# Patient Record
Sex: MALE | Race: BLACK OR AFRICAN AMERICAN | Employment: OTHER | URBAN - METROPOLITAN AREA
[De-identification: names, ages, dates, MRNs, and addresses within clinical notes are randomized per-mention and may not be internally consistent; named-entity substitution may affect disease eponyms.]

---

## 2017-01-17 ENCOUNTER — HOSPITAL ENCOUNTER (OUTPATIENT)
Dept: LAB | Age: 62
Discharge: HOME | End: 2017-01-17
Payer: MEDICARE

## 2017-01-17 ENCOUNTER — OFFICE VISIT (OUTPATIENT)
Dept: INTERNAL MEDICINE CLINIC | Age: 62
End: 2017-01-17

## 2017-01-17 VITALS
BODY MASS INDEX: 38.61 KG/M2 | RESPIRATION RATE: 18 BRPM | HEART RATE: 63 BPM | HEIGHT: 67 IN | OXYGEN SATURATION: 96 % | SYSTOLIC BLOOD PRESSURE: 104 MMHG | TEMPERATURE: 98.7 F | DIASTOLIC BLOOD PRESSURE: 55 MMHG | WEIGHT: 246 LBS

## 2017-01-17 DIAGNOSIS — I10 ESSENTIAL HYPERTENSION: ICD-10-CM

## 2017-01-17 DIAGNOSIS — E78.00 HYPERCHOLESTEROLEMIA: ICD-10-CM

## 2017-01-17 DIAGNOSIS — M25.512 ACUTE PAIN OF LEFT SHOULDER: Primary | ICD-10-CM

## 2017-01-17 LAB
ALBUMIN SERPL BCP-MCNC: 3.6 G/DL (ref 3.4–5)
ALBUMIN/GLOB SERPL: 1 {RATIO} (ref 0.8–1.7)
ALP SERPL-CCNC: 85 U/L (ref 45–117)
ALT SERPL-CCNC: 17 U/L (ref 16–61)
ANION GAP BLD CALC-SCNC: 10 MMOL/L (ref 3–18)
AST SERPL W P-5'-P-CCNC: 11 U/L (ref 15–37)
BILIRUB SERPL-MCNC: 0.5 MG/DL (ref 0.2–1)
BUN SERPL-MCNC: 17 MG/DL (ref 7–18)
BUN/CREAT SERPL: 14 (ref 12–20)
CALCIUM SERPL-MCNC: 8.6 MG/DL (ref 8.5–10.1)
CHLORIDE SERPL-SCNC: 106 MMOL/L (ref 100–108)
CHOLEST SERPL-MCNC: 147 MG/DL
CO2 SERPL-SCNC: 26 MMOL/L (ref 21–32)
CREAT SERPL-MCNC: 1.23 MG/DL (ref 0.6–1.3)
GLOBULIN SER CALC-MCNC: 3.5 G/DL (ref 2–4)
GLUCOSE SERPL-MCNC: 128 MG/DL (ref 74–99)
HDLC SERPL-MCNC: 37 MG/DL (ref 40–60)
HDLC SERPL: 4 {RATIO} (ref 0–5)
LDLC SERPL CALC-MCNC: 82.2 MG/DL (ref 0–100)
LIPID PROFILE,FLP: ABNORMAL
POTASSIUM SERPL-SCNC: 4.1 MMOL/L (ref 3.5–5.5)
PROT SERPL-MCNC: 7.1 G/DL (ref 6.4–8.2)
SODIUM SERPL-SCNC: 142 MMOL/L (ref 136–145)
TRIGL SERPL-MCNC: 139 MG/DL (ref ?–150)
VLDLC SERPL CALC-MCNC: 27.8 MG/DL

## 2017-01-17 PROCEDURE — 80061 LIPID PANEL: CPT | Performed by: INTERNAL MEDICINE

## 2017-01-17 PROCEDURE — 80053 COMPREHEN METABOLIC PANEL: CPT | Performed by: INTERNAL MEDICINE

## 2017-01-17 PROCEDURE — 36415 COLL VENOUS BLD VENIPUNCTURE: CPT | Performed by: INTERNAL MEDICINE

## 2017-01-17 RX ORDER — METHYLPREDNISOLONE 4 MG/1
TABLET ORAL
Qty: 1 DOSE PACK | Refills: 0 | Status: SHIPPED | OUTPATIENT
Start: 2017-01-17 | End: 2017-01-24 | Stop reason: ALTCHOICE

## 2017-01-17 NOTE — PROGRESS NOTES
Tc Gold is a 59 y.o.  male and presents with Shoulder Pain (Left )      SUBJECTIVE:    Shoulder Pain  Patient complains of left side shoulder pain. The symptoms began 2 weeks ago Course of symptoms since onset has been symptoms have progressed to a point and plateaued. . Pain is described as worse with overhead movements and worse at night. Symptoms were incited by injury while lifting a gate. Patient denies fever. Therapy to date includes opioids: somewhat effective. Respiratory ROS: negative for - shortness of breath  Cardiovascular ROS: negative for - chest pain    Current Outpatient Prescriptions   Medication Sig    methylPREDNISolone (MEDROL DOSEPACK) 4 mg tablet Take as directed    metoprolol tartrate (LOPRESSOR) 25 mg tablet TAKE ONE TABLET BY MOUTH TWICE DAILY ( GENERIC FOR LOPRESSOR )    oxyCODONE-acetaminophen (PERCOCET 10)  mg per tablet Take 1 Tab by mouth every four (4) hours as needed for Pain. Max Daily Amount: 6 Tabs.  tamsulosin (FLOMAX) 0.4 mg capsule Take 1 Cap by mouth daily (after dinner).  pravastatin (PRAVACHOL) 40 mg tablet Take 1 Tab by mouth nightly.  traZODone (DESYREL) 50 mg tablet TAKE ONE TABLET BY MOUTH ONCE DAILY AT BEDTIME AS NEEDED FOR SLEEP    gabapentin (NEURONTIN) 300 mg capsule TAKE ONE CAPSULE BY MOUTH THREE TIMES DAILY    montelukast (SINGULAIR) 10 mg tablet Take 1 Tab by mouth daily.  tolterodine (DETROL) 2 mg tablet TAKE ONE TABLET BY MOUTH TWICE DAILY    esomeprazole (NEXIUM) 40 mg capsule TAKE ONE CAPSULE BY MOUTH ONE TIME DAILY    TRAVATAN Z 0.004 % ophthalmic solution Administer 1 Drop to both eyes nightly.  fluticasone (FLONASE) 50 mcg/actuation nasal spray 2 Sprays by Both Nostrils route daily.     ibuprofen (MOTRIN) 800 mg tablet TAKE ONE TABLET BY MOUTH EVERY 8 HOURS AS NEEDED FOR PAIN    albuterol (PROVENTIL HFA, VENTOLIN HFA, PROAIR HFA) 90 mcg/actuation inhaler Take 2 Puffs by inhalation every four (4) hours as needed for Wheezing.  lactulose (CHRONULAC) 10 gram/15 mL solution TAKE 45 MILLILITERS BY MOUTH THREE TIMES DAILY    traMADol (ULTRAM) 50 mg tablet Take 1 Tab by mouth every six (6) hours as needed for Pain. Max Daily Amount: 200 mg.  cyclobenzaprine (FLEXERIL) 10 mg tablet Take 1 Tab by mouth nightly.  fluocinoNIDE (LIDEX) 0.05 % topical cream Apply BID to affected area BID for 1 week    Loperamide (IMODIUM A-D) 2 mg chew Sig: Take 1 tab Q 6H as needed for diarrhea    nitroglycerin (NITROSTAT) 0.4 mg SL tablet by SubLINGual route every five (5) minutes as needed. No current facility-administered medications for this visit. OBJECTIVE:  alert, well appearing, and in no distress  Visit Vitals    /55 (BP 1 Location: Left arm, BP Patient Position: Sitting)    Pulse 63    Temp 98.7 °F (37.1 °C) (Oral)    Resp 18    Ht 5' 7\" (1.702 m)    Wt 246 lb (111.6 kg)    SpO2 96%    BMI 38.53 kg/m2      well developed and well nourished  Musculoskeletal - pain in left shoulder with internal and external rotation. Pain left shoulder with active abduction         Assessment/Plan      ICD-10-CM ICD-9-CM    1. Acute pain of left shoulder. Possible rotator cuff injury. M25.512 719.41 REFERRAL TO ORTHOPEDIC SURGERY      Will treat with methylPREDNISolone (MEDROL DOSEPACK) 4 mg tablet and continue Percocet      Follow-up Disposition:  Return if symptoms worsen or fail to improve. Reviewed plan of care. Patient has provided input and agrees with goals.

## 2017-01-17 NOTE — PROGRESS NOTES
Patient is in the office today for left shoulder pain 5/10. Patient states 2 weeks ago patient was lifting his arm up and states he heard a tear in his shoulder. Do you have an Advance Directive no  Do you want more information declined      1. Have you been to the ER, urgent care clinic since your last visit? Hospitalized since your last visit? No    2. Have you seen or consulted any other health care providers outside of the [address] [place] [address] since your last visit? Include any pap smears or colon screening.  No

## 2017-01-17 NOTE — MR AVS SNAPSHOT
Visit Information Date & Time Provider Department Dept. Phone Encounter #  
 2017  2:00 PM Prosper Hall MD [facility] [place] [address] [id number] Follow-up Instructions Return if symptoms worsen or fail to improve. Your Appointments 2017  8:30 AM  
ROUTINE CARE with Prosper Hall MD  
[facility] (--) Appt Note: Return in about 3 months (around 2017) for Labs 1 week before. [address] [facility] [id number] [phone]  
  
   
 [address] [place] [id number]  
  
    
  
 3/20/2017  8:30 AM  
Any with Destiny Hopkins MD  
[facility] ([facility]) Appt Note: 3 mnth f/u (no notes per pt) [facility] [address] [place] [address]  
[address] [facility] [place][note 2] [address] [place] [address] [place] [address] Upcoming Health Maintenance Date Due DTaP/Tdap/Td series (1 - Tdap) 1974 FOBT Q 1 YEAR, 18+ 10/15/2016 Pneumococcal 19-64 Medium Risk (1 of 1 - PPSV23) 2017* COLONOSCOPY 2020 *Topic was postponed. The date shown is not the original due date. Allergies as of 2017  Review Complete On: 2017 By: Peter Sosa LPN Severity Noted Reaction Type Reactions Ace Inhibitors  2010    Cough Dilaudid [Hydromorphone]  2010    Hives Lisinopril  2010    Cough Zocor [Simvastatin]  2011    Myalgia Right leg Current Immunizations  Reviewed on 10/12/2016 Name Date Influenza Vaccine 2015 Influenza Vaccine (Quad) PF 10/12/2016, 10/15/2015 Zoster Vaccine, Live 10/22/2015 Not reviewed this visit You Were Diagnosed With   
  
 Codes Comments Acute pain of left shoulder    -  Primary ICD-10-CM: M25.512 ICD-9-CM: 719.41 Vitals BP Pulse Temp Resp Height(growth percentile) Weight(growth percentile) 104/55 (BP 1 Location: Left arm, BP Patient Position: Sitting) 63 98.7 °F (37.1 °C) (Oral) 18 5' 7\" (1.702 m) 246 lb (111.6 kg) SpO2 BMI Smoking Status 96% 38.53 kg/m2 Smoker, Current Status Unknown Vitals History BMI and BSA Data Body Mass Index Body Surface Area 38.53 kg/m 2 2.3 m 2 Preferred Pharmacy Pharmacy Name Phone [facility] [address] [place], [address] Your Updated Medication List  
  
   
This list is accurate as of: 1/17/17  2:20 PM.  Always use your most recent med list.  
  
  
  
  
 albuterol 90 mcg/actuation inhaler Commonly known as:  PROVENTIL HFA, VENTOLIN HFA, PROAIR HFA Take 2 Puffs by inhalation every four (4) hours as needed for Wheezing. cyclobenzaprine 10 mg tablet Commonly known as:  FLEXERIL Take 1 Tab by mouth nightly. esomeprazole 40 mg capsule Commonly known as:  NexIUM  
TAKE ONE CAPSULE BY MOUTH ONE TIME DAILY  
  
 fluocinoNIDE 0.05 % topical cream  
Commonly known as:  LIDEX Apply BID to affected area BID for 1 week  
  
 fluticasone 50 mcg/actuation nasal spray Commonly known as:  [facility] 2 Sprays by Both Nostrils route daily. gabapentin 300 mg capsule Commonly known as:  NEURONTIN  
TAKE ONE CAPSULE BY MOUTH THREE TIMES DAILY  
  
 ibuprofen 800 mg tablet Commonly known as:  MOTRIN  
TAKE ONE TABLET BY MOUTH EVERY 8 HOURS AS NEEDED FOR PAIN  
  
 lactulose 10 gram/15 mL solution Commonly known as:  Venu [employer]ensing TAKE 45 MILLILITERS BY MOUTH THREE TIMES DAILY Loperamide 2 mg [address] [facility] Commonly known as:  IMODIUM A-D Sig: Take 1 tab Q 6H as needed for diarrhea  
  
 methylPREDNISolone 4 mg tablet Commonly known as:  Albina Allis Take as directed  
  
 metoprolol tartrate 25 mg tablet Commonly known as:  LOPRESSOR  
TAKE ONE TABLET BY MOUTH TWICE DAILY ( GENERIC FOR LOPRESSOR )  
  
 montelukast 10 mg tablet Commonly known as:  SINGULAIR Take 1 Tab by mouth daily. nitroglycerin 0.4 mg SL tablet Commonly known as:  NITROSTAT  
by SubLINGual route every five (5) minutes as needed. oxyCODONE-acetaminophen  mg per tablet Commonly known as:  PERCOCET 10 Take 1 Tab by mouth every four (4) hours as needed for Pain. Max Daily Amount: 6 Tabs. pravastatin 40 mg tablet Commonly known as:  PRAVACHOL Take 1 Tab by mouth nightly. tamsulosin 0.4 mg capsule Commonly known as:  FLOMAX Take 1 Cap by mouth daily (after dinner). tolterodine 2 mg tablet Commonly known as:  DETROL  
TAKE ONE TABLET BY MOUTH TWICE DAILY  
  
 traMADol 50 mg tablet Commonly known as:  ULTRAM  
Take 1 Tab by mouth every six (6) hours as needed for Pain. Max Daily Amount: 200 mg. TRAVATAN Z 0.004 % ophthalmic solution Generic drug:  travoprost  
Administer 1 Drop to both eyes nightly. traZODone 50 mg tablet Commonly known as:  DESYREL  
TAKE ONE TABLET BY MOUTH ONCE DAILY AT BEDTIME AS NEEDED FOR SLEEP Prescriptions Printed Refills  
 methylPREDNISolone (MEDROL DOSEPACK) 4 mg tablet 0 Sig: Take as directed Class: Print We Performed the Following REFERRAL TO ORTHOPEDIC SURGERY [REF62 Custom] Comments:  
 Refer to Dr Natan Blake for left shoulder pain Follow-up Instructions Return if symptoms worsen or fail to improve. Referral Information Referral ID Referred By Referred To  
  
 [id number] WILLIAM MENARD MD   
   [address] [place] [address], [address] Phone: [phone] Fax: [phone] Visits Status Start Date End Date 1 New Request 1/17/17 1/17/18 If your referral has a status of pending review or denied, additional information will be sent to support the outcome of this decision. Patient Instructions Shoulder Pain: Care Instructions Your Care Instructions You can hurt your shoulder by using it too much during an activity, such as fishing or baseball. It can also happen as part of the everyday wear and tear of getting older. Shoulder injuries can be slow to heal, but your shoulder should get better with time. Your doctor may recommend a sling to rest your shoulder. If you have injured your shoulder, you may need testing and treatment. Follow-up care is a key part of your treatment and safety. Be sure to make and go to all appointments, and call your doctor if you are having problems. It's also a good idea to know your test results and keep a list of the medicines you take. How can you care for yourself at home? · Take pain medicines exactly as directed. ¨ If the doctor gave you a prescription medicine for pain, take it as prescribed. ¨ If you are not taking a prescription pain medicine, ask your doctor if you can take an over-the-counter medicine. ¨ Do not take two or more pain medicines at the same time unless the doctor told you to. Many pain medicines contain acetaminophen, which is Tylenol. Too much acetaminophen (Tylenol) can be harmful. · If your doctor recommends that you wear a sling, use it as directed. Do not take it off before your doctor tells you to. · Put ice or a cold pack on the sore area for 10 to 20 minutes at a time. Put a thin cloth between the ice and your skin. · If there is no swelling, you can put moist heat, a heating pad, or a warm cloth on your shoulder. Some doctors suggest alternating between hot and cold. · Rest your shoulder for a few days. If your doctor recommends it, you can then begin gentle exercise of the shoulder, but do not lift anything heavy. When should you call for help? Call 911 anytime you think you may need emergency care. For example, call if: 
· You have chest pain or pressure. This may occur with: ¨ Sweating. ¨ Shortness of breath. ¨ Nausea or vomiting. ¨ Pain that spreads from the chest to the neck, jaw, or one or both shoulders or arms. ¨ Dizziness or lightheadedness. ¨ A fast or uneven pulse. After calling 911, chew 1 adult-strength aspirin. Wait for an ambulance. Do not try to drive yourself. · Your arm or hand is cool or pale or changes color. Call your doctor now or seek immediate medical care if: 
· You have signs of infection, such as: 
¨ Increased pain, swelling, warmth, or redness in your shoulder. ¨ Red streaks leading from a place on your shoulder. ¨ Pus draining from an area of your shoulder. ¨ Swollen lymph nodes in your neck, armpits, or groin. ¨ A fever. Watch closely for changes in your health, and be sure to contact your doctor if: 
· You cannot use your shoulder. · Your shoulder does not get better as expected. Where can you learn more? Go to http://samreen-tabitha.info/. Enter B153 in the search box to learn more about \"Shoulder Pain: Care Instructions. \" Current as of: May 23, 2016 Content Version: 11.1 © 8362-9058 [employer]. Care instructions adapted under license by [employer] (which disclaims liability or warranty for this information). If you have questions about a medical condition or this instruction, always ask your healthcare professional. [place] [address] any warranty or liability for your use of this information. Introducing [facility]! Ashwin Santos introduces Feusd patient portal. Now you can access parts of your medical record, email your doctor's office, and request medication refills online. 1. In your internet browser, go to [email or web address] 2. Click on the First Time User? Click Here link in the Sign In box. You will see the New Member Sign Up page. 3. Enter your Feusd Access Code exactly as it appears below. You will not need to use this code after youve completed the sign-up process.  If you do not sign up before the expiration date, you must request a new code. · WhereNet Access Code: [email or web address] Expires: 4/17/2017  1:47 PM 
 
4. Enter the last four digits of your Social Security Number (xxxx) and Date of Birth (mm/dd/yyyy) as indicated and click Submit. You will be taken to the next sign-up page. 5. Create a WhereNet ID. This will be your WhereNet login ID and cannot be changed, so think of one that is secure and easy to remember. 6. Create a WhereNet password. You can change your password at any time. 7. Enter your Password Reset Question and Answer. This can be used at a later time if you forget your password. 8. Enter your e-mail address. You will receive e-mail notification when new information is available in [address] 9. Click Sign Up. You can now view and download portions of your medical record. 10. Click the Download Summary menu link to download a portable copy of your medical information. If you have questions, please visit the Frequently Asked Questions section of the WhereNet website. Remember, WhereNet is NOT to be used for urgent needs. For medical emergencies, dial 911. Now available from your iPhone and Android! Please provide this summary of care documentation to your next provider. Your primary care clinician is listed as WILLIAM MENARD. If you have any questions after today's visit, please call [phone].

## 2017-01-23 ENCOUNTER — TELEPHONE (OUTPATIENT)
Dept: INTERNAL MEDICINE CLINIC | Age: 62
End: 2017-01-23

## 2017-01-23 NOTE — TELEPHONE ENCOUNTER
Pt had a reaction to the methylpred medication pt broke out in a rash and sweat. Please review and send something else in.

## 2017-01-24 ENCOUNTER — OFFICE VISIT (OUTPATIENT)
Dept: INTERNAL MEDICINE CLINIC | Age: 62
End: 2017-01-24

## 2017-01-24 VITALS
BODY MASS INDEX: 38.3 KG/M2 | WEIGHT: 244 LBS | DIASTOLIC BLOOD PRESSURE: 67 MMHG | HEIGHT: 67 IN | RESPIRATION RATE: 20 BRPM | TEMPERATURE: 98.4 F | OXYGEN SATURATION: 98 % | SYSTOLIC BLOOD PRESSURE: 106 MMHG | HEART RATE: 62 BPM

## 2017-01-24 DIAGNOSIS — M54.50 CHRONIC MIDLINE LOW BACK PAIN WITHOUT SCIATICA: ICD-10-CM

## 2017-01-24 DIAGNOSIS — G89.29 CHRONIC MIDLINE LOW BACK PAIN WITHOUT SCIATICA: ICD-10-CM

## 2017-01-24 DIAGNOSIS — E78.5 DYSLIPIDEMIA, GOAL LDL BELOW 100: ICD-10-CM

## 2017-01-24 DIAGNOSIS — R73.9 HIGH BLOOD SUGAR: ICD-10-CM

## 2017-01-24 DIAGNOSIS — I10 ESSENTIAL HYPERTENSION: Primary | ICD-10-CM

## 2017-01-24 DIAGNOSIS — J40 BRONCHITIS: ICD-10-CM

## 2017-01-24 RX ORDER — CODEINE PHOSPHATE AND GUAIFENESIN 10; 100 MG/5ML; MG/5ML
10 SOLUTION ORAL
Qty: 120 ML | Refills: 0 | Status: SHIPPED | OUTPATIENT
Start: 2017-01-24 | End: 2017-02-13 | Stop reason: SDUPTHER

## 2017-01-24 RX ORDER — ALBUTEROL SULFATE 90 UG/1
2 AEROSOL, METERED RESPIRATORY (INHALATION)
Qty: 1 INHALER | Refills: 5 | Status: SHIPPED | OUTPATIENT
Start: 2017-01-24 | End: 2018-11-21 | Stop reason: SDUPTHER

## 2017-01-24 NOTE — PROGRESS NOTES
Patient is in the office today for a 3 month follow up. Patient states he has left shoulder pain at night and has an appointment to see Orthopedic. Do you have an Advance Directive no  Do you want more information declined    1. Have you been to the ER, urgent care clinic since your last visit? Hospitalized since your last visit? No    2. Have you seen or consulted any other health care providers outside of the [address] [place] [address] since your last visit? Include any pap smears or colon screening.  No

## 2017-01-24 NOTE — MR AVS SNAPSHOT
Visit Information Date & Time Provider Department Dept. Phone Encounter #  
 1/24/2017  8:30 AM Dada García MD [facility] 8 [id number] Follow-up Instructions Return in about 4 months (around 5/24/2017) for OV, and Medicare Wellness Visit, labs 1 week before. Your Appointments 2/3/2017 10:20 AM  
Follow Up with Jaxon Delacruz PA-C  
[facility] ([address] [place] [address]) Appt Note: RT HIP FU  
 [address], [address] [facility] [address]  
[phone] [address] [facility], [address] [facility] [address]  
  
    
 2/7/2017  9:40 AM  
PROBLEM VISIT with Ryanne Aguilar MD  
[address] [facility], [address] [facility] ([address] [place] [address]) Appt Note: LT SHLDR PAIN NXYS REF DR DIEUDONNE MENARD MEDICARE AND MEDICAID ID INS CARD MED LIST ARRIVE [address] [facility], [address] [facility] [address]  
[phone] [address] [facility], [address]  
  
    
  
 3/20/2017  8:30 AM  
Any with Alan Coburn MD  
[facility] ([address] [place] [address]) Appt Note: 3 mnth f/u (no notes per pt) [address] [place] [id number]  
[address] [facility] [address] [place] [address] Upcoming Health Maintenance Date Due DTaP/Tdap/Td series (1 - Tdap) 7/9/1974 FOBT Q 1 YEAR, 18+ 10/15/2016 Pneumococcal 19-64 Medium Risk (1 of 1 - PPSV23) 1/31/2017* COLONOSCOPY 4/1/2020 *Topic was postponed. The date shown is not the original due date. Allergies as of 1/24/2017  Review Complete On: 1/24/2017 By: Cody Chavez LPN Severity Noted Reaction Type Reactions Ace Inhibitors  05/17/2010    Cough Dilaudid [Hydromorphone]  05/17/2010    Hives Lisinopril  05/17/2010    Cough Zocor [Simvastatin]  07/30/2011    Myalgia Right leg Current Immunizations  Reviewed on 10/12/2016 Name Date Influenza Vaccine 1/2/2015 Influenza Vaccine (Quad) PF 10/12/2016, 10/15/2015 Zoster Vaccine, Live 10/22/2015 Not reviewed this visit You Were Diagnosed With   
  
 Codes Comments Essential hypertension    -  Primary ICD-10-CM: I10 
ICD-9-CM: 401.9 Dyslipidemia, goal LDL below 100     ICD-10-CM: E78.5 ICD-9-CM: 272.4 Chronic midline low back pain without sciatica     ICD-10-CM: M54.5, G89.29 ICD-9-CM: 724.2, 338.29 Prediabetes     ICD-10-CM: R73.03 
ICD-9-CM: 790.29 Bronchitis     ICD-10-CM: J40 ICD-9-CM: 639 Vitals BP Pulse Temp Resp Height(growth percentile) Weight(growth percentile) 106/67 (BP 1 Location: Left arm, BP Patient Position: Sitting) 62 98.4 °F (36.9 °C) (Oral) 20 5' 7\" (1.702 m) 244 lb (110.7 kg) SpO2 BMI Smoking Status 98% 38.22 kg/m2 Smoker, Current Status Unknown Vitals History BMI and BSA Data Body Mass Index Body Surface Area  
 38.22 kg/m 2 2.29 m 2 Preferred Pharmacy Pharmacy Name Phone [facility] [address], [address] Your Updated Medication List  
  
   
This list is accurate as of: 1/24/17  8:32 AM.  Always use your most recent med list.  
  
  
  
  
 albuterol 90 mcg/actuation inhaler Commonly known as:  PROVENTIL HFA, VENTOLIN HFA, PROAIR HFA Take 2 Puffs by inhalation every four (4) hours as needed for Wheezing. cyclobenzaprine 10 mg tablet Commonly known as:  FLEXERIL Take 1 Tab by mouth nightly. esomeprazole 40 mg capsule Commonly known as:  NexIUM  
TAKE ONE CAPSULE BY MOUTH ONE TIME DAILY  
  
 fluocinoNIDE 0.05 % topical cream  
Commonly known as:  LIDEX Apply BID to affected area BID for 1 week  
  
 fluticasone 50 mcg/actuation nasal spray Commonly known as:  Lindalee Aspermont 2 Sprays by Both Nostrils route daily. gabapentin 300 mg capsule Commonly known as:  NEURONTIN  
 TAKE ONE CAPSULE BY MOUTH THREE TIMES DAILY  
  
 guaiFENesin-codeine 100-10 mg/5 mL solution Commonly known as:  [place] Take 10 mL by mouth four (4) times daily as needed for Cough. Max Daily Amount: 40 mL. ibuprofen 800 mg tablet Commonly known as:  MOTRIN  
TAKE ONE TABLET BY MOUTH EVERY 8 HOURS AS NEEDED FOR PAIN  
  
 lactulose 10 gram/15 mL solution Commonly known as:  Kelsie Juan Miguel TAKE 45 MILLILITERS BY MOUTH THREE TIMES DAILY Loperamide 2 mg Norva Lacks Commonly known as:  IMODIUM A-D Sig: Take 1 tab Q 6H as needed for diarrhea  
  
 metoprolol tartrate 25 mg tablet Commonly known as:  LOPRESSOR  
TAKE ONE TABLET BY MOUTH TWICE DAILY ( GENERIC FOR LOPRESSOR )  
  
 montelukast 10 mg tablet Commonly known as:  SINGULAIR Take 1 Tab by mouth daily. nitroglycerin 0.4 mg SL tablet Commonly known as:  NITROSTAT  
by SubLINGual route every five (5) minutes as needed. oxyCODONE-acetaminophen  mg per tablet Commonly known as:  PERCOCET 10 Take 1 Tab by mouth every four (4) hours as needed for Pain. Max Daily Amount: 6 Tabs. pravastatin 40 mg tablet Commonly known as:  PRAVACHOL Take 1 Tab by mouth nightly. tamsulosin 0.4 mg capsule Commonly known as:  FLOMAX Take 1 Cap by mouth daily (after dinner). tolterodine 2 mg tablet Commonly known as:  DETROL  
TAKE ONE TABLET BY MOUTH TWICE DAILY  
  
 traMADol 50 mg tablet Commonly known as:  ULTRAM  
Take 1 Tab by mouth every six (6) hours as needed for Pain. Max Daily Amount: 200 mg. TRAVATAN Z 0.004 % ophthalmic solution Generic drug:  travoprost  
Administer 1 Drop to both eyes nightly. traZODone 50 mg tablet Commonly known as:  DESYREL  
TAKE ONE TABLET BY MOUTH ONCE DAILY AT BEDTIME AS NEEDED FOR SLEEP Prescriptions Printed  Refills  
 guaiFENesin-codeine (CHERATUSSIN AC) 100-10 mg/5 mL solution 0  
 Sig: Take 10 mL by mouth four (4) times daily as needed for Cough. Max Daily Amount: 40 mL. Class: Print Route: Oral  
  
Prescriptions Sent to Pharmacy Refills  
 albuterol (PROVENTIL HFA, VENTOLIN HFA, PROAIR HFA) 90 mcg/actuation inhaler 5 Sig: Take 2 Puffs by inhalation every four (4) hours as needed for Wheezing. Class: Normal  
 Pharmacy: [address] [facility] #: [phone] Route: Inhalation Follow-up Instructions Return in about 4 months (around 5/24/2017) for OV, and Medicare Wellness Visit, labs 1 week before. Patient Instructions Heart-Healthy Diet: Care Instructions Your Care Instructions A heart-healthy diet has lots of vegetables, fruits, nuts, beans, and whole grains, and is low in salt. It limits foods that are high in saturated fat, such as meats, cheeses, and fried foods. It may be hard to change your diet, but even small changes can lower your risk of heart attack and heart disease. Follow-up care is a key part of your treatment and safety. Be sure to make and go to all appointments, and call your doctor if you are having problems. It's also a good idea to know your test results and keep a list of the medicines you take. How can you care for yourself at home? Watch your portions · Learn what a serving is. A \"serving\" and a \"portion\" are not always the same thing. Make sure that you are not eating larger portions than are recommended. For example, a serving of pasta is ½ cup. A serving size of meat is 2 to 3 ounces. A 3-ounce serving is about the size of a deck of cards. Measure serving sizes until you are good at Emmet" them. Keep in mind that restaurants often serve portions that are 2 or 3 times the size of one serving. · To keep your energy level up and keep you from feeling hungry, eat often but in smaller portions. · Eat only the number of calories you need to stay at a healthy weight. If you need to lose weight, eat fewer calories than your body burns (through exercise and other physical activity). Eat more fruits and vegetables · Eat a variety of fruit and vegetables every day. Dark green, deep orange, red, or yellow fruits and vegetables are especially good for you. Examples include spinach, carrots, peaches, and berries. · Keep carrots, celery, and other veggies handy for snacks. Buy fruit that is in season and store it where you can see it so that you will be tempted to eat it. · Cook dishes that have a lot of veggies in them, such as stir-fries and soups. Limit saturated and trans fat · Read food labels, and try to avoid saturated and trans fats. They increase your risk of heart disease. Trans fat is found in many processed foods such as cookies and crackers. · Use olive or canola oil when you cook. Try cholesterol-lowering spreads, such as Benecol or Take Control. · Bake, broil, grill, or steam foods instead of frying them. · Choose lean meats instead of high-fat meats such as hot dogs and sausages. Cut off all visible fat when you prepare meat. · Eat fish, skinless poultry, and meat alternatives such as soy products instead of high-fat meats. Soy products, such as tofu, may be especially good for your heart. · Choose low-fat or fat-free milk and dairy products. Eat fish · Eat at least two servings of fish a week. Certain fish, such as salmon and tuna, contain omega-3 fatty acids, which may help reduce your risk of heart attack. Eat foods high in fiber · Eat a variety of grain products every day. Include whole-grain foods that have lots of fiber and nutrients. Examples of whole-grain foods include oats, whole wheat bread, and brown rice. · Buy whole-grain breads and cereals, instead of white bread or pastries. Limit salt and sodium · Limit how much salt and sodium you eat to help lower your blood pressure. · Taste food before you salt it. Add only a little salt when you think you need it. With time, your taste buds will adjust to less salt. · Eat fewer snack items, fast foods, and other high-salt, processed foods. Check food labels for the amount of sodium in packaged foods. · Choose low-sodium versions of canned goods (such as soups, vegetables, and beans). Limit sugar · Limit drinks and foods with added sugar. These include candy, desserts, and soda pop. Limit alcohol · Limit alcohol to no more than 2 drinks a day for men and 1 drink a day for women. Too much alcohol can cause health problems. When should you call for help? Watch closely for changes in your health, and be sure to contact your doctor if: 
· You would like help planning heart-healthy meals. Where can you learn more? Go to http://samreen"[employer]tabitha.info/. Enter V137 in the search box to learn more about \"Heart-Healthy Diet: Care Instructions. \" Current as of: January 27, 2016 Content Version: 11.1 © 3462-9892 [employer] Care instructions adapted under license by [employer] (which disclaims liability or warranty for this information). If you have questions about a medical condition or this instruction, always ask your healthcare professional. [address] any warranty or liability for your use of this information. Introducing [facility]! [employer] introduces [employer] patient portal. Now you can access parts of your medical record, email your doctor's office, and request medication refills online. 1. In your internet browser, go to [email or web address] 2. Click on the First Time User? Click Here link in the Sign In box. You will see the New Member Sign Up page. 3. Enter your [employer]ouch[employer] Access Code exactly as it appears below. You will not need to use this code after youve completed the sign-up process.  If you do not sign up before the expiration date, you must request a new code. · [employer] Access Code: [email or web address] Expires: 4/17/2017  1:47 PM 
 
4. Enter the last four digits of your Social Security Number (xxxx) and Date of Birth (mm/dd/yyyy) as indicated and click Submit. You will be taken to the next sign-up page. 5. Create a [employer] ID. This will be your [employer] login ID and cannot be changed, so think of one that is secure and easy to remember. 6. Create a [employer] password. You can change your password at any time. 7. Enter your Password Reset Question and Answer. This can be used at a later time if you forget your password. 8. Enter your e-mail address. You will receive e-mail notification when new information is available in [address] 9. Click Sign Up. You can now view and download portions of your medical record. 10. Click the Download Summary menu link to download a portable copy of your medical information. If you have questions, please visit the Frequently Asked Questions section of the [employer] website. Remember, [employer] is NOT to be used for urgent needs. For medical emergencies, dial 911. Now available from your iPhone and Android! Please provide this summary of care documentation to your next provider. Your primary care clinician is listed as WILLIAM MENARD. If you have any questions after today's visit, please call [phone].

## 2017-01-25 NOTE — PROGRESS NOTES
Subjective:       Chief Complaint  The patient presents for follow up of hypertension and high cholesterol. prediabetes  chronic back pain and right hip pain, and leg pain         HPI  Ceasar Layton is a 59 y.o. male seen for follow up of hyperlipidemia. Nathalia has hypertension. Hypertension well controlled, no significant medication side effects noted, on Lopressor, hyperlipidemia well controlled, no significant medication side effects noted, on Pravachol 40 mg, pt had myalgias on Zocor. Diet and Lifestyle: not attempting to follow a low fat, low cholesterol diet, exercises sporadically    Home BP Monitoring: is not measured at home. Other symptoms and concerns: Osteoarthritis and Chronic Pain:  Patient has osteoarthritis, primarily affecting the back. Symptoms onset: several days ago. Rheumatological ROS: using narcotic analgesics regularly daily in stable pattern with good pain control and good quality of life. Pt was given motrin 800 mg so he can reduce his percocet use. He is currently just using 3 percocet/day as well as ultram.     Prediabetes: pt is trying to control with diet and weight loss but it continues to be an issue due to his obesity     Pt c/o cough and congestion for the last week that is productive of clear mucus. Discussed the patient's BMI with him. The BMI follow up plan is as follows: BMI is out of normal parameters and plan is as follows: I have counseled this patient on diet and exercise regimens. Current Outpatient Prescriptions   Medication Sig    albuterol (PROVENTIL HFA, VENTOLIN HFA, PROAIR HFA) 90 mcg/actuation inhaler Take 2 Puffs by inhalation every four (4) hours as needed for Wheezing.  guaiFENesin-codeine (CHERATUSSIN AC) 100-10 mg/5 mL solution Take 10 mL by mouth four (4) times daily as needed for Cough. Max Daily Amount: 40 mL.     metoprolol tartrate (LOPRESSOR) 25 mg tablet TAKE ONE TABLET BY MOUTH TWICE DAILY ( GENERIC FOR LOPRESSOR )    oxyCODONE-acetaminophen (PERCOCET 10)  mg per tablet Take 1 Tab by mouth every four (4) hours as needed for Pain. Max Daily Amount: 6 Tabs.  tamsulosin (FLOMAX) 0.4 mg capsule Take 1 Cap by mouth daily (after dinner).  pravastatin (PRAVACHOL) 40 mg tablet Take 1 Tab by mouth nightly.  traZODone (DESYREL) 50 mg tablet TAKE ONE TABLET BY MOUTH ONCE DAILY AT BEDTIME AS NEEDED FOR SLEEP    gabapentin (NEURONTIN) 300 mg capsule TAKE ONE CAPSULE BY MOUTH THREE TIMES DAILY    traMADol (ULTRAM) 50 mg tablet Take 1 Tab by mouth every six (6) hours as needed for Pain. Max Daily Amount: 200 mg.    montelukast (SINGULAIR) 10 mg tablet Take 1 Tab by mouth daily.  tolterodine (DETROL) 2 mg tablet TAKE ONE TABLET BY MOUTH TWICE DAILY    fluocinoNIDE (LIDEX) 0.05 % topical cream Apply BID to affected area BID for 1 week    esomeprazole (NEXIUM) 40 mg capsule TAKE ONE CAPSULE BY MOUTH ONE TIME DAILY    TRAVATAN Z 0.004 % ophthalmic solution Administer 1 Drop to both eyes nightly.  fluticasone (FLONASE) 50 mcg/actuation nasal spray 2 Sprays by Both Nostrils route daily.  ibuprofen (MOTRIN) 800 mg tablet TAKE ONE TABLET BY MOUTH EVERY 8 HOURS AS NEEDED FOR PAIN    lactulose (CHRONULAC) 10 gram/15 mL solution TAKE 45 MILLILITERS BY MOUTH THREE TIMES DAILY    cyclobenzaprine (FLEXERIL) 10 mg tablet Take 1 Tab by mouth nightly.  Loperamide (IMODIUM A-D) 2 mg chew Sig: Take 1 tab Q 6H as needed for diarrhea    nitroglycerin (NITROSTAT) 0.4 mg SL tablet by SubLINGual route every five (5) minutes as needed. No current facility-administered medications for this visit.               Review of Systems  Respiratory: negative for dyspnea on exertion  Cardiovascular: negative for chest pain    Objective:     Visit Vitals    /67 (BP 1 Location: Left arm, BP Patient Position: Sitting)    Pulse 62    Temp 98.4 °F (36.9 °C) (Oral)    Resp 20    Ht 5' 7\" (1.702 m)    Wt 244 lb (110.7 kg)    SpO2 98%  BMI 38.22 kg/m2        General appearance - alert, well appearing, and in no distress  Neck - supple, no significant adenopathy, carotids upstroke normal bilaterally, no bruits  Chest - clear to auscultation, no wheezes, rales or rhonchi, symmetric air entry  Heart - normal rate, regular rhythm, normal S1, S2, no murmurs, rubs, clicks or gallops  Extremities - peripheral pulses normal, no pedal edema, no clubbing or cyanosis  Skin - normal coloration and turgor, no rashes, no suspicious skin lesions noted      Labs:   Lab Results   Component Value Date/Time    Hemoglobin A1c 5.5 10/18/2016 08:33 AM    Hemoglobin A1c 6.1 01/15/2016 09:03 AM    Hemoglobin A1c 5.9 01/08/2016 07:48 AM    Glucose 128 01/17/2017 09:07 AM    Glucose (POC) 123 01/24/2016 04:57 PM    Glucose (POC) 116 04/01/2015 08:56 AM    Microalb/Creat ratio (ug/mg creat.) 4.4 04/29/2015 03:00 PM    LDL, calculated 82.2 01/17/2017 09:07 AM    Creatinine 1.23 01/17/2017 09:07 AM      Lab Results   Component Value Date/Time    Cholesterol, total 147 01/17/2017 09:07 AM    HDL Cholesterol 37 01/17/2017 09:07 AM    LDL, calculated 82.2 01/17/2017 09:07 AM    Triglyceride 139 01/17/2017 09:07 AM    CHOL/HDL Ratio 4.0 01/17/2017 09:07 AM     Lab Results   Component Value Date/Time    ALT 17 01/17/2017 09:07 AM    AST 11 01/17/2017 09:07 AM    Alk.  phosphatase 85 01/17/2017 09:07 AM    Bilirubin, total 0.5 01/17/2017 09:07 AM     Lab Results   Component Value Date/Time    GFR est AA >60 01/17/2017 09:07 AM    GFR est non-AA 59 01/17/2017 09:07 AM    Creatinine 1.23 01/17/2017 09:07 AM    BUN 17 01/17/2017 09:07 AM    BUN (POC) 6 04/01/2015 08:56 AM    Sodium (POC) 142 04/01/2015 08:56 AM    Sodium 142 01/17/2017 09:07 AM    Potassium 4.1 01/17/2017 09:07 AM    Potassium (POC) 4.0 04/01/2015 08:56 AM    Chloride (POC) 107 04/01/2015 08:56 AM    Chloride 106 01/17/2017 09:07 AM    CO2 26 01/17/2017 09:07 AM      Lab Results   Component Value Date/Time Glucose 128 01/17/2017 09:07 AM    Glucose (POC) 123 01/24/2016 04:57 PM    Glucose (POC) 116 04/01/2015 08:56 AM            Assessment / Plan     Hypertension well controlled, on Lopressor   Hyperlipidemia well controlled, on Pravachol 40 mg. ICD-10-CM ICD-9-CM    1. Essential hypertension K01 523.9 METABOLIC PANEL, COMPREHENSIVE   2. Dyslipidemia, goal LDL below 100 E78.5 272.4 LIPID PANEL   3. Chronic midline low back pain without sciatica M54.5 724.2 Stable on Percocet and Ultram.     G89.29 338.29    4. High blood sugar R73.9 790.29 Pt trying to control with diet currently HEMOGLOBIN A1C W/O EAG   5. Bronchitis J40 490 albuterol (PROVENTIL HFA, VENTOLIN HFA, PROAIR HFA) 90 mcg/actuation inhaler      guaiFENesin-codeine (CHERATUSSIN AC) 100-10 mg/5 mL solution             Low cholesterol diet, weight control and daily exercise discussed. Follow-up Disposition:  Return in about 4 months (around 5/24/2017) for OV, and Medicare Wellness Visit, labs 1 week before. Reviewed plan of care. Patient has provided input and agrees with goals.

## 2017-01-27 ENCOUNTER — TELEPHONE (OUTPATIENT)
Dept: INTERNAL MEDICINE CLINIC | Age: 62
End: 2017-01-27

## 2017-01-27 DIAGNOSIS — M54.5 CHRONIC MIDLINE LOW BACK PAIN, WITH SCIATICA PRESENCE UNSPECIFIED: ICD-10-CM

## 2017-01-27 DIAGNOSIS — G89.29 CHRONIC MIDLINE LOW BACK PAIN, WITH SCIATICA PRESENCE UNSPECIFIED: ICD-10-CM

## 2017-01-27 RX ORDER — OXYCODONE AND ACETAMINOPHEN 10; 325 MG/1; MG/1
1 TABLET ORAL
Qty: 90 TAB | Refills: 0 | Status: SHIPPED | OUTPATIENT
Start: 2017-01-27 | End: 2017-02-13 | Stop reason: SDUPTHER

## 2017-01-27 NOTE — TELEPHONE ENCOUNTER
Requested Prescriptions     Pending Prescriptions Disp Refills    oxyCODONE-acetaminophen (PERCOCET 10)  mg per tablet 90 Tab 0     Sig: Take 1 Tab by mouth every four (4) hours as needed for Pain. Max Daily Amount: 6 Tabs.        Last office visit was  1/24/17  Next office visit is     5/24/17    90 tabs prescribed 12/30/16  Please assist.

## 2017-02-03 ENCOUNTER — OFFICE VISIT (OUTPATIENT)
Dept: ORTHOPEDIC SURGERY | Age: 62
End: 2017-02-03

## 2017-02-03 VITALS
DIASTOLIC BLOOD PRESSURE: 59 MMHG | TEMPERATURE: 98.9 F | HEIGHT: 67 IN | BODY MASS INDEX: 37.79 KG/M2 | SYSTOLIC BLOOD PRESSURE: 104 MMHG | HEART RATE: 76 BPM | WEIGHT: 240.8 LBS

## 2017-02-03 DIAGNOSIS — Z96.641 STATUS POST RIGHT HIP REPLACEMENT: Primary | ICD-10-CM

## 2017-02-03 NOTE — PROGRESS NOTES
[address] [place] [address]  [phone]           Patient: Nanci Knox                MRN: [id number]       SSN: [id number]  YOB: 1955        AGE: 59 y.o. SEX: male  Body mass index is 37.71 kg/(m^2). PCP: Bhupendra Mauro MD  02/03/17      This office note has been dictated. REVIEW OF SYSTEMS:  Constitutional: Negative for fever, chills, weight loss and malaise/fatigue. HENT: Negative. Eyes: Negative. Respiratory: Negative. Cardiovascular: Negative. Gastrointestinal: No bowel incontinence or constipation. Genitourinary: No bladder incontinence or saddle anesthesia. Skin: Negative. Neurological: Negative. Endo/Heme/Allergies: Negative. Psychiatric/Behavioral: Negative. Musculoskeletal: As per HPI above. Past Medical History   Diagnosis Date    Allergic rhinitis     Avascular necrosis (HCC)     CAD (coronary artery disease) 4/2009     mild, hemodynamically non-significant by angiography    Carpal tunnel syndrome     Carpal tunnel syndrome on both sides     Chest pain, unspecified     Chronic back pain 2/11/2011    Chronic obstructive pulmonary disease ([address]     Degenerative joint disease      with chronic back pain    Dysesthesia      of hand post carpal tunnel surgery v sudeck's atrophy    Dyslipidemia, goal LDL below 100     Dysphagia     GERD (gastroesophageal reflux disease) 5/17/2010    Glaucoma 9/15/2010    Glaucoma     Hearing loss     Hypercholesterolemia     Hypertension     Joint fusion      of right wrist    Morbid obesity ([address]     Obesity 5/17/2010    Other dyspnea and respiratory abnormality     Palpitations     Right hip pain     Right hip pain     Right knee pain     S/P cardiac catheterization 4/9/2009     1. Normal systemic pressure. 2. Moderate elevation of left-sided filling pressures.  3. Preserved left ventricular systolic function in the LOWE projection. 4. Mild, nonsignificant epicardial coronary artery disese by angiography.  Sleep apnea      on CPAP    Strain of lumbar region     Tobacco abuse          Current Outpatient Prescriptions:     oxyCODONE-acetaminophen (PERCOCET 10)  mg per tablet, Take 1 Tab by mouth every four (4) hours as needed for Pain. Max Daily Amount: 6 Tabs., Disp: 90 Tab, Rfl: 0    albuterol (PROVENTIL HFA, VENTOLIN HFA, PROAIR HFA) 90 mcg/actuation inhaler, Take 2 Puffs by inhalation every four (4) hours as needed for Wheezing., Disp: 1 Inhaler, Rfl: 5    guaiFENesin-codeine (CHERATUSSIN AC) 100-10 mg/5 mL solution, Take 10 mL by mouth four (4) times daily as needed for Cough. Max Daily Amount: 40 mL., Disp: 120 mL, Rfl: 0    metoprolol tartrate (LOPRESSOR) 25 mg tablet, TAKE ONE TABLET BY MOUTH TWICE DAILY ( GENERIC FOR LOPRESSOR ), Disp: 180 Tab, Rfl: 3    tamsulosin (FLOMAX) 0.4 mg capsule, Take 1 Cap by mouth daily (after dinner). , Disp: 90 Cap, Rfl: 3    fluticasone (FLONASE) 50 mcg/actuation nasal spray, 2 Sprays by Both Nostrils route daily. , Disp: 1 Bottle, Rfl: 5    ibuprofen (MOTRIN) 800 mg tablet, TAKE ONE TABLET BY MOUTH EVERY 8 HOURS AS NEEDED FOR PAIN, Disp: 90 Tab, Rfl: 2    pravastatin (PRAVACHOL) 40 mg tablet, Take 1 Tab by mouth nightly., Disp: 90 Tab, Rfl: 3    traZODone (DESYREL) 50 mg tablet, TAKE ONE TABLET BY MOUTH ONCE DAILY AT BEDTIME AS NEEDED FOR SLEEP, Disp: 30 Tab, Rfl: 2    lactulose (CHRONULAC) 10 gram/15 mL solution, TAKE 45 MILLILITERS BY MOUTH THREE TIMES DAILY, Disp: 480 mL, Rfl: 5    gabapentin (NEURONTIN) 300 mg capsule, TAKE ONE CAPSULE BY MOUTH THREE TIMES DAILY, Disp: 90 Cap, Rfl: 4    traMADol (ULTRAM) 50 mg tablet, Take 1 Tab by mouth every six (6) hours as needed for Pain. Max Daily Amount: 200 mg., Disp: 100 Tab, Rfl: 0    montelukast (SINGULAIR) 10 mg tablet, Take 1 Tab by mouth daily. , Disp: 30 Tab, Rfl: 5    tolterodine (DETROL) 2 mg tablet, TAKE ONE TABLET BY MOUTH TWICE DAILY, Disp: 60 Tab, Rfl: 0    cyclobenzaprine (FLEXERIL) 10 mg tablet, Take 1 Tab by mouth nightly., Disp: 30 Tab, Rfl: 1    fluocinoNIDE (LIDEX) 0.05 % topical cream, Apply BID to affected area BID for 1 week, Disp: 30 g, Rfl: 0    Loperamide (IMODIUM A-D) 2 mg chew, Sig: Take 1 tab Q 6H as needed for diarrhea, Disp: 30 Tab, Rfl: 0    esomeprazole (NEXIUM) 40 mg capsule, TAKE ONE CAPSULE BY MOUTH ONE TIME DAILY, Disp: 30 Cap, Rfl: 5    TRAVATAN Z 0.004 % ophthalmic solution, Administer 1 Drop to both eyes nightly., Disp: , Rfl:     nitroglycerin (NITROSTAT) 0.4 mg SL tablet, by SubLINGual route every five (5) minutes as needed. , Disp: , Rfl:     Allergies   Allergen Reactions    Ace Inhibitors Cough    Dilaudid [Hydromorphone] Hives    Lisinopril Cough    Zocor [Simvastatin] Myalgia     Right leg       Social History     Social History    Marital status: SINGLE     Spouse name: N/A    Number of children: N/A    Years of education: N/A     Occupational History    Not on file. Social History Main Topics    Smoking status: Former Smoker     Quit date: 5/17/2009    Smokeless tobacco: Never Used      Comment: urothelial cancer risk discussed today [id number]    Alcohol use No    Drug use: No    Sexual activity: Not on file     Other Topics Concern    Not on file     Social History Narrative       Past Surgical History   Procedure Laterality Date    Hx tonsillectomy      Hx heart catheterization  4/9/2009     1. Normal systemic pressure. 2. Moderate elevation of left-sided filling pressures. 3. Preserved left ventricular systolic function in the LOWE projection. 4. Mild, nonsignificant epicardial coronary artery disese by angiography.  Hx carpal tunnel release       bilateral wrists    Hx colonoscopy             We did see Mr. Wilkerson Has for follow-up in regards to his right hip. He is status post right hip replacement.  He is doing well and is quite happy with the results of the hip replacement. He is now approximately one year status post surgery. He is currently in physical therapy in regards to his back. His family doctor ordered physical therapy for him. It is improving it. He does have a waist belt as well, which helps him. He has no radicular pain at this point. There has been no change in bowel or bladder habits. He has had no recent fevers, chills, systemic changes, or injuries to report. PHYSICAL EXAMINATION: In general the patient is alert and oriented x 3 and is in no acute distress. The patient is well-developed and well-nourished with a normal affect. The patient is afebrile. HEENT:  Head is normocephalic and atraumatic. Pupils are equally round and reactive to light and accommodation. Extraocular eye movements are intact. Neck is supple. Trachea is midline. No JVD is present. Breathing is nonlabored. Examination the lower extremities reveals pain free range of motion of the hips. There is no pain with palpation to the trochanteric bursae. The surgical wound on the right hip has healed up nicely. There is no erythema or ecchymosis. There is no warmth or signs for infection or cellulitis. Abduction strength is 5/5 and symmetric bilaterally. There is no pain with rotation of the hip. RADIOGRAPHS:  Review of radiographs including AP pelvis and AP and crosstable lateral of the right hip reveals the total hip components to be well fixed and in excellent position without evidence for loosening or fracture noted. ASSESSMENT:  Status post right hip replacement. PLAN:  At this point the patient will continue activities as tolerated. He will continue with physical therapy in regards to his back. Certainly we can refer him to The [address] [place] [address] if he is not improving, but it seems like he is doing quite well. We will plan on seeing him back in a years time for reevaluation and repeat X-ray of the right hip or sooner if necessary.                    [facility] MPAS, PA-C, ATC

## 2017-02-07 ENCOUNTER — OFFICE VISIT (OUTPATIENT)
Dept: ORTHOPEDIC SURGERY | Age: 62
End: 2017-02-07

## 2017-02-07 VITALS
SYSTOLIC BLOOD PRESSURE: 120 MMHG | WEIGHT: 240 LBS | HEART RATE: 59 BPM | BODY MASS INDEX: 37.67 KG/M2 | TEMPERATURE: 98.6 F | DIASTOLIC BLOOD PRESSURE: 70 MMHG | HEIGHT: 67 IN

## 2017-02-07 DIAGNOSIS — M75.112 INCOMPLETE TEAR OF LEFT ROTATOR CUFF: ICD-10-CM

## 2017-02-07 DIAGNOSIS — M25.512 LEFT SHOULDER PAIN, UNSPECIFIED CHRONICITY: Primary | ICD-10-CM

## 2017-02-07 RX ORDER — MELOXICAM 15 MG/1
15 TABLET ORAL
Qty: 30 TAB | Refills: 1 | Status: SHIPPED | OUTPATIENT
Start: 2017-02-07 | End: 2017-03-16

## 2017-02-07 RX ORDER — HYDROCODONE BITARTRATE AND ACETAMINOPHEN 5; 325 MG/1; MG/1
1 TABLET ORAL
Qty: 40 TAB | Refills: 0 | Status: SHIPPED | OUTPATIENT
Start: 2017-02-07 | End: 2017-02-21 | Stop reason: ALTCHOICE

## 2017-02-11 ENCOUNTER — HOSPITAL ENCOUNTER (OUTPATIENT)
Age: 62
Discharge: HOME | End: 2017-02-11
Attending: ORTHOPAEDIC SURGERY
Payer: MEDICARE

## 2017-02-11 DIAGNOSIS — M25.512 LEFT SHOULDER PAIN, UNSPECIFIED CHRONICITY: ICD-10-CM

## 2017-02-11 DIAGNOSIS — M75.112 INCOMPLETE TEAR OF LEFT ROTATOR CUFF: ICD-10-CM

## 2017-02-11 PROCEDURE — 73221 MRI JOINT UPR EXTREM W/O DYE: CPT

## 2017-02-13 ENCOUNTER — TELEPHONE (OUTPATIENT)
Dept: INTERNAL MEDICINE CLINIC | Age: 62
End: 2017-02-13

## 2017-02-13 DIAGNOSIS — M54.5 CHRONIC MIDLINE LOW BACK PAIN, WITH SCIATICA PRESENCE UNSPECIFIED: ICD-10-CM

## 2017-02-13 DIAGNOSIS — Z79.891 LONG TERM PRESCRIPTION OPIATE USE: Primary | ICD-10-CM

## 2017-02-13 DIAGNOSIS — J40 BRONCHITIS: ICD-10-CM

## 2017-02-13 DIAGNOSIS — G89.29 CHRONIC MIDLINE LOW BACK PAIN, WITH SCIATICA PRESENCE UNSPECIFIED: ICD-10-CM

## 2017-02-13 RX ORDER — BENZONATATE 100 MG/1
100 CAPSULE ORAL
Qty: 21 CAP | Refills: 0 | Status: SHIPPED | OUTPATIENT
Start: 2017-02-13 | End: 2017-02-20

## 2017-02-13 RX ORDER — CODEINE PHOSPHATE AND GUAIFENESIN 10; 100 MG/5ML; MG/5ML
10 SOLUTION ORAL
Qty: 120 ML | Refills: 0 | Status: SHIPPED | OUTPATIENT
Start: 2017-02-13 | End: 2017-03-16

## 2017-02-13 NOTE — TELEPHONE ENCOUNTER
Pt calling per [facility] [address], Cheritussin  is not covered by ins    Pt asked is there another medication Dr Alicia Waller can prescribe

## 2017-02-13 NOTE — TELEPHONE ENCOUNTER
Called and spoke with the pt. Informed him that the provider sent medication to the pharmacy for him. (cough pills)  He verbalized understanding.

## 2017-02-13 NOTE — TELEPHONE ENCOUNTER
Requested Prescriptions     Pending Prescriptions Disp Refills    oxyCODONE-acetaminophen (PERCOCET 10)  mg per tablet 90 Tab 0     Sig: Take 1 Tab by mouth every four (4) hours as needed for Pain. Max Daily Amount: 6 Tabs.        Last office visit was  1/24/17  Next office visit is     5/24/17    90 tabs prescribed 1/27/17  Please assist.

## 2017-02-13 NOTE — TELEPHONE ENCOUNTER
C/o cold and cough with thick white mucus for 2 weeks now. .    Or if he can take something OTC just advise. Devika Linton

## 2017-02-21 RX ORDER — OXYCODONE AND ACETAMINOPHEN 10; 325 MG/1; MG/1
1 TABLET ORAL
Qty: 90 TAB | Refills: 0 | Status: SHIPPED | OUTPATIENT
Start: 2017-02-21 | End: 2017-04-03 | Stop reason: SDUPTHER

## 2017-02-24 ENCOUNTER — HOSPITAL ENCOUNTER (OUTPATIENT)
Dept: LAB | Age: 62
Discharge: HOME | End: 2017-02-24
Payer: MEDICARE

## 2017-02-24 DIAGNOSIS — M54.5 CHRONIC MIDLINE LOW BACK PAIN, WITH SCIATICA PRESENCE UNSPECIFIED: ICD-10-CM

## 2017-02-24 DIAGNOSIS — Z79.891 LONG TERM PRESCRIPTION OPIATE USE: ICD-10-CM

## 2017-02-24 DIAGNOSIS — G89.29 CHRONIC MIDLINE LOW BACK PAIN, WITH SCIATICA PRESENCE UNSPECIFIED: ICD-10-CM

## 2017-02-24 PROCEDURE — 80307 DRUG TEST PRSMV CHEM ANLYZR: CPT | Performed by: INTERNAL MEDICINE

## 2017-02-25 LAB
AMPHETAMINES UR QL SCN: NEGATIVE NG/ML
BARBITURATES UR QL: NEGATIVE NG/ML
BENZODIAZ UR QL: NEGATIVE NG/ML
BZE UR QL: NEGATIVE NG/ML
CANNABINOIDS UR QL SCN: NEGATIVE NG/ML
CREAT UR-MCNC: 126.9 MG/DL (ref 20–300)
ETHANOL UR-MCNC: NEGATIVE %
MEPERIDINE UR QL: NEGATIVE NG/ML
METHADONE UR QL: NEGATIVE NG/ML
OPIATES UR QL SCN: NEGATIVE NG/ML
OXYCODONE+OXYMORPHONE UR QL SCN: NEGATIVE NG/ML
PCP UR QL: NEGATIVE NG/ML
PROPOXYPH UR QL: NEGATIVE NG/ML
TRAMADOL UR QL SCN: NEGATIVE NG/ML

## 2017-02-28 ENCOUNTER — OFFICE VISIT (OUTPATIENT)
Dept: ORTHOPEDIC SURGERY | Age: 62
End: 2017-02-28

## 2017-02-28 VITALS
HEART RATE: 54 BPM | WEIGHT: 248 LBS | TEMPERATURE: 98.4 F | SYSTOLIC BLOOD PRESSURE: 133 MMHG | BODY MASS INDEX: 38.92 KG/M2 | HEIGHT: 67 IN | DIASTOLIC BLOOD PRESSURE: 73 MMHG

## 2017-02-28 DIAGNOSIS — M67.922 BICEPS TENDINOPATHY, LEFT: ICD-10-CM

## 2017-02-28 DIAGNOSIS — S46.812D PARTIAL TEAR SUBSCAPULARIS TENDON, LEFT, SUBSEQUENT ENCOUNTER: ICD-10-CM

## 2017-02-28 DIAGNOSIS — M75.112 INCOMPLETE TEAR OF LEFT ROTATOR CUFF: Primary | ICD-10-CM

## 2017-02-28 NOTE — PROGRESS NOTES
Karlos Blandon  1955   Chief Complaint   Patient presents with    Shoulder Pain     Left        HISTORY OF PRESENT ILLNESS  Karlos Blandon is a 59 y.o. male who presents today for reevaluation of left shoulder pain present for several weeks. He states he was lifting a gate off the ground when he felt a \"tearing\" sensation. He rates his pain 5/10 today. He states the pain affects his daily activities and is increased by certain activities. He has trouble sleeping at night due to the pain. Patient denies any fever, chills, chest pain, shortness of breath or calf pain. There are no new illness or injuries to report since last seen in the office. There are no changes to medications, allergies, family or social history. PHYSICAL EXAM:   Visit Vitals    /73    Pulse (!) 54    Temp 98.4 °F (36.9 °C) (Oral)    Ht 5' 7\" (1.702 m)    Wt 248 lb (112.5 kg)    BMI 38.84 kg/m2     The patient is a well-developed, well-nourished male   in no acute distress. The patient is alert and oriented times three. The patient is alert and oriented times three. Mood and affect are normal.  LYMPHATIC: lymph nodes are not enlarged and are within normal limits  SKIN: normal in color and non tender to palpation. There are no bruises or abrasions noted. NEUROLOGICAL: Motor sensory exam is within normal limits. Reflexes are equal bilaterally.  There is normal sensation to pinprick and light touch  MUSCULOSKELETAL:  Examination Left shoulder   Skin Intact   AC joint tenderness -   Biceps tenderness -   Forward flexion/Elevation    Active abduction    Glenohumeral abduction 70   External rotation ROM 70   Internal rotation ROM 60   Apprehension -   Chaus Relocation -   Jerk -   Load and Shift -   Obriens -   Speeds -   Impingement sign -   Supraspinatus/Empty Can +, 4/5   External Rotation Strength -, 5/5   Lift Off/Belly Press -, 5/5   Neurovascular Intact              IMAGING: MRI of the left shoulder dated 2/11/17 was reviewed and read:   IMPRESSION:  1. Moderate to severe supraspinatus and infraspinatus tendinosis. Partial thickness tear of supraspinatus tendon at its insertion. Partial thickness tear of subscapularis tendon at its insertion. 2. Moderate biceps tenosynovitis. Suspect biceps tendon rupture and retraction.         IMPRESSION:      ICD-10-CM ICD-9-CM    1. Incomplete tear of left rotator cuff M75.112 840.4    2. Partial tear subscapularis tendon, left, subsequent encounter S43.82XD V58.89      840.5    3. Biceps tendinopathy, left M67.922 727.9         PLAN: The MRI results and treatment options were discussed with the patient. I discussed the risks and benefits and potential adverse outcomes of both operative vs non operative treatment of rotator cuff tear with the patient. Patient wishes to proceed with rotator cuff repair, subscapularis repair, and biceps tendonesis. Risks of operative intervention include but not limited to bleeding, infection, deep vein thrombosis, pulmonary embolism, death, limb length discrepancy, reflexive sympathetic dystrophy, fat embolism syndrome,damage to blood vessels and nerves, malunion, non-union, delayed union, failure of hardware, post traumatic arthritis, stroke, heart attack, and death. Patient understands that infection may arise and may require numerous surgeries. History and physical exam scheduled for a later date.     Follow-up Disposition: Not on File    Scribed by Sabina Madsen[address]) as dictated by MD Mele Helm M.D.   [employer]

## 2017-03-02 DIAGNOSIS — M75.112 INCOMPLETE TEAR OF LEFT ROTATOR CUFF: Primary | ICD-10-CM

## 2017-03-02 DIAGNOSIS — Z01.818 PREOP EXAMINATION: ICD-10-CM

## 2017-03-09 ENCOUNTER — HOSPITAL ENCOUNTER (OUTPATIENT)
Dept: LAB | Age: 62
Discharge: HOME | End: 2017-03-09
Payer: MEDICARE

## 2017-03-09 DIAGNOSIS — Z01.818 PREOP EXAMINATION: ICD-10-CM

## 2017-03-09 LAB
ANION GAP BLD CALC-SCNC: 5 MMOL/L (ref 3–18)
BASOPHILS # BLD AUTO: 0 K/UL (ref 0–0.06)
BASOPHILS # BLD: 0 % (ref 0–3)
BUN SERPL-MCNC: 15 MG/DL (ref 7–18)
BUN/CREAT SERPL: 14 (ref 12–20)
CALCIUM SERPL-MCNC: 9.1 MG/DL (ref 8.5–10.1)
CHLORIDE SERPL-SCNC: 108 MMOL/L (ref 100–108)
CO2 SERPL-SCNC: 28 MMOL/L (ref 21–32)
CREAT SERPL-MCNC: 1.07 MG/DL (ref 0.6–1.3)
DIFFERENTIAL METHOD BLD: ABNORMAL
EOSINOPHIL # BLD: 0.4 K/UL (ref 0–0.4)
EOSINOPHIL NFR BLD: 5 % (ref 0–5)
ERYTHROCYTE [DISTWIDTH] IN BLOOD BY AUTOMATED COUNT: 14.5 % (ref 11.6–14.5)
GLUCOSE SERPL-MCNC: 97 MG/DL (ref 74–99)
HCT VFR BLD AUTO: 39.6 % (ref 36–48)
HGB BLD-MCNC: 13 G/DL (ref 13–16)
LYMPHOCYTES # BLD AUTO: 35 % (ref 20–51)
LYMPHOCYTES # BLD: 2.5 K/UL (ref 0.8–3.5)
MCH RBC QN AUTO: 29.8 PG (ref 24–34)
MCHC RBC AUTO-ENTMCNC: 32.8 G/DL (ref 31–37)
MCV RBC AUTO: 90.8 FL (ref 74–97)
MONOCYTES # BLD: 0.6 K/UL (ref 0–1)
MONOCYTES NFR BLD AUTO: 9 % (ref 2–9)
NEUTS BAND NFR BLD MANUAL: 5 % (ref 0–5)
NEUTS SEG # BLD: 3.6 K/UL (ref 1.8–8)
NEUTS SEG NFR BLD AUTO: 46 % (ref 42–75)
PLATELET # BLD AUTO: 211 K/UL (ref 135–420)
PLATELET COMMENTS,PCOM: ABNORMAL
PMV BLD AUTO: 11.3 FL (ref 9.2–11.8)
POTASSIUM SERPL-SCNC: 4.4 MMOL/L (ref 3.5–5.5)
RBC # BLD AUTO: 4.36 M/UL (ref 4.7–5.5)
RBC MORPH BLD: ABNORMAL
SODIUM SERPL-SCNC: 141 MMOL/L (ref 136–145)
WBC # BLD AUTO: 7.1 K/UL (ref 4.6–13.2)
WBC MORPH BLD: ABNORMAL

## 2017-03-09 PROCEDURE — 80048 BASIC METABOLIC PNL TOTAL CA: CPT | Performed by: PHYSICIAN ASSISTANT

## 2017-03-09 PROCEDURE — 36415 COLL VENOUS BLD VENIPUNCTURE: CPT | Performed by: PHYSICIAN ASSISTANT

## 2017-03-09 PROCEDURE — 85025 COMPLETE CBC W/AUTO DIFF WBC: CPT | Performed by: PHYSICIAN ASSISTANT

## 2017-03-16 ENCOUNTER — OFFICE VISIT (OUTPATIENT)
Dept: INTERNAL MEDICINE CLINIC | Age: 62
End: 2017-03-16

## 2017-03-16 VITALS
HEIGHT: 67 IN | BODY MASS INDEX: 39.24 KG/M2 | TEMPERATURE: 98.3 F | HEART RATE: 60 BPM | WEIGHT: 250 LBS | SYSTOLIC BLOOD PRESSURE: 130 MMHG | OXYGEN SATURATION: 98 % | DIASTOLIC BLOOD PRESSURE: 78 MMHG | RESPIRATION RATE: 16 BRPM

## 2017-03-16 DIAGNOSIS — E66.09 OBESITY DUE TO EXCESS CALORIES, UNSPECIFIED OBESITY SEVERITY: ICD-10-CM

## 2017-03-16 DIAGNOSIS — I51.9 MILD DIASTOLIC DYSFUNCTION: ICD-10-CM

## 2017-03-16 DIAGNOSIS — G47.30 SLEEP APNEA, UNSPECIFIED TYPE: ICD-10-CM

## 2017-03-16 DIAGNOSIS — I10 ESSENTIAL HYPERTENSION: ICD-10-CM

## 2017-03-16 DIAGNOSIS — Z01.818 PREOPERATIVE CLEARANCE: Primary | ICD-10-CM

## 2017-03-16 RX ORDER — NITROGLYCERIN 0.4 MG/1
0.4 TABLET SUBLINGUAL
Qty: 6 TAB | Refills: 1 | Status: SHIPPED | OUTPATIENT
Start: 2017-03-16

## 2017-03-16 NOTE — MR AVS SNAPSHOT
Visit Information Date & Time Provider Department Dept. Phone Encounter #  
 3/16/2017  9:30 AM DAVID Crawford-CAMILA [facility] [phone] Follow-up Instructions Return if symptoms worsen or fail to improve, for scheduled visit with Dr. Zoë Davila. Your Appointments 3/17/2017  2:00 PM  
HISTORY AND PHYSICAL with DAVID Mejias  
[facility] Appt Note: LEFT SHOULDER ARTHROSCOPIC ROTATOR CUFF REPAIR, SUBSCAPULARIS REPAIR WITH BICEPS TENODESIS  
 [address], [address] [facility] [address]  
[phone] [address], [address]  
  
    
 3/30/2017  9:00 AM  
POST OP with DAVID Mejias  
[facility] Appt Note: LEFT SHOULDER ARTHROSCOPIC ROTATOR CUFF REPAIR, SUBSCAPULARIS REPAIR WITH BICEPS TENODESIS  
 [address], [address] [facility] [address]  
[phone] [address] [facility], [address] 5/17/2017  8:15 AM  
LAB with Peg Penaloza MD  
[facility] (--) Appt Note: 4 mo f/u lab [address] [address]  
[phone]  
  
   
 [address] [place] [address] [place] [address]  
  
    
 5/24/2017  8:30 AM  
ROUTINE CARE with Peg Penaloza MD  
[facility] (--) Appt Note: 4 mo f/u & mwv [facility] [address] [address]  
[phone]  
  
   
 [address] [place] [address] [place] [address]  
  
    
  
 3/20/2017  8:30 AM  
Any with Vickey Colon MD  
[facility] Appt Note: 3 mnth f/u (no notes per pt) [address] [place] [address]  
[address] [facility] [address] [facility] [address] Upcoming Health Maintenance  Date Due  
 DTaP/Tdap/Td series (1 - Tdap) 7/9/1974 FOBT Q 1 YEAR, 18+ 10/15/2016 COLONOSCOPY 4/1/2020 Allergies as of 3/16/2017  Review Complete On: 3/16/2017 By: Roosevelt Trinidad PA-C Severity Noted Reaction Type Reactions Ace Inhibitors  05/17/2010    Cough Dilaudid [Hydromorphone]  05/17/2010    Hives Lisinopril  05/17/2010    Cough Zocor [Simvastatin]  07/30/2011    Myalgia Right leg Current Immunizations  Reviewed on 10/12/2016 Name Date Influenza Vaccine 1/2/2015 Influenza Vaccine (Quad) PF 10/12/2016, 10/15/2015 Zoster Vaccine, Live 10/22/2015 Not reviewed this visit You Were Diagnosed With   
  
 Codes Comments Preoperative clearance    -  Primary ICD-10-CM: B48.196 ICD-9-CM: V72.84 Obesity due to excess calories, unspecified obesity severity     ICD-10-CM: E66.09 
ICD-9-CM: 278.00 Sleep apnea, unspecified type     ICD-10-CM: G47.30 ICD-9-CM: 780.57 Essential hypertension     ICD-10-CM: I10 
ICD-9-CM: 401.9 Vitals BP Pulse Temp Resp Height(growth percentile) Weight(growth percentile) 130/78 (BP 1 Location: Right arm, BP Patient Position: Sitting) 60 98.3 °F (36.8 °C) (Oral) 16 5' 7\" (1.702 m) 250 lb (113.4 kg) SpO2 BMI Smoking Status 98% 39.16 kg/m2 Former Smoker Vitals History BMI and BSA Data Body Mass Index Body Surface Area  
 39.16 kg/m 2 2.32 m 2 Preferred Pharmacy Pharmacy Name Phone [facility] [address] [place] [address] Your Updated Medication List  
  
   
This list is accurate as of: 3/16/17  9:39 AM.  Always use your most recent med list.  
  
  
  
  
 albuterol 90 mcg/actuation inhaler Commonly known as:  PROVENTIL HFA, VENTOLIN HFA, PROAIR HFA Take 2 Puffs by inhalation every four (4) hours as needed for Wheezing. cyclobenzaprine 10 mg tablet Commonly known as:  FLEXERIL Take 1 Tab by mouth nightly. esomeprazole 40 mg capsule Commonly known as:  NexIUM  
TAKE ONE CAPSULE BY MOUTH ONE TIME DAILY  
  
 fluticasone 50 mcg/actuation nasal spray Commonly known as:  Imagene Poot 2 Sprays by Both Nostrils route daily. gabapentin 300 mg capsule Commonly known as:  NEURONTIN  
TAKE ONE CAPSULE BY MOUTH THREE TIMES DAILY  
  
 ibuprofen 800 mg tablet Commonly known as:  MOTRIN  
TAKE ONE TABLET BY MOUTH EVERY 8 HOURS AS NEEDED FOR PAIN  
  
 metoprolol tartrate 25 mg tablet Commonly known as:  LOPRESSOR  
TAKE ONE TABLET BY MOUTH TWICE DAILY ( GENERIC FOR LOPRESSOR )  
  
 nitroglycerin 0.4 mg SL tablet Commonly known as:  NITROSTAT  
1 Tab by SubLINGual route every five (5) minutes as needed. oxyCODONE-acetaminophen  mg per tablet Commonly known as:  PERCOCET 10 Take 1 Tab by mouth every four (4) hours as needed for Pain. Max Daily Amount: 6 Tabs. pravastatin 40 mg tablet Commonly known as:  PRAVACHOL Take 1 Tab by mouth nightly. tamsulosin 0.4 mg capsule Commonly known as:  FLOMAX Take 1 Cap by mouth daily (after dinner). tolterodine 2 mg tablet Commonly known as:  DETROL  
TAKE ONE TABLET BY MOUTH TWICE DAILY  
  
 TRAVATAN Z 0.004 % ophthalmic solution Generic drug:  travoprost  
Administer 1 Drop to both eyes nightly. traZODone 50 mg tablet Commonly known as:  DESYREL  
TAKE ONE TABLET BY MOUTH ONCE DAILY AT BEDTIME AS NEEDED FOR SLEEP Prescriptions Sent to Pharmacy Refills  
 nitroglycerin (NITROSTAT) 0.4 mg SL tablet 1 Si Tab by SubLINGual route every five (5) minutes as needed. Class: Normal  
 Pharmacy: [address] [place] [address] #: [phone] Route: SubLINGual  
  
Follow-up Instructions Return if symptoms worsen or fail to improve, for scheduled visit with Dr. Desiree Reid.   
  
To-Do List   
 2017 12:00 PM  
 Appointment with Emi French, PT at [facility] ([phone]) Patient Instructions DASH Diet: Care Instructions Your Care Instructions The DASH diet is an eating plan that can help lower your blood pressure. DASH stands for Dietary Approaches to Stop Hypertension. Hypertension is high blood pressure. The DASH diet focuses on eating foods that are high in calcium, potassium, and magnesium. These nutrients can lower blood pressure. The foods that are highest in these nutrients are fruits, vegetables, low-fat dairy products, nuts, seeds, and legumes. But taking calcium, potassium, and magnesium supplements instead of eating foods that are high in those nutrients does not have the same effect. The DASH diet also includes whole grains, fish, and poultry. The DASH diet is one of several lifestyle changes your doctor may recommend to lower your high blood pressure. Your doctor may also want you to decrease the amount of sodium in your diet. Lowering sodium while following the DASH diet can lower blood pressure even further than just the DASH diet alone. Follow-up care is a key part of your treatment and safety. Be sure to make and go to all appointments, and call your doctor if you are having problems. It's also a good idea to know your test results and keep a list of the medicines you take. How can you care for yourself at home? Following the DASH diet · Eat 4 to 5 servings of fruit each day. A serving is 1 medium-sized piece of fruit, ½ cup chopped or canned fruit, 1/4 cup dried fruit, or 4 ounces (½ cup) of fruit juice. Choose fruit more often than fruit juice. · Eat 4 to 5 servings of vegetables each day. A serving is 1 cup of lettuce or raw leafy vegetables, ½ cup of chopped or cooked vegetables, or 4 ounces (½ cup) of vegetable juice. Choose vegetables more often than vegetable juice. · Get 2 to 3 servings of low-fat and fat-free dairy each day.  A serving is 8 ounces of milk, 1 cup of yogurt, or 1 ½ ounces of cheese. · Eat 6 to 8 servings of grains each day. A serving is 1 slice of bread, 1 ounce of dry cereal, or ½ cup of cooked rice, pasta, or cooked cereal. Try to choose whole-grain products as much as possible. · Limit lean meat, poultry, and fish to 2 servings each day. A serving is 3 ounces, about the size of a deck of cards. · Eat 4 to 5 servings of nuts, seeds, and legumes (cooked dried beans, lentils, and split peas) each week. A serving is 1/3 cup of nuts, 2 tablespoons of seeds, or ½ cup of cooked beans or peas. · Limit fats and oils to 2 to 3 servings each day. A serving is 1 teaspoon of vegetable oil or 2 tablespoons of salad dressing. · Limit sweets and added sugars to 5 servings or less a week. A serving is 1 tablespoon jelly or jam, ½ cup sorbet, or 1 cup of lemonade. · Eat less than 2,300 milligrams (mg) of sodium a day. If you limit your sodium to 1,500 mg a day, you can lower your blood pressure even more. Tips for success · Start small. Do not try to make dramatic changes to your diet all at once. You might feel that you are missing out on your favorite foods and then be more likely to not follow the plan. Make small changes, and stick with them. Once those changes become habit, add a few more changes. · Try some of the following: ¨ Make it a goal to eat a fruit or vegetable at every meal and at snacks. This will make it easy to get the recommended amount of fruits and vegetables each day. ¨ Try yogurt topped with fruit and nuts for a snack or healthy dessert. ¨ Add lettuce, tomato, cucumber, and onion to sandwiches. ¨ Combine a ready-made pizza crust with low-fat mozzarella cheese and lots of vegetable toppings. Try using tomatoes, squash, spinach, broccoli, carrots, cauliflower, and onions. ¨ Have a variety of cut-up vegetables with a low-fat dip as an appetizer instead of chips and dip. ¨ Sprinkle sunflower seeds or chopped almonds over salads. Or try adding chopped walnuts or almonds to cooked vegetables. ¨ Try some vegetarian meals using beans and peas. Add garbanzo or kidney beans to salads. Make burritos and tacos with mashed elizabeth beans or black beans. Where can you learn more? Go to http://samreen-tabitha.info/. Enter R142 in the search box to learn more about \"DASH Diet: Care Instructions. \" Current as of: March 23, 2016 Content Version: 11.1 © 6856-9471 [employer]. Care instructions adapted under license by [employer] (which disclaims liability or warranty for this information). If you have questions about a medical condition or this instruction, always ask your healthcare professional. [address] any warranty or liability for your use of this information. DASH Diet: Care Instructions Your Care Instructions The DASH diet is an eating plan that can help lower your blood pressure. DASH stands for Dietary Approaches to Stop Hypertension. Hypertension is high blood pressure. The DASH diet focuses on eating foods that are high in calcium, potassium, and magnesium. These nutrients can lower blood pressure. The foods that are highest in these nutrients are fruits, vegetables, low-fat dairy products, nuts, seeds, and legumes. But taking calcium, potassium, and magnesium supplements instead of eating foods that are high in those nutrients does not have the same effect. The DASH diet also includes whole grains, fish, and poultry. The DASH diet is one of several lifestyle changes your doctor may recommend to lower your high blood pressure. Your doctor may also want you to decrease the amount of sodium in your diet. Lowering sodium while following the DASH diet can lower blood pressure even further than just the DASH diet alone. Follow-up care is a key part of your treatment and safety.  Be sure to make and go to all appointments, and call your doctor if you are having problems. It's also a good idea to know your test results and keep a list of the medicines you take. How can you care for yourself at home? Following the DASH diet · Eat 4 to 5 servings of fruit each day. A serving is 1 medium-sized piece of fruit, ½ cup chopped or canned fruit, 1/4 cup dried fruit, or 4 ounces (½ cup) of fruit juice. Choose fruit more often than fruit juice. · Eat 4 to 5 servings of vegetables each day. A serving is 1 cup of lettuce or raw leafy vegetables, ½ cup of chopped or cooked vegetables, or 4 ounces (½ cup) of vegetable juice. Choose vegetables more often than vegetable juice. · Get 2 to 3 servings of low-fat and fat-free dairy each day. A serving is 8 ounces of milk, 1 cup of yogurt, or 1 ½ ounces of cheese. · Eat 6 to 8 servings of grains each day. A serving is 1 slice of bread, 1 ounce of dry cereal, or ½ cup of cooked rice, pasta, or cooked cereal. Try to choose whole-grain products as much as possible. · Limit lean meat, poultry, and fish to 2 servings each day. A serving is 3 ounces, about the size of a deck of cards. · Eat 4 to 5 servings of nuts, seeds, and legumes (cooked dried beans, lentils, and split peas) each week. A serving is 1/3 cup of nuts, 2 tablespoons of seeds, or ½ cup of cooked beans or peas. · Limit fats and oils to 2 to 3 servings each day. A serving is 1 teaspoon of vegetable oil or 2 tablespoons of salad dressing. · Limit sweets and added sugars to 5 servings or less a week. A serving is 1 tablespoon jelly or jam, ½ cup sorbet, or 1 cup of lemonade. · Eat less than 2,300 milligrams (mg) of sodium a day. If you limit your sodium to 1,500 mg a day, you can lower your blood pressure even more. Tips for success · Start small. Do not try to make dramatic changes to your diet all at once.  You might feel that you are missing out on your favorite foods and then be more likely to not follow the plan. Make small changes, and stick with them. Once those changes become habit, add a few more changes. · Try some of the following: ¨ Make it a goal to eat a fruit or vegetable at every meal and at snacks. This will make it easy to get the recommended amount of fruits and vegetables each day. ¨ Try yogurt topped with fruit and nuts for a snack or healthy dessert. ¨ Add lettuce, tomato, cucumber, and onion to sandwiches. ¨ Combine a ready-made pizza crust with low-fat mozzarella cheese and lots of vegetable toppings. Try using tomatoes, squash, spinach, broccoli, carrots, cauliflower, and onions. ¨ Have a variety of cut-up vegetables with a low-fat dip as an appetizer instead of chips and dip. ¨ Sprinkle sunflower seeds or chopped almonds over salads. Or try adding chopped walnuts or almonds to cooked vegetables. ¨ Try some vegetarian meals using beans and peas. Add garbanzo or kidney beans to salads. Make burritos and tacos with mashed elizabeth beans or black beans. Where can you learn more? Go to http://samreen-tabitha.info/. Enter E235 in the search box to learn more about \"DASH Diet: Care Instructions. \" Current as of: March 23, 2016 Content Version: 11.1 © 6968-6171 [employer]. Care instructions adapted under license by [employer] (which disclaims liability or warranty for this information). If you have questions about a medical condition or this instruction, always ask your healthcare professional. [address] any warranty or liability for your use of this information. Introducing [facility]! Tanvir Mcduffie introduces [employer] patient portal. Now you can access parts of your medical record, email your doctor's office, and request medication refills online. 1. In your internet browser, go to [email or web address] 2. Click on the First Time User? Click Here link in the Sign In box. You will see the New Member Sign Up page. 3. Enter your [employer] Access Code exactly as it appears below. You will not need to use this code after youve completed the sign-up process. If you do not sign up before the expiration date, you must request a new code. · [employer] Access Code: [email or web address] Expires: 4/17/2017  2:47 PM 
 
4. Enter the last four digits of your Social Security Number (xxxx) and Date of Birth (mm/dd/yyyy) as indicated and click Submit. You will be taken to the next sign-up page. 5. Create a [employer] ID. This will be your [employer] login ID and cannot be changed, so think of one that is secure and easy to remember. 6. Create a [employer] password. You can change your password at any time. 7. Enter your Password Reset Question and Answer. This can be used at a later time if you forget your password. 8. Enter your e-mail address. You will receive e-mail notification when new information is available in [address] 9. Click Sign Up. You can now view and download portions of your medical record. 10. Click the Download Summary menu link to download a portable copy of your medical information. If you have questions, please visit the Frequently Asked Questions section of the [employer] website. Remember, [employer] is NOT to be used for urgent needs. For medical emergencies, dial 911. Now available from your iPhone and Android! Please provide this summary of care documentation to your next provider. Your primary care clinician is listed as WILLIAM MENARD. If you have any questions after today's visit, please call [phone].

## 2017-03-16 NOTE — PATIENT INSTRUCTIONS

## 2017-03-16 NOTE — PROGRESS NOTES
Patient is in the office today for pre op exam  Do you have an Advance Directive no  Do you want more information no    1. Have you been to the ER, urgent care clinic since your last visit? Hospitalized since your last visit? No    2. Have you seen or consulted any other health care providers outside of the [address] [place] [address] since your last visit? Include any pap smears or colon screening.  No

## 2017-03-16 NOTE — PROGRESS NOTES
Subjective:   I am seeing Phillip Claire, a 59 y.o. male, for preop exam.  Planned surgery :rotator cuff repair, subscapularis repair, and biceps tendonesis. Tom Michelle PMH: History of controlled hypertension. Diabetes, heart disease, stroke, cancers, kidney or liver diseases or DVT. The patient denies a history of prior anesthesia complications or abnormal bleeding. No latex allergy. ROS: No recent infections, has been feeling well other than presenting surgical complaint. No CNS, cardiorespiratory or GI symptoms otherwise. Patient Active Problem List   Diagnosis Code    Hypercholesterolemia E78.00    Allergic rhinitis J30.9    Sleep apnea G47.30    GERD (gastroesophageal reflux disease) K21.9    Obesity E66.9    Glaucoma H40.9    HTN (hypertension) I10    Chronic back pain M54.9, G89.29    Chest pain, unspecified R07.9    Dyslipidemia, goal LDL below 100 E78.5    Other dyspnea and respiratory abnormality R06.09, R09.89    Palpitations R00.2    Degenerative joint disease M19.90    Prediabetes R73.03    Right hip pain M25.551    Right knee pain M25.561    Enlarged prostate with lower urinary tract symptoms (LUTS) N40.1    Urinary frequency R35.0    Sense of Incomplete bladder emptying R33.9    Nocturia R35.1    Slowing of urinary stream R39.198    Hip arthritis M19.90    Abdominal discomfort R10.9    Diarrhea R19.7    Primary insomnia F51.01    Obstructive sleep apnea syndrome G47.33     Current Outpatient Prescriptions   Medication Sig Dispense Refill    nitroglycerin (NITROSTAT) 0.4 mg SL tablet 1 Tab by SubLINGual route every five (5) minutes as needed. 6 Tab 1    oxyCODONE-acetaminophen (PERCOCET 10)  mg per tablet Take 1 Tab by mouth every four (4) hours as needed for Pain. Max Daily Amount: 6 Tabs.  90 Tab 0    gabapentin (NEURONTIN) 300 mg capsule TAKE ONE CAPSULE BY MOUTH THREE TIMES DAILY 90 Cap 5    albuterol (PROVENTIL HFA, VENTOLIN HFA, PROAIR HFA) 90 mcg/actuation inhaler Take 2 Puffs by inhalation every four (4) hours as needed for Wheezing. 1 Inhaler 5    metoprolol tartrate (LOPRESSOR) 25 mg tablet TAKE ONE TABLET BY MOUTH TWICE DAILY ( GENERIC FOR LOPRESSOR ) 180 Tab 3    tamsulosin (FLOMAX) 0.4 mg capsule Take 1 Cap by mouth daily (after dinner). 90 Cap 3    fluticasone (FLONASE) 50 mcg/actuation nasal spray 2 Sprays by Both Nostrils route daily. 1 Bottle 5    ibuprofen (MOTRIN) 800 mg tablet TAKE ONE TABLET BY MOUTH EVERY 8 HOURS AS NEEDED FOR PAIN 90 Tab 2    pravastatin (PRAVACHOL) 40 mg tablet Take 1 Tab by mouth nightly. 90 Tab 3    traZODone (DESYREL) 50 mg tablet TAKE ONE TABLET BY MOUTH ONCE DAILY AT BEDTIME AS NEEDED FOR SLEEP 30 Tab 2    cyclobenzaprine (FLEXERIL) 10 mg tablet Take 1 Tab by mouth nightly. 30 Tab 1    esomeprazole (NEXIUM) 40 mg capsule TAKE ONE CAPSULE BY MOUTH ONE TIME DAILY 30 Cap 5    TRAVATAN Z 0.004 % ophthalmic solution Administer 1 Drop to both eyes nightly.  tolterodine (DETROL) 2 mg tablet TAKE ONE TABLET BY MOUTH TWICE DAILY 60 Tab 0     Allergies   Allergen Reactions    Ace Inhibitors Cough    Dilaudid [Hydromorphone] Hives    Lisinopril Cough    Zocor [Simvastatin] Myalgia     Right leg       3/9/2017  1:36 PM - [facility], [facility]   Component Results   Component Value Flag Ref Range Units Status   WBC 7.1  4.6 - 13.2 K/uL Final   RBC 4.36 (L) 4.70 - 5.50 M/uL Final   HGB 13.0  13.0 - 16.0 g/dL Final   HCT 39.6  36.0 - 48.0 % Final   MCV 90.8  74.0 - 97.0 FL Final   MCH 29.8  24.0 - 34.0 PG Final   MCHC 32.8  31.0 - 37.0 g/dL Final   RDW 14.5  11.6 - 14.5 % Final   PLATELET 306  433 - 153 K/uL Final   MPV 11.3  9.2 - 11.8 FL Final   NEUTROPHILS 46  42 - 75 % Final   BAND NEUTROPHILS 5  0 - 5 % Final   LYMPHOCYTES 35  20 - 51 % Final   MONOCYTES 9  2 - 9 % Final   EOSINOPHILS 5  0 - 5 % Final   BASOPHILS 0  0 - 3 % Final   ABS. NEUTROPHILS 3.6  1.8 - 8.0 K/UL Final   ABS.  LYMPHOCYTES 2.5  0.8 - 3.5 K/UL Final ABS. MONOCYTES 0.6  0 - 1.0 K/UL Final   ABS. EOSINOPHILS 0.4  0.0 - 0.4 K/UL Final   ABS. BASOPHILS 0.0  0.0 - 0.06 K/UL Final   DF MANUAL     Final   PLATELET COMMENTS ADEQUATE PLATELETS     Final   RBC COMMENTS      Final   NORMOCYTIC, NORMOCHROMIC   WBC COMMENTS      Final   ATYPICAL LYMPHOCYTES PRESENT     3/9/2017 12:35 PM - [facility], [facility]   Component Results   Component Value Flag Ref Range Units Status   Sodium 141  136 - 145 mmol/L Final   Potassium 4.4  3.5 - 5.5 mmol/L Final   Chloride 108  100 - 108 mmol/L Final   CO2 28  21 - 32 mmol/L Final   Anion gap 5  3.0 - 18 mmol/L Final   Glucose 97  74 - 99 mg/dL Final   BUN 15  7.0 - 18 MG/DL Final   Creatinine 1.07  0.6 - 1.3 MG/DL Final   BUN/Creatinine ratio 14  12 - 20   Final   GFR est AA >60  >60 ml/min/1.73m2 Final   GFR est non-AA >60  >60 ml/min/1.73m2 Final   Comment:   (NOTE)   Estimated GFR is calculated using the Modification of Diet in Renal   Disease (MDRD) Study equation, reported for both  Americans   (GFRAA) and non- Americans (GFRNA), and normalized to 1.73m2   body surface area. The physician must decide which value applies to   the patient. The MDRD study equation should only be used in   individuals age 25 or older. It has not been validated for the   following: pregnant women, patients with serious comorbid conditions,   or on certain medications, or persons with extremes of body size,   muscle mass, or nutritional status. Calcium 9.1  8.5 - 10.1 MG/DL Final         Objective:     Visit Vitals    /78 (BP 1 Location: Right arm, BP Patient Position: Sitting)    Pulse 60    Temp 98.3 °F (36.8 °C) (Oral)    Resp 16    Ht 5' 7\" (1.702 m)    Wt 250 lb (113.4 kg)    SpO2 98%    BMI 39.16 kg/m2        The physical exam is unremarkable. He appears well, alert and oriented, pleasant and cooperative. Vitals as noted. Lungs are clear to auscultation.  Heart sounds are normal. Abdomen is soft, no tenderness, masses or organomegaly. Extremities with no edema, distal pulses intact. Preop studies were normal; including CBC, BMP, EKG 2/27/2017 sinus bradycardia. Echo: 12/23/2016 mild(stage I)  diastolic dysfunction with EF=55%    Cardiac Risk Index:  High Risk Surgery: N  History of Ischemic Heart Disease: N  History of Congestive Heart Failure: N  History of Cardiovascular Disease: Y  Pre-operative Tx with Insulin: N  Pre-operative creatinine >2 mg/dL: N  1 point, Class II risk. 0.9% risk of major cardiac event. Assessment/Plan:   Martha Purcell was seen today for pre-op exam.    Diagnoses and all orders for this visit:    Preoperative clearance    Obesity due to excess calories, unspecified obesity severity - Discussed weight management through lifestyle adjustments including healthy eating and exercise. Sleep apnea, unspecified type    Essential hypertension - well controlled, on Metoprolol 25mg bid. Information given on DASH diet. Mild diastolic dysfunction - followed by [facility]    Other orders  -     nitroglycerin (NITROSTAT) 0.4 mg SL tablet; 1 Tab by SubLINGual route every five (5) minutes as needed. Discussed this patient with Dr. Gustavo Casper who agrees with my judgement to clear this patient with low cardiac risk for the surgery mentioned above. Medication and side effects, including risk and signs of allergy were discussed. Patient given printed information with diagnoses and medication information. Answered all questions and patient acknowledged understanding. Patient agrees to follow up with PCP as suggested or sooner if symptoms worsen. Mr. Lisa Lynn has a reminder for a \"due or due soon\" health maintenance. I have asked that he contact his primary care provider for follow-up on this health maintenance.     Frandy Zaragoza MPA, PA-C  [facility]    I reviewed the patient's medical history, the physician assistant's findings on physical examination, the patient's diagnoses, and treatment plan as documented in the progress note. I concur with the treatment plan as documented. There are no additional recommendations at this time.     Ivory Caraballo MD

## 2017-03-16 NOTE — TELEPHONE ENCOUNTER
[facility] called to state that the nitroglycerin only comes in 25 (not 6) and they can not break it up. Could they go ahead and supply the patient with 25 tabs? Please advise.

## 2017-03-17 ENCOUNTER — OFFICE VISIT (OUTPATIENT)
Dept: ORTHOPEDIC SURGERY | Age: 62
End: 2017-03-17

## 2017-03-17 VITALS
TEMPERATURE: 97.7 F | SYSTOLIC BLOOD PRESSURE: 131 MMHG | HEIGHT: 67 IN | DIASTOLIC BLOOD PRESSURE: 65 MMHG | RESPIRATION RATE: 15 BRPM | HEART RATE: 52 BPM | BODY MASS INDEX: 38.92 KG/M2 | WEIGHT: 248 LBS

## 2017-03-17 DIAGNOSIS — S46.812D PARTIAL TEAR SUBSCAPULARIS TENDON, LEFT, SUBSEQUENT ENCOUNTER: ICD-10-CM

## 2017-03-17 DIAGNOSIS — M67.922 BICEPS TENDINOPATHY, LEFT: ICD-10-CM

## 2017-03-17 DIAGNOSIS — M75.112 INCOMPLETE TEAR OF LEFT ROTATOR CUFF: Primary | ICD-10-CM

## 2017-03-17 RX ORDER — OXYCODONE AND ACETAMINOPHEN 7.5; 325 MG/1; MG/1
1-2 TABLET ORAL
Qty: 60 TAB | Refills: 0 | Status: SHIPPED | OUTPATIENT
Start: 2017-03-17 | End: 2017-04-03 | Stop reason: SDUPTHER

## 2017-03-17 NOTE — PROGRESS NOTES
HISTORY AND PHYSICAL          Patient: Mindy Haq                MRN: [id number]       SSN: [id number]  YOB: 1955          AGE: 59 y.o. SEX: male      Patient scheduled for:  Left shoulder arthroscopic rotator cuff repair, subscapularis repair, and biceps tendonesis. Surgeon: Laura Pang MD    ANESTHESIA TYPE:  General    HISTORY:     The patient was seen in the office today for a preoperative history and physical for an upcoming above listed surgery. The patient is a pleasant 59 y.o. male who has a history of left shoulder pain. for several weeks. He states he was lifting a gate off the ground when he felt a \"tearing\" sensation. He rates his pain 5/10 today. He states the pain affects his daily activities and is increased by certain activities. He has trouble sleeping at night due to the pain. Due to the current findings, affected activity of daily living and continued pain and discomfort, surgical intervention is indicated. The alternatives, risks, and complications, including but not limited to infection, blood loss, need for blood transfusion, neurovascular damage, bernie-incisional numbness, subcutaneous hematoma, bone fracture, anesthetic complications, DVT, PE, death, RSD, postoperative stiffness and pain, possible surgical scar, delayed healing and nonhealing, reflexive sympathetic dystrophy, damage to blood vessels and nerves, need for more surgery, MI, and stroke,  failure of hardware, gait disturbances,have been discussed. The patient understands and wishes to proceed with surgery.      PAST MEDICAL HISTORY:     Past Medical History:   Diagnosis Date    Allergic rhinitis     Avascular necrosis (HCC)     CAD (coronary artery disease) 4/2009    mild, hemodynamically non-significant by angiography    Carpal tunnel syndrome     Carpal tunnel syndrome on both sides     Chest pain, unspecified     Chronic back pain 2/11/2011    Chronic obstructive pulmonary disease ([address]     Degenerative joint disease     with chronic back pain    Dysesthesia     of hand post carpal tunnel surgery v sudeck's atrophy    Dyslipidemia, goal LDL below 100     Dysphagia     GERD (gastroesophageal reflux disease) 5/17/2010    Glaucoma 9/15/2010    Glaucoma     Hearing loss     Hypercholesterolemia     Hypertension     Joint fusion     of right wrist    Morbid obesity ([address]     Obesity 5/17/2010    Other dyspnea and respiratory abnormality     Palpitations     Right hip pain     Right hip pain     Right knee pain     S/P cardiac catheterization 4/9/2009    1. Normal systemic pressure. 2. Moderate elevation of left-sided filling pressures. 3. Preserved left ventricular systolic function in the LOWE projection. 4. Mild, nonsignificant epicardial coronary artery disese by angiography.  Sleep apnea     on CPAP    Strain of lumbar region     Tobacco abuse        CURRENT MEDICATIONS:     Current Outpatient Prescriptions   Medication Sig Dispense Refill    oxyCODONE-acetaminophen (PERCOCET 7.5) 7.5-325 mg per tablet Take 1-2 Tabs by mouth every four (4) hours as needed for Pain. Max Daily Amount: 12 Tabs. Do not take until after surgery 60 Tab 0    nitroglycerin (NITROSTAT) 0.4 mg SL tablet 1 Tab by SubLINGual route every five (5) minutes as needed. 6 Tab 1    oxyCODONE-acetaminophen (PERCOCET 10)  mg per tablet Take 1 Tab by mouth every four (4) hours as needed for Pain. Max Daily Amount: 6 Tabs. 90 Tab 0    gabapentin (NEURONTIN) 300 mg capsule TAKE ONE CAPSULE BY MOUTH THREE TIMES DAILY 90 Cap 5    albuterol (PROVENTIL HFA, VENTOLIN HFA, PROAIR HFA) 90 mcg/actuation inhaler Take 2 Puffs by inhalation every four (4) hours as needed for Wheezing. 1 Inhaler 5    metoprolol tartrate (LOPRESSOR) 25 mg tablet TAKE ONE TABLET BY MOUTH TWICE DAILY ( GENERIC FOR LOPRESSOR ) 180 Tab 3    tamsulosin (FLOMAX) 0.4 mg capsule Take 1 Cap by mouth daily (after dinner).  90 Cap 3    fluticasone (FLONASE) 50 mcg/actuation nasal spray 2 Sprays by Both Nostrils route daily. 1 Bottle 5    ibuprofen (MOTRIN) 800 mg tablet TAKE ONE TABLET BY MOUTH EVERY 8 HOURS AS NEEDED FOR PAIN 90 Tab 2    pravastatin (PRAVACHOL) 40 mg tablet Take 1 Tab by mouth nightly. 90 Tab 3    traZODone (DESYREL) 50 mg tablet TAKE ONE TABLET BY MOUTH ONCE DAILY AT BEDTIME AS NEEDED FOR SLEEP 30 Tab 2    tolterodine (DETROL) 2 mg tablet TAKE ONE TABLET BY MOUTH TWICE DAILY 60 Tab 0    cyclobenzaprine (FLEXERIL) 10 mg tablet Take 1 Tab by mouth nightly. 30 Tab 1    esomeprazole (NEXIUM) 40 mg capsule TAKE ONE CAPSULE BY MOUTH ONE TIME DAILY 30 Cap 5    TRAVATAN Z 0.004 % ophthalmic solution Administer 1 Drop to both eyes nightly. ALLERGIES:     Allergies   Allergen Reactions    Ace Inhibitors Cough    Dilaudid [Hydromorphone] Hives    Lisinopril Cough    Zocor [Simvastatin] Myalgia     Right leg         SURGICAL HISTORY:     Past Surgical History:   Procedure Laterality Date    HX CARPAL TUNNEL RELEASE      bilateral wrists    HX COLONOSCOPY      HX HEART CATHETERIZATION  4/9/2009    1. Normal systemic pressure. 2. Moderate elevation of left-sided filling pressures. 3. Preserved left ventricular systolic function in the LOWE projection. 4. Mild, nonsignificant epicardial coronary artery disese by angiography.     HX TONSILLECTOMY         SOCIAL HISTORY:     Social History     Social History    Marital status: SINGLE     Spouse name: N/A    Number of children: N/A    Years of education: N/A     Social History Main Topics    Smoking status: Former Smoker     Quit date: 5/17/2009    Smokeless tobacco: Never Used      Comment: urothelial cancer risk discussed today [id number]    Alcohol use No    Drug use: No    Sexual activity: Not Asked     Other Topics Concern    None     Social History Narrative       FAMILY HISTORY:     Family History   Problem Relation Age of Onset    Diabetes Mother    [facility] Cancer Father     Hypertension Other     Arthritis-osteo Other        REVIEW OF SYSTEMS:     Negative for fevers, chills, chest pain, shortness of breath, weight loss, recent illness     General: Negative for fever and chills. No unexpected change in weight. Denies fatigue. No change in appetite. Skin: Negative for rash or itching. HEENT: Negative for congestion, sore throat, neck pain and neck stiffness. No change in vision or hearing. Hasn't noted any enlarged lymph nodes in the neck. Cardiovascular:  Negative for chest pain and palpitations. Has not noted pedal edema. Respiratory: Negative for cough, colds, sinus, hemoptysis, shortness of breath and wheezing. Gastrointestinal: Negative for nausea and vomiting, rectal bleeding, coffee ground emesis, abdominal pain, diarrhea and constipation. Genitourinary: Negative for dysuria, frequency urgency, or burning on micturition. No flank pain, no foul smelling urine, no difficulty with initiating urination. Hematological: Negative for bleeding or easy bruising. Musculoskeletal: Negative  for arthralgias, back pain or neck pain. Neurological: Negative for dizziness, seizures or syncopal episodes. Denies headaches. Endocrine: Denies excessive thirst.  No heat/cold intolerance. Psychiatric: Negative for depression or insomnia. PHYSICAL EXAMINATION:     VITALS:   Visit Vitals    Resp 15    Ht 5' 7\" (1.702 m)    Wt 248 lb (112.5 kg)    BMI 38.84 kg/m2     GEN:  Well developed, well nourished 59 y.o. male in no acute distress. HEENT: Normocephalic and atraumatic. Eyes: Conjunctivae and EOM are normal.Pupils are equal, round, and reactive to light. External ear normal appearance, external nose normal appearing. Mouth/Throat: Oropharynx is clear and moist, able to handle oral secretions w/out difficulty, airway patent  NECK: Supple. Normal ROM, No lymphadenopathy. Trachea is midline.  No bruising, swelling or deformity  RESP: Clear to auscultation bilaterally. No wheezes, rales, rhonchi. Normal effort and breath sounds. No respiratory distress  CARDIO:  Normal rate, regular rhythm and normal heart sounds. No MGR. ABDOMEN: Soft, non-tender, non-distended, normoactive bowel sounds in all four quadrants. There is no tenderness. There is no rebound and no guarding. BACK: No CVA or spinal tenderness  BREAST:  Deferred  PELVIC:    Deferred   RECTAL:  Deferred   :           Deferred  EXTREMITIES: EXAMINATION OF: left shoulder  Examination Left shoulder   Skin Intact   AC joint tenderness -   Biceps tenderness +   Forward flexion/Elevation    Active abduction    Glenohumeral abduction 90   External rotation ROM 90   Internal rotation ROM 70   Apprehension -   Chaus Relocation -   Jerk -   Load and Shift -   Obriens -   Speeds -   Impingement sign +   Supraspinatus/Empty Can +4/5   External Rotation Strength +4/5   Lift Off/Belly Press +4/5   Neurovascular Intact       NEUROVASCULAR: Sensation intact to light touch and strength grossly intact and symmetrical. No nystagmus. Positive distal pulses and capillary refill. DVT ASSESSMENT:  There is not  calf tenderness. No evidence of DVT seen on physical exam.  MOTOR: In tact  PSYCH: Alert an oriented to person, place and time. Mood, memory, affect, behavior and judgment normal       RADIOGRAPHS & DIAGNOSTIC STUDIES:     MRI left shoulder/xray reveals : 1. Moderate to severe supraspinatus and infraspinatus tendinosis. Partial thickness tear of supraspinatus tendon at its insertion. Partial thickness tear of subscapularis tendon at its insertion. 2. Moderate biceps tenosynovitis.  Suspect biceps tendon rupture and retraction.         LABS:       @  CBC:   Lab Results   Component Value Date/Time    WBC 7.1 03/09/2017 10:38 AM    RBC 4.36 03/09/2017 10:38 AM    HGB 13.0 03/09/2017 10:38 AM    HCT 39.6 03/09/2017 10:38 AM    PLATELET 889 05/49/8311 10:38 AM    and BMP:   Lab Results   Component Value Date/Time    Glucose 97 03/09/2017 10:38 AM    Sodium 141 03/09/2017 10:38 AM    Potassium 4.4 03/09/2017 10:38 AM    Chloride 108 03/09/2017 10:38 AM    CO2 28 03/09/2017 10:38 AM    BUN 15 03/09/2017 10:38 AM    Creatinine 1.07 03/09/2017 10:38 AM    Calcium 9.1 03/09/2017 10:38 AM   @    Preoperative labs were reviewed and are substantially within normal limits   EKG: unchanged from previous tracings, sinus bradycardia. ASSESSMENT:       Encounter Diagnoses   Name Primary?  Incomplete tear of left rotator cuff Yes    Partial tear subscapularis tendon, left, subsequent encounter     Biceps tendinopathy, left        PLAN:     Again, the alternatives, risks, and complications, as well as expected outcome were discussed. The patient understands and agrees to proceed with left shoulder arthroscopic rotator cuff repair, subscapularis repair, biceps tenodesis.  Patient given orders listed below:    Orders Placed This Encounter    oxyCODONE-acetaminophen (PERCOCET 7.5) 7.5-325 mg per tablet         Syed Zazueta PA-C  3/17/2017  1:59 PM

## 2017-03-23 RX ORDER — OXYCODONE AND ACETAMINOPHEN 7.5; 325 MG/1; MG/1
1-2 TABLET ORAL
Qty: 60 TAB | Refills: 0 | OUTPATIENT
Start: 2017-03-23

## 2017-03-24 ENCOUNTER — HOSPITAL ENCOUNTER (OUTPATIENT)
Dept: PHYSICAL THERAPY | Age: 62
Discharge: HOME | End: 2017-03-24
Payer: MEDICARE

## 2017-03-24 PROCEDURE — 97140 MANUAL THERAPY 1/> REGIONS: CPT

## 2017-03-24 PROCEDURE — G8985 CARRY GOAL STATUS: HCPCS

## 2017-03-24 PROCEDURE — 97014 ELECTRIC STIMULATION THERAPY: CPT

## 2017-03-24 PROCEDURE — G8984 CARRY CURRENT STATUS: HCPCS

## 2017-03-24 PROCEDURE — 97161 PT EVAL LOW COMPLEX 20 MIN: CPT

## 2017-03-24 NOTE — PROGRESS NOTES
PT DAILY TREATMENT NOTE/SHOULDER EVAL 3-16    Patient Name: Jeffery Guillen  Date:3/24/2017  : 1955  [x]  Patient  Verified  Payor: Ricki Dear / Plan: [place] MEDICARE [place] A & B / Product Type: Medicare /    In time:1224  Out time:1:15  Total Treatment Time (min): 49  Total Timed Codes (min): 24  1:1 Treatment Time ([address] only): 49   Visit #: 1 of     Treatment Area: Incomplete rotator cuff tear or rupture of left shoulder, not specified as traumatic [M75.112]    SUBJECTIVE  Pain Level (0-10 scale): 10  []constant []intermittent []improving []worsening []no change since onset    Any medication changes, allergies to medications, adverse drug reactions, diagnosis change, or new procedure performed?: [x] No    [] Yes (see summary sheet for update)  Subjective functional status/changes:     Subjective:  S/P (L) Shoulder RCR of the biceps tendon and subscapularis. Chief Complaint/: Pain in shoulder    Onset: One month ago    Mechanism of Injury: While going into back gait, he lifted the gait and heard what sounded like a piece of paper tearing in his shoulder    PMHx/Surgical Hx: HTN, OA, (B) Wrist Carpal Tunnel     What type of work do you do? Living Situation: Lives at home with Brother    What type of daily activities/hobbies?  Ride bike, basketball, Bike maintenance, [place] leaves,    Limitations to Activity: Shoulder immobile, Decreased mobility (L) UE     Current Health Status:  Fair    Barriers: [x]pain []financial []time [x]transportation []other    Goal: Be able to work with (L) arm    PLOF: (L) UE weakness but able to work on bike and ride bike    Limitations to PLOF:     Motivation: high    Substance use: []Alcohol [x]Tobacco []other:     FABQ Score: []low [x]elevate    Cognition: A & O x 3    Other:        OBJECTIVE/EXAMINATION  Domestic Life: normal social  Activity/Recreational Limitations:  Yes due to pain and immobile (L) UE  Mobility: (I)  Self Care: Primarily Mod (I) but need assistance to tie shoes and put on pullover shirt        Modality rationale: decrease inflammation, decrease pain and increase tissue extensibility to improve the patients ability to tolerate increased activity levels   Min Type Additional Details   15 [x] Estim:  [x]Unatt       [x]IFC  []Premod                        []Other:  [x]w/ice   []w/heat  Position:Supine HOB elev  Location: (L) Shoulder    [] Estim: []Att    []TENS instruct  []NMES                    []Other:  []w/US   []w/ice   []w/heat  Position:  Location:    []  Traction: [] Cervical       []Lumbar                       [] Prone          []Supine                       []Intermittent   []Continuous Lbs:  [] before manual  [] after manual    []  Ultrasound: []Continuous   [] Pulsed                           []1MHz   []3MHz Location:  W/cm2:    []  Iontophoresis with dexamethasone         Location: [] Take home patch   [] In clinic    []  Ice     []  heat  []  Ice massage  []  Laser   []  Anodyne Position:  Location:    []  Laser with stim  []  Other: Position:  Location:    []  Vasopneumatic Device Pressure:       [] lo [] med [] hi   Temperature: [] lo [] med [] hi   [x] Skin assessment post-treatment:  [x]intact []redness- no adverse reaction    []redness - adverse reaction:     10 min []Eval                  []Re-Eval       8 min Therapeutic Exercise:  [x] See flow sheet : develop and instruct HEP   Rationale: increase ROM, increase strength and improve coordination to improve the patients ability to tolerate increased activity levels    16 min Manual Therapy:  Remove dressing, PROM shoulder Flx/ABD   Rationale: decrease pain, increase ROM, increase tissue extensibility and decrease edema  to tolerate increased activity levels          With   [x] TE   [] TA   [] neuro   [] other: Patient Education: [x] Review HEP    [] Progressed/Changed HEP based on:   [] positioning   [] body mechanics   [] transfers   [] heat/ice application    [] other:      Other Objective/Functional Measures:   - Limited tolerance to PROM today    Physical Therapy Evaluation - Shoulder    Posture: [] Poor    [x] Fair    [] Good    Describe:    ROM:  [] Unable to assess at this time                                           AROM                                                              PROM   Left Right  Left Right   Flexion   Flexion 20*    Extension   Extension 10*    Scaption/ABD   Scaptin/ABD 25*    ER @ 0 Degrees   ER @ 0 Degrees 0    ER @ 90 Degrees   ER @ 90 Degrees     IR @ 90 Degrees   IR @ 90 Degrees       End Feel / Painful Arc:    Strength:   [x] Unable to assess at this time                                                                            L (1-5) R (1-5) Pain   Flexors   [] Yes   [] No   Abductors   [] Yes   [] No   External Rotators   [] Yes   [] No   Internal Rotators   [] Yes   [] No   Supraspinatus   [] Yes   [] No   Serratus Anterior   [] Yes   [] No   Lower Trapezius   [] Yes   [] No   Elbow Flexion   [] Yes   [] No   Elbow Extension   [] Yes   [] No       Scapulohumoral Control / Rhythm: NT  Able to eccentrically lower with good control?  Left: [] Yes   [] No     Right: [] Yes   [] No    Accessory Motions:    Palpation  [x] Min  [] Mod  [] Severe    Location: Surrounding (L) shoulder  [] Min  [] Mod  [] Severe    Location:  [] Min  [] Mod  [] Severe    Location:    Optional Tests:  NT  Sensation Left Right Reflexes Left Right   Biceps (C5)   Biceps (C5)     Arceo Radial(C6-7)   Brachioradialis (C6)     Arceo Ulnar(C8-T1)   Triceps (C7)       Adson's Test  [] Pos   [] Neg Yergason's Test [] Pos   [] Neg  Alma's Test  [] Pos   [] Neg [place]'s Sign [] Pos   [] Neg  Neer's Test  [] Pos   [] Neg Clunk Test  [] Pos   [] Neg  Hawkin's Test  [] Pos   [] Neg AC Joint  [] Pos   [] Neg  Speed's Test  [] Pos   [] Neg SC Joint  [] Pos   [] Neg  Empty Can  [] Pos   [] Neg Pectoral Tightness [] Pos   [] Neg  Anterior Apprehension [] Pos   [] Neg   Posterior Apprehension [] Pos   [] Neg      Other Tests / Comments:        Pain Level (0-10 scale) post treatment: 5    ASSESSMENT/Changes in Function:      Patient will continue to benefit from skilled PT services to modify and progress therapeutic interventions, address functional mobility deficits, address ROM deficits, address strength deficits, analyze and address soft tissue restrictions, analyze and cue movement patterns and analyze and modify body mechanics/ergonomics to attain remaining goals. []  See Plan of Care  []  See progress note/recertification  []  See Discharge Summary         Progress towards goals / Updated goals:  1. Pt will be compliant and independent with HEP in order to facilitate PT sessions and aid with self management   Eval Status:  Initiated   Current Status:  2. Pt to tolerate 30 min or more of TE and/or Interventions w/o increased s/s   Eval Status:  Initiated   Current Status:  3. Pt will have PROM (L) Shoulder Flx/ABD to 120* unforced to initiate AAROM    1. Pt will report 50% improvement or better with involvement to show a significant increase in function   Eval Status:  Initiated   Current Status:  2. Pt will demonstrated full elbow and wrist function to achieve light activity   Eval Status:  Initiated   Current Status:  3. Pt will have AROM (L) shoulder Flx/ABD to 0-135* to show good shoulder mobility for progress to PREs. Eval Status:  Initiated   Current Status:  4.   Pt will improve FOTO score to 66 to show significant improvement in function for progress to Good shoulder function   Eval Status: 36   Current Status:    PLAN  [x]  Upgrade activities as tolerated     [x]  Continue plan of care  []  Update interventions per flow sheet       []  Discharge due to:_  []  Other:_      Bobby Bartlett, PT 3/24/2017  12:28 PM

## 2017-03-24 NOTE — PROGRESS NOTES
[facility]  [address], [address] [address]  [phone] fax    Plan of Care/ Statement of Necessity for Physical Therapy Services    Patient name: Mitul Cuenca Start of Care: 3/24/2017   Referral source: Barry Hughes,* : 1955    Medical Diagnosis: Incomplete rotator cuff tear or rupture of left shoulder, not specified as traumatic [M75.112]   Onset Date:One month ago with Surgery on 3/23/14    Treatment Diagnosis: Pain and immobility, S/P (L) RCR   Prior Hospitalization: see medical history Provider#: [id number]   Medications: Verified on Patient summary List    Comorbidities: OA, HTN   Prior Level of Function: (L) UE weakness but able to work on bike and ride bike at will. The Plan of Care and following information is based on the information from the initial evaluation. Assessment/ key information: Patient is a 59 y.o. male referred to PT with the above Dx. Patient seen today for one day s/p (L) RCR on 3/23/17. Patient presents to PT with decreased strength, decreased flexibility, and decreased mobility of the (L) UE/Sholder. Patient s/s appear to be consistent w/ diagnosis. Patient demonstrates the potential to make functional gains within a reasonable time frame and will benefit from skilled PT to address impairments and improve functional mobility and strength for an improved quality of life. Fall Risk Assessment: Patient demonstrates no Fall Risk     Evaluation Complexity History MEDIUM  Complexity : 1-2 comorbidities / personal factors will impact the outcome/ POC ; Examination LOW Complexity : 1-2 Standardized tests and measures addressing body structure, function, activity limitation and / or participation in recreation  ;Presentation MEDIUM Complexity : Evolving with changing characteristics  ; Clinical Decision Making MEDIUM Complexity : FOTO score of 26-74  Overall Complexity Rating: LOW   Problem List: pain affecting function, decrease ROM, decrease strength, edema affecting function, decrease ADL/ functional abilitiies, decrease activity tolerance, decrease flexibility/ joint mobility, decrease transfer abilities and other FOTO = 36   Treatment Plan may include any combination of the following: Therapeutic exercise, Therapeutic activities, Neuromuscular re-education, Physical agent/modality, Manual therapy, Patient education, Self Care training, Functional mobility training and Home safety training  Patient / Family readiness to learn indicated by: asking questions, trying to perform skills and interest  Persons(s) to be included in education: patient (P)  Barriers to Learning/Limitations: None  Patient Goal (s): Be able to work with (L) arm  Patient Self Reported Health Status: fair  Rehabilitation Potential: good    Short Term Goals: To be accomplished in 5-6 treatments:  1. Pt will be compliant and independent with HEP in order to facilitate PT sessions and aid with self management   Eval Status: Initiated   Current Status:  2. Pt to tolerate 30 min or more of TE and/or Interventions w/o increased s/s   Eval Status: Initiated   Current Status:  3. Pt will have PROM (L) Shoulder Flx/ABD to 120* unforced to initiate AAROM   Eval Status: Initiated   Current Status:    Long Term Goals: To be accomplished in 10-20 treatments:  1. Pt will report 50% improvement or better with involvement to show a significant increase in function   Eval Status: Initiated   Current Status:  2. Pt will demonstrated full elbow and wrist function to achieve light activity   Eval Status: Initiated   Current Status:  3. Pt will have AROM (L) shoulder Flx/ABD to 0-135* to show good shoulder mobility for progress to PREs. Eval Status: Initiated   Current Status:  4.  Pt will improve FOTO score to 66 to show significant improvement in function for progress to Good shoulder function   Eval Status: 36   Current Status:    Frequency / Duration: Patient to be seen 2-3 times per week for 20 treatments. Patient/ Caregiver education and instruction: Diagnosis, prognosis, self care, activity modification and exercises   [x]  Plan of care has been reviewed with PTA    G-Codes (GP)    Carry   Current  CL= 60-79%    Goal  CJ= 20-39%    The severity rating is based on clinical judgment and the FOTO score. Certification Period: 3/24/17 - 5/23/17  Carlos Case, PT 3/24/2017 4:09 PM    ________________________________________________________________________    I certify that the above Therapy Services are being furnished while the patient is under my care. I agree with the treatment plan and certify that this therapy is necessary.     [de-identified] Signature:____________________  Date:____________Time: _________    Please sign and return to [facility] - LUIS DELANEY [address], [address] [facility] [address] [phone] fax

## 2017-03-27 ENCOUNTER — HOSPITAL ENCOUNTER (OUTPATIENT)
Dept: PHYSICAL THERAPY | Age: 62
Discharge: HOME | End: 2017-03-27
Payer: MEDICARE

## 2017-03-27 PROCEDURE — 97140 MANUAL THERAPY 1/> REGIONS: CPT

## 2017-03-27 RX ORDER — OXYCODONE AND ACETAMINOPHEN 10; 325 MG/1; MG/1
1-2 TABLET ORAL
Qty: 40 TAB | Refills: 0 | Status: SHIPPED | OUTPATIENT
Start: 2017-03-27 | End: 2017-04-26 | Stop reason: SDUPTHER

## 2017-03-27 RX ORDER — OXYCODONE AND ACETAMINOPHEN 7.5; 325 MG/1; MG/1
1-2 TABLET ORAL
Qty: 60 TAB | Refills: 0 | Status: CANCELLED | OUTPATIENT
Start: 2017-03-27

## 2017-03-27 NOTE — TELEPHONE ENCOUNTER
Last Visit: 03/17/2017 with DAVID Rodriguez   Date of Surgery:03/23/2017 left shoulder arthroscopic RCR, subscapularis repair, and biceps tendonesis  Next Appointment: 03/30/2017 with DAVID Rodriguez   Previous Refill Encounters: 03/17/2017 per DAVID Rodriguez #[id number]     Requested Prescriptions     Pending Prescriptions Disp Refills    oxyCODONE-acetaminophen (PERCOCET 7.5) 7.5-325 mg per tablet 60 Tab 0     Sig: Take 1-2 Tabs by mouth every four (4) hours as needed (post operative pain). Max Daily Amount: 12 Tabs.

## 2017-03-27 NOTE — TELEPHONE ENCOUNTER
Pt called in and is requesting to see if he could get a refill on the Percocet for his back. Please advise pt at [phone].

## 2017-03-27 NOTE — TELEPHONE ENCOUNTER
i called patient already. He is aware he can  at [facility]. Last rx from us.  He will go back to his back doctor

## 2017-03-29 ENCOUNTER — HOSPITAL ENCOUNTER (OUTPATIENT)
Dept: PHYSICAL THERAPY | Age: 62
Discharge: HOME | End: 2017-03-29
Payer: MEDICARE

## 2017-03-29 PROCEDURE — 97140 MANUAL THERAPY 1/> REGIONS: CPT

## 2017-03-29 PROCEDURE — 97110 THERAPEUTIC EXERCISES: CPT

## 2017-03-29 NOTE — PROGRESS NOTES
[facility]  [address], [address] [address]  [phone] fax    Physician Update  [x] Progress Note  [] Discharge Summary  Patient name: Carlo Kennedy Start of Care: 3/24/2017   Referral source: Rolf Gee,* : 1955    Medical Diagnosis: Incomplete rotator cuff tear or rupture of left shoulder, not specified as traumatic [M75.112] Onset Date:One month ago with Surgery on 3/23/14    Treatment Diagnosis: Pain and immobility, S/P (L) RCR   Prior Hospitalization: see medical history Provider#: [id number]   Medications: Verified on Patient summary List   Comorbidities: OA, HTN  Prior Level of Function: (L) UE weakness but able to work on bike and ride bike at will. Visits from Start of Care: 3    Missed Visits: 0    Status at Evaluation/Last Progress Note: See Initial Evaluation    Progress towards Goals: Good progress to this point. Good response to pain modulation and ROM. Pain 0-1/10 and PROM Shoulder Flx/ABD ~110-115*. Progress towards goals / Updated goals:  Short Term Goals: To be accomplished in 5-6 treatments:  1. Pt will be compliant and independent with HEP in order to facilitate PT sessions and aid with self management  Eval Status: Initiated  Current Status: progressing 3/27/17  2. Pt to tolerate 30 min or more of TE and/or Interventions w/o increased s/s  Eval Status: Initiated  Current Status: 28 3/29/17  3. Pt will have PROM (L) Shoulder Flx/ABD to 120* unforced to initiate AAROM  Eval Status: Initiated  Current Status: progressing 3/27/17 > 115 each      Long Term Goals: To be accomplished in 10-20 treatments:  1. Pt will report 50% improvement or better with involvement to show a significant increase in function  Eval Status: Initiated  Current Status:  2. Pt will demonstrated full elbow and wrist function to achieve light activity  Eval Status: Initiated  Current Status:  3.  Pt will have AROM (L) shoulder Flx/ABD to 0-135* to show good shoulder mobility for progress to PREs. Eval Status: Initiated  Current Status:  4. Pt will improve FOTO score to 66 to show significant improvement in function for progress to Good shoulder function  Eval Status: 36  Current Status:    Goals: to be achieved in 17 visits: as noted above      ASSESSMENT/RECOMMENDATIONS:  [x]Continue therapy per initial plan/protocol at a frequency of  2-3 x per week for 17 visits  []Continue therapy with the following recommended changes:_____________________      _____________________________________________________________________  []Discontinue therapy progressing towards or have reached established goals  []Discontinue therapy due to lack of appreciable progress towards goals  []Discontinue therapy due to lack of attendance or compliance  []Await Physician's recommendations/decisions regarding therapy  []Other:________________________________________________________________    Thank you for this referral. Brandon Smiley, PT 3/29/2017 7:12 PM  NOTE TO PHYSICIAN:  PLEASE COMPLETE THE ORDERS BELOW AND   FAX TO [facility]: [phone]  If you are unable to process this request in 24 hours please contact our office: [phone]    ? I have read the above report and request that my patient continue as recommended. ? I have read the above report and request that my patient continue therapy with the following changes/special instructions:_____________________________________  ? I have read the above report and request that my patient be discharged from therapy.     Physicians signature: __________________________Date: ________Time:________

## 2017-03-29 NOTE — PROGRESS NOTES
PT DAILY TREATMENT NOTE 3-16    Patient Name: Ronal Felty  Date:3/29/2017  : 1955  [x]  Patient  Verified  Payor: Carlos Orlando / Plan: VA MEDICARE PART A & B / Product Type: Medicare /    In time: 4:18  Out time:5:06  Total Treatment Time (min): 48  Total Timed Codes (min):38  1:1 Treatment Time [facility] only):38  Visit #: 3 of     Treatment Area: Incomplete rotator cuff tear or rupture of left shoulder, not specified as traumatic [M75.112]    SUBJECTIVE  Pain Level (0-10 scale): 2  Any medication changes, allergies to medications, adverse drug reactions, diagnosis change, or new procedure performed?: [x] No    [] Yes (see summary sheet for update)  Subjective functional status/changes:   [] No changes reported  I'm feeling a lot better.   Only a small bit of pain    OBJECTIVE  Modality rationale: decrease inflammation, decrease pain and increase tissue extensibility to improve the patients ability to tolerate increased activity levels   Min Type Additional Details    [] Estim:  []Unatt       []IFC  []Premod                        []Other:  []w/ice   []w/heat  Position:  Location:    [] Estim: []Att    []TENS instruct  []NMES                    []Other:  []w/US   []w/ice   []w/heat  Position:  Location:    []  Traction: [] Cervical       []Lumbar                       [] Prone          []Supine                       []Intermittent   []Continuous Lbs:  [] before manual  [] after manual    []  Ultrasound: []Continuous   [] Pulsed                           []1MHz   []3MHz Location:  W/cm2:    []  Iontophoresis with dexamethasone         Location: [] Take home patch   [] In clinic   20 [x]  Ice Post 10'    [x]  Heat Pre 10'  []  Ice massage  []  Laser   []  Anodyne Position: Supine HOB Elev  Location: (L) Shoulder    []  Laser with stim  []  Other: Position:  Location:    []  Vasopneumatic Device Pressure:       [] lo [] med [] hi   Temperature: [] lo [] med [] hi   [x] Skin assessment post-treatment: [x]intact []redness- no adverse reaction    []redness - adverse reaction:     20 min Therapeutic Exercise:  [x] See flow sheet :   Rationale: increase ROM, increase strength and improve coordination to improve the patients ability to tolerate increased activity levels  8 min Manual Therapy:  (L) Shoulder PROM Flx/ABD with overstretch. (L) UE LAD with oscillations    Rationale: decrease pain, increase ROM and increase tissue extensibility to tolerate increased activity levels             With   [x] TE   [] TA   [] neuro   [] other: Patient Education: [x] Review HEP    [] Progressed/Changed HEP based on:   [] positioning   [] body mechanics   [] transfers   [] heat/ice application    [] other:      Other Objective/Functional Measures:   - PROM Shoulder Flx/ABD ~110* unforced  - Good tolerance with exercises     Pain Level (0-10 scale) post treatment: 0    ASSESSMENT/Changes in Function:      Patient will continue to benefit from skilled PT services to modify and progress therapeutic interventions, address functional mobility deficits, address ROM deficits, address strength deficits, analyze and address soft tissue restrictions, analyze and cue movement patterns and analyze and modify body mechanics/ergonomics to attain remaining goals. []  See Plan of Care  []  See progress note/recertification  []  See Discharge Summary         Progress towards goals / Updated goals:  Short Term Goals: To be accomplished in 5-6 treatments:  1. Pt will be compliant and independent with HEP in order to facilitate PT sessions and aid with self management  Eval Status: Initiated  Current Status: progressing 3/27/17  2. Pt to tolerate 30 min or more of TE and/or Interventions w/o increased s/s  Eval Status: Initiated  Current Status: 28 3/29/17  3. Pt will have PROM (L) Shoulder Flx/ABD to 120* unforced to initiate AAROM  Eval Status: Initiated  Current Status: progressing 3/27/17 > 115 each      Long Term Goals:  To be accomplished in 10-20 treatments:  1. Pt will report 50% improvement or better with involvement to show a significant increase in function  Eval Status: Initiated  Current Status:  2. Pt will demonstrated full elbow and wrist function to achieve light activity  Eval Status: Initiated  Current Status:  3. Pt will have AROM (L) shoulder Flx/ABD to 0-135* to show good shoulder mobility for progress to PREs. Eval Status: Initiated  Current Status:  4.  Pt will improve FOTO score to 66 to show significant improvement in function for progress to Good shoulder function  Eval Status: 36  Current Status:    PLAN  [x]  Upgrade activities as tolerated     [x]  Continue plan of care  []  Update interventions per flow sheet       []  Discharge due to:_  []  Other:_      Josselinsa Hunter, PT 3/29/2017  5:12 PM    Future Appointments  Date Time Provider Shae Ho   3/30/2017 9:00 AM DAVID Ramos 69   4/4/2017 11:30 AM Aminta Figures, PT [facility]   4/7/2017 11:00 AM Aminta Figures, PT [facility]   4/11/2017 11:00 AM Aminta Figures, PT [facility]   4/13/2017 11:30 AM Aminta Figures, PT [facility]   4/18/2017 11:00 AM Josselin Petite, PT [facility]   4/20/2017 12:00 PM [facility]inta Figures, [facility]   4/25/2017 11:00 AM Josselin Petite, PT [facility]   4/27/2017 10:00 AM Josselin Petite, PT [facility]   5/2/2017 1:45 PM Kvng Russell MD [address] [place] [address]   5/17/2017 8:15 AM MD VELASQUEZ Jacobs None   5/24/2017 8:30 AM Yenifer Ellis MD [facility] None

## 2017-03-30 ENCOUNTER — TELEPHONE (OUTPATIENT)
Dept: INTERNAL MEDICINE CLINIC | Age: 62
End: 2017-03-30

## 2017-03-30 ENCOUNTER — OFFICE VISIT (OUTPATIENT)
Dept: ORTHOPEDIC SURGERY | Age: 62
End: 2017-03-30

## 2017-03-30 VITALS
HEART RATE: 71 BPM | DIASTOLIC BLOOD PRESSURE: 63 MMHG | WEIGHT: 247 LBS | SYSTOLIC BLOOD PRESSURE: 118 MMHG | HEIGHT: 67 IN | BODY MASS INDEX: 38.77 KG/M2

## 2017-03-30 DIAGNOSIS — M75.122 COMPLETE ROTATOR CUFF TEAR OF LEFT SHOULDER: Primary | ICD-10-CM

## 2017-03-30 NOTE — PROGRESS NOTES
Tex Matute  1955     HISTORY OF PRESENT ILLNESS  Tex Matute is a 59 y.o. male who presents today for evaluation s/p Left shoulder arthroscopic rotator cuff repair, subscapularis repair and biceps debridement on 3/23/17. Patient has been going to PT. Describes pain as a 0/10. Has been taking percocet 10 for pain. Still has night pain. Patient denies any fever, chills, chest pain, shortness of breath or calf pain. There are no new illness or injuries to report since last seen in the office. PHYSICAL EXAM:   Visit Vitals    /63    Pulse 71    Ht 5' 7\" (1.702 m)    Wt 247 lb (112 kg)    BMI 38.69 kg/m2      The patient is a well-developed, well-nourished male in no acute distress. The patient is alert and oriented times three. The patient appears to be well groomed. Mood and affect are normal.  ORTHOPEDIC EXAM of left shoulder:  Inspection: swelling not present,  Bruising not present  Incision, clean, dry, intact, sutures in place  Passive glenohumeral abduction 0-30 degrees  Stability: Stable  Strength: n/a  2+ distal pulses    IMPRESSION:  S/P Left shoulder arthroscopic rotator cuff repair, subscapularis repair, biceps debridement    PLAN:   Incisions cleaned, steri strips applied. Patient to continue with PROM and PT. Continue wearing sling. Discussed surgery at length. We will no longer be rxing narcotics for pain for his surgery as he wishes to get from his PCP.      RTC 3 weeks    Edvin Aguilar PA-C  [facility]     CC: Dr. Mila Lr

## 2017-03-30 NOTE — TELEPHONE ENCOUNTER
Pt calling asking if fax from Dr Bobby HERNANDEZ was received. She was faxing per pt to let Dr Butch Fajardo know it is okay for Dr Butch Fajardo to write pt pain medication    Pt advised her to hard for him to come to their office for appointments. Has to pay someone to take him.  It Cost $20 to $30 dollars for transportation

## 2017-03-31 NOTE — TELEPHONE ENCOUNTER
Patient is aware information was received and patient states he does have enough to last him until next week.

## 2017-04-03 ENCOUNTER — TELEPHONE (OUTPATIENT)
Dept: INTERNAL MEDICINE CLINIC | Age: 62
End: 2017-04-03

## 2017-04-03 DIAGNOSIS — M54.5 CHRONIC MIDLINE LOW BACK PAIN, WITH SCIATICA PRESENCE UNSPECIFIED: ICD-10-CM

## 2017-04-03 DIAGNOSIS — G89.29 CHRONIC MIDLINE LOW BACK PAIN, WITH SCIATICA PRESENCE UNSPECIFIED: ICD-10-CM

## 2017-04-03 RX ORDER — OXYCODONE AND ACETAMINOPHEN 10; 325 MG/1; MG/1
1 TABLET ORAL
Qty: 90 TAB | Refills: 0 | Status: SHIPPED | OUTPATIENT
Start: 2017-04-03 | End: 2017-04-26 | Stop reason: SDUPTHER

## 2017-04-03 NOTE — TELEPHONE ENCOUNTER
Patient came in and stated that he received letters in the mail from [place].  Patient would like to have prior auths started on the medications oxycod/apap 7.5-325, Esomepra Mag 40 mg and OxyCod/Apap

## 2017-04-04 ENCOUNTER — HOSPITAL ENCOUNTER (OUTPATIENT)
Dept: PHYSICAL THERAPY | Age: 62
Discharge: HOME | End: 2017-04-04
Payer: MEDICARE

## 2017-04-04 ENCOUNTER — TELEPHONE (OUTPATIENT)
Dept: INTERNAL MEDICINE CLINIC | Age: 62
End: 2017-04-04

## 2017-04-04 PROCEDURE — 97140 MANUAL THERAPY 1/> REGIONS: CPT

## 2017-04-04 PROCEDURE — 97110 THERAPEUTIC EXERCISES: CPT

## 2017-04-04 RX ORDER — PANTOPRAZOLE SODIUM 40 MG/1
40 TABLET, DELAYED RELEASE ORAL DAILY
Qty: 30 TAB | Refills: 5 | Status: SHIPPED | OUTPATIENT
Start: 2017-04-04 | End: 2018-08-16 | Stop reason: SDUPTHER

## 2017-04-04 NOTE — TELEPHONE ENCOUNTER
Left message for patient pain medication does need a PA unless he exceeds the plan limit of #180 in a 30 day cycle. Also Nexium is not covered and Protonix was sent to the pharmacy any questions call the office.

## 2017-04-04 NOTE — TELEPHONE ENCOUNTER
Spoke with [employer] she states Oxycodone/ APAP is covered for a quantity of 180 per 30 days, however the rep states the pharmacy is running the claim wrong. [employer] Rep stated as long as the patient does not go over 180 tabs per 30 days no PA is required. Nexium is not covered.   Covered meds are omeprazole 10,20,and 40 mg quantity of 30 for 30 day, Pantoprazole 20 mg and 40 mg # 30 for 30 days

## 2017-04-04 NOTE — PROGRESS NOTES
PT DAILY TREATMENT NOTE - [facility] 3-16    Patient Name: Michelle Soni  Date:2017  : 1955  [x]  Patient  Verified  Payor: Yair Alaniz / Plan: [place] / Product Type: Medicare /    In time:1100  Out time:1159  Total Treatment Time (min): 50  Total Timed Codes (min): 30  1:1 Treatment Time ( [address] only): 25  Visit #:4 of     Treatment Area: Incomplete rotator cuff tear or rupture of left shoulder, not specified as traumatic [M75.112]    SUBJECTIVE  Pain Level (0-10 scale): 0  Any medication changes, allergies to medications, adverse drug reactions, diagnosis change, or new procedure performed?: [x] No    [] Yes (see summary sheet for update)  Subjective functional status/changes:   [] No changes reported  I saw the doctor, she took off the pillow. I still have to wear it and especially when I sleep. She told me not to pick anything up with it. OBJECTIVE    Modality rationale: increase tissue extensibility to improve the patients ability to aid with pre and post exercise.    Min Type Additional Details    [] Estim:  []Unatt       []IFC  []Premod                        []Other:  []w/ice   []w/heat  Position:  Location:    [] Estim: []Att    []TENS instruct  []NMES                    []Other:  []w/US   []w/ice   []w/heat  Position:  Location:    []  Traction: [] Cervical       []Lumbar                       [] Prone          []Supine                       []Intermittent   []Continuous Lbs:  [] before manual  [] after manual    []  Ultrasound: []Continuous   [] Pulsed                           []1MHz   []3MHz Location:  W/cm2:    []  Iontophoresis with dexamethasone         Location: [] Take home patch   [] In clinic   20 [x]  Ice post     [x]  Heat pre   []  Ice massage  []  Laser   []  Anodyne Position:seated  Location:L shoulder    []  Laser with stim  []  Other: Position:  Location:    []  Vasopneumatic Device Pressure:       [] lo [] med [] hi   Temperature: [] lo [] med [] hi   [] Skin assessment post-treatment:  []intact []redness- no adverse reaction    []redness - adverse reaction:       min []Eval                  []Re-Eval       20 min Therapeutic Exercise:  [x] See flow sheet :   Rationale: increase ROM, increase strength and improve coordination to improve the patients ability to aid with increase tolerance to ADLS and activities     min Therapeutic Activity:  []  See flow sheet :   Rationale:   to improve the patients ability to       min Neuromuscular Re-education:  []  See flow sheet :   Rationale:   to improve the patients ability to     10 min Manual Therapy:  PROM and gentle oscill L shoulder all planes. Gentle overpressure stretch alexi with ER in neutral   Rationale: increase ROM and increase tissue extensibility to aid with increase tolerance to ADLs and activities     min Gait Training:  ___ feet with ___ device on level surfaces with ___ level of assist   Rationale: With   [] TE   [] TA   [] neuro   [] other: Patient Education: [x] Review HEP    [] Progressed/Changed HEP based on:   [] positioning   [] body mechanics   [] transfers   [] heat/ice application    [] other:      Other Objective/Functional Measures: VC exercises and technique, added bike for cardio, increased scapular depression to 10X    Pain Level (0-10 scale) post treatment: 0    ASSESSMENT/Changes in Function: tolerated well with mild tight N ER noted with PROM. Reviewed addition of  Cane ER stretch. Patient will continue to benefit from skilled PT services to modify and progress therapeutic interventions, address functional mobility deficits, address ROM deficits, address strength deficits, analyze and address soft tissue restrictions, analyze and cue movement patterns, analyze and modify body mechanics/ergonomics, assess and modify postural abnormalities and instruct in home and community integration to attain remaining goals.      [x]  See Plan of Care  []  See progress note/recertification  [] See Discharge Summary         Progress towards goals / Updated goals:   Short Term Goals: To be accomplished in 5-6 treatments:  1. Pt will be compliant and independent with HEP in order to facilitate PT sessions and aid with self management  Eval Status: Initiated  Current Status: progressing 3/27/17 4/4/17  2. Pt to tolerate 30 min or more of TE and/or Interventions w/o increased s/s  Eval Status: Initiated  Current Status: 20 4/4/17  3. Pt will have PROM (L) Shoulder Flx/ABD to 120* unforced to initiate AAROM  Eval Status: Initiated  Current Status: progressing 3/27/17 > 115 each      Long Term Goals: To be accomplished in 10-20 treatments:  1. Pt will report 50% improvement or better with involvement to show a significant increase in function  Eval Status: Initiated  Current Status:  2. Pt will demonstrated full elbow and wrist function to achieve light activity  Eval Status: Initiated  Current Status:  3. Pt will have AROM (L) shoulder Flx/ABD to 0-135* to show good shoulder mobility for progress to PREs. Eval Status: Initiated  Current Status:  4.  Pt will improve FOTO score to 66 to show significant improvement in function for progress to Good shoulder function  Eval Status: 36  Current Status:    PLAN  [x]  Upgrade activities as tolerated     [x]  Continue plan of care  []  Update interventions per flow sheet       []  Discharge due to:_  [x]  R Lyla Batres 89, PT 4/4/2017  11:08 AM

## 2017-04-07 ENCOUNTER — HOSPITAL ENCOUNTER (OUTPATIENT)
Dept: PHYSICAL THERAPY | Age: 62
Discharge: HOME | End: 2017-04-07
Payer: MEDICARE

## 2017-04-07 PROCEDURE — 97110 THERAPEUTIC EXERCISES: CPT

## 2017-04-07 PROCEDURE — 97140 MANUAL THERAPY 1/> REGIONS: CPT

## 2017-04-07 NOTE — PROGRESS NOTES
PT DAILY TREATMENT NOTE - [facility] 3-16    Patient Name: Padma Bernard  Date:2017  : 1955  [x]  Patient  Verified  Payor: Jose Gillespie / Plan: [place] MED[place] PART [place] & B / Product Type: Medicare /    In time:1122  Out time:1207  Total Treatment Time (min): 38  Total Timed Codes (min): 28  1:1 Treatment Time ([facility] only): 28  Visit #: 5 of 18    Treatment Area: Incomplete rotator cuff tear or rupture of left shoulder, not specified as traumatic [M75.112]    SUBJECTIVE  Pain Level (0-10 scale):5  Any medication changes, allergies to medications, adverse drug reactions, diagnosis change, or new procedure performed?: [x] No    [] Yes (see summary sheet for update)  Subjective functional status/changes:   [] No changes reported  It is sore some today.     OBJECTIVE    Modality rationale: decrease inflammation, decrease pain and increase tissue extensibility to improve the patients ability to aid with increase tolerance to ADLS and activities   Min Type Additional Details    [] Estim:  []Unatt       []IFC  []Premod                        []Other:  []w/ice   []w/heat  Position:  Location:    [] Estim: []Att    []TENS instruct  []NMES                    []Other:  []w/US   []w/ice   []w/heat  Position:  Location:    []  Traction: [] Cervical       []Lumbar                       [] Prone          []Supine                       []Intermittent   []Continuous Lbs:  [] before manual  [] after manual    []  Ultrasound: []Continuous   [] Pulsed                           []1MHz   []3MHz Location:  W/cm2:    []  Iontophoresis with dexamethasone         Location: [] Take home patch   [] In clinic   10 [x]  Ice post    []  heat  []  Ice massage  []  Laser   []  Anodyne Position:reclined  Location:L shoulder    []  Laser with stim  []  Other: Position:  Location:    []  Vasopneumatic Device Pressure:       [] lo [] med [] hi   Temperature: [] lo [] med [] hi   [] Skin assessment post-treatment:  []intact []redness- no adverse reaction    []redness - adverse reaction:      min []Eval                  []Re-Eval       15 min Therapeutic Exercise:  [x] See flow sheet :   Rationale: increase ROM, increase strength and improve coordination to improve the patients ability to aid with increase tolerance to ADLs and activities     min Therapeutic Activity:  []  See flow sheet :   Rationale:   to improve the patients ability to       min Neuromuscular Re-education:  []  See flow sheet :   Rationale:   to improve the patients ability to     13 min Manual Therapy:  PROM all planes L shoulder    Rationale: decrease pain, increase ROM and increase tissue extensibility to aid with increase tolerance to ADLS and activities     min Gait Training:  ___ feet with ___ device on level surfaces with ___ level of assist   Rationale: With   [] TE   [] TA   [] neuro   [] other: Patient Education: [x] Review HEP    [] Progressed/Changed HEP based on:   [] positioning   [] body mechanics   [] transfers   [] heat/ice application    [] other:      Other Objective/Functional Measures: VC all exercises FOTO 60    Pain Level (0-10 scale) post treatment: 0    ASSESSMENT/Changes in Function: increased stiffness and soreness reported today    Patient will continue to benefit from skilled PT services to modify and progress therapeutic interventions, address functional mobility deficits, address ROM deficits, address strength deficits, analyze and address soft tissue restrictions, analyze and cue movement patterns, analyze and modify body mechanics/ergonomics, assess and modify postural abnormalities and instruct in home and community integration to attain remaining goals. [x]  See Plan of Care  []  See progress note/recertification  []  See Discharge Summary         Progress towards goals / Updated goals:   Short Term Goals: To be accomplished in 5-6 treatments:  1.  Pt will be compliant and independent with HEP in order to facilitate PT sessions and aid with self management  Eval Status: Initiated  Current Status: progressing 3/27/17 4/4/17  4/7/17  2. Pt to tolerate 30 min or more of TE and/or Interventions w/o increased s/s  Eval Status: Initiated  Current Status: 20 4/4/17     3. Pt will have PROM (L) Shoulder Flx/ABD to 120* unforced to initiate AAROM  Eval Status: Initiated  Current Status: progressing 3/27/17 > 115 each      Long Term Goals: To be accomplished in 10-20 treatments:  1. Pt will report 50% improvement or better with involvement to show a significant increase in function  Eval Status: Initiated  Current Status:  2. Pt will demonstrated full elbow and wrist function to achieve light activity  Eval Status: Initiated  Current Status: met 4/7/17  3. Pt will have AROM (L) shoulder Flx/ABD to 0-135* to show good shoulder mobility for progress to PREs. Eval Status: Initiated  Current Status:  4.  Pt will improve FOTO score to 66 to show significant improvement in function for progress to Good shoulder function  Eval Status: 36  Current Status: 60 4/7/17    PLAN  [x]  Upgrade activities as tolerated     [x]  Continue plan of care  []  Update interventions per flow sheet       []  Discharge due to:_  []  Other:_      Sara Simpson PT 4/7/2017  11:40 AM

## 2017-04-11 ENCOUNTER — HOSPITAL ENCOUNTER (OUTPATIENT)
Dept: PHYSICAL THERAPY | Age: 62
Discharge: HOME | End: 2017-04-11
Payer: MEDICARE

## 2017-04-11 PROCEDURE — 97110 THERAPEUTIC EXERCISES: CPT

## 2017-04-11 PROCEDURE — 97140 MANUAL THERAPY 1/> REGIONS: CPT

## 2017-04-11 NOTE — PROGRESS NOTES
PT DAILY TREATMENT NOTE - [facility] 3-16    Patient Name: Brown Iyer  Date:2017  : 1955  [x]  Patient  Verified  Payor: Jesus Alberto  / Plan: [place] MEDICARE PART A & B / Product Type: Medicare /    In time:1108  Out time:1201  Total Treatment Time (min): 51  Total Timed Codes (min):31  1:1 Treatment Time [facility] only)31  Visit # 6 of 18    Treatment Area: Incomplete rotator cuff tear or rupture of left shoulder, not specified as traumatic [M75.112]    SUBJECTIVE  Pain Level (0-10 scale): 5  Any medication changes, allergies to medications, adverse drug reactions, diagnosis change, or new procedure performed?: [x] No    [] Yes (see summary sheet for update)  Subjective functional status/changes:   [] No changes reported  I don't know why but it is more sore today. The elbow is stiff. They told me I still needed to wear this sling. OBJECTIVE    Modality rationale: decrease inflammation, decrease pain and increase tissue extensibility to improve the patients ability to aid with increase tolerance to ADLS and activities.    Min Type Additional Details    [] Estim:  []Unatt       []IFC  []Premod                        []Other:  []w/ice   []w/heat  Position:  Location:    [] Estim: []Att    []TENS instruct  []NMES                    []Other:  []w/US   []w/ice   []w/heat  Position:  Location:    []  Traction: [] Cervical       []Lumbar                       [] Prone          []Supine                       []Intermittent   []Continuous Lbs:  [] before manual  [] after manual    []  Ultrasound: []Continuous   [] Pulsed                           []1MHz   []3MHz Location:  W/cm2:    []  Iontophoresis with dexamethasone         Location: [] Take home patch   [] In clinic   20 [x]  Ice  post   [x]  Heat pre  []  Ice massage  []  Laser   []  Anodyne Position:seated  Location:L shoulder    []  Laser with stim  []  Other: Position:  Location:    []  Vasopneumatic Device Pressure:       [] lo [] med [] hi   Temperature: [] lo [] med [] hi   [] Skin assessment post-treatment:  []intact []redness- no adverse reaction    []redness - adverse reaction:       min []Eval                  []Re-Eval       16 min Therapeutic Exercise:  [x] See flow sheet :   Rationale: increase ROM, increase strength and improve coordination to improve the patients ability to aid with increase tolerance to ADLS and activities     min Therapeutic Activity:  []  See flow sheet :   Rationale:   to improve the patients ability to       min Neuromuscular Re-education:  []  See flow sheet :   Rationale:   to improve the patients ability to     15 min Manual Therapy:  LAD, PROM AAROM, gentle oscill, Passive ER stretch. Rationale: decrease pain, increase ROM, increase tissue extensibility and decrease trigger points to aid with increase tolerance to ADLS and activities      min Gait Training:  ___ feet with ___ device on level surfaces with ___ level of assist   Rationale: With   [] TE   [] TA   [] neuro   [] other: Patient Education: [x] Review HEP    [] Progressed/Changed HEP based on:   [] positioning   [] body mechanics   [] transfers   [] heat/ice application    [] other:      Other Objective/Functional Measures:VC exercises including cane ER stretch     Pain Level (0-10 scale) post treatment: 3    ASSESSMENT/Changes in Function: end range pain with overhead F and abd and ER    Patient will continue to benefit from skilled PT services to modify and progress therapeutic interventions, address functional mobility deficits, address ROM deficits, address strength deficits, analyze and address soft tissue restrictions, analyze and cue movement patterns, analyze and modify body mechanics/ergonomics, assess and modify postural abnormalities and instruct in home and community integration to attain remaining goals.      [x]  See Plan of Care  []  See progress note/recertification  []  See Discharge Summary         Progress towards goals / Updated goals:  Short Term Goals: To be accomplished in 5-6 treatments:  1. Pt will be compliant and independent with HEP in order to facilitate PT sessions and aid with self management  Eval Status: Initiated  Current Status: progressing 3/27/17 4/4/17 4/7/17  4/11/17  2. Pt to tolerate 30 min or more of TE and/or Interventions w/o increased s/s  Eval Status: Initiated  Current Status: 20 4/4/17   3. Pt will have PROM (L) Shoulder Flx/ABD to 120* unforced to initiate AAROM  Eval Status: Initiated  Current Status: progressing 3/27/17 > 115 each      Long Term Goals: To be accomplished in 10-20 treatments:  1. Pt will report 50% improvement or better with involvement to show a significant increase in function  Eval Status: Initiated  Current Status:  2. Pt will demonstrated full elbow and wrist function to achieve light activity  Eval Status: Initiated  Current Status: met 4/7/17 4/11/17  3. Pt will have AROM (L) shoulder Flx/ABD to 0-135* to show good shoulder mobility for progress to PREs. Eval Status: Initiated  Current Status:  4.  Pt will improve FOTO score to 66 to show significant improvement in function for progress to Good shoulder function  Eval Status: 36  Current Status: 60 4/7/17    PLAN  [x]  Upgrade activities as tolerated     [x]  Continue plan of care  []  Update interventions per flow sheet       []  Discharge due to:_  []  Other:_      Trevor Crowley, PT 4/11/2017  11:26 AM

## 2017-04-13 ENCOUNTER — HOSPITAL ENCOUNTER (OUTPATIENT)
Dept: PHYSICAL THERAPY | Age: 62
Discharge: HOME | End: 2017-04-13
Payer: MEDICARE

## 2017-04-13 PROCEDURE — 97110 THERAPEUTIC EXERCISES: CPT

## 2017-04-13 PROCEDURE — 97140 MANUAL THERAPY 1/> REGIONS: CPT

## 2017-04-13 PROCEDURE — 97035 APP MDLTY 1+ULTRASOUND EA 15: CPT

## 2017-04-13 NOTE — PROGRESS NOTES
PT DAILY TREATMENT NOTE - [facility] 3-16    Patient Name: Ya Minus  Date:2017  : 1955  [x]  Patient  Verified  Payor: Sebastian Dacosta / Plan: [place] / Product Type: Medicare /    In time:1109  Out time:1230  Total Treatment Time (min):65  Total Timed Codes (min): 45  1:1 Treatment Time ( [address] only): 38  Visit #: 7 of     Treatment Area: Incomplete rotator cuff tear or rupture of left shoulder, not specified as traumatic [M75.112]    SUBJECTIVE  Pain Level (0-10 scale): 0  Any medication changes, allergies to medications, adverse drug reactions, diagnosis change, or new procedure performed?: [x] No    [] Yes (see summary sheet for update)  Subjective functional status/changes:   [] No changes reported  Doing ok but it feels like it is popping in there when you move it. It hurts when you bring it up over my head but not out to the side.      OBJECTIVE    Modality rationale: decrease inflammation, decrease pain and increase tissue extensibility to improve the patients ability to aid with increase tolerance to ADLS and activities   Min Type Additional Details    [] Estim:  []Unatt       []IFC  []Premod                        []Other:  []w/ice   []w/heat  Position:  Location:    [] Estim: []Att    []TENS instruct  []NMES                    []Other:  []w/US   []w/ice   []w/heat  Position:  Location:    []  Traction: [] Cervical       []Lumbar                       [] Prone          []Supine                       []Intermittent   []Continuous Lbs:  [] before manual  [] after manual   8 [x]  Ultrasound: [x]Continuous   [] Pulsed                8           [x]1MHz   []3MHz Location:L shoulder  W/cm2:1.3    []  Iontophoresis with dexamethasone         Location: [] Take home patch   [] In clinic   20 [x]  Ice post     [x]  Heat pre  []  Ice massage  []  Laser   []  Anodyne Position:seated  Location:L shoulder    []  Laser with stim  []  Other: Position:  Location:    []  Vasopneumatic Device Pressure:       [] lo [] med [] hi   Temperature: [] lo [] med [] hi   [] Skin assessment post-treatment:  []intact []redness- no adverse reaction    []redness - adverse reaction:       min []Eval                  []Re-Eval       24 min Therapeutic Exercise:  [x] See flow sheet :   Rationale: increase ROM, increase strength and improve coordination to improve the patients ability to aid with increase tolerance to ADLs and activities     min Therapeutic Activity:  []  See flow sheet :   Rationale:   to improve the patients ability to       min Neuromuscular Re-education:  []  See flow sheet :   Rationale:   to improve the patients ability to     13 min Manual Therapy:  PROM, gentle LAD and oscillations. Gentle STM L shoulder   Rationale: decrease pain, increase ROM and increase tissue extensibility to aid with increase tolerance to ADLs and activities     min Gait Training:  ___ feet with ___ device on level surfaces with ___ level of assist   Rationale: With   [] TE   [] TA   [] neuro   [] other: Patient Education: [x] Review HEP    [] Progressed/Changed HEP based on:   [] positioning   [] body mechanics   [] transfers   [] heat/ice application    [] other:      Other Objective/Functional Measures: VC technique and supervision with his exercises. Note reports of pain with end range PROM in Flexion at the anterior L shoulder and posterior shoulder with Abd. This pain is unable to be palpated. Pain Level (0-10 scale) post treatment: 0    ASSESSMENT/Changes in Function: As above, trial US to aid with reports of pain.      Patient will continue to benefit from skilled PT services to modify and progress therapeutic interventions, address functional mobility deficits, address ROM deficits, address strength deficits, analyze and address soft tissue restrictions, analyze and cue movement patterns, analyze and modify body mechanics/ergonomics, assess and modify postural abnormalities and instruct in home and community integration to attain remaining goals. [x]  See Plan of Care  []  See progress note/recertification  []  See Discharge Summary         Progress towards goals / Updated goals:  Short Term Goals: To be accomplished in 5-6 treatments:  1. Pt will be compliant and independent with HEP in order to facilitate PT sessions and aid with self management  Eval Status: Initiated  Current Status: progressing 3/27/17 4/4/17 4/7/17 4/11/17 4/13/17  2. Pt to tolerate 30 min or more of TE and/or Interventions w/o increased s/s  Eval Status: Initiated  Current Status: 20 4/4/17  24 minutes 4/13/17  3. Pt will have PROM (L) Shoulder Flx/ABD to 120* unforced to initiate AAROM  Eval Status: Initiated  Current Status: progressing 3/27/17 > 115 each      Long Term Goals: To be accomplished in 10-20 treatments:  1. Pt will report 50% improvement or better with involvement to show a significant increase in function  Eval Status: Initiated  Current Status:  2. Pt will demonstrated full elbow and wrist function to achieve light activity  Eval Status: Initiated  Current Status: met 4/7/17 4/11/17 4/13/17  3. Pt will have AROM (L) shoulder Flx/ABD to 0-135* to show good shoulder mobility for progress to PREs. Eval Status: Initiated  Current Status:  4.  Pt will improve FOTO score to 66 to show significant improvement in function for progress to Good shoulder function  Eval Status: 36  Current Status: 60 4/7/17       PLAN  [x]  Upgrade activities as tolerated     [x]  Continue plan of care  []  Update interventions per flow sheet       []  Discharge due to:_  []  Other:_      Gamaliel Bowens PT 4/13/2017  12:42 PM

## 2017-04-18 ENCOUNTER — OFFICE VISIT (OUTPATIENT)
Dept: ORTHOPEDIC SURGERY | Age: 62
End: 2017-04-18

## 2017-04-18 ENCOUNTER — TELEPHONE (OUTPATIENT)
Dept: INTERNAL MEDICINE CLINIC | Age: 62
End: 2017-04-18

## 2017-04-18 ENCOUNTER — HOSPITAL ENCOUNTER (OUTPATIENT)
Dept: PHYSICAL THERAPY | Age: 62
Discharge: HOME | End: 2017-04-18
Payer: MEDICARE

## 2017-04-18 VITALS
HEIGHT: 67 IN | HEART RATE: 52 BPM | SYSTOLIC BLOOD PRESSURE: 136 MMHG | BODY MASS INDEX: 38.08 KG/M2 | TEMPERATURE: 98.6 F | WEIGHT: 242.6 LBS | DIASTOLIC BLOOD PRESSURE: 61 MMHG

## 2017-04-18 DIAGNOSIS — M75.122 COMPLETE ROTATOR CUFF TEAR OF LEFT SHOULDER: Primary | ICD-10-CM

## 2017-04-18 PROCEDURE — 97140 MANUAL THERAPY 1/> REGIONS: CPT

## 2017-04-18 PROCEDURE — 97110 THERAPEUTIC EXERCISES: CPT

## 2017-04-18 NOTE — PROGRESS NOTES
PT DAILY TREATMENT NOTE - [facility] 3-16    Patient Name: Demetra Ozuna  Date:2017  : 1955  [x]  Patient  Verified  Payor: Emigdio Nino / Plan: [place] MED[place] A & [place] / Product Type: Medicare /    In PVBP:6284  Out time:1154  Total Treatment Time (min): 46  Total Timed Codes (min): 36  1:1 Treatment Time ( [address] only): 24   Visit #: 8 of   Treatment Area: Incomplete rotator cuff tear or rupture of left shoulder, not specified as traumatic [M75.112]    SUBJECTIVE  Pain Level (0-10 scale): 0  Any medication changes, allergies to medications, adverse drug reactions, diagnosis change, or new procedure performed?: [x] No    [] Yes (see summary sheet for update)  Subjective functional status/changes:   [] No changes reported  I'm doing good. Got a note from the Doc.     OBJECTIVE  Modality rationale: decrease inflammation and decrease pain to improve the patients ability to perform increased ADL   Min Type Additional Details    [] Estim:  []Unatt       []IFC  []Premod                        []Other:  []w/ice   []w/heat  Position:  Location:    [] Estim: []Att    []TENS instruct  []NMES                    []Other:  []w/US   []w/ice   []w/heat  Position:  Location:    []  Traction: [] Cervical       []Lumbar                       [] Prone          []Supine                       []Intermittent   []Continuous Lbs:  [] before manual  [] after manual    []  Ultrasound: []Continuous   [] Pulsed                           []1MHz   []3MHz Location:  W/cm2:    []  Iontophoresis with dexamethasone         Location: [] Take home patch   [] In clinic   10 [x]  Ice     []  heat  []  Ice massage  []  Laser   []  Anodyne Position: Supine  Location: (L) Shoulder    []  Laser with stim  []  Other: Position:  Location:    []  Vasopneumatic Device Pressure:       [] lo [] med [] hi   Temperature: [] lo [] med [] hi   [x] Skin assessment post-treatment:  []intact []redness- no adverse reaction    []redness - adverse reaction:     24 min Therapeutic Exercise:  [x] See flow sheet : add AAROM with Pulleys   Rationale: increase ROM and improve coordination to improve the patients ability to perform increased ADL  12 min Manual Therapy:  PROM Shoulder Flx/ABD   Rationale: decrease pain, increase ROM and increase tissue extensibility to perform increased ADL  . With   [x] TE   [] TA   [] neuro   [] other: Patient Education: [x] Review HEP    [] Progressed/Changed HEP based on:   [] positioning   [] body mechanics   [] transfers   [] heat/ice application    [] other:      Other Objective/Functional Measures:   - Add AAROM per MD script  - AAROM with pulleys Flx 120 Ext 130     Pain Level (0-10 scale) post treatment: 0    ASSESSMENT/Changes in Function:      Patient will continue to benefit from skilled PT services to modify and progress therapeutic interventions, address functional mobility deficits, address ROM deficits, address strength deficits, analyze and address soft tissue restrictions and analyze and cue movement patterns to attain remaining goals. []  See Plan of Care  []  See progress note/recertification  []  See Discharge Summary         Progress towards goals / Updated goals:  Short Term Goals: To be accomplished in 5-6 treatments:  1. Pt will be compliant and independent with HEP in order to facilitate PT sessions and aid with self management  Eval Status: Initiated  Current Status: progressing 3/27/17 4/4/17 4/7/17 4/11/17 4/13/17  2. Pt to tolerate 30 min or more of TE and/or Interventions w/o increased s/s  Eval Status: Initiated  Current Status: 20 4/4/17 24 minutes 4/13/17  3. Pt will have PROM (L) Shoulder Flx/ABD to 120* unforced to initiate AAROM  Eval Status: Initiated  Current Status: Met with more than 130* unforced for Flx/ABD 4/18/17      Long Term Goals: To be accomplished in 10-20 treatments:  1.  Pt will report 50% improvement or better with involvement to show a significant increase in function  Eval Status: Initiated  Current Status:  2. Pt will demonstrated full elbow and wrist function to achieve light activity  Eval Status: Initiated  Current Status: met 4/7/17 4/11/17 4/13/17  3. Pt will have AROM (L) shoulder Flx/ABD to 0-135* to show good shoulder mobility for progress to PREs. Eval Status: Initiated  Current Status:  4.  Pt will improve FOTO score to 66 to show significant improvement in function for progress to Good shoulder function  Eval Status: 36  Current Status: 60 4/7/17    PLAN  [x]  Upgrade activities as tolerated     [x]  Continue plan of care  []  Update interventions per flow sheet       []  Discharge due to:_  []  Other:_      Alexander Alvarez PT 4/18/2017  11:37 AM    Future Appointments  Date Time Provider Shae Ho   4/20/2017 12:00 PM Csasandra Peter PT [facility]   4/25/2017 11:00 AM Cassandra Peter PT [facility]   4/27/2017 10:00 AM Alexander Alvarez PT [facility]   5/2/2017 1:45 PM Laina Mayo MD [facility] [address]   5/17/2017 8:15 AM Dorrine Bernheim, MD [facility] None   5/17/2017 9:15 AM DAVID Gomez [address]   5/24/2017 8:30 AM Dorrine Bernheim, MD [facility] None

## 2017-04-18 NOTE — TELEPHONE ENCOUNTER
I called patient in regards to Wheezing. Patient sts he is not short of breath but has notice some coughing and wheezing x 1 day. Would to see if Dr Ayanna Baugh could call something in. Cough is productive he sts. Dr Ayanna Baugh Please advise.

## 2017-04-18 NOTE — TELEPHONE ENCOUNTER
Continue to use albuterol inhaler if not getting better he can see us in the office.  Can use OTC robitussin for cough

## 2017-04-18 NOTE — TELEPHONE ENCOUNTER
Spoke with patient he is aware to continue to use his albuterol inhaler and if he is not getting any better he will need an appointment. Patient is aware he can use OTC Robitussin for the cough. Patient verbalizes understanding.

## 2017-04-18 NOTE — PROGRESS NOTES
Nemo Yip  1955     HISTORY OF PRESENT ILLNESS  Nemo Yip is a 59 y.o. male who presents today for evaluation s/p Left shoulder arthroscopic rotator cuff repair, subscapularis repair and biceps debridement on 3/23/17. Patient has been going to PT. Describes pain as a 0/10. Presents today in his sling. States he is doing well. Just has a hard time sleeping in his sling. Patient denies any fever, chills, chest pain, shortness of breath or calf pain. There are no new illness or injuries to report since last seen in the office. PHYSICAL EXAM:   Visit Vitals    /61    Pulse (!) 52    Temp 98.6 °F (37 °C) (Oral)    Ht 5' 7\" (1.702 m)    Wt 242 lb 9.6 oz (110 kg)    BMI 38 kg/m2      The patient is a well-developed, well-nourished male in no acute distress. The patient is alert and oriented times three. The patient appears to be well groomed. Mood and affect are normal.  ORTHOPEDIC EXAM of left shoulder:  Inspection: swelling not present,  Bruising not present  Incisions well healed  Passive glenohumeral abduction 0-90 degrees  Stability: Stable  Strength: n/a  2+ distal pulses    IMPRESSION:  S/P Left shoulder arthroscopic rotator cuff repair, subscapularis repair, biceps debridement    PLAN:   1. Patient will continue with PT. Patient can advance to AROM phase and remove sling in 2 weeks.    2. Will start active assist now    RTC 4 weeks    KAREN Rankin [facility]     CC: Dr. Yanni Santos

## 2017-04-18 NOTE — TELEPHONE ENCOUNTER
Patient called in and stated that he is having some wheezing and want to know if something can be called in for him. Patient stated that Dr. Emma Cintron gave him something for this before. Please advise.

## 2017-04-20 ENCOUNTER — HOSPITAL ENCOUNTER (OUTPATIENT)
Dept: PHYSICAL THERAPY | Age: 62
Discharge: HOME | End: 2017-04-20
Payer: MEDICARE

## 2017-04-20 PROCEDURE — 97140 MANUAL THERAPY 1/> REGIONS: CPT

## 2017-04-20 NOTE — PROGRESS NOTES
PT DAILY TREATMENT NOTE - [facility] 3-16    Patient Name: Marilee Fairbanks  Date:2017  : 1955  [x]  Patient  Verified  Payor: Sivakumar Postin / Plan: [place] MEDICARE PART A & B / Product Type: Medicare /    In time:1201  Out time:1238  Total Treatment Time (min): 34  Total Timed Codes (min): 34  1:1 Treatment Time [facility] only):15  Visit #: 9 of     Treatment Area: Incomplete rotator cuff tear or rupture of left shoulder, not specified as traumatic [M75.112]    SUBJECTIVE  Pain Level (0-10 scale): 0  Any medication changes, allergies to medications, adverse drug reactions, diagnosis change, or new procedure performed?: [x] No    [] Yes (see summary sheet for update)  Subjective functional status/changes:   [] No changes reported  I have another appointment I need to get to. I can ice it at home. I am not having any pain.      OBJECTIVE    Modality rationale:     Min Type Additional Details    [] Estim:  []Unatt       []IFC  []Premod                        []Other:  []w/ice   []w/heat  Position:  Location:    [] Estim: []Att    []TENS instruct  []NMES                    []Other:  []w/US   []w/ice   []w/heat  Position:  Location:    []  Traction: [] Cervical       []Lumbar                       [] Prone          []Supine                       []Intermittent   []Continuous Lbs:  [] before manual  [] after manual    []  Ultrasound: []Continuous   [] Pulsed                           []1MHz   []3MHz Location:  W/cm2:    []  Iontophoresis with dexamethasone         Location: [] Take home patch   [] In clinic    []  Ice     []  heat  []  Ice massage  []  Laser   []  Anodyne Position:  Location:    []  Laser with stim  []  Other: Position:  Location:    []  Vasopneumatic Device Pressure:       [] lo [] med [] hi   Temperature: [] lo [] med [] hi   [] Skin assessment post-treatment:  []intact []redness- no adverse reaction    []redness - adverse reaction:       min []Eval                  []Re-Eval       19 min Therapeutic Exercise:  [x] See flow sheet :   Rationale: increase ROM, increase strength and improve coordination to improve the patients ability to aid with increase tolerance to ADLS and activities     min Therapeutic Activity:  []  See flow sheet :   Rationale:   to improve the patients ability to       min Neuromuscular Re-education:  []  See flow sheet :   Rationale:   to improve the patients ability to     15 min Manual Therapy:  PROM, AAROM, Gentle LAD and oscill,    Rationale: decrease pain, increase ROM and increase tissue extensibility to aid with increase tolerance to ADLS and activities     min Gait Training:  ___ feet with ___ device on level surfaces with ___ level of assist   Rationale: With   [] TE   [] TA   [] neuro   [] other: Patient Education: [x] Review HEP    [] Progressed/Changed HEP based on:   [] positioning   [] body mechanics   [] transfers   [] heat/ice application    [] other:      Other Objective/Functional Measures: VC exercises and technique     Reviewed wand flexion      Pain Level (0-10 scale) post treatment: 0    ASSESSMENT/Changes in Function: progressing well with AAROM exercises    Patient will continue to benefit from skilled PT services to modify and progress therapeutic interventions, address functional mobility deficits, address ROM deficits, address strength deficits, analyze and address soft tissue restrictions, analyze and cue movement patterns, analyze and modify body mechanics/ergonomics, assess and modify postural abnormalities and instruct in home and community integration to attain remaining goals. [x]  See Plan of Care  []  See progress note/recertification  []  See Discharge Summary         Progress towards goals / Updated goals:  Short Term Goals: To be accomplished in 5-6 treatments:  1.  Pt will be compliant and independent with HEP in order to facilitate PT sessions and aid with self management  Eval Status: Initiated  Current Status: progressing 3/27/17 4/4/17 4/7/17 4/11/17 4/13/17 4/20/17  2. Pt to tolerate 30 min or more of TE and/or Interventions w/o increased s/s  Eval Status: Initiated  Current Status: 20 4/4/17 24 minutes 4/13/17   3. Pt will have PROM (L) Shoulder Flx/ABD to 120* unforced to initiate AAROM  Eval Status: Initiated  Current Status: Met with more than 130* unforced for Flx/ABD 4/20/17       Long Term Goals: To be accomplished in 10-20 treatments:  1. Pt will report 50% improvement or better with involvement to show a significant increase in function  Eval Status: Initiated  Current Status:  2. Pt will demonstrated full elbow and wrist function to achieve light activity  Eval Status: Initiated  Current Status: met 4/7/17 4/11/17 4/13/17 4/20/17  3. Pt will have AROM (L) shoulder Flx/ABD to 0-135* to show good shoulder mobility for progress to PREs. Eval Status: Initiated  Current Status:  4.  Pt will improve FOTO score to 66 to show significant improvement in function for progress to Good shoulder function  Eval Status: 36  Current Status: 60 4/7/17  PLAN  [x]  Upgrade activities as tolerated     [x]  Continue plan of care  []  Update interventions per flow sheet       []  Discharge due to:_  []  Other:_      Kristen Ayers, PT 4/20/2017  12:09 PM

## 2017-04-25 ENCOUNTER — HOSPITAL ENCOUNTER (OUTPATIENT)
Dept: PHYSICAL THERAPY | Age: 62
Discharge: HOME | End: 2017-04-25
Payer: MEDICARE

## 2017-04-25 PROCEDURE — 97110 THERAPEUTIC EXERCISES: CPT

## 2017-04-25 PROCEDURE — 97140 MANUAL THERAPY 1/> REGIONS: CPT

## 2017-04-25 PROCEDURE — G8984 CARRY CURRENT STATUS: HCPCS

## 2017-04-25 PROCEDURE — G8985 CARRY GOAL STATUS: HCPCS

## 2017-04-25 NOTE — PROGRESS NOTES
PT DAILY TREATMENT NOTE - [facility] 3-16    Patient Name: Belle Stacy  Date:2017  : 1955  [x]  Patient  Verified  Payor: Soo Phillips / Plan: [place] MEDICARE PART A & B / Product Type: Medicare /    In time:1105  Out time:1155  Total Treatment Time (min): 50  Total Timed Codes (min): 40  1:1 Treatment Time ( [address] only): 25   Visit #: 10 of 18-20    Treatment Area: Incomplete rotator cuff tear or rupture of left shoulder, not specified as traumatic [M75.112]    SUBJECTIVE  Pain Level (0-10 scale): 0  Any medication changes, allergies to medications, adverse drug reactions, diagnosis change, or new procedure performed?: [x] No    [] Yes (see summary sheet for update)  Subjective functional status/changes:   [] No changes reported  It's doing all right.     OBJECTIVE    Modality rationale: decrease inflammation, decrease pain and increase tissue extensibility to improve the patients ability to aid with increase tolerance to ADLs and activities   Min Type Additional Details    [] Estim:  []Unatt       []IFC  []Premod                        []Other:  []w/ice   []w/heat  Position:  Location:    [] Estim: []Att    []TENS instruct  []NMES                    []Other:  []w/US   []w/ice   []w/heat  Position:  Location:    []  Traction: [] Cervical       []Lumbar                       [] Prone          []Supine                       []Intermittent   []Continuous Lbs:  [] before manual  [] after manual    []  Ultrasound: []Continuous   [] Pulsed                           []1MHz   []3MHz Location:  W/cm2:    []  Iontophoresis with dexamethasone         Location: [] Take home patch   [] In clinic   10 [x]  Ice    Post  []  heat  []  Ice massage  []  Laser   []  Anodyne Position:supine  Location:L shoulder    []  Laser with stim  []  Other: Position:  Location:    []  Vasopneumatic Device Pressure:       [] lo [] med [] hi   Temperature: [] lo [] med [] hi   [] Skin assessment post-treatment:  []intact []redness- no adverse reaction    []redness - adverse reaction:      min []Eval                  []Re-Eval       25 min Therapeutic Exercise:  [x] See flow sheet :   Rationale: increase ROM, increase strength and improve coordination to improve the patients ability to aid with increase tolerance to ADLs and activities     min Therapeutic Activity:  []  See flow sheet :   Rationale:   to improve the patients ability to       min Neuromuscular Re-education:  []  See flow sheet :   Rationale:   to improve the patients ability to     15 min Manual Therapy:  PROM , LAD, OSCILL and gentle ADEEL law   Rationale: increase ROM and increase tissue extensibility to aid with increase tolerance to ADLS     min Gait Training:  ___ feet with ___ device on level surfaces with ___ level of assist   Rationale: With   [] TE   [] TA   [] neuro   [] other: Patient Education: [x] Review HEP    [] Progressed/Changed HEP based on:   [] positioning   [] body mechanics   [] transfers   [] heat/ice application    [] other:      Other Objective/Functional Measures: VC exercises     Pain Level (0-10 scale) post treatment: 0    ASSESSMENT/Changes in Function: ADEEL santiago full in supine for F and ABD    Patient will continue to benefit from skilled PT services to modify and progress therapeutic interventions, address functional mobility deficits, address ROM deficits, address strength deficits, analyze and address soft tissue restrictions, analyze and cue movement patterns, analyze and modify body mechanics/ergonomics, assess and modify postural abnormalities and instruct in home and community integration to attain remaining goals. [x]  See Plan of Care  [x]  See progress note/recertification  []  See Discharge Summary             Progress towards goals / Updated goals:  Short Term Goals: To be accomplished in 5-6 treatments:  1.  Pt will be compliant and independent with HEP in order to facilitate PT sessions and aid with self management  Eval Status: Initiated  Current Status: progressing 3/27/17 4/4/17 4/7/17 4/11/17 4/13/17 4/20/17 4/25/17  2. Pt to tolerate 30 min or more of TE and/or Interventions w/o increased s/s  Eval Status: Initiated  Current Status: 20 4/4/17 24 minutes 4/13/17    25 4/25/17  3. Pt will have PROM (L) Shoulder Flx/ABD to 120* unforced to initiate AAROM  Eval Status: Initiated  Current Status: Met with more than 130* unforced for Flx/ABD 4/20/17 4/25/17      Long Term Goals: To be accomplished in 10-20 treatments:  1. Pt will report 50% improvement or better with involvement to show a significant increase in function  Eval Status: Initiated  Current Status: overall  100$ 4/25/17  2. Pt will demonstrated full elbow and wrist function to achieve light activity  Eval Status: Initiated  Current Status: met 4/7/17 4/11/17 4/13/17 4/20/17 4/25/17  3. Pt will have AROM (L) shoulder Flx/ABD to 0-135* to show good shoulder mobility for progress to PREs. Eval Status: Initiated  Current Status:   4.  Pt will improve FOTO score to 66 to show significant improvement in function for progress to Good shoulder function  Eval Status: 36  Current Status: 73 4/25/17    PLAN  [x]  Upgrade activities as tolerated     [x]  Continue plan of care  []  Update interventions per flow sheet       []  Discharge due to:_  []  Other:_      Tammi Weaver, PT 4/25/2017  10:58 AM

## 2017-04-25 NOTE — PROGRESS NOTES
[facility]  [address] [facility], [address] [address],[place] [address]  [phone] fax    Medicare Progress Report    Patient name: Summer Cr Start of Care: 3/24/17   Referral source: Deonte Pittman,* : 1955   Medical/Treatment Diagnosis: Incomplete rotator cuff tear or rupture of left shoulder, not specified as traumatic [M75.112] Onset Date:One month ago with Surgery on 3/23/14      Prior Hospitalization: see medical history Provider#: [id number]   Medications: Verified on Patient Summary List    Comorbidities: OA , HTN  Prior Level of Function:(L) UE weakness but able to work on bike and ride bike at will.     Visits from Start of Care: 10   Missed Visits: 0  Reporting Period: 3/24/17 to 17    Subjective Reports: Overall 100% improved, still a little catch/pop at the top sometimes    Current Status/ treatment goals Objective measures   1. Pt will be compliant and independent with HEP in order to facilitate PT sessions and aid with self management   [x] met                 [] not met  [] progressing  reports compliance 17   2. Pt will have PROM (L) Shoulder Flx/ABD to 120* unforced to initiate AAROM   [x] met                 [] not met  [] progressing Met with more than 130* unforced for Flx/ABD 17   3. Pt will report 50% improvement or better with involvement to show a significant increase in function [x] met                 [] not met  [] progressing 100%   4. Pt will improve FOTO score to 66 to show significant improvement in function for progress to Good shoulder function   [x] met                 [] not met  [] progressing 73     Key functional changes: decrease pain, increased ROM       Problems/ barriers to goal attainment: residual weakness     Assessment / Recommendations:Patient demonstrates the potential to make further gains with improving ROM, strength, endurance/activity tolerance, functional FOTO survey score  and all within a reasonable time frame so as to further increase their functional independence with ADLs and activities for carryover to  Improved quality of life, resuming bike riding. Patient requires skilled Physical Therapy so as to monitor their response to and modify their treatment plan accordingly. Patient appears to be an appropriate candidate for skilled outpatient Physical Therapy. Problem List: pain affecting function, decrease ROM, decrease strength, edema affecting function, impaired gait/ balance, decrease ADL/ functional abilitiies, decrease activity tolerance, decrease flexibility/ joint mobility, decrease transfer abilities and other FOTO 73   Treatment Plan: Therapeutic exercise, Therapeutic activities, Neuromuscular re-education, Physical agent/modality, Manual therapy and Patient education    Patient Goal (s) has been updated and includes: get the arm stronger so I can ride my bike     Updated Goals to be accomplished in 10 treatments:  1. Pt will have AROM (L) shoulder Flx/ABD to 0-135* to show good shoulder mobility for progress to PREs. PN NA  Current Status:   2. Pt will improve FOTO score to 80 to show significant improvement in function for progress to Good shoulder function  PN 73  Current Status:    3 patient will have MMT L shoulder F  abd  ER  IR all 4+ ,5 to aid with increase tolerance to ADLS  PN NA  CURRENT  4 patient will tolerate overhead cone stacks 10 cones 3 sets with 2# weight for carryover to household activities  PN Not initiated  CURRENT  Frequency / Duration: Patient to be seen 2-3 times per week for 10 tratments  G-Codes (GP)   Carry   Current  CJ= 20-39%    Goal  CJ= 20-39%     The severity rating is based on clinical judgment and the FOTO score.         Frantz Farley, PT 4/25/2017 11:53 AM

## 2017-04-26 DIAGNOSIS — M54.5 CHRONIC MIDLINE LOW BACK PAIN, WITH SCIATICA PRESENCE UNSPECIFIED: ICD-10-CM

## 2017-04-26 DIAGNOSIS — G89.29 CHRONIC MIDLINE LOW BACK PAIN, WITH SCIATICA PRESENCE UNSPECIFIED: ICD-10-CM

## 2017-04-26 RX ORDER — OXYCODONE AND ACETAMINOPHEN 10; 325 MG/1; MG/1
1 TABLET ORAL
Qty: 90 TAB | Refills: 0 | Status: SHIPPED | OUTPATIENT
Start: 2017-04-26 | End: 2017-05-22 | Stop reason: SDUPTHER

## 2017-04-26 RX ORDER — OXYCODONE AND ACETAMINOPHEN 10; 325 MG/1; MG/1
1-2 TABLET ORAL
Qty: 40 TAB | Refills: 0 | Status: CANCELLED | OUTPATIENT
Start: 2017-04-26

## 2017-04-26 NOTE — TELEPHONE ENCOUNTER
Requested Prescriptions     Pending Prescriptions Disp Refills    oxyCODONE-acetaminophen (PERCOCET)  mg per tablet 40 Tab 0     Sig: Take 1-2 Tabs by mouth every four (4) hours as needed for Pain. Max Daily Amount: 12 Tabs. Ludwin huitron

## 2017-04-26 NOTE — TELEPHONE ENCOUNTER
Requested Prescriptions     Pending Prescriptions Disp Refills    oxyCODONE-acetaminophen (PERCOCET)  mg per tablet 40 Tab 0     Sig: Take 1-2 Tabs by mouth every four (4) hours as needed for Pain. Max Daily Amount: 12 Tabs.        Last office visit was  3/16/17  Next office visit is     5/24/17    90 tabs prescribed 4/3/17  Please assist.

## 2017-04-27 ENCOUNTER — HOSPITAL ENCOUNTER (OUTPATIENT)
Dept: PHYSICAL THERAPY | Age: 62
Discharge: HOME | End: 2017-04-27
Payer: MEDICARE

## 2017-04-27 PROCEDURE — 97140 MANUAL THERAPY 1/> REGIONS: CPT

## 2017-04-27 NOTE — PROGRESS NOTES
PT DAILY TREATMENT NOTE 3-16    Patient Name: Deng Parra  Date:2017  : 1955  [x]  Patient  Verified  Payor: Fern Crowley / Plan: [place] MED[place] & B / Product Type: Medicare /    In time:1010  Out time:1117  Total Treatment Time (min): 35  Total Timed Codes (min): 35  1:1 Treatment Time ( [address] only): 35  Visit #: 11 of 18-    Treatment Area: Incomplete rotator cuff tear or rupture of left shoulder, not specified as traumatic [M75.112]    SUBJECTIVE  Pain Level (0-10 scale): 0  Any medication changes, allergies to medications, adverse drug reactions, diagnosis change, or new procedure performed?: [x] No    [] Yes (see summary sheet for update)  Subjective functional status/changes:   [] No changes reported  I'm feeling god    OBJECTIVE  25 min Therapeutic Exercise:  [x] See flow sheet :   Rationale: increase ROM, increase strength and improve coordination to improve the patients ability to tolerate increased activity levels  10 min Manual Therapy:  PROM with overstretch (L) shoulder flx/ABD, GH Jt mobs, HZtl ABD   Rationale: increase ROM and increase tissue extensibility to perform increased ADL             With   [] TE   [] TA   [] neuro   [] other: Patient Education: [x] Review HEP    [] Progressed/Changed HEP based on:   [] positioning   [] body mechanics   [] transfers   [] heat/ice application    [] other:      Other Objective/Functional Measures:   - Good PROM to WNL  - Ready to move to progressive AAROM and AROM at next visit     Pain Level (0-10 scale) post treatment: 0    ASSESSMENT/Changes in Function:      Patient will continue to benefit from skilled PT services to modify and progress therapeutic interventions, address functional mobility deficits, address ROM deficits, address strength deficits, analyze and address soft tissue restrictions, analyze and cue movement patterns and analyze and modify body mechanics/ergonomics to attain remaining goals.      []  See Plan of Care  []  See progress note/recertification  []  See Discharge Summary         Progress towards goals / Updated goals:  Updated Goals to be accomplished in 10 treatments:  1. Pt will have AROM (L) shoulder Flx/ABD to 0-135* to show good shoulder mobility for progress to PREs. PN NA  Current Status:   2.  Pt will improve FOTO score to 80 to show significant improvement in function for progress to Good shoulder function  PN 73  Current Status:   3 patient will have MMT L shoulder F abd ER IR all 4+ ,5 to aid with increase tolerance to ADLS  PN NA  CURRENT  4 patient will tolerate overhead cone stacks 10 cones 3 sets with 2# weight for carryover to household activities  PN Not initiated  CURRENT  Frequency / Duration: Patient to be seen 2-3 times per week for 10 tratments  G-Codes (GP)  Carry    PLAN  [x]  Upgrade activities as tolerated     [x]  Continue plan of care  []  Update interventions per flow sheet       []  Discharge due to:_  []  Other:_      Agnes Erik, PT 4/27/2017  11:17 AM    Future Appointments  Date Time Provider Shae Ho   5/2/2017 1:45 PM Darrion Christie  [address]   5/17/2017 8:15 AM Kianna Carver MD [facility] None   5/17/2017 9:15 AM DAVID [address]   5/24/2017 8:30 AM Kianna Carver MD [facility] None

## 2017-05-03 ENCOUNTER — HOSPITAL ENCOUNTER (OUTPATIENT)
Dept: PHYSICAL THERAPY | Age: 62
Discharge: HOME | End: 2017-05-03
Payer: MEDICARE

## 2017-05-03 PROCEDURE — 97110 THERAPEUTIC EXERCISES: CPT

## 2017-05-03 NOTE — PROGRESS NOTES
PT DAILY TREATMENT NOTE 3-16    Patient Name: Marilee Fairbanks  Date:5/3/2017  : 1955  [x]  Patient  Verified  Payor: Sivakumar Postin / Plan: [place] MEDICARE PART A & B / Product Type: Medicare /    In EENT:6437 Out time: 1020  Total Treatment Time (min): 45  Total Timed Codes (min): 45  1:1 Treatment Time ( [address] only): 45  Visit #: 12 of     Treatment Area:  Incomplete rotator cuff tear or rupture of left shoulder, not specified as traumatic [M75.112]    SUBJECTIVE  Pain Level (0-10 scale): 0  Any medication changes, allergies to medications, adverse drug reactions, diagnosis change, or new procedure performed?: [x] No    [] Yes (see summary sheet for update)  Subjective functional status/changes:   [] No changes reported  I'm feeling god    OBJECTIVE  42 min Therapeutic Exercise:  [x] See flow sheet :   Rationale: increase ROM, increase strength and improve coordination to improve the patients ability to tolerate increased activity levels  3 min Manual Therapy:  PROM with overstretch (L) shoulder flx/ABD,  HZtl ABD   Rationale: increase ROM and increase tissue extensibility to perform increased ADL             With   [x] TE   [] TA   [] neuro   [] other: Patient Education: [x] Review HEP    [] Progressed/Changed HEP based on:   [] positioning   [] body mechanics   [] transfers   [] heat/ice application    [] other:      Other Objective/Functional Measures:   - Add AAROM exercises  - - Finger ladder to 25 at first attempt  - - Good tolerance with added exercises   - Cone stack to level 3    Pain Level (0-10 scale) post treatment: 0    ASSESSMENT/Changes in Function:      Patient will continue to benefit from skilled PT services to modify and progress therapeutic interventions, address functional mobility deficits, address ROM deficits, address strength deficits, analyze and address soft tissue restrictions, analyze and cue movement patterns and analyze and modify body mechanics/ergonomics to attain remaining goals.     []  See Plan of Care  []  See progress note/recertification  []  See Discharge Summary         Progress towards goals / Updated goals:  Updated Goals to be accomplished in 10 treatments:  1. Pt will have AROM (L) shoulder Flx/ABD to 0-135* to show good shoulder mobility for progress to PREs. PN NA  Current Status:   2.  Pt will improve FOTO score to 80 to show significant improvement in function for progress to Good shoulder function  PN 73  Current Status:   3 patient will have MMT L shoulder F abd ER IR all 4+ ,5 to aid with increase tolerance to ADLS  PN NA  CURRENT  4 patient will tolerate overhead cone stacks 10 cones 3 sets with 2# weight for carryover to household activities  PN Not initiated  CURRENT Progressing with cone stacks to level 3 at shoulder level w/o difficulty  Frequency / Duration: Patient to be seen 2-3 times per week for 10 tratments  G-Codes (GP)  Carry    PLAN  [x]  Upgrade activities as tolerated     [x]  Continue plan of care  []  Update interventions per flow sheet       []  Discharge due to:_  []  Other:_      Cherelle Vargas, PT 5/3/2017  9:38 AM    Future Appointments  Date Time Provider Shae Ho   5/5/2017 10:30 AM Gloria Adams, PT [facility]   5/8/2017 2:00 PM Gloria Adams, PT [facility]   5/9/2017 2:30 PM Cherelle Vargas PT [facility]   5/16/2017 10:30 AM Gloria Adams, PT [facility]   5/17/2017 8:15 AM Celso Spurling, MD [facility] None   5/17/2017 9:15 AM DAVID [place]   5/19/2017 9:30 AM Gloria Adams, PT [facility]   5/23/2017 10:30 AM Cherelle Vargas PT [facility]   5/24/2017 8:30 AM Celso Spurling, MD [facility] None   5/25/2017 10:30 AM Gloria Adams PT [facility]   5/30/2017 10:30 AM Gloria Adams PT [facility]   5/3/2018 9:30 AM [facility] BETO NURSE LAUREN KANG [facility]   5/11/2018 9:30 AM Lucy Mustafa MD [address] [place] [address]

## 2017-05-05 ENCOUNTER — HOSPITAL ENCOUNTER (OUTPATIENT)
Dept: PHYSICAL THERAPY | Age: 62
Discharge: HOME | End: 2017-05-05
Payer: MEDICARE

## 2017-05-05 PROCEDURE — 97140 MANUAL THERAPY 1/> REGIONS: CPT

## 2017-05-05 PROCEDURE — 97110 THERAPEUTIC EXERCISES: CPT

## 2017-05-05 NOTE — PROGRESS NOTES
PT DAILY TREATMENT NOTE - [facility] 3-16    Patient Name: Nemo Yip  Date:2017  : 1955  [x]  Patient  Verified  Payor: [place] MEDICARE / Plan: [place] MEDICARE PART A & B / Product Type: Medicare /    In IFRY:[id number]   Out time:1255  Total Treatment Time (min): 60  Total Timed Codes (min):50  1:1 Treatment Time [facility] only):50  Visit #: 89 of 18-20    Treatment Area: Incomplete rotator cuff tear or rupture of left shoulder, not specified as traumatic [M75.112]    SUBJECTIVE  Pain Level (0-10 scale): 0  Any medication changes, allergies to medications, adverse drug reactions, diagnosis change, or new procedure performed?: [x] No    [] Yes (see summary sheet for update)  Subjective functional status/changes:   [] No changes reported  It is stiff.      OBJECTIVE    Modality rationale: increase tissue extensibility to improve the patients ability to aid with increase tolerance to ADLS and activities   Min Type Additional Details    [] Estim:  []Unatt       []IFC  []Premod                        []Other:  []w/ice   []w/heat  Position:  Location:    [] Estim: []Att    []TENS instruct  []NMES                    []Other:  []w/US   []w/ice   []w/heat  Position:  Location:    []  Traction: [] Cervical       []Lumbar                       [] Prone          []Supine                       []Intermittent   []Continuous Lbs:  [] before manual  [] after manual    []  Ultrasound: []Continuous   [] Pulsed                           []1MHz   []3MHz Location:  W/cm2:    []  Iontophoresis with dexamethasone         Location: [] Take home patch   [] In clinic   10 [x]  Ice   post  []  heat  []  Ice massage  []  Laser   []  Anodyne Position:reclined  Location:L shoulder    []  Laser with stim  []  Other: Position:  Location:    []  Vasopneumatic Device Pressure:       [] lo [] med [] hi   Temperature: [] lo [] med [] hi   [] Skin assessment post-treatment:  []intact []redness- no adverse reaction    []redness - adverse reaction:      min []Eval                  []Re-Eval       40 min Therapeutic Exercise:  [x] See flow sheet :   Rationale: increase ROM, increase strength and improve coordination to improve the patients ability to aid with increase tolerance to ADLS and activities     min Therapeutic Activity:  []  See flow sheet :   Rationale:   to improve the patients ability to       min Neuromuscular Re-education:  []  See flow sheet :   Rationale:   to improve the patients ability to     10 min Manual Therapy:  AAROM L shoulder  All planes with gentle LAD and oscill. Rationale: decrease stiffness, and increase ROM  to aid with increase tolerance to ADLS     min Gait Training:  ___ feet with ___ device on level surfaces with ___ level of assist   Rationale: With   [] TE   [] TA   [] neuro   [] other: Patient Education: [x] Review HEP    [] Progressed/Changed HEP based on:   [] positioning   [] body mechanics   [] transfers   [] heat/ice application    [] other:      Other Objective/Functional Measures: VC exercises and technique    Pain Level (0-10 scale) post treatment: 0    ASSESSMENT/Changes in Function:  Tolerated well. C/O stiffness    Patient will continue to benefit from skilled PT services to modify and progress therapeutic interventions, address functional mobility deficits, address ROM deficits, address strength deficits, analyze and address soft tissue restrictions, analyze and cue movement patterns, analyze and modify body mechanics/ergonomics, assess and modify postural abnormalities and instruct in home and community integration to attain remaining goals. [x]  See Plan of Care  []  See progress note/recertification  []  See Discharge Summary         Progress towards goals / Updated goals:   Updated Goals to be accomplished in 10 treatments:  1. Pt will have AROM (L) shoulder Flx/ABD to 0-135* to show good shoulder mobility for progress to PREs. PN NA  Current Status:   2.  Pt will improve FOTO score to 80 to show significant improvement in function for progress to Good shoulder function  PN 73  Current Status:  73 4/25/17  3 patient will have MMT L shoulder F abd ER IR all 4+ ,5 to aid with increase tolerance to ADLS  PN NA  CURRENT  4 patient will tolerate overhead cone stacks 10 cones 3 sets with 2# weight for carryover to household activities  PN Not initiated  CURRENT Progressing with cone stacks to level 3 at shoulder level w/o difficulty 2 sets with no added weight 5/5/17    PLAN  [x]  Upgrade activities as tolerated     [x]  Continue plan of care  []  Update interventions per flow sheet       []  Discharge due to:_  []  Other:_      Gamaliel Bowens PT 5/5/2017  12:12 PM

## 2017-05-08 ENCOUNTER — TELEPHONE (OUTPATIENT)
Dept: ORTHOPEDIC SURGERY | Age: 62
End: 2017-05-08

## 2017-05-08 ENCOUNTER — HOSPITAL ENCOUNTER (OUTPATIENT)
Dept: PHYSICAL THERAPY | Age: 62
Discharge: HOME | End: 2017-05-08
Payer: MEDICARE

## 2017-05-08 PROCEDURE — 97110 THERAPEUTIC EXERCISES: CPT

## 2017-05-08 PROCEDURE — 97140 MANUAL THERAPY 1/> REGIONS: CPT

## 2017-05-08 NOTE — PROGRESS NOTES
PT DAILY TREATMENT NOTE - [facility]    Patient Name: Kenya Pleitez  Date:2017  : 1955  [x]  Patient  Verified  Payor: Kulwinder Knight / Plan: [place] MEDICARE PART A & B / Product Type: Medicare /    In time:155  Out time:253  Total Treatment Time (min):58  Total Timed Codes (min): 58  1:1 Treatment Time ( [address] only): 58  Visit #:14 of     Treatment Area: Incomplete rotator cuff tear or rupture of left shoulder, not specified as traumatic [M75.112]    SUBJECTIVE  Pain Level (0-10 scale): 0  Any medication changes, allergies to medications, adverse drug reactions, diagnosis change, or new procedure performed?: [x] No    [] Yes (see summary sheet for update)  Subjective functional status/changes:   [] No changes reported  I am tired. I am not having any pain.     OBJECTIVE    Modality rationale:     Min Type Additional Details    [] Estim:  []Unatt       []IFC  []Premod                        []Other:  []w/ice   []w/heat  Position:  Location:    [] Estim: []Att    []TENS instruct  []NMES                    []Other:  []w/US   []w/ice   []w/heat  Position:  Location:    []  Traction: [] Cervical       []Lumbar                       [] Prone          []Supine                       []Intermittent   []Continuous Lbs:  [] before manual  [] after manual    []  Ultrasound: []Continuous   [] Pulsed                           []1MHz   []3MHz Location:  W/cm2:    []  Iontophoresis with dexamethasone         Location: [] Take home patch   [] In clinic   PD []  Ice     []  heat  []  Ice massage  []  Laser   []  Anodyne Position:  Location:    []  Laser with stim  []  Other: Position:  Location:    []  Vasopneumatic Device Pressure:       [] lo [] med [] hi   Temperature: [] lo [] med [] hi   [] Skin assessment post-treatment:  []intact []redness- no adverse reaction    []redness - adverse reaction:      min []Eval                  []Re-Eval       46 min Therapeutic Exercise:  [x] See flow sheet :   Rationale: increase ROM, increase strength and improve coordination to improve the patients ability to aid with increase tolerance to ADLS and activities     min Therapeutic Activity:  []  See flow sheet :   Rationale:   to improve the patients ability to       min Neuromuscular Re-education:  []  See flow sheet :   Rationale:   to improve the patients ability to     12 min Manual Therapy:  PROM , AAROM,    Rationale: increase ROM and increase tissue extensibility to aid with increase tolerance to ADLs and activities     min Gait Training:  ___ feet with ___ device on level surfaces with ___ level of assist   Rationale: With   [] TE   [] TA   [] neuro   [] other: Patient Education: [x] Review HEP    [] Progressed/Changed HEP based on:   [] positioning   [] body mechanics   [] transfers   [] heat/ice application    [] other:      Other Objective/Functional Measures: VC exercises and techniques, increased UBE to level 2 X 6 minutes, increased supine wand flexion to 2 sets     Pain Level (0-10 scale) post treatment: 0    ASSESSMENT/Changes in Function: denies pain, AAROM in supine is essentially full overhead for F and abd. Patient will continue to benefit from skilled PT services to modify and progress therapeutic interventions, address functional mobility deficits, address ROM deficits, address strength deficits, analyze and address soft tissue restrictions, analyze and cue movement patterns, analyze and modify body mechanics/ergonomics, assess and modify postural abnormalities and instruct in home and community integration to attain remaining goals. [x]  See Plan of Care  []  See progress note/recertification  []  See Discharge Summary         Progress towards goals / Updated goals:   Updated Goals to be accomplished in 10 treatments:  1. Pt will have AROM (L) shoulder Flx/ABD to 0-135* to show good shoulder mobility for progress to PREs. PN NA  Current Status:   2.  Pt will improve FOTO score to 80 to show significant improvement in function for progress to Good shoulder function  PN 73  Current Status:  73 4/25/17   Recheck next session   3 patient will have MMT L shoulder F abd ER IR all 4+ ,5 to aid with increase tolerance to ADLS  PN NA  CURRENT  4 patient will tolerate overhead cone stacks 10 cones 3 sets with 2# weight for carryover to household activities  PN Not initiated  CURRENT Progressing with cone stacks to level 3 at shoulder level w/o difficulty 2 sets with no added weight 5/5/17       PLAN  [x]  Upgrade activities as tolerated     [x]  Continue plan of care  []  Update interventions per flow sheet       []  Discharge due to:_  []  Other:_      Bev Finney, PT 5/8/2017  2:14 PM

## 2017-05-08 NOTE — TELEPHONE ENCOUNTER
Pt called in wanting to know if he was allowed to ride his bike? Pt states he has an important appt tomorrow and needs to know if he can ride his bike for it? Please advise pt at [phone].

## 2017-05-09 ENCOUNTER — HOSPITAL ENCOUNTER (OUTPATIENT)
Dept: PHYSICAL THERAPY | Age: 62
Discharge: HOME | End: 2017-05-09
Payer: MEDICARE

## 2017-05-09 ENCOUNTER — HOSPITAL ENCOUNTER (OUTPATIENT)
Dept: LAB | Age: 62
Discharge: HOME | End: 2017-05-09
Payer: MEDICARE

## 2017-05-09 DIAGNOSIS — E78.5 DYSLIPIDEMIA, GOAL LDL BELOW 100: ICD-10-CM

## 2017-05-09 DIAGNOSIS — R73.9 HIGH BLOOD SUGAR: ICD-10-CM

## 2017-05-09 DIAGNOSIS — I10 ESSENTIAL HYPERTENSION: ICD-10-CM

## 2017-05-09 LAB
ALBUMIN SERPL BCP-MCNC: 3.7 G/DL (ref 3.4–5)
ALBUMIN/GLOB SERPL: 0.9 {RATIO} (ref 0.8–1.7)
ALP SERPL-CCNC: 87 U/L (ref 45–117)
ALT SERPL-CCNC: 17 U/L (ref 16–61)
ANION GAP BLD CALC-SCNC: 9 MMOL/L (ref 3–18)
AST SERPL W P-5'-P-CCNC: 12 U/L (ref 15–37)
BILIRUB SERPL-MCNC: 0.4 MG/DL (ref 0.2–1)
BUN SERPL-MCNC: 9 MG/DL (ref 7–18)
BUN/CREAT SERPL: 8 (ref 12–20)
CALCIUM SERPL-MCNC: 9.2 MG/DL (ref 8.5–10.1)
CHLORIDE SERPL-SCNC: 105 MMOL/L (ref 100–108)
CHOLEST SERPL-MCNC: 164 MG/DL
CO2 SERPL-SCNC: 27 MMOL/L (ref 21–32)
CREAT SERPL-MCNC: 1.08 MG/DL (ref 0.6–1.3)
GLOBULIN SER CALC-MCNC: 3.9 G/DL (ref 2–4)
GLUCOSE SERPL-MCNC: 104 MG/DL (ref 74–99)
HBA1C MFR BLD: 6.1 % (ref 4.2–5.6)
HDLC SERPL-MCNC: 43 MG/DL (ref 40–60)
HDLC SERPL: 3.8 {RATIO} (ref 0–5)
LDLC SERPL CALC-MCNC: 103.4 MG/DL (ref 0–100)
LIPID PROFILE,FLP: ABNORMAL
POTASSIUM SERPL-SCNC: 4.2 MMOL/L (ref 3.5–5.5)
PROT SERPL-MCNC: 7.6 G/DL (ref 6.4–8.2)
SODIUM SERPL-SCNC: 141 MMOL/L (ref 136–145)
TRIGL SERPL-MCNC: 88 MG/DL (ref ?–150)
VLDLC SERPL CALC-MCNC: 17.6 MG/DL

## 2017-05-09 PROCEDURE — 97110 THERAPEUTIC EXERCISES: CPT

## 2017-05-09 PROCEDURE — 80053 COMPREHEN METABOLIC PANEL: CPT | Performed by: INTERNAL MEDICINE

## 2017-05-09 PROCEDURE — 36415 COLL VENOUS BLD VENIPUNCTURE: CPT | Performed by: INTERNAL MEDICINE

## 2017-05-09 PROCEDURE — 83036 HEMOGLOBIN GLYCOSYLATED A1C: CPT | Performed by: INTERNAL MEDICINE

## 2017-05-09 PROCEDURE — 97140 MANUAL THERAPY 1/> REGIONS: CPT

## 2017-05-09 PROCEDURE — 80061 LIPID PANEL: CPT | Performed by: INTERNAL MEDICINE

## 2017-05-09 RX ORDER — IBUPROFEN 800 MG/1
TABLET ORAL
Qty: 90 TAB | Refills: 5 | Status: SHIPPED | OUTPATIENT
Start: 2017-05-09 | End: 2018-04-11 | Stop reason: SDUPTHER

## 2017-05-09 NOTE — PROGRESS NOTES
PT DAILY TREATMENT NOTE - [facility] 316    Patient Name: Tian Odom  Date:2017  : 1955  [x]  Patient  Verified  Payor: Baldev Rao / Plan: [place] MED[place] & B / Product Type: Medicare /    In time:239  Out time:341  Total Treatment Time (min): 59  Total Timed Codes (min):49  1:1 Treatment Time ( [address] only): 49   Visit #: 15 of     Treatment Area:  Incomplete rotator cuff tear or rupture of left shoulder, not specified as traumatic [M75.112]    SUBJECTIVE  Pain Level (0-10 scale):0  Any medication changes, allergies to medications, adverse drug reactions, diagnosis change, or new procedure performed?: [x] No    [] Yes (see summary sheet for update)  Subjective functional status/changes:   [] No changes reported  I am not having any pain    OBJECTIVE    Modality rationale: decrease inflammation and increase tissue extensibility to improve the patients ability to aid with post exercise recovery   Min Type Additional Details    [] Estim:  []Unatt       []IFC  []Premod                        []Other:  []w/ice   []w/heat  Position:  Location:    [] Estim: []Att    []TENS instruct  []NMES                    []Other:  []w/US   []w/ice   []w/heat  Position:  Location:    []  Traction: [] Cervical       []Lumbar                       [] Prone          []Supine                       []Intermittent   []Continuous Lbs:  [] before manual  [] after manual    []  Ultrasound: []Continuous   [] Pulsed                           []1MHz   []3MHz Location:  W/cm2:    []  Iontophoresis with dexamethasone         Location: [] Take home patch   [] In clinic   10 [x]  Ice post    []  heat  []  Ice massage  []  Laser   []  Anodyne Position:reclined  Location:L shoulder    []  Laser with stim  []  Other: Position:  Location:    []  Vasopneumatic Device Pressure:       [] lo [] med [] hi   Temperature: [] lo [] med [] hi   [] Skin assessment post-treatment:  []intact []redness- no adverse reaction    []redness - adverse reaction:       min []Eval                  []Re-Eval       41 min Therapeutic Exercise:  [x] See flow sheet :   Rationale: increase ROM, increase strength and improve coordination to improve the patients ability to aid with increase tolerance to ADLS and activities     min Therapeutic Activity:  []  See flow sheet :   Rationale:   to improve the patients ability to       min Neuromuscular Re-education:  []  See flow sheet :   Rationale:   to improve the patients ability to     8 min Manual Therapy:  PROM OSCILL LAD RS AI   Rationale: increase ROM, increase tissue extensibility and decrease trigger points to aid with increase tolerance to ADLS and activities     min Gait Training:  ___ feet with ___ device on level surfaces with ___ level of assist   Rationale: With   [] TE   [] TA   [] neuro   [] other: Patient Education: [x] Review HEP    [] Progressed/Changed HEP based on:   [] positioning   [] body mechanics   [] transfers   [] heat/ice application    [] other:      Other Objective/Functional Measures: FOTO 73     Pain Level (0-10 scale) post treatment: 0    ASSESSMENT/Changes in Function: tolerated RS AI well. Patient will continue to benefit from skilled PT services to modify and progress therapeutic interventions, address functional mobility deficits, address ROM deficits, address strength deficits, analyze and address soft tissue restrictions, analyze and cue movement patterns, analyze and modify body mechanics/ergonomics, assess and modify postural abnormalities and instruct in home and community integration to attain remaining goals. [x]  See Plan of Care  [x]  See progress note/recertification  []  See Discharge Summary         Progress towards goals / Updated goals:   Updated Goals to be accomplished in 10 treatments:  1. Pt will have AROM (L) shoulder Flx/ABD to 0-135* to show good shoulder mobility for progress to PREs. PN NA  Current Status:   2.  Pt will improve FOTO score to 80 to show significant improvement in function for progress to Good shoulder function  PN 73  Current Status:  73 5/9/17  3 patient will have MMT L shoulder F abd ER IR all 4+ ,5 to aid with increase tolerance to ADLS  PN NA  CURRENT  4 patient will tolerate overhead cone stacks 10 cones 3 sets with 2# weight for carryover to household activities  PN Not initiated  CURRENT Progressing with cone stacks to level 5 at shoulder level w/o difficulty 2 sets with no added weight 5/9/17        PLAN  [x]  Upgrade activities as tolerated     [x]  Continue plan of care  []  Update interventions per flow sheet       []  Discharge due to:_  []  Other:_      Talib Lieberman, PT 5/9/2017  2:37 PM

## 2017-05-09 NOTE — TELEPHONE ENCOUNTER
Requested Prescriptions     Pending Prescriptions Disp Refills    ibuprofen (MOTRIN) 800 mg tablet 90 Tab 5       Last office visit was  3/16/17  Next office visit is     5/24/17    Please assist.

## 2017-05-09 NOTE — TELEPHONE ENCOUNTER
Pt called in requesting refill of his   Requested Prescriptions     Pending Prescriptions Disp Refills    ibuprofen (MOTRIN) 800 mg tablet 90 Tab 5   .

## 2017-05-16 ENCOUNTER — HOSPITAL ENCOUNTER (OUTPATIENT)
Dept: PHYSICAL THERAPY | Age: 62
Discharge: HOME | End: 2017-05-16
Payer: MEDICARE

## 2017-05-16 PROCEDURE — 97110 THERAPEUTIC EXERCISES: CPT

## 2017-05-16 PROCEDURE — 97140 MANUAL THERAPY 1/> REGIONS: CPT

## 2017-05-16 NOTE — PROGRESS NOTES
PT DAILY TREATMENT NOTE - [facility] 3-16    Patient Name: Summer Cr  Date:2017  : 1955  [x]  Patient  Verified  Payor: Romana Estimable / Plan: [place] MEDICARE PART A & B / Product Type: Medicare /    In time:1019  Out [id number]  Total Treatment Time (min): 51  Total Timed Codes (min): 51  1:1 Treatment Time ( [address] only): 38  Visit #: 16 of     Treatment Area:  Incomplete rotator cuff tear or rupture of left shoulder, not specified as traumatic [M75.112]    SUBJECTIVE  Pain Level (0-10 scale): 0  Any medication changes, allergies to medications, adverse drug reactions, diagnosis change, or new procedure performed?: [x] No    [] Yes (see summary sheet for update)  Subjective functional status/changes:   [] No changes reported  I see the doctor tomorrow    OBJECTIVE    Modality rationale:     Min Type Additional Details    [] Estim:  []Unatt       []IFC  []Premod                        []Other:  []w/ice   []w/heat  Position:  Location:    [] Estim: []Att    []TENS instruct  []NMES                    []Other:  []w/US   []w/ice   []w/heat  Position:  Location:    []  Traction: [] Cervical       []Lumbar                       [] Prone          []Supine                       []Intermittent   []Continuous Lbs:  [] before manual  [] after manual    []  Ultrasound: []Continuous   [] Pulsed                           []1MHz   []3MHz Location:  W/cm2:    []  Iontophoresis with dexamethasone         Location: [] Take home patch   [] In clinic   PD []  Ice     []  heat  []  Ice massage  []  Laser   []  Anodyne Position:  Location:    []  Laser with stim  []  Other: Position:  Location:    []  Vasopneumatic Device Pressure:       [] lo [] med [] hi   Temperature: [] lo [] med [] hi   [] Skin assessment post-treatment:  []intact []redness- no adverse reaction    []redness - adverse reaction:       min []Eval                  []Re-Eval       36 min Therapeutic Exercise:  [x] See flow sheet :   Rationale: increase ROM, increase strength and improve coordination to improve the patients ability to aid with increase tolerance to ADLS and activities     min Therapeutic Activity:  []  See flow sheet :   Rationale:   to improve the patients ability to       min Neuromuscular Re-education:  []  See flow sheet :   Rationale:  to improve the patients ability to     15 min Manual Therapy:  LAD OSCILL, PROM, RS AI   Rationale: increase ROM and increase tissue extensibility to aid with increase tolerance to ADLS and activities     min Gait Training:  ___ feet with ___ device on level surfaces with ___ level of assist   Rationale: With   [] TE   [] TA   [] neuro   [] other: Patient Education: [x] Review HEP    [] Progressed/Changed HEP based on:   [] positioning   [] body mechanics   [] transfers   [] heat/ice application    [] other:      Other Objective/Functional Measures: VC for exercises and technique     AROM     F  145    ABD   127     ER  50  MMT F  4+,5    ABD   4+    IR 4+,5    ER   4+,5           Pain Level (0-10 scale) post treatment: 0    ASSESSMENT/Changes in Function: increased AROM and MMT    Patient will continue to benefit from skilled PT services to modify and progress therapeutic interventions, address functional mobility deficits, address ROM deficits, address strength deficits, analyze and address soft tissue restrictions, analyze and cue movement patterns, analyze and modify body mechanics/ergonomics, assess and modify postural abnormalities and instruct in home and community integration to attain remaining goals. [x]  See Plan of Care  []  See progress note/recertification  []  See Discharge Summary            Updated Goals to be accomplished in 10 treatments:  1. Pt will have AROM (L) shoulder Flx/ABD to 0-135* to show good shoulder mobility for progress to PREs. PN NA  Current Status:  AROM     F  145    ABD   127     ER  50  5/16/17    2.  Pt will improve FOTO score to 80 to show significant improvement in function for progress to Good shoulder function  PN 73  Current Status:  73 5/9/17  3 patient will have MMT L shoulder F abd ER IR all 4+ ,5 to aid with increase tolerance to ADLS  PN NA  CURRENT MMT F  4+,5    ABD   4+    IR 4+,5    ER   4+,5 5/16/17  4 patient will tolerate overhead cone stacks 10 cones 3 sets with 2# weight for carryover to household activities  PN Not initiated  CURRENT Progressing with cone stacks to level 5 at shoulder level w/o difficulty 2 sets with no added weight 5/9/17 5/16/17        PLAN  [x]  Upgrade activities as tolerated     [x]  Continue plan of care  []  Update interventions per flow sheet       []  Discharge due to:_  []  Other:_      Ortiz Walker, PT 5/16/2017  10:18 AM

## 2017-05-16 NOTE — PROGRESS NOTES
[facility]  [address] [facility], [address] [place], [place] [address] [address]  [phone] fax    Physician Update  [x] Progress Note  [] Discharge Summary  Patient name: Deng Parra Start of Care: 3/24/17   Referral source: Felicia No,* : 1955   Medical/Treatment Diagnosis: Incomplete rotator cuff tear or rupture of left shoulder, not specified as traumatic [M75.112] Onset Date:One month ago with Surgery on 3/23/14      Prior Hospitalization: see medical history Provider#: [id number]   Medications: Verified on Patient Summary List    Comorbidities: OA, HTN  Prior Level of Function::(L) UE weakness but able to work on bike and ride bike at will.       Visits from Start of Care: 16   Missed Visits:0    Status at Evaluation/Last Progress Note: MD note dated 17  Progress towards Goals: FOTO improved to 73, AROM F 145  ER 50, MMT F 4+,5 ABD 4+ IR 4+,5 ER 4+,5    Goals: to be achieved in 2-8 treatments:  1.  Pt will improve FOTO score to 80 to show significant improvement in function for progress to Good shoulder function  PN 73  Current Status:  7   2 patient will have MMT L shoulder F abd ER IR all 4+ ,5 to aid with increase tolerance to ADLS  PN  MMT F 4+,5 ABD 4+ IR 4+,5 ER 4+,5    CURRENT  3 patient will tolerate overhead cone stacks 10 cones 3 sets with 2# weight for carryover to household activities  PN  Progressing with cone stacks to level 5 at shoulder level w/o difficulty 2 sets with no added weight    CURRENT         ASSESSMENT/RECOMMENDATIONS:  [x]Continue therapy per initial plan/protocol at a frequency of  2-3 x per week for 2-8 treatments   []Continue therapy with the following recommended changes:_____________________      _____________________________________________________________________  []Discontinue therapy progressing towards or have reached established goals  []Discontinue therapy due to lack of appreciable progress towards goals  []Discontinue therapy due to lack of attendance or compliance  []Await Physician's recommendations/decisions regarding therapy  []Other:________________________________________________________________    Thank you for this referral. Chuy Linder, PT 5/16/2017 11:19 AM  NOTE TO PHYSICIAN:  PLEASE COMPLETE THE ORDERS BELOW AND   FAX TO [facility]: [phone]  If you are unable to process this request in 24 hours please contact our office: [phone]    ? I have read the above report and request that my patient continue as recommended. ? I have read the above report and request that my patient continue therapy with the following changes/special instructions:_____________________________________  ? I have read the above report and request that my patient be discharged from therapy.     Physicians signature: __________________________Date: ________Time:________

## 2017-05-17 ENCOUNTER — OFFICE VISIT (OUTPATIENT)
Dept: ORTHOPEDIC SURGERY | Age: 62
End: 2017-05-17

## 2017-05-17 VITALS
BODY MASS INDEX: 37.67 KG/M2 | HEIGHT: 67 IN | HEART RATE: 59 BPM | SYSTOLIC BLOOD PRESSURE: 144 MMHG | TEMPERATURE: 99 F | WEIGHT: 240 LBS | DIASTOLIC BLOOD PRESSURE: 72 MMHG

## 2017-05-17 DIAGNOSIS — M75.122 COMPLETE ROTATOR CUFF TEAR OF LEFT SHOULDER: Primary | ICD-10-CM

## 2017-05-17 NOTE — PROGRESS NOTES
Marilee Fairbanks  1955     HISTORY OF PRESENT ILLNESS  Marilee Fairbanks is a 59 y.o. male who presents today for evaluation s/p Left shoulder arthroscopic rotator cuff repair, subscapularis repair and biceps debridement on 3/23/17. Patient has been going to PT and has 3 more sessions. Describes pain as a 0/10. States he is doing well. Patient denies any fever, chills, chest pain, shortness of breath or calf pain. There are no new illness or injuries to report since last seen in the office. PHYSICAL EXAM:   Visit Vitals    /72    Pulse (!) 59    Temp 99 °F (37.2 °C) (Oral)    Ht 5' 7\" (1.702 m)    Wt 240 lb (108.9 kg)    BMI 37.59 kg/m2      The patient is a well-developed, well-nourished male in no acute distress. The patient is alert and oriented times three. The patient appears to be well groomed. Mood and affect are normal.  ORTHOPEDIC EXAM of left shoulder:  Inspection: swelling not present,  Bruising not present  Incisions well healed  Passive glenohumeral abduction 0-90 degrees, able to reach up over his head without difficulty  Stability: Stable  Strength: n/a  2+ distal pulses    IMPRESSION:  S/P Left shoulder arthroscopic rotator cuff repair, subscapularis repair, biceps debridement    PLAN: Patient will complete PT and then transition to HEP. Patient questioned whether he may ride his bike now, I discussed with him him that he can but he needs to be very careful. He will limit his activity to lifting no more than 10 pounds. He will RTC 6 weeks.     Scribed by Oriana Ponce [address]) as dictated by KAREN Nugent, [address] and Spine Specialist

## 2017-05-19 ENCOUNTER — HOSPITAL ENCOUNTER (OUTPATIENT)
Dept: PHYSICAL THERAPY | Age: 62
Discharge: HOME | End: 2017-05-19
Payer: MEDICARE

## 2017-05-19 PROCEDURE — 97110 THERAPEUTIC EXERCISES: CPT

## 2017-05-19 PROCEDURE — 97140 MANUAL THERAPY 1/> REGIONS: CPT

## 2017-05-19 NOTE — PROGRESS NOTES
PT DAILY TREATMENT NOTE - [facility] 3-16    Patient Name: Samuel Larson  Date:2017  : 1955  [x]  Patient  Verified  Payor: [place] MEDICARE / Plan: VA MEDICARE PART A & B / Product Type: Medicare /    In time:930  Out time:1020  Total Treatment Time (min): 45  Total Timed Codes (min):45  1:1 Treatment Time ( [address] only): 39  Visit #: 1 of 2-8 per MD note. Treatment Area: Incomplete rotator cuff tear or rupture of left shoulder, not specified as traumatic [M75.112]    SUBJECTIVE  Pain Level (0-10 scale): 0  Any medication changes, allergies to medications, adverse drug reactions, diagnosis change, or new procedure performed?: [x] No    [] Yes (see summary sheet for update)  Subjective functional status/changes:   [] No changes reported  Not hurting me right now but last night it sure was after I lifted the attic door up to get into the attic. I saw his assistant and they plan to cut me loose when I go back to see them the next time .    OBJECTIVE    Modality rationale:     Min Type Additional Details    [] Estim:  []Unatt       []IFC  []Premod                        []Other:  []w/ice   []w/heat  Position:  Location:    [] Estim: []Att    []TENS instruct  []NMES                    []Other:  []w/US   []w/ice   []w/heat  Position:  Location:    []  Traction: [] Cervical       []Lumbar                       [] Prone          []Supine                       []Intermittent   []Continuous Lbs:  [] before manual  [] after manual    []  Ultrasound: []Continuous   [] Pulsed                           []1MHz   []3MHz Location:  W/cm2:    []  Iontophoresis with dexamethasone         Location: [] Take home patch   [] In clinic   PD will ice at home []  Ice     []  heat  []  Ice massage  []  Laser   []  Anodyne Position:  Location:    []  Laser with stim  []  Other: Position:  Location:    []  Vasopneumatic Device Pressure:       [] lo [] med [] hi   Temperature: [] lo [] med [] hi   [] Skin assessment post-treatment: []intact []redness- no adverse reaction    []redness - adverse reaction:                37 min Therapeutic Exercise:  [x] See flow sheet :   Rationale: increase ROM, increase strength and improve coordination to improve the patients ability to aid with increase tolerance to ADLS and activities     min Therapeutic Activity:  []  See flow sheet :   Rationale:   to improve the patients ability to       min Neuromuscular Re-education:  []  See flow sheet :   Rationale:   to improve the patients ability to     8 min Manual Therapy: PROM, RS AI   Rationale: decrease pain, increase ROM and increase tissue extensibility to aid with increase tolerance to ADLs       min Gait Training:  ___ feet with ___ device on level surfaces with ___ level of assist   Rationale: With   [] TE   [] TA   [] neuro   [] other: Patient Education: [x] Review HEP    [] Progressed/Changed HEP based on:   [] positioning   [] body mechanics   [] transfers   [] heat/ice application    [] other:      Other Objective/Functional Measures:increased UBE to level 3, increased wall walk to 10X and pulleys to 20 X. Changed wand to 2# and standing Flexion 2X 10 and Abd 10X. Added Tband red 10 reps    Pain Level (0-10 scale) post treatment: 0    ASSESSMENT/Changes in Function: plan of care reviewed. He returns after seeing MD and no new RX received nor MD note signed and returned. Plan discussed to continue through 4 sessions. He had some pain with standing wand abduction with return to neutral position. Patient will continue to benefit from skilled PT services to modify and progress therapeutic interventions, address functional mobility deficits, address ROM deficits, address strength deficits, analyze and address soft tissue restrictions, analyze and cue movement patterns, analyze and modify body mechanics/ergonomics, assess and modify postural abnormalities and instruct in home and community integration to attain remaining goals.      [x] See Plan of Care  []  See progress note/recertification  []  See Discharge Summary         Progress towards goals / Updated goals:  1.  Pt will improve FOTO score to 80 to show significant improvement in function for progress to Good shoulder function  PN 73  Current Status:  7   2 patient will have MMT L shoulder F abd ER IR all 4+ ,5 to aid with increase tolerance to ADLS  PN  MMT F 4+,5 ABD 4+ IR 4+,5 ER 4+,5   CURRENT  3 patient will tolerate overhead cone stacks 10 cones 3 sets with 2# weight for carryover to household activities  PN Progressing with cone stacks to level 5 at shoulder level w/o difficulty 2 sets with no added weight   CURRENT         PLAN  [x]  Upgrade activities as tolerated     [x]  Continue plan of care  []  Update interventions per flow sheet       []  Discharge due to:_  []  Other:_      Gris Meeks, PT 5/19/2017  9:41 AM

## 2017-05-22 ENCOUNTER — TELEPHONE (OUTPATIENT)
Dept: INTERNAL MEDICINE CLINIC | Age: 62
End: 2017-05-22

## 2017-05-22 DIAGNOSIS — M54.5 CHRONIC MIDLINE LOW BACK PAIN, WITH SCIATICA PRESENCE UNSPECIFIED: ICD-10-CM

## 2017-05-22 DIAGNOSIS — G89.29 CHRONIC MIDLINE LOW BACK PAIN, WITH SCIATICA PRESENCE UNSPECIFIED: ICD-10-CM

## 2017-05-22 RX ORDER — OXYCODONE AND ACETAMINOPHEN 10; 325 MG/1; MG/1
1 TABLET ORAL
Qty: 90 TAB | Refills: 0 | Status: SHIPPED | OUTPATIENT
Start: 2017-05-22 | End: 2017-06-21 | Stop reason: SDUPTHER

## 2017-05-22 NOTE — TELEPHONE ENCOUNTER
Pt called in requesting refill of his   Requested Prescriptions     Pending Prescriptions Disp Refills    oxyCODONE-acetaminophen (PERCOCET 10)  mg per tablet 90 Tab 0     Sig: Take 1 Tab by mouth every four (4) hours as needed for Pain. Max Daily Amount: 6 Tabs. Vernida Chard

## 2017-05-22 NOTE — TELEPHONE ENCOUNTER
Requested Prescriptions     Pending Prescriptions Disp Refills    oxyCODONE-acetaminophen (PERCOCET 10)  mg per tablet 90 Tab 0     Sig: Take 1 Tab by mouth every four (4) hours as needed for Pain. Max Daily Amount: 6 Tabs. Last office visit was  1/24/17  Next office visit is     5/24/17    90 tabs prescribed 4/26/17.     Please assist.

## 2017-05-23 ENCOUNTER — HOSPITAL ENCOUNTER (OUTPATIENT)
Dept: PHYSICAL THERAPY | Age: 62
Discharge: HOME | End: 2017-05-23
Payer: MEDICARE

## 2017-05-23 PROCEDURE — 97110 THERAPEUTIC EXERCISES: CPT

## 2017-05-23 NOTE — PROGRESS NOTES
PT DAILY TREATMENT NOTE - [facility] 3-16    Patient Name: Tian Odom  Date:2017  : 1955  [x]  Patient  Verified  Payor: Baldev Rao / Plan: VA MEDICARE PART A & B / Product Type: Medicare /    In time:1028  Out time:1111  Total Treatment Time (min): 36  Total Timed Codes (min): 36  1:1 Treatment Time ( [address] only): 16   Visit #: 2 of 2-8    Treatment Area: Incomplete rotator cuff tear or rupture of left shoulder, not specified as traumatic [M75.112]    SUBJECTIVE  Pain Level (0-10 scale): 0  Any medication changes, allergies to medications, adverse drug reactions, diagnosis change, or new procedure performed?: [x] No    [] Yes (see summary sheet for update)  Subjective functional status/changes:   [] No changes reported  I'm doing good    OBJECTIVE  36 min Therapeutic Exercise:  [x] See flow sheet :   Rationale: increase ROM, increase strength and improve coordination to improve the patients ability to tolerate increased activity levels              With   [x] TE   [] TA   [] neuro   [] other: Patient Education: [x] Review HEP    [] Progressed/Changed HEP based on:   [] positioning   [] body mechanics   [] transfers   [] heat/ice application    [] other:      Other Objective/Functional Measures:   - Pt showing increased strength and ROM  - - AROM Shoulder Flx 145      Pain Level (0-10 scale) post treatment: 0    ASSESSMENT/Changes in Function:      Patient will continue to benefit from skilled PT services to modify and progress therapeutic interventions, address functional mobility deficits, address ROM deficits, address strength deficits, analyze and address soft tissue restrictions, analyze and cue movement patterns and analyze and modify body mechanics/ergonomics to attain remaining goals. []  See Plan of Care  []  See progress note/recertification  []  See Discharge Summary         Progress towards goals / Updated goals:  1.  Pt will improve FOTO score to 80 to show significant improvement in function for progress to Good shoulder function  PN 73  Current Status:      2 patient will have MMT L shoulder F abd ER IR all 4+ ,5 to aid with increase tolerance to ADLS  PN  MMT F 4+,5 ABD 4+ IR 4+,5 ER 4+,5   CURRENT Showing good strength with Supine AI at 90* Flx all planes at 4+/5 or better 5/23/17  3 patient will tolerate overhead cone stacks 10 cones 3 sets with 2# weight for carryover to household activities  PN Progressing with cone stacks to level 5 at shoulder level w/o difficulty 2 sets with no added weight   CURRENT     PLAN  [x]  Upgrade activities as tolerated     [x]  Continue plan of care  []  Update interventions per flow sheet       []  Discharge due to:_  []  Other:_      Kulwinder Corey, PT 5/23/2017  11:51 AM    Future Appointments  Date Time Provider Shae Ho   5/24/2017 8:30 AM Alexandra Moran MD [facility] None   5/25/2017 10:30 AM Vijay Lujan PT [id number]   5/30/2017 10:30 AM ABILIO ByersPTBLADIMIR [facility]   6/28/2017 9:15 AM DAVID Borjas VS JORGE LUIS SCHED   5/3/2018 9:30 AM [facility] NURSE LAUREN KANG SCHED   5/11/2018 9:30 AM Vipul Joshi MD [id number] [facility]

## 2017-05-24 ENCOUNTER — OFFICE VISIT (OUTPATIENT)
Dept: INTERNAL MEDICINE CLINIC | Age: 62
End: 2017-05-24

## 2017-05-24 VITALS
TEMPERATURE: 98.8 F | RESPIRATION RATE: 18 BRPM | HEART RATE: 61 BPM | HEIGHT: 67 IN | BODY MASS INDEX: 37.51 KG/M2 | SYSTOLIC BLOOD PRESSURE: 110 MMHG | DIASTOLIC BLOOD PRESSURE: 69 MMHG | OXYGEN SATURATION: 98 % | WEIGHT: 239 LBS

## 2017-05-24 DIAGNOSIS — Z13.39 SCREENING FOR ALCOHOLISM: ICD-10-CM

## 2017-05-24 DIAGNOSIS — S46.912A LEFT SHOULDER STRAIN, INITIAL ENCOUNTER: ICD-10-CM

## 2017-05-24 DIAGNOSIS — R73.9 ELEVATED BLOOD SUGAR: ICD-10-CM

## 2017-05-24 DIAGNOSIS — Z12.5 SCREENING PSA (PROSTATE SPECIFIC ANTIGEN): ICD-10-CM

## 2017-05-24 DIAGNOSIS — E78.00 HYPERCHOLESTEROLEMIA: ICD-10-CM

## 2017-05-24 DIAGNOSIS — M54.50 CHRONIC MIDLINE LOW BACK PAIN WITHOUT SCIATICA: ICD-10-CM

## 2017-05-24 DIAGNOSIS — Z00.00 ROUTINE GENERAL MEDICAL EXAMINATION AT A HEALTH CARE FACILITY: Primary | ICD-10-CM

## 2017-05-24 DIAGNOSIS — G89.29 CHRONIC MIDLINE LOW BACK PAIN WITHOUT SCIATICA: ICD-10-CM

## 2017-05-24 DIAGNOSIS — I10 ESSENTIAL HYPERTENSION: ICD-10-CM

## 2017-05-24 DIAGNOSIS — F51.01 PRIMARY INSOMNIA: ICD-10-CM

## 2017-05-24 RX ORDER — CYCLOBENZAPRINE HCL 10 MG
10 TABLET ORAL
Qty: 30 TAB | Refills: 1 | Status: SHIPPED | OUTPATIENT
Start: 2017-05-24 | End: 2018-06-19 | Stop reason: ALTCHOICE

## 2017-05-24 RX ORDER — TRAZODONE HYDROCHLORIDE 50 MG/1
TABLET ORAL
Qty: 30 TAB | Refills: 2 | Status: SHIPPED | OUTPATIENT
Start: 2017-05-24 | End: 2017-12-11 | Stop reason: SDUPTHER

## 2017-05-24 NOTE — MR AVS SNAPSHOT
Visit Information Date & Time Provider Department Dept. Phone Encounter #  
 5/24/2017  8:30 AM Elida Esparza MD [facility] [phone] Follow-up Instructions Return in about 4 months (around 9/24/2017) for labs 1 week before. Your Appointments 6/28/2017  9:15 AM  
Follow Up with DAVID Singh  
[facility] ([address]) Appt Note: lt shld 6 wk fu  
 [address], [address] [facility]  
[phone] [address], [address]  
  
    
 5/3/2018  9:30 AM  
Nurse Visit with [facility] WB NURSE [facility] ([address] [place] [address] Road) [address] [place] [id number]  
[address] [place] [address] 5/11/2018  9:30 AM  
Any with Jermaine Black MD  
[facility] ([facility]) [address] [place] [id number]  
[address] [place] [address] Upcoming Health Maintenance Date Due DTaP/Tdap/Td series (1 - Tdap) 7/9/1974 FOBT Q 1 YEAR, 18+ 10/15/2016 INFLUENZA AGE 9 TO ADULT 8/1/2017 COLONOSCOPY 4/1/2020 Allergies as of 5/24/2017  Review Complete On: 5/24/2017 By: Elida Esparza MD  
  
 Severity Noted Reaction Type Reactions Ace Inhibitors  05/17/2010    Cough Dilaudid [Hydromorphone]  05/17/2010    Hives Lisinopril  05/17/2010    Cough Zocor [Simvastatin]  07/30/2011    Myalgia Right leg Current Immunizations  Reviewed on 10/12/2016 Name Date Influenza Vaccine 1/2/2015 Influenza Vaccine (Quad) PF 10/12/2016, 10/15/2015 Zoster Vaccine, Live 10/22/2015 Not reviewed this visit You Were Diagnosed With   
  
 Codes Comments Routine general medical examination at a health care facility    -  Primary ICD-10-CM: Z00.00 ICD-9-CM: V70.0 Primary insomnia     ICD-10-CM: F51.01 
ICD-9-CM: 307.42 Hypercholesterolemia     ICD-10-CM: E78.00 ICD-9-CM: 272.0 Essential hypertension     ICD-10-CM: I10 
ICD-9-CM: 401.9 Screening for alcoholism     ICD-10-CM: Z13.89 ICD-9-CM: V79.1 Back strain, initial encounter     ICD-10-CM: S39.012A ICD-9-CM: 847.9 Special screening for malignant neoplasm of prostate     ICD-10-CM: Z12.5 ICD-9-CM: V76.44 Vitals BP Pulse Temp Resp Height(growth percentile) Weight(growth percentile) 110/69 (BP 1 Location: Left arm, BP Patient Position: Sitting) 61 98.8 °F (37.1 °C) (Oral) 18 5' 7\" (1.702 m) 239 lb (108.4 kg) SpO2 BMI Smoking Status 98% 37.43 kg/m2 Former Smoker Vitals History BMI and BSA Data Body Mass Index Body Surface Area  
 37.43 kg/m 2 2.26 m 2 Preferred Pharmacy [facility] Name Phone [facility] [address] [place] [address] 32 Your Updated Medication List  
  
   
This list is accurate as of: 5/24/17  9:11 AM.  Always use your most recent med list.  
  
  
  
  
 albuterol 90 mcg/actuation inhaler Commonly known as:  PROVENTIL HFA, VENTOLIN HFA, PROAIR HFA Take 2 Puffs by inhalation every four (4) hours as needed for Wheezing. cyclobenzaprine 10 mg tablet Commonly known as:  FLEXERIL Take 1 Tab by mouth nightly. fluticasone 50 mcg/actuation nasal spray Commonly known as:  Chales Copa 2 Sprays by Both Nostrils route daily. gabapentin 300 mg capsule Commonly known as:  NEURONTIN  
TAKE ONE CAPSULE BY MOUTH THREE TIMES DAILY  
  
 ibuprofen 800 mg tablet Commonly known as:  MOTRIN  
TAKE ONE TABLET BY MOUTH EVERY 8 HOURS AS NEEDED FOR PAIN  
  
 metoprolol tartrate 25 mg tablet Commonly known as:  LOPRESSOR  
TAKE ONE TABLET BY MOUTH TWICE DAILY ( GENERIC FOR LOPRESSOR )  
  
 nitroglycerin 0.4 mg SL tablet Commonly known as:  NITROSTAT 1 Tab by SubLINGual route every five (5) minutes as needed. oxyCODONE-acetaminophen  mg per tablet Commonly known as:  PERCOCET 10 Take 1 Tab by mouth every four (4) hours as needed for Pain. Max Daily Amount: 6 Tabs. pantoprazole 40 mg tablet Commonly known as:  PROTONIX Take 1 Tab by mouth daily. pravastatin 40 mg tablet Commonly known as:  PRAVACHOL Take 1 Tab by mouth nightly. tamsulosin 0.4 mg capsule Commonly known as:  FLOMAX Take 1 Cap by mouth daily (after dinner). TRAVATAN Z 0.004 % ophthalmic solution Generic drug:  travoprost  
Administer 1 Drop to both eyes nightly. traZODone 50 mg tablet Commonly known as:  DESYREL  
TAKE ONE TABLET BY MOUTH ONCE DAILY AT BEDTIME AS NEEDED FOR SLEEP Prescriptions Sent to Pharmacy Refills  
 traZODone (DESYREL) 50 mg tablet 2 Sig: TAKE ONE TABLET BY MOUTH ONCE DAILY AT BEDTIME AS NEEDED FOR SLEEP Class: Normal  
 Pharmacy: [facility] [address] [place] [address], [address] Ph #: [phone]  
 cyclobenzaprine (FLEXERIL) 10 mg tablet 1 Sig: Take 1 Tab by mouth nightly. Class: Normal  
 Pharmacy: [address] [place] [address], [address] [place] [address] Ph #: [phone] Route: Oral  
  
Follow-up Instructions Return in about 4 months (around 9/24/2017) for labs 1 week before. To-Do List   
 05/25/2017 10:30 AM  
  Appointment with Vijay Lujan PT at [facility] ([phone])  
  
 05/30/2017 10:30 AM  
  Appointment with Vijay Lujan PT at [facility] ([phone]) Patient Instructions Advance Directives: Care Instructions Your Care Instructions An advance directive is a legal way to state your wishes at the end of your life. It tells your family and your doctor what to do if you can no longer say what you want. There are two main types of advance directives.  You can change them any time that your wishes change. · A living will tells your family and your doctor your wishes about life support and other treatment. · A durable power of  for health care lets you name a person to make treatment decisions for you when you can't speak for yourself. This person is called a health care agent. If you do not have an advance directive, decisions about your medical care may be made by a doctor or a  who doesn't know you. It may help to think of an advance directive as a gift to the people who care for you. If you have one, they won't have to make tough decisions by themselves. Follow-up care is a key part of your treatment and safety. Be sure to make and go to all appointments, and call your doctor if you are having problems. It's also a good idea to know your test results and keep a list of the medicines you take. How can you care for yourself at home? · Discuss your wishes with your loved ones and your doctor. This way, there are no surprises. · Many states have a unique form. Or you might use a universal form that has been approved by many states. This kind of form can sometimes be completed and stored online. Your electronic copy will then be available wherever you have a connection to the Internet. In most cases, doctors will respect your wishes even if you have a form from a different state. · You don't need a  to do an advance directive. But you may want to get legal advice. · Think about these questions when you prepare an advance directive: ¨ Who do you want to make decisions about your medical care if you are not able to? Many people choose a family member or close friend. ¨ Do you know enough about life support methods that might be used? If not, talk to your doctor so you understand. ¨ What are you most afraid of that might happen? You might be afraid of having pain, losing your independence, or being kept alive by machines. ¨ Where would you prefer to die? Choices include your home, a hospital, or a nursing home. ¨ Would you like to have information about hospice care to support you and your family? ¨ Do you want to donate organs when you die? ¨ Do you want certain Sikh practices performed before you die? If so, put your wishes in the advance directive. · Read your advance directive every year, and make changes as needed. When should you call for help? Be sure to contact your doctor if you have any questions. Where can you learn more? Go to http://samreen-tabitha.info/. Enter R264 in the search box to learn more about \"Advance Directives: Care Instructions. \" Current as of: November 17, 2016 Content Version: 11.2 © 9379-8622 [employer]. Care instructions adapted under license by [employer] (which disclaims liability or warranty for this information). If you have questions about a medical condition or this instruction, always ask your healthcare professional. [place] [address] any warranty or liability for your use of this information. Medicare Part B Preventive Services Limitations Recommendation Scheduled Bone Mass Measurement 
(age 72 & older, biennial) Requires diagnosis related to osteoporosis or estrogen deficiency. Biennial benefit unless patient has history of long-term glucocorticoid tx or baseline is needed because initial test was by other method  NA Cardiovascular Screening Blood Tests (every 5 years) Total cholesterol, HDL, Triglycerides Order as a panel if possible  5/2017 Colorectal Cancer Screening 
-Fecal occult blood test (annual) -Flexible sigmoidoscopy (5y) 
-Screening colonoscopy (10y) -Barium Enema   4/2015 Counseling to Prevent Tobacco Use (up to 8 sessions per year) - Counseling greater than 3 and up to 10 minutes - Counseling greater than 10 minutes Patients must be asymptomatic of tobacco-related conditions to receive as preventive service  NA Diabetes Screening Tests (at least every 3 years, Medicare covers annually or at 6-month intervals for prediabetic patients) Fasting blood sugar (FBS) or glucose tolerance test (GTT) Patient must be diagnosed with one of the following: 
-Hypertension, Dyslipidemia, obesity, previous impaired FBS or GTT 
Or any two of the following: overweight, FH of diabetes, age ? 72, history of gestational diabetes, birth of baby weighing more than 9 pounds  5/2017 Diabetes Self-Management Training (DSMT) (no USPSTF recommendation) Requires referral by treating physician for patient with diabetes or renal disease. 10 hours of initial DSMT session of no less than 30 minutes each in a continuous 12-month period. 2 hours of follow-up DSMT in subsequent years. Glaucoma Screening (no USPSTF recommendation) Diabetes mellitus, family history, , age 48 or over,  American, age 72 or over  Ordered Human Immunodeficiency Virus (HIV) Screening (annually for increased risk patients) HIV-1 and HIV-2 by EIA, FELICITY, rapid antibody test, or oral mucosa transudate Patient must be at increased risk for HIV infection per USPSTF guidelines or pregnant. Tests covered annually for patients at increased risk. Pregnant patients may receive up to 3 test during pregnancy. NA Medical Nutrition Therapy (MNT) (for diabetes or renal disease not recommended schedule) Requires referral by treating physician for patient with diabetes or renal disease. Can be provided in same year as diabetes self-management training (DSMT), and CMS recommends medical nutrition therapy take place after DSMT. Up to 3 hours for initial year and 2 hours in subsequent years. NA Prostate Cancer Screening (annually up to age 76) - Digital rectal exam (DEO) - Prostate specific antigen (PSA) Annually (age 48 or over), DEO not paid separately when covered E/M service is provided on same date Men up to age 76 may need a screening blood test for prostate cancer at certain intervals, depending on their personal and family history. This decision is between the patient and his provider. Ordered Seasonal Influenza Vaccination (annually)   10/2016 Pneumococcal Vaccination (once after 65) Hepatitis B Vaccinations (if medium/high risk) Medium/high risk factors:  End-stage renal disease, Hemophiliacs who received Factor VIII or IX concentrates, Clients of institutions for the mentally retarded, Persons who live in the same house as a HepB virus carrier, Homosexual men, Illicit injectable drug abusers. NA Shingles Vaccination A shingles vaccine is also recommended once in a lifetime after age 61  10/2015 Ultrasound Screening for Abdominal Aortic Aneurysm (AAA) (once) Patient must be referred through IPPE and not have had a screening for abdominal aortic aneurysm before under Medicare. Limited to patients who meet one of the following criteria: 
- Men who are 73-68 years old and have smoked more than 100 cigarettes in their lifetime. 
-Anyone with a FH of AAA 
-Anyone recommended for screening by USPSTF  NA \[facility] [place] [facility]\"" & HEALTH SERVICES! Gerri Braden introduces [employer] patient portal. Now you can access parts of your medical record, email your doctor's office, and request medication refills online. 1. In your internet browser, go to [email or web address]. My [employer] 2. Click on the First Time User? Click Here link in the Sign In box. You will see the New Member Sign Up page. 3. Enter your [employer] Access Code exactly as it appears below. You will not need to use this code after youve completed the sign-up process. If you do not sign up before the expiration date, you must request a new code. · [employer] Access Code: [email or web address] Expires: 7/17/2017  7:37 AM 
 
 4. Enter the last four digits of your Social Security Number (xxxx) and Date of Birth (mm/dd/yyyy) as indicated and click Submit. You will be taken to the next sign-up page. 5. Create a [employer] ID. This will be your [employer] login ID and cannot be changed, so think of one that is secure and easy to remember. 6. Create a [employer] password. You can change your password at any time. 7. Enter your Password Reset Question and Answer. This can be used at a later time if you forget your password. 8. Enter your e-mail address. You will receive e-mail notification when new information is available in [address] 9. Click Sign Up. You can now view and download portions of your medical record. 10. Click the Download Summary menu link to download a portable copy of your medical information. If you have questions, please visit the Frequently Asked Questions section of the [employer] website. Remember, [employer] is NOT to be used for urgent needs. For medical emergencies, dial 911. Now available from your iPhone and Android! Please provide this summary of care documentation to your next provider. Your primary care clinician is listed as WILLIAM MENARD. If you have any questions after today's visit, please call [phone].

## 2017-05-24 NOTE — PROGRESS NOTES
Subjective:       Chief Complaint  The patient presents for follow up of hypertension and high cholesterol. prediabetes  chronic back pain and right hip pain, and leg pain         HPI  Honey Roper is a 59 y.o. male seen for follow up of hyperlipidemia. Nathalia has hypertension. Hypertension well controlled, no significant medication side effects noted, on Lopressor, hyperlipidemia well controlled, no significant medication side effects noted, on Pravachol 40 mg, pt had myalgias on Zocor. Diet and Lifestyle: not attempting to follow a low fat, low cholesterol diet, exercises sporadically    Home BP Monitoring: is not measured at home. Other symptoms and concerns: Osteoarthritis and Chronic Pain:  Patient has osteoarthritis, primarily affecting the back. Symptoms onset: several days ago. Rheumatological ROS: using narcotic analgesics regularly daily in stable pattern with good pain control and good quality of life. Pt was given motrin 800 mg so he can reduce his percocet use. He is currently just using 3 percocet/day. Prediabetes: pt is trying to control with diet and weight loss but it continues to be an issue due to his obesity       Discussed the patient's BMI with him. The BMI follow up plan is as follows: BMI is out of normal parameters and plan is as follows: I have counseled this patient on diet and exercise regimens. Current Outpatient Prescriptions   Medication Sig    traZODone (DESYREL) 50 mg tablet TAKE ONE TABLET BY MOUTH ONCE DAILY AT BEDTIME AS NEEDED FOR SLEEP    cyclobenzaprine (FLEXERIL) 10 mg tablet Take 1 Tab by mouth nightly.  tamsulosin (FLOMAX) 0.4 mg capsule Take 1 Cap by mouth daily (after dinner).  pantoprazole (PROTONIX) 40 mg tablet Take 1 Tab by mouth daily.     gabapentin (NEURONTIN) 300 mg capsule TAKE ONE CAPSULE BY MOUTH THREE TIMES DAILY    metoprolol tartrate (LOPRESSOR) 25 mg tablet TAKE ONE TABLET BY MOUTH TWICE DAILY ( GENERIC FOR LOPRESSOR )    pravastatin (PRAVACHOL) 40 mg tablet Take 1 Tab by mouth nightly.  TRAVATAN Z 0.004 % ophthalmic solution Administer 1 Drop to both eyes nightly.  oxyCODONE-acetaminophen (PERCOCET 10)  mg per tablet Take 1 Tab by mouth every four (4) hours as needed for Pain. Max Daily Amount: 6 Tabs.  ibuprofen (MOTRIN) 800 mg tablet TAKE ONE TABLET BY MOUTH EVERY 8 HOURS AS NEEDED FOR PAIN    nitroglycerin (NITROSTAT) 0.4 mg SL tablet 1 Tab by SubLINGual route every five (5) minutes as needed.  albuterol (PROVENTIL HFA, VENTOLIN HFA, PROAIR HFA) 90 mcg/actuation inhaler Take 2 Puffs by inhalation every four (4) hours as needed for Wheezing.  fluticasone (FLONASE) 50 mcg/actuation nasal spray 2 Sprays by Both Nostrils route daily. No current facility-administered medications for this visit.               Review of Systems  Respiratory: negative for dyspnea on exertion  Cardiovascular: negative for chest pain    Objective:     Visit Vitals    /69 (BP 1 Location: Left arm, BP Patient Position: Sitting)    Pulse 61    Temp 98.8 °F (37.1 °C) (Oral)    Resp 18    Ht 5' 7\" (1.702 m)    Wt 239 lb (108.4 kg)    SpO2 98%    BMI 37.43 kg/m2        General appearance - alert, well appearing, and in no distress  Neck - supple, no significant adenopathy, carotids upstroke normal bilaterally, no bruits  Chest - clear to auscultation, no wheezes, rales or rhonchi, symmetric air entry  Heart - normal rate, regular rhythm, normal S1, S2, no murmurs, rubs, clicks or gallops  Extremities - peripheral pulses normal, no pedal edema, no clubbing or cyanosis  Skin - normal coloration and turgor, no rashes, no suspicious skin lesions noted      Labs:   Lab Results   Component Value Date/Time    Hemoglobin A1c 6.1 05/09/2017 08:06 AM    Hemoglobin A1c 5.5 10/18/2016 08:33 AM    Hemoglobin A1c 6.1 01/15/2016 09:03 AM    Glucose 104 05/09/2017 08:06 AM    Glucose (POC) 123 01/24/2016 04:57 PM    Glucose (POC) 116 04/01/2015 08:56 AM    Microalb/Creat ratio (ug/mg creat.) 4.4 04/29/2015 03:00 PM    LDL, calculated 103.4 05/09/2017 08:06 AM    Creatinine 1.08 05/09/2017 08:06 AM      Lab Results   Component Value Date/Time    Cholesterol, total 164 05/09/2017 08:06 AM    HDL Cholesterol 43 05/09/2017 08:06 AM    LDL, calculated 103.4 05/09/2017 08:06 AM    Triglyceride 88 05/09/2017 08:06 AM    CHOL/HDL Ratio 3.8 05/09/2017 08:06 AM     Lab Results   Component Value Date/Time    ALT (SGPT) 17 05/09/2017 08:06 AM    AST (SGOT) 12 05/09/2017 08:06 AM    Alk. phosphatase 87 05/09/2017 08:06 AM    Bilirubin, total 0.4 05/09/2017 08:06 AM     Lab Results   Component Value Date/Time    GFR est AA >60 05/09/2017 08:06 AM    GFR est non-AA >60 05/09/2017 08:06 AM    Creatinine 1.08 05/09/2017 08:06 AM    BUN 9 05/09/2017 08:06 AM    BUN (POC) 6 04/01/2015 08:56 AM    Sodium (POC) 142 04/01/2015 08:56 AM    Sodium 141 05/09/2017 08:06 AM    Potassium 4.2 05/09/2017 08:06 AM    Potassium (POC) 4.0 04/01/2015 08:56 AM    Chloride (POC) 107 04/01/2015 08:56 AM    Chloride 105 05/09/2017 08:06 AM    CO2 27 05/09/2017 08:06 AM      Lab Results   Component Value Date/Time    Glucose 104 05/09/2017 08:06 AM    Glucose (POC) 123 01/24/2016 04:57 PM    Glucose (POC) 116 04/01/2015 08:56 AM            Assessment / Plan     Hypertension well controlled, on Lopressor   Hyperlipidemia well controlled, on Pravachol 40 mg. ICD-10-CM ICD-9-CM    1. Routine general medical examination at a health care facility Z00.00 V70.0    2. Primary insomnia F51.01 307.42 traZODone (DESYREL) 50 mg tablet   3. Hypercholesterolemia E78.00 272.0 LIPID PANEL   4. Essential hypertension Q98 311.1 METABOLIC PANEL, COMPREHENSIVE   5. Screening for alcoholism Z13.89 V79.1    6. Screening PSA (prostate specific antigen) Z12.5 V76.44 PSA SCREENING (SCREENING)   7.  Chronic midline low back pain without sciatica M54.5 724.2 Stable on Percocet 3 tabs/day     G89.29 338.29 8. Left shoulder strain, initial encounter S46.912A 840.9 cyclobenzaprine (FLEXERIL) 10 mg tablet   9. Elevated blood sugar R73.9 790.29 Pt trying to control with diet and weight loss. HEMOGLOBIN A1C W/O EAG             Low cholesterol diet, weight control and daily exercise discussed. Follow-up Disposition:  Return in about 4 months (around 9/24/2017) for labs 1 week before. Reviewed plan of care. Patient has provided input and agrees with goals.

## 2017-05-24 NOTE — PROGRESS NOTES
Patient is in the office today for a 4 month follow up, and Medicare Wellness Visit. 1. Have you been to the ER, urgent care clinic since your last visit? Hospitalized since your last visit? No    2. Have you seen or consulted any other health care providers outside of [facility] [place] since your last visit? Include any pap smears or colon screening. No        This is an Initial Medicare Annual Wellness Exam (AWV) (Performed 12 months after IPPE or effective date of Medicare Part B enrollment, Once in a lifetime)    I have reviewed the patient's medical history in detail and updated the computerized patient record. History     Past Medical History:   Diagnosis Date    Allergic rhinitis     Avascular necrosis (HCC)     CAD (coronary artery disease) 4/2009    mild, hemodynamically non-significant by angiography    Carpal tunnel syndrome     Carpal tunnel syndrome on both sides     Chest pain, unspecified     Chronic back pain 2/11/2011    Chronic obstructive pulmonary disease ([address]     Degenerative joint disease     with chronic back pain    Dysesthesia     of hand post carpal tunnel surgery v sudeck's atrophy    Dyslipidemia, goal LDL below 100     Dysphagia     GERD (gastroesophageal reflux disease) 5/17/2010    Glaucoma 9/15/2010    Glaucoma     Hearing loss     Hypercholesterolemia     Hypertension     Joint fusion     of right wrist    Morbid obesity ([address]     Obesity 5/17/2010    Other dyspnea and respiratory abnormality     Palpitations     Right hip pain     Right hip pain     Right knee pain     S/P cardiac catheterization 4/9/2009    1. Normal systemic pressure. 2. Moderate elevation of left-sided filling pressures. 3. Preserved left ventricular systolic function in the LOWE projection. 4. Mild, nonsignificant epicardial coronary artery disese by angiography.     Sleep apnea     on CPAP    Strain of lumbar region     Tobacco abuse       Past Surgical History:   Procedure Laterality Date    HX CARPAL TUNNEL RELEASE      bilateral wrists    HX COLONOSCOPY      HX HEART CATHETERIZATION  4/9/2009    1. Normal systemic pressure. 2. Moderate elevation of left-sided filling pressures. 3. Preserved left ventricular systolic function in the LOWE projection. 4. Mild, nonsignificant epicardial coronary artery disese by angiography.  HX OTHER SURGICAL  02/2017    left shoulder surgery    HX TONSILLECTOMY       Current Outpatient Prescriptions   Medication Sig Dispense Refill    traZODone (DESYREL) 50 mg tablet TAKE ONE TABLET BY MOUTH ONCE DAILY AT BEDTIME AS NEEDED FOR SLEEP 30 Tab 2    cyclobenzaprine (FLEXERIL) 10 mg tablet Take 1 Tab by mouth nightly. 30 Tab 1    tamsulosin (FLOMAX) 0.4 mg capsule Take 1 Cap by mouth daily (after dinner). 90 Cap 3    pantoprazole (PROTONIX) 40 mg tablet Take 1 Tab by mouth daily. 30 Tab 5    gabapentin (NEURONTIN) 300 mg capsule TAKE ONE CAPSULE BY MOUTH THREE TIMES DAILY 90 Cap 5    metoprolol tartrate (LOPRESSOR) 25 mg tablet TAKE ONE TABLET BY MOUTH TWICE DAILY ( GENERIC FOR LOPRESSOR ) 180 Tab 3    pravastatin (PRAVACHOL) 40 mg tablet Take 1 Tab by mouth nightly. 90 Tab 3    TRAVATAN Z 0.004 % ophthalmic solution Administer 1 Drop to both eyes nightly.  oxyCODONE-acetaminophen (PERCOCET 10)  mg per tablet Take 1 Tab by mouth every four (4) hours as needed for Pain. Max Daily Amount: 6 Tabs. 90 Tab 0    ibuprofen (MOTRIN) 800 mg tablet TAKE ONE TABLET BY MOUTH EVERY 8 HOURS AS NEEDED FOR PAIN 90 Tab 5    nitroglycerin (NITROSTAT) 0.4 mg SL tablet 1 Tab by SubLINGual route every five (5) minutes as needed. 6 Tab 1    albuterol (PROVENTIL HFA, VENTOLIN HFA, PROAIR HFA) 90 mcg/actuation inhaler Take 2 Puffs by inhalation every four (4) hours as needed for Wheezing. 1 Inhaler 5    fluticasone (FLONASE) 50 mcg/actuation nasal spray 2 Sprays by Both Nostrils route daily.  1 Bottle 5     Allergies   Allergen Reactions    Ace Inhibitors Cough    Dilaudid [Hydromorphone] Hives    Lisinopril Cough    Zocor [Simvastatin] Myalgia     Right leg     Family History   Problem Relation Age of Onset    Diabetes Mother     Cancer Father     Hypertension Other     Arthritis-osteo Other      Social History   Substance Use Topics    Smoking status: Former Smoker     Quit date: 5/17/2009    Smokeless tobacco: Never Used      Comment: urothelial cancer risk discussed today [id number]    Alcohol use No     Patient Active Problem List   Diagnosis Code    Hypercholesterolemia E78.00    Allergic rhinitis J30.9    Sleep apnea G47.30    GERD (gastroesophageal reflux disease) K21.9    Obesity E66.9    Glaucoma H40.9    HTN (hypertension) I10    Chronic back pain M54.9, G89.29    Chest pain, unspecified R07.9    Dyslipidemia, goal LDL below 100 E78.5    Other dyspnea and respiratory abnormality R06.09, R09.89    Palpitations R00.2    Degenerative joint disease M19.90    Prediabetes R73.03    Right hip pain M25.551    Right knee pain M25.561    Enlarged prostate with lower urinary tract symptoms (LUTS) N40.1    Urinary frequency R35.0    Sense of Incomplete bladder emptying R33.9    Nocturia R35.1    Slowing of urinary stream R39.198    Hip arthritis M16.10    Abdominal discomfort R10.9    Diarrhea R19.7    Primary insomnia F51.01    Obstructive sleep apnea syndrome G47.33         Depression Risk Factor Screening:     PHQ over the last two weeks 3/16/2017   Little interest or pleasure in doing things Not at all   Feeling down, depressed or hopeless Not at all   Total Score PHQ 2 0     Alcohol Risk Factor Screening:   Patient states he does not drink alcohol. Functional Ability and Level of Safety:     Hearing Loss   none    Activities of Daily Living   Self-care. Requires assistance with: no ADLs    Fall Risk   No flowsheet data found.   Abuse Screen   Patient is not abused    Review of Systems   A comprehensive review of systems was negative except for that written in the HPI. Physical Examination     No exam data present    Evaluation of Cognitive Function:  Mood/affect:  neutral  Appearance: age appropriate  Family member/caregiver input: none    Visit Vitals    /69 (BP 1 Location: Left arm, BP Patient Position: Sitting)    Pulse 61    Temp 98.8 °F (37.1 °C) (Oral)    Resp 18    Ht 5' 7\" (1.702 m)    Wt 239 lb (108.4 kg)    SpO2 98%    BMI 37.43 kg/m2       Patient Care Team:  Lajean Halsted, MD as PCP - General  Tito Valenzuela MD (Orthopedic Surgery)  Sixto Miramontes MD as Physician (Gastroenterology)  Dr. Ayah Carlton as Physician (Cardiology)  Daya Agustin MD (Ophthalmology)    Advice/Referrals/Counseling   Education and counseling provided:  Are appropriate based on today's review and evaluation  End-of-Life planning (with patient's consent)    Glaucoma Screening- UTD;  Pneumonia Vaccine- not indicated currently  Shingles Vaccine-  Is due, script given  Tdap Vaccine- is due, declined today  Colonoscopy- UTD; done 4/2015 by Dr. Yan Gary with follow up in 10 years recommended  PSA- 0.2 on 12/2014- repeat with next blood draw  Advance Directive- none on file, verbal and written information provided to patient regarding its purpose and importance  Assessment/Plan       ICD-10-CM ICD-9-CM    1. Routine general medical examination at a health care facility Z00.00 V70.0    2. Primary insomnia F51.01 307.42 traZODone (DESYREL) 50 mg tablet   3. Hypercholesterolemia E78.00 272.0    4. Essential hypertension I10 401.9    5. Screening for alcoholism Z13.89 V79.1    6. Special screening for malignant neoplasm of prostate Z12.5 V76.44    7. Screening PSA (prostate specific antigen) Z12.5 V76.44 PSA SCREENING (SCREENING)   8. Chronic midline low back pain without sciatica M54.5 724.2     G89.29 338.29    9.  Left shoulder strain, initial encounter S46.912A 840.9 cyclobenzaprine (FLEXERIL) 10 mg tablet     A comprehensive 5 year plan for medical care and screening exams was reviewed with pt and they received a copy of it.

## 2017-05-24 NOTE — PATIENT INSTRUCTIONS
Advance Directives: Care Instructions  Your Care Instructions  An advance directive is a legal way to state your wishes at the end of your life. It tells your family and your doctor what to do if you can no longer say what you want. There are two main types of advance directives. You can change them any time that your wishes change. · A living will tells your family and your doctor your wishes about life support and other treatment. · A durable power of  for health care lets you name a person to make treatment decisions for you when you can't speak for yourself. This person is called a health care agent. If you do not have an advance directive, decisions about your medical care may be made by a doctor or a  who doesn't know you. It may help to think of an advance directive as a gift to the people who care for you. If you have one, they won't have to make tough decisions by themselves. Follow-up care is a key part of your treatment and safety. Be sure to make and go to all appointments, and call your doctor if you are having problems. It's also a good idea to know your test results and keep a list of the medicines you take. How can you care for yourself at home? · Discuss your wishes with your loved ones and your doctor. This way, there are no surprises. · Many states have a unique form. Or you might use a universal form that has been approved by many states. This kind of form can sometimes be completed and stored online. Your electronic copy will then be available wherever you have a connection to the Internet. In most cases, doctors will respect your wishes even if you have a form from a different state. · You don't need a  to do an advance directive. But you may want to get legal advice. · Think about these questions when you prepare an advance directive:  ¨ Who do you want to make decisions about your medical care if you are not able to?  Many people choose a family member or close friend. ¨ Do you know enough about life support methods that might be used? If not, talk to your doctor so you understand. ¨ What are you most afraid of that might happen? You might be afraid of having pain, losing your independence, or being kept alive by machines. ¨ Where would you prefer to die? Choices include your home, a hospital, or a nursing home. ¨ Would you like to have information about hospice care to support you and your family? ¨ Do you want to donate organs when you die? ¨ Do you want certain [employer] practices performed before you die? If so, put your wishes in the advance directive. · Read your advance directive every year, and make changes as needed. When should you call for help? Be sure to contact your doctor if you have any questions. Where can you learn more? Go to http://[employer].info/. Enter R264 in the search box to learn more about \"Advance Directives: Care Instructions. \"  Current as of: November 17, 2016  Content Version: 11.2  © 2209-7846 [employer]. Care instructions adapted under license by [employer] (which disclaims liability or warranty for this information). If you have questions about a medical condition or this instruction, always ask your healthcare professional. [place] [address] any warranty or liability for your use of this information. Medicare Part B Preventive Services Limitations Recommendation Scheduled   Bone Mass Measurement  (age 72 & older, biennial) Requires diagnosis related to osteoporosis or estrogen deficiency.  Biennial benefit unless patient has history of long-term glucocorticoid tx or baseline is needed because initial test was by other method  NA   Cardiovascular Screening Blood Tests (every 5 years)  Total cholesterol, HDL, Triglycerides Order as a panel if possible  5/2017   Colorectal Cancer Screening  -Fecal occult blood test (annual)  -Flexible sigmoidoscopy (5y)  -Screening colonoscopy (10y)  -Barium Enema   4/2015   Counseling to Prevent Tobacco Use (up to 8 sessions per year)  - Counseling greater than 3 and up to 10 minutes  - Counseling greater than 10 minutes Patients must be asymptomatic of tobacco-related conditions to receive as preventive service  NA   Diabetes Screening Tests (at least every 3 years, Medicare covers annually or at 6-month intervals for prediabetic patients)    Fasting blood sugar (FBS) or glucose tolerance test (GTT) Patient must be diagnosed with one of the following:  -Hypertension, Dyslipidemia, obesity, previous impaired FBS or GTT  Or any two of the following: overweight, FH of diabetes, age ? 72, history of gestational diabetes, birth of baby weighing more than 9 pounds  5/2017   Diabetes Self-Management Training (DSMT) (no USPSTF recommendation) Requires referral by treating physician for patient with diabetes or renal disease. 10 hours of initial DSMT session of no less than 30 minutes each in a continuous 12-month period. 2 hours of follow-up DSMT in subsequent years. Glaucoma Screening (no USPSTF recommendation) Diabetes mellitus, family history, , age 48 or over,  American, age 72 or over  Ordered    Human Immunodeficiency Virus (HIV) Screening (annually for increased risk patients)  HIV-1 and HIV-2 by EIA, FELICITY, rapid antibody test, or oral mucosa transudate Patient must be at increased risk for HIV infection per USPSTF guidelines or pregnant. Tests covered annually for patients at increased risk. Pregnant patients may receive up to 3 test during pregnancy. NA   Medical Nutrition Therapy (MNT) (for diabetes or renal disease not recommended schedule) Requires referral by treating physician for patient with diabetes or renal disease. Can be provided in same year as diabetes self-management training (DSMT), and CMS recommends medical nutrition therapy take place after DSMT.   Up to 3 hours for initial year and 2 hours in subsequent years. NA   Prostate Cancer Screening (annually up to age 76)  - Digital rectal exam (DEO)  - Prostate specific antigen (PSA) Annually (age 48 or over), DEO not paid separately when covered E/M service is provided on same date  Men up to age 76 may need a screening blood test for prostate cancer at certain intervals, depending on their personal and family history. This decision is between the patient and his provider. Ordered    Seasonal Influenza Vaccination (annually)   10/2016     Pneumococcal Vaccination (once after 72)      Hepatitis B Vaccinations (if medium/high risk) Medium/high risk factors:  End-stage renal disease,  Hemophiliacs who received Factor VIII or IX concentrates, Clients of institutions for the mentally retarded, Persons who live in the same house as a HepB virus carrier, Homosexual men, Illicit injectable drug abusers. NA   Shingles Vaccination A shingles vaccine is also recommended once in a lifetime after age 61  10/2015   Ultrasound Screening for Abdominal Aortic Aneurysm (AAA) (once) Patient must be referred through [facility] and not have had a screening for abdominal aortic aneurysm before under Medicare.   Limited to patients who meet one of the following criteria:  - Men who are 73-68 years old and have smoked more than 100 cigarettes in their lifetime.  -Anyone with a FH of AAA  -Anyone recommended for screening by USPSTF  NA

## 2017-05-25 ENCOUNTER — HOSPITAL ENCOUNTER (OUTPATIENT)
Dept: PHYSICAL THERAPY | Age: 62
Discharge: HOME | End: 2017-05-25
Payer: MEDICARE

## 2017-05-25 PROCEDURE — 97140 MANUAL THERAPY 1/> REGIONS: CPT

## 2017-05-25 PROCEDURE — 97110 THERAPEUTIC EXERCISES: CPT

## 2017-05-25 NOTE — PROGRESS NOTES
PT DAILY TREATMENT NOTE - [facility] 3-16    Patient Name: Sho Mccabe  Date:2017  : 1955  [x]  Patient  Verified  Payor: Laura Delgadillo / Plan: [place] PART A & B / Product Type: Medicare /    In time:1025  Out time:1109  Total Treatment Time (min): 40  Total Timed Codes (min): 40  1:1 Treatment Time [facility] only):40  Visit #:3 of 2-8    Treatment Area: Incomplete rotator cuff tear or rupture of left shoulder, not specified as traumatic [M75.112]    SUBJECTIVE  Pain Level (0-10 scale): 0  Any medication changes, allergies to medications, adverse drug reactions, diagnosis change, or new procedure performed?: [x] No    [] Yes (see summary sheet for update)  Subjective functional status/changes:   [] No changes reported  Patient C/O back pain today and is wearing his lumbar brace.     OBJECTIVE    Modality rationale:     Min Type Additional Details    [] Estim:  []Unatt       []IFC  []Premod                        []Other:  []w/ice   []w/heat  Position:  Location:    [] Estim: []Att    []TENS instruct  []NMES                    []Other:  []w/US   []w/ice   []w/heat  Position:  Location:    []  Traction: [] Cervical       []Lumbar                       [] Prone          []Supine                       []Intermittent   []Continuous Lbs:  [] before manual  [] after manual    []  Ultrasound: []Continuous   [] Pulsed                           []1MHz   []3MHz Location:  W/cm2:    []  Iontophoresis with dexamethasone         Location: [] Take home patch   [] In clinic   PD []  Ice     []  heat  []  Ice massage  []  Laser   []  Anodyne Position:  Location:    []  Laser with stim  []  Other: Position:  Location:    []  Vasopneumatic Device Pressure:       [] lo [] med [] hi   Temperature: [] lo [] med [] hi   [] Skin assessment post-treatment:  []intact []redness- no adverse reaction    []redness - adverse reaction:       min []Eval                  []Re-Eval       30 min Therapeutic Exercise:  [x] See flow sheet : Rationale: increase ROM, increase strength and improve coordination to improve the patients ability to aid with increase tolerance to ADLS and activities     min Therapeutic Activity:  []  See flow sheet :   Rationale:   to improve the patients ability to       min Neuromuscular Re-education:  []  See flow sheet :   Rationale:   to improve the patients ability to     10 min Manual Therapy:  Gentle LAD and Oscill , Passive overstretch with Abd and Flexion, RS AI   Rationale: increase ROM and increase strength and proprioception to aid with increase tolerance to ADLs and activities     min Gait Training:  ___ feet with ___ device on level surfaces with ___ level of assist   Rationale: With   [] TE   [] TA   [] neuro   [] other: Patient Education: [x] Review HEP    [] Progressed/Changed HEP based on:   [] positioning   [] body mechanics   [] transfers   [] heat/ice application    [] other:      Other Objective/Functional Measures: VC exercises and technique . He has AROM full overhead F and Abd    Pain Level (0-10 scale) post treatment: 0    ASSESSMENT/Changes in Function: tolerated well. Plan of care discussed with pending DC next session    Patient will continue to benefit from skilled PT services to modify and progress therapeutic interventions, address functional mobility deficits, address ROM deficits, address strength deficits, analyze and address soft tissue restrictions, analyze and cue movement patterns, analyze and modify body mechanics/ergonomics, assess and modify postural abnormalities and instruct in home and community integration to attain remaining goals. [x]  See Plan of Care  [x]  See progress note/recertification  []  See Discharge Summary         Progress towards goals / Updated goals:   1.  Pt will improve FOTO score to 80 to show significant improvement in function for progress to Good shoulder function  PN 73  Current Status:  Recheck next session    2 patient will have MMT L shoulder F abd ER IR all 4+ ,5 to aid with increase tolerance to ADLS  PN  MMT F 4+,5 ABD 4+ IR 4+,5 ER 4+,5   CURRENT Showing good strength with Supine AI at 90* Flx all planes at 4+/5 or better 5/23/17  3 patient will tolerate overhead cone stacks 10 cones 3 sets with 2# weight for carryover to household activities  PN Progressing with cone stacks to level 5 at shoulder level w/o difficulty 2 sets with no added weight   CURRENT level 7 without difficulty 5/25/17     PLAN  [x]  Upgrade activities as tolerated     [x]  Continue plan of care  []  Update interventions per flow sheet       []  Discharge due to:_  []  Other:_      Gamaliel Bowens PT 5/25/2017  10:36 AM

## 2017-05-30 ENCOUNTER — HOSPITAL ENCOUNTER (OUTPATIENT)
Dept: PHYSICAL THERAPY | Age: 62
Discharge: HOME | End: 2017-05-30
Payer: MEDICARE

## 2017-05-30 PROCEDURE — 97530 THERAPEUTIC ACTIVITIES: CPT

## 2017-05-30 PROCEDURE — G8986 CARRY D/C STATUS: HCPCS

## 2017-05-30 PROCEDURE — 97110 THERAPEUTIC EXERCISES: CPT

## 2017-05-30 PROCEDURE — G8985 CARRY GOAL STATUS: HCPCS

## 2017-05-30 NOTE — PROGRESS NOTES
PT DISCHARGE DAILY NOTE AND [id number]    Date:2017  Patient name: Federico Tran Start of Care:3/24/17   Referral source: Ayah Ly,* : 1955   Medical/Treatment Diagnosis: Incomplete rotator cuff tear or rupture of left shoulder, not specified as traumatic [M75.112] Onset Date:One month ago with Surgery on 3/23/14      Prior Hospitalization: see medical history Provider#: [id number]   Medications: Verified on Patient Summary List    Comorbidities:OA , HTN  Prior Level of Function:L) UE weakness but able to work on bike and ride bike at will. Visits from Start of Care: 20   Missed Visits: 0    Reporting Period : 3/24/17 to 17    [x]  Patient  Verified  Payor: Lakisha England / Plan: [place] MEDICARE PART A & B / Product Type: Medicare /    In time:1025  Out time:1121  Total Treatment Time (min): 56  Total Timed Codes (min): 43  1:1 Treatment Time ( [address] only): 25  Visit #:4 of 2-8 per last MD note    SUBJECTIVE  Pain Level (0-10 scale): 0  Any medication changes, allergies to medications, adverse drug reactions, diagnosis change, or new procedure performed?: [x] No    [] Yes (see summary sheet for update)  Subjective functional status/changes:   [] No changes reported  Overall reports 60% improvement since surgery.     OBJECTIVE    Modality rationale:     Min Type Additional Details    [] Estim:  []Unatt       []IFC  []Premod                        []Other:  []w/ice   []w/heat  Position:  Location:    [] Estim: []Att    []TENS instruct  []NMES                    []Other:  []w/US   []w/ice   []w/heat  Position:  Location:    []  Traction: [] Cervical       []Lumbar                       [] Prone          []Supine                       []Intermittent   []Continuous Lbs:  [] before manual  [] after manual    []  Ultrasound: []Continuous   [] Pulsed                           []1MHz   []3MHz W/cm2:  Location:    []  Iontophoresis with dexamethasone         Location: [] Take home patch   [] In clinic PD []  Ice     []  heat  []  Ice massage  []  Laser   []  Anodyne Position:  Location:    []  Laser with stim  []  Other:  Position:  Location:    []  Vasopneumatic Device Pressure:       [] lo [] med [] hi   Temperature: [] lo [] med [] hi   [] Skin assessment post-treatment:  []intact []redness- no adverse reaction    []redness - adverse reaction:       min []Eval                  []Re-Eval       35 min Therapeutic Exercise:  [x] See flow sheet :   Rationale: increase ROM, increase strength and improve coordination to improve the patients ability to aid with increase tolerance to ADLs and activities    8 min Therapeutic Activity:  []  See flow sheet :   Rationale:   to improve the patients ability to       min Neuromuscular Re-education:  []  See flow sheet :   Rationale:   to improve the patients ability to      min Manual Therapy:     Rationale:  to      min Gait Training:  ___ feet with ___ device on level surfaces with ___ level of assist   Rationale:           With   [] TE   [] TA   [] neuro   [] other: Patient Education: [x] Review HEP    [] Progressed/Changed HEP based on:   [] positioning   [] body mechanics   [] transfers   [] heat/ice application    [x] other: DC instructions     Other Objective/Functional Measures: FOTO 84    F 150,     ER 62       MMT F  5   ABD 4+,5   ER  4+,5    IR 5    Pain Level (0-10 scale) post treatment: 0    Summary of Care:  Goal: Pt will improve FOTO score to 80 to show significant improvement in function for progress to Good shoulder function  Status at last note/certification:  Status at discharge: met    Goal:patient will have MMT L shoulder F abd ER IR all 4+ ,5 to aid with increase tolerance to ADLS   Status at last note/certification:last MD note  Status at discharge: met    Goal: patient will tolerate overhead cone stacks 10 cones 3 sets with 2# weight for carryover to household activities  Status at last note/certification:last MD note  Status at discharge: not met, progressing, 2 sets 10X no added weight on L UE however full overhead height level 7         ASSESSMENT/Changes in Function: He appears and is motivated for DC to HEP and attempted self management. He should follow up with his MD as scheduled. G-Codes (GP)   Carry    Goal  CJ= 20-39%   D/C  CI= 1-19%     The severity rating is based on clinical judgment and the FOTO score.       Thank you for this referral!     PLAN  [x]Discontinue therapy: [x]Patient has reached or is progressing toward set goals      []Patient is non-compliant or has abdicated      []Due to lack of appreciable progress towards set goals    Margarita Mccarty, PT 5/30/2017  11:10 AM

## 2017-06-02 ENCOUNTER — OFFICE VISIT (OUTPATIENT)
Dept: INTERNAL MEDICINE CLINIC | Age: 62
End: 2017-06-02

## 2017-06-02 VITALS
HEIGHT: 67 IN | TEMPERATURE: 98.2 F | WEIGHT: 238 LBS | OXYGEN SATURATION: 98 % | DIASTOLIC BLOOD PRESSURE: 68 MMHG | HEART RATE: 60 BPM | BODY MASS INDEX: 37.35 KG/M2 | RESPIRATION RATE: 20 BRPM | SYSTOLIC BLOOD PRESSURE: 124 MMHG

## 2017-06-02 DIAGNOSIS — L74.511 FACIAL SWEATING: ICD-10-CM

## 2017-06-02 DIAGNOSIS — M25.551 RIGHT HIP PAIN: Primary | ICD-10-CM

## 2017-06-02 RX ORDER — PREDNISONE 10 MG/1
TABLET ORAL
Qty: 25 TAB | Refills: 0 | Status: SHIPPED | OUTPATIENT
Start: 2017-06-02 | End: 2017-10-13 | Stop reason: ALTCHOICE

## 2017-06-02 NOTE — PROGRESS NOTES
Nael Monsalve is a 59 y.o.  male and presents with Excessive Sweating and Side Pain (right)      SUBJECTIVE:    Hip Pain  Patient complains of right hip pain. Onset of the symptoms was several weeks ago. Inciting event: pt has h/o Right  THR done in the last year. Current symptoms include location: lateral, over greater trochanter  worse with weight bearing  aggravated by walking. Associated symptoms: none. Aggravating symptoms: standing, walking. Patient's overall course: symptoms have progressed to a point and plateaued. . Patient has had prior hip problems. Previous visits for this problem: none. Evaluation to date: none. Treatment to date: none. Pt c/o excessive sweating for the last week. Denies any fever or chills. He has had some right nasal congestion which is long standing. Respiratory ROS: negative for - shortness of breath  Cardiovascular ROS: negative for - chest pain    Current Outpatient Prescriptions   Medication Sig    predniSONE (DELTASONE) 10 mg tablet 5 tabs daily for 5 days    traZODone (DESYREL) 50 mg tablet TAKE ONE TABLET BY MOUTH ONCE DAILY AT BEDTIME AS NEEDED FOR SLEEP    tamsulosin (FLOMAX) 0.4 mg capsule Take 1 Cap by mouth daily (after dinner).  pantoprazole (PROTONIX) 40 mg tablet Take 1 Tab by mouth daily.  gabapentin (NEURONTIN) 300 mg capsule TAKE ONE CAPSULE BY MOUTH THREE TIMES DAILY    metoprolol tartrate (LOPRESSOR) 25 mg tablet TAKE ONE TABLET BY MOUTH TWICE DAILY ( GENERIC FOR LOPRESSOR )    pravastatin (PRAVACHOL) 40 mg tablet Take 1 Tab by mouth nightly.  TRAVATAN Z 0.004 % ophthalmic solution Administer 1 Drop to both eyes nightly.  cyclobenzaprine (FLEXERIL) 10 mg tablet Take 1 Tab by mouth nightly.  oxyCODONE-acetaminophen (PERCOCET 10)  mg per tablet Take 1 Tab by mouth every four (4) hours as needed for Pain. Max Daily Amount: 6 Tabs.     ibuprofen (MOTRIN) 800 mg tablet TAKE ONE TABLET BY MOUTH EVERY 8 HOURS AS NEEDED FOR PAIN    nitroglycerin (NITROSTAT) 0.4 mg SL tablet 1 Tab by SubLINGual route every five (5) minutes as needed.  albuterol (PROVENTIL HFA, VENTOLIN HFA, PROAIR HFA) 90 mcg/actuation inhaler Take 2 Puffs by inhalation every four (4) hours as needed for Wheezing.  fluticasone (FLONASE) 50 mcg/actuation nasal spray 2 Sprays by Both Nostrils route daily. No current facility-administered medications for this visit. OBJECTIVE:  alert, well appearing, and in no distress  Visit Vitals    /68 (BP 1 Location: Left arm, BP Patient Position: Sitting)    Pulse 60    Temp 98.2 °F (36.8 °C) (Oral)    Resp 20    Ht 5' 7\" (1.702 m)    Wt 238 lb (108 kg)    SpO2 98%    BMI 37.28 kg/m2      well developed and well nourished  Nose - mucosal congestion  Mouth - mucous membranes moist, pharynx normal without lesions  Chest - clear to auscultation, no wheezes, rales or rhonchi, symmetric air entry  Heart - normal rate, regular rhythm, normal S1, S2, no murmurs, rubs, clicks or gallops  Musculoskeletal - abnormal active range of motion of right hip with flexion of right hip flexor pt has increased pain. Assessment/Plan      ICD-10-CM ICD-9-CM    1. Right hip pain M25.551 719.45 Will treat with predniSONE (DELTASONE) 10 mg tablet. If not improving will roger to f/u with orthopedics. 2. Facial sweating L74.511 705.21 Etiology unclear. Pt will continue to monitor. Follow-up Disposition:  Return if symptoms worsen or fail to improve. Reviewed plan of care. Patient has provided input and agrees with goals.

## 2017-06-02 NOTE — PROGRESS NOTES
Patient is in the office today for excessive sweating and right side pain on and off.     1. Have you been to the ER, urgent care clinic since your last visit? Hospitalized since your last visit? No    2. Have you seen or consulted any other health care providers outside of the [address] [place] [address] since your last visit? Include any pap smears or colon screening.  No

## 2017-06-02 NOTE — MR AVS SNAPSHOT
Visit Information Date & Time Provider Department Dept. Phone Encounter #  
 6/2/2017 10:45 AM Reza Lambert MD [facility] 8 [id number] Follow-up Instructions Return if symptoms worsen or fail to improve. Your Appointments 6/2/2017 10:45 AM  
ACUTE CARE with Reza Lambert MD  
[facility] (--) Appt Note: hot/cold sweating, & left side pain x 1 week [address] [address]  
[phone]  
  
   
 [address] [address]  
  
    
 6/28/2017  9:15 AM  
Follow Up with DAVID Menezes  
[facility] ([address] [facility]) Appt Note: lt shld 6 wk fu  
 [address] [facility], [address] [place] [address]  
[phone] [address] [place] [address], [address] [place] [address] Rd 9/13/2017  8:05 AM  
LAB with Reza Lambert MD  
[facility] (--) Appt Note: 4 mo f/u lab [address] [address] [phone]  
  
   
 [address],[facility] [address] [address]  
  
    
 9/20/2017  8:15 AM  
ROUTINE CARE with Reza Lambert MD  
[facility] (--) Appt Note: 4 mo f/u  
 [address] [address]  
[phone]  
  
   
 [address] [address]  
  
    
 5/3/2018  9:30 AM  
Nurse Visit with [facility] ([address]) [address] [place] [address]  
[address] [facility] [address] [address] [place] [address] 5/11/2018  9:30 AM  
Any with Snehal Tanner MD  
[facility] ([address]) [address] [place] [address]  
[address] [facility] [address] [facility] [address] Upcoming Health Maintenance Date Due DTaP/Tdap/Td series (1 - Tdap) 7/9/1974 FOBT Q 1 YEAR, 18+ 10/15/2016 INFLUENZA AGE 9 TO ADULT 8/1/2017 COLONOSCOPY 4/1/2020 Allergies as of 6/2/2017  Review Complete On: 6/2/2017 By: Patsy Neal LPN Severity Noted Reaction Type Reactions Ace Inhibitors  05/17/2010    Cough Dilaudid [Hydromorphone]  05/17/2010    Hives Lisinopril  05/17/2010    Cough Zocor [Simvastatin]  07/30/2011    Myalgia Right leg Current Immunizations  Reviewed on 10/12/2016 Name Date Influenza Vaccine 1/2/2015 Influenza Vaccine (Quad) PF 10/12/2016, 10/15/2015 Zoster Vaccine, Live 10/22/2015 Not reviewed this visit You Were Diagnosed With   
  
 Codes Comments Right hip pain    -  Primary ICD-10-CM: M25.551 ICD-9-CM: 719.45 Facial sweating     ICD-10-CM: L74.511 ICD-9-CM: 705.21 Vitals BP Pulse Temp Resp Height(growth percentile) Weight(growth percentile) 124/68 (BP 1 Location: Left arm, BP Patient Position: Sitting) 60 98.2 °F (36.8 °C) (Oral) 20 5' 7\" (1.702 m) 238 lb (108 kg) SpO2 BMI Smoking Status 98% 37.28 kg/m2 Former Smoker Vitals History BMI and BSA Data Body Mass Index Body Surface Area  
 37.28 kg/m 2 2.26 m 2 Preferred Pharmacy Pharmacy Name Phone [facility] [address] [place] [address] Your Updated Medication List  
  
   
This list is accurate as of: 6/2/17 10:17 AM.  Always use your most recent med list.  
  
  
  
  
 albuterol 90 mcg/actuation inhaler Commonly known as:  PROVENTIL HFA, VENTOLIN HFA, PROAIR HFA Take 2 Puffs by inhalation every four (4) hours as needed for Wheezing. cyclobenzaprine 10 mg tablet Commonly known as:  FLEXERIL Take 1 Tab by mouth nightly. fluticasone 50 mcg/actuation nasal spray Commonly known as:  Jenny Carlo 2 Sprays by Both Nostrils route daily. gabapentin 300 mg capsule Commonly known as:  NEURONTIN  
 TAKE ONE CAPSULE BY MOUTH THREE TIMES DAILY  
  
 ibuprofen 800 mg tablet Commonly known as:  MOTRIN  
TAKE ONE TABLET BY MOUTH EVERY 8 HOURS AS NEEDED FOR PAIN  
  
 metoprolol tartrate 25 mg tablet Commonly known as:  LOPRESSOR  
TAKE ONE TABLET BY MOUTH TWICE DAILY ( GENERIC FOR LOPRESSOR )  
  
 nitroglycerin 0.4 mg SL tablet Commonly known as:  NITROSTAT  
1 Tab by SubLINGual route every five (5) minutes as needed. oxyCODONE-acetaminophen  mg per tablet Commonly known as:  PERCOCET 10 Take 1 Tab by mouth every four (4) hours as needed for Pain. Max Daily Amount: 6 Tabs. pantoprazole 40 mg tablet Commonly known as:  PROTONIX Take 1 Tab by mouth daily. pravastatin 40 mg tablet Commonly known as:  PRAVACHOL Take 1 Tab by mouth nightly. predniSONE 10 mg tablet Commonly known as:  DELTASONE  
5 tabs daily for 5 days  
  
 tamsulosin 0.4 mg capsule Commonly known as:  FLOMAX Take 1 Cap by mouth daily (after dinner). TRAVATAN Z 0.004 % ophthalmic solution Generic drug:  travoprost  
Administer 1 Drop to both eyes nightly. traZODone 50 mg tablet Commonly known as:  DESYREL  
TAKE ONE TABLET BY MOUTH ONCE DAILY AT BEDTIME AS NEEDED FOR SLEEP Prescriptions Sent to Pharmacy Refills  
 predniSONE (DELTASONE) 10 mg tablet 0 Si tabs daily for 5 days Class: Normal  
 Pharmacy: [address] #: [phone] Follow-up Instructions Return if symptoms worsen or fail to improve. Patient Instructions Advance Care Planning: Care Instructions Your Care Instructions It can be hard to live with an illness that cannot be cured. But if your health is getting worse, you may want to make decisions about end-of-life care.  Planning for the end of your life does not mean that you are giving up. It is a way to make sure that your wishes are met. Clearly stating your wishes can make it easier for your loved ones. Making plans while you are still able may also ease your mind and make your final days less stressful and more meaningful. Follow-up care is a key part of your treatment and safety. Be sure to make and go to all appointments, and call your doctor if you are having problems. It's also a good idea to know your test results and keep a list of the medicines you take. What can you do to plan for the end of life? · You can bring these issues up with your doctor. You do not need to wait until your doctor starts the conversation. You might start with \"I would not be willing to live with . Lova Mean Lova Mean Lova Mean \" When you complete this sentence it helps your doctor understand your wishes. · Talk openly and honestly with your doctor. This is the best way to understand the decisions you will need to make as your health changes. Know that you can always change your mind. · Ask your doctor about commonly used life-support measures. These include tube feedings, breathing machines, and fluids given through a vein (IV). Understanding these treatments will help you decide whether you want them. · You may choose to have these life-supporting treatments for a limited time. This allows a trial period to see whether they will help you. You may also decide that you want your doctor to take only certain measures to keep you alive. It is important to spell out these conditions so that your doctor and family understand them. · Talk to your doctor about how long you are likely to live. He or she may be able to give you an idea of what usually happens with your specific illness. · Think about preparing papers that state your wishes. This way there will not be any confusion about what you want. You can change your instructions at any time. Which papers should you prepare? Advance directives are legal papers that tell doctors how you want to be cared for at the end of your life. You do not need a  to write these papers. Ask your doctor or your [facility] department for information on how to write your advance directives. They may have the forms for each of these types of papers. Make sure your doctor has a copy of these on file, and give a copy to a family member or close friend. · Consider a do-not-resuscitate order (DNR). This order asks that no extra treatments be done if your heart stops or you stop breathing. Extra treatments may include cardiopulmonary resuscitation (CPR), electrical shock to restart your heart, or a machine to breathe for you. If you decide to have a DNR order, ask your doctor to explain and write it. Place the order in your home where everyone can easily see it. · Consider a living will. A living will explains your wishes about life support and other treatments at the end of your life if you become unable to speak for yourself. Living sotomayor tell doctors to use or not use treatments that would keep you alive. You must have one or two witnesses or a notary present when you sign this form. · Consider a durable power of  for health care. This allows you to name a person to make decisions about your care if you are not able to. Most people ask a close friend or family member. Talk to this person about the kinds of treatments you want and those that you do not want. Make sure this person understands your wishes. These legal papers are simple to change. Tell your doctor what you want to change, and ask him or her to make a note in your medical file. Give your family updated copies of the papers. Where can you learn more? Go to http://samreen-tabitha.info/. Enter P184 in the search box to learn more about \"Advance Care Planning: Care Instructions. \" Current as of: November 17, 2016 Content Version: 11.2 © 3152-6549 [employer]. Care instructions adapted under license by [employer] (which disclaims liability or warranty for this information). If you have questions about a medical condition or this instruction, always ask your healthcare professional. [address] any warranty or liability for your use of this information. Introducing [facility]! Ellen Foster introduces [employer] patient portal. Now you can access parts of your medical record, email your doctor's office, and request medication refills online. 1. In your internet browser, go to [email or web address] 2. Click on the First Time User? Click Here link in the Sign In box. You will see the New Member Sign Up page. 3. Enter your [employer]ela Access Code exactly as it appears below. You will not need to use this code after youve completed the sign-up process. If you do not sign up before the expiration date, you must request a new code. · [employer]ela Access Code: [email or web address] Expires: 7/17/2017  7:37 AM 
 
4. Enter the last four digits of your Social Security Number (xxxx) and Date of Birth (mm/dd/yyyy) as indicated and click Submit. You will be taken to the next sign-up page. 5. Create a Nivela ID. This will be your [employer]ela login ID and cannot be changed, so think of one that is secure and easy to remember. 6. Create a [employer] password. You can change your password at any time. 7. Enter your Password Reset Question and Answer. This can be used at a later time if you forget your password. 8. Enter your e-mail address. You will receive e-mail notification when new information is available in [address] 9. Click Sign Up. You can now view and download portions of your medical record. 10. Click the Download Summary menu link to download a portable copy of your medical information.  
 
If you have questions, please visit the Frequently Asked Questions section of the [employer]. Remember, [employer] is NOT to be used for urgent needs. For medical emergencies, dial 911. Now available from your iPhone and Android! Please provide this summary of care documentation to your next provider. Your primary care clinician is listed as WILLIAM MENARD. If you have any questions after today's visit, please call [phone].

## 2017-06-02 NOTE — PATIENT INSTRUCTIONS
Advance Care Planning: Care Instructions  Your Care Instructions  It can be hard to live with an illness that cannot be cured. But if your health is getting worse, you may want to make decisions about end-of-life care. Planning for the end of your life does not mean that you are giving up. It is a way to make sure that your wishes are met. Clearly stating your wishes can make it easier for your loved ones. Making plans while you are still able may also ease your mind and make your final days less stressful and more meaningful. Follow-up care is a key part of your treatment and safety. Be sure to make and go to all appointments, and call your doctor if you are having problems. It's also a good idea to know your test results and keep a list of the medicines you take. What can you do to plan for the end of life? · You can bring these issues up with your doctor. You do not need to wait until your doctor starts the conversation. You might start with \"I would not be willing to live with . Wolm Junaid Wolm Junaid Wolm Junaid \" When you complete this sentence it helps your doctor understand your wishes. · Talk openly and honestly with your doctor. This is the best way to understand the decisions you will need to make as your health changes. Know that you can always change your mind. · Ask your doctor about commonly used life-support measures. These include tube feedings, breathing machines, and fluids given through a vein (IV). Understanding these treatments will help you decide whether you want them. · You may choose to have these life-supporting treatments for a limited time. This allows a trial period to see whether they will help you. You may also decide that you want your doctor to take only certain measures to keep you alive. It is important to spell out these conditions so that your doctor and family understand them. · Talk to your doctor about how long you are likely to live.  He or she may be able to give you an idea of what usually happens with your specific illness. · Think about preparing papers that state your wishes. This way there will not be any confusion about what you want. You can change your instructions at any time. Which papers should you prepare? Advance directives are legal papers that tell doctors how you want to be cared for at the end of your life. You do not need a  to write these papers. Ask your doctor or your state health department for information on how to write your advance directives. They may have the forms for each of these types of papers. Make sure your doctor has a copy of these on file, and give a copy to a family member or close friend. · Consider a do-not-resuscitate order (DNR). This order asks that no extra treatments be done if your heart stops or you stop breathing. Extra treatments may include cardiopulmonary resuscitation (CPR), electrical shock to restart your heart, or a machine to breathe for you. If you decide to have a DNR order, ask your doctor to explain and write it. Place the order in your home where everyone can easily see it. · Consider a living will. A living will explains your wishes about life support and other treatments at the end of your life if you become unable to speak for yourself. Living sotomayor tell doctors to use or not use treatments that would keep you alive. You must have one or two witnesses or a notary present when you sign this form. · Consider a durable power of  for health care. This allows you to name a person to make decisions about your care if you are not able to. Most people ask a close friend or family member. Talk to this person about the kinds of treatments you want and those that you do not want. Make sure this person understands your wishes. These legal papers are simple to change. Tell your doctor what you want to change, and ask him or her to make a note in your medical file. Give your family updated copies of the papers.   Where can you learn more?  Go to http://samreen-tabitha.info/. Enter P184 in the search box to learn more about \"Advance Care Planning: Care Instructions. \"  Current as of: November 17, 2016  Content Version: 11.2  © 5972-2764 [employer]. Care instructions adapted under license by [employer] (which disclaims liability or warranty for this information). If you have questions about a medical condition or this instruction, always ask your healthcare professional. [address] any warranty or liability for your use of this information.

## 2017-06-21 DIAGNOSIS — M54.5 CHRONIC MIDLINE LOW BACK PAIN, WITH SCIATICA PRESENCE UNSPECIFIED: ICD-10-CM

## 2017-06-21 DIAGNOSIS — G89.29 CHRONIC MIDLINE LOW BACK PAIN, WITH SCIATICA PRESENCE UNSPECIFIED: ICD-10-CM

## 2017-06-21 RX ORDER — OXYCODONE AND ACETAMINOPHEN 10; 325 MG/1; MG/1
1 TABLET ORAL
Qty: 90 TAB | Refills: 0 | Status: SHIPPED | OUTPATIENT
Start: 2017-06-21 | End: 2017-07-17 | Stop reason: SDUPTHER

## 2017-06-21 NOTE — TELEPHONE ENCOUNTER
Pt called in requesting refill of his   Requested Prescriptions     Pending Prescriptions Disp Refills    oxyCODONE-acetaminophen (PERCOCET 10)  mg per tablet 90 Tab 0     Sig: Take 1 Tab by mouth every four (4) hours as needed for Pain. Max Daily Amount: 6 Tabs. Tone Ludivina

## 2017-06-28 ENCOUNTER — OFFICE VISIT (OUTPATIENT)
Dept: ORTHOPEDIC SURGERY | Age: 62
End: 2017-06-28

## 2017-06-28 VITALS
BODY MASS INDEX: 35.55 KG/M2 | WEIGHT: 240 LBS | TEMPERATURE: 98.6 F | HEART RATE: 63 BPM | HEIGHT: 69 IN | SYSTOLIC BLOOD PRESSURE: 116 MMHG | DIASTOLIC BLOOD PRESSURE: 62 MMHG

## 2017-06-28 DIAGNOSIS — M75.122 COMPLETE ROTATOR CUFF TEAR OF LEFT SHOULDER: Primary | ICD-10-CM

## 2017-06-28 NOTE — PROGRESS NOTES
Max Ayala  1955     HISTORY OF PRESENT ILLNESS  Max Ayala is a 59 y.o. male who presents today for evaluation s/p Left shoulder arthroscopic rotator cuff repair, subscapularis repair and biceps debridement on 3/23/17. Patient has now finished his physical therapy and continues with his HEP. Describes pain as a 0/10. States he is doing well. Patient denies any fever, chills, chest pain, shortness of breath or calf pain. There are no new illness or injuries to report since last seen in the office. PHYSICAL EXAM:   Visit Vitals    /62    Pulse 63    Temp 98.6 °F (37 °C) (Oral)    Ht 5' 9\" (1.753 m)    Wt 240 lb (108.9 kg)    BMI 35.44 kg/m2      The patient is a well-developed, well-nourished male in no acute distress. The patient is alert and oriented times three. The patient appears to be well groomed. Mood and affect are normal.  LYMPHATIC: lymph nodes are not enlarged and are within normal limits  SKIN: normal in color and non tender to palpation. There are no bruises or abrasions noted. NEUROLOGICAL: Motor sensory exam is within normal limits. Reflexes are equal bilaterally. There is normal sensation to pinprick and light touch   ORTHOPEDIC EXAM of left shoulder:  Inspection: swelling not present,  Bruising not present  Incisions well healed  Passive glenohumeral abduction 0-90 degrees, able to reach up over his head without difficulty  Stability: Stable  Strength: 4/5  2+ distal pulses    IMPRESSION:  S/P Left shoulder arthroscopic rotator cuff repair, subscapularis repair, biceps debridement    PLAN:   1. Patient is doing very well in the post operative state  2. Will continue with HEP from PT  3. Will gradually increase the amount that he is lifting being very mindful of pain.      RTC PRN     Terrial Delay, [facility]

## 2017-07-17 DIAGNOSIS — G89.29 CHRONIC MIDLINE LOW BACK PAIN, WITH SCIATICA PRESENCE UNSPECIFIED: ICD-10-CM

## 2017-07-17 DIAGNOSIS — M54.5 CHRONIC MIDLINE LOW BACK PAIN, WITH SCIATICA PRESENCE UNSPECIFIED: ICD-10-CM

## 2017-07-17 RX ORDER — OXYCODONE AND ACETAMINOPHEN 10; 325 MG/1; MG/1
1 TABLET ORAL
Qty: 90 TAB | Refills: 0 | Status: SHIPPED | OUTPATIENT
Start: 2017-07-17 | End: 2017-08-14 | Stop reason: SDUPTHER

## 2017-07-17 NOTE — TELEPHONE ENCOUNTER
I called Pt in regards to Rx refill. Informed Pt that Rx for Percocet has been refill and printed out. Pt sts he will need it sent to the pharmacy because he has no Transportation. Informed Pt that Dr Ravin Fisher isn'tt here this week. Informed Pt that I would discuss with another provider.

## 2017-08-01 ENCOUNTER — OFFICE VISIT (OUTPATIENT)
Dept: INTERNAL MEDICINE CLINIC | Age: 62
End: 2017-08-01

## 2017-08-01 VITALS
OXYGEN SATURATION: 98 % | HEART RATE: 62 BPM | SYSTOLIC BLOOD PRESSURE: 118 MMHG | HEIGHT: 69 IN | DIASTOLIC BLOOD PRESSURE: 72 MMHG | BODY MASS INDEX: 36.02 KG/M2 | RESPIRATION RATE: 18 BRPM | WEIGHT: 243.2 LBS | TEMPERATURE: 99 F

## 2017-08-01 DIAGNOSIS — E66.9 OBESITY (BMI 30-39.9): ICD-10-CM

## 2017-08-01 DIAGNOSIS — R05.9 COUGH: ICD-10-CM
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
## 2017-08-01 DIAGNOSIS — G89.29 CHRONIC MIDLINE LOW BACK PAIN, WITH SCIATICA PRESENCE UNSPECIFIED: ICD-10-CM

## 2017-08-01 DIAGNOSIS — M54.5 CHRONIC MIDLINE LOW BACK PAIN, WITH SCIATICA PRESENCE UNSPECIFIED: ICD-10-CM

## 2017-08-01 RX ORDER — OXYCODONE AND ACETAMINOPHEN 10; 325 MG/1; MG/1
1 TABLET ORAL
Qty: 90 TAB | Refills: 0 | OUTPATIENT
Start: 2017-08-01

## 2017-08-01 RX ORDER — GUAIFENESIN 600 MG/1
600 TABLET, EXTENDED RELEASE ORAL 2 TIMES DAILY
Qty: 30 TAB | Refills: 0 | Status: SHIPPED | OUTPATIENT
Start: 2017-08-01 | End: 2017-11-20 | Stop reason: SDUPTHER

## 2017-08-01 RX ORDER — CEPHALEXIN 500 MG/1
500 CAPSULE ORAL 2 TIMES DAILY
Qty: 20 CAP | Refills: 0 | Status: SHIPPED | OUTPATIENT
Start: 2017-08-01 | End: 2017-08-11

## 2017-08-01 NOTE — PROGRESS NOTES
Chief Complaint   Patient presents with   Adele Ordonez 81   Patient is here today for bug bite x 1 day below belly button. 1. Have you been to the ER, urgent care clinic since your last visit? Hospitalized since your last visit? No    2. Have you seen or consulted any other health care providers outside of the [address] [place] [address] since your last visit? Include any pap smears or colon screeng. NO

## 2017-08-01 NOTE — PROGRESS NOTES
Chief Complaint   Patient presents with    Insect Bite       HPI:  Patient is a 57year old black female presents today for an acute visit after been bitten by a bug. He stated that he may have been bitten by a bug last night and noticed an induration on the right lower abdomen this morning with area red and itchy. Associated is low grade fever, though he denies chills, nausea, or vomiting. He continues to have chronic cough productive of whitish sputum and thus came in today for evaluation and has gained 5 pounds in the last 2 months. Past Medical History:   Diagnosis Date    Allergic rhinitis     Avascular necrosis (HCC)     CAD (coronary artery disease) 4/2009    mild, hemodynamically non-significant by angiography    Carpal tunnel syndrome     Carpal tunnel syndrome on both sides     Chest pain, unspecified     Chronic back pain 2/11/2011    Chronic obstructive pulmonary disease ([address]     Degenerative joint disease     with chronic back pain    Dysesthesia     of hand post carpal tunnel surgery v sudeck's atrophy    Dyslipidemia, goal LDL below 100     Dysphagia     GERD (gastroesophageal reflux disease) 5/17/2010    Glaucoma 9/15/2010    Glaucoma     Hearing loss     Hypercholesterolemia     Hypertension     Joint fusion     of right wrist    Morbid obesity ([address]     Obesity 5/17/2010    Other dyspnea and respiratory abnormality     Palpitations     Right hip pain     Right hip pain     Right knee pain     S/P cardiac catheterization 4/9/2009    1. Normal systemic pressure. 2. Moderate elevation of left-sided filling pressures. 3. Preserved left ventricular systolic function in the LOWE projection. 4. Mild, nonsignificant epicardial coronary artery disese by angiography.     Sleep apnea     on CPAP    Strain of lumbar region     Tobacco abuse      Allergies   Allergen Reactions    Ace Inhibitors Cough    Dilaudid [Hydromorphone] Hives    Lisinopril Cough    Zocor [Simvastatin] Myalgia     Right leg     Current Outpatient Prescriptions   Medication Sig Dispense Refill    cephALEXin (KEFLEX) 500 mg capsule Take 1 Cap by mouth two (2) times a day for 10 days. 20 Cap 0    guaiFENesin ER (MUCINEX) 600 mg ER tablet Take 1 Tab by mouth two (2) times a day. 30 Tab 0    oxyCODONE-acetaminophen (PERCOCET 10)  mg per tablet Take 1 Tab by mouth every four (4) hours as needed for Pain. Max Daily Amount: 6 Tabs. Indications: chronic back pain 90 Tab 0    cyclobenzaprine (FLEXERIL) 10 mg tablet Take 1 Tab by mouth nightly. 30 Tab 1    ibuprofen (MOTRIN) 800 mg tablet TAKE ONE TABLET BY MOUTH EVERY 8 HOURS AS NEEDED FOR PAIN 90 Tab 5    tamsulosin (FLOMAX) 0.4 mg capsule Take 1 Cap by mouth daily (after dinner). 90 Cap 3    pantoprazole (PROTONIX) 40 mg tablet Take 1 Tab by mouth daily. 30 Tab 5    nitroglycerin (NITROSTAT) 0.4 mg SL tablet 1 Tab by SubLINGual route every five (5) minutes as needed. 6 Tab 1    gabapentin (NEURONTIN) 300 mg capsule TAKE ONE CAPSULE BY MOUTH THREE TIMES DAILY 90 Cap 5    albuterol (PROVENTIL HFA, VENTOLIN HFA, PROAIR HFA) 90 mcg/actuation inhaler Take 2 Puffs by inhalation every four (4) hours as needed for Wheezing. 1 Inhaler 5    metoprolol tartrate (LOPRESSOR) 25 mg tablet TAKE ONE TABLET BY MOUTH TWICE DAILY ( GENERIC FOR LOPRESSOR ) 180 Tab 3    fluticasone (FLONASE) 50 mcg/actuation nasal spray 2 Sprays by Both Nostrils route daily. 1 Bottle 5    pravastatin (PRAVACHOL) 40 mg tablet Take 1 Tab by mouth nightly. 90 Tab 3    TRAVATAN Z 0.004 % ophthalmic solution Administer 1 Drop to both eyes nightly.       predniSONE (DELTASONE) 10 mg tablet 5 tabs daily for 5 days 25 Tab 0    traZODone (DESYREL) 50 mg tablet TAKE ONE TABLET BY MOUTH ONCE DAILY AT BEDTIME AS NEEDED FOR SLEEP 30 Tab 2          ROS:  Pertinent as in HPI      Physical Exam:  Visit Vitals    /72 (BP 1 Location: Right arm, BP Patient Position: Sitting)    Pulse 62    Temp 99 °F (37.2 °C) (Oral)    Resp 18    Ht 5' 9\" (1.753 m)    Wt 243 lb 3.2 oz (110.3 kg)    SpO2 98%    BMI 35.91 kg/m2     General: Obese black male, a & o x 3, afebrile, well-nourished, interacting appropriately, in no acute distress  Respiratory: symmetrical chest expansion, lung sounds clear bilaterally, good air entry  Cardiovascular: normal S1S2, regular rate and rhythm  Abdomen: Induration with surrounding erythema noted on right lower abdomen        Assessment/Plan:    ICD-10-CM ICD-9-CM    1. Bug bite with infection, initial encounter W57. XXXA 919.5 cephALEXin (KEFLEX) 500 mg capsule     E906.4    2. Cough R05 786.2 guaiFENesin ER (MUCINEX) 600 mg ER tablet   3. Obesity (BMI 30-39. 9) E66.9 278.00 I have reviewed/discussed the above normal BMI with the patient. I have recommended the following interventions: dietary management education, guidance, and counseling, encourage exercise and monitor weight . Caleb Genao Orders Placed This Encounter    cephALEXin (KEFLEX) 500 mg capsule     Sig: Take 1 Cap by mouth two (2) times a day for 10 days. Dispense:  20 Cap     Refill:  0    guaiFENesin ER (MUCINEX) 600 mg ER tablet     Sig: Take 1 Tab by mouth two (2) times a day. Dispense:  30 Tab     Refill:  0           Additional Notes: Discussed today's diagnosis, treatment plans. Discussed medication indications and side effects. Weight Management: Counseled  After Visit Summary: Discussed provided printed patient instructions. Answered questions accordingly.   Follow-up Disposition: As previously scheduled with PCP          Azul Heath DO, MPH  Internal Medicine

## 2017-08-01 NOTE — PATIENT INSTRUCTIONS
Insect Stings and Bites: Care Instructions  Your Care Instructions  Stings and bites from bees, wasps, ants, and other insects often cause pain, swelling, redness, and itching. In some people, especially children, the redness and swelling may be worse. It may extend several inches beyond the affected area. But in most cases, stings and bites don't cause reactions all over the body. If you have had a reaction to an insect sting or bite, you are at risk for a reaction if you get stung or bitten again. Follow-up care is a key part of your treatment and safety. Be sure to make and go to all appointments, and call your doctor if you are having problems. It's also a good idea to know your test results and keep a list of the medicines you take. How can you care for yourself at home? · Do not scratch or rub the skin where the sting or bite occurred. · Put a cold pack or ice cube on the area. Put a thin cloth between the ice and your skin. For some people, a paste of baking soda mixed with a little water helps relieve pain and decrease the reaction. · Take an over-the-counter antihistamine, such as diphenhydramine (Benadryl) or loratadine (Claritin), to relieve swelling, redness, and itching. Calamine lotion or hydrocortisone cream may also help. Do not give antihistamines to your child unless you have checked with the doctor first.  · Be safe with medicines. If your doctor prescribed medicine for your allergy, take it exactly as prescribed. Call your doctor if you think you are having a problem with your medicine. You will get more details on the specific medicines your doctor prescribes. · Your doctor may prescribe a shot of epinephrine to carry with you in case you have a severe reaction. Learn how and when to give yourself the shot, and keep it with you at all times. Make sure it has not . · Go to the emergency room anytime you have a severe reaction.  Go even if you have given yourself epinephrine and are feeling better. Symptoms can come back. When should you call for help? Call 911 anytime you think you may need emergency care. For example, call if:  · You have symptoms of a severe allergic reaction. These may include:  ¨ Sudden raised, red areas (hives) all over your body. ¨ Swelling of the throat, mouth, lips, or tongue. ¨ Trouble breathing. ¨ Passing out (losing consciousness). Or you may feel very lightheaded or suddenly feel weak, confused, or restless. Call your doctor now or seek immediate medical care if:  · You have symptoms of an allergic reaction not right at the sting or bite, such as:  ¨ A rash or small area of hives (raised, red areas on the skin). ¨ Itching. ¨ Swelling. ¨ Belly pain, nausea, or vomiting. · You have a lot of swelling around the site (such as your entire arm or leg is swollen). · You have signs of infection, such as:  ¨ Increased pain, swelling, redness, or warmth around the sting. ¨ Red streaks leading from the area. ¨ Pus draining from the sting. ¨ A fever. Watch closely for changes in your health, and be sure to contact your doctor if:  · You do not get better as expected. Where can you learn more? Go to http://samreen-tabitha.info/. Enter P390 in the search box to learn more about \"Insect Stings and Bites: Care Instructions. \"  Current as of: March 20, 2017  Content Version: 11.3  © 0090-2451 [employer]. Care instructions adapted under license by [employer] (which disclaims liability or warranty for this information). If you have questions about a medical condition or this instruction, always ask your healthcare professional. [place] [address] any warranty or liability for your use of this information. Cough: Care Instructions  Your Care Instructions  A cough is your body's response to something that bothers your throat or airways. Many things can cause a cough.  You might cough because of a cold or the flu, bronchitis, or asthma. Smoking, postnasal drip, allergies, and stomach acid that backs up into your throat also can cause coughs. A cough is a symptom, not a disease. Most coughs stop when the cause, such as a cold, goes away. You can take a few steps at home to cough less and feel better. Follow-up care is a key part of your treatment and safety. Be sure to make and go to all appointments, and call your doctor if you are having problems. It's also a good idea to know your test results and keep a list of the medicines you take. How can you care for yourself at home? · Drink lots of water and other fluids. This helps thin the mucus and soothes a dry or sore throat. Honey or lemon juice in hot water or tea may ease a dry cough. · Take cough medicine as directed by your doctor. · Prop up your head on pillows to help you breathe and ease a dry cough. · Try cough drops to soothe a dry or sore throat. Cough drops don't stop a cough. Medicine-flavored cough drops are no better than candy-flavored drops or hard candy. · Do not smoke. Avoid secondhand smoke. If you need help quitting, talk to your doctor about stop-smoking programs and medicines. These can increase your chances of quitting for good. When should you call for help? Call 911 anytime you think you may need emergency care. For example, call if:  · You have severe trouble breathing. Call your doctor now or seek immediate medical care if:  · You cough up blood. · You have new or worse trouble breathing. · You have a new or higher fever. · You have a new rash. Watch closely for changes in your health, and be sure to contact your doctor if:  · You cough more deeply or more often, especially if you notice more mucus or a change in the color of your mucus. · You have new symptoms, such as a sore throat, an earache, or sinus pain. · You do not get better as expected. Where can you learn more?   Go to http://samreen-tabitha.info/. Enter D279 in the search box to learn more about \"Cough: Care Instructions. \"  Current as of: March 25, 2017  Content Version: 11.3  © 3297-1609 [employer]. Care instructions adapted under license by [employer] (which disclaims liability or warranty for this information). If you have questions about a medical condition or this instruction, always ask your healthcare professional. [place] [address] any warranty or liability for your use of this information.

## 2017-08-01 NOTE — MR AVS SNAPSHOT
Visit Information Date & Time Provider Department Dept. Phone Encounter #  
 8/1/2017  8:45 AM Alma Davis,  [facility] [phone] Your Appointments 9/13/2017  8:05 AM  
LAB with Nicholos Gosselin, MD  
[facility] (--) Appt Note: 4 mo f/u lab [address] [id number] [phone]  
  
   
 [address] [address]  
  
    
 9/20/2017  8:15 AM  
ROUTINE CARE with Nicholos Gosselin, MD  
[facility] (--) Appt Note: 4 mo f/u  
 [address] [id number] [phone]  
  
   
 [address] [address]  
  
    
 5/3/2018  9:30 AM  
Nurse Visit with [facility] BETO NURSE [facility] ([facility]) [address] [place] [address]  
[address] [place] [address] 5/11/2018  9:30 AM  
Any with José Webber MD  
[facility] ([facility]) [address] [place] [address]  
[address] [place] [address] Upcoming Health Maintenance Date Due DTaP/Tdap/Td series (1 - Tdap) 7/9/1974 FOBT Q 1 YEAR, 18+ 10/15/2016 INFLUENZA AGE 9 TO ADULT 8/1/2017 COLONOSCOPY 4/1/2020 Allergies as of 8/1/2017  Review Complete On: 8/1/2017 By: Jagruti Cedillo LPN Severity Noted Reaction Type Reactions Ace Inhibitors  05/17/2010    Cough Dilaudid [Hydromorphone]  05/17/2010    Hives Lisinopril  05/17/2010    Cough Zocor [Simvastatin]  07/30/2011    Myalgia Right leg Current Immunizations  Reviewed on 10/12/2016 Name Date Influenza Vaccine 1/2/2015 Influenza Vaccine (Quad) PF 10/12/2016, 10/15/2015 Zoster Vaccine, Live 10/22/2015 Not reviewed this visit You Were Diagnosed With   
  
 Codes Comments Bug bite with infection, initial encounter    -  Primary ICD-10-CM: W57. Zonia Elm ICD-9-CM: 919.5, E906.4 Cough     ICD-10-CM: R05 ICD-9-CM: 643. 2 Vitals BP Pulse Temp Resp Height(growth percentile) Weight(growth percentile) 118/72 (BP 1 Location: Right arm, BP Patient Position: Sitting) 62 99 °F (37.2 °C) (Oral) 18 5' 9\" (1.753 m) 243 lb 3.2 oz (110.3 kg) SpO2 BMI Smoking Status 98% 35.91 kg/m2 Former Smoker Vitals History BMI and BSA Data Body Mass Index Body Surface Area  
 35.91 kg/m 2 2.32 m 2 Preferred Pharmacy Pharmacy Name Phone [facility]  Your Updated Medication List  
  
   
This list is accurate as of: 8/1/17  9:19 AM.  Always use your most recent med list.  
  
  
  
  
 albuterol 90 mcg/actuation inhaler Commonly known as:  PROVENTIL HFA, VENTOLIN HFA, PROAIR HFA Take 2 Puffs by inhalation every four (4) hours as needed for Wheezing. cephALEXin 500 mg capsule Commonly known as:  Ralene Plum Take 1 Cap by mouth two (2) times a day for 10 days. cyclobenzaprine 10 mg tablet Commonly known as:  FLEXERIL Take 1 Tab by mouth nightly. fluticasone 50 mcg/actuation nasal spray Commonly known as:  Lovetta End 2 Sprays by Both Nostrils route daily. gabapentin 300 mg capsule Commonly known as:  NEURONTIN  
TAKE ONE CAPSULE BY MOUTH THREE TIMES DAILY  
  
 guaiFENesin  mg ER tablet Commonly known as:  Nick & Nick Take 1 Tab by mouth two (2) times a day. ibuprofen 800 mg tablet Commonly known as:  MOTRIN  
TAKE ONE TABLET BY MOUTH EVERY 8 HOURS AS NEEDED FOR PAIN  
  
 metoprolol tartrate 25 mg tablet Commonly known as:  LOPRESSOR  
TAKE ONE TABLET BY MOUTH TWICE DAILY ( GENERIC FOR LOPRESSOR )  
  
 nitroglycerin 0.4 mg SL tablet Commonly known as:  NITROSTAT 1 Tab by SubLINGual route every five (5) minutes as needed. oxyCODONE-acetaminophen  mg per tablet Commonly known as:  PERCOCET 10 Take 1 Tab by mouth every four (4) hours as needed for Pain. Max Daily Amount: 6 Tabs. Indications: chronic back pain  
  
 pantoprazole 40 mg tablet Commonly known as:  PROTONIX Take 1 Tab by mouth daily. pravastatin 40 mg tablet Commonly known as:  PRAVACHOL Take 1 Tab by mouth nightly. predniSONE 10 mg tablet Commonly known as:  DELTASONE  
5 tabs daily for 5 days  
  
 tamsulosin 0.4 mg capsule Commonly known as:  FLOMAX Take 1 Cap by mouth daily (after dinner). TRAVATAN Z 0.004 % ophthalmic solution Generic drug:  travoprost  
Administer 1 Drop to both eyes nightly. traZODone 50 mg tablet Commonly known as:  DESYREL  
TAKE ONE TABLET BY MOUTH ONCE DAILY AT BEDTIME AS NEEDED FOR SLEEP Prescriptions Sent to Pharmacy Refills  
 cephALEXin (KEFLEX) 500 mg capsule 0 Sig: Take 1 Cap by mouth two (2) times a day for 10 days. Class: Normal  
 Pharmacy: [address] Ph #: [phone] Route: Oral  
 guaiFENesin ER (MUCINEX) 600 mg ER tablet 0 Sig: Take 1 Tab by mouth two (2) times a day. Class: Normal  
 Pharmacy: [address] Ph #: [phone] Route: Oral  
  
Patient Instructions Insect Stings and Bites: Care Instructions Your Care Instructions Stings and bites from bees, wasps, ants, and other insects often cause pain, swelling, redness, and itching. In some people, especially children, the redness and swelling may be worse. It may extend several inches beyond the affected area. But in most cases, stings and bites don't cause reactions all over the body.  
If you have had a reaction to an insect sting or bite, you are at risk for a reaction if you get stung or bitten again. Follow-up care is a key part of your treatment and safety. Be sure to make and go to all appointments, and call your doctor if you are having problems. It's also a good idea to know your test results and keep a list of the medicines you take. How can you care for yourself at home? · Do not scratch or rub the skin where the sting or bite occurred. · Put a cold pack or ice cube on the area. Put a thin cloth between the ice and your skin. For some people, a paste of baking soda mixed with a little water helps relieve pain and decrease the reaction. · Take an over-the-counter antihistamine, such as diphenhydramine (Benadryl) or loratadine (Claritin), to relieve swelling, redness, and itching. Calamine lotion or hydrocortisone cream may also help. Do not give antihistamines to your child unless you have checked with the doctor first. 
· Be safe with medicines. If your doctor prescribed medicine for your allergy, take it exactly as prescribed. Call your doctor if you think you are having a problem with your medicine. You will get more details on the specific medicines your doctor prescribes. · Your doctor may prescribe a shot of epinephrine to carry with you in case you have a severe reaction. Learn how and when to give yourself the shot, and keep it with you at all times. Make sure it has not . · Go to the emergency room anytime you have a severe reaction. Go even if you have given yourself epinephrine and are feeling better. Symptoms can come back. When should you call for help? Call 911 anytime you think you may need emergency care. For example, call if: 
· You have symptoms of a severe allergic reaction. These may include: 
¨ Sudden raised, red areas (hives) all over your body. ¨ Swelling of the throat, mouth, lips, or tongue. ¨ Trouble breathing. ¨ Passing out (losing consciousness).  Or you may feel very lightheaded or suddenly feel weak, confused, or restless. Call your doctor now or seek immediate medical care if: 
· You have symptoms of an allergic reaction not right at the sting or bite, such as: ¨ A rash or small area of hives (raised, red areas on the skin). ¨ Itching. ¨ Swelling. ¨ Belly pain, nausea, or vomiting. · You have a lot of swelling around the site (such as your entire arm or leg is swollen). · You have signs of infection, such as: 
¨ Increased pain, swelling, redness, or warmth around the sting. ¨ Red streaks leading from the area. ¨ Pus draining from the sting. ¨ A fever. Watch closely for changes in your health, and be sure to contact your doctor if: 
· You do not get better as expected. Where can you learn more? Go to http://samreen-tabitha.info/. Enter P390 in the search box to learn more about \"Insect Stings and Bites: Care Instructions. \" Current as of: March 20, 2017 Content Version: 11.3 © 6899-4898 [employer]. Care instructions adapted under license by [employer] (which disclaims liability or warranty for this information). If you have questions about a medical condition or this instruction, always ask your healthcare professional. [address] any warranty or liability for your use of this information. Cough: Care Instructions Your Care Instructions A cough is your body's response to something that bothers your throat or airways. Many things can cause a cough. You might cough because of a cold or the flu, bronchitis, or asthma. Smoking, postnasal drip, allergies, and stomach acid that backs up into your throat also can cause coughs. A cough is a symptom, not a disease. Most coughs stop when the cause, such as a cold, goes away. You can take a few steps at home to cough less and feel better. Follow-up care is a key part of your treatment and safety.  Be sure to make and go to all appointments, and call your doctor if you are having problems. It's also a good idea to know your test results and keep a list of the medicines you take. How can you care for yourself at home? · Drink lots of water and other fluids. This helps thin the mucus and soothes a dry or sore throat. Honey or lemon juice in hot water or tea may ease a dry cough. · Take cough medicine as directed by your doctor. · Prop up your head on pillows to help you breathe and ease a dry cough. · Try cough drops to soothe a dry or sore throat. Cough drops don't stop a cough. Medicine-flavored cough drops are no better than candy-flavored drops or hard candy. · Do not smoke. Avoid secondhand smoke. If you need help quitting, talk to your doctor about stop-smoking programs and medicines. These can increase your chances of quitting for good. When should you call for help? Call 911 anytime you think you may need emergency care. For example, call if: 
· You have severe trouble breathing. Call your doctor now or seek immediate medical care if: 
· You cough up blood. · You have new or worse trouble breathing. · You have a new or higher fever. · You have a new rash. Watch closely for changes in your health, and be sure to contact your doctor if: 
· You cough more deeply or more often, especially if you notice more mucus or a change in the color of your mucus. · You have new symptoms, such as a sore throat, an earache, or sinus pain. · You do not get better as expected. Where can you learn more? Go to http://samreen-tabitha.info/. Enter D279 in the search box to learn more about \"Cough: Care Instructions. \" Current as of: March 25, 2017 Content Version: 11.3 © 4106-2201 [employer]. Care instructions adapted under license by [employer] (which disclaims liability or warranty for this information).  If you have questions about a medical condition or this instruction, always ask your healthcare professional. [place] [address] any warranty or liability for your use of this information. Introducing [facility] & HEALTH SERVICES! Maurice Fletcher introduces [employer] patient portal. Now you can access parts of your medical record, email your doctor's office, and request medication refills online. 1. In your internet browser, go to [email or web address] 2. Click on the First Time User? Click Here link in the Sign In box. You will see the New Member Sign Up page. 3. Enter your AchieveMint Access Code exactly as it appears below. You will not need to use this code after youve completed the sign-up process. If you do not sign up before the expiration date, you must request a new code. · [employer] Access Code: [email or web address] Expires: 10/30/2017  9:19 AM 
 
4. Enter the last four digits of your Social Security Number (xxxx) and Date of Birth (mm/dd/yyyy) as indicated and click Submit. You will be taken to the next sign-up page. 5. Create a AchieveMint ID. This will be your AchieveMint login ID and cannot be changed, so think of one that is secure and easy to remember. 6. Create a Achie[employer]Mint password. You can change your password at any time. 7. Enter your Password Reset Question and Answer. This can be used at a later time if you forget your password. 8. Enter your e-mail address. You will receive e-mail notification when new information is available in [address] 9. Click Sign Up. You can now view and download portions of your medical record. 10. Click the Download Summary menu link to download a portable copy of your medical information. If you have questions, please visit the Frequently Asked Questions section of the [employer] website. Remember, [employer] is NOT to be used for urgent needs. For medical emergencies, dial 911. Now available from your iPhone and Android! Please provide this summary of care documentation to your next provider. Your primary care clinician is listed as WILLIAM MENARD. If you have any questions after today's visit, please call [phone].

## 2017-08-01 NOTE — TELEPHONE ENCOUNTER
Pt called in requesting refill of his   Requested Prescriptions     Pending Prescriptions Disp Refills    oxyCODONE-acetaminophen (PERCOCET 10)  mg per tablet 90 Tab 0     Sig: Take 1 Tab by mouth every four (4) hours as needed for Pain. Max Daily Amount: 6 Tabs. Indications: chronic back pain   .

## 2017-08-14 DIAGNOSIS — M54.5 CHRONIC MIDLINE LOW BACK PAIN, WITH SCIATICA PRESENCE UNSPECIFIED: ICD-10-CM

## 2017-08-14 DIAGNOSIS — G89.29 CHRONIC MIDLINE LOW BACK PAIN, WITH SCIATICA PRESENCE UNSPECIFIED: ICD-10-CM

## 2017-08-14 RX ORDER — OXYCODONE AND ACETAMINOPHEN 10; 325 MG/1; MG/1
1 TABLET ORAL
Qty: 90 TAB | Refills: 0 | Status: SHIPPED | OUTPATIENT
Start: 2017-08-14 | End: 2017-09-20

## 2017-08-16 ENCOUNTER — TELEPHONE (OUTPATIENT)
Dept: INTERNAL MEDICINE CLINIC | Age: 62
End: 2017-08-16

## 2017-08-16 NOTE — TELEPHONE ENCOUNTER
Left message for patient prescription is ready for  at the front office. Also patient will need to  prescription. Any questions call the office.

## 2017-08-30 ENCOUNTER — TELEPHONE (OUTPATIENT)
Dept: INTERNAL MEDICINE CLINIC | Age: 62
End: 2017-08-30

## 2017-08-30 NOTE — TELEPHONE ENCOUNTER
Patient called in and stated that he can not get to the office today because he is watching his sister kids and stated that he will come in tomorrow morning.

## 2017-08-30 NOTE — TELEPHONE ENCOUNTER
Patient is aware he needs to come to the office for a pill count. Patient is aware to bring all percocet tabs.

## 2017-08-31 ENCOUNTER — DOCUMENTATION ONLY (OUTPATIENT)
Dept: INTERNAL MEDICINE CLINIC | Age: 62
End: 2017-08-31

## 2017-08-31 NOTE — PROGRESS NOTES
Patient came in for a pill count and UDS for percocet. Percocet filled 8/22/2017 for #90. Patient states he takes 2 pills daily. Patient had 56 tabs left. UDS was collected.

## 2017-09-05 ENCOUNTER — TELEPHONE (OUTPATIENT)
Dept: INTERNAL MEDICINE CLINIC | Age: 62
End: 2017-09-05

## 2017-09-05 NOTE — TELEPHONE ENCOUNTER
Patient stopped by office to drop off letter that was sent to him and wanted to know if Dr. Floridalma Moran received the same letter. Letter placed on [employer]. Patient wants to know what he should do. Please advise.

## 2017-09-10 NOTE — PROGRESS NOTES
Pt needs another pill count when he comes in 9/13 for his labs and then  random UDS when he actually has his labs done. His urine drug screen remains inconsistent.

## 2017-09-12 DIAGNOSIS — G89.29 CHRONIC MIDLINE LOW BACK PAIN, WITH SCIATICA PRESENCE UNSPECIFIED: ICD-10-CM

## 2017-09-12 DIAGNOSIS — M54.5 CHRONIC MIDLINE LOW BACK PAIN, WITH SCIATICA PRESENCE UNSPECIFIED: ICD-10-CM

## 2017-09-12 RX ORDER — OXYCODONE AND ACETAMINOPHEN 10; 325 MG/1; MG/1
1 TABLET ORAL
Qty: 90 TAB | Refills: 0 | OUTPATIENT
Start: 2017-09-12

## 2017-09-12 NOTE — PROGRESS NOTES
Spoke with patient he is aware to bring Percocet in to office for another pill count. Patient  states he is out of Percocet and has started taking Tramadol, and only has about 4 tabs left. Patient was instructed to bring Tramadol as well.

## 2017-09-13 ENCOUNTER — HOSPITAL ENCOUNTER (OUTPATIENT)
Dept: LAB | Age: 62
Discharge: HOME | End: 2017-09-13
Payer: MEDICARE

## 2017-09-13 DIAGNOSIS — I10 ESSENTIAL HYPERTENSION: ICD-10-CM

## 2017-09-13 DIAGNOSIS — R73.9 ELEVATED BLOOD SUGAR: ICD-10-CM

## 2017-09-13 DIAGNOSIS — E78.00 HYPERCHOLESTEROLEMIA: ICD-10-CM

## 2017-09-13 LAB
ALBUMIN SERPL-MCNC: 3.7 G/DL (ref 3.4–5)
ALBUMIN/GLOB SERPL: 1 {RATIO} (ref 0.8–1.7)
ALP SERPL-CCNC: 77 U/L (ref 45–117)
ALT SERPL-CCNC: 18 U/L (ref 16–61)
ANION GAP SERPL CALC-SCNC: 7 MMOL/L (ref 3–18)
AST SERPL-CCNC: 10 U/L (ref 15–37)
BILIRUB SERPL-MCNC: 0.5 MG/DL (ref 0.2–1)
BUN SERPL-MCNC: 12 MG/DL (ref 7–18)
BUN/CREAT SERPL: 10 (ref 12–20)
CALCIUM SERPL-MCNC: 8.8 MG/DL (ref 8.5–10.1)
CHLORIDE SERPL-SCNC: 110 MMOL/L (ref 100–108)
CHOLEST SERPL-MCNC: 163 MG/DL
CO2 SERPL-SCNC: 25 MMOL/L (ref 21–32)
CREAT SERPL-MCNC: 1.2 MG/DL (ref 0.6–1.3)
GLOBULIN SER CALC-MCNC: 3.7 G/DL (ref 2–4)
GLUCOSE SERPL-MCNC: 99 MG/DL (ref 74–99)
HBA1C MFR BLD: 5.7 % (ref 4.2–5.6)
HDLC SERPL-MCNC: 42 MG/DL (ref 40–60)
HDLC SERPL: 3.9 {RATIO} (ref 0–5)
LDLC SERPL CALC-MCNC: 94.6 MG/DL (ref 0–100)
LIPID PROFILE,FLP: NORMAL
POTASSIUM SERPL-SCNC: 4.2 MMOL/L (ref 3.5–5.5)
PROT SERPL-MCNC: 7.4 G/DL (ref 6.4–8.2)
PSA SERPL-MCNC: 0.2 NG/ML (ref 0–4)
SODIUM SERPL-SCNC: 142 MMOL/L (ref 136–145)
TRIGL SERPL-MCNC: 132 MG/DL (ref ?–150)
VLDLC SERPL CALC-MCNC: 26.4 MG/DL

## 2017-09-13 PROCEDURE — 80061 LIPID PANEL: CPT | Performed by: INTERNAL MEDICINE

## 2017-09-13 PROCEDURE — 80053 COMPREHEN METABOLIC PANEL: CPT | Performed by: INTERNAL MEDICINE

## 2017-09-13 PROCEDURE — 84153 ASSAY OF PSA TOTAL: CPT | Performed by: INTERNAL MEDICINE

## 2017-09-13 PROCEDURE — 83036 HEMOGLOBIN GLYCOSYLATED A1C: CPT | Performed by: INTERNAL MEDICINE

## 2017-09-13 PROCEDURE — 36415 COLL VENOUS BLD VENIPUNCTURE: CPT | Performed by: INTERNAL MEDICINE

## 2017-09-13 NOTE — PROGRESS NOTES
Patient came in this morning for pill count. Patient stated he does not have anymore Percocet, or Tramadol and has 3 Ibuprofen left. Patient gave sample for UDS. Patient wanted to know why he had to have another UDS and pill count. Patient was informed that according to his contract we do random pill counts and random UDS. Patient states he has not had any Percocet for 1 week.

## 2017-09-15 DIAGNOSIS — M54.5 CHRONIC MIDLINE LOW BACK PAIN, WITH SCIATICA PRESENCE UNSPECIFIED: ICD-10-CM

## 2017-09-15 DIAGNOSIS — G89.29 CHRONIC MIDLINE LOW BACK PAIN, WITH SCIATICA PRESENCE UNSPECIFIED: ICD-10-CM

## 2017-09-15 RX ORDER — OXYCODONE AND ACETAMINOPHEN 10; 325 MG/1; MG/1
1 TABLET ORAL
Qty: 90 TAB | Refills: 0 | OUTPATIENT
Start: 2017-09-15

## 2017-09-20 ENCOUNTER — OFFICE VISIT (OUTPATIENT)
Dept: INTERNAL MEDICINE CLINIC | Age: 62
End: 2017-09-20

## 2017-09-20 ENCOUNTER — DOCUMENTATION ONLY (OUTPATIENT)
Dept: INTERNAL MEDICINE CLINIC | Age: 62
End: 2017-09-20

## 2017-09-20 VITALS
WEIGHT: 243 LBS | HEART RATE: 65 BPM | TEMPERATURE: 98.8 F | HEIGHT: 69 IN | RESPIRATION RATE: 20 BRPM | SYSTOLIC BLOOD PRESSURE: 132 MMHG | DIASTOLIC BLOOD PRESSURE: 67 MMHG | OXYGEN SATURATION: 98 % | BODY MASS INDEX: 35.99 KG/M2

## 2017-09-20 DIAGNOSIS — G89.29 CHRONIC MIDLINE LOW BACK PAIN WITHOUT SCIATICA: ICD-10-CM

## 2017-09-20 DIAGNOSIS — R73.03 PREDIABETES: ICD-10-CM

## 2017-09-20 DIAGNOSIS — Z23 ENCOUNTER FOR IMMUNIZATION: ICD-10-CM

## 2017-09-20 DIAGNOSIS — I10 ESSENTIAL HYPERTENSION: ICD-10-CM

## 2017-09-20 DIAGNOSIS — M54.50 CHRONIC MIDLINE LOW BACK PAIN WITHOUT SCIATICA: ICD-10-CM

## 2017-09-20 DIAGNOSIS — E78.00 HYPERCHOLESTEROLEMIA: Primary | ICD-10-CM

## 2017-09-20 RX ORDER — MONTELUKAST SODIUM 10 MG/1
10 TABLET ORAL DAILY
Qty: 30 TAB | Refills: 5 | Status: SHIPPED | OUTPATIENT
Start: 2017-09-20 | End: 2019-03-21 | Stop reason: SDUPTHER

## 2017-09-20 RX ORDER — METOPROLOL TARTRATE 25 MG/1
TABLET, FILM COATED ORAL
Qty: 180 TAB | Refills: 3 | Status: SHIPPED | OUTPATIENT
Start: 2017-09-20 | End: 2018-10-06 | Stop reason: SDUPTHER

## 2017-09-20 RX ORDER — CHLORHEXIDINE GLUCONATE 1.2 MG/ML
RINSE ORAL
COMMUNITY
Start: 2017-08-10 | End: 2019-05-01 | Stop reason: ALTCHOICE

## 2017-09-20 RX ORDER — ACETAMINOPHEN AND CODEINE PHOSPHATE 300; 30 MG/1; MG/1
TABLET ORAL
COMMUNITY
Start: 2017-08-10 | End: 2018-01-18 | Stop reason: ALTCHOICE

## 2017-09-20 RX ORDER — FLUTICASONE PROPIONATE 50 MCG
2 SPRAY, SUSPENSION (ML) NASAL DAILY
Qty: 1 BOTTLE | Refills: 5 | Status: SHIPPED | OUTPATIENT
Start: 2017-09-20 | End: 2018-10-23 | Stop reason: SDUPTHER

## 2017-09-20 NOTE — PROGRESS NOTES
Patient is in the office today for a 4 month follow up. 1. Have you been to the ER, urgent care clinic since your last visit? Hospitalized since your last visit? No    2. Have you seen or consulted any other health care providers outside of the [address] [place] [address] since your last visit? Include any pap smears or colon screening. No     Verbal order read back per Dr. Nery Munoz flu vaccine. Patient received flu vaccine in left deltoid. Patient observed for 10 minutes and no signs and symptoms. Patient tolerated well and left without complaints. Patient received flu VIS.

## 2017-09-20 NOTE — MR AVS SNAPSHOT
Visit Information Date & Time Provider Department Dept. Phone Encounter #  
 9/20/2017  8:15 AM Brandon Wayne MD [facility] [place] [address] [id number] Follow-up Instructions Return in about 4 weeks (around 10/18/2017) for back pain . Your Appointments 5/3/2018  9:30 AM  
Nurse Visit with [facility] NURSE [facility] ([facility]) [address] [place] [address]  
[address] [place] [address] 5/11/2018  9:30 AM  
Any with Danita Prieto MD  
[facility] ([facility]) [address] [place] [address]  
[address] [place] [address] Upcoming Health Maintenance Date Due DTaP/Tdap/Td series (1 - Tdap) 7/9/1974 FOBT Q 1 YEAR, 18+ 10/15/2016 INFLUENZA AGE 9 TO ADULT 8/1/2017 COLONOSCOPY 4/1/2020 Allergies as of 9/20/2017  Review Complete On: 9/20/2017 By: Brandon Wayne MD  
  
 Severity Noted Reaction Type Reactions Ace Inhibitors  05/17/2010    Cough Dilaudid [Hydromorphone]  05/17/2010    Hives Lisinopril  05/17/2010    Cough Zocor [Simvastatin]  07/30/2011    Myalgia Right leg Current Immunizations  Reviewed on 9/20/2017 Name Date Influenza Vaccine 1/2/2015 Influenza Vaccine (Quad) PF 9/20/2017, 10/12/2016, 10/15/2015 Zoster Vaccine, Live 10/22/2015 Reviewed by Carole Miranda LPN on 7/20/0969 at  8:30 AM  
You Were Diagnosed With   
  
 Codes Comments Hypercholesterolemia    -  Primary ICD-10-CM: E78.00 ICD-9-CM: 272.0 Essential hypertension     ICD-10-CM: I10 
ICD-9-CM: 401.9 Encounter for immunization     ICD-10-CM: Y36 ICD-9-CM: V03.89 Prediabetes     ICD-10-CM: R73.03 
ICD-9-CM: 790.29 Vitals BP Pulse Temp Resp Height(growth percentile) Weight(growth percentile) 132/67 (BP 1 Location: Left arm, BP Patient Position: Sitting) 65 98.8 °F (37.1 °C) (Oral) 20 5' 9\" (1.753 m) 243 lb (110.2 kg) SpO2 BMI Smoking Status 98% 35.88 kg/m2 Former Smoker Vitals History BMI and BSA Data Body Mass Index Body Surface Area  
 35.88 kg/m 2 2.32 m 2 Preferred Pharmacy [facility] [address], [address] Your Updated Medication List  
  
   
This list is accurate as of: 9/20/17  8:33 AM.  Always use your most recent med list.  
  
  
  
  
 acetaminophen-codeine 300-30 mg per tablet Commonly known as:  TYLENOL #3  
  
 albuterol 90 mcg/actuation inhaler Commonly known as:  PROVENTIL HFA, VENTOLIN HFA, PROAIR HFA Take 2 Puffs by inhalation every four (4) hours as needed for Wheezing. chlorhexidine 0.12 % solution Commonly known as:  PERIDEX  
  
 cyclobenzaprine 10 mg tablet Commonly known as:  FLEXERIL Take 1 Tab by mouth nightly. fluticasone 50 mcg/actuation nasal spray Commonly known as:  Francella Lacks 2 Sprays by Both Nostrils route daily. gabapentin 300 mg capsule Commonly known as:  NEURONTIN  
TAKE ONE CAPSULE BY MOUTH THREE TIMES DAILY  
  
 guaiFENesin  mg ER tablet Commonly known as:  Nick & Nick Take 1 Tab by mouth two (2) times a day. ibuprofen 800 mg tablet Commonly known as:  MOTRIN  
TAKE ONE TABLET BY MOUTH EVERY 8 HOURS AS NEEDED FOR PAIN  
  
 metoprolol tartrate 25 mg tablet Commonly known as:  LOPRESSOR  
TAKE ONE TABLET BY MOUTH TWICE DAILY ( GENERIC FOR LOPRESSOR )  
  
 montelukast 10 mg tablet Commonly known as:  SINGULAIR Take 1 Tab by mouth daily. nitroglycerin 0.4 mg SL tablet Commonly known as:  NITROSTAT  
1 Tab by SubLINGual route every five (5) minutes as needed. pantoprazole 40 mg tablet Commonly known as:  PROTONIX Take 1 Tab by mouth daily. pravastatin 40 mg tablet Commonly known as:  PRAVACHOL Take 1 Tab by mouth nightly. predniSONE 10 mg tablet Commonly known as:  DELTASONE  
5 tabs daily for 5 days  
  
 tamsulosin 0.4 mg capsule Commonly known as:  FLOMAX Take 1 Cap by mouth daily (after dinner). tapentadol ER 50 mg tablet Commonly known as:  Noberto Rasp ER Take 1 Tab by mouth every twelve (12) hours. Max Daily Amount: 100 mg. **Tapentadol ER tablets are to be swallowed whole and not split, broken, chewed, dissolved, or crushed. **  
  
 TRAVATAN Z 0.004 % ophthalmic solution Generic drug:  travoprost  
Administer 1 Drop to both eyes nightly. traZODone 50 mg tablet Commonly known as:  DESYREL  
TAKE ONE TABLET BY MOUTH ONCE DAILY AT BEDTIME AS NEEDED FOR SLEEP Prescriptions Printed Refills  
 tapentadol ER (NUCYNTA ER) 50 mg tablet 0 Sig: Take 1 Tab by mouth every twelve (12) hours. Max Daily Amount: 100 mg. **Tapentadol ER tablets are to be swallowed whole and not split, broken, chewed, dissolved, or crushed. **  
 Class: Print Route: Oral  
  
Prescriptions Sent to Pharmacy Refills  
 metoprolol tartrate (LOPRESSOR) 25 mg tablet 3 Sig: TAKE ONE TABLET BY MOUTH TWICE DAILY ( [address] ) Class: Normal  
 Pharmacy: [facility] [address] [place] [address], [address] [place] [address] Ph #: [phone]  
 fluticasone (FLONASE) 50 mcg/actuation nasal spray 5 Si Sprays by Both Nostrils route daily. Class: Normal  
 Pharmacy: [facility] [address] [place] [address] Ph #: [phone] Route: Both Nostrils  
 montelukast (SINGULAIR) 10 mg tablet 5 Sig: Take 1 Tab by mouth daily. Class: Normal  
 Pharmacy: [facility] [address] [place] [address], [facility] [address] Ph #: [phone] Route: Oral  
  
We Performed the Following ADMIN INFLUENZA VIRUS VAC [ [facility]] INFLUENZA VIRUS VAC QUAD,SPLIT,PRESV FREE SYRINGE IM [id number] CPT(R)] Follow-up Instructions Return in about 4 weeks (around 10/18/2017) for back pain . Introducing [facility] & HEALTH SERVICES! Radha Garza introduces Pricefalls patient portal. Now you can access parts of your medical record, email your doctor's office, and request medication refills online. 1. In your internet browser, go to [email or web address] 2. Click on the First Time User? Click Here link in the Sign In box. You will see the New Member Sign Up page. 3. Enter your Pricefalls Access Code exactly as it appears below. You will not need to use this code after youve completed the sign-up process. If you do not sign up before the expiration date, you must request a new code. · Pricefalls Access Code: [email or web address] Expires: 10/30/2017  9:19 AM 
 
4. Enter the last four digits of your Social Security Number (xxxx) and Date of Birth (mm/dd/yyyy) as indicated and click Submit. You will be taken to the next sign-up page. 5. Create a Pricefalls ID. This will be your Pricefalls login ID and cannot be changed, so think of one that is secure and easy to remember. 6. Create a Pricefalls password. You can change your password at any time. 7. Enter your Password Reset Question and Answer. This can be used at a later time if you forget your password. 8. Enter your e-mail address. You will receive e-mail notification when new information is available in [address] 9. Click Sign Up. You can now view and download portions of your medical record. 10. Click the Download Summary menu link to download a portable copy of your medical information. If you have questions, please visit the Frequently Asked Questions section of the Pricefalls website. Remember, Pricefalls is NOT to be used for urgent needs. For medical emergencies, dial 911. Now available from your iPhone and Android! Please provide this summary of care documentation to your next provider. Your primary care clinician is listed as WILLIAM MENARD. If you have any questions after today's visit, please call [phone].

## 2017-09-20 NOTE — PROGRESS NOTES
Prior Auth was obtained for Nucynta ER 50mg @ [facility] [phone]. Formulary drugs-fentanyl patch, methadone & 3 forms of morphine were refused as being less effective. Alice Done approved until 12/31/17. Faxed notification to [facility] @ [phone].  for Pt that medication was approved.

## 2017-09-20 NOTE — PROGRESS NOTES
Subjective:       Chief Complaint  The patient presents for follow up of hypertension and high cholesterol. prediabetes  chronic back pain and right hip pain        HPI  Judy Torrez is a 57 y.o. male seen for follow up of hyperlipidemia. Healso has hypertension. Hypertension well controlled, no significant medication side effects noted, on Lopressor, hyperlipidemia fairly well controlled, no significant medication side effects noted, on Pravachol 40 mg, pt had myalgias on Zocor. Diet and Lifestyle: not attempting to follow a low fat, low cholesterol diet, exercises sporadically    Home BP Monitoring: is not measured at home. Judy Torrez RTC today to follow up on chronic pain diagnosis. We discussed his osteoarthritis that is affecting his back. Significant changes since last visit: none. He is  able to do his normal daily activities. He reports the following adverse side effects: none. Pt oxycodone has not been showing up in his UDS so I will no longer give him percocet. He will be given Nucynta ER for pain control since it has no significant street value and can control pain through out the day. If this does not show up in his UDS will eventually stop all narcotics. Least pain over the last week has been 3/10. Worst pain over the last week has been 8/10. Opioid Risk Tool Reviewed: YES    Aberrant behaviors: Abnormal urine drug test.      Urine Drug Screen: reviewed and up to date. Controlled substance agreement on file: YES.  reviewed:yes    Pill count is consistent with his prescription: yes    Concomitant use of a benzodiazepine: no        Prediabetes: pt is trying to control with diet and weight loss but it continues to be an issue due to his obesity       Discussed the patient's BMI with him. The BMI follow up plan is as follows: BMI is out of normal parameters and plan is as follows: I have counseled this patient on diet and exercise regimens.       Current Outpatient Prescriptions   Medication Sig    metoprolol tartrate (LOPRESSOR) 25 mg tablet TAKE ONE TABLET BY MOUTH TWICE DAILY ( GENERIC FOR LOPRESSOR )    fluticasone (FLONASE) 50 mcg/actuation nasal spray 2 Sprays by Both Nostrils route daily.  montelukast (SINGULAIR) 10 mg tablet Take 1 Tab by mouth daily.  tapentadol ER (NUCYNTA ER) 50 mg tablet Take 1 Tab by mouth every twelve (12) hours. Max Daily Amount: 100 mg. **Tapentadol ER tablets are to be swallowed whole and not split, broken, chewed, dissolved, or crushed. **    guaiFENesin ER (MUCINEX) 600 mg ER tablet Take 1 Tab by mouth two (2) times a day.  predniSONE (DELTASONE) 10 mg tablet 5 tabs daily for 5 days    traZODone (DESYREL) 50 mg tablet TAKE ONE TABLET BY MOUTH ONCE DAILY AT BEDTIME AS NEEDED FOR SLEEP    tamsulosin (FLOMAX) 0.4 mg capsule Take 1 Cap by mouth daily (after dinner).  pantoprazole (PROTONIX) 40 mg tablet Take 1 Tab by mouth daily.  gabapentin (NEURONTIN) 300 mg capsule TAKE ONE CAPSULE BY MOUTH THREE TIMES DAILY    pravastatin (PRAVACHOL) 40 mg tablet Take 1 Tab by mouth nightly.  TRAVATAN Z 0.004 % ophthalmic solution Administer 1 Drop to both eyes nightly.  chlorhexidine (PERIDEX) 0.12 % solution     acetaminophen-codeine (TYLENOL #3) 300-30 mg per tablet     cyclobenzaprine (FLEXERIL) 10 mg tablet Take 1 Tab by mouth nightly.  ibuprofen (MOTRIN) 800 mg tablet TAKE ONE TABLET BY MOUTH EVERY 8 HOURS AS NEEDED FOR PAIN    nitroglycerin (NITROSTAT) 0.4 mg SL tablet 1 Tab by SubLINGual route every five (5) minutes as needed.  albuterol (PROVENTIL HFA, VENTOLIN HFA, PROAIR HFA) 90 mcg/actuation inhaler Take 2 Puffs by inhalation every four (4) hours as needed for Wheezing. No current facility-administered medications for this visit.               Review of Systems  Respiratory: negative for dyspnea on exertion  Cardiovascular: negative for chest pain    Objective:     Visit Vitals    /67 (BP 1 Location: Left arm, BP Patient Position: Sitting)    Pulse 65    Temp 98.8 °F (37.1 °C) (Oral)    Resp 20    Ht 5' 9\" (1.753 m)    Wt 243 lb (110.2 kg)    SpO2 98%    BMI 35.88 kg/m2        General appearance - alert, well appearing, and in no distress  Neck - supple, no significant adenopathy, carotids upstroke normal bilaterally, no bruits  Chest - clear to auscultation, no wheezes, rales or rhonchi, symmetric air entry  Heart - normal rate, regular rhythm, normal S1, S2, no murmurs, rubs, clicks or gallops  Extremities - peripheral pulses normal, no pedal edema, no clubbing or cyanosis  Skin - normal coloration and turgor, no rashes, no suspicious skin lesions noted      Labs:   Lab Results   Component Value Date/Time    Hemoglobin A1c 5.7 09/13/2017 08:07 AM    Hemoglobin A1c 6.1 05/09/2017 08:06 AM    Hemoglobin A1c 5.5 10/18/2016 08:33 AM    Glucose 99 09/13/2017 08:07 AM    Glucose (POC) 123 01/24/2016 04:57 PM    Glucose,  04/01/2015 08:56 AM    Microalb/Creat ratio (ug/mg creat.) 4.4 04/29/2015 03:00 PM    LDL, calculated 94.6 09/13/2017 08:07 AM    Creatinine 1.20 09/13/2017 08:07 AM      Lab Results   Component Value Date/Time    Cholesterol, total 163 09/13/2017 08:07 AM    HDL Cholesterol 42 09/13/2017 08:07 AM    LDL, calculated 94.6 09/13/2017 08:07 AM    Triglyceride 132 09/13/2017 08:07 AM    CHOL/HDL Ratio 3.9 09/13/2017 08:07 AM     Lab Results   Component Value Date/Time    ALT (SGPT) 18 09/13/2017 08:07 AM    AST (SGOT) 10 09/13/2017 08:07 AM    Alk.  phosphatase 77 09/13/2017 08:07 AM    Bilirubin, total 0.5 09/13/2017 08:07 AM     Lab Results   Component Value Date/Time    GFR est AA >60 09/13/2017 08:07 AM    GFR est non-AA >60 09/13/2017 08:07 AM    Creatinine 1.20 09/13/2017 08:07 AM    BUN 12 09/13/2017 08:07 AM    BUN, POC 6 04/01/2015 08:56 AM    Sodium,  04/01/2015 08:56 AM    Sodium 142 09/13/2017 08:07 AM    Potassium 4.2 09/13/2017 08:07 AM    Potassium, POC 4.0 04/01/2015 08:56 AM    Chloride,  04/01/2015 08:56 AM    Chloride 110 09/13/2017 08:07 AM    CO2 25 09/13/2017 08:07 AM      Lab Results   Component Value Date/Time    Glucose 99 09/13/2017 08:07 AM    Glucose (POC) 123 01/24/2016 04:57 PM    Glucose,  04/01/2015 08:56 AM            Assessment / Plan     Hypertension well controlled, on Lopressor   Hyperlipidemia fairly well controlled, on Pravachol 40 mg. ICD-10-CM ICD-9-CM    1. Hypercholesterolemia E78.00 272.0    2. Essential hypertension I10 401.9    3. Encounter for immunization Z23 V03.89 INFLUENZA VIRUS VAC QUAD,SPLIT,PRESV FREE SYRINGE IM      ADMIN INFLUENZA VIRUS VAC   4. Prediabetes R73.03 790.29 Will continue to try and control with diet     5. Chronic midline low back pain without sciatica M54.5 724.2 Will change to tapentadol ER (NUCYNTA ER) 50 mg tablet BID and do UDS with next refill     G89.29 338.29              Low cholesterol diet, weight control and daily exercise discussed. Follow-up Disposition:  Return in about 4 weeks (around 10/18/2017) for back pain . Reviewed plan of care. Patient has provided input and agrees with goals.

## 2017-09-29 ENCOUNTER — TELEPHONE (OUTPATIENT)
Dept: INTERNAL MEDICINE CLINIC | Age: 62
End: 2017-09-29

## 2017-09-29 NOTE — TELEPHONE ENCOUNTER
Patient called in and stated that he want to know if he can take two nucynta pills at one time. Please advise.

## 2017-09-29 NOTE — TELEPHONE ENCOUNTER
Patient is aware per Dr. Nick Lantigua he can take two tab 2 xday. Patient verbalizes understanding.

## 2017-10-11 RX ORDER — GABAPENTIN 300 MG/1
CAPSULE ORAL
Qty: 90 CAP | Refills: 5 | Status: SHIPPED | OUTPATIENT
Start: 2017-10-11 | End: 2018-05-05 | Stop reason: SDUPTHER

## 2017-10-13 ENCOUNTER — OFFICE VISIT (OUTPATIENT)
Dept: FAMILY MEDICINE CLINIC | Age: 62
End: 2017-10-13

## 2017-10-13 VITALS
OXYGEN SATURATION: 95 % | RESPIRATION RATE: 20 BRPM | HEIGHT: 69 IN | BODY MASS INDEX: 37.09 KG/M2 | SYSTOLIC BLOOD PRESSURE: 146 MMHG | DIASTOLIC BLOOD PRESSURE: 71 MMHG | WEIGHT: 250.4 LBS | HEART RATE: 71 BPM | TEMPERATURE: 99.3 F

## 2017-10-13 DIAGNOSIS — M54.50 CHRONIC MIDLINE LOW BACK PAIN WITHOUT SCIATICA: ICD-10-CM

## 2017-10-13 DIAGNOSIS — M70.62 TROCHANTERIC BURSITIS OF LEFT HIP: ICD-10-CM

## 2017-10-13 DIAGNOSIS — G89.29 CHRONIC MIDLINE LOW BACK PAIN WITHOUT SCIATICA: ICD-10-CM

## 2017-10-13 DIAGNOSIS — M25.552 LEFT HIP PAIN: Primary | ICD-10-CM

## 2017-10-13 RX ORDER — PREDNISONE 10 MG/1
TABLET ORAL
Qty: 25 TAB | Refills: 0 | Status: SHIPPED | OUTPATIENT
Start: 2017-10-13 | End: 2017-10-18 | Stop reason: ALTCHOICE

## 2017-10-13 NOTE — PROGRESS NOTES
Patient is in the office today for left hip pain 7/10.      1. Have you been to the ER, urgent care clinic since your last visit? Hospitalized since your last visit? No    2. Have you seen or consulted any other health care providers outside of the [address] [place] [address] since your last visit? Include any pap smears or colon screening.  No

## 2017-10-13 NOTE — MR AVS SNAPSHOT
Visit Information Date & Time Provider Department Dept. Phone Encounter #  
 10/13/2017 11:45 AM Kelley Romero, [address] [place] [address] [phone] [id number] Follow-up Instructions Return if symptoms worsen or fail to improve. Your Appointments 10/18/2017 10:45 AM  
Office Visit with Kelley Romero MD  
[address]) Appt Note: ov  
 [address], [address] [address]  
[phone]  
  
   
 [address] 5/3/2018  9:30 AM  
Nurse Visit with [facility] NURSE [facility] ([address] [place] [address]) [facility] [address] [place] [address]  
[address] [place] [address] 5/11/2018  9:30 AM  
Any with Florina Homans, MD  
[facility] ([address] [place] [address]) [facility] [address] [place] [address]  
[address] [place] [address] Upcoming Health Maintenance Date Due DTaP/Tdap/Td series (1 - Tdap) 7/9/1974 FOBT Q 1 YEAR, 18+ 10/15/2016 COLONOSCOPY 4/1/2020 Allergies as of 10/13/2017  Review Complete On: 10/13/2017 By: Zeb Sky LPN Severity Noted Reaction Type Reactions Ace Inhibitors  05/17/2010    Cough Dilaudid [Hydromorphone]  05/17/2010    Hives Lisinopril  05/17/2010    Cough Zocor [Simvastatin]  07/30/2011    Myalgia Right leg Current Immunizations  Reviewed on 9/20/2017 Name Date Influenza Vaccine 1/2/2015 Influenza Vaccine (Quad) PF 9/20/2017, 10/12/2016, 10/15/2015 Zoster Vaccine, Live 10/22/2015 Not reviewed this visit You Were Diagnosed With   
  
 Codes Comments Left hip pain    -  Primary ICD-10-CM: C69.860 ICD-9-CM: 719.45 Trochanteric bursitis of left hip     ICD-10-CM: M70.62 ICD-9-CM: 726.5 Vitals BP Pulse Temp Resp Height(growth percentile) Weight(growth percentile) 146/71 (BP 1 Location: Left arm, BP Patient Position: Sitting) 71 99.3 °F (37.4 °C) (Oral) 20 5' 9\" (1.753 m) 250 lb 6.4 oz (113.6 kg) SpO2 BMI Smoking Status 95% 36.98 kg/m2 Former Smoker BMI and BSA Data Body Mass Index Body Surface Area  
 36.98 kg/m 2 2.35 m 2 Preferred Pharmacy Pharmacy Name Phone [facility] [address] Your Updated Medication List  
  
   
This list is accurate as of: 10/13/17 11:54 AM.  Always use your most recent med list.  
  
  
  
  
 acetaminophen-codeine 300-30 mg per tablet Commonly known as:  TYLENOL #3  
  
 albuterol 90 mcg/actuation inhaler Commonly known as:  PROVENTIL HFA, VENTOLIN HFA, PROAIR HFA Take 2 Puffs by inhalation every four (4) hours as needed for Wheezing. chlorhexidine 0.12 % solution Commonly known as:  PERIDEX  
  
 cyclobenzaprine 10 mg tablet Commonly known as:  FLEXERIL Take 1 Tab by mouth nightly. fluticasone 50 mcg/actuation nasal spray Commonly known as:  Rey Gut 2 Sprays by Both Nostrils route daily. gabapentin 300 mg capsule Commonly known as:  NEURONTIN  
TAKE ONE CAPSULE BY MOUTH THREE TIMES DAILY  
  
 guaiFENesin  mg ER tablet Commonly known as:  Nick & Nick Take 1 Tab by mouth two (2) times a day. ibuprofen 800 mg tablet Commonly known as:  MOTRIN  
TAKE ONE TABLET BY MOUTH EVERY 8 HOURS AS NEEDED FOR PAIN  
  
 metoprolol tartrate 25 mg tablet Commonly known as:  LOPRESSOR  
TAKE ONE TABLET BY MOUTH TWICE DAILY ( GENERIC FOR LOPRESSOR )  
  
 montelukast 10 mg tablet Commonly known as:  SINGULAIR Take 1 Tab by mouth daily. nitroglycerin 0.4 mg SL tablet Commonly known as:  NITROSTAT  
1 Tab by SubLINGual route every five (5) minutes as needed. pantoprazole 40 mg tablet Commonly known as:  PROTONIX Take 1 Tab by mouth daily. pravastatin 40 mg tablet Commonly known as:  PRAVACHOL Take 1 Tab by mouth nightly. predniSONE 10 mg tablet Commonly known as:  DELTASONE  
5 tabs daily for 5 days  
  
 tamsulosin 0.4 mg capsule Commonly known as:  FLOMAX Take 1 Cap by mouth daily (after dinner). tapentadol ER 50 mg tablet Commonly known as:  Renee Amina ER Take 1 Tab by mouth every twelve (12) hours. Max Daily Amount: 100 mg. **Tapentadol ER tablets are to be swallowed whole and not split, broken, chewed, dissolved, or crushed. **  
  
 TRAVATAN Z 0.004 % ophthalmic solution Generic drug:  travoprost  
Administer 1 Drop to both eyes nightly. traZODone 50 mg tablet Commonly known as:  DESYREL  
TAKE ONE TABLET BY MOUTH ONCE DAILY AT BEDTIME AS NEEDED FOR SLEEP Prescriptions Sent to Pharmacy Refills  
 predniSONE (DELTASONE) 10 mg tablet 0 Si tabs daily for 5 days Class: Normal  
 Pharmacy: [facility] [place] [address] #: [phone] We Performed the Following REFERRAL TO ORTHOPEDICS [EGI465 Custom] Comments:  
 Refer to Dr Juliná Diaz for left hip pain Follow-up Instructions Return if symptoms worsen or fail to improve. Referral Information Referral ID Referred By Referred To  
  
 [id number] HUNTE, NOEL L Griselda Handing, MD   
   [address] [place] [address]   
   Raisa Plan, [address] Phone: [phone] Fax: [phone] Visits Status Start Date End Date 1 New Request 10/13/17 10/13/18 If your referral has a status of pending review or denied, additional information will be sent to support the outcome of this decision. Patient Instructions Hip Pain: Care Instructions Your Care Instructions Hip pain may be caused by many things, including overuse, a fall, or a twisting movement. Another cause of hip pain is arthritis. Your pain may increase when you stand up, walk, or squat. The pain may come and go or may be constant. Home treatment can help relieve hip pain, swelling, and stiffness. If your pain is ongoing, you may need more tests and treatment. Follow-up care is a key part of your treatment and safety. Be sure to make and go to all appointments, and call your doctor if you are having problems. Its also a good idea to know your test results and keep a list of the medicines you take. How can you care for yourself at home? · Take pain medicines exactly as directed. ¨ If the doctor gave you a prescription medicine for pain, take it as prescribed. ¨ If you are not taking a prescription pain medicine, ask your doctor if you can take an over-the-counter medicine. · Rest and protect your hip. Take a break from any activity, including standing or walking, that may cause pain. · Put ice or a cold pack against your hip for 10 to 20 minutes at a time. Try to do this every 1 to 2 hours for the next 3 days (when you are awake) or until the swelling goes down. Put a thin cloth between the ice and your skin. · Sleep on your healthy side with a pillow between your knees, or sleep on your back with pillows under your knees. · If there is no swelling, you can put moist heat, a heating pad, or a warm cloth on your hip. Do gentle stretching exercises to help keep your hip flexible. · Learn how to prevent falls. Have your vision and hearing checked regularly. Wear slippers or shoes with a nonskid sole. · Stay at a healthy weight. · Wear comfortable shoes. When should you call for help? Call 911 anytime you think you may need emergency care. For example, call if: 
· You have sudden chest pain and shortness of breath, or you cough up blood. · You are not able to stand or walk or bear weight. · Your buttocks, legs, or feet feel numb or tingly. · Your leg or foot is cool or pale or changes color. · You have severe pain. Call your doctor now or seek immediate medical care if: 
· You have signs of infection, such as: 
¨ Increased pain, swelling, warmth, or redness in the hip area. ¨ Red streaks leading from the hip area. ¨ Pus draining from the hip area. ¨ A fever. · You have signs of a blood clot, such as: 
¨ Pain in your calf, back of the knee, thigh, or groin. ¨ Redness and swelling in your leg or groin. · You are not able to bend, straighten, or move your leg normally. · You have trouble urinating or having bowel movements. Watch closely for changes in your health, and be sure to contact your doctor if: 
· You do not get better as expected. Where can you learn more? Go to http://samreen-tabitha.info/. Enter T480 in the search box to learn more about \"Hip Pain: Care Instructions. \" Current as of: March 20, 2017 Content Version: 11.3 © 1100-0795 [employer]. Care instructions adapted under license by [employer] (which disclaims liability or warranty for this information). If you have questions about a medical condition or this instruction, always ask your healthcare professional. [address] any warranty or liability for your use of this information. Introducing \[facility]\"" & HEALTH SERVICES! Sabrina Landis introduces [employer] patient portal. Now you can access parts of your medical record, email your doctor's office, and request medication refills online. 1. In your internet browser, go to [email or web address] 2. Click on the First Time User? Click Here link in the Sign In box. You will see the New Member Sign Up page. 3. Enter your [employer] Access Code exactly as it appears below. You will not need to use this code after youve completed the sign-up process. If you do not sign up before the expiration date, you must request a new code. · [employer] Access Code: [email or web address] Expires: 10/30/2017  9:19 AM 
 
4. Enter the last four digits of your Social Security Number (xxxx) and Date of Birth (mm/dd/yyyy) as indicated and click Submit. You will be taken to the next sign-up page. 5. Create a [employer] ID. This will be your [employer] login ID and cannot be changed, so think of one that is secure and easy to remember. 6. Create a [employer] password. You can change your password at any time. 7. Enter your Password Reset Question and Answer. This can be used at a later time if you forget your password. 8. Enter your e-mail address. You will receive e-mail notification when new information is available in [address] 9. Click Sign Up. You can now view and download portions of your medical record. 10. Click the Download Summary menu link to download a portable copy of your medical information. If you have questions, please visit the Frequently Asked Questions section of the [employer] website. Remember, [employer] is NOT to be used for urgent needs. For medical emergencies, dial 911. Now available from your iPhone and Android! Please provide this summary of care documentation to your next provider. Your primary care clinician is listed as WILLIAM MENARD. If you have any questions after today's visit, please call [phone].

## 2017-10-13 NOTE — PATIENT INSTRUCTIONS
Hip Pain: Care Instructions  Your Care Instructions  Hip pain may be caused by many things, including overuse, a fall, or a twisting movement. Another cause of hip pain is arthritis. Your pain may increase when you stand up, walk, or squat. The pain may come and go or may be constant. Home treatment can help relieve hip pain, swelling, and stiffness. If your pain is ongoing, you may need more tests and treatment. Follow-up care is a key part of your treatment and safety. Be sure to make and go to all appointments, and call your doctor if you are having problems. Its also a good idea to know your test results and keep a list of the medicines you take. How can you care for yourself at home? · Take pain medicines exactly as directed. ¨ If the doctor gave you a prescription medicine for pain, take it as prescribed. ¨ If you are not taking a prescription pain medicine, ask your doctor if you can take an over-the-counter medicine. · Rest and protect your hip. Take a break from any activity, including standing or walking, that may cause pain. · Put ice or a cold pack against your hip for 10 to 20 minutes at a time. Try to do this every 1 to 2 hours for the next 3 days (when you are awake) or until the swelling goes down. Put a thin cloth between the ice and your skin. · Sleep on your healthy side with a pillow between your knees, or sleep on your back with pillows under your knees. · If there is no swelling, you can put moist heat, a heating pad, or a warm cloth on your hip. Do gentle stretching exercises to help keep your hip flexible. · Learn how to prevent falls. Have your vision and hearing checked regularly. Wear slippers or shoes with a nonskid sole. · Stay at a healthy weight. · Wear comfortable shoes. When should you call for help? Call 911 anytime you think you may need emergency care. For example, call if:  · You have sudden chest pain and shortness of breath, or you cough up blood.   · You are not able to stand or walk or bear weight. · Your buttocks, legs, or feet feel numb or tingly. · Your leg or foot is cool or pale or changes color. · You have severe pain. Call your doctor now or seek immediate medical care if:  · You have signs of infection, such as:  ¨ Increased pain, swelling, warmth, or redness in the hip area. ¨ Red streaks leading from the hip area. ¨ Pus draining from the hip area. ¨ A fever. · You have signs of a blood clot, such as:  ¨ Pain in your calf, back of the knee, thigh, or groin. ¨ Redness and swelling in your leg or groin. · You are not able to bend, straighten, or move your leg normally. · You have trouble urinating or having bowel movements. Watch closely for changes in your health, and be sure to contact your doctor if:  · You do not get better as expected. Where can you learn more? Go to http://samreen-tabitha.info/. Enter A347 in the search box to learn more about \"Hip Pain: Care Instructions. \"  Current as of: March 20, 2017  Content Version: 11.3  © 4250-1124 [employer]. Care instructions adapted under license by [employer] (which disclaims liability or warranty for this information). If you have questions about a medical condition or this instruction, always ask your healthcare professional. [place] [address] any warranty or liability for your use of this information.

## 2017-10-18 ENCOUNTER — OFFICE VISIT (OUTPATIENT)
Dept: FAMILY MEDICINE CLINIC | Age: 62
End: 2017-10-18

## 2017-10-18 VITALS
RESPIRATION RATE: 20 BRPM | OXYGEN SATURATION: 97 % | WEIGHT: 254 LBS | HEIGHT: 69 IN | DIASTOLIC BLOOD PRESSURE: 62 MMHG | TEMPERATURE: 98.7 F | SYSTOLIC BLOOD PRESSURE: 136 MMHG | HEART RATE: 72 BPM | BODY MASS INDEX: 37.62 KG/M2

## 2017-10-18 DIAGNOSIS — M25.552 LEFT HIP PAIN: Primary | ICD-10-CM

## 2017-10-18 RX ORDER — TAPENTADOL HYDROCHLORIDE 50 MG/1
TABLET, FILM COATED, EXTENDED RELEASE ORAL
COMMUNITY
Start: 2017-09-25 | End: 2017-10-18

## 2017-10-18 NOTE — PROGRESS NOTES
Patient is in the office today for a 4 week follow up. 1. Have you been to the ER, urgent care clinic since your last visit? Hospitalized since your last visit? No    2. Have you seen or consulted any other health care providers outside of the [address] [place] [address] since your last visit? Include any pap smears or colon screening.  No

## 2017-10-18 NOTE — PATIENT INSTRUCTIONS

## 2017-10-18 NOTE — MR AVS SNAPSHOT
Visit Information Date & Time Provider Department Dept. Phone Encounter #  
 10/18/2017 10:45 AM Solis Hatch, [address] [place], [place] [address] [facility] [id number] Follow-up Instructions Return in about 4 months (around 2/18/2018) for labs 1 week before. Your Appointments 10/18/2017 10:45 AM  
Office Visit with Solis Hatch MD  
[address] [place] [address]) Appt Note: ov  
 [address], [address] [address]  
[phone]  
  
   
 [address] 5/3/2018  9:30 AM  
Nurse Visit with [facility] WB NURSE [facility] ([address] [place] [address]) [facility] [address] [facility] [address] [place] [address]  
[address] [place] [address] 5/11/2018  9:30 AM  
Any with Marialuisa Zepeda MD  
[facility] ([address] [place] [address]) [facility] [address] [facility] [address] [place] [address]  
[address] [place] [address] Upcoming Health Maintenance Date Due DTaP/Tdap/Td series (1 - Tdap) 7/9/1974 FOBT Q 1 YEAR, 18+ 10/15/2016 COLONOSCOPY 4/1/2020 Allergies as of 10/18/2017  Review Complete On: 10/18/2017 By: Solis Hatch MD  
  
 Severity Noted Reaction Type Reactions Ace Inhibitors  05/17/2010    Cough Dilaudid [Hydromorphone]  05/17/2010    Hives Lisinopril  05/17/2010    Cough Zocor [Simvastatin]  07/30/2011    Myalgia Right leg Current Immunizations  Reviewed on 9/20/2017 Name Date Influenza Vaccine 1/2/2015 Influenza Vaccine (Quad) PF 9/20/2017, 10/12/2016, 10/15/2015 Zoster Vaccine, Live 10/22/2015 Not reviewed this visit You Were Diagnosed With   
  
 Codes Comments Left hip pain    -  Primary ICD-10-CM: Y26.911 ICD-9-CM: 719.45 Vitals BP Pulse Temp Resp Height(growth percentile) Weight(growth percentile) 136/62 (BP 1 Location: Left arm, BP Patient Position: Sitting) 72 98.7 °F (37.1 °C) (Oral) 20 5' 9\" (1.753 m) 254 lb (115.2 kg) SpO2 BMI Smoking Status 97% 37.51 kg/m2 Former Smoker Vitals History BMI and BSA Data Body Mass Index Body Surface Area  
 37.51 kg/m 2 2.37 m 2 Preferred Pharmacy Pharmacy Name Phone [facility] [address] [place], [address] Your Updated Medication List  
  
   
This list is accurate as of: 10/18/17 10:30 AM.  Always use your most recent med list.  
  
  
  
  
 acetaminophen-codeine 300-30 mg per tablet Commonly known as:  TYLENOL #3  
  
 albuterol 90 mcg/actuation inhaler Commonly known as:  PROVENTIL HFA, VENTOLIN HFA, PROAIR HFA Take 2 Puffs by inhalation every four (4) hours as needed for Wheezing. chlorhexidine 0.12 % solution Commonly known as:  PERIDEX  
  
 cyclobenzaprine 10 mg tablet Commonly known as:  FLEXERIL Take 1 Tab by mouth nightly. fluticasone 50 mcg/actuation nasal spray Commonly known as:  Drea Jumper 2 Sprays by Both Nostrils route daily. gabapentin 300 mg capsule Commonly known as:  NEURONTIN  
TAKE ONE CAPSULE BY MOUTH THREE TIMES DAILY  
  
 guaiFENesin  mg ER tablet Commonly known as:  Nick & Nick Take 1 Tab by mouth two (2) times a day. ibuprofen 800 mg tablet Commonly known as:  MOTRIN  
TAKE ONE TABLET BY MOUTH EVERY 8 HOURS AS NEEDED FOR PAIN  
  
 metoprolol tartrate 25 mg tablet Commonly known as:  LOPRESSOR  
TAKE ONE TABLET BY MOUTH TWICE DAILY ( GENERIC FOR LOPRESSOR )  
  
 montelukast 10 mg tablet Commonly known as:  SINGULAIR Take 1 Tab by mouth daily. nitroglycerin 0.4 mg SL tablet Commonly known as:  NITROSTAT  
1 Tab by SubLINGual route every five (5) minutes as needed. pantoprazole 40 mg tablet Commonly known as:  PROTONIX Take 1 Tab by mouth daily. pravastatin 40 mg tablet Commonly known as:  PRAVACHOL Take 1 Tab by mouth nightly. tamsulosin 0.4 mg capsule Commonly known as:  FLOMAX Take 1 Cap by mouth daily (after dinner). tapentadol  mg tablet Commonly known as:  Leandra Sal ER Take 1 Tab by mouth every twelve (12) hours. Max Daily Amount: 200 mg. TRAVATAN Z 0.004 % ophthalmic solution Generic drug:  travoprost  
Administer 1 Drop to both eyes nightly. traZODone 50 mg tablet Commonly known as:  DESYREL  
TAKE ONE TABLET BY MOUTH ONCE DAILY AT BEDTIME AS NEEDED FOR SLEEP Follow-up Instructions Return in about 4 months (around 2/18/2018) for labs 1 week before. To-Do List   
 10/18/2017 Imaging:  XR HIP LT W OR WO PELV 2-3 VWS Patient Instructions High Blood Pressure: Care Instructions Your Care Instructions If your blood pressure is usually above 140/90, you have high blood pressure, or hypertension. That means the top number is 140 or higher or the bottom number is 90 or higher, or both. Despite what a lot of people think, high blood pressure usually doesn't cause headaches or make you feel dizzy or lightheaded. It usually has no symptoms. But it does increase your risk for heart attack, stroke, and kidney or eye damage. The higher your blood pressure, the more your risk increases. Your doctor will give you a goal for your blood pressure. Your goal will be based on your health and your age. An example of a goal is to keep your blood pressure below 140/90. Lifestyle changes, such as eating healthy and being active, are always important to help lower blood pressure. You might also take medicine to reach your blood pressure goal. 
Follow-up care is a key part of your treatment and safety. Be sure to make and go to all appointments, and call your doctor if you are having problems. It's also a good idea to know your test results and keep a list of the medicines you take. How can you care for yourself at home? Medical treatment · If you stop taking your medicine, your blood pressure will go back up. You may take one or more types of medicine to lower your blood pressure. Be safe with medicines. Take your medicine exactly as prescribed. Call your doctor if you think you are having a problem with your medicine. · Talk to your doctor before you start taking aspirin every day. Aspirin can help certain people lower their risk of a heart attack or stroke. But taking aspirin isn't right for everyone, because it can cause serious bleeding. · See your doctor regularly. You may need to see the doctor more often at first or until your blood pressure comes down. · If you are taking blood pressure medicine, talk to your doctor before you take decongestants or anti-inflammatory medicine, such as ibuprofen. Some of these medicines can raise blood pressure. · Learn how to check your blood pressure at home. Lifestyle changes · Stay at a healthy weight. This is especially important if you put on weight around the waist. Losing even 10 pounds can help you lower your blood pressure. · If your doctor recommends it, get more exercise. Walking is a good choice. Bit by bit, increase the amount you walk every day. Try for at least 30 minutes on most days of the week. You also may want to swim, bike, or do other activities. · Avoid or limit alcohol. Talk to your doctor about whether you can drink any alcohol. · Try to limit how much sodium you eat to less than 2,300 milligrams (mg) a day. Your doctor may ask you to try to eat less than 1,500 mg a day. · Eat plenty of fruits (such as bananas and oranges), vegetables, legumes, whole grains, and low-fat dairy products. · Lower the amount of saturated fat in your diet. Saturated fat is found in animal products such as milk, cheese, and meat. Limiting these foods may help you lose weight and also lower your risk for heart disease. · Do not smoke. Smoking increases your risk for heart attack and stroke. If you need help quitting, talk to your doctor about stop-smoking programs and medicines. These can increase your chances of quitting for good. When should you call for help? Call 911 anytime you think you may need emergency care. This may mean having symptoms that suggest that your blood pressure is causing a serious heart or blood vessel problem. Your blood pressure may be over 180/110. For example, call 911 if: 
· You have symptoms of a heart attack. These may include: ¨ Chest pain or pressure, or a strange feeling in the chest. 
¨ Sweating. ¨ Shortness of breath. ¨ Nausea or vomiting. ¨ Pain, pressure, or a strange feeling in the back, neck, jaw, or upper belly or in one or both shoulders or arms. ¨ Lightheadedness or sudden weakness. ¨ A fast or irregular heartbeat. · You have symptoms of a stroke. These may include: 
¨ Sudden numbness, tingling, weakness, or loss of movement in your face, arm, or leg, especially on only one side of your body. ¨ Sudden vision changes. ¨ Sudden trouble speaking. ¨ Sudden confusion or trouble understanding simple statements. ¨ Sudden problems with walking or balance. ¨ A sudden, severe headache that is different from past headaches. · You have severe back or belly pain. Do not wait until your blood pressure comes down on its own. Get help right away. Call your doctor now or seek immediate care if: 
· Your blood pressure is much higher than normal (such as 180/110 or higher), but you don't have symptoms. · You think high blood pressure is causing symptoms, such as: ¨ Severe headache. ¨ Blurry vision. Watch closely for changes in your health, and be sure to contact your doctor if: 
· Your blood pressure measures 140/90 or higher at least 2 times. That means the top number is 140 or higher or the bottom number is 90 or higher, or both. · You think you may be having side effects from your blood pressure medicine. · Your blood pressure is usually normal, but it goes above normal at least 2 times. Where can you learn more? Go to http://samreen-tabitha.info/. Enter K949 in the search box to learn more about \"High Blood Pressure: Care Instructions. \" Current as of: August 8, 2016 Content Version: 11.3 © 2094-1188 [employer]. Care instructions adapted under license by [employer] (which disclaims liability or warranty for this information). If you have questions about a medical condition or this instruction, always ask your healthcare professional. [address] any warranty or liability for your use of this information. Introducing [facility]! [facility] introduces In*Situ Architecture patient portal. Now you can access parts of your medical record, email your doctor's office, and request medication refills online. 1. In your internet browser, go to [email or web address] 2. Click on the First Time User? Click Here link in the Sign In box. You will see the New Member Sign Up page. 3. Enter your In*[employer]u [employer] Access Code exactly as it appears below. You will not need to use this code after youve completed the sign-up process. If you do not sign up before the expiration date, you must request a new code. · In*[employer]u [employer] Access Code: [email or web address] Expires: 10/30/2017  9:19 AM 
 
4. Enter the last four digits of your Social Security Number (xxxx) and Date of Birth (mm/dd/yyyy) as indicated and click Submit. You will be taken to the next sign-up page. 5. Create a [email or web address] ID. This will be your In*[employer]u [employer] login ID and cannot be changed, so think of one that is secure and easy to remember. 6. Create a In[employer] password. You can change your password at any time. 7. Enter your Password Reset Question and Answer. This can be used at a later time if you forget your password. 8. Enter your e-mail address. You will receive e-mail notification when new information is available in [address] 9. Click Sign Up. You can now view and download portions of your medical record. 10. Click the Download Summary menu link to download a portable copy of your medical information. If you have questions, please visit the Frequently Asked Questions section of the [employer] website. Remember, [employer] is NOT to be used for urgent needs. For medical emergencies, dial 911. Now available from your iPhone and Android! Please provide this summary of care documentation to your next provider. Your primary care clinician is listed as WILLIAM MENARD. If you have any questions after today's visit, please call [phone].

## 2017-10-19 NOTE — PROGRESS NOTES
Sharron Mullen is a 57 y.o.  male and presents with Hip Pain (left )      SUBJECTIVE:  Pt's left hip pain has improved with prednisone and Nucynta  mg BID. Pt also has had improvement with continuing Neurontin 300 mg TID. He will f/u orthopedics for definitive evaluation. Current Outpatient Prescriptions   Medication Sig    tapentadol ER (NUCYNTA ER) 100 mg tablet Take 1 Tab by mouth every twelve (12) hours. Max Daily Amount: 200 mg.    gabapentin (NEURONTIN) 300 mg capsule TAKE ONE CAPSULE BY MOUTH THREE TIMES DAILY    metoprolol tartrate (LOPRESSOR) 25 mg tablet TAKE ONE TABLET BY MOUTH TWICE DAILY ( GENERIC FOR LOPRESSOR )    montelukast (SINGULAIR) 10 mg tablet Take 1 Tab by mouth daily.  traZODone (DESYREL) 50 mg tablet TAKE ONE TABLET BY MOUTH ONCE DAILY AT BEDTIME AS NEEDED FOR SLEEP    tamsulosin (FLOMAX) 0.4 mg capsule Take 1 Cap by mouth daily (after dinner).  pantoprazole (PROTONIX) 40 mg tablet Take 1 Tab by mouth daily.  pravastatin (PRAVACHOL) 40 mg tablet Take 1 Tab by mouth nightly.  TRAVATAN Z 0.004 % ophthalmic solution Administer 1 Drop to both eyes nightly.  chlorhexidine (PERIDEX) 0.12 % solution     acetaminophen-codeine (TYLENOL #3) 300-30 mg per tablet     fluticasone (FLONASE) 50 mcg/actuation nasal spray 2 Sprays by Both Nostrils route daily.  guaiFENesin ER (MUCINEX) 600 mg ER tablet Take 1 Tab by mouth two (2) times a day.  cyclobenzaprine (FLEXERIL) 10 mg tablet Take 1 Tab by mouth nightly.  ibuprofen (MOTRIN) 800 mg tablet TAKE ONE TABLET BY MOUTH EVERY 8 HOURS AS NEEDED FOR PAIN    nitroglycerin (NITROSTAT) 0.4 mg SL tablet 1 Tab by SubLINGual route every five (5) minutes as needed.  albuterol (PROVENTIL HFA, VENTOLIN HFA, PROAIR HFA) 90 mcg/actuation inhaler Take 2 Puffs by inhalation every four (4) hours as needed for Wheezing. No current facility-administered medications for this visit.           OBJECTIVE:  alert, well appearing, and in no distress  Visit Vitals    /62 (BP 1 Location: Left arm, BP Patient Position: Sitting)    Pulse 72    Temp 98.7 °F (37.1 °C) (Oral)    Resp 20    Ht 5' 9\" (1.753 m)    Wt 254 lb (115.2 kg)    SpO2 97%    BMI 37.51 kg/m2      well developed and well nourished            Assessment/Plan      ICD-10-CM ICD-9-CM    1. Left hip pain M25.552 719.45 Improving after getting prednisone. Continue nucynta ER and Neurontin. Will check XR HIP LT W OR WO PELV 2-3 VWS and pt to f/u with orthopedics      Follow-up Disposition:  Return in about 4 months (around 2/18/2018) for labs 1 week before. Reviewed plan of care. Patient has provided input and agrees with goals.

## 2017-10-21 NOTE — PROGRESS NOTES
Tc Gold is a 57 y.o.  male and presents with Hip Pain (left )      SUBJECTIVE:    Hip Pain  Patient complains of left hip pain. Onset of the symptoms was 1 week ago. Inciting event: none. Current symptoms include location: lateral, over greater trochanter  worse with weight bearing  aggravated by walking  better with activity. Associated symptoms: none. Aggravating symptoms: standing. Patient's overall course: symptoms have progressed to a point and plateaued. . Patient has had prior hip problems. Previous visits for this problem: none. Evaluation to date: none. Treatment to date: Motrin and Nucynta  mg BID . Respiratory ROS: negative for - shortness of breath  Cardiovascular ROS: negative for - chest pain  Current Outpatient Prescriptions   Medication Sig    tapentadol ER (NUCYNTA ER) 100 mg tablet Take 1 Tab by mouth every twelve (12) hours. Max Daily Amount: 200 mg.    gabapentin (NEURONTIN) 300 mg capsule TAKE ONE CAPSULE BY MOUTH THREE TIMES DAILY    metoprolol tartrate (LOPRESSOR) 25 mg tablet TAKE ONE TABLET BY MOUTH TWICE DAILY ( GENERIC FOR LOPRESSOR )    montelukast (SINGULAIR) 10 mg tablet Take 1 Tab by mouth daily.  traZODone (DESYREL) 50 mg tablet TAKE ONE TABLET BY MOUTH ONCE DAILY AT BEDTIME AS NEEDED FOR SLEEP    tamsulosin (FLOMAX) 0.4 mg capsule Take 1 Cap by mouth daily (after dinner).  pantoprazole (PROTONIX) 40 mg tablet Take 1 Tab by mouth daily.  pravastatin (PRAVACHOL) 40 mg tablet Take 1 Tab by mouth nightly.  chlorhexidine (PERIDEX) 0.12 % solution     acetaminophen-codeine (TYLENOL #3) 300-30 mg per tablet     fluticasone (FLONASE) 50 mcg/actuation nasal spray 2 Sprays by Both Nostrils route daily.  guaiFENesin ER (MUCINEX) 600 mg ER tablet Take 1 Tab by mouth two (2) times a day.  cyclobenzaprine (FLEXERIL) 10 mg tablet Take 1 Tab by mouth nightly.     ibuprofen (MOTRIN) 800 mg tablet TAKE ONE TABLET BY MOUTH EVERY 8 HOURS AS NEEDED FOR PAIN    nitroglycerin (NITROSTAT) 0.4 mg SL tablet 1 Tab by SubLINGual route every five (5) minutes as needed.  albuterol (PROVENTIL HFA, VENTOLIN HFA, PROAIR HFA) 90 mcg/actuation inhaler Take 2 Puffs by inhalation every four (4) hours as needed for Wheezing.  TRAVATAN Z 0.004 % ophthalmic solution Administer 1 Drop to both eyes nightly. No current facility-administered medications for this visit. OBJECTIVE:  in mild to moderate distress  Visit Vitals    /71 (BP 1 Location: Left arm, BP Patient Position: Sitting)    Pulse 71    Temp 99.3 °F (37.4 °C) (Oral)    Resp 20    Ht 5' 9\" (1.753 m)    Wt 250 lb 6.4 oz (113.6 kg)    SpO2 95%    BMI 36.98 kg/m2      well developed and well nourished  Musculoskeletal - pain over left trochantic bursa to palpation. Internal and external rotation of hip ok. Assessment/Plan      ICD-10-CM ICD-9-CM    1. Left hip pain M25.552 719.45 Will treat with prednisone taper REFERRAL TO ORTHOPEDICS   2. Trochanteric bursitis of left hip M70.62 726.5 Will treat with prednisone taper REFERRAL TO ORTHOPEDICS   3. Chronic midline low back pain without sciatica M54.5 724.2 tapentadol ER (NUCYNTA ER) 100 mg tablet    G89.29 338.29      Follow-up Disposition:  Return if symptoms worsen or fail to improve. Reviewed plan of care. Patient has provided input and agrees with goals.

## 2017-11-13 ENCOUNTER — TELEPHONE (OUTPATIENT)
Dept: FAMILY MEDICINE CLINIC | Age: 62
End: 2017-11-13

## 2017-11-13 DIAGNOSIS — M54.50 CHRONIC MIDLINE LOW BACK PAIN WITHOUT SCIATICA: ICD-10-CM

## 2017-11-13 DIAGNOSIS — G89.29 CHRONIC MIDLINE LOW BACK PAIN WITHOUT SCIATICA: ICD-10-CM

## 2017-11-13 NOTE — TELEPHONE ENCOUNTER
Pt want to know if the script could be written today and while he have a ride to the office. Please advise.

## 2017-11-17 RX ORDER — PRAVASTATIN SODIUM 40 MG/1
TABLET ORAL
Qty: 90 TAB | Refills: 3 | Status: SHIPPED | OUTPATIENT
Start: 2017-11-17 | End: 2018-10-20 | Stop reason: SDUPTHER

## 2017-11-20 DIAGNOSIS — R05.9 COUGH: ICD-10-CM

## 2017-11-20 RX ORDER — PREDNISONE 10 MG/1
TABLET ORAL
Qty: 25 TAB | Refills: 0 | Status: SHIPPED | OUTPATIENT
Start: 2017-11-20 | End: 2018-02-22 | Stop reason: ALTCHOICE

## 2017-11-20 RX ORDER — GUAIFENESIN 600 MG/1
600 TABLET, EXTENDED RELEASE ORAL 2 TIMES DAILY
Qty: 30 TAB | Refills: 5 | Status: SHIPPED | OUTPATIENT
Start: 2017-11-20 | End: 2022-09-12

## 2017-12-11 DIAGNOSIS — F51.01 PRIMARY INSOMNIA: ICD-10-CM

## 2017-12-11 DIAGNOSIS — M54.50 CHRONIC MIDLINE LOW BACK PAIN WITHOUT SCIATICA: ICD-10-CM

## 2017-12-11 DIAGNOSIS — G89.29 CHRONIC MIDLINE LOW BACK PAIN WITHOUT SCIATICA: ICD-10-CM

## 2017-12-11 NOTE — TELEPHONE ENCOUNTER
Requested Prescriptions     Pending Prescriptions Disp Refills    tapentadol ER (NUCYNTA ER) 100 mg tablet 60 Tab 0     Sig: Take 1 Tab by mouth every twelve (12) hours. Max Daily Amount: 200 mg.

## 2017-12-13 ENCOUNTER — TELEPHONE (OUTPATIENT)
Dept: FAMILY MEDICINE CLINIC | Age: 62
End: 2017-12-13

## 2017-12-13 NOTE — TELEPHONE ENCOUNTER
Please notify patient that the refill has been approved and script is available in the office for . This medication was filled on 11/16/2016 - 30 days is 12/16. He shouldn't be out of his medication at this point. If he is taking more than the prescribed amount, he needs to follow up with Dr. Juan David Fraga.        checked

## 2017-12-13 NOTE — TELEPHONE ENCOUNTER
Pt aware prescription is available for  and also given message re: f/u with Dr Bronwyn Mclean if taking more than the prescribed amount

## 2017-12-17 RX ORDER — TRAZODONE HYDROCHLORIDE 50 MG/1
TABLET ORAL
Qty: 30 TAB | Refills: 2 | Status: SHIPPED | OUTPATIENT
Start: 2017-12-17 | End: 2018-04-23 | Stop reason: SDUPTHER

## 2017-12-19 ENCOUNTER — OFFICE VISIT (OUTPATIENT)
Dept: FAMILY MEDICINE CLINIC | Age: 62
End: 2017-12-19

## 2017-12-19 VITALS
DIASTOLIC BLOOD PRESSURE: 72 MMHG | TEMPERATURE: 98.9 F | HEART RATE: 52 BPM | RESPIRATION RATE: 18 BRPM | WEIGHT: 252.8 LBS | HEIGHT: 69 IN | OXYGEN SATURATION: 96 % | BODY MASS INDEX: 37.44 KG/M2 | SYSTOLIC BLOOD PRESSURE: 129 MMHG

## 2017-12-19 DIAGNOSIS — M54.50 CHRONIC MIDLINE LOW BACK PAIN WITHOUT SCIATICA: ICD-10-CM

## 2017-12-19 DIAGNOSIS — M54.6 ACUTE MIDLINE THORACIC BACK PAIN: Primary | ICD-10-CM

## 2017-12-19 DIAGNOSIS — G89.29 CHRONIC MIDLINE LOW BACK PAIN WITHOUT SCIATICA: ICD-10-CM

## 2017-12-19 DIAGNOSIS — J44.9 CHRONIC BRONCHIOLITIS (HCC): ICD-10-CM

## 2017-12-19 NOTE — PROGRESS NOTES
Chin Howell is a  57 y.o. male presents today for office visit for Chronic LBP , patient state's that it is not really painful just tightness. Patient states no change in medication. 1. Have you been to the ER, urgent care clinic or hospitalized since your last visit? NO       2. Have you seen or consulted any other health care providers outside of the [address] [place] [address] since your last visit (Include any pap smears or colon screening)?  NO

## 2017-12-19 NOTE — PATIENT INSTRUCTIONS
High Blood Pressure: Care Instructions  Your Care Instructions    If your blood pressure is usually above 140/90, you have high blood pressure, or hypertension. That means the top number is 140 or higher or the bottom number is 90 or higher, or both. Despite what a lot of people think, high blood pressure usually doesn't cause headaches or make you feel dizzy or lightheaded. It usually has no symptoms. But it does increase your risk for heart attack, stroke, and kidney or eye damage. The higher your blood pressure, the more your risk increases. Your doctor will give you a goal for your blood pressure. Your goal will be based on your health and your age. An example of a goal is to keep your blood pressure below 140/90. Lifestyle changes, such as eating healthy and being active, are always important to help lower blood pressure. You might also take medicine to reach your blood pressure goal.  Follow-up care is a key part of your treatment and safety. Be sure to make and go to all appointments, and call your doctor if you are having problems. It's also a good idea to know your test results and keep a list of the medicines you take. How can you care for yourself at home? Medical treatment  · If you stop taking your medicine, your blood pressure will go back up. You may take one or more types of medicine to lower your blood pressure. Be safe with medicines. Take your medicine exactly as prescribed. Call your doctor if you think you are having a problem with your medicine. · Talk to your doctor before you start taking aspirin every day. Aspirin can help certain people lower their risk of a heart attack or stroke. But taking aspirin isn't right for everyone, because it can cause serious bleeding. · See your doctor regularly. You may need to see the doctor more often at first or until your blood pressure comes down.   · If you are taking blood pressure medicine, talk to your doctor before you take decongestants or anti-inflammatory medicine, such as ibuprofen. Some of these medicines can raise blood pressure. · Learn how to check your blood pressure at home. Lifestyle changes  · Stay at a healthy weight. This is especially important if you put on weight around the waist. Losing even 10 pounds can help you lower your blood pressure. · If your doctor recommends it, get more exercise. Walking is a good choice. Bit by bit, increase the amount you walk every day. Try for at least 30 minutes on most days of the week. You also may want to swim, bike, or do other activities. · Avoid or limit alcohol. Talk to your doctor about whether you can drink any alcohol. · Try to limit how much sodium you eat to less than 2,300 milligrams (mg) a day. Your doctor may ask you to try to eat less than 1,500 mg a day. · Eat plenty of fruits (such as bananas and oranges), vegetables, legumes, whole grains, and low-fat dairy products. · Lower the amount of saturated fat in your diet. Saturated fat is found in animal products such as milk, cheese, and meat. Limiting these foods may help you lose weight and also lower your risk for heart disease. · Do not smoke. Smoking increases your risk for heart attack and stroke. If you need help quitting, talk to your doctor about stop-smoking programs and medicines. These can increase your chances of quitting for good. When should you call for help? Call 911 anytime you think you may need emergency care. This may mean having symptoms that suggest that your blood pressure is causing a serious heart or blood vessel problem. Your blood pressure may be over 180/110. ? For example, call 911 if:  ? · You have symptoms of a heart attack. These may include:  ¨ Chest pain or pressure, or a strange feeling in the chest.  ¨ Sweating. ¨ Shortness of breath. ¨ Nausea or vomiting.   ¨ Pain, pressure, or a strange feeling in the back, neck, jaw, or upper belly or in one or both shoulders or arms.  ¨ Lightheadedness or sudden weakness. ¨ A fast or irregular heartbeat. ? · You have symptoms of a stroke. These may include:  ¨ Sudden numbness, tingling, weakness, or loss of movement in your face, arm, or leg, especially on only one side of your body. ¨ Sudden vision changes. ¨ Sudden trouble speaking. ¨ Sudden confusion or trouble understanding simple statements. ¨ Sudden problems with walking or balance. ¨ A sudden, severe headache that is different from past headaches. ? · You have severe back or belly pain. ?Do not wait until your blood pressure comes down on its own. Get help right away. ?Call your doctor now or seek immediate care if:  ? · Your blood pressure is much higher than normal (such as 180/110 or higher), but you don't have symptoms. ? · You think high blood pressure is causing symptoms, such as:  ¨ Severe headache. ¨ Blurry vision. ? Watch closely for changes in your health, and be sure to contact your doctor if:  ? · Your blood pressure measures 140/90 or higher at least 2 times. That means the top number is 140 or higher or the bottom number is 90 or higher, or both. ? · You think you may be having side effects from your blood pressure medicine. ? · Your blood pressure is usually normal, but it goes above normal at least 2 times. Where can you learn more? Go to http://samreen-tabitha.info/. Enter Q816 in the search box to learn more about \"High Blood Pressure: Care Instructions. \"  Current as of: September 21, 2016  Content Version: 11.4  © 9216-8018 [employer]. Care instructions adapted under license by [employer] (which disclaims liability or warranty for this information). If you have questions about a medical condition or this instruction, always ask your healthcare professional. [place] [address] any warranty or liability for your use of this information.        DASH Diet: Care Instructions  Your Care Instructions    The DASH diet is an eating plan that can help lower your blood pressure. DASH stands for Dietary Approaches to Stop Hypertension. Hypertension is high blood pressure. The DASH diet focuses on eating foods that are high in calcium, potassium, and magnesium. These nutrients can lower blood pressure. The foods that are highest in these nutrients are fruits, vegetables, low-fat dairy products, nuts, seeds, and legumes. But taking calcium, potassium, and magnesium supplements instead of eating foods that are high in those nutrients does not have the same effect. The DASH diet also includes whole grains, fish, and poultry. The DASH diet is one of several lifestyle changes your doctor may recommend to lower your high blood pressure. Your doctor may also want you to decrease the amount of sodium in your diet. Lowering sodium while following the DASH diet can lower blood pressure even further than just the DASH diet alone. Follow-up care is a key part of your treatment and safety. Be sure to make and go to all appointments, and call your doctor if you are having problems. It's also a good idea to know your test results and keep a list of the medicines you take. How can you care for yourself at home? Following the DASH diet  · Eat 4 to 5 servings of fruit each day. A serving is 1 medium-sized piece of fruit, ½ cup chopped or canned fruit, 1/4 cup dried fruit, or 4 ounces (½ cup) of fruit juice. Choose fruit more often than fruit juice. · Eat 4 to 5 servings of vegetables each day. A serving is 1 cup of lettuce or raw leafy vegetables, ½ cup of chopped or cooked vegetables, or 4 ounces (½ cup) of vegetable juice. Choose vegetables more often than vegetable juice. · Get 2 to 3 servings of low-fat and fat-free dairy each day. A serving is 8 ounces of milk, 1 cup of yogurt, or 1 ½ ounces of cheese. · Eat 6 to 8 servings of grains each day.  A serving is 1 slice of bread, 1 ounce of dry cereal, or ½ cup of cooked rice, pasta, or cooked cereal. Try to choose whole-grain products as much as possible. · Limit lean meat, poultry, and fish to 2 servings each day. A serving is 3 ounces, about the size of a deck of cards. · Eat 4 to 5 servings of nuts, seeds, and legumes (cooked dried beans, lentils, and split peas) each week. A serving is 1/3 cup of nuts, 2 tablespoons of seeds, or ½ cup of cooked beans or peas. · Limit fats and oils to 2 to 3 servings each day. A serving is 1 teaspoon of vegetable oil or 2 tablespoons of salad dressing. · Limit sweets and added sugars to 5 servings or less a week. A serving is 1 tablespoon jelly or jam, ½ cup sorbet, or 1 cup of lemonade. · Eat less than 2,300 milligrams (mg) of sodium a day. If you limit your sodium to 1,500 mg a day, you can lower your blood pressure even more. Tips for success  · Start small. Do not try to make dramatic changes to your diet all at once. You might feel that you are missing out on your favorite foods and then be more likely to not follow the plan. Make small changes, and stick with them. Once those changes become habit, add a few more changes. · Try some of the following:  ¨ Make it a goal to eat a fruit or vegetable at every meal and at snacks. This will make it easy to get the recommended amount of fruits and vegetables each day. ¨ Try yogurt topped with fruit and nuts for a snack or healthy dessert. ¨ Add lettuce, tomato, cucumber, and onion to sandwiches. ¨ Combine a ready-made pizza crust with low-fat mozzarella cheese and lots of vegetable toppings. Try using tomatoes, squash, spinach, broccoli, carrots, cauliflower, and onions. ¨ Have a variety of cut-up vegetables with a low-fat dip as an appetizer instead of chips and dip. ¨ Sprinkle sunflower seeds or chopped almonds over salads. Or try adding chopped walnuts or almonds to cooked vegetables. ¨ Try some vegetarian meals using beans and peas. Add garbanzo or kidney beans to salads. Make burritos and tacos with mashed elizabeth beans or black beans. Where can you learn more? Go to http://samreen-tabitha.info/. Enter M500 in the search box to learn more about \"DASH Diet: Care Instructions. \"  Current as of: September 21, 2016  Content Version: 11.4  © 2306-6761 [employer]. Care instructions adapted under license by [employer] (which disclaims liability or warranty for this information). If you have questions about a medical condition or this instruction, always ask your healthcare professional. [place] [address] any warranty or liability for your use of this information.

## 2017-12-19 NOTE — MR AVS SNAPSHOT
Visit Information Date & Time Provider Department Dept. Phone Encounter #  
 12/19/2017  3:15 PM Silva Morejon, [address] [place] [address] [phone] [id number] Follow-up Instructions Return if symptoms worsen or fail to improve. Your Appointments 2/15/2018  7:00 AM  
LAB with [facility] (JORGE LUIS Pepper) Appt Note: lab 129 [facility] [address] [address]  
[phone]  
  
   
 [address], [facility]  
  
    
 2/22/2018 10:00 AM  
Office Visit with Silva Morejon MD  
[facility] [address] [place] [address]) Appt Note: 4 m fu  
 2613 [address] [facility],[address], [address] [address]  
[phone]  
  
   
 [address] 5/3/2018  9:30 AM  
Nurse Visit with [facility]SE [facility] ([address] [place] [address] Road) [facility] [address] [place] [address]  
[address] [place] [address] 5/11/2018  9:30 AM  
Any with Sari Lechuga MD  
[facility] ([address] [place] [address]) [facility] [address] [place] [address]  
[address] [place] [address] Upcoming Health Maintenance Date Due DTaP/Tdap/Td series (1 - Tdap) 7/9/1974 FOBT Q 1 YEAR, 18+ 10/15/2016 COLONOSCOPY 4/1/2020 Allergies as of 12/19/2017  Review Complete On: 12/19/2017 By: Rachel Cheatham Severity Noted Reaction Type Reactions Ace Inhibitors  05/17/2010    Cough Dilaudid [Hydromorphone]  05/17/2010    Hives Lisinopril  05/17/2010    Cough Zocor [Simvastatin]  07/30/2011    Myalgia Right leg Current Immunizations  Reviewed on 9/20/2017 Name Date Influenza Vaccine 1/2/2015 Influenza Vaccine (Quad) PF 9/20/2017, 10/12/2016, 10/15/2015 Zoster Vaccine, Live 10/22/2015 Not reviewed this visit You Were Diagnosed With   
 Codes Comments Acute midline thoracic back pain    -  Primary ICD-10-CM: M54.6 ICD-9-CM: 724.1 Chronic midline low back pain without sciatica     ICD-10-CM: M54.5, G89.29 ICD-9-CM: 724.2, 338.29 Vitals BP Pulse Temp Resp Height(growth percentile) Weight(growth percentile) 129/72 (BP 1 Location: Left arm, BP Patient Position: Sitting) (!) 52 98.9 °F (37.2 °C) (Oral) 18 5' 9\" (1.753 m) 252 lb 12.8 oz (114.7 kg) SpO2 BMI Smoking Status 96% 37.33 kg/m2 Former Smoker BMI and BSA Data Body Mass Index Body Surface Area  
 37.33 kg/m 2 2.36 m 2 Preferred Pharmacy Pharmacy Name Phone [facility] [facility] Your Updated Medication List  
  
   
This list is accurate as of: 12/19/17  3:52 PM.  Always use your most recent med list.  
  
  
  
  
 acetaminophen-codeine 300-30 mg per tablet Commonly known as:  TYLENOL #3  
  
 albuterol 90 mcg/actuation inhaler Commonly known as:  PROVENTIL HFA, VENTOLIN HFA, PROAIR HFA Take 2 Puffs by inhalation every four (4) hours as needed for Wheezing. chlorhexidine 0.12 % solution Commonly known as:  PERIDEX  
  
 cyclobenzaprine 10 mg tablet Commonly known as:  FLEXERIL Take 1 Tab by mouth nightly. fluticasone 50 mcg/actuation nasal spray Commonly known as:  Darrell Sacks 2 Sprays by Both Nostrils route daily. gabapentin 300 mg capsule Commonly known as:  NEURONTIN  
TAKE ONE CAPSULE BY MOUTH THREE TIMES DAILY  
  
 guaiFENesin  mg ER tablet Commonly known as:  Nick & Nick Take 1 Tab by mouth two (2) times a day. ibuprofen 800 mg tablet Commonly known as:  MOTRIN  
TAKE ONE TABLET BY MOUTH EVERY 8 HOURS AS NEEDED FOR PAIN  
  
 metoprolol tartrate 25 mg tablet Commonly known as:  LOPRESSOR  
TAKE ONE TABLET BY MOUTH TWICE DAILY ( GENERIC FOR LOPRESSOR )  
  
 montelukast 10 mg tablet Commonly known as:  SINGULAIR Take 1 Tab by mouth daily. nitroglycerin 0.4 mg SL tablet Commonly known as:  NITROSTAT  
1 Tab by SubLINGual route every five (5) minutes as needed. pantoprazole 40 mg tablet Commonly known as:  PROTONIX Take 1 Tab by mouth daily. pravastatin 40 mg tablet Commonly known as:  PRAVACHOL  
TAKE ONE TABLET BY MOUTH NIGHTLY  
  
 predniSONE 10 mg tablet Commonly known as:  DELTASONE  
TAKE FIVE TABLETS BY MOUTH ONCE DAILY FOR 5 DAYS  
  
 tamsulosin 0.4 mg capsule Commonly known as:  FLOMAX Take 1 Cap by mouth daily (after dinner). tapentadol  mg tablet Commonly known as:  Teri Vargas ER Take 1 Tab by mouth every twelve (12) hours. Max Daily Amount: 200 mg. TRAVATAN Z 0.004 % ophthalmic solution Generic drug:  travoprost  
Administer 1 Drop to both eyes nightly. traZODone 50 mg tablet Commonly known as:  DESYREL  
TAKE ONE TABLET BY MOUTH ONCE DAILY AT BEDTIME AS NEEDED FOR SLEEP We Performed the Following REFERRAL TO PHYSICAL THERAPY [CXW51 Custom] Comments:  
 Refer for low and mid back pain Follow-up Instructions Return if symptoms worsen or fail to improve. To-Do List   
 12/19/2017 Imaging:  MRI [facility] SPINE WO CONT Referral Information Referral ID Referred By Referred To  
  
 [id number] WILLIAM MENARD [address] [place] [address] Phone: [phone] Visits Status Start Date End Date 1 New Request 12/19/17 12/19/18 If your referral has a status of pending review or denied, additional information will be sent to support the outcome of this decision. Patient Instructions High Blood Pressure: Care Instructions Your Care Instructions If your blood pressure is usually above 140/90, you have high blood pressure, or hypertension.  That means the top number is 140 or higher or the bottom number is 90 or higher, or both. Despite what a lot of people think, high blood pressure usually doesn't cause headaches or make you feel dizzy or lightheaded. It usually has no symptoms. But it does increase your risk for heart attack, stroke, and kidney or eye damage. The higher your blood pressure, the more your risk increases. Your doctor will give you a goal for your blood pressure. Your goal will be based on your health and your age. An example of a goal is to keep your blood pressure below 140/90. Lifestyle changes, such as eating healthy and being active, are always important to help lower blood pressure. You might also take medicine to reach your blood pressure goal. 
Follow-up care is a key part of your treatment and safety. Be sure to make and go to all appointments, and call your doctor if you are having problems. It's also a good idea to know your test results and keep a list of the medicines you take. How can you care for yourself at home? Medical treatment · If you stop taking your medicine, your blood pressure will go back up. You may take one or more types of medicine to lower your blood pressure. Be safe with medicines. Take your medicine exactly as prescribed. Call your doctor if you think you are having a problem with your medicine. · Talk to your doctor before you start taking aspirin every day. Aspirin can help certain people lower their risk of a heart attack or stroke. But taking aspirin isn't right for everyone, because it can cause serious bleeding. · See your doctor regularly. You may need to see the doctor more often at first or until your blood pressure comes down. · If you are taking blood pressure medicine, talk to your doctor before you take decongestants or anti-inflammatory medicine, such as ibuprofen. Some of these medicines can raise blood pressure. · Learn how to check your blood pressure at home. Lifestyle changes · Stay at a healthy weight. This is especially important if you put on weight around the waist. Losing even 10 pounds can help you lower your blood pressure. · If your doctor recommends it, get more exercise. Walking is a good choice. Bit by bit, increase the amount you walk every day. Try for at least 30 minutes on most days of the week. You also may want to swim, bike, or do other activities. · Avoid or limit alcohol. Talk to your doctor about whether you can drink any alcohol. · Try to limit how much sodium you eat to less than 2,300 milligrams (mg) a day. Your doctor may ask you to try to eat less than 1,500 mg a day. · Eat plenty of fruits (such as bananas and oranges), vegetables, legumes, whole grains, and low-fat dairy products. · Lower the amount of saturated fat in your diet. Saturated fat is found in animal products such as milk, cheese, and meat. Limiting these foods may help you lose weight and also lower your risk for heart disease. · Do not smoke. Smoking increases your risk for heart attack and stroke. If you need help quitting, talk to your doctor about stop-smoking programs and medicines. These can increase your chances of quitting for good. When should you call for help? Call 911 anytime you think you may need emergency care. This may mean having symptoms that suggest that your blood pressure is causing a serious heart or blood vessel problem. Your blood pressure may be over 180/110. ? For example, call 911 if: 
? · You have symptoms of a heart attack. These may include: ¨ Chest pain or pressure, or a strange feeling in the chest. 
¨ Sweating. ¨ Shortness of breath. ¨ Nausea or vomiting. ¨ Pain, pressure, or a strange feeling in the back, neck, jaw, or upper belly or in one or both shoulders or arms. ¨ Lightheadedness or sudden weakness. ¨ A fast or irregular heartbeat. ? · You have symptoms of a stroke. These may include: ¨ Sudden numbness, tingling, weakness, or loss of movement in your face, arm, or leg, especially on only one side of your body. ¨ Sudden vision changes. ¨ Sudden trouble speaking. ¨ Sudden confusion or trouble understanding simple statements. ¨ Sudden problems with walking or balance. ¨ A sudden, severe headache that is different from past headaches. ? · You have severe back or belly pain. ?Do not wait until your blood pressure comes down on its own. Get help right away. ?Call your doctor now or seek immediate care if: 
? · Your blood pressure is much higher than normal (such as 180/110 or higher), but you don't have symptoms. ? · You think high blood pressure is causing symptoms, such as: ¨ Severe headache. ¨ Blurry vision. ? Watch closely for changes in your health, and be sure to contact your doctor if: 
? · Your blood pressure measures 140/90 or higher at least 2 times. That means the top number is 140 or higher or the bottom number is 90 or higher, or both. ? · You think you may be having side effects from your blood pressure medicine. ? · Your blood pressure is usually normal, but it goes above normal at least 2 times. Where can you learn more? Go to http://samreen-tabitha.info/. Enter B003 in the search box to learn more about \"High Blood Pressure: Care Instructions. \" Current as of: September 21, 2016 Content Version: 11.4 © 3788-8125 Mobile[employer]. Care instructions adapted under license by gaytravel.com (which disclaims liability or warranty for this information). If you have questions about a medical condition or this instruction, always ask your healthcare professional. [place] [address] any warranty or liability for your use of this information. DASH Diet: Care Instructions Your Care Instructions The DASH diet is an eating plan that can help lower your blood pressure. DASH stands for Dietary Approaches to Stop Hypertension. Hypertension is high blood pressure. The DASH diet focuses on eating foods that are high in calcium, potassium, and magnesium. These nutrients can lower blood pressure. The foods that are highest in these nutrients are fruits, vegetables, low-fat dairy products, nuts, seeds, and legumes. But taking calcium, potassium, and magnesium supplements instead of eating foods that are high in those nutrients does not have the same effect. The DASH diet also includes whole grains, fish, and poultry. The DASH diet is one of several lifestyle changes your doctor may recommend to lower your high blood pressure. Your doctor may also want you to decrease the amount of sodium in your diet. Lowering sodium while following the DASH diet can lower blood pressure even further than just the DASH diet alone. Follow-up care is a key part of your treatment and safety. Be sure to make and go to all appointments, and call your doctor if you are having problems. It's also a good idea to know your test results and keep a list of the medicines you take. How can you care for yourself at home? Following the DASH diet · Eat 4 to 5 servings of fruit each day. A serving is 1 medium-sized piece of fruit, ½ cup chopped or canned fruit, 1/4 cup dried fruit, or 4 ounces (½ cup) of fruit juice. Choose fruit more often than fruit juice. · Eat 4 to 5 servings of vegetables each day. A serving is 1 cup of lettuce or raw leafy vegetables, ½ cup of chopped or cooked vegetables, or 4 ounces (½ cup) of vegetable juice. Choose vegetables more often than vegetable juice. · Get 2 to 3 servings of low-fat and fat-free dairy each day. A serving is 8 ounces of milk, 1 cup of yogurt, or 1 ½ ounces of cheese. · Eat 6 to 8 servings of grains each day.  A serving is 1 slice of bread, 1 ounce of dry cereal, or ½ cup of cooked rice, pasta, or cooked cereal. Try to choose whole-grain products as much as possible. · Limit lean meat, poultry, and fish to 2 servings each day. A serving is 3 ounces, about the size of a deck of cards. · Eat 4 to 5 servings of nuts, seeds, and legumes (cooked dried beans, lentils, and split peas) each week. A serving is 1/3 cup of nuts, 2 tablespoons of seeds, or ½ cup of cooked beans or peas. · Limit fats and oils to 2 to 3 servings each day. A serving is 1 teaspoon of vegetable oil or 2 tablespoons of salad dressing. · Limit sweets and added sugars to 5 servings or less a week. A serving is 1 tablespoon jelly or jam, ½ cup sorbet, or 1 cup of lemonade. · Eat less than 2,300 milligrams (mg) of sodium a day. If you limit your sodium to 1,500 mg a day, you can lower your blood pressure even more. Tips for success · Start small. Do not try to make dramatic changes to your diet all at once. You might feel that you are missing out on your favorite foods and then be more likely to not follow the plan. Make small changes, and stick with them. Once those changes become habit, add a few more changes. · Try some of the following: ¨ Make it a goal to eat a fruit or vegetable at every meal and at snacks. This will make it easy to get the recommended amount of fruits and vegetables each day. ¨ Try yogurt topped with fruit and nuts for a snack or healthy dessert. ¨ Add lettuce, tomato, cucumber, and onion to sandwiches. ¨ Combine a ready-made pizza crust with low-fat mozzarella cheese and lots of vegetable toppings. Try using tomatoes, squash, spinach, broccoli, carrots, cauliflower, and onions. ¨ Have a variety of cut-up vegetables with a low-fat dip as an appetizer instead of chips and dip. ¨ Sprinkle sunflower seeds or chopped almonds over salads. Or try adding chopped walnuts or almonds to cooked vegetables. ¨ Try some vegetarian meals using beans and peas.  Add garbanzo or kidney beans to salads. Make burritos and tacos with mashed elizabeth beans or black beans. Where can you learn more? Go to http://samreen-tabitha.info/. Enter P128 in the search box to learn more about \"DASH Diet: Care Instructions. \" Current as of: September 21, 2016 Content Version: 11.4 © 3229-1767 [employer]. Care instructions adapted under license by [employer] (which disclaims liability or warranty for this information). If you have questions about a medical condition or this instruction, always ask your healthcare professional. [place] [address] any warranty or liability for your use of this information. Introducing [facility]! [facility] introduces SIVI patient portal. Now you can access parts of your medical record, email your doctor's office, and request medication refills online. 1. In your internet browser, go to [email or web address] 2. Click on the First Time User? Click Here link in the Sign In box. You will see the New Member Sign Up page. 3. Enter your SIVI Access Code exactly as it appears below. You will not need to use this code after youve completed the sign-up process. If you do not sign up before the expiration date, you must request a new code. · SIVI Access Code: [email or web address] Expires: 3/19/2018  3:52 PM 
 
4. Enter the last four digits of your Social Security Number (xxxx) and Date of Birth (mm/dd/yyyy) as indicated and click Submit. You will be taken to the next sign-up page. 5. Create a Mailboxt ID. This will be your SIVI login ID and cannot be changed, so think of one that is secure and easy to remember. 6. Create a SIVI password. You can change your password at any time. 7. Enter your Password Reset Question and Answer. This can be used at a later time if you forget your password. 8. Enter your e-mail address.  You will receive e-mail notification when new information is available in UrtheCast. 9. Click Sign Up. You can now view and download portions of your medical record. 10. Click the Download Summary menu link to download a portable copy of your medical information. If you have questions, please visit the Frequently Asked Questions section of the UrtheCast website. Remember, UrtheCast is NOT to be used for urgent needs. For medical emergencies, dial 911. Now available from your iPhone and Android! Please provide this summary of care documentation to your next provider. Your primary care clinician is listed as WILLIAM MENARD. If you have any questions after today's visit, please call [phone].

## 2017-12-21 DIAGNOSIS — G89.29 CHRONIC MIDLINE LOW BACK PAIN WITHOUT SCIATICA: Primary | ICD-10-CM

## 2017-12-21 DIAGNOSIS — M54.50 CHRONIC MIDLINE LOW BACK PAIN WITHOUT SCIATICA: Primary | ICD-10-CM

## 2017-12-24 NOTE — PROGRESS NOTES
Angelo Rodriguez is a 57 y.o.  male and presents with Back Pain and COPD      SUBJECTIVE:    Back Pain  Patient presents for presents evaluation of low back problems. Symptoms have been present for 1 week and include pain in mid back and lower back (aching, stabbing in character; 8/10 in severity). Initial inciting event: none. Symptoms are worst: morning. Alleviating factors identifiable by patient are recumbency. Exacerbating factors identifiable by patient are standing, walking. Treatments so far initiated by patient: pt continues on Nucynta ER Previous lower back problems: yes. Previous workup: MRI of LS spine and Spine center eval. Previous treatments: followed by Spine center. COPD  Patient complains of increased sputum. Symptoms began several weeks ago. Symptoms chronic dyspnea: severity = mild: course of sx: symptoms have progressed to a point and plateaued. does worsen with exertion. Sputum is clear in large amounts. . Patient uses 1 pillows at night. Patient can walk >100 feet before resting. Patient currently is not on home oxygen therapy. Yady Goodwin Respiratory history: occasional episodes of bronchitis    Respiratory ROS: negative for - shortness of breath  Cardiovascular ROS: negative for - chest pain    Current Outpatient Prescriptions   Medication Sig    umeclidinium-vilanterol (ANORO ELLIPTA) 62.5-25 mcg/actuation inhaler Take 1 Puff by inhalation daily.  traZODone (DESYREL) 50 mg tablet TAKE ONE TABLET BY MOUTH ONCE DAILY AT BEDTIME AS NEEDED FOR SLEEP    tapentadol ER (NUCYNTA ER) 100 mg tablet Take 1 Tab by mouth every twelve (12) hours. Max Daily Amount: 200 mg.    guaiFENesin ER (MUCINEX) 600 mg ER tablet Take 1 Tab by mouth two (2) times a day.     predniSONE (DELTASONE) 10 mg tablet TAKE FIVE TABLETS BY MOUTH ONCE DAILY FOR 5 DAYS    pravastatin (PRAVACHOL) 40 mg tablet TAKE ONE TABLET BY MOUTH NIGHTLY    gabapentin (NEURONTIN) 300 mg capsule TAKE ONE CAPSULE BY MOUTH THREE TIMES DAILY    chlorhexidine (PERIDEX) 0.12 % solution     acetaminophen-codeine (TYLENOL #3) 300-30 mg per tablet     metoprolol tartrate (LOPRESSOR) 25 mg tablet TAKE ONE TABLET BY MOUTH TWICE DAILY ( GENERIC FOR LOPRESSOR )    fluticasone (FLONASE) 50 mcg/actuation nasal spray 2 Sprays by Both Nostrils route daily.  montelukast (SINGULAIR) 10 mg tablet Take 1 Tab by mouth daily.  cyclobenzaprine (FLEXERIL) 10 mg tablet Take 1 Tab by mouth nightly.  ibuprofen (MOTRIN) 800 mg tablet TAKE ONE TABLET BY MOUTH EVERY 8 HOURS AS NEEDED FOR PAIN    tamsulosin (FLOMAX) 0.4 mg capsule Take 1 Cap by mouth daily (after dinner).  pantoprazole (PROTONIX) 40 mg tablet Take 1 Tab by mouth daily.  nitroglycerin (NITROSTAT) 0.4 mg SL tablet 1 Tab by SubLINGual route every five (5) minutes as needed.  albuterol (PROVENTIL HFA, VENTOLIN HFA, PROAIR HFA) 90 mcg/actuation inhaler Take 2 Puffs by inhalation every four (4) hours as needed for Wheezing.  TRAVATAN Z 0.004 % ophthalmic solution Administer 1 Drop to both eyes nightly.  tapentadol (NUCYNTA) 100 mg tablet Take 100 mg by mouth every six (6) hours as needed for Pain. Max Daily Amount: 400 mg. No current facility-administered medications for this visit.           OBJECTIVE:  alert, well appearing, and in no distress  Visit Vitals    /72 (BP 1 Location: Left arm, BP Patient Position: Sitting)    Pulse (!) 52    Temp 98.9 °F (37.2 °C) (Oral)    Resp 18    Ht 5' 9\" (1.753 m)    Wt 252 lb 12.8 oz (114.7 kg)    SpO2 96%    BMI 37.33 kg/m2      well developed and well nourished  Chest - clear to auscultation, no wheezes, rales or rhonchi, symmetric air entry  Heart - normal rate, regular rhythm, normal S1, S2, no murmurs, rubs, clicks or gallops  Back exam - antalgic gait, pain with motion noted during exam, tenderness noted mostly in left paraspinal thoracic area   Extremities - peripheral pulses normal, no pedal edema, no clubbing or cyanosis      Discussed the patient's BMI with him. The BMI follow up plan is as follows: I have counseled this patient on diet and exercise regimens. Assessment/Plan      ICD-10-CM ICD-9-CM    1. Acute midline thoracic back pain M54.6 724.1 MRI [facility] SPINE WO CONT      REFERRAL TO PHYSICAL THERAPY   2. Chronic midline low back pain without sciatica M54.5 724.2 REFERRAL TO PHYSICAL THERAPY    G89.29 338.29    3. [facility] J44.9 491.8 Will treat with umeclidinium-vilanterol (ANORO ELLIPTA) 62.5-25 mcg/actuation inhaler     Follow-up Disposition:  Return if symptoms worsen or fail to improve. Reviewed plan of care. Patient has provided input and agrees with goals.

## 2017-12-29 ENCOUNTER — HOSPITAL ENCOUNTER (OUTPATIENT)
Age: 62
Discharge: HOME | End: 2017-12-29
Attending: INTERNAL MEDICINE
Payer: MEDICARE

## 2017-12-29 DIAGNOSIS — M54.6 ACUTE MIDLINE THORACIC BACK PAIN: ICD-10-CM

## 2017-12-29 PROCEDURE — 72146 MRI CHEST SPINE W/O DYE: CPT

## 2018-01-08 NOTE — PROGRESS NOTES
I called Pt in regards to MRI results. Informed Pt that MRI showed some mild arthritis. Pt verbalized understanding.

## 2018-01-10 RX ORDER — LACTULOSE 10 G/15ML
SOLUTION ORAL; RECTAL
Qty: 480 ML | Refills: 2 | Status: SHIPPED | OUTPATIENT
Start: 2018-01-10

## 2018-01-10 NOTE — TELEPHONE ENCOUNTER
[facility] requesting refill on  Constulose 10gm/15 sol    Last OV 12/19/17  Last refill 9/1/16  DC'D 3/16/17

## 2018-01-18 DIAGNOSIS — M54.50 CHRONIC MIDLINE LOW BACK PAIN WITHOUT SCIATICA: ICD-10-CM

## 2018-01-18 DIAGNOSIS — G89.29 CHRONIC MIDLINE LOW BACK PAIN WITHOUT SCIATICA: ICD-10-CM

## 2018-01-19 ENCOUNTER — TELEPHONE (OUTPATIENT)
Dept: FAMILY MEDICINE CLINIC | Age: 63
End: 2018-01-19

## 2018-01-19 DIAGNOSIS — G89.29 CHRONIC MIDLINE LOW BACK PAIN WITHOUT SCIATICA: ICD-10-CM

## 2018-01-19 DIAGNOSIS — M54.50 CHRONIC MIDLINE LOW BACK PAIN WITHOUT SCIATICA: ICD-10-CM

## 2018-01-19 NOTE — TELEPHONE ENCOUNTER
Pt calling  Re: [place].  [facility] not have the 100 mg in stock and won't have until next week    They do have 50 mg    He is completely out of medication

## 2018-01-22 ENCOUNTER — TELEPHONE (OUTPATIENT)
Dept: FAMILY MEDICINE CLINIC | Age: 63
End: 2018-01-22

## 2018-01-22 NOTE — TELEPHONE ENCOUNTER
Patient states he received a call regarding medication was ready to be picked up. But he is confused as to what the medication is since one was changed and picked up.  Please advise [phone]

## 2018-01-24 NOTE — TELEPHONE ENCOUNTER
Pt came in to office stating that [facility] RENAN JONES does not have the 50mg prescription of Tapentadol please advise.

## 2018-02-09 ENCOUNTER — TELEPHONE (OUTPATIENT)
Dept: FAMILY MEDICINE CLINIC | Age: 63
End: 2018-02-09

## 2018-02-09 NOTE — TELEPHONE ENCOUNTER
Patient stopped by office to drop off letter that stated that the medication Kathannah Leash is not included on the list of covered drugs. Patient wants to know will medication be changed. Letter placed in mailbox in front office.

## 2018-02-12 NOTE — TELEPHONE ENCOUNTER
Please advise patient is requesting a new pain medication. Will patient need an appointment to get new medication or wait until Dr. Franco Araujo returns.

## 2018-02-15 ENCOUNTER — HOSPITAL ENCOUNTER (OUTPATIENT)
Dept: LAB | Age: 63
Discharge: HOME | End: 2018-02-15
Payer: MEDICARE

## 2018-02-15 DIAGNOSIS — E78.00 HYPERCHOLESTEROLEMIA: ICD-10-CM

## 2018-02-15 DIAGNOSIS — I10 ESSENTIAL HYPERTENSION: ICD-10-CM

## 2018-02-15 DIAGNOSIS — R73.9 ELEVATED BLOOD SUGAR: ICD-10-CM

## 2018-02-15 LAB
ALBUMIN SERPL-MCNC: 3.6 G/DL (ref 3.4–5)
ALBUMIN/GLOB SERPL: 1.1 {RATIO} (ref 0.8–1.7)
ALP SERPL-CCNC: 68 U/L (ref 45–117)
ALT SERPL-CCNC: 16 U/L (ref 16–61)
ANION GAP SERPL CALC-SCNC: 6 MMOL/L (ref 3–18)
AST SERPL-CCNC: 14 U/L (ref 15–37)
BILIRUB SERPL-MCNC: 0.2 MG/DL (ref 0.2–1)
BUN SERPL-MCNC: 17 MG/DL (ref 7–18)
BUN/CREAT SERPL: 13 (ref 12–20)
CALCIUM SERPL-MCNC: 8.7 MG/DL (ref 8.5–10.1)
CHLORIDE SERPL-SCNC: 111 MMOL/L (ref 100–108)
CHOLEST SERPL-MCNC: 150 MG/DL
CO2 SERPL-SCNC: 25 MMOL/L (ref 21–32)
CREAT SERPL-MCNC: 1.32 MG/DL (ref 0.6–1.3)
EST. AVERAGE GLUCOSE BLD GHB EST-MCNC: 134 MG/DL
GLOBULIN SER CALC-MCNC: 3.4 G/DL (ref 2–4)
GLUCOSE SERPL-MCNC: 109 MG/DL (ref 74–99)
HBA1C MFR BLD: 6.3 % (ref 4.2–5.6)
HDLC SERPL-MCNC: 39 MG/DL (ref 40–60)
HDLC SERPL: 3.8 {RATIO} (ref 0–5)
LDLC SERPL CALC-MCNC: 82 MG/DL (ref 0–100)
LIPID PROFILE,FLP: ABNORMAL
POTASSIUM SERPL-SCNC: 4.5 MMOL/L (ref 3.5–5.5)
PROT SERPL-MCNC: 7 G/DL (ref 6.4–8.2)
SODIUM SERPL-SCNC: 142 MMOL/L (ref 136–145)
TRIGL SERPL-MCNC: 145 MG/DL (ref ?–150)
VLDLC SERPL CALC-MCNC: 29 MG/DL

## 2018-02-15 PROCEDURE — 36415 COLL VENOUS BLD VENIPUNCTURE: CPT | Performed by: INTERNAL MEDICINE

## 2018-02-15 PROCEDURE — 83036 HEMOGLOBIN GLYCOSYLATED A1C: CPT | Performed by: INTERNAL MEDICINE

## 2018-02-15 PROCEDURE — 80053 COMPREHEN METABOLIC PANEL: CPT | Performed by: INTERNAL MEDICINE

## 2018-02-15 PROCEDURE — 80061 LIPID PANEL: CPT | Performed by: INTERNAL MEDICINE

## 2018-02-22 ENCOUNTER — OFFICE VISIT (OUTPATIENT)
Dept: FAMILY MEDICINE CLINIC | Age: 63
End: 2018-02-22

## 2018-02-22 VITALS
OXYGEN SATURATION: 97 % | HEIGHT: 69 IN | TEMPERATURE: 98.9 F | SYSTOLIC BLOOD PRESSURE: 124 MMHG | RESPIRATION RATE: 20 BRPM | BODY MASS INDEX: 37.33 KG/M2 | HEART RATE: 61 BPM | WEIGHT: 252 LBS | DIASTOLIC BLOOD PRESSURE: 52 MMHG

## 2018-02-22 DIAGNOSIS — G89.29 CHRONIC MIDLINE LOW BACK PAIN WITHOUT SCIATICA: ICD-10-CM

## 2018-02-22 DIAGNOSIS — I10 ESSENTIAL HYPERTENSION: Primary | ICD-10-CM

## 2018-02-22 DIAGNOSIS — F51.01 PRIMARY INSOMNIA: ICD-10-CM

## 2018-02-22 DIAGNOSIS — E78.00 HYPERCHOLESTEROLEMIA: ICD-10-CM

## 2018-02-22 DIAGNOSIS — M54.50 CHRONIC MIDLINE LOW BACK PAIN WITHOUT SCIATICA: ICD-10-CM

## 2018-02-22 DIAGNOSIS — R73.9 ELEVATED BLOOD SUGAR: ICD-10-CM

## 2018-02-22 RX ORDER — TRAMADOL HYDROCHLORIDE 50 MG/1
100 TABLET ORAL
Qty: 100 TAB | Refills: 0 | Status: SHIPPED | OUTPATIENT
Start: 2018-02-22 | End: 2018-04-20 | Stop reason: ALTCHOICE

## 2018-02-22 NOTE — PATIENT INSTRUCTIONS

## 2018-02-22 NOTE — MR AVS SNAPSHOT
[address] [facility] 
 
 
 [address], Katharina Lennox [address] [address] 
[phone] Patient: Nimo Márquez MRN:  [id number] Visit Information Date & Time Provider Department Dept. Phone Encounter #  
 2/22/2018  1:00 PM Dipak Sunshine, [address] [place] [address] [phone] [id number] Follow-up Instructions Return in about 4 months (around 6/22/2018) for labs 1 week before. Your Appointments 5/3/2018  9:30 AM  
Nurse Visit with Kathi Stewart [facility] ([address] [facility]) Appt Note: free and total psa  
 709 [facility] 300 [facility]  
  
    
  
 5/11/2018  9:30 AM  
Any with Charla Retana MD  
[facility] ([address] [facility]) Skylar Diamond [address] [place] [address]  
[address] [facility] 301 [facility] [place] [address] [place] [address] Upcoming Health Maintenance Date Due DTaP/Tdap/Td series (1 - Tdap) 7/9/1974 FOBT Q 1 YEAR, 18+ 10/15/2016 COLONOSCOPY 4/1/2020 Allergies as of 2/22/2018  Review Complete On: 2/22/2018 By: Dipak Sunshine MD  
  
 Severity Noted Reaction Type Reactions Ace Inhibitors  05/17/2010    Cough Dilaudid [Hydromorphone]  05/17/2010    Hives Lisinopril  05/17/2010    Cough Zocor [Simvastatin]  07/30/2011    Myalgia Right leg Current Immunizations  Reviewed on 9/20/2017 Name Date Influenza Vaccine 1/2/2015 Influenza Vaccine (Quad) PF 9/20/2017, 10/12/2016, 10/15/2015 Zoster Vaccine, Live 10/22/2015 Not reviewed this visit You Were Diagnosed With   
  
 Codes Comments Essential hypertension    -  Primary ICD-10-CM: I10 
ICD-9-CM: 401.9 Hypercholesterolemia     ICD-10-CM: E78.00 ICD-9-CM: 272.0  Primary insomnia     ICD-10-CM: F51.01 
ICD-9-CM: 307.42   
 Chronic midline low back pain without sciatica     ICD-10-CM: M54.5, G89.29 ICD-9-CM: 724.2, 338.29 Vitals BP Pulse Temp Resp Height(growth percentile) Weight(growth percentile) 124/52 (BP 1 Location: Left arm, BP Patient Position: Sitting) 61 98.9 °F (37.2 °C) (Oral) 20 5' 9\" (1.753 m) 252 lb (114.3 kg) SpO2 BMI Smoking Status 97% 37.21 kg/m2 Former Smoker BMI and BSA Data Body Mass Index Body Surface Area  
 37.21 kg/m 2 2.36 m 2 Preferred Pharmacy Pharmacy Name Phone Meenakshi Olmos [address] [facility], [address] [place] [phone] Your Updated Medication List  
  
   
This list is accurate as of 2/22/18  1:45 PM.  Always use your most recent med list.  
  
  
  
  
 albuterol 90 mcg/actuation inhaler Commonly known as:  PROVENTIL HFA, VENTOLIN HFA, PROAIR HFA Take 2 Puffs by inhalation every four (4) hours as needed for Wheezing. chlorhexidine 0.12 % solution Commonly known as:  PERIDEX  
  
 cyclobenzaprine 10 mg tablet Commonly known as:  FLEXERIL Take 1 Tab by mouth nightly. fluticasone 50 mcg/actuation nasal spray Commonly known as:  Kae Pop 2 Sprays by Both Nostrils route daily. gabapentin 300 mg capsule Commonly known as:  NEURONTIN  
TAKE ONE CAPSULE BY MOUTH THREE TIMES DAILY  
  
 guaiFENesin  mg ER tablet Commonly known as:  Nick & Nick Take 1 Tab by mouth two (2) times a day. ibuprofen 800 mg tablet Commonly known as:  MOTRIN  
TAKE ONE TABLET BY MOUTH EVERY 8 HOURS AS NEEDED FOR PAIN  
  
 lactulose 10 gram/15 mL solution Commonly known as:  Rochele Tori Take 45ml PO TID  
  
 metoprolol tartrate 25 mg tablet Commonly known as:  LOPRESSOR  
TAKE ONE TABLET BY MOUTH TWICE DAILY ( GENERIC FOR LOPRESSOR )  
  
 montelukast 10 mg tablet Commonly known as:  SINGULAIR Take 1 Tab by mouth daily. nitroglycerin 0.4 mg SL tablet Commonly known as:  NITROSTAT 1 Tab by SubLINGual route every five (5) minutes as needed. pantoprazole 40 mg tablet Commonly known as:  PROTONIX Take 1 Tab by mouth daily. pravastatin 40 mg tablet Commonly known as:  PRAVACHOL  
TAKE ONE TABLET BY MOUTH NIGHTLY  
  
 tamsulosin 0.4 mg capsule Commonly known as:  FLOMAX Take 1 Cap by mouth daily (after dinner). tapentadol  mg tablet Commonly known as:  Donata Dingwall ER Take 1 Tab by mouth every twelve (12) hours. Max Daily Amount: 200 mg.  
  
 traMADol 50 mg tablet Commonly known as:  ULTRAM  
Take 2 Tabs by mouth every six (6) hours as needed for Pain. Max Daily Amount: 400 mg. TRAVATAN Z 0.004 % ophthalmic solution Generic drug:  travoprost  
Administer 1 Drop to both eyes nightly. traZODone 50 mg tablet Commonly known as:  DESYREL  
TAKE ONE TABLET BY MOUTH ONCE DAILY AT BEDTIME AS NEEDED FOR SLEEP  
  
 umeclidinium-vilanterol 62.5-25 mcg/actuation inhaler Commonly known as:  Flynn Maxcy Take 1 Puff by inhalation daily. Prescriptions Printed Refills  
 traMADol (ULTRAM) 50 mg tablet 0 Sig: Take 2 Tabs by mouth every six (6) hours as needed for Pain. Max Daily Amount: 400 mg. Class: Print Route: Oral  
  
Follow-up Instructions Return in about 4 months (around 6/22/2018) for labs 1 week before. Patient Instructions Back Stretches: Exercises Your Care Instructions Here are some examples of exercises for stretching your back. Start each exercise slowly. Ease off the exercise if you start to have pain. Your doctor or physical therapist will tell you when you can start these exercises and which ones will work best for you. How to do the exercises Overhead stretch 1. Stand comfortably with your feet shoulder-width apart. 2. Looking straight ahead, raise both arms over your head and reach toward the ceiling. Do not allow your head to tilt back. 3. Hold for 15 to 30 seconds, then lower your arms to your sides. 4. Repeat 2 to 4 times. Side stretch 1. Stand comfortably with your feet shoulder-width apart. 2. Raise one arm over your head, and then lean to the other side. 3. Slide your hand down your leg as you let the weight of your arm gently stretch your side muscles. Hold for 15 to 30 seconds. 4. Repeat 2 to 4 times on each side. Press-up 1. Lie on your stomach, supporting your body with your forearms. 2. Press your elbows down into the floor to raise your upper back. As you do this, relax your stomach muscles and allow your back to arch without using your back muscles. As your press up, do not let your hips or pelvis come off the floor. 3. Hold for 15 to 30 seconds, then relax. 4. Repeat 2 to 4 times. Relax and rest 
 
1. Lie on your back with a rolled towel under your neck and a pillow under your knees. Extend your arms comfortably to your sides. 2. Relax and breathe normally. 3. Remain in this position for about 10 minutes. 4. If you can, do this 2 or 3 times each day. Follow-up care is a key part of your treatment and safety. Be sure to make and go to all appointments, and call your doctor if you are having problems. It's also a good idea to know your test results and keep a list of the medicines you take. Where can you learn more? Go to http://samreen-tabitha.info/. Enter G735 in the search box to learn more about \"Back Stretches: Exercises. \" Current as of: March 21, 2017 Content Version: 11.4 © 2906-8999 Healthwise, Incorporated. Care instructions adapted under license by [employer] (which disclaims liability or warranty for this information). If you have questions about a medical condition or this instruction, always ask your healthcare professional. [address]  any warranty or liability for your use of this information. Introducing [facility]! [employer] introduces [employer] patient portal. Now you can access parts of your medical record, email your doctor's office, and request medication refills online. 1. In your internet browser, go to [email or web address] 2. Click on the First Time User? Click Here link in the Sign In box. You will see the New Member Sign Up page. 3. Enter your [employer] Access Code exactly as it appears below. You will not need to use this code after youve completed the sign-up process. If you do not sign up before the expiration date, you must request a new code. · [employer] Access Code: [email or web address] Expires: 3/19/2018  3:52 PM 
 
4. Enter the last four digits of your Social Security Number (xxxx) and Date of Birth (mm/dd/yyyy) as indicated and click Submit. You will be taken to the next sign-up page. 5. Create a [employer] ID. This will be your [employer] login ID and cannot be changed, so think of one that is secure and easy to remember. 6. Create a [employer] password. You can change your password at any time. 7. Enter your Password Reset Question and Answer. This can be used at a later time if you forget your password. 8. Enter your e-mail address. You will receive e-mail notification when new information is available in [address] 9. Click Sign Up. You can now view and download portions of your medical record. 10. Click the Download Summary menu link to download a portable copy of your medical information. If you have questions, please visit the Frequently Asked Questions section of the [employer] website. Remember, [employer] is NOT to be used for urgent needs. For medical emergencies, dial 911. Now available from your iPhone and Android! Please provide this summary of care documentation to your next provider. Your primary care clinician is listed as WILLIAM MENARD. If you have any questions after today's visit, please call [phone].

## 2018-02-22 NOTE — PROGRESS NOTES
Subjective:       Chief Complaint  The patient presents for follow up of hypertension and high cholesterol. prediabetes  chronic back pain and right hip pain        HPI  Sisi Nunez is a 57 y.o. male seen for follow up of hyperlipidemia. Healso has hypertension. Hypertension well controlled, no significant medication side effects noted, on Lopressor, hyperlipidemia fairly well controlled, no significant medication side effects noted, on Pravachol 40 mg, pt had myalgias on Zocor. Diet and Lifestyle: not attempting to follow a low fat, low cholesterol diet, exercises sporadically    Home BP Monitoring: is not measured at home. Sisi Nunez RTC today to follow up on chronic pain diagnosis. We discussed his osteoarthritis that is affecting his back. Significant changes since last visit: none. He is  able to do his normal daily activities. He reports the following adverse side effects: none. Insurance will no longer pay for Nucynta. Will therefore try Ultram. If this does not control his pain will need to refer to a Pain Clinic which will be difficult for pt since he does not drive. Least pain over the last week has been 3/10. Worst pain over the last week has been 8/10. Opioid Risk Tool Reviewed: YES    Aberrant behaviors: Abnormal urine drug test.      Urine Drug Screen: reviewed and up to date. Controlled substance agreement on file: YES.  reviewed:yes    Pill count is consistent with his prescription: yes    Concomitant use of a benzodiazepine: no        Prediabetes: pt is trying to control with diet and weight loss but it continues to be an issue due to his obesity       Discussed the patient's BMI with him. The BMI follow up plan is as follows: BMI is out of normal parameters and plan is as follows: I have counseled this patient on diet and exercise regimens.       Current Outpatient Prescriptions   Medication Sig    traMADol (ULTRAM) 50 mg tablet Take 2 Tabs by mouth every six (6) hours as needed for Pain. Max Daily Amount: 400 mg.    umeclidinium-vilanterol (ANORO ELLIPTA) 62.5-25 mcg/actuation inhaler Take 1 Puff by inhalation daily.  traZODone (DESYREL) 50 mg tablet TAKE ONE TABLET BY MOUTH ONCE DAILY AT BEDTIME AS NEEDED FOR SLEEP    pravastatin (PRAVACHOL) 40 mg tablet TAKE ONE TABLET BY MOUTH NIGHTLY    gabapentin (NEURONTIN) 300 mg capsule TAKE ONE CAPSULE BY MOUTH THREE TIMES DAILY    chlorhexidine (PERIDEX) 0.12 % solution     metoprolol tartrate (LOPRESSOR) 25 mg tablet TAKE ONE TABLET BY MOUTH TWICE DAILY ( GENERIC FOR LOPRESSOR )    montelukast (SINGULAIR) 10 mg tablet Take 1 Tab by mouth daily.  tamsulosin (FLOMAX) 0.4 mg capsule Take 1 Cap by mouth daily (after dinner).  pantoprazole (PROTONIX) 40 mg tablet Take 1 Tab by mouth daily.  TRAVATAN Z 0.004 % ophthalmic solution Administer 1 Drop to both eyes nightly.  lactulose (CONSTULOSE) 10 gram/15 mL solution Take 45ml PO TID    tapentadol ER (NUCYNTA ER) 100 mg tablet Take 1 Tab by mouth every twelve (12) hours. Max Daily Amount: 200 mg.    guaiFENesin ER (MUCINEX) 600 mg ER tablet Take 1 Tab by mouth two (2) times a day.  fluticasone (FLONASE) 50 mcg/actuation nasal spray 2 Sprays by Both Nostrils route daily.  cyclobenzaprine (FLEXERIL) 10 mg tablet Take 1 Tab by mouth nightly.  ibuprofen (MOTRIN) 800 mg tablet TAKE ONE TABLET BY MOUTH EVERY 8 HOURS AS NEEDED FOR PAIN    nitroglycerin (NITROSTAT) 0.4 mg SL tablet 1 Tab by SubLINGual route every five (5) minutes as needed.  albuterol (PROVENTIL HFA, VENTOLIN HFA, PROAIR HFA) 90 mcg/actuation inhaler Take 2 Puffs by inhalation every four (4) hours as needed for Wheezing. No current facility-administered medications for this visit.               Review of Systems  Respiratory: negative for dyspnea on exertion  Cardiovascular: negative for chest pain    Objective:     Visit Vitals    /52 (BP 1 Location: Left arm, BP Patient Position: Sitting)    Pulse 61    Temp 98.9 °F (37.2 °C) (Oral)    Resp 20    Ht 5' 9\" (1.753 m)    Wt 252 lb (114.3 kg)    SpO2 97%    BMI 37.21 kg/m2        General appearance - alert, well appearing, and in no distress  Neck - supple, no significant adenopathy, carotids upstroke normal bilaterally, no bruits  Chest - clear to auscultation, no wheezes, rales or rhonchi, symmetric air entry  Heart - normal rate, regular rhythm, normal S1, S2, no murmurs, rubs, clicks or gallops  Extremities - peripheral pulses normal, no pedal edema, no clubbing or cyanosis  Skin - normal coloration and turgor, no rashes, no suspicious skin lesions noted      Labs:   Lab Results   Component Value Date/Time    Hemoglobin A1c 6.3 (H) 02/15/2018 08:29 AM    Hemoglobin A1c 5.7 (H) 09/13/2017 08:07 AM    Hemoglobin A1c 6.1 (H) 05/09/2017 08:06 AM    Glucose 109 (H) 02/15/2018 08:29 AM    Glucose (POC) 123 (H) 01/24/2016 04:57 PM    Glucose,  (H) 04/01/2015 08:56 AM    Microalb/Creat ratio (ug/mg creat.) 4.4 04/29/2015 03:00 PM    LDL, calculated 82 02/15/2018 08:29 AM    Creatinine 1.32 (H) 02/15/2018 08:29 AM      Lab Results   Component Value Date/Time    Cholesterol, total 150 02/15/2018 08:29 AM    HDL Cholesterol 39 (L) 02/15/2018 08:29 AM    LDL, calculated 82 02/15/2018 08:29 AM    Triglyceride 145 02/15/2018 08:29 AM    CHOL/HDL Ratio 3.8 02/15/2018 08:29 AM     Lab Results   Component Value Date/Time    ALT (SGPT) 16 02/15/2018 08:29 AM    AST (SGOT) 14 (L) 02/15/2018 08:29 AM    Alk.  phosphatase 68 02/15/2018 08:29 AM    Bilirubin, total 0.2 02/15/2018 08:29 AM     Lab Results   Component Value Date/Time    GFR est AA >60 02/15/2018 08:29 AM    GFR est non-AA 55 (L) 02/15/2018 08:29 AM    Creatinine 1.32 (H) 02/15/2018 08:29 AM    BUN 17 02/15/2018 08:29 AM    BUN, POC 6 (L) 04/01/2015 08:56 AM    Sodium,  04/01/2015 08:56 AM    Sodium 142 02/15/2018 08:29 AM    Potassium 4.5 02/15/2018 08:29 AM Potassium, POC 4.0 04/01/2015 08:56 AM    Chloride,  04/01/2015 08:56 AM    Chloride 111 (H) 02/15/2018 08:29 AM    CO2 25 02/15/2018 08:29 AM      Lab Results   Component Value Date/Time    Glucose 109 (H) 02/15/2018 08:29 AM    Glucose (POC) 123 (H) 01/24/2016 04:57 PM    Glucose,  (H) 04/01/2015 08:56 AM            Assessment / Plan     Hypertension well controlled, on Lopressor   Hyperlipidemia fairly well controlled, on Pravachol 40 mg. ICD-10-CM ICD-9-CM    1. Essential hypertension W93 523.0 METABOLIC PANEL, COMPREHENSIVE   2. Hypercholesterolemia E78.00 272.0 LIPID PANEL   3. Primary insomnia F51.01 307.42 Improved on trazodone    4. Chronic midline low back pain without sciatica M54.5 724.2 Will treat with traMADol (ULTRAM) 50 mg tablet    G89.29 338.29    5. Elevated blood sugar R73.9 790.29 Will try to control with diet HEMOGLOBIN A1C W/O EAG               Low cholesterol diet, weight control and daily exercise discussed. Follow-up Disposition:  Return in about 4 months (around 6/22/2018) for labs 1 week before. Reviewed plan of care. Patient has provided input and agrees with goals.

## 2018-02-22 NOTE — PROGRESS NOTES
Patient is in the office today for a 4 month follow up. Patient states he is having back pain. 1. Have you been to the ER, urgent care clinic since your last visit? Hospitalized since your last visit? No    2. Have you seen or consulted any other health care providers outside of the [address] [place] [address] since your last visit? Include any pap smears or colon screening.  No

## 2018-03-01 ENCOUNTER — TELEPHONE (OUTPATIENT)
Dept: FAMILY MEDICINE CLINIC | Age: 63
End: 2018-03-01

## 2018-03-01 DIAGNOSIS — M54.50 CHRONIC MIDLINE LOW BACK PAIN WITHOUT SCIATICA: Primary | ICD-10-CM

## 2018-03-01 DIAGNOSIS — G89.29 CHRONIC MIDLINE LOW BACK PAIN WITHOUT SCIATICA: Primary | ICD-10-CM

## 2018-03-01 NOTE — TELEPHONE ENCOUNTER
Pt states that the medicine he's taking is not helping with the back pain and want to know what else he could do or take. Pt states he has to take 3 to 4 for the medicine to work. Please call and advise.

## 2018-03-02 NOTE — TELEPHONE ENCOUNTER
Pt aware Dr Joane Fabry is out of the office until Tuesday & he would the the one to change his medication. Advised if needed Pt to go to the ED or UC. Please advise.

## 2018-03-09 RX ORDER — HYDROCODONE BITARTRATE 30 MG/1
TABLET, EXTENDED RELEASE ORAL
Qty: 30 TAB | Refills: 0 | Status: SHIPPED | OUTPATIENT
Start: 2018-03-09 | End: 2018-03-09 | Stop reason: SDUPTHER

## 2018-03-09 RX ORDER — HYDROCODONE BITARTRATE 30 MG/1
30 TABLET, EXTENDED RELEASE ORAL DAILY
Qty: 30 TAB | Refills: 0 | Status: SHIPPED | OUTPATIENT
Start: 2018-03-09 | End: 2018-04-03 | Stop reason: SDUPTHER

## 2018-03-09 NOTE — TELEPHONE ENCOUNTER
Pt called to check the status of the medication. Please call and advise once something is recommended.

## 2018-03-21 ENCOUNTER — TELEPHONE (OUTPATIENT)
Dept: FAMILY MEDICINE CLINIC | Age: 63
End: 2018-03-21

## 2018-03-21 NOTE — TELEPHONE ENCOUNTER
Patient is taking HYDROcodone bitartrate 30 mg ER once daily, patient is complaining of pain returning 30 minutes after taking medication. Mr Isabel Ortega would like to know if he can take his medication more than once a day.

## 2018-03-21 NOTE — TELEPHONE ENCOUNTER
Pt asking if he can take pain medication (hydrocodone bitartrate) more than 1 x daily. States pain comes back about 30 minutes after taking medication.  Stephanie Gonzales \"it stops it but then it comes back\"

## 2018-03-22 NOTE — TELEPHONE ENCOUNTER
Left message for patient he can take 2 tablets at once time of Hydrocodone Bitartrate 30 mg. Any questions call the office.

## 2018-04-03 ENCOUNTER — TELEPHONE (OUTPATIENT)
Dept: FAMILY MEDICINE CLINIC | Age: 63
End: 2018-04-03

## 2018-04-03 DIAGNOSIS — G89.29 CHRONIC MIDLINE LOW BACK PAIN WITHOUT SCIATICA: ICD-10-CM

## 2018-04-03 DIAGNOSIS — M54.50 CHRONIC MIDLINE LOW BACK PAIN WITHOUT SCIATICA: ICD-10-CM

## 2018-04-08 RX ORDER — HYDROCODONE BITARTRATE 60 MG/1
TABLET, EXTENDED RELEASE ORAL
Qty: 30 TAB | Refills: 0 | Status: SHIPPED | OUTPATIENT
Start: 2018-04-08 | End: 2018-05-07 | Stop reason: SDUPTHER

## 2018-04-10 ENCOUNTER — TELEPHONE (OUTPATIENT)
Dept: FAMILY MEDICINE CLINIC | Age: 63
End: 2018-04-10

## 2018-04-10 NOTE — TELEPHONE ENCOUNTER
[place] with [facility] calling re presciption needs instructions clarification for hydrocodone bitartrate (HYSINGLA)

## 2018-04-11 RX ORDER — IBUPROFEN 800 MG/1
TABLET ORAL
Qty: 90 TAB | Refills: 5 | Status: SHIPPED | OUTPATIENT
Start: 2018-04-11 | End: 2019-03-20 | Stop reason: SDUPTHER

## 2018-04-17 NOTE — TELEPHONE ENCOUNTER
Patient called in and stated that he wanted to know if he can start taking the medication Hysingla two times a day. Patient stated that taking them once a day is not helping with his pain.  Please advise

## 2018-04-18 NOTE — TELEPHONE ENCOUNTER
Spoke with patient he is aware he should only be taking 1 tab a day. Patient verbalizes understanding.

## 2018-04-20 ENCOUNTER — OFFICE VISIT (OUTPATIENT)
Dept: FAMILY MEDICINE CLINIC | Age: 63
End: 2018-04-20

## 2018-04-20 ENCOUNTER — TELEPHONE (OUTPATIENT)
Dept: FAMILY MEDICINE CLINIC | Age: 63
End: 2018-04-20

## 2018-04-20 VITALS
TEMPERATURE: 99 F | BODY MASS INDEX: 36.29 KG/M2 | SYSTOLIC BLOOD PRESSURE: 131 MMHG | HEIGHT: 69 IN | RESPIRATION RATE: 20 BRPM | DIASTOLIC BLOOD PRESSURE: 64 MMHG | WEIGHT: 245 LBS | OXYGEN SATURATION: 95 % | HEART RATE: 65 BPM

## 2018-04-20 DIAGNOSIS — R05.9 COUGH: ICD-10-CM

## 2018-04-20 DIAGNOSIS — J40 BRONCHITIS: Primary | ICD-10-CM

## 2018-04-20 RX ORDER — HYDROCODONE BITARTRATE 30 MG/1
TABLET, EXTENDED RELEASE ORAL
COMMUNITY
Start: 2018-03-13 | End: 2018-04-20 | Stop reason: ALTCHOICE

## 2018-04-20 RX ORDER — TIZANIDINE 2 MG/1
2 TABLET ORAL 3 TIMES DAILY
Qty: 60 TAB | Refills: 1 | Status: SHIPPED | OUTPATIENT
Start: 2018-04-20 | End: 2018-06-19 | Stop reason: ALTCHOICE

## 2018-04-20 RX ORDER — CODEINE PHOSPHATE AND GUAIFENESIN 10; 100 MG/5ML; MG/5ML
10 SOLUTION ORAL
Qty: 120 ML | Refills: 0 | Status: SHIPPED | OUTPATIENT
Start: 2018-04-20 | End: 2018-05-14 | Stop reason: ALTCHOICE

## 2018-04-20 RX ORDER — TAPENTADOL HYDROCHLORIDE 50 MG/1
TABLET, FILM COATED ORAL
COMMUNITY
Start: 2018-01-25 | End: 2018-04-20 | Stop reason: ALTCHOICE

## 2018-04-20 RX ORDER — BENZONATATE 100 MG/1
100 CAPSULE ORAL
Qty: 21 CAP | Refills: 0 | Status: SHIPPED | OUTPATIENT
Start: 2018-04-20 | End: 2018-04-27

## 2018-04-20 RX ORDER — AZITHROMYCIN 250 MG/1
TABLET, FILM COATED ORAL
Qty: 6 TAB | Refills: 0 | Status: SHIPPED | OUTPATIENT
Start: 2018-04-20 | End: 2018-04-25

## 2018-04-20 RX ORDER — PREDNISONE 10 MG/1
TABLET ORAL
Qty: 25 TAB | Refills: 0 | Status: SHIPPED | OUTPATIENT
Start: 2018-04-20 | End: 2018-06-19 | Stop reason: ALTCHOICE

## 2018-04-20 NOTE — PROGRESS NOTES
HISTORY OF PRESENT ILLNESS  Andres Posadas is a 57 y.o. male. Cold Symptoms   The history is provided by the patient. This is a new problem. The current episode started more than 1 week ago. The problem occurs constantly. The problem has been gradually worsening. Associated symptoms include rhinorrhea and wheezing. Treatments tried: mucinex. The treatment provided mild relief. Review of Systems   HENT: Positive for rhinorrhea. Respiratory: Positive for wheezing. Physical Exam   Constitutional: He appears well-developed and well-nourished. HENT:   Right Ear: Tympanic membrane and ear canal normal.   Left Ear: Tympanic membrane and ear canal normal.   Nose: Mucosal edema present. Mouth/Throat: Uvula is midline, oropharynx is clear and moist and mucous membranes are normal.   Cardiovascular: Normal rate, regular rhythm and normal heart sounds. Pulmonary/Chest: Effort normal and breath sounds normal.   Vitals reviewed. ASSESSMENT and PLAN    ICD-10-CM ICD-9-CM    1. Bronchitis J40 490 Will treat with azithromycin (ZITHROMAX) 250 mg tablet      And predniSONE (DELTASONE) 10 mg tablet   2. Cough R05 786.2 Will treat with guaiFENesin-codeine (CHERATUSSIN AC) 100-10 mg/5 mL solution     Reviewed plan of care. Patient has provided input and agrees with goals.

## 2018-04-20 NOTE — PATIENT INSTRUCTIONS
Wheezing or Bronchoconstriction: Care Instructions  Your Care Instructions  Wheezing is a whistling noise made during breathing. It occurs when the small airways, or bronchial tubes, that lead to your lungs swell or contract (spasm) and become narrow. This narrowing is called bronchoconstriction. When your airways constrict, it is hard for air to pass through and this makes it hard for you to breathe. Wheezing and bronchoconstriction can be caused by many problems, including:  · An infection such as the flu or a cold. · Allergies such as hay fever. · Diseases such as asthma or chronic obstructive pulmonary disease. · Smoking. Treatment for your wheezing depends on what is causing the problem. Your wheezing may get better without treatment. But you may need to pay attention to things that cause your wheezing and avoid them. Or you may need medicine to help treat the wheezing and to reduce the swelling or to relieve spasms in your lungs. Follow-up care is a key part of your treatment and safety. Be sure to make and go to all appointments, and call your doctor if you are having problems. It is also a good idea to know your test results and keep a list of the medicines you take. How can you care for yourself at home? · Take your medicine exactly as prescribed. Call your doctor if you think you are having a problem with your medicine. You will get more details on the specific medicine your doctor prescribes. · If your doctor prescribed antibiotics, take them as directed. Do not stop taking them just because you feel better. You need to take the full course of antibiotics. · Breathe moist air from a humidifier, hot shower, or sink filled with hot water. This may help ease your symptoms and make it easier for you to breathe. · If you have congestion in your nose and throat, drinking plenty of fluids, especially hot fluids, may help relieve your symptoms.  If you have kidney, heart, or liver disease and have to limit fluids, talk with your doctor before you increase the amount of fluids you drink. · If you have mucus in your airways, it may help to breathe deeply and cough. · Do not smoke or allow others to smoke around you. Smoking can make your wheezing worse. If you need help quitting, talk to your doctor about stop-smoking programs and medicines. These can increase your chances of quitting for good. · Avoid things that may cause your wheezing. These may include colds, smoke, air pollution, dust, pollen, pets, cockroaches, stress, and cold air. When should you call for help? Call 911 anytime you think you may need emergency care. For example, call if:  ? · You have severe trouble breathing. ? · You passed out (lost consciousness). ?Call your doctor now or seek immediate medical care if:  ? · You cough up yellow, dark brown, or bloody mucus (sputum). ? · You have new or worse shortness of breath. ? · Your wheezing is not getting better or it gets worse after you start taking your medicine. ? Watch closely for changes in your health, and be sure to contact your doctor if:  ? · You do not get better as expected. Where can you learn more? Go to http://samreen-tabitha.info/. Enter 454 [id number] in the search box to learn more about \"Wheezing or Bronchoconstriction: Care Instructions. \"  Current as of: May 12, 2017  Content Version: 11.4  © 9713-8838 [employer]. Care instructions adapted under license by [employer] (which disclaims liability or warranty for this information). If you have questions about a medical condition or this instruction, always ask your healthcare professional. [address] any warranty or liability for your use of this information.

## 2018-04-20 NOTE — PROGRESS NOTES
Patient is in the office today for wheezing x 2 weeks    1. Have you been to the ER, urgent care clinic since your last visit? Hospitalized since your last visit? No    2. Have you seen or consulted any other health care providers outside of the [address] [place] [address] since your last visit? Include any pap smears or colon screening.  No

## 2018-04-20 NOTE — MR AVS SNAPSHOT
[facility] 
 
 
 [address] [place], [place] [address] [address] 
[phone] Patient: Hiram Staley MRN:  [id number] Visit Information Date & Time Provider Department Dept. Phone Encounter #  
 4/20/2018  9:00 AM Hayden Benjamin, [address] [place] [address] [phone] [id number] Follow-up Instructions Return if symptoms worsen or fail to improve. Your Appointments 5/3/2018  9:30 AM  
Nurse Visit with Marisol Kraft [facility] ([facility]) Appt Note: free and total psa  
 [facility] [address] [place] [address] Road [address]  
  
    
 5/4/2018  9:15 AM  
New Patient with Phyliss Habermann, MD  
[facility] [address] [facility] [place] [address] ([facility]) Appt Note: right hip pains/aware HV; right hip pains/aware HV**PT WAS WAS RESCHEDULED  DUE TO PROVIDER OUT OF THE OFFICE ON  4/20/18 [address], [address] [facility]  
[phone] [address] [place] [address]  
  
    
 6/12/2018  8:35 AM  
LAB with [facility] (JORGE LUIS [facility]) Appt Note: 4mo f/u lab [facility] [address] [address]  
[phone]  
  
   
 [address]  
  
    
 6/19/2018 10:30 AM  
Office Visit with Hayden Benjamin MD  
[address]) Appt Note: 4 mo f/u  
 [address] [facility] [address] [address]  
[phone]  
  
   
 [address] [place] [address] 5/10/2018  9:30 AM  
Any with Daniel Browne MD  
[facility] ([facility]) Appt Note: 1 yr f/u w/ free and total psa prior [facility] [address]  
  
   
 [address] [facility] [address] [address] [facility] [address] [address] Upcoming Health Maintenance Date Due DTaP/Tdap/Td series (1 - Tdap) 7/9/1974 FOBT Q 1 YEAR, 18+ 10/15/2016 MEDICARE YEARLY EXAM 5/25/2018 COLONOSCOPY 4/1/2020 Allergies as of 4/20/2018  Review Complete On: 4/20/2018 By: Dorothy Curiel MD  
  
 Severity Noted Reaction Type Reactions Ace Inhibitors  05/17/2010    Cough Dilaudid [Hydromorphone]  05/17/2010    Hives Lisinopril  05/17/2010    Cough Zocor [Simvastatin]  07/30/2011    Myalgia Right leg Current Immunizations  Reviewed on 9/20/2017 Name Date Influenza Vaccine 1/2/2015 Influenza Vaccine (Quad) PF 9/20/2017, 10/12/2016, 10/15/2015 Zoster Vaccine, Live 10/22/2015 Not reviewed this visit You Were Diagnosed With   
  
 Codes Comments Bronchitis    -  Primary ICD-10-CM: Z60 ICD-9-CM: 625 Cough     ICD-10-CM: R05 ICD-9-CM: 755. 2 Vitals BP Pulse Temp Resp Height(growth percentile) Weight(growth percentile) 131/64 (BP 1 Location: Left arm, BP Patient Position: Sitting) 65 99 °F (37.2 °C) (Oral) 20 5' 9\" (1.753 m) 245 lb (111.1 kg) SpO2 BMI Smoking Status 95% 36.18 kg/m2 Former Smoker BMI and BSA Data Body Mass Index Body Surface Area  
 36.18 kg/m 2 2.33 m 2 Preferred Pharmacy Pharmacy Name Phone [address] [place] [address], [address] [place] [phone] Your Updated Medication List  
  
   
This list is accurate as of 4/20/18  9:33 AM.  Always use your most recent med list.  
  
  
  
  
 albuterol 90 mcg/actuation inhaler Commonly known as:  PROVENTIL HFA, VENTOLIN HFA, PROAIR HFA Take 2 Puffs by inhalation every four (4) hours as needed for Wheezing. azithromycin 250 mg tablet Commonly known as:  Oracio Cramp Take 2 tablets today, then take 1 tablet daily  
  
 chlorhexidine 0.12 % solution Commonly known as:  PERIDEX  
  
 cyclobenzaprine 10 mg tablet Commonly known as:  FLEXERIL  
 Take 1 Tab by mouth nightly. fluticasone 50 mcg/actuation nasal spray Commonly known as:  Hank Nidia 2 Sprays by Both Nostrils route daily. gabapentin 300 mg capsule Commonly known as:  NEURONTIN  
TAKE ONE CAPSULE BY MOUTH THREE TIMES DAILY  
  
 guaiFENesin  mg ER tablet Commonly known as:  Nick & Nick Take 1 Tab by mouth two (2) times a day. guaiFENesin-codeine 100-10 mg/5 mL solution Commonly known as:  Joselyn Halsted Take 10 mL by mouth four (4) times daily as needed for Cough. Max Daily Amount: 40 mL. HYDROcodone bitartrate 60 mg ER tablet Commonly known as:  HYSINGLA  
*DO NOT CRUSH,CHEW, OR DISSOLVE TABLET*  
  
 ibuprofen 800 mg tablet Commonly known as:  MOTRIN  
TAKE ONE TABLET BY MOUTH EVERY 8 HOURS AS NEEDED FOR PAIN  
  
 lactulose 10 gram/15 mL solution Commonly known as:  Lara Graces Take 45ml PO TID  
  
 metoprolol tartrate 25 mg tablet Commonly known as:  LOPRESSOR  
TAKE ONE TABLET BY MOUTH TWICE DAILY ( GENERIC FOR LOPRESSOR )  
  
 montelukast 10 mg tablet Commonly known as:  SINGULAIR Take 1 Tab by mouth daily. nitroglycerin 0.4 mg SL tablet Commonly known as:  NITROSTAT  
1 Tab by SubLINGual route every five (5) minutes as needed. pantoprazole 40 mg tablet Commonly known as:  PROTONIX Take 1 Tab by mouth daily. pravastatin 40 mg tablet Commonly known as:  PRAVACHOL  
TAKE ONE TABLET BY MOUTH NIGHTLY  
  
 predniSONE 10 mg tablet Commonly known as:  DELTASONE  
5 tabs daily for 5 days  
  
 tamsulosin 0.4 mg capsule Commonly known as:  FLOMAX Take 1 Cap by mouth daily (after dinner). tiZANidine 2 mg tablet Commonly known as:  Patsey Beets Take 1 Tab by mouth three (3) times daily. Indications: Muscle Spasm TRAVATAN Z 0.004 % ophthalmic solution Generic drug:  travoprost  
Administer 1 Drop to both eyes nightly. traZODone 50 mg tablet Commonly known as:  Ame Flannery  
 TAKE ONE TABLET BY MOUTH ONCE DAILY AT BEDTIME AS NEEDED FOR SLEEP  
  
 umeclidinium-vilanterol 62.5-25 mcg/actuation inhaler Commonly known as:  Katarina Boxer Take 1 Puff by inhalation daily. Prescriptions Printed Refills  
 azithromycin (ZITHROMAX) 250 mg tablet 0 Sig: Take 2 tablets today, then take 1 tablet daily Class: Print  
 predniSONE (DELTASONE) 10 mg tablet 0 Si tabs daily for 5 days Class: Print  
 guaiFENesin-codeine (CHERATUSSIN AC) 100-10 mg/5 mL solution 0 Sig: Take 10 mL by mouth four (4) times daily as needed for Cough. Max Daily Amount: 40 mL. Class: Print Route: Oral  
 tiZANidine (ZANAFLEX) 2 mg tablet 1 Sig: Take 1 Tab by mouth three (3) times daily. Indications: Muscle Spasm Class: Print Route: Oral  
  
Follow-up Instructions Return if symptoms worsen or fail to improve. Patient Instructions Wheezing or Bronchoconstriction: Care Instructions Your Care Instructions Wheezing is a whistling noise made during breathing. It occurs when the small airways, or bronchial tubes, that lead to your lungs swell or contract (spasm) and become narrow. This narrowing is called bronchoconstriction. When your airways constrict, it is hard for air to pass through and this makes it hard for you to breathe. Wheezing and bronchoconstriction can be caused by many problems, including: · An infection such as the flu or a cold. · Allergies such as hay fever. · Diseases such as asthma or chronic obstructive pulmonary disease. · Smoking. Treatment for your wheezing depends on what is causing the problem. Your wheezing may get better without treatment. But you may need to pay attention to things that cause your wheezing and avoid them. Or you may need medicine to help treat the wheezing and to reduce the swelling or to relieve spasms in your lungs. Follow-up care is a key part of your treatment and safety.  Be sure to make and go to all appointments, and call your doctor if you are having problems. It is also a good idea to know your test results and keep a list of the medicines you take. How can you care for yourself at home? · Take your medicine exactly as prescribed. Call your doctor if you think you are having a problem with your medicine. You will get more details on the specific medicine your doctor prescribes. · If your doctor prescribed antibiotics, take them as directed. Do not stop taking them just because you feel better. You need to take the full course of antibiotics. · Breathe moist air from a humidifier, hot shower, or sink filled with hot water. This may help ease your symptoms and make it easier for you to breathe. · If you have congestion in your nose and throat, drinking plenty of fluids, especially hot fluids, may help relieve your symptoms. If you have kidney, heart, or liver disease and have to limit fluids, talk with your doctor before you increase the amount of fluids you drink. · If you have mucus in your airways, it may help to breathe deeply and cough. · Do not smoke or allow others to smoke around you. Smoking can make your wheezing worse. If you need help quitting, talk to your doctor about stop-smoking programs and medicines. These can increase your chances of quitting for good. · Avoid things that may cause your wheezing. These may include colds, smoke, air pollution, dust, pollen, pets, cockroaches, stress, and cold air. When should you call for help? Call 911 anytime you think you may need emergency care. For example, call if: 
? · You have severe trouble breathing. ? · You passed out (lost consciousness). ?Call your doctor now or seek immediate medical care if: 
? · You cough up yellow, dark brown, or bloody mucus (sputum). ? · You have new or worse shortness of breath. ? · Your wheezing is not getting better or it gets worse after you start taking your medicine. ?Watch closely for changes in your health, and be sure to contact your doctor if: 
? · You do not get better as expected. Where can you learn more? Go to http://samreen-tabitha.info/. Enter 454 [id number] in the search box to learn more about \"Wheezing or Bronchoconstriction: Care Instructions. \" Current as of: May 12, 2017 Content Version: 11.4 © 8622-1281 ii4b. Care instructions adapted under license by [employer] (which disclaims liability or warranty for this information). If you have questions about a medical condition or this instruction, always ask your healthcare professional. [address] any warranty or liability for your use of this information. Introducing [facility]! Barrett Lauren introduces TripAdvisor patient portal. Now you can access parts of your medical record, email your doctor's office, and request medication refills online. 1. In your internet browser, go to [email or web address] 2. Click on the First Time User? Click Here link in the Sign In box. You will see the New Member Sign Up page. 3. Enter your TripAdvisor Access Code exactly as it appears below. You will not need to use this code after youve completed the sign-up process. If you do not sign up before the expiration date, you must request a new code. · TripAdvisor Access Code: [email or web address] Expires: 7/19/2018  9:33 AM 
 
4. Enter the last four digits of your Social Security Number (xxxx) and Date of Birth (mm/dd/yyyy) as indicated and click Submit. You will be taken to the next sign-up page. 5. Create a CopaCastt ID. This will be your TripAdvisor login ID and cannot be changed, so think of one that is secure and easy to remember. 6. Create a TripAdvisor password. You can change your password at any time. 7. Enter your Password Reset Question and Answer. This can be used at a later time if you forget your password. 8. Enter your e-mail address. You will receive e-mail notification when new information is available in [address] 9. Click Sign Up. You can now view and download portions of your medical record. 10. Click the Download Summary menu link to download a portable copy of your medical information. If you have questions, please visit the Frequently Asked Questions section of the PlanHQ website. Remember, PlanHQ is NOT to be used for urgent needs. For medical emergencies, dial 911. Now available from your iPhone and Android! Please provide this summary of care documentation to your next provider. Your primary care clinician is listed as WILLIAM MENARD. If you have any questions after today's visit, please call [phone].

## 2018-04-20 NOTE — TELEPHONE ENCOUNTER
Per pt per Cindy Worley is not covered by his ins. Per pharmacy this ins is $12 without ins.      Please advise

## 2018-04-23 DIAGNOSIS — F51.01 PRIMARY INSOMNIA: ICD-10-CM

## 2018-04-23 RX ORDER — TRAZODONE HYDROCHLORIDE 50 MG/1
TABLET ORAL
Qty: 30 TAB | Refills: 2 | Status: SHIPPED | OUTPATIENT
Start: 2018-04-23 | End: 2018-10-13 | Stop reason: SDUPTHER

## 2018-04-23 NOTE — TELEPHONE ENCOUNTER
Pt called in requesting refill of his   Requested Prescriptions     Pending Prescriptions Disp Refills    traZODone (DESYREL) 50 mg tablet 30 Tab 2   .

## 2018-05-04 ENCOUNTER — OFFICE VISIT (OUTPATIENT)
Dept: ORTHOPEDIC SURGERY | Age: 63
End: 2018-05-04

## 2018-05-04 ENCOUNTER — DOCUMENTATION ONLY (OUTPATIENT)
Dept: ORTHOPEDIC SURGERY | Age: 63
End: 2018-05-04

## 2018-05-04 VITALS
TEMPERATURE: 97.6 F | RESPIRATION RATE: 16 BRPM | DIASTOLIC BLOOD PRESSURE: 62 MMHG | BODY MASS INDEX: 35.55 KG/M2 | OXYGEN SATURATION: 97 % | SYSTOLIC BLOOD PRESSURE: 121 MMHG | HEIGHT: 69 IN | WEIGHT: 240 LBS | HEART RATE: 58 BPM

## 2018-05-04 DIAGNOSIS — Z96.641 HISTORY OF RIGHT HIP REPLACEMENT: Primary | ICD-10-CM

## 2018-05-04 DIAGNOSIS — M70.61 TROCHANTERIC BURSITIS, RIGHT HIP: ICD-10-CM

## 2018-05-04 DIAGNOSIS — M54.5 LOW BACK PAIN, UNSPECIFIED BACK PAIN LATERALITY, UNSPECIFIED CHRONICITY, WITH SCIATICA PRESENCE UNSPECIFIED: ICD-10-CM

## 2018-05-04 RX ORDER — BETAMETHASONE SODIUM PHOSPHATE AND BETAMETHASONE ACETATE 3; 3 MG/ML; MG/ML
6 INJECTION, SUSPENSION INTRA-ARTICULAR; INTRALESIONAL; INTRAMUSCULAR; SOFT TISSUE ONCE
Qty: 1 ML | Refills: 0
Start: 2018-05-04 | End: 2018-05-04

## 2018-05-04 NOTE — PROGRESS NOTES
Patient: Nato Matt                MRN: [id number]       SSN: [id number]  YOB: 1955        AGE: 57 y.o. SEX: male  Body mass index is 35.44 kg/(m^2). PCP: Zohra Carney MD  05/04/18    HISTORY: I had the pleasure of reviewing Leonides Dickerson. His back has really been bothering him. He is getting a lot of low back pain with radiculopathy and he is also complaining of some right hip pain, which is mild to moderate. He has no start-up pain and no thigh pain and really no groin pain. He does complain of numbness and tingling going down the right leg. It hurts him to roll over on the hip at night. Otherwise, he is fairly pleased with the hip replacement itself. He denies fevers or chills. He is otherwise feeling well. PHYSICAL EXAMINATION:  On examination today, he actually gets up and walks normally. There is no antalgic or Trendelenburg component to the gait. His low back is quite tender around L3-4. The hips rotate well. He has a well healed incision. He has some mild tenderness over the greater trochanter. Strength in the leg is actually quite good to the hip flexors and abductors. He does have decreased sensation to L4 specific on the right side. Tib/ant is a -5/5. Straight leg raise is just equivocal.  Both feet are warm and well perfused, although I cannot feel the dorsalis pedis pulse. RADIOGRAPHS:  Review of his x-rays, AP of the pelvis and AP and lateral of the hip:  I think the components are well-aligned and well-fixed. He has a couple of spot welds, but I think the stem is stable and so is the cup. The offset looks well restored. IMPRESSION:  My overall impression is hip replacement, mechanically doing well. He does have some bursitis of the hip and quite a bit of low back pain with radiculopathy.       PROCEDURE:  Under aseptic conditions and after informed, written consent with a time out, the right trochanteric bursa was injected with 1 cc of the Celestone preparation, i.e. 6 mg, which was well tolerated. PLAN:  I will get a few blood tests on him as well, which I expect to be normal, and I will see him in follow up afterwards. We could consider some therapy for his bursitis. We will see how things go. REVIEW OF SYSTEMS:      CON: negative for weight loss, fever  EYE: negative for double vision  ENT: negative for hoarseness  RS:   negative for Tb  GI:    negative for blood in stool  :  negative for blood in urine  Other systems reviewed and noted below. Past Medical History:   Diagnosis Date    Allergic rhinitis     Avascular necrosis (HCC)     CAD (coronary artery disease) 4/2009    mild, hemodynamically non-significant by angiography    Carpal tunnel syndrome     Carpal tunnel syndrome on both sides     Chest pain, unspecified     Chronic back pain 2/11/2011    Chronic obstructive pulmonary disease ([address]     Degenerative joint disease     with chronic back pain    Dysesthesia     of hand post carpal tunnel surgery v sudeck's atrophy    Dyslipidemia, goal LDL below 100     Dysphagia     GERD (gastroesophageal reflux disease) 5/17/2010    Glaucoma 9/15/2010    Glaucoma     Hearing loss     Hypercholesterolemia     Hypertension     Joint fusion     of right wrist    Morbid obesity ([address]     Obesity 5/17/2010    Other dyspnea and respiratory abnormality     Palpitations     Right hip pain     Right hip pain     Right knee pain     S/P cardiac catheterization 4/9/2009    1. Normal systemic pressure. 2. Moderate elevation of left-sided filling pressures. 3. Preserved left ventricular systolic function in the LOWE projection. 4. Mild, nonsignificant epicardial coronary artery disese by angiography.     Sleep apnea     on CPAP    Strain of lumbar region     Tobacco abuse        Family History   Problem Relation Age of Onset    Diabetes Mother     Cancer Father     Hypertension Other     Arthritis-osteo Other Current Outpatient Prescriptions   Medication Sig Dispense Refill    traZODone (DESYREL) 50 mg tablet TAKE ONE TABLET BY MOUTH ONCE DAILY AT BEDTIME AS NEEDED FOR SLEEP 30 Tab 2    predniSONE (DELTASONE) 10 mg tablet 5 tabs daily for 5 days 25 Tab 0    guaiFENesin-codeine (CHERATUSSIN AC) 100-10 mg/5 mL solution Take 10 mL by mouth four (4) times daily as needed for Cough. Max Daily Amount: 40 mL. 120 mL 0    tiZANidine (ZANAFLEX) 2 mg tablet Take 1 Tab by mouth three (3) times daily. Indications: Muscle Spasm 60 Tab 1    ibuprofen (MOTRIN) 800 mg tablet TAKE ONE TABLET BY MOUTH EVERY 8 HOURS AS NEEDED FOR PAIN 90 Tab 5    HYDROcodone bitartrate (HYSINGLA) 60 mg ER tablet *DO NOT CRUSH,CHEW, OR DISSOLVE TABLET* 30 Tab 0    lactulose (CONSTULOSE) 10 gram/15 mL solution Take 45ml PO  mL 2    umeclidinium-vilanterol (ANORO ELLIPTA) 62.5-25 mcg/actuation inhaler Take 1 Puff by inhalation daily. 1 Inhaler 2    guaiFENesin ER (MUCINEX) 600 mg ER tablet Take 1 Tab by mouth two (2) times a day. 30 Tab 5    pravastatin (PRAVACHOL) 40 mg tablet TAKE ONE TABLET BY MOUTH NIGHTLY 90 Tab 3    gabapentin (NEURONTIN) 300 mg capsule TAKE ONE CAPSULE BY MOUTH THREE TIMES DAILY 90 Cap 5    chlorhexidine (PERIDEX) 0.12 % solution       metoprolol tartrate (LOPRESSOR) 25 mg tablet TAKE ONE TABLET BY MOUTH TWICE DAILY ( GENERIC FOR LOPRESSOR ) 180 Tab 3    fluticasone (FLONASE) 50 mcg/actuation nasal spray 2 Sprays by Both Nostrils route daily. 1 Bottle 5    montelukast (SINGULAIR) 10 mg tablet Take 1 Tab by mouth daily. 30 Tab 5    cyclobenzaprine (FLEXERIL) 10 mg tablet Take 1 Tab by mouth nightly. 30 Tab 1    tamsulosin (FLOMAX) 0.4 mg capsule Take 1 Cap by mouth daily (after dinner). 90 Cap 3    pantoprazole (PROTONIX) 40 mg tablet Take 1 Tab by mouth daily. 30 Tab 5    nitroglycerin (NITROSTAT) 0.4 mg SL tablet 1 Tab by SubLINGual route every five (5) minutes as needed.  6 Tab 1    albuterol (PROVENTIL HFA, VENTOLIN HFA, PROAIR HFA) 90 mcg/actuation inhaler Take 2 Puffs by inhalation every four (4) hours as needed for Wheezing. 1 Inhaler 5    TRAVATAN Z 0.004 % ophthalmic solution Administer 1 Drop to both eyes nightly. Allergies   Allergen Reactions    Ace Inhibitors Cough    Dilaudid [Hydromorphone] Hives    Lisinopril Cough    Zocor [Simvastatin] Myalgia     Right leg       Past Surgical History:   Procedure Laterality Date    HX CARPAL TUNNEL RELEASE      bilateral wrists    HX COLONOSCOPY      HX HEART CATHETERIZATION  4/9/2009    1. Normal systemic pressure. 2. Moderate elevation of left-sided filling pressures. 3. Preserved left ventricular systolic function in the LOWE projection. 4. Mild, nonsignificant epicardial coronary artery disese by angiography.  HX HIP REPLACEMENT      HX OTHER SURGICAL  02/2017    left shoulder surgery    HX TONSILLECTOMY      HX WRIST FRACTURE [place]      DE ANESTH,SURGERY OF SHOULDER         Social History     Social History    Marital status: SINGLE     Spouse name: N/A    Number of children: N/A    Years of education: N/A     Occupational History    Not on file. Social History Main Topics    Smoking status: Former Smoker     Quit date: 5/17/2009    Smokeless tobacco: Never Used      Comment: urothelial cancer risk discussed today [id number]    Alcohol use No    Drug use: No    Sexual activity: Not on file     Other Topics Concern    Not on file     Social History Narrative       Visit Vitals    /62    Pulse (!) 58    Temp 97.6 °F (36.4 °C) (Oral)    Resp 16    Ht 5' 9\" (1.753 m)    Wt 240 lb (108.9 kg)    SpO2 97%    BMI 35.44 kg/m2         PHYSICAL EXAMINATION:  GENERAL: Alert and oriented x3, in no acute distress, well-developed, well-nourished, afebrile. HEART: No JVD.   EYES: No scleral icterus   NECK: No significant lymphadenopathy   LUNGS: No respiratory compromise or indrawing  ABDOMEN: Soft, non-tender, non-distended. Electronically signed by:  Kayli Hughes MD

## 2018-05-04 NOTE — PROGRESS NOTES
Pt unable to complete/update pt forms. He did not bring fam mbr to help him read or write. Provided forms for pt to take home for completion. Pt agrees to bring forms back at next appt or he will drop them off here at [facility] loc.

## 2018-05-05 RX ORDER — GABAPENTIN 300 MG/1
CAPSULE ORAL
Qty: 90 CAP | Refills: 5 | Status: SHIPPED | OUTPATIENT
Start: 2018-05-05 | End: 2018-09-10 | Stop reason: SDUPTHER

## 2018-05-07 DIAGNOSIS — G89.29 CHRONIC MIDLINE LOW BACK PAIN WITHOUT SCIATICA: ICD-10-CM

## 2018-05-07 DIAGNOSIS — M54.50 CHRONIC MIDLINE LOW BACK PAIN WITHOUT SCIATICA: ICD-10-CM

## 2018-05-07 NOTE — TELEPHONE ENCOUNTER
Requested Prescriptions     Pending Prescriptions Disp Refills    HYDROcodone bitartrate (HYSINGLA) 60 mg ER tablet 30 Tab 0     Sig: *DO NOT CRUSH,CHEW, OR DISSOLVE TABLET*

## 2018-05-14 RX ORDER — HYDROCODONE BITARTRATE 60 MG/1
60 TABLET, EXTENDED RELEASE ORAL DAILY
Qty: 30 TAB | Refills: 0 | Status: SHIPPED | OUTPATIENT
Start: 2018-05-14 | End: 2018-06-12 | Stop reason: SDUPTHER

## 2018-05-15 ENCOUNTER — TELEPHONE (OUTPATIENT)
Dept: FAMILY MEDICINE CLINIC | Age: 63
End: 2018-05-15

## 2018-05-15 NOTE — TELEPHONE ENCOUNTER
The medication is extended release and should last the whole day. Its the same medication he was given last month.  If needs needs more pain medication I would have to send him to a pain clinic

## 2018-05-17 NOTE — TELEPHONE ENCOUNTER
Patient is aware he only received 30 tabs for the month because this is an extended release medication and should last him the whole day. Patient verbalizes understanding.

## 2018-06-11 ENCOUNTER — HOSPITAL ENCOUNTER (OUTPATIENT)
Dept: LAB | Age: 63
Discharge: HOME | End: 2018-06-11
Payer: MEDICARE

## 2018-06-11 DIAGNOSIS — I10 ESSENTIAL HYPERTENSION: ICD-10-CM

## 2018-06-11 DIAGNOSIS — E78.00 HYPERCHOLESTEROLEMIA: ICD-10-CM

## 2018-06-11 DIAGNOSIS — R73.9 ELEVATED BLOOD SUGAR: ICD-10-CM

## 2018-06-11 LAB
ALBUMIN SERPL-MCNC: 3.7 G/DL (ref 3.4–5)
ALBUMIN/GLOB SERPL: 1 {RATIO} (ref 0.8–1.7)
ALP SERPL-CCNC: 82 U/L (ref 45–117)
ALT SERPL-CCNC: 22 U/L (ref 16–61)
ANION GAP SERPL CALC-SCNC: 7 MMOL/L (ref 3–18)
AST SERPL-CCNC: 15 U/L (ref 15–37)
BILIRUB SERPL-MCNC: 0.7 MG/DL (ref 0.2–1)
BUN SERPL-MCNC: 10 MG/DL (ref 7–18)
BUN/CREAT SERPL: 9 (ref 12–20)
CALCIUM SERPL-MCNC: 8.9 MG/DL (ref 8.5–10.1)
CHLORIDE SERPL-SCNC: 108 MMOL/L (ref 100–108)
CHOLEST SERPL-MCNC: 143 MG/DL
CO2 SERPL-SCNC: 27 MMOL/L (ref 21–32)
CREAT SERPL-MCNC: 1.1 MG/DL (ref 0.6–1.3)
GLOBULIN SER CALC-MCNC: 3.6 G/DL (ref 2–4)
GLUCOSE SERPL-MCNC: 96 MG/DL (ref 74–99)
HDLC SERPL-MCNC: 39 MG/DL (ref 40–60)
HDLC SERPL: 3.7 {RATIO} (ref 0–5)
LDLC SERPL CALC-MCNC: 86.8 MG/DL (ref 0–100)
LIPID PROFILE,FLP: ABNORMAL
POTASSIUM SERPL-SCNC: 4.4 MMOL/L (ref 3.5–5.5)
PROT SERPL-MCNC: 7.3 G/DL (ref 6.4–8.2)
SODIUM SERPL-SCNC: 142 MMOL/L (ref 136–145)
TRIGL SERPL-MCNC: 86 MG/DL (ref ?–150)
VLDLC SERPL CALC-MCNC: 17.2 MG/DL

## 2018-06-11 PROCEDURE — 36415 COLL VENOUS BLD VENIPUNCTURE: CPT | Performed by: INTERNAL MEDICINE

## 2018-06-11 PROCEDURE — 83036 HEMOGLOBIN GLYCOSYLATED A1C: CPT | Performed by: INTERNAL MEDICINE

## 2018-06-11 PROCEDURE — 80061 LIPID PANEL: CPT | Performed by: INTERNAL MEDICINE

## 2018-06-11 PROCEDURE — 80053 COMPREHEN METABOLIC PANEL: CPT | Performed by: INTERNAL MEDICINE

## 2018-06-12 ENCOUNTER — TELEPHONE (OUTPATIENT)
Dept: FAMILY MEDICINE CLINIC | Age: 63
End: 2018-06-12

## 2018-06-12 DIAGNOSIS — M54.50 CHRONIC MIDLINE LOW BACK PAIN WITHOUT SCIATICA: ICD-10-CM

## 2018-06-12 DIAGNOSIS — G89.29 CHRONIC MIDLINE LOW BACK PAIN WITHOUT SCIATICA: ICD-10-CM

## 2018-06-12 LAB — HBA1C MFR BLD: 6.1 % (ref 4.2–5.6)

## 2018-06-12 RX ORDER — HYDROCODONE BITARTRATE 60 MG/1
60 TABLET, EXTENDED RELEASE ORAL DAILY
Qty: 30 TAB | Refills: 0 | Status: SHIPPED | OUTPATIENT
Start: 2018-06-12 | End: 2018-06-19 | Stop reason: SDUPTHER

## 2018-06-12 NOTE — TELEPHONE ENCOUNTER
Requested Prescriptions     Pending Prescriptions Disp Refills    HYDROcodone bitartrate (HYSINGLA) 60 mg ER tablet 30 Tab 0     Sig: Take 1 Tab by mouth daily. Max Daily Amount: 60 mg.  *DO NOT CRUSH,CHEW, OR DISSOLVE TABLET*

## 2018-06-19 ENCOUNTER — OFFICE VISIT (OUTPATIENT)
Dept: FAMILY MEDICINE CLINIC | Age: 63
End: 2018-06-19

## 2018-06-19 VITALS
RESPIRATION RATE: 18 BRPM | HEART RATE: 58 BPM | SYSTOLIC BLOOD PRESSURE: 125 MMHG | OXYGEN SATURATION: 98 % | WEIGHT: 232 LBS | BODY MASS INDEX: 34.36 KG/M2 | TEMPERATURE: 99 F | HEIGHT: 69 IN | DIASTOLIC BLOOD PRESSURE: 74 MMHG

## 2018-06-19 DIAGNOSIS — I10 ESSENTIAL HYPERTENSION: Primary | ICD-10-CM

## 2018-06-19 DIAGNOSIS — J44.9 CHRONIC BRONCHIOLITIS (HCC): ICD-10-CM

## 2018-06-19 DIAGNOSIS — M54.50 CHRONIC MIDLINE LOW BACK PAIN WITHOUT SCIATICA: ICD-10-CM

## 2018-06-19 DIAGNOSIS — G89.29 CHRONIC LEFT SHOULDER PAIN: ICD-10-CM

## 2018-06-19 DIAGNOSIS — M25.512 CHRONIC LEFT SHOULDER PAIN: ICD-10-CM

## 2018-06-19 DIAGNOSIS — E78.5 DYSLIPIDEMIA, GOAL LDL BELOW 100: ICD-10-CM

## 2018-06-19 DIAGNOSIS — G89.29 CHRONIC MIDLINE LOW BACK PAIN WITHOUT SCIATICA: ICD-10-CM

## 2018-06-19 RX ORDER — HYDROCODONE BITARTRATE 60 MG/1
60 TABLET, EXTENDED RELEASE ORAL DAILY
Qty: 30 TAB | Refills: 0 | Status: SHIPPED | OUTPATIENT
Start: 2018-06-19 | End: 2018-07-20 | Stop reason: SDUPTHER

## 2018-06-19 NOTE — PROGRESS NOTES
Patient is in the office today for a 4 month follow up. 1. Have you been to the ER, urgent care clinic since your last visit? Hospitalized since your last visit? No    2. Have you seen or consulted any other health care providers outside of the [address] [place] [address] since your last visit? Include any pap smears or colon screening.  No

## 2018-06-19 NOTE — PATIENT INSTRUCTIONS

## 2018-06-19 NOTE — PROGRESS NOTES
Subjective:       Chief Complaint  The patient presents for follow up of hypertension and high cholesterol. prediabetes  chronic back pain and right hip pain, COPD        HPI  Kvng Esquivel is a 57 y.o. male seen for follow up of hyperlipidemia. Nathalia has hypertension. Hypertension well controlled, no significant medication side effects noted, on Lopressor, hyperlipidemia fairly well controlled, no significant medication side effects noted, on Pravachol 40 mg, pt had myalgias on Zocor. Diet and Lifestyle: not attempting to follow a low fat, low cholesterol diet, exercises sporadically    Home BP Monitoring: is not measured at home. Kvng Esquivel RTC today to follow up on chronic pain diagnosis. We discussed his osteoarthritis that is affecting his back. Significant changes since last visit: none. He is  able to do his normal daily activities. He reports the following adverse side effects: none. Insurance will no longer pay for Nucynta. Ultram was not strong enough. He will stay with high Hysingla even if his pharmacy has to order it. Least pain over the last week has been 3/10. Worst pain over the last week has been 8/10. Opioid Risk Tool Reviewed: YES    Aberrant behaviors: Abnormal urine drug test.  When pt was on percocet. Urine Drug Screen: reviewed and up to date. Controlled substance agreement on file: YES.  reviewed:yes    Pill count is consistent with his prescription: yes    Concomitant use of a benzodiazepine: no        Prediabetes: pt is trying to control with diet and weight loss but it continues to be an issue due to his obesity     COPD  Patient complains of cough productive of white sputum in moderate amounts. Symptoms began several months ago. Symptoms chronic dyspnea: severity = moderate: course of sx: symptoms have progressed to a point and plateaued. does worsen with exertion. Sputum is thin in moderate amounts. Patient uses 1 pillows at night.  Patient can walk several feet before resting. Patient currently is not on home oxygen therapy. Franco Bear Respiratory history: history of tobacco use      Current Outpatient Prescriptions   Medication Sig    HYDROcodone bitartrate (HYSINGLA) 60 mg ER tablet Take 1 Tab by mouth daily. Max Daily Amount: 60 mg. *DO NOT CRUSH,CHEW, OR DISSOLVE TABLET*    umeclidinium-vilanterol (ANORO ELLIPTA) 62.5-25 mcg/actuation inhaler Take 1 Puff by inhalation daily.  gabapentin (NEURONTIN) 300 mg capsule TAKE ONE CAPSULE BY MOUTH THREE TIMES DAILY    traZODone (DESYREL) 50 mg tablet TAKE ONE TABLET BY MOUTH ONCE DAILY AT BEDTIME AS NEEDED FOR SLEEP    pravastatin (PRAVACHOL) 40 mg tablet TAKE ONE TABLET BY MOUTH NIGHTLY    chlorhexidine (PERIDEX) 0.12 % solution     metoprolol tartrate (LOPRESSOR) 25 mg tablet TAKE ONE TABLET BY MOUTH TWICE DAILY ( GENERIC FOR LOPRESSOR )    montelukast (SINGULAIR) 10 mg tablet Take 1 Tab by mouth daily.  tamsulosin (FLOMAX) 0.4 mg capsule Take 1 Cap by mouth daily (after dinner).  pantoprazole (PROTONIX) 40 mg tablet Take 1 Tab by mouth daily.  TRAVATAN Z 0.004 % ophthalmic solution Administer 1 Drop to both eyes nightly.  predniSONE (DELTASONE) 10 mg tablet 5 tabs daily for 5 days    ibuprofen (MOTRIN) 800 mg tablet TAKE ONE TABLET BY MOUTH EVERY 8 HOURS AS NEEDED FOR PAIN    lactulose (CONSTULOSE) 10 gram/15 mL solution Take 45ml PO TID    guaiFENesin ER (MUCINEX) 600 mg ER tablet Take 1 Tab by mouth two (2) times a day.  fluticasone (FLONASE) 50 mcg/actuation nasal spray 2 Sprays by Both Nostrils route daily.  nitroglycerin (NITROSTAT) 0.4 mg SL tablet 1 Tab by SubLINGual route every five (5) minutes as needed.  albuterol (PROVENTIL HFA, VENTOLIN HFA, PROAIR HFA) 90 mcg/actuation inhaler Take 2 Puffs by inhalation every four (4) hours as needed for Wheezing. No current facility-administered medications for this visit.               Review of Systems  Respiratory: negative for dyspnea on exertion  Cardiovascular: negative for chest pain    Objective:     Visit Vitals    /74 (BP 1 Location: Left arm, BP Patient Position: Sitting)    Pulse (!) 58    Temp 99 °F (37.2 °C) (Oral)    Resp 18    Ht 5' 9\" (1.753 m)    Wt 232 lb (105.2 kg)    SpO2 98%    BMI 34.26 kg/m2        General appearance - alert, well appearing, and in no distress  Neck - supple, no significant adenopathy, carotids upstroke normal bilaterally, no bruits  Chest - clear to auscultation, no wheezes, rales or rhonchi, symmetric air entry  Heart - normal rate, regular rhythm, normal S1, S2, no murmurs, rubs, clicks or gallops  Extremities - peripheral pulses normal, no pedal edema, no clubbing or cyanosis  Skin - normal coloration and turgor, no rashes, no suspicious skin lesions noted      Labs:   Lab Results   Component Value Date/Time    Hemoglobin A1c 6.1 (H) 06/11/2018 08:06 AM    Hemoglobin A1c 6.3 (H) 02/15/2018 08:29 AM    Hemoglobin A1c 5.7 (H) 09/13/2017 08:07 AM    Glucose 96 06/11/2018 08:06 AM    Glucose (POC) 123 (H) 01/24/2016 04:57 PM    Glucose,  (H) 04/01/2015 08:56 AM    Microalb/Creat ratio (ug/mg creat.) 4.4 04/29/2015 03:00 PM    LDL, calculated 86.8 06/11/2018 08:06 AM    Creatinine 1.10 06/11/2018 08:06 AM      Lab Results   Component Value Date/Time    Cholesterol, total 143 06/11/2018 08:06 AM    HDL Cholesterol 39 (L) 06/11/2018 08:06 AM    LDL, calculated 86.8 06/11/2018 08:06 AM    Triglyceride 86 06/11/2018 08:06 AM    CHOL/HDL Ratio 3.7 06/11/2018 08:06 AM     Lab Results   Component Value Date/Time    ALT (SGPT) 22 06/11/2018 08:06 AM    AST (SGOT) 15 06/11/2018 08:06 AM    Alk.  phosphatase 82 06/11/2018 08:06 AM    Bilirubin, total 0.7 06/11/2018 08:06 AM     Lab Results   Component Value Date/Time    GFR est AA >60 06/11/2018 08:06 AM    GFR est non-AA >60 06/11/2018 08:06 AM    Creatinine 1.10 06/11/2018 08:06 AM    BUN 10 06/11/2018 08:06 AM    BUN, POC 6 (L) 04/01/2015 08:56 AM    Sodium,  04/01/2015 08:56 AM    Sodium 142 06/11/2018 08:06 AM    Potassium 4.4 06/11/2018 08:06 AM    Potassium, POC 4.0 04/01/2015 08:56 AM    Chloride,  04/01/2015 08:56 AM    Chloride 108 06/11/2018 08:06 AM    CO2 27 06/11/2018 08:06 AM      Lab Results   Component Value Date/Time    Glucose 96 06/11/2018 08:06 AM    Glucose (POC) 123 (H) 01/24/2016 04:57 PM    Glucose,  (H) 04/01/2015 08:56 AM            Assessment / Plan     Hypertension well controlled, on Lopressor   Hyperlipidemia fairly well controlled, on Pravachol 40 mg. ICD-10-CM ICD-9-CM    1. Essential hypertension I10 401.9    2. Dyslipidemia, goal LDL below 100 E78.5 272.4    3. Chronic midline low back pain without sciatica M54.5 724.2  fairly well controlled on hYDROcodone bitartrate (HYSINGLA) 60 mg ER tablet    G89.29 338.29    4. Chronic left shoulder pain M25.512 719.41  patient to follow-up with orthopedics for further management    G89.29 338.29    5. Chronic bronchiolitis ([facility] [address] J44.9 491.8 Pt doing well on umeclidinium-vilanterol (ANORO ELLIPTA) 62.5-25 mcg/actuation inhaler               Low cholesterol diet, weight control and daily exercise discussed. Follow-up Disposition:  Return in about 4 months (around 10/19/2018) for OV, and Medicare Wellness Visit, labs 1 week before. Reviewed plan of care. Patient has provided input and agrees with goals.

## 2018-06-19 NOTE — MR AVS SNAPSHOT
[address] [place] [address] 
 
 
 [address] [address] Anahy Stewart [id number] [phone] Patient: Mitul Cuenca MRN:  [id number] Visit Information Date & Time Provider Department Dept. Phone Encounter #  
 6/19/2018 10:30 AM [address] [id number] Follow-up Instructions Return in about 4 months (around 10/19/2018) for OV, and Medicare Wellness Visit, labs 1 week before. Upcoming Health Maintenance Date Due DTaP/Tdap/Td series (1 - Tdap) 7/9/1974 FOBT Q 1 YEAR, 18+ 10/15/2016 Influenza Age 5 to Adult 8/1/2018 COLONOSCOPY 4/1/2020 Allergies as of 6/19/2018  Review Complete On: 6/19/2018 By: Les Campbell LPN Severity Noted Reaction Type Reactions Ace Inhibitors  05/17/2010    Cough Dilaudid [Hydromorphone]  05/17/2010    Hives Lisinopril  05/17/2010    Cough Zocor [Simvastatin]  07/30/2011    Myalgia Right leg Current Immunizations  Reviewed on 9/20/2017 Name Date Influenza Vaccine 1/2/2015 Influenza Vaccine (Quad) PF 9/20/2017, 10/12/2016, 10/15/2015 Zoster Vaccine, Live 10/22/2015 Not reviewed this visit You Were Diagnosed With   
  
 Codes Comments Essential hypertension    -  Primary ICD-10-CM: I10 
ICD-9-CM: 401.9 Dyslipidemia, goal LDL below 100     ICD-10-CM: E78.5 ICD-9-CM: 272.4 Chronic midline low back pain without sciatica     ICD-10-CM: M54.5, G89.29 ICD-9-CM: 724.2, 338.29 Chronic left shoulder pain     ICD-10-CM: M25.512, G89.29 ICD-9-CM: 719.41, 338.29 Vitals BP Pulse Temp Resp Height(growth percentile) Weight(growth percentile) 125/74 (BP 1 Location: Left arm, BP Patient Position: Sitting) (!) 58 99 °F (37.2 °C) (Oral) 18 5' 9\" (1.753 m) 232 lb (105.2 kg) SpO2 BMI Smoking Status 98% 34.26 kg/m2 Former Smoker BMI and BSA Data Body Mass Index Body Surface Area 34.26 kg/m 2 2.26 m 2 Preferred Pharmacy Pharmacy Name Phone [address] [place] [address], [address] [place] [phone] Your Updated Medication List  
  
   
This list is accurate as of 6/19/18 10:45 AM.  Always use your most recent med list.  
  
  
  
  
 albuterol 90 mcg/actuation inhaler Commonly known as:  PROVENTIL HFA, VENTOLIN HFA, PROAIR HFA Take 2 Puffs by inhalation every four (4) hours as needed for Wheezing. chlorhexidine 0.12 % solution Commonly known as:  PERIDEX  
  
 fluticasone 50 mcg/actuation nasal spray Commonly known as:  Carina [place] 2 Sprays by Both Nostrils route daily. gabapentin 300 mg capsule Commonly known as:  NEURONTIN  
TAKE ONE CAPSULE BY MOUTH THREE TIMES DAILY  
  
 guaiFENesin  mg ER tablet Commonly known as:  Nick & Nick Take 1 Tab by mouth two (2) times a day. HYDROcodone bitartrate 60 mg ER tablet Commonly known as:  HYSINGLA Take 1 Tab by mouth daily. Max Daily Amount: 60 mg. *DO NOT CRUSH,CHEW, OR DISSOLVE TABLET*  
  
 ibuprofen 800 mg tablet Commonly known as:  MOTRIN  
TAKE ONE TABLET BY MOUTH EVERY 8 HOURS AS NEEDED FOR PAIN  
  
 lactulose 10 gram/15 mL solution Commonly known as:  Davonna Ye[place] Take 45ml PO TID  
  
 metoprolol tartrate 25 mg tablet Commonly known as:  LOPRESSOR  
TAKE ONE TABLET BY MOUTH TWICE DAILY ( GENERIC FOR LOPRESSOR )  
  
 montelukast 10 mg tablet Commonly known as:  SINGULAIR Take 1 Tab by mouth daily. nitroglycerin 0.4 mg SL tablet Commonly known as:  NITROSTAT  
1 Tab by SubLINGual route every five (5) minutes as needed. pantoprazole 40 mg tablet Commonly known as:  PROTONIX Take 1 Tab by mouth daily. pravastatin 40 mg tablet Commonly known as:  PRAVACHOL  
TAKE ONE TABLET BY MOUTH NIGHTLY  
  
 tamsulosin 0.4 mg capsule Commonly known as:  FLOMAX Take 1 Cap by mouth daily (after dinner). TRAVATAN Z 0.004 % ophthalmic solution Generic drug:  travoprost  
Administer 1 Drop to both eyes nightly. traZODone 50 mg tablet Commonly known as:  DESYREL  
TAKE ONE TABLET BY MOUTH ONCE DAILY AT BEDTIME AS NEEDED FOR SLEEP  
  
 umeclidinium-vilanterol 62.5-25 mcg/actuation inhaler Commonly known as:  Clau Call Take 1 Puff by inhalation daily. Prescriptions Printed Refills HYDROcodone bitartrate (HYSINGLA) 60 mg ER tablet 0 Sig: Take 1 Tab by mouth daily. Max Daily Amount: 60 mg. *DO NOT CRUSH,CHEW, OR DISSOLVE TABLET* Class: Print Route: Oral  
  
Follow-up Instructions Return in about 4 months (around 10/19/2018) for OV, and Medicare Wellness Visit, labs 1 week before. Patient Instructions Back Stretches: Exercises Your Care Instructions Here are some examples of exercises for stretching your back. Start each exercise slowly. Ease off the exercise if you start to have pain. Your doctor or physical therapist will tell you when you can start these exercises and which ones will work best for you. How to do the exercises Overhead stretch 1. Stand comfortably with your feet shoulder-width apart. 2. Looking straight ahead, raise both arms over your head and reach toward the ceiling. Do not allow your head to tilt back. 3. Hold for 15 to 30 seconds, then lower your arms to your sides. 4. Repeat 2 to 4 times. Side stretch 1. Stand comfortably with your feet shoulder-width apart. 2. Raise one arm over your head, and then lean to the other side. 3. Slide your hand down your leg as you let the weight of your arm gently stretch your side muscles. Hold for 15 to 30 seconds. 4. Repeat 2 to 4 times on each side. Press-up 1. Lie on your stomach, supporting your body with your forearms. 2. Press your elbows down into the floor to raise your upper back.  As you do this, relax your stomach muscles and allow your back to arch without using your back muscles. As your press up, do not let your hips or pelvis come off the floor. 3. Hold for 15 to 30 seconds, then relax. 4. Repeat 2 to 4 times. Relax and rest 
 
1. Lie on your back with a rolled towel under your neck and a pillow under your knees. Extend your arms comfortably to your sides. 2. Relax and breathe normally. 3. Remain in this position for about 10 minutes. 4. If you can, do this 2 or 3 times each day. Follow-up care is a key part of your treatment and safety. Be sure to make and go to all appointments, and call your doctor if you are having problems. It's also a good idea to know your test results and keep a list of the medicines you take. Where can you learn more? Go to http://samreen-tabitha.info/. Enter T030 in the search box to learn more about \"Back Stretches: Exercises. \" Current as of: March 21, 2017 Content Version: 11.4 © 3978-1804 [employer]. Care instructions adapted under license by [employer] (which disclaims liability or warranty for this information). If you have questions about a medical condition or this instruction, always ask your healthcare professional. [address] [address] any warranty or liability for your use of this information. Introducing [facility]! [employer] introduces [employer] patient portal. Now you can access parts of your medical record, email your doctor's office, and request medication refills online. 1. In your internet browser, go to [email or web address] 2. Click on the First Time User? Click Here link in the Sign In box. You will see the New Member Sign Up page. 3. Enter your [employer] Access Code exactly as it appears below. You will not need to use this code after youve completed the sign-up process. If you do not sign up before the expiration date, you must request a new code. · [employer] Access Code: [email or web address] Expires: 7/19/2018  9:33 AM 
 
4. Enter the last four digits of your Social Security Number (xxxx) and Date of Birth (mm/dd/yyyy) as indicated and click Submit. You will be taken to the next sign-up page. 5. Create a [employer] ID. This will be your [employer] login ID and cannot be changed, so think of one that is secure and easy to remember. 6. Create a [employer] password. You can change your password at any time. 7. Enter your Password Reset Question and Answer. This can be used at a later time if you forget your password. 8. Enter your e-mail address. You will receive e-mail notification when new information is available in [address] 9. Click Sign Up. You can now view and download portions of your medical record. 10. Click the Download Summary menu link to download a portable copy of your medical information. If you have questions, please visit the Frequently Asked Questions section of the [employer] website. Remember, [employer] is NOT to be used for urgent needs. For medical emergencies, dial 911. Now available from your iPhone and Android! Please provide this summary of care documentation to your next provider. Your primary care clinician is listed as WILLIAM MENARD. If you have any questions after today's visit, please call [phone].

## 2018-06-26 ENCOUNTER — OFFICE VISIT (OUTPATIENT)
Dept: FAMILY MEDICINE CLINIC | Age: 63
End: 2018-06-26

## 2018-06-26 VITALS
HEART RATE: 68 BPM | OXYGEN SATURATION: 98 % | RESPIRATION RATE: 20 BRPM | BODY MASS INDEX: 34.07 KG/M2 | DIASTOLIC BLOOD PRESSURE: 60 MMHG | TEMPERATURE: 98.6 F | SYSTOLIC BLOOD PRESSURE: 114 MMHG | HEIGHT: 69 IN | WEIGHT: 230 LBS

## 2018-06-26 DIAGNOSIS — M25.511 ACUTE PAIN OF RIGHT SHOULDER: Primary | ICD-10-CM

## 2018-06-26 RX ORDER — PREDNISONE 10 MG/1
TABLET ORAL
Qty: 25 TAB | Refills: 0 | Status: SHIPPED | OUTPATIENT
Start: 2018-06-26 | End: 2018-08-24

## 2018-06-26 NOTE — PROGRESS NOTES
Patient is in the office today for right shoulder pain 10/10. 1. Have you been to the ER, urgent care clinic since your last visit? Hospitalized since your last visit? No    2. Have you seen or consulted any other health care providers outside of the [facility] since your last visit? Include any pap smears or colon screening.  No

## 2018-06-26 NOTE — PROGRESS NOTES
Colleen Blanc is a 57 y.o.  male and presents with Shoulder Pain (right )      SUBJECTIVE:    Shoulder Pain  Patient complains of right side shoulder pain. The symptoms began 4 days ago Course of symptoms since onset has been symptoms have progressed to a point and plateaued. . Pain is described as worse with overhead movements and worse at night. Pain is 9/10. Symptoms were incited by injury while tiling yard. Patient denies fever. Therapy to date includes rest and pt was using Ultram with minimal improvement in pain. Respiratory ROS: negative for - shortness of breath  Cardiovascular ROS: negative for - chest pain    Current Outpatient Prescriptions   Medication Sig    predniSONE (DELTASONE) 10 mg tablet 5 tabs daily for 5 days    umeclidinium-vilanterol (ANORO ELLIPTA) 62.5-25 mcg/actuation inhaler Take 1 Puff by inhalation daily.  gabapentin (NEURONTIN) 300 mg capsule TAKE ONE CAPSULE BY MOUTH THREE TIMES DAILY    traZODone (DESYREL) 50 mg tablet TAKE ONE TABLET BY MOUTH ONCE DAILY AT BEDTIME AS NEEDED FOR SLEEP    pravastatin (PRAVACHOL) 40 mg tablet TAKE ONE TABLET BY MOUTH NIGHTLY    metoprolol tartrate (LOPRESSOR) 25 mg tablet TAKE ONE TABLET BY MOUTH TWICE DAILY ( GENERIC FOR LOPRESSOR )    montelukast (SINGULAIR) 10 mg tablet Take 1 Tab by mouth daily.  tamsulosin (FLOMAX) 0.4 mg capsule Take 1 Cap by mouth daily (after dinner).  pantoprazole (PROTONIX) 40 mg tablet Take 1 Tab by mouth daily.  TRAVATAN Z 0.004 % ophthalmic solution Administer 1 Drop to both eyes nightly.  HYDROcodone bitartrate (HYSINGLA) 60 mg ER tablet Take 1 Tab by mouth daily. Max Daily Amount: 60 mg. *DO NOT CRUSH,CHEW, OR DISSOLVE TABLET*    ibuprofen (MOTRIN) 800 mg tablet TAKE ONE TABLET BY MOUTH EVERY 8 HOURS AS NEEDED FOR PAIN    lactulose (CONSTULOSE) 10 gram/15 mL solution Take 45ml PO TID    guaiFENesin ER (MUCINEX) 600 mg ER tablet Take 1 Tab by mouth two (2) times a day.     chlorhexidine (PERIDEX) 0.12 % solution     fluticasone (FLONASE) 50 mcg/actuation nasal spray 2 Sprays by Both Nostrils route daily.  nitroglycerin (NITROSTAT) 0.4 mg SL tablet 1 Tab by SubLINGual route every five (5) minutes as needed.  albuterol (PROVENTIL HFA, VENTOLIN HFA, PROAIR HFA) 90 mcg/actuation inhaler Take 2 Puffs by inhalation every four (4) hours as needed for Wheezing. No current facility-administered medications for this visit. OBJECTIVE:  oriented to person, place, and time and overweight  Visit Vitals    /60 (BP 1 Location: Left arm, BP Patient Position: Sitting)    Pulse 68    Temp 98.6 °F (37 °C) (Oral)    Resp 20    Ht 5' 9\" (1.753 m)    Wt 230 lb (104.3 kg)    SpO2 98%    BMI 33.97 kg/m2      well developed and well nourished  Musculoskeletal -pain in right shoulder with active abduction. Pain in the right shoulder worse with internal and external rotation. Patient unable to extend shoulder above head 180° due to pain patient actually limited to about little over 90°. Assessment/Plan      ICD-10-CM ICD-9-CM    1. Acute pain of right shoulder M25.511 719.41 REFERRAL TO PHYSICAL THERAPY      Will add predniSONE (DELTASONE) 10 mg tablet and pt to restart Hysingla 60 mg which he was out off for the last few weeks. Follow-up Disposition:  Return if symptoms worsen or fail to improve. Reviewed plan of care. Patient has provided input and agrees with goals.

## 2018-06-26 NOTE — MR AVS SNAPSHOT
[address] 
 
 
 [address] [place], [place] [address] [address] [place] [address] 
[phone] Patient: Letha Miranda MRN:  [id number] Visit Information Date & Time Provider Department Dept. Phone Encounter #  
 6/26/2018  1:30 PM Melly Kwok [address] [phone] [id number] Follow-up Instructions Return if symptoms worsen or fail to improve. Your Appointments 6/26/2018  1:30 PM  
Office Visit with Fabiola Meraz MD  
[address] 365[address] [facility]) Appt Note: ov  
 [address], [facility] [address] [place] [address]  
[phone]  
  
   
 [address],  [address] Upcoming Health Maintenance Date Due DTaP/Tdap/Td series (1 - Tdap) 7/9/1974 FOBT Q 1 YEAR, 18+ 10/15/2016 Influenza Age 5 to Adult 8/1/2018 COLONOSCOPY 4/1/2020 Allergies as of 6/26/2018  Review Complete On: 6/26/2018 By: Cindi Holguin LPN Severity Noted Reaction Type Reactions Ace Inhibitors  05/17/2010    Cough Dilaudid [Hydromorphone]  05/17/2010    Hives Lisinopril  05/17/2010    Cough Zocor [Simvastatin]  07/30/2011    Myalgia Right leg Current Immunizations  Reviewed on 9/20/2017 Name Date Influenza Vaccine 1/2/2015 Influenza Vaccine (Quad) PF 9/20/2017, 10/12/2016, 10/15/2015 Zoster Vaccine, Live 10/22/2015 Not reviewed this visit You Were Diagnosed With   
  
 Codes Comments Acute pain of right shoulder    -  Primary ICD-10-CM: M25.511 ICD-9-CM: 719.41 Vitals BP Pulse Temp Resp Height(growth percentile) Weight(growth percentile) 114/60 (BP 1 Location: Left arm, BP Patient Position: Sitting) 68 98.6 °F (37 °C) (Oral) 20 5' 9\" (1.753 m) 230 lb (104.3 kg) SpO2 BMI Smoking Status 98% 33.97 kg/m2 Former Smoker BMI and BSA Data Body Mass Index Body Surface Area  
 33.97 kg/m 2 2.25 m 2 Preferred Pharmacy Pharmacy Name Phone [facility] [address] [facility] [phone] Your Updated Medication List  
  
   
This list is accurate as of 6/26/18 12:37 PM.  Always use your most recent med list.  
  
  
  
  
 albuterol 90 mcg/actuation inhaler Commonly known as:  PROVENTIL HFA, VENTOLIN HFA, PROAIR HFA Take 2 Puffs by inhalation every four (4) hours as needed for Wheezing. chlorhexidine 0.12 % solution Commonly known as:  PERIDEX  
  
 fluticasone 50 mcg/actuation nasal spray Commonly known as:  [place] Para 2 Sprays by Both Nostrils route daily. gabapentin 300 mg capsule Commonly known as:  NEURONTIN  
TAKE ONE CAPSULE BY MOUTH THREE TIMES DAILY  
  
 guaiFENesin  mg ER tablet Commonly known as:  Nick & Nick Take 1 Tab by mouth two (2) times a day. HYDROcodone bitartrate 60 mg ER tablet Commonly known as:  HYSINGLA Take 1 Tab by mouth daily. Max Daily Amount: 60 mg. *DO NOT CRUSH,CHEW, OR DISSOLVE TABLET*  
  
 ibuprofen 800 mg tablet Commonly known as:  MOTRIN  
TAKE ONE TABLET BY MOUTH EVERY 8 HOURS AS NEEDED FOR PAIN  
  
 lactulose 10 gram/15 mL solution Commonly known as:  Josh Colmes[place]il Take 45ml PO TID  
  
 metoprolol tartrate 25 mg tablet Commonly known as:  LOPRESSOR  
TAKE ONE TABLET BY MOUTH TWICE DAILY ( GENERIC FOR LOPRESSOR )  
  
 montelukast 10 mg tablet Commonly known as:  SINGULAIR Take 1 Tab by mouth daily. nitroglycerin 0.4 mg SL tablet Commonly known as:  NITROSTAT  
1 Tab by SubLINGual route every five (5) minutes as needed. pantoprazole 40 mg tablet Commonly known as:  PROTONIX Take 1 Tab by mouth daily. pravastatin 40 mg tablet Commonly known as:  PRAVACHOL  
TAKE ONE TABLET BY MOUTH NIGHTLY  
  
 predniSONE 10 mg tablet Commonly known as:  DELTASONE  
5 tabs daily for 5 days  
  
 tamsulosin 0.4 mg capsule Commonly known as:  FLOMAX Take 1 Cap by mouth daily (after dinner). TRAVATAN Z 0.004 % ophthalmic solution Generic drug:  travoprost  
Administer 1 Drop to both eyes nightly. traZODone 50 mg tablet Commonly known as:  DESYREL  
TAKE ONE TABLET BY MOUTH ONCE DAILY AT BEDTIME AS NEEDED FOR SLEEP  
  
 umeclidinium-vilanterol 62.5-25 mcg/actuation inhaler Commonly known as:  [place] Take 1 Puff by inhalation daily. Prescriptions Sent to Pharmacy Refills  
 predniSONE (DELTASONE) 10 mg tablet 0 Si tabs daily for 5 days Class: Normal  
 Pharmacy: [facility] [address] RENAN JONES [address] [place] [address] #: [phone] We Performed the Following REFERRAL TO PHYSICAL THERAPY [JZN83 Custom] Comments:  
 Refer for right shoulder pain Follow-up Instructions Return if symptoms worsen or fail to improve. Referral Information Referral ID Referred By Referred To  
  
 [id number] WILLIAM MENARD [address] [place] [address] Phone: [phone] Visits Status Start Date End Date 1 New Request 18 If your referral has a status of pending review or denied, additional information will be sent to support the outcome of this decision. Patient Instructions Shoulder Pain: Care Instructions Your Care Instructions You can hurt your shoulder by using it too much during an activity, such as fishing or baseball. It can also happen as part of the everyday wear and tear of getting older. Shoulder injuries can be slow to heal, but your shoulder should get better with time. Your doctor may recommend a sling to rest your shoulder. If you have injured your shoulder, you may need testing and treatment. Follow-up care is a key part of your treatment and safety.  Be sure to make and go to all appointments, and call your doctor if you are having problems. It's also a good idea to know your test results and keep a list of the medicines you take. How can you care for yourself at home? · Take pain medicines exactly as directed. ¨ If the doctor gave you a prescription medicine for pain, take it as prescribed. ¨ If you are not taking a prescription pain medicine, ask your doctor if you can take an over-the-counter medicine. ¨ Do not take two or more pain medicines at the same time unless the doctor told you to. Many pain medicines contain acetaminophen, which is Tylenol. Too much acetaminophen (Tylenol) can be harmful. · If your doctor recommends that you wear a sling, use it as directed. Do not take it off before your doctor tells you to. · Put ice or a cold pack on the sore area for 10 to 20 minutes at a time. Put a thin cloth between the ice and your skin. · If there is no swelling, you can put moist heat, a heating pad, or a warm cloth on your shoulder. Some doctors suggest alternating between hot and cold. · Rest your shoulder for a few days. If your doctor recommends it, you can then begin gentle exercise of the shoulder, but do not lift anything heavy. When should you call for help? Call 911 anytime you think you may need emergency care. For example, call if: 
? · You have chest pain or pressure. This may occur with: ¨ Sweating. ¨ Shortness of breath. ¨ Nausea or vomiting. ¨ Pain that spreads from the chest to the neck, jaw, or one or both shoulders or arms. ¨ Dizziness or lightheadedness. ¨ A fast or uneven pulse. After calling 911, chew 1 adult-strength aspirin. Wait for an ambulance. Do not try to drive yourself. ? · Your arm or hand is cool or pale or changes color. ?Call your doctor now or seek immediate medical care if: 
? · You have signs of infection, such as: 
¨ Increased pain, swelling, warmth, or redness in your shoulder. ¨ Red streaks leading from a place on your shoulder. ¨ Pus draining from an area of your shoulder. ¨ Swollen lymph nodes in your neck, armpits, or groin. ¨ A fever. ? Watch closely for changes in your health, and be sure to contact your doctor if: 
? · You cannot use your shoulder. ? · Your shoulder does not get better as expected. Where can you learn more? Go to http://samreen-tabitha.info/. Enter S497 in the search box to learn more about \"Shoulder Pain: Care Instructions. \" Current as of: March 21, 2017 Content Version: 11.4 © 2709-5160 [employer]. Care instructions adapted under license by [employer] (which disclaims liability or warranty for this information). If you have questions about a medical condition or this instruction, always ask your healthcare professional. [address] any warranty or liability for your use of this information. Introducing [facility]! Clovis Maxwell introduces [employer] patient portal. Now you can access parts of your medical record, email your doctor's office, and request medication refills online. 1. In your internet browser, go to [email or web address] 2. Click on the First Time User? Click Here link in the Sign In box. You will see the New Member Sign Up page. 3. Enter your [employer] Access Code exactly as it appears below. You will not need to use this code after youve completed the sign-up process. If you do not sign up before the expiration date, you must request a new code. · [employer] Access Code: [email or web address] Expires: 7/19/2018  9:33 AM 
 
4. Enter the last four digits of your Social Security Number (xxxx) and Date of Birth (mm/dd/yyyy) as indicated and click Submit. You will be taken to the next sign-up page. 5. Create a [employer] ID. This will be your [employer] login ID and cannot be changed, so think of one that is secure and easy to remember. 6. Create a [employer] password. You can change your password at any time. 7. Enter your Password Reset Question and Answer. This can be used at a later time if you forget your password. 8. Enter your e-mail address. You will receive e-mail notification when new information is available in [address] 9. Click Sign Up. You can now view and download portions of your medical record. 10. Click the Download Summary menu link to download a portable copy of your medical information. If you have questions, please visit the Frequently Asked Questions section of the [employer] website. Remember, [employer] is NOT to be used for urgent needs. For medical emergencies, dial 911. Now available from your iPhone and Android! Please provide this summary of care documentation to your next provider. Your primary care clinician is listed as WILLIAM MENARD. If you have any questions after today's visit, please call [phone].

## 2018-06-26 NOTE — PATIENT INSTRUCTIONS
Shoulder Pain: Care Instructions  Your Care Instructions    You can hurt your shoulder by using it too much during an activity, such as fishing or baseball. It can also happen as part of the everyday wear and tear of getting older. Shoulder injuries can be slow to heal, but your shoulder should get better with time. Your doctor may recommend a sling to rest your shoulder. If you have injured your shoulder, you may need testing and treatment. Follow-up care is a key part of your treatment and safety. Be sure to make and go to all appointments, and call your doctor if you are having problems. It's also a good idea to know your test results and keep a list of the medicines you take. How can you care for yourself at home? · Take pain medicines exactly as directed. ¨ If the doctor gave you a prescription medicine for pain, take it as prescribed. ¨ If you are not taking a prescription pain medicine, ask your doctor if you can take an over-the-counter medicine. ¨ Do not take two or more pain medicines at the same time unless the doctor told you to. Many pain medicines contain acetaminophen, which is Tylenol. Too much acetaminophen (Tylenol) can be harmful. · If your doctor recommends that you wear a sling, use it as directed. Do not take it off before your doctor tells you to. · Put ice or a cold pack on the sore area for 10 to 20 minutes at a time. Put a thin cloth between the ice and your skin. · If there is no swelling, you can put moist heat, a heating pad, or a warm cloth on your shoulder. Some doctors suggest alternating between hot and cold. · Rest your shoulder for a few days. If your doctor recommends it, you can then begin gentle exercise of the shoulder, but do not lift anything heavy. When should you call for help? Call 911 anytime you think you may need emergency care. For example, call if:  ? · You have chest pain or pressure. This may occur with:  ¨ Sweating.   ¨ Shortness of breath. ¨ Nausea or vomiting. ¨ Pain that spreads from the chest to the neck, jaw, or one or both shoulders or arms. ¨ Dizziness or lightheadedness. ¨ A fast or uneven pulse. After calling 911, chew 1 adult-strength aspirin. Wait for an ambulance. Do not try to drive yourself. ? · Your arm or hand is cool or pale or changes color. ?Call your doctor now or seek immediate medical care if:  ? · You have signs of infection, such as:  ¨ Increased pain, swelling, warmth, or redness in your shoulder. ¨ Red streaks leading from a place on your shoulder. ¨ Pus draining from an area of your shoulder. ¨ Swollen lymph nodes in your neck, armpits, or groin. ¨ A fever. ? Watch closely for changes in your health, and be sure to contact your doctor if:  ? · You cannot use your shoulder. ? · Your shoulder does not get better as expected. Where can you learn more? Go to http://samreen-tabitha.info/. Enter Z777 in the search box to learn more about \"Shoulder Pain: Care Instructions. \"  Current as of: March 21, 2017  Content Version: 11.4  © 9398-9309 [employer]. Care instructions adapted under license by [employer] (which disclaims liability or warranty for this information). If you have questions about a medical condition or this instruction, always ask your healthcare professional. [place] [address] any warranty or liability for your use of this information.

## 2018-07-14 ENCOUNTER — HOSPITAL ENCOUNTER (EMERGENCY)
Age: 63
Discharge: HOME | End: 2018-07-14
Attending: EMERGENCY MEDICINE | Admitting: EMERGENCY MEDICINE
Payer: MEDICARE

## 2018-07-14 ENCOUNTER — APPOINTMENT (OUTPATIENT)
Dept: GENERAL RADIOLOGY | Age: 63
End: 2018-07-14
Attending: PHYSICIAN ASSISTANT
Payer: MEDICARE

## 2018-07-14 VITALS
DIASTOLIC BLOOD PRESSURE: 77 MMHG | TEMPERATURE: 97.9 F | RESPIRATION RATE: 16 BRPM | HEART RATE: 86 BPM | SYSTOLIC BLOOD PRESSURE: 150 MMHG | OXYGEN SATURATION: 98 %

## 2018-07-14 DIAGNOSIS — M25.551 RIGHT HIP PAIN: Primary | ICD-10-CM

## 2018-07-14 LAB
ANION GAP SERPL CALC-SCNC: 8 MMOL/L (ref 3–18)
BASOPHILS # BLD: 0 K/UL (ref 0–0.1)
BASOPHILS NFR BLD: 0 % (ref 0–2)
BUN SERPL-MCNC: 11 MG/DL (ref 7–18)
BUN/CREAT SERPL: 10 (ref 12–20)
CALCIUM SERPL-MCNC: 8.9 MG/DL (ref 8.5–10.1)
CHLORIDE SERPL-SCNC: 106 MMOL/L (ref 100–108)
CO2 SERPL-SCNC: 25 MMOL/L (ref 21–32)
CREAT SERPL-MCNC: 1.13 MG/DL (ref 0.6–1.3)
DIFFERENTIAL METHOD BLD: ABNORMAL
EOSINOPHIL # BLD: 0.1 K/UL (ref 0–0.4)
EOSINOPHIL NFR BLD: 1 % (ref 0–5)
ERYTHROCYTE [DISTWIDTH] IN BLOOD BY AUTOMATED COUNT: 13.6 % (ref 11.6–14.5)
GLUCOSE SERPL-MCNC: 114 MG/DL (ref 74–99)
HCT VFR BLD AUTO: 35.6 % (ref 36–48)
HGB BLD-MCNC: 11.9 G/DL (ref 13–16)
LYMPHOCYTES # BLD: 2.7 K/UL (ref 0.9–3.6)
LYMPHOCYTES NFR BLD: 26 % (ref 21–52)
MCH RBC QN AUTO: 29.2 PG (ref 24–34)
MCHC RBC AUTO-ENTMCNC: 33.4 G/DL (ref 31–37)
MCV RBC AUTO: 87.3 FL (ref 74–97)
MONOCYTES # BLD: 1.4 K/UL (ref 0.05–1.2)
MONOCYTES NFR BLD: 13 % (ref 3–10)
NEUTS SEG # BLD: 6.1 K/UL (ref 1.8–8)
NEUTS SEG NFR BLD: 60 % (ref 40–73)
PLATELET # BLD AUTO: 236 K/UL (ref 135–420)
PMV BLD AUTO: 10.2 FL (ref 9.2–11.8)
POTASSIUM SERPL-SCNC: 4.4 MMOL/L (ref 3.5–5.5)
RBC # BLD AUTO: 4.08 M/UL (ref 4.7–5.5)
SODIUM SERPL-SCNC: 139 MMOL/L (ref 136–145)
WBC # BLD AUTO: 10.4 K/UL (ref 4.6–13.2)

## 2018-07-14 PROCEDURE — 85025 COMPLETE CBC W/AUTO DIFF WBC: CPT | Performed by: PHYSICIAN ASSISTANT

## 2018-07-14 PROCEDURE — 74011250637 HC RX REV CODE- 250/637: Performed by: PHYSICIAN ASSISTANT

## 2018-07-14 PROCEDURE — 80048 BASIC METABOLIC PNL TOTAL CA: CPT | Performed by: PHYSICIAN ASSISTANT

## 2018-07-14 PROCEDURE — 99283 EMERGENCY DEPT VISIT LOW MDM: CPT

## 2018-07-14 PROCEDURE — 73502 X-RAY EXAM HIP UNI 2-3 VIEWS: CPT

## 2018-07-14 RX ORDER — HYDROCODONE BITARTRATE AND ACETAMINOPHEN 5; 325 MG/1; MG/1
1 TABLET ORAL
Qty: 12 TAB | Refills: 0 | Status: SHIPPED | OUTPATIENT
Start: 2018-07-14 | End: 2019-05-01 | Stop reason: ALTCHOICE

## 2018-07-14 RX ORDER — HYDROCODONE BITARTRATE AND ACETAMINOPHEN 5; 325 MG/1; MG/1
1 TABLET ORAL
Status: COMPLETED | OUTPATIENT
Start: 2018-07-14 | End: 2018-07-14

## 2018-07-14 RX ADMIN — HYDROCODONE BITARTRATE AND ACETAMINOPHEN 1 TABLET: 5; 325 TABLET ORAL at 12:59

## 2018-07-14 NOTE — ED PROVIDER NOTES
EMERGENCY DEPARTMENT HISTORY AND PHYSICAL EXAM    1:02 PM      Date: 7/14/2018  Patient Name: Chin Howell    History of Presenting Illness     Chief Complaint   Patient presents with    Hip Pain    Other     generalized body pain (joints)         History Provided By: Patient    Chief Complaint: right hip pain  Duration:  Hours  Timing:  Acute  Location:   Quality: Aching  Severity: Moderate  Modifying Factors: none  Associated Symptoms: denies any other associated signs or symptoms      Additional History (Context):Gabe Cherry is a 70 y.o. male with a pertinent history of chronic back pain, DLD, CAD, obesity, GERD, HTN, COPD, right hip pain who presents via EMS to the emergency department for evaluation of right hip pain since last night, preventing him from sleeping. He denies any known injury or trauma. Pt denies any fevers or chills, headache, dizziness or light headedness, ENT issues, CP or discomfort, SOB, cough, n/v/d/c, abd pain, new back pain, melena/hematochezia, dysuria, hematuria, frequency, focal weakness/numbness/tingling, or rash. Patient has no other complaints at this time. PCP:  Fernando Ignacio MD      Current Outpatient Prescriptions   Medication Sig Dispense Refill    HYDROcodone-acetaminophen (NORCO) 5-325 mg per tablet Take 1 Tab by mouth every eight (8) hours as needed for Pain. Max Daily Amount: 3 Tabs. 12 Tab 0    predniSONE (DELTASONE) 10 mg tablet 5 tabs daily for 5 days 25 Tab 0    HYDROcodone bitartrate (HYSINGLA) 60 mg ER tablet Take 1 Tab by mouth daily. Max Daily Amount: 60 mg. *DO NOT CRUSH,CHEW, OR DISSOLVE TABLET* 30 Tab 0    umeclidinium-vilanterol (ANORO ELLIPTA) 62.5-25 mcg/actuation inhaler Take 1 Puff by inhalation daily.  1 Inhaler 2    gabapentin (NEURONTIN) 300 mg capsule TAKE ONE CAPSULE BY MOUTH THREE TIMES DAILY 90 Cap 5    traZODone (DESYREL) 50 mg tablet TAKE ONE TABLET BY MOUTH ONCE DAILY AT BEDTIME AS NEEDED FOR SLEEP 30 Tab 2    ibuprofen (MOTRIN) 800 mg tablet TAKE ONE TABLET BY MOUTH EVERY 8 HOURS AS NEEDED FOR PAIN 90 Tab 5    lactulose (CONSTULOSE) 10 gram/15 mL solution Take 45ml PO  mL 2    guaiFENesin ER (MUCINEX) 600 mg ER tablet Take 1 Tab by mouth two (2) times a day. 30 Tab 5    pravastatin (PRAVACHOL) 40 mg tablet TAKE ONE TABLET BY MOUTH NIGHTLY 90 Tab 3    chlorhexidine (PERIDEX) 0.12 % solution       metoprolol tartrate (LOPRESSOR) 25 mg tablet TAKE ONE TABLET BY MOUTH TWICE DAILY ( GENERIC FOR LOPRESSOR ) 180 Tab 3    fluticasone (FLONASE) 50 mcg/actuation nasal spray 2 Sprays by Both Nostrils route daily. 1 Bottle 5    montelukast (SINGULAIR) 10 mg tablet Take 1 Tab by mouth daily. 30 Tab 5    tamsulosin (FLOMAX) 0.4 mg capsule Take 1 Cap by mouth daily (after dinner). 90 Cap 3    pantoprazole (PROTONIX) 40 mg tablet Take 1 Tab by mouth daily. 30 Tab 5    nitroglycerin (NITROSTAT) 0.4 mg SL tablet 1 Tab by SubLINGual route every five (5) minutes as needed. 6 Tab 1    albuterol (PROVENTIL HFA, VENTOLIN HFA, PROAIR HFA) 90 mcg/actuation inhaler Take 2 Puffs by inhalation every four (4) hours as needed for Wheezing. 1 Inhaler 5    TRAVATAN Z 0.004 % ophthalmic solution Administer 1 Drop to both eyes nightly.          Past History     Past Medical History:  Past Medical History:   Diagnosis Date    Allergic rhinitis     Avascular necrosis (HCC)     CAD (coronary artery disease) 4/2009    mild, hemodynamically non-significant by angiography    Carpal tunnel syndrome     Carpal tunnel syndrome on both sides     Chest pain, unspecified     Chronic back pain 2/11/2011    Chronic obstructive pulmonary disease ([address]     Degenerative joint disease     with chronic back pain    Dysesthesia     of hand post carpal tunnel surgery v sudeck's atrophy    Dyslipidemia, goal LDL below 100     Dysphagia     GERD (gastroesophageal reflux disease) 5/17/2010    Glaucoma 9/15/2010    Glaucoma     Hearing loss     Hypercholesterolemia  Hypertension     Joint fusion     of right wrist    Morbid obesity ([facility] [address]     Obesity 5/17/2010    Other dyspnea and respiratory abnormality     Palpitations     Right hip pain     Right hip pain     Right knee pain     S/P cardiac catheterization 4/9/2009    1. Normal systemic pressure. 2. Moderate elevation of left-sided filling pressures. 3. Preserved left ventricular systolic function in the LOWE projection. 4. Mild, nonsignificant epicardial coronary artery disese by angiography.  Sleep apnea     on CPAP    Strain of lumbar region     Tobacco abuse        Past Surgical History:  Past Surgical History:   Procedure Laterality Date    HX CARPAL TUNNEL RELEASE      bilateral wrists    HX COLONOSCOPY      HX HEART CATHETERIZATION  4/9/2009    1. Normal systemic pressure. 2. Moderate elevation of left-sided filling pressures. 3. Preserved left ventricular systolic function in the LOWE projection. 4. Mild, nonsignificant epicardial coronary artery disese by angiography.  HX HIP REPLACEMENT      HX OTHER SURGICAL  02/2017    left shoulder surgery    HX TONSILLECTOMY      HX WRIST FRACTURE TX      NE ANESTH,SURGERY OF SHOULDER         Family History:  Family History   Problem Relation Age of Onset    Diabetes Mother    Mallika.Cameron Cancer Father     Hypertension Other     Arthritis-osteo Other        Social History:  Social History   Substance Use Topics    Smoking status: Former Smoker     Quit date: 5/17/2009    Smokeless tobacco: Never Used      Comment: urothelial cancer risk discussed today [id number]    Alcohol use No       Allergies: Allergies   Allergen Reactions    Ace Inhibitors Cough    Dilaudid [Hydromorphone] Hives    Lisinopril Cough    Zocor [Simvastatin] Myalgia     Right leg         Review of Systems       Review of Systems   Constitutional: Negative for chills and fever. HENT: Negative for congestion, rhinorrhea and sore throat.     Respiratory: Negative for cough and shortness of breath. Cardiovascular: Negative for chest pain. Gastrointestinal: Negative for abdominal pain, blood in stool, constipation, diarrhea, nausea and vomiting. Genitourinary: Negative for dysuria, frequency and hematuria. Musculoskeletal: Negative for back pain and myalgias. Skin: Negative for rash and wound. Neurological: Negative for dizziness and headaches. Physical Exam     Visit Vitals    /77 (BP 1 Location: Right arm, BP Patient Position: At rest)    Pulse 86    Temp 97.9 °F (36.6 °C)    Resp 16    SpO2 98%       Physical Exam   Constitutional: He is oriented to person, place, and time. He appears well-developed and well-nourished. No distress. HENT:   Head: Normocephalic and atraumatic. Eyes: Conjunctivae are normal.   Neck: Normal range of motion. Neck supple. Cardiovascular: Normal rate, regular rhythm and normal heart sounds. Pulmonary/Chest: Effort normal and breath sounds normal. No respiratory distress. He has no wheezes. He has no rales. He exhibits no tenderness. Lungs CTAB   Musculoskeletal: Normal range of motion. He exhibits tenderness. He exhibits no edema or deformity. TTP noted to right lateral hip without edema, induration, crepitance, or deformity. Pt is neurovascularly intact distally with cap refill < 3 seconds and intact sensation. Neurological: He is alert and oriented to person, place, and time. Skin: Skin is warm and dry. He is not diaphoretic. Psychiatric: He has a normal mood and affect. Nursing note and vitals reviewed.       Diagnostic Study Results     Labs -  Recent Results (from the past 12 hour(s))   METABOLIC PANEL, BASIC    Collection Time: 07/14/18 12:58 PM   Result Value Ref Range    Sodium 139 136 - 145 mmol/L    Potassium 4.4 3.5 - 5.5 mmol/L    Chloride 106 100 - 108 mmol/L    CO2 25 21 - 32 mmol/L    Anion gap 8 3.0 - 18 mmol/L    Glucose 114 (H) 74 - 99 mg/dL    BUN 11 7.0 - 18 MG/DL    Creatinine 1.13 0.6 - 1.3 MG/DL BUN/Creatinine ratio 10 (L) 12 - 20      GFR est AA >60 >60 ml/min/1.73m2    GFR est non-AA >60 >60 ml/min/1.73m2    Calcium 8.9 8.5 - 10.1 MG/DL   CBC WITH AUTOMATED DIFF    Collection Time: 07/14/18 12:58 PM   Result Value Ref Range    WBC 10.4 4.6 - 13.2 K/uL    RBC 4.08 (L) 4.70 - 5.50 M/uL    HGB 11.9 (L) 13.0 - 16.0 g/dL    HCT 35.6 (L) 36.0 - 48.0 %    MCV 87.3 74.0 - 97.0 FL    MCH 29.2 24.0 - 34.0 PG    MCHC 33.4 31.0 - 37.0 g/dL    RDW 13.6 11.6 - 14.5 %    PLATELET 296 692 - 929 K/uL    MPV 10.2 9.2 - 11.8 FL    NEUTROPHILS 60 40 - 73 %    LYMPHOCYTES 26 21 - 52 %    MONOCYTES 13 (H) 3 - 10 %    EOSINOPHILS 1 0 - 5 %    BASOPHILS 0 0 - 2 %    ABS. NEUTROPHILS 6.1 1.8 - 8.0 K/UL    ABS. LYMPHOCYTES 2.7 0.9 - 3.6 K/UL    ABS. MONOCYTES 1.4 (H) 0.05 - 1.2 K/UL    ABS. EOSINOPHILS 0.1 0.0 - 0.4 K/UL    ABS. BASOPHILS 0.0 0.0 - 0.1 K/UL    DF AUTOMATED         Radiologic Studies -   Xr Hip Rt W Or Wo Pelv 2-3 Vws    Result Date: 7/14/2018  EXAM:  XR HIP RT W OR WO PELV 2-3 VWS INDICATION:   Hip pain COMPARISON: 1/16, 1/15. FINDINGS: An AP view of the pelvis and a frogleg lateral view of the right hip demonstrates right hip arthroplasty. Similar acetabular lucency posterior to the superior screws, perhaps not necessarily acute or reflective of loosening. However please correlate clinically. Sacroiliac joints are intact. Interval increased heterotopic ossification trochanteric region. No convincing definite acute displaced fracture. IMPRESSION:  Interval increased heterotopic ossification trochanteric region. No convincing definite acute displaced fracture. Arthroplasty with similar acetabular lucency as above. Medical Decision Making   I am the first provider for this patient. I reviewed the vital signs, available nursing notes, past medical history, past surgical history, family history and social history. Vital Signs-Reviewed the patient's vital signs.     Pulse Oximetry Analysis -  98% on room air (Interpretation)    Records Reviewed: Nursing Notes and Old Medical Records (Time of Review: 1:02 PM)    ED Course: Progress Notes, Reevaluation, and Consults:      Provider Notes (Medical Decision Making):   Differential Diagnosis: Septic hip, transient synovitis, Kqat-Eflec-Rfpppcc Disease/avascular necrosis of femoral head, slipped capital femoral epiphysis, hip fracture, hip dislocation    Plan:  Pt presents via EMS. Vitals wnl. No leukocytosis or fever. Labs otherwise unremarkable. Xr without acute process. Treated here with norco.  Will DC home with same. Advised to follow-up with established ortho. At this time, patient is stable and appropriate for discharge home. Patient demonstrates understanding of current diagnoses and is in agreement with the treatment plan. They are advised that while the likelihood of serious underlying condition is low at this point given the evaluation performed today, we cannot fully rule it out. They are advised to immediately return with any new symptoms or worsening of current condition. All questions have been answered. Patient is given educational material regarding their diagnoses, including danger symptoms and when to return to the ED. Diagnosis     Clinical Impression:   1. Right hip pain        Disposition: DC Home    Follow-up Information     Follow up With Details Comments Contact Info    Peg Penaloza MD Call in 2 days For follow-up [address] [facility]      [facility] EMERGENCY DEPT Go to As needed, If symptoms worsen [address] [place] [id number]  [phone]           Patient's Medications   Start Taking    HYDROCODONE-ACETAMINOPHEN (NORCO) 5-325 MG PER TABLET    Take 1 Tab by mouth every eight (8) hours as needed for Pain. Max Daily Amount: 3 Tabs.    Continue Taking    ALBUTEROL (PROVENTIL HFA, VENTOLIN HFA, PROAIR HFA) 90 MCG/ACTUATION INHALER    Take 2 Puffs by inhalation every four (4) hours as needed for Wheezing. CHLORHEXIDINE (PERIDEX) 0.12 % SOLUTION        FLUTICASONE (FLONASE) 50 MCG/ACTUATION NASAL SPRAY    2 Sprays by Both Nostrils route daily. GABAPENTIN (NEURONTIN) 300 MG CAPSULE    TAKE ONE CAPSULE BY MOUTH THREE TIMES DAILY    GUAIFENESIN ER (MUCINEX) 600 MG ER TABLET    Take 1 Tab by mouth two (2) times a day. HYDROCODONE BITARTRATE (HYSINGLA) 60 MG ER TABLET    Take 1 Tab by mouth daily. Max Daily Amount: 60 mg. *DO NOT CRUSH,CHEW, OR DISSOLVE TABLET*    IBUPROFEN (MOTRIN) 800 MG TABLET    TAKE ONE TABLET BY MOUTH EVERY 8 HOURS AS NEEDED FOR PAIN    LACTULOSE (CONSTULOSE) 10 GRAM/15 ML SOLUTION    Take 45ml PO TID    METOPROLOL TARTRATE (LOPRESSOR) 25 MG TABLET    TAKE ONE TABLET BY MOUTH TWICE DAILY ( GENERIC FOR LOPRESSOR )    MONTELUKAST (SINGULAIR) 10 MG TABLET    Take 1 Tab by mouth daily. NITROGLYCERIN (NITROSTAT) 0.4 MG SL TABLET    1 Tab by SubLINGual route every five (5) minutes as needed. PANTOPRAZOLE (PROTONIX) 40 MG TABLET    Take 1 Tab by mouth daily. PRAVASTATIN (PRAVACHOL) 40 MG TABLET    TAKE ONE TABLET BY MOUTH NIGHTLY    PREDNISONE (DELTASONE) 10 MG TABLET    5 tabs daily for 5 days    TAMSULOSIN (FLOMAX) 0.4 MG CAPSULE    Take 1 Cap by mouth daily (after dinner). TRAVATAN Z 0.004 % OPHTHALMIC SOLUTION    Administer 1 Drop to both eyes nightly. TRAZODONE (DESYREL) 50 MG TABLET    TAKE ONE TABLET BY MOUTH ONCE DAILY AT BEDTIME AS NEEDED FOR SLEEP    UMECLIDINIUM-VILANTEROL (ANORO ELLIPTA) 62.5-25 MCG/ACTUATION INHALER    Take 1 Puff by inhalation daily.    These Medications have changed    No medications on file   Stop Taking    No medications on file     _______________________________

## 2018-07-14 NOTE — ED TRIAGE NOTES
Pt arrived via medic from home c/o of R hip pain (hip replacement 5 years ago) and pain in other joints all over. Pt takes gabapentin and ibuprofen but this is not working anymore. Pt hx of HTN and compliant w/ meds.

## 2018-07-14 NOTE — DISCHARGE INSTRUCTIONS
Hip Pain: Care Instructions  Your Care Instructions    Hip pain may be caused by many things, including overuse, a fall, or a twisting movement. Another cause of hip pain is arthritis. Your pain may increase when you stand up, walk, or squat. The pain may come and go or may be constant. Home treatment can help relieve hip pain, swelling, and stiffness. If your pain is ongoing, you may need more tests and treatment. Follow-up care is a key part of your treatment and safety. Be sure to make and go to all appointments, and call your doctor if you are having problems. It's also a good idea to know your test results and keep a list of the medicines you take. How can you care for yourself at home? · Take pain medicines exactly as directed. ¨ If the doctor gave you a prescription medicine for pain, take it as prescribed. ¨ If you are not taking a prescription pain medicine, ask your doctor if you can take an over-the-counter medicine. · Rest and protect your hip. Take a break from any activity, including standing or walking, that may cause pain. · Put ice or a cold pack against your hip for 10 to 20 minutes at a time. Try to do this every 1 to 2 hours for the next 3 days (when you are awake) or until the swelling goes down. Put a thin cloth between the ice and your skin. · Sleep on your healthy side with a pillow between your knees, or sleep on your back with pillows under your knees. · If there is no swelling, you can put moist heat, a heating pad, or a warm cloth on your hip. Do gentle stretching exercises to help keep your hip flexible. · Learn how to prevent falls. Have your vision and hearing checked regularly. Wear slippers or shoes with a nonskid sole. · Stay at a healthy weight. · Wear comfortable shoes. When should you call for help? Call 911 anytime you think you may need emergency care.  For example, call if:    · You have sudden chest pain and shortness of breath, or you cough up blood.     · You are not able to stand or walk or bear weight.     · Your buttocks, legs, or feet feel numb or tingly.     · Your leg or foot is cool or pale or changes color.     · You have severe pain.    Call your doctor now or seek immediate medical care if:    · You have signs of infection, such as:  ¨ Increased pain, swelling, warmth, or redness in the hip area. ¨ Red streaks leading from the hip area. ¨ Pus draining from the hip area. ¨ A fever.     · You have signs of a blood clot, such as:  ¨ Pain in your calf, back of the knee, thigh, or groin. ¨ Redness and swelling in your leg or groin.     · You are not able to bend, straighten, or move your leg normally.     · You have trouble urinating or having bowel movements.    Watch closely for changes in your health, and be sure to contact your doctor if:    · You do not get better as expected. Where can you learn more? Go to http://samreen-tabitha.info/. Enter N295 in the search box to learn more about \"Hip Pain: Care Instructions. \"  Current as of: November 20, 2017  Content Version: 11.7  © 7186-1281 [employer]. Care instructions adapted under license by [employer] (which disclaims liability or warranty for this information). If you have questions about a medical condition or this instruction, always ask your healthcare professional. [place] [address] any warranty or liability for your use of this information.

## 2018-07-16 ENCOUNTER — TELEPHONE (OUTPATIENT)
Dept: ORTHOPEDIC SURGERY | Age: 63
End: 2018-07-16

## 2018-07-16 NOTE — TELEPHONE ENCOUNTER
Patient called stating he is having severe right hip pain and he went to [facility] on 07/14/18 because of it. He is wondering if there is anyone else he can see for the pain since Dr. Daphne Rivera and Mary Canales don't have any availability until August. Please advise patient regarding this at [phone].

## 2018-07-20 ENCOUNTER — DOCUMENTATION ONLY (OUTPATIENT)
Dept: FAMILY MEDICINE CLINIC | Age: 63
End: 2018-07-20

## 2018-07-20 ENCOUNTER — TELEPHONE (OUTPATIENT)
Dept: FAMILY MEDICINE CLINIC | Age: 63
End: 2018-07-20

## 2018-07-20 ENCOUNTER — OFFICE VISIT (OUTPATIENT)
Dept: ORTHOPEDIC SURGERY | Age: 63
End: 2018-07-20

## 2018-07-20 ENCOUNTER — TELEPHONE (OUTPATIENT)
Dept: ORTHOPEDIC SURGERY | Age: 63
End: 2018-07-20

## 2018-07-20 VITALS
HEIGHT: 69 IN | BODY MASS INDEX: 33.03 KG/M2 | HEART RATE: 59 BPM | OXYGEN SATURATION: 98 % | DIASTOLIC BLOOD PRESSURE: 61 MMHG | SYSTOLIC BLOOD PRESSURE: 130 MMHG | WEIGHT: 223 LBS | TEMPERATURE: 98.4 F

## 2018-07-20 DIAGNOSIS — M70.61 TROCHANTERIC BURSITIS, RIGHT HIP: ICD-10-CM

## 2018-07-20 DIAGNOSIS — G89.29 CHRONIC MIDLINE LOW BACK PAIN WITHOUT SCIATICA: ICD-10-CM

## 2018-07-20 DIAGNOSIS — Z96.641 HISTORY OF RIGHT HIP REPLACEMENT: Primary | ICD-10-CM

## 2018-07-20 DIAGNOSIS — M54.41 CHRONIC MIDLINE LOW BACK PAIN WITH RIGHT-SIDED SCIATICA: ICD-10-CM

## 2018-07-20 DIAGNOSIS — M54.50 CHRONIC MIDLINE LOW BACK PAIN WITHOUT SCIATICA: ICD-10-CM

## 2018-07-20 DIAGNOSIS — G89.29 CHRONIC MIDLINE LOW BACK PAIN WITH RIGHT-SIDED SCIATICA: ICD-10-CM

## 2018-07-20 RX ORDER — METHYLPREDNISOLONE 4 MG/1
TABLET ORAL
Qty: 1 DOSE PACK | Refills: 0 | Status: SHIPPED | OUTPATIENT
Start: 2018-07-20 | End: 2018-08-24

## 2018-07-20 RX ORDER — HYDROCODONE BITARTRATE 60 MG/1
60 TABLET, EXTENDED RELEASE ORAL DAILY
Qty: 30 TAB | Refills: 0 | Status: SHIPPED | OUTPATIENT
Start: 2018-07-20 | End: 2018-08-23 | Stop reason: SDUPTHER

## 2018-07-20 RX ORDER — HYDROCODONE BITARTRATE AND ACETAMINOPHEN 5; 325 MG/1; MG/1
1 TABLET ORAL
Qty: 21 TAB | Refills: 0 | Status: SHIPPED | OUTPATIENT
Start: 2018-07-20 | End: 2018-11-21 | Stop reason: SDUPTHER

## 2018-07-20 RX ORDER — CYCLOBENZAPRINE HCL 10 MG
10 TABLET ORAL
Qty: 60 TAB | Refills: 0 | Status: SHIPPED | OUTPATIENT
Start: 2018-07-20 | End: 2018-11-21 | Stop reason: SDUPTHER

## 2018-07-20 NOTE — PROGRESS NOTES
[facility] [id number]           Patient: Karlos Blandon                MRN: [id number]       SSN: [id number]  YOB: 1955        AGE: 70 y.o. SEX: male  Body mass index is 32.93 kg/(m^2). PCP: Emmanuel Ojeda MD  07/20/18      This office note has been dictated. REVIEW OF SYSTEMS:  Constitutional: Negative for fever, chills, weight loss and malaise/fatigue. HENT: Negative. Eyes: Negative. Respiratory: Negative. Cardiovascular: Negative. Gastrointestinal: No bowel incontinence or constipation. Genitourinary: No bladder incontinence or saddle anesthesia. Skin: Negative. Neurological: Negative. Endo/Heme/Allergies: Negative. Psychiatric/Behavioral: Negative. Musculoskeletal: As per HPI above. Past Medical History:   Diagnosis Date    Allergic rhinitis     Avascular necrosis (HCC)     CAD (coronary artery disease) 4/2009    mild, hemodynamically non-significant by angiography    Carpal tunnel syndrome     Carpal tunnel syndrome on both sides     Chest pain, unspecified     Chronic back pain 2/11/2011    Chronic obstructive pulmonary disease ([address]     Degenerative joint disease     with chronic back pain    Dysesthesia     of hand post carpal tunnel surgery v sudeck's atrophy    Dyslipidemia, goal LDL below 100     Dysphagia     GERD (gastroesophageal reflux disease) 5/17/2010    Glaucoma 9/15/2010    Glaucoma     Hearing loss     Hypercholesterolemia     Hypertension     Joint fusion     of right wrist    Morbid obesity ([address]     Obesity 5/17/2010    Other dyspnea and respiratory abnormality     Palpitations     Right hip pain     Right hip pain     Right knee pain     S/P cardiac catheterization 4/9/2009    1. Normal systemic pressure. 2. Moderate elevation of left-sided filling pressures.  3. Preserved left ventricular systolic function in the LOWE projection. 4. Mild, nonsignificant epicardial coronary artery disese by angiography.  Sleep apnea     on CPAP    Strain of lumbar region     Tobacco abuse          Current Outpatient Prescriptions:     tamsulosin (FLOMAX) 0.4 mg capsule, TAKE ONE CAPSULE BY MOUTH ONCE DAILY (AFTER  DINNER), Disp: 90 Cap, Rfl: 0    HYDROcodone-acetaminophen (NORCO) 5-325 mg per tablet, Take 1 Tab by mouth every eight (8) hours as needed for Pain. Max Daily Amount: 3 Tabs., Disp: 12 Tab, Rfl: 0    predniSONE (DELTASONE) 10 mg tablet, 5 tabs daily for 5 days, Disp: 25 Tab, Rfl: 0    HYDROcodone bitartrate (HYSINGLA) 60 mg ER tablet, Take 1 Tab by mouth daily. Max Daily Amount: 60 mg. *DO NOT CRUSH,CHEW, OR DISSOLVE TABLET*, Disp: 30 Tab, Rfl: 0    umeclidinium-vilanterol (ANORO ELLIPTA) 62.5-25 mcg/actuation inhaler, Take 1 Puff by inhalation daily. , Disp: 1 Inhaler, Rfl: 2    gabapentin (NEURONTIN) 300 mg capsule, TAKE ONE CAPSULE BY MOUTH THREE TIMES DAILY, Disp: 90 Cap, Rfl: 5    traZODone (DESYREL) 50 mg tablet, TAKE ONE TABLET BY MOUTH ONCE DAILY AT BEDTIME AS NEEDED FOR SLEEP, Disp: 30 Tab, Rfl: 2    ibuprofen (MOTRIN) 800 mg tablet, TAKE ONE TABLET BY MOUTH EVERY 8 HOURS AS NEEDED FOR PAIN, Disp: 90 Tab, Rfl: 5    lactulose (CONSTULOSE) 10 gram/15 mL solution, Take 45ml PO TID, Disp: 480 mL, Rfl: 2    guaiFENesin ER (MUCINEX) 600 mg ER tablet, Take 1 Tab by mouth two (2) times a day., Disp: 30 Tab, Rfl: 5    pravastatin (PRAVACHOL) 40 mg tablet, TAKE ONE TABLET BY MOUTH NIGHTLY, Disp: 90 Tab, Rfl: 3    chlorhexidine (PERIDEX) 0.12 % solution, , Disp: , Rfl:     metoprolol tartrate (LOPRESSOR) 25 mg tablet, TAKE ONE TABLET BY MOUTH TWICE DAILY ( GENERIC FOR LOPRESSOR ), Disp: 180 Tab, Rfl: 3    fluticasone (FLONASE) 50 mcg/actuation nasal spray, 2 Sprays by Both Nostrils route daily. , Disp: 1 Bottle, Rfl: 5    montelukast (SINGULAIR) 10 mg tablet, Take 1 Tab by mouth daily. , Disp: 30 Tab, Rfl: 5   pantoprazole (PROTONIX) 40 mg tablet, Take 1 Tab by mouth daily. , Disp: 30 Tab, Rfl: 5    nitroglycerin (NITROSTAT) 0.4 mg SL tablet, 1 Tab by SubLINGual route every five (5) minutes as needed. , Disp: 6 Tab, Rfl: 1    albuterol (PROVENTIL HFA, VENTOLIN HFA, PROAIR HFA) 90 mcg/actuation inhaler, Take 2 Puffs by inhalation every four (4) hours as needed for Wheezing., Disp: 1 Inhaler, Rfl: 5    TRAVATAN Z 0.004 % ophthalmic solution, Administer 1 Drop to both eyes nightly., Disp: , Rfl:     Allergies   Allergen Reactions    Ace Inhibitors Cough    Dilaudid [Hydromorphone] Hives    Lisinopril Cough    Zocor [Simvastatin] Myalgia     Right leg       Social History     Social History    Marital status: SINGLE     Spouse name: N/A    Number of children: N/A    Years of education: N/A     Occupational History    Not on file. Social History Main Topics    Smoking status: Former Smoker     Quit date: 5/17/2009    Smokeless tobacco: Never Used      Comment: urothelial cancer risk discussed today [id number]    Alcohol use No    Drug use: No    Sexual activity: Not on file     Other Topics Concern    Not on file     Social History Narrative       Past Surgical History:   Procedure Laterality Date    HX CARPAL TUNNEL RELEASE      bilateral wrists    HX COLONOSCOPY      HX HEART CATHETERIZATION  4/9/2009    1. Normal systemic pressure. 2. Moderate elevation of left-sided filling pressures. 3. Preserved left ventricular systolic function in the LOWE projection. 4. Mild, nonsignificant epicardial coronary artery disese by angiography.  HX HIP REPLACEMENT      HX OTHER SURGICAL  02/2017    left shoulder surgery    HX TONSILLECTOMY      HX WRIST FRACTURE TX      [place] ANESTH,SURGERY OF SHOULDER             SUBJECTIVE:   We did see Mr. Wilkerson Has today for followup in regard to complaints of low back pain, right lower extremity radiculopathy, pain and discomfort. He is status post right hip replacement.     He states a couple of days ago he had pain bad enough that he had to call the ambulance, taken to the emergency room. They did x-rays of his hip as well as some bloodwork. They gave him a [place] prescription for a couple of pills which has helped. He has improved. Still having low back pain with radiation of symptoms down his right lower extremity. No change in bowel or bladder habits. No systemic changes. No injuries to report. PHYSICAL EXAMINATION: In general, the patient is alert and oriented x3 and is in no acute distress. The patient is well-developed and well-nourished. The patient is afebrile. HEENT:  Head is normocephalic and atraumatic. Extraocular eye movements are intact. Trachea is midline. No JVD is present. Breathing is nonlabored. Examination of right hip reveals skin is intact. No ecchymosis. No warmth. No signs for infection or cellulitis present. He has pretty good range of motion of the hip. Just a little bit of tenderness to the trochanteric bursa region. Leg lengths look good. Neurovascular status intact. He does have discomfort with straight leg raise, right lower extremity. Slightly decreased sensation to L4-5, L5-S1, right lower extremity. RADIOGRAPHS:  Radiographs taken in the office today including AP and lateral of the lumbar spine shows significant degenerative changes, especially L5-S1. Review of radiographs done at [facility], 07/14/2018, right hip, shows no acute bony abnormalities. There may be slight pedestal seen on the femoral stem. He does have some heterotopic ossification lateral to the greater trochanter. No acute abnormalities noted. LABORATORY DATA:    Labs at that time, 07/14/2018, CBC: White count was 10.4. ASSESSMENT:    1. Right hip replacement. 2. Mild trochanteric bursitis, right hip. 3. Lumbar degenerative disk disease. Radiculopathy. PLAN:  At this point, we are going to move forward with an MRI, lumbar spine.   Further evaluation with referral to The [address] [place] [address]. We are also going to move forward with infectious workup, CBC, ESR, CRP, IL-6, ** negative, and add 3-phase technetium bone scan to rule out any femoral loosening. In ambulation, the patient displays no startup discomfort. He is given a prescription for a Medrol Dosepak, Flexeril, and a 1-week supply of Norco.  We will see him back in the office after the studies are done for further evaluation. He will call with any questions or if concerns arise.     CC:  MD Tati Chappell, PA-C, ATC

## 2018-07-20 NOTE — TELEPHONE ENCOUNTER
Pt called in requesting refill of his   Requested Prescriptions     Pending Prescriptions Disp Refills    HYDROcodone bitartrate (HYSINGLA) 60 mg ER tablet 30 Tab 0     Sig: Take 1 Tab by mouth daily. Max Daily Amount: 60 mg. *DO NOT CRUSH,CHEW, OR DISSOLVE TABLET*   .

## 2018-07-20 NOTE — TELEPHONE ENCOUNTER
Spoke with pharmacist Aristides House and advised her do not fill the [place] Rx from our office per DAVID Myers. Aristides House verbalized understanding.

## 2018-07-20 NOTE — TELEPHONE ENCOUNTER
Beryl Macias pharmacist calling to get auth to fill the [address] [facility] prescription from today by Elieser Blakely  Patient just has prescription of Hysingla Ext release Hydrocodone from another physician filled on 06/25 for a 30 day supply ( 1 a day)  Please call Beryl Macias back with auth to fill a [phone]  Also request today refill by Dr. Gem Brody today.

## 2018-07-20 NOTE — PROGRESS NOTES
[facility] DR RENAN JONES called in and stated that it is too early for the patient to get his refill on the medication Hysingla. Per Dr. Marilyn Fabian patient can get medication refill on 07/25/2018.

## 2018-07-23 ENCOUNTER — TELEPHONE (OUTPATIENT)
Dept: ORTHOPEDIC SURGERY | Age: 63
End: 2018-07-23

## 2018-07-23 NOTE — TELEPHONE ENCOUNTER
Patient called in checking on status update for getting his MRI scheduled. Please advise patient at [phone].

## 2018-08-16 RX ORDER — PANTOPRAZOLE SODIUM 40 MG/1
TABLET, DELAYED RELEASE ORAL
Qty: 90 TAB | Refills: 1 | Status: SHIPPED | OUTPATIENT
Start: 2018-08-16 | End: 2020-10-02 | Stop reason: SDUPTHER

## 2018-08-21 ENCOUNTER — HOSPITAL ENCOUNTER (OUTPATIENT)
Dept: NUCLEAR MEDICINE | Age: 63
Discharge: HOME | End: 2018-08-21
Attending: PHYSICIAN ASSISTANT
Payer: MEDICARE

## 2018-08-21 ENCOUNTER — HOSPITAL ENCOUNTER (OUTPATIENT)
Dept: GENERAL RADIOLOGY | Age: 63
Discharge: HOME | End: 2018-08-21
Attending: PHYSICIAN ASSISTANT
Payer: MEDICARE

## 2018-08-21 ENCOUNTER — TELEPHONE (OUTPATIENT)
Dept: ORTHOPEDIC SURGERY | Age: 63
End: 2018-08-21

## 2018-08-21 ENCOUNTER — HOSPITAL ENCOUNTER (OUTPATIENT)
Dept: MRI IMAGING | Age: 63
Discharge: HOME | End: 2018-08-21
Attending: PHYSICIAN ASSISTANT
Payer: MEDICARE

## 2018-08-21 DIAGNOSIS — Z96.641 HISTORY OF RIGHT HIP REPLACEMENT: ICD-10-CM

## 2018-08-21 DIAGNOSIS — M70.61 TROCHANTERIC BURSITIS, RIGHT HIP: ICD-10-CM

## 2018-08-21 DIAGNOSIS — G89.29 CHRONIC MIDLINE LOW BACK PAIN WITH RIGHT-SIDED SCIATICA: ICD-10-CM

## 2018-08-21 DIAGNOSIS — M54.41 CHRONIC MIDLINE LOW BACK PAIN WITH RIGHT-SIDED SCIATICA: ICD-10-CM

## 2018-08-21 PROCEDURE — 78315 BONE IMAGING 3 PHASE: CPT

## 2018-08-21 PROCEDURE — 73502 X-RAY EXAM HIP UNI 2-3 VIEWS: CPT

## 2018-08-21 NOTE — TELEPHONE ENCOUNTER
Patient called in states that he had some xrays done today but they did not do his back. He is requesting an order so that they may get it done. Please advise patient at [phone].

## 2018-08-22 DIAGNOSIS — M54.50 CHRONIC MIDLINE LOW BACK PAIN WITHOUT SCIATICA: ICD-10-CM

## 2018-08-22 DIAGNOSIS — M25.551 RIGHT HIP PAIN: ICD-10-CM

## 2018-08-22 DIAGNOSIS — G89.29 CHRONIC MIDLINE LOW BACK PAIN WITHOUT SCIATICA: ICD-10-CM

## 2018-08-22 NOTE — TELEPHONE ENCOUNTER
Left message on patients personal voicemail letting him know that they did the hip xray in the hospital for the bone scan and we did a back xray in our office in July so another one at this time is unnecessary

## 2018-08-23 RX ORDER — HYDROCODONE BITARTRATE AND ACETAMINOPHEN 5; 325 MG/1; MG/1
1 TABLET ORAL
Qty: 12 TAB | Refills: 0 | OUTPATIENT
Start: 2018-08-23

## 2018-08-23 RX ORDER — HYDROCODONE BITARTRATE 60 MG/1
60 TABLET, EXTENDED RELEASE ORAL DAILY
Qty: 30 TAB | Refills: 0 | Status: SHIPPED | OUTPATIENT
Start: 2018-08-23 | End: 2018-09-24 | Stop reason: SDUPTHER

## 2018-08-24 ENCOUNTER — OFFICE VISIT (OUTPATIENT)
Dept: FAMILY MEDICINE CLINIC | Age: 63
End: 2018-08-24

## 2018-08-24 VITALS
OXYGEN SATURATION: 95 % | RESPIRATION RATE: 20 BRPM | WEIGHT: 229 LBS | SYSTOLIC BLOOD PRESSURE: 139 MMHG | HEART RATE: 52 BPM | TEMPERATURE: 98.9 F | HEIGHT: 69 IN | DIASTOLIC BLOOD PRESSURE: 60 MMHG | BODY MASS INDEX: 33.92 KG/M2

## 2018-08-24 DIAGNOSIS — Z00.00 MEDICARE ANNUAL WELLNESS VISIT, SUBSEQUENT: Primary | ICD-10-CM

## 2018-08-24 DIAGNOSIS — G89.29 CHRONIC MIDLINE LOW BACK PAIN WITH RIGHT-SIDED SCIATICA: ICD-10-CM

## 2018-08-24 DIAGNOSIS — M54.41 CHRONIC MIDLINE LOW BACK PAIN WITH RIGHT-SIDED SCIATICA: ICD-10-CM

## 2018-08-24 RX ORDER — PREDNISONE 10 MG/1
TABLET ORAL
Qty: 25 TAB | Refills: 0 | Status: SHIPPED | OUTPATIENT
Start: 2018-08-24 | End: 2018-08-30 | Stop reason: SDUPTHER

## 2018-08-24 NOTE — PATIENT INSTRUCTIONS
Medicare Part B Preventive Services Limitations Recommendation Scheduled   Bone Mass Measurement  (age 72 & older, biennial) Requires diagnosis related to osteoporosis or estrogen deficiency. Biennial benefit unless patient has history of long-term glucocorticoid tx or baseline is needed because initial test was by other method     Cardiovascular Screening Blood Tests (every 5 years)  Total cholesterol, HDL, Triglycerides and ECG Order blood work  as a panel if possible and  for adults with routine risk  an electrocardiogram (ECG) at intervals determined by the provider. Lipid 6/18  cmp 6/18   Colorectal Cancer Screening  -Fecal occult blood test (annual)  -Flexible sigmoidoscopy (5y)  -Screening colonoscopy (10y)  -Barium Enema Colorectal cancer screening should be done for adults age 54-65 with no increased risk factors for colorectal cancer. There are a number of acceptable methods of screening for this type of cancer. Each test has its own benefits and drawbacks. Discuss with your provider what is most appropriate for you during your annual wellness visit.  The different tests include: colonoscopy (considered the best screening method), a fecal occult blood test, a fecal DNA test, and sigmoidoscopy  4/15   Counseling to Prevent Tobacco Use (up to 8 sessions per year)  - Counseling greater than 3 and up to 10 minutes  - Counseling greater than 10 minutes Patients must be asymptomatic of tobacco-related conditions to receive as preventive service     Diabetes Screening Tests (at least every 3 years, Medicare covers annually or at 6-month intervals for prediabetic patients)    Fasting blood sugar (FBS) or glucose tolerance test (GTT) All adults age 38-68 who are overweight should have a diabetes screening test once every three years.   -Other screening tests & preventive services for persons with diabetes include: an eye exam to screen for diabetic retinopathy, a kidney function test, a foot exam, and stricter control over your cholesterol. Diabetes Self-Management Training (DSMT) (no USPSTF recommendation) Requires referral by treating physician for patient with diabetes or renal disease. 10 hours of initial DSMT session of no less than 30 minutes each in a continuous 12-month period. 2 hours of follow-up DSMT in subsequent years. Glaucoma Screening (no USPSTF recommendation) Diabetes mellitus, family history, , age 48 or over,  American, age 72 or over  order   Human Immunodeficiency Virus (HIV) Screening (annually for increased risk patients)  HIV-1 and HIV-2 by EIA, FELICITY, rapid antibody test, or oral mucosa transudate Patient must be at increased risk for HIV infection per USPSTF guidelines or pregnant. Tests covered annually for patients at increased risk. Pregnant patients may receive up to 3 test during pregnancy. Medical Nutrition Therapy (MNT) (for diabetes or renal disease not recommended schedule) Requires referral by treating physician for patient with diabetes or renal disease. Can be provided in same year as diabetes self-management training (DSMT), and CMS recommends medical nutrition therapy take place after DSMT. Up to 3 hours for initial year and 2 hours in subsequent years. Prostate Cancer Screening (annually up to age 76)  - Digital rectal exam (DEO)  - Prostate specific antigen (PSA) Annually (age 48 or over), DEO not paid separately when covered E/M service is provided on same date  Men up to age 76 may need a screening blood test for prostate cancer at certain intervals, depending on their personal and family history. This decision is between the patient and his provider. 9/17   Seasonal Influenza Vaccination (annually) All adults should have a flu vaccine yearly   9/17     Pneumococcal Vaccination (once after 72) All adults  over age 72 should receive the recommended pneumonia vaccines.  Current USPSTF guidelines recommend a series of two vaccines for the best pneumonia protection. Hepatitis B Vaccinations (if medium/high risk) Medium/high risk factors:  End-stage renal disease,  Hemophiliacs who received Factor VIII or IX concentrates, Clients of institutions for the mentally retarded, Persons who live in the same house as a HepB virus carrier, Homosexual men, Illicit injectable drug abusers. Shingles Vaccination A shingles vaccine is also recommended once in a lifetime after age 61     Ultrasound Screening for Abdominal Aortic Aneurysm (AAA) (once) An Abdominal Aortic Aneurysm (AAA) Screening is recommended for men age 73-68 who has ever smoked in their lifetime.   of the following criteria:  - Men who are 73-68 years old and have smoked more than 100 cigarettes in their lifetime.  -Anyone with a FH of AAA  -Anyone recommended for screening by USPSTF       Hep C All adults born between [facility] should be screened once for Hepatitis C  complete   Tetanus  All adults should have a tetanus vaccine every 10 years

## 2018-08-24 NOTE — PROGRESS NOTES
Patient is in the office today for medicare wellness visit, 6 months follow up. 1. Have you been to the ER, urgent care clinic since your last visit? Hospitalized since your last visit? No    2. Have you seen or consulted any other health care providers outside of the [address] [place] [address] since your last visit? Include any pap smears or colon screening. No          This is the Subsequent Medicare Annual Wellness Exam, performed 12 months or more after the Initial AWV or the last Subsequent AWV    I have reviewed the patient's medical history in detail and updated the computerized patient record. History     Past Medical History:   Diagnosis Date    Allergic rhinitis     Avascular necrosis (HCC)     CAD (coronary artery disease) 4/2009    mild, hemodynamically non-significant by angiography    Carpal tunnel syndrome     Carpal tunnel syndrome on both sides     Chest pain, unspecified     Chronic back pain 2/11/2011    Chronic obstructive pulmonary disease ([address]     Degenerative joint disease     with chronic back pain    Dysesthesia     of hand post carpal tunnel surgery v sudeck's atrophy    Dyslipidemia, goal LDL below 100     Dysphagia     GERD (gastroesophageal reflux disease) 5/17/2010    Glaucoma 9/15/2010    Glaucoma     Hearing loss     Hypercholesterolemia     Hypertension     Joint fusion     of right wrist    Morbid obesity ([address]     Obesity 5/17/2010    Other dyspnea and respiratory abnormality     Palpitations     Right hip pain     Right hip pain     Right knee pain     S/P cardiac catheterization 4/9/2009    1. Normal systemic pressure. 2. Moderate elevation of left-sided filling pressures. 3. Preserved left ventricular systolic function in the LOWE projection. 4. Mild, nonsignificant epicardial coronary artery disese by angiography.     Sleep apnea     on CPAP    Strain of lumbar region     Tobacco abuse       Past Surgical History:   Procedure Laterality Date  HX CARPAL TUNNEL RELEASE      bilateral wrists    HX COLONOSCOPY      HX HEART CATHETERIZATION  4/9/2009    1. Normal systemic pressure. 2. Moderate elevation of left-sided filling pressures. 3. Preserved left ventricular systolic function in the LOWE projection. 4. Mild, nonsignificant epicardial coronary artery disese by angiography.  HX HIP REPLACEMENT      HX OTHER SURGICAL  02/2017    left shoulder surgery    HX TONSILLECTOMY      HX WRIST FRACTURE TX      [place] ANESTH,SURGERY OF SHOULDER       Current Outpatient Prescriptions   Medication Sig Dispense Refill    predniSONE (DELTASONE) 10 mg tablet 5 tabs daily for 5 days 25 Tab 0    pantoprazole (PROTONIX) 40 mg tablet TAKE ONE TABLET BY MOUTH ONCE DAILY 90 Tab 1    cyclobenzaprine (FLEXERIL) 10 mg tablet Take 1 Tab by mouth three (3) times daily as needed for Muscle Spasm(s). 60 Tab 0    HYDROcodone-acetaminophen (NORCO) 5-325 mg per tablet Take 1 Tab by mouth every eight (8) hours as needed for Pain. Max Daily Amount: 3 Tabs. 21 Tab 0    tamsulosin (FLOMAX) 0.4 mg capsule TAKE ONE CAPSULE BY MOUTH ONCE DAILY (AFTER  DINNER) 90 Cap 0    HYDROcodone-acetaminophen (NORCO) 5-325 mg per tablet Take 1 Tab by mouth every eight (8) hours as needed for Pain. Max Daily Amount: 3 Tabs. 12 Tab 0    umeclidinium-vilanterol (ANORO ELLIPTA) 62.5-25 mcg/actuation inhaler Take 1 Puff by inhalation daily.  1 Inhaler 2    gabapentin (NEURONTIN) 300 mg capsule TAKE ONE CAPSULE BY MOUTH THREE TIMES DAILY 90 Cap 5    traZODone (DESYREL) 50 mg tablet TAKE ONE TABLET BY MOUTH ONCE DAILY AT BEDTIME AS NEEDED FOR SLEEP 30 Tab 2    ibuprofen (MOTRIN) 800 mg tablet TAKE ONE TABLET BY MOUTH EVERY 8 HOURS AS NEEDED FOR PAIN 90 Tab 5    lactulose (CONSTULOSE) 10 gram/15 mL solution Take 45ml PO  mL 2    pravastatin (PRAVACHOL) 40 mg tablet TAKE ONE TABLET BY MOUTH NIGHTLY 90 Tab 3    chlorhexidine (PERIDEX) 0.12 % solution       metoprolol tartrate (LOPRESSOR) 25 mg tablet TAKE ONE TABLET BY MOUTH TWICE DAILY ( GENERIC FOR LOPRESSOR ) 180 Tab 3    fluticasone (FLONASE) 50 mcg/actuation nasal spray 2 Sprays by Both Nostrils route daily. 1 Bottle 5    montelukast (SINGULAIR) 10 mg tablet Take 1 Tab by mouth daily. 30 Tab 5    nitroglycerin (NITROSTAT) 0.4 mg SL tablet 1 Tab by SubLINGual route every five (5) minutes as needed. 6 Tab 1    albuterol (PROVENTIL HFA, VENTOLIN HFA, PROAIR HFA) 90 mcg/actuation inhaler Take 2 Puffs by inhalation every four (4) hours as needed for Wheezing. 1 Inhaler 5    TRAVATAN Z 0.004 % ophthalmic solution Administer 1 Drop to both eyes nightly.  HYDROcodone bitartrate (HYSINGLA) 60 mg ER tablet Take 1 Tab by mouth daily. Max Daily Amount: 60 mg. *DO NOT CRUSH,CHEW, OR DISSOLVE TABLET* 30 Tab 0    guaiFENesin ER (MUCINEX) 600 mg ER tablet Take 1 Tab by mouth two (2) times a day.  30 Tab 5     Allergies   Allergen Reactions    Ace Inhibitors Cough    Dilaudid [Hydromorphone] Hives    Lisinopril Cough    Zocor [Simvastatin] Myalgia     Right leg     Family History   Problem Relation Age of Onset    Diabetes Mother     Cancer Father     Hypertension Other     Arthritis-osteo Other      Social History   Substance Use Topics    Smoking status: Former Smoker     Quit date: 5/17/2009    Smokeless tobacco: Never Used      Comment: urothelial cancer risk discussed today [id number]    Alcohol use No     Patient Active Problem List   Diagnosis Code    Hypercholesterolemia E78.00    Allergic rhinitis J30.9    Sleep apnea G47.30    GERD (gastroesophageal reflux disease) K21.9    Obesity E66.9    Glaucoma H40.9    HTN (hypertension) I10    Chronic back pain M54.9, G89.29    Chest pain, unspecified R07.9    Dyslipidemia, goal LDL below 100 E78.5    Other dyspnea and respiratory abnormality R06.09, R09.89    Palpitations R00.2    Degenerative joint disease M19.90    Prediabetes R73.03    Right hip pain M25.551    Right knee pain M25.561    Enlarged prostate with lower urinary tract symptoms (LUTS) N40.1    Urinary frequency R35.0    Sense of Incomplete bladder emptying R33.9    Nocturia R35.1    Slowing of urinary stream R39.198    Hip arthritis M16.10    Abdominal discomfort R10.9    Diarrhea R19.7    Primary insomnia F51.01    Obstructive sleep apnea syndrome G47.33    Bug bite with infection W57. XXXA    Cough R05    Obesity (BMI 30-39. 9) E66.9       Depression Risk Factor Screening:     PHQ over the last two weeks 7/20/2018   Little interest or pleasure in doing things Not at all   Feeling down, depressed, irritable, or hopeless Not at all   Total Score PHQ 2 0     Alcohol Risk Factor Screening: You do not drink alcohol or very rarely. Functional Ability and Level of Safety:   Hearing Loss  Hearing is good. Activities of Daily Living  The home contains: no safety equipment. Patient does total self care    Fall Risk  Fall Risk Assessment, last 12 mths 7/20/2018   Able to walk? Yes   Fall in past 12 months?  No       Abuse Screen  Patient is not abused    Cognitive Screening   Evaluation of Cognitive Function:  Has your family/caregiver stated any concerns about your memory: no  Normal    Patient Care Team   Patient Care Team:  Mark Beltran MD as PCP - Carlitos Piedra MD (Orthopedic Surgery)  Sugar Bradley MD as Physician (Gastroenterology)  Dr. Tatiana Kc as Physician (Cardiology)  Kate Sheppard MD (Ophthalmology)    Glaucoma Screening- UTD;  Pneumonia Vaccine- not indicated currently  Shingles Vaccine- pt aware of SHingrinx  Tdap Vaccine- is due, declined today  Colonoscopy- UTD; done 4/2015 by Dr. Alicia Zuleta with follow up in 10 years recommended  PSA- 0.2 on 9/13/17-   Advance Directive- none on file, verbal and written information provided to patient regarding its purpose and importance    Assessment/Plan   Education and counseling provided:  Are appropriate based on today's review and evaluation    Diagnoses and all orders for this visit:    1. Medicare annual wellness visit, subsequent    2. Hypercholesterolemia    3. Essential hypertension    4. Chronic midline low back pain with right-sided sciatica  -     predniSONE (DELTASONE) 10 mg tablet; 5 tabs daily for 5 days        Health Maintenance Due   Topic Date Due    DTaP/Tdap/Td series (1 - Tdap) 07/09/1974    FOBT Q 1 YEAR, 18+  10/15/2016    GLAUCOMA SCREENING Q2Y  07/09/2018    Pneumococcal 65+ Low/Medium Risk (1 of 2 - PCV13) 07/09/2018    Influenza Age 5 to Adult  08/01/2018     A comprehensive 5 year plan for medical care and screening exams was reviewed with pt and they received a copy of it.

## 2018-08-24 NOTE — MR AVS SNAPSHOT
[address] [facility] 
 
 
 [address], [place] [address] [address] 
[phone] Patient: Tc Gold MRN:  [id number] Visit Information Date & Time Provider Department Dept. Phone Encounter #  
 8/24/2018 11:45 AM Shane Ignacio [address] [place] [address] [phone] [id number] Follow-up Instructions Return in about 3 months (around 11/24/2018) for labs 1 week before. Your Appointments 8/24/2018 11:45 AM  
Office Visit with Shane Ignacio MD  
[facility]  
 [address] [facility] [address] [address]  
[phone]  
  
   
 [address], [facility]  
  
    
 9/10/2018 10:00 AM  
New Patient with Oralia Yao MD  
[facility] [facility], [address] [facility]) Appt Note: LOW BACK P[AIN/ REF BY DR CAMILO/ MRI IN CC FROM 2017/*ADVISED PT TO COME EARLY W. PHOTO ID & INS. CARD, CURRENT MEDICATION LIST & DOSAGE TO THE [facility] LOCATION; LOW BACK P[AIN/ REF BY DR CAMILO/ MRI IN CC FROM 2017/*ADVISED PT TO COME EARLY W. PHOTO ID & INS. CARD, CURRENT MEDICATION LIST & DOSAGE TO THE [facility] LOCATION  
 [facility] [address]  
  
    
  
 2/28/2019  9:00 AM  
Any with Sho Matute MD  
[facility] ([facility]) Appt Note: Return in about 6 months (around 2/1/2019) for follow up with MD, DEO, PVR  
 [address] [facility] [place] 10[address] [facility]  
  
   
 [address] [facility] [address] [place] [address],[address] 200 [facility] Upcoming Health Maintenance Date Due DTaP/Tdap/Td series (1 - Tdap) 7/9/1974 FOBT Q 1 YEAR, 18+ 10/15/2016 GLAUCOMA SCREENING Q2Y 7/9/2018 Pneumococcal 65+ Low/Medium Risk (1 of 2 - PCV13) 7/9/2018 MEDICARE YEARLY EXAM 7/20/2018 Influenza Age 5 to Adult 8/1/2018 COLONOSCOPY 4/1/2020 Allergies as of 8/24/2018  Review Complete On: 8/24/2018 By: Steph Langford MD  
  
 Severity Noted Reaction Type Reactions Ace Inhibitors  05/17/2010    Cough Dilaudid [Hydromorphone]  05/17/2010    Hives Lisinopril  05/17/2010    Cough Zocor [Simvastatin]  07/30/2011    Myalgia Right leg Current Immunizations  Reviewed on 9/20/2017 Name Date Influenza Vaccine 1/2/2015 Influenza Vaccine (Quad) PF 9/20/2017, 10/12/2016, 10/15/2015 Zoster Vaccine, Live 10/22/2015 Not reviewed this visit You Were Diagnosed With   
  
 Codes Comments Medicare annual wellness visit, subsequent    -  Primary ICD-10-CM: Z00.00 ICD-9-CM: V70.0 Essential hypertension     ICD-10-CM: I10 
ICD-9-CM: 401.9 Chronic midline low back pain with right-sided sciatica     ICD-10-CM: M54.41, G89.29 ICD-9-CM: 724.2, 724.3, 338.29 Vitals BP Pulse Temp Resp Height(growth percentile) Weight(growth percentile) 139/60 (BP 1 Location: Left arm, BP Patient Position: Sitting) (!) 52 98.9 °F (37.2 °C) (Oral) 20 5' 9\" (1.753 m) 229 lb (103.9 kg) SpO2 BMI Smoking Status 95% 33.82 kg/m2 Former Smoker BMI and BSA Data Body Mass Index Body Surface Area  
 33.82 kg/m 2 2.25 m 2 Preferred Pharmacy Pharmacy Name Phone [facility] [address] [facility], [facility] [phone] Your Updated Medication List  
  
   
This list is accurate as of 8/24/18 11:08 AM.  Always use your most recent med list.  
  
  
  
  
 albuterol 90 mcg/actuation inhaler Commonly known as:  PROVENTIL HFA, VENTOLIN HFA, PROAIR HFA Take 2 Puffs by inhalation every four (4) hours as needed for Wheezing. chlorhexidine 0.12 % solution Commonly known as:  PERIDEX  
  
 cyclobenzaprine 10 mg tablet Commonly known as:  FLEXERIL  
 Take 1 Tab by mouth three (3) times daily as needed for Muscle Spasm(s). fluticasone 50 mcg/actuation nasal spray Commonly known as:  Shelvia Birks 2 Sprays by Both Nostrils route daily. gabapentin 300 mg capsule Commonly known as:  NEURONTIN  
TAKE ONE CAPSULE BY MOUTH THREE TIMES DAILY  
  
 guaiFENesin  mg ER tablet Commonly known as:  Nick & Nick Take 1 Tab by mouth two (2) times a day. HYDROcodone bitartrate 60 mg ER tablet Commonly known as:  HYSINGLA Take 1 Tab by mouth daily. Max Daily Amount: 60 mg. *DO NOT CRUSH,CHEW, OR DISSOLVE TABLET*  
  
 * HYDROcodone-acetaminophen 5-325 mg per tablet Commonly known as:  March Castles Take 1 Tab by mouth every eight (8) hours as needed for Pain. Max Daily Amount: 3 Tabs. * HYDROcodone-acetaminophen 5-325 mg per tablet Commonly known as:  March Castles Take 1 Tab by mouth every eight (8) hours as needed for Pain. Max Daily Amount: 3 Tabs. ibuprofen 800 mg tablet Commonly known as:  MOTRIN  
TAKE ONE TABLET BY MOUTH EVERY 8 HOURS AS NEEDED FOR PAIN  
  
 lactulose 10 gram/15 mL solution Commonly known as:  Nathanel Kimball Take 45ml PO TID  
  
 metoprolol tartrate 25 mg tablet Commonly known as:  LOPRESSOR  
TAKE ONE TABLET BY MOUTH TWICE DAILY ( GENERIC FOR LOPRESSOR )  
  
 montelukast 10 mg tablet Commonly known as:  SINGULAIR Take 1 Tab by mouth daily. nitroglycerin 0.4 mg SL tablet Commonly known as:  NITROSTAT  
1 Tab by SubLINGual route every five (5) minutes as needed. pantoprazole 40 mg tablet Commonly known as:  PROTONIX  
TAKE ONE TABLET BY MOUTH ONCE DAILY pravastatin 40 mg tablet Commonly known as:  PRAVACHOL  
TAKE ONE TABLET BY MOUTH NIGHTLY  
  
 predniSONE 10 mg tablet Commonly known as:  DELTASONE  
5 tabs daily for 5 days  
  
 tamsulosin 0.4 mg capsule Commonly known as:  FLOMAX TAKE ONE CAPSULE BY MOUTH ONCE DAILY (AFTER  DINNER) TRAVATAN Z 0.004 % ophthalmic solution Generic drug:  travoprost  
Administer 1 Drop to both eyes nightly. traZODone 50 mg tablet Commonly known as:  DESYREL  
TAKE ONE TABLET BY MOUTH ONCE DAILY AT BEDTIME AS NEEDED FOR SLEEP  
  
 umeclidinium-vilanterol 62.5-25 mcg/actuation inhaler Commonly known as:  Marika Chicago Take 1 Puff by inhalation daily. * Notice: This list has 2 medication(s) that are the same as other medications prescribed for you. Read the directions carefully, and ask your doctor or other care provider to review them with you. Prescriptions Sent to Pharmacy Refills  
 predniSONE (DELTASONE) 10 mg tablet 0 Si tabs daily for 5 days Class: Normal  
 Pharmacy: [address] [facility] [address] #: [phone] Follow-up Instructions Return in about 3 months (around 2018) for labs 1 week before. Patient Instructions Medicare Part B Preventive Services Limitations Recommendation Scheduled Bone Mass Measurement 
(age 72 & older, biennial) Requires diagnosis related to osteoporosis or estrogen deficiency. Biennial benefit unless patient has history of long-term glucocorticoid tx or baseline is needed because initial test was by other method Cardiovascular Screening Blood Tests (every 5 years) Total cholesterol, HDL, Triglycerides and ECG Order blood work  as a panel if possible and  for adults with routine risk  an electrocardiogram (ECG) at intervals determined by the provider. Lipid  
cmp  Colorectal Cancer Screening 
-Fecal occult blood test (annual) -Flexible sigmoidoscopy (5y) 
-Screening colonoscopy (10y) -Barium Enema Colorectal cancer screening should be done for adults age 54-65 with no increased risk factors for colorectal cancer. There are a number of acceptable methods of screening for this type of cancer. Each test has its own benefits and drawbacks.  Discuss with your provider what is most appropriate for you during your annual wellness visit. The different tests include: colonoscopy (considered the best screening method), a fecal occult blood test, a fecal DNA test, and sigmoidoscopy  4/15 Counseling to Prevent Tobacco Use (up to 8 sessions per year) - Counseling greater than 3 and up to 10 minutes - Counseling greater than 10 minutes Patients must be asymptomatic of tobacco-related conditions to receive as preventive service Diabetes Screening Tests (at least every 3 years, Medicare covers annually or at 6-month intervals for prediabetic patients) Fasting blood sugar (FBS) or glucose tolerance test (GTT) All adults age 38-68 who are overweight should have a diabetes screening test once every three years.  
-Other screening tests & preventive services for persons with diabetes include: an eye exam to screen for diabetic retinopathy, a kidney function test, a foot exam, and stricter control over your cholesterol. Diabetes Self-Management Training (DSMT) (no USPSTF recommendation) Requires referral by treating physician for patient with diabetes or renal disease. 10 hours of initial DSMT session of no less than 30 minutes each in a continuous 12-month period. 2 hours of follow-up DSMT in subsequent years. Glaucoma Screening (no USPSTF recommendation) Diabetes mellitus, family history, , age 48 or over,  American, age 72 or over  order Human Immunodeficiency Virus (HIV) Screening (annually for increased risk patients) HIV-1 and HIV-2 by EIA, FELICITY, rapid antibody test, or oral mucosa transudate Patient must be at increased risk for HIV infection per USPSTF guidelines or pregnant. Tests covered annually for patients at increased risk. Pregnant patients may receive up to 3 test during pregnancy.     
Medical Nutrition Therapy (MNT) (for diabetes or renal disease not recommended schedule) Requires referral by treating physician for patient with diabetes or renal disease. Can be provided in same year as diabetes self-management training (DSMT), and CMS recommends medical nutrition therapy take place after DSMT. Up to 3 hours for initial year and 2 hours in subsequent years. Prostate Cancer Screening (annually up to age 76) - Digital rectal exam (DEO) - Prostate specific antigen (PSA) Annually (age 48 or over), DEO not paid separately when covered E/M service is provided on same date Men up to age 76 may need a screening blood test for prostate cancer at certain intervals, depending on their personal and family history. This decision is between the patient and his provider. 9/17 Seasonal Influenza Vaccination (annually) All adults should have a flu vaccine yearly   9/17 Pneumococcal Vaccination (once after 72) All adults  over age 72 should receive the recommended pneumonia vaccines. Current USPSTF guidelines recommend a series of two vaccines for the best pneumonia protection. Hepatitis B Vaccinations (if medium/high risk) Medium/high risk factors:  End-stage renal disease, Hemophiliacs who received Factor VIII or IX concentrates, Clients of institutions for the mentally retarded, Persons who live in the same house as a HepB virus carrier, Homosexual men, Illicit injectable drug abusers. Shingles Vaccination A shingles vaccine is also recommended once in a lifetime after age 61 Ultrasound Screening for Abdominal Aortic Aneurysm (AAA) (once) An Abdominal Aortic Aneurysm (AAA) Screening is recommended for men age 73-68 who has ever smoked in their lifetime. of the following criteria: 
- Men who are 73-68 years old and have smoked more than 100 cigarettes in their lifetime. 
-Anyone with a FH of AAA 
-Anyone recommended for screening by USPSTF Hep C All adults born between [birth year] and 1965 should be screened once for Hepatitis C  complete Tetanus  All adults should have a tetanus vaccine every 10 years Introducing [facility]! Brock Bains introduces [employer] patient portal. Now you can access parts of your medical record, email your doctor's office, and request medication refills online. 1. In your internet browser, go to [email or web address] 2. Click on the First Time User? Click Here link in the Sign In box. You will see the New Member Sign Up page. 3. Enter your [employer] Access Code exactly as it appears below. You will not need to use this code after youve completed the sign-up process. If you do not sign up before the expiration date, you must request a new code. · [employer] Access Code: [email or web address] Expires: 10/18/2018 10:01 AM 
 
4. Enter the last four digits of your Social Security Number (xxxx) and Date of Birth (mm/dd/yyyy) as indicated and click Submit. You will be taken to the next sign-up page. 5. Create a [employer] ID. This will be your [employer] login ID and cannot be changed, so think of one that is secure and easy to remember. 6. Create a [employer] password. You can change your password at any time. 7. Enter your Password Reset Question and Answer. This can be used at a later time if you forget your password. 8. Enter your e-mail address. You will receive e-mail notification when new information is available in [address] 9. Click Sign Up. You can now view and download portions of your medical record. 10. Click the Download Summary menu link to download a portable copy of your medical information. If you have questions, please visit the Frequently Asked Questions section of the [employer] website. Remember, [employer] is NOT to be used for urgent needs. For medical emergencies, dial 911. Now available from your iPhone and Android! Please provide this summary of care documentation to your next provider. Your primary care clinician is listed as WILLIAM MENARD. If you have any questions after today's visit, please call [phone].

## 2018-08-28 ENCOUNTER — HOSPITAL ENCOUNTER (OUTPATIENT)
Age: 63
Discharge: HOME | End: 2018-08-28
Attending: PHYSICIAN ASSISTANT
Payer: MEDICARE

## 2018-08-28 DIAGNOSIS — G89.29 CHRONIC MIDLINE LOW BACK PAIN WITH RIGHT-SIDED SCIATICA: ICD-10-CM

## 2018-08-28 DIAGNOSIS — M54.41 CHRONIC MIDLINE LOW BACK PAIN WITH RIGHT-SIDED SCIATICA: ICD-10-CM

## 2018-08-28 PROCEDURE — 72148 MRI LUMBAR SPINE W/O DYE: CPT

## 2018-08-28 NOTE — PROGRESS NOTES
Tc Gold is a 70 y.o.  male and presents with Annual Wellness Visit and Back Pain      SUBJECTIVE:    Back Pain  Patient presents for presents evaluation of low back problems. Symptoms have been present for several months and include pain in back radiating down to right leg (excruciating in character; 8/10 in severity). Initial inciting event: none. Symptoms are worst: all day. Alleviating factors identifiable by patient are recumbency. Exacerbating factors identifiable by patient are standing, walking. Treatments so far initiated by patient: pt went to ER and was placed on Norco Previous lower back problems: yes. Previous workup: Bone Scan and MRI LS was ordered. Previous treatments: PT and chronic narcotic pain meds. Respiratory ROS: negative for - shortness of breath  Cardiovascular ROS: negative for - chest pain    Current Outpatient Prescriptions   Medication Sig    predniSONE (DELTASONE) 10 mg tablet 5 tabs daily for 5 days    pantoprazole (PROTONIX) 40 mg tablet TAKE ONE TABLET BY MOUTH ONCE DAILY    cyclobenzaprine (FLEXERIL) 10 mg tablet Take 1 Tab by mouth three (3) times daily as needed for Muscle Spasm(s).  HYDROcodone-acetaminophen (NORCO) 5-325 mg per tablet Take 1 Tab by mouth every eight (8) hours as needed for Pain. Max Daily Amount: 3 Tabs.  tamsulosin (FLOMAX) 0.4 mg capsule TAKE ONE CAPSULE BY MOUTH ONCE DAILY (AFTER  DINNER)    HYDROcodone-acetaminophen (NORCO) 5-325 mg per tablet Take 1 Tab by mouth every eight (8) hours as needed for Pain. Max Daily Amount: 3 Tabs.  umeclidinium-vilanterol (ANORO ELLIPTA) 62.5-25 mcg/actuation inhaler Take 1 Puff by inhalation daily.     gabapentin (NEURONTIN) 300 mg capsule TAKE ONE CAPSULE BY MOUTH THREE TIMES DAILY    traZODone (DESYREL) 50 mg tablet TAKE ONE TABLET BY MOUTH ONCE DAILY AT BEDTIME AS NEEDED FOR SLEEP    ibuprofen (MOTRIN) 800 mg tablet TAKE ONE TABLET BY MOUTH EVERY 8 HOURS AS NEEDED FOR PAIN    lactulose (CONSTULOSE) 10 gram/15 mL solution Take 45ml PO TID    pravastatin (PRAVACHOL) 40 mg tablet TAKE ONE TABLET BY MOUTH NIGHTLY    chlorhexidine (PERIDEX) 0.12 % solution     metoprolol tartrate (LOPRESSOR) 25 mg tablet TAKE ONE TABLET BY MOUTH TWICE DAILY ( GENERIC FOR LOPRESSOR )    fluticasone (FLONASE) 50 mcg/actuation nasal spray 2 Sprays by Both Nostrils route daily.  montelukast (SINGULAIR) 10 mg tablet Take 1 Tab by mouth daily.  nitroglycerin (NITROSTAT) 0.4 mg SL tablet 1 Tab by SubLINGual route every five (5) minutes as needed.  albuterol (PROVENTIL HFA, VENTOLIN HFA, PROAIR HFA) 90 mcg/actuation inhaler Take 2 Puffs by inhalation every four (4) hours as needed for Wheezing.  TRAVATAN Z 0.004 % ophthalmic solution Administer 1 Drop to both eyes nightly.  HYDROcodone bitartrate (HYSINGLA) 60 mg ER tablet Take 1 Tab by mouth daily. Max Daily Amount: 60 mg. *DO NOT CRUSH,CHEW, OR DISSOLVE TABLET*    guaiFENesin ER (MUCINEX) 600 mg ER tablet Take 1 Tab by mouth two (2) times a day. No current facility-administered medications for this visit. OBJECTIVE:  alert, well appearing, and in no distress  Visit Vitals    /60 (BP 1 Location: Left arm, BP Patient Position: Sitting)    Pulse (!) 52    Temp 98.9 °F (37.2 °C) (Oral)    Resp 20    Ht 5' 9\" (1.753 m)    Wt 229 lb (103.9 kg)    SpO2 95%    BMI 33.82 kg/m2      well developed and well nourished  Back exam - pain with motion noted during exam, positive straight-leg raise on the right        Assessment/Plan      ICD-10-CM ICD-9-CM    1. Medicare annual wellness visit, subsequent Z00.00 V70.0    2. Chronic midline low back pain with right-sided sciatica M54.41 724.2 Will treat with predniSONE 5 tabs (DELTASONE) 10 mg tablet daily for 5 days     G89.29 724.3      338.29      Follow-up Disposition:  Return in about 3 months (around 11/24/2018) for labs 1 week before. Reviewed plan of care.  Patient has provided input and agrees with goals.

## 2018-08-30 ENCOUNTER — OFFICE VISIT (OUTPATIENT)
Dept: ORTHOPEDIC SURGERY | Age: 63
End: 2018-08-30

## 2018-08-30 VITALS
WEIGHT: 225.6 LBS | HEIGHT: 69 IN | TEMPERATURE: 98.9 F | BODY MASS INDEX: 33.41 KG/M2 | SYSTOLIC BLOOD PRESSURE: 132 MMHG | HEART RATE: 62 BPM | OXYGEN SATURATION: 97 % | DIASTOLIC BLOOD PRESSURE: 71 MMHG

## 2018-08-30 DIAGNOSIS — M54.41 CHRONIC MIDLINE LOW BACK PAIN WITH RIGHT-SIDED SCIATICA: ICD-10-CM

## 2018-08-30 DIAGNOSIS — G89.29 CHRONIC MIDLINE LOW BACK PAIN WITH RIGHT-SIDED SCIATICA: Primary | ICD-10-CM

## 2018-08-30 DIAGNOSIS — M54.41 CHRONIC MIDLINE LOW BACK PAIN WITH RIGHT-SIDED SCIATICA: Primary | ICD-10-CM

## 2018-08-30 DIAGNOSIS — G89.29 CHRONIC MIDLINE LOW BACK PAIN WITH RIGHT-SIDED SCIATICA: ICD-10-CM

## 2018-08-30 RX ORDER — HYDROCODONE BITARTRATE AND ACETAMINOPHEN 10; 325 MG/1; MG/1
1 TABLET ORAL
Qty: 21 TAB | Refills: 0 | Status: SHIPPED | OUTPATIENT
Start: 2018-08-30 | End: 2018-11-21

## 2018-08-30 RX ORDER — PREDNISONE 10 MG/1
TABLET ORAL
Qty: 25 TAB | Refills: 0 | Status: SHIPPED | COMMUNITY
Start: 2018-08-30 | End: 2018-09-05 | Stop reason: SDUPTHER

## 2018-08-30 NOTE — TELEPHONE ENCOUNTER
Pt called in requesting refill of his   Requested Prescriptions     Pending Prescriptions Disp Refills    predniSONE (DELTASONE) 10 mg tablet 25 Tab 0     Si tabs daily for 5 days   .

## 2018-08-30 NOTE — TELEPHONE ENCOUNTER
Spoke with pharmacist Camilo Ortiz and notified her do not fill Rx from our office. Camilo Ortiz verbalized understanding and is going to void and discard the Rx.

## 2018-08-30 NOTE — PROGRESS NOTES
Patient: Ceasar Layton                MRN: [id number]       SSN: [id number]  YOB: 1955        AGE: 70 y.o. SEX: male  Body mass index is 33.32 kg/(m^2). PCP: Humberto Benavidez MD  08/30/18    Dear Demetrius Stone:    I had the pleasure of viewing Katlyn Concepcion. As you know, he is status post total hip replacement and having a lot of back problems. My [de-identified] assistant quite astutely ruled out hip issues with a bone scan and some negative infectious labs. He does have a touch of heterotopic ossification associated with the hip itself, but it is relatively non symptomatic and he is quite happy with things overall. He denies fevers or chills. His low back - he gets radiculopathy and hurts him with prolonged sitting, prolonged walking, prolonged standing, he gets radiculopathy, and he's noticed some numbness and tingling going down the legs as well. PHYSICAL EXAMINATION:  Very pleasant gentleman. The hip is noncontributory. He walks well from the hip point of view. The low back is tender around L4-5, fairly significant neuropathy distally to L4-5. No foot drop, however. SLR is just equivocal.  About 0.5+ pitting edema. Calf non tender. Marinus Vicente' sign negative. RADIOGRAPHS:  Review of his xrays - he has progression of a moderate disc bulge at 4-5 with some central and foraminal protrusion, moderate bilateral facet arthrosis and moderate spinal canal stenosis and fairly severe foraminal stenosis at 4-5 as well. He denies bowel or bladder problems. Review of his hip xrays look pretty solid. PLAN:  At this point of time I think it is mostly his back with the issues. We're going to get him to the [address] [facility]. [place] 5 he was not happy with so we have increased his Norco 21 and I'll get him seen at the [address] [facility] and I'll see him back for myself in about six months' time. We're going to repeat the AP of his hip when he returns.     C:     Karyle Spring, MD        REVIEW OF SYSTEMS:      CON: negative for weight loss, fever  EYE: negative for double vision  ENT: negative for hoarseness  RS:   negative for Tb  GI:    negative for blood in stool  :  negative for blood in urine  Other systems reviewed and noted below. Past Medical History:   Diagnosis Date    Allergic rhinitis     Avascular necrosis (HCC)     CAD (coronary artery disease) 4/2009    mild, hemodynamically non-significant by angiography    Carpal tunnel syndrome     Carpal tunnel syndrome on both sides     Chest pain, unspecified     Chronic back pain 2/11/2011    Chronic obstructive pulmonary disease ([address]     Degenerative joint disease     with chronic back pain    Dysesthesia     of hand post carpal tunnel surgery v sudeck's atrophy    Dyslipidemia, goal LDL below 100     Dysphagia     GERD (gastroesophageal reflux disease) 5/17/2010    Glaucoma 9/15/2010    Glaucoma     Hearing loss     Hypercholesterolemia     Hypertension     Joint fusion     of right wrist    Morbid obesity ([address]     Obesity 5/17/2010    Other dyspnea and respiratory abnormality     Palpitations     Right hip pain     Right hip pain     Right knee pain     S/P cardiac catheterization 4/9/2009    1. Normal systemic pressure. 2. Moderate elevation of left-sided filling pressures. 3. Preserved left ventricular systolic function in the LOWE projection. 4. Mild, nonsignificant epicardial coronary artery disese by angiography.  Sleep apnea     on CPAP    Strain of lumbar region     Tobacco abuse        Family History   Problem Relation Age of Onset    Diabetes Mother     Cancer Father     Hypertension Other     Arthritis-osteo Other        Current Outpatient Prescriptions   Medication Sig Dispense Refill    HYDROcodone-acetaminophen (NORCO)  mg tablet Take 1 Tab by mouth every eight (8) hours as needed for Pain. Max Daily Amount: 3 Tabs.  21 Tab 0    predniSONE (DELTASONE) 10 mg tablet 5 tabs daily for 5 days 25 Tab 0    HYDROcodone bitartrate (HYSINGLA) 60 mg ER tablet Take 1 Tab by mouth daily. Max Daily Amount: 60 mg. *DO NOT CRUSH,CHEW, OR DISSOLVE TABLET* 30 Tab 0    pantoprazole (PROTONIX) 40 mg tablet TAKE ONE TABLET BY MOUTH ONCE DAILY 90 Tab 1    cyclobenzaprine (FLEXERIL) 10 mg tablet Take 1 Tab by mouth three (3) times daily as needed for Muscle Spasm(s). 60 Tab 0    tamsulosin (FLOMAX) 0.4 mg capsule TAKE ONE CAPSULE BY MOUTH ONCE DAILY (AFTER  DINNER) 90 Cap 0    umeclidinium-vilanterol (ANORO ELLIPTA) 62.5-25 mcg/actuation inhaler Take 1 Puff by inhalation daily. 1 Inhaler 2    gabapentin (NEURONTIN) 300 mg capsule TAKE ONE CAPSULE BY MOUTH THREE TIMES DAILY 90 Cap 5    traZODone (DESYREL) 50 mg tablet TAKE ONE TABLET BY MOUTH ONCE DAILY AT BEDTIME AS NEEDED FOR SLEEP 30 Tab 2    ibuprofen (MOTRIN) 800 mg tablet TAKE ONE TABLET BY MOUTH EVERY 8 HOURS AS NEEDED FOR PAIN 90 Tab 5    lactulose (CONSTULOSE) 10 gram/15 mL solution Take 45ml PO  mL 2    guaiFENesin ER (MUCINEX) 600 mg ER tablet Take 1 Tab by mouth two (2) times a day. 30 Tab 5    pravastatin (PRAVACHOL) 40 mg tablet TAKE ONE TABLET BY MOUTH NIGHTLY 90 Tab 3    chlorhexidine (PERIDEX) 0.12 % solution       metoprolol tartrate (LOPRESSOR) 25 mg tablet TAKE ONE TABLET BY MOUTH TWICE DAILY ( GENERIC FOR LOPRESSOR ) 180 Tab 3    fluticasone (FLONASE) 50 mcg/actuation nasal spray 2 Sprays by Both Nostrils route daily. 1 Bottle 5    montelukast (SINGULAIR) 10 mg tablet Take 1 Tab by mouth daily. 30 Tab 5    nitroglycerin (NITROSTAT) 0.4 mg SL tablet 1 Tab by SubLINGual route every five (5) minutes as needed. 6 Tab 1    albuterol (PROVENTIL HFA, VENTOLIN HFA, PROAIR HFA) 90 mcg/actuation inhaler Take 2 Puffs by inhalation every four (4) hours as needed for Wheezing. 1 Inhaler 5    TRAVATAN Z 0.004 % ophthalmic solution Administer 1 Drop to both eyes nightly.       HYDROcodone-acetaminophen (NORCO) 5-325 mg per tablet Take 1 Tab by mouth every eight (8) hours as needed for Pain. Max Daily Amount: 3 Tabs. 21 Tab 0    HYDROcodone-acetaminophen (NORCO) 5-325 mg per tablet Take 1 Tab by mouth every eight (8) hours as needed for Pain. Max Daily Amount: 3 Tabs. 12 Tab 0       Allergies   Allergen Reactions    Ace Inhibitors Cough    Dilaudid [Hydromorphone] Hives    Lisinopril Cough    Zocor [Simvastatin] Myalgia     Right leg       Past Surgical History:   Procedure Laterality Date    HX CARPAL TUNNEL RELEASE      bilateral wrists    HX COLONOSCOPY      HX HEART CATHETERIZATION  4/9/2009    1. Normal systemic pressure. 2. Moderate elevation of left-sided filling pressures. 3. Preserved left ventricular systolic function in the LOWE projection. 4. Mild, nonsignificant epicardial coronary artery disese by angiography.  HX HIP REPLACEMENT      HX OTHER SURGICAL  02/2017    left shoulder surgery    HX TONSILLECTOMY      HX WRIST FRACTURE TX      [place] ANESTH,SURGERY OF SHOULDER         Social History     Social History    Marital status: SINGLE     Spouse name: N/A    Number of children: N/A    Years of education: N/A     Occupational History    Not on file. Social History Main Topics    Smoking status: Former Smoker     Quit date: 5/17/2009    Smokeless tobacco: Never Used      Comment: urothelial cancer risk discussed today [id number]    Alcohol use No    Drug use: No    Sexual activity: Not on file     Other Topics Concern    Not on file     Social History Narrative       Visit Vitals    /71 (BP 1 Location: Left arm, BP Patient Position: Sitting)    Pulse 62    Temp 98.9 °F (37.2 °C) (Oral)    Ht 5' 9\" (1.753 m)    Wt 225 lb 9.6 oz (102.3 kg)    SpO2 97%    BMI 33.32 kg/m2         PHYSICAL EXAMINATION:  GENERAL: Alert and oriented x3, in no acute distress, well-developed, well-nourished, afebrile. HEART: No JVD.   EYES: No scleral icterus   NECK: No significant lymphadenopathy   LUNGS: No respiratory compromise or indrawing  ABDOMEN: Soft, non-tender, non-distended. Electronically signed by:  Charlotte Lopez MD

## 2018-08-30 NOTE — TELEPHONE ENCOUNTER
Eliza Vásquez pharmacist at [facility] calling to inform Dr. Liat Shepard patient was just prescribed Hysingla 60 mg for 30 tabs on 08/24 by Dr. Klever Avila. Does Dr Liat Shepard want pharmacist to fill the [address] [facility] [facility]  today?   Please call pharmacist at [phone]

## 2018-08-31 NOTE — TELEPHONE ENCOUNTER
Called and spoke to Mr Haylee Phillip making him aware that per Alix Cardona he would have to discuss his meds with Dr Alicia Waller.  Patient verbalized understanding

## 2018-08-31 NOTE — TELEPHONE ENCOUNTER
Patient called back asking if Dr. Ryanne Godinez could prescribe him something else for his pain. He only has 8 left from the prescription Dr. Samantha Strange prescribed 08/24 taking 2 every 4 hrs. Patient states that medication did not help him.  Please call patient back with response to message to come get new med prescription at [phone]

## 2018-09-05 DIAGNOSIS — G89.29 CHRONIC MIDLINE LOW BACK PAIN WITH RIGHT-SIDED SCIATICA: ICD-10-CM

## 2018-09-05 DIAGNOSIS — M54.41 CHRONIC MIDLINE LOW BACK PAIN WITH RIGHT-SIDED SCIATICA: ICD-10-CM

## 2018-09-05 RX ORDER — PREDNISONE 10 MG/1
TABLET ORAL
Qty: 25 TAB | Refills: 0 | Status: SHIPPED | OUTPATIENT
Start: 2018-09-05 | End: 2018-11-21 | Stop reason: ALTCHOICE

## 2018-09-05 NOTE — TELEPHONE ENCOUNTER
Requested Prescriptions     Pending Prescriptions Disp Refills    predniSONE (DELTASONE) 10 mg tablet 25 Tab 0     Si tabs daily for 5 days       Pt request refill, stated it does help with his back pain

## 2018-09-10 ENCOUNTER — OFFICE VISIT (OUTPATIENT)
Dept: ORTHOPEDIC SURGERY | Age: 63
End: 2018-09-10

## 2018-09-10 VITALS
TEMPERATURE: 99.2 F | RESPIRATION RATE: 18 BRPM | SYSTOLIC BLOOD PRESSURE: 143 MMHG | OXYGEN SATURATION: 96 % | BODY MASS INDEX: 34.21 KG/M2 | HEART RATE: 60 BPM | HEIGHT: 69 IN | DIASTOLIC BLOOD PRESSURE: 56 MMHG | WEIGHT: 231 LBS

## 2018-09-10 DIAGNOSIS — M48.061 SPINAL STENOSIS OF LUMBAR REGION WITHOUT NEUROGENIC CLAUDICATION: ICD-10-CM

## 2018-09-10 DIAGNOSIS — F17.200 TOBACCO DEPENDENCE: ICD-10-CM

## 2018-09-10 DIAGNOSIS — M62.838 MUSCLE SPASM: ICD-10-CM

## 2018-09-10 DIAGNOSIS — M79.2 NEURITIS: ICD-10-CM

## 2018-09-10 DIAGNOSIS — M47.816 LUMBAR FACET ARTHROPATHY: Primary | ICD-10-CM

## 2018-09-10 RX ORDER — GABAPENTIN 300 MG/1
CAPSULE ORAL
Qty: 180 CAP | Refills: 5 | Status: SHIPPED | OUTPATIENT
Start: 2018-09-10 | End: 2019-07-22 | Stop reason: SDUPTHER

## 2018-09-10 NOTE — PATIENT INSTRUCTIONS
Low Back Arthritis: Exercises  Your Care Instructions  Here are some examples of typical rehabilitation exercises for your condition. Start each exercise slowly. Ease off the exercise if you start to have pain. Your doctor or physical therapist will tell you when you can start these exercises and which ones will work best for you. When you are not being active, find a comfortable position for rest. Some people are comfortable on the floor or a medium-firm bed with a small pillow under their head and another under their knees. Some people prefer to lie on their side with a pillow between their knees. Don't stay in one position for too long. Take short walks (10 to 20 minutes) every 2 to 3 hours. Avoid slopes, hills, and stairs until you feel better. Walk only distances you can manage without pain, especially leg pain. How to do the exercises  Pelvic tilt    1. Lie on your back with your knees bent. 2. \"Brace\" your stomach-tighten your muscles by pulling in and imagining your belly button moving toward your spine. 3. Press your lower back into the floor. You should feel your hips and pelvis rock back. 4. Hold for 6 seconds while breathing smoothly. 5. Relax and allow your pelvis and hips to rock forward. 6. Repeat 8 to 12 times. Back stretches    1. Get down on your hands and knees on the floor. 2. Relax your head and allow it to droop. Round your back up toward the ceiling until you feel a nice stretch in your upper, middle, and lower back. Hold this stretch for as long as it feels comfortable, or about 15 to 30 seconds. 3. Return to the starting position with a flat back while you are on your hands and knees. 4. Let your back sway by pressing your stomach toward the floor. Lift your buttocks toward the ceiling. 5. Hold this position for 15 to 30 seconds. 6. Repeat 2 to 4 times. Follow-up care is a key part of your treatment and safety.  Be sure to make and go to all appointments, and call your doctor if you are having problems. It's also a good idea to know your test results and keep a list of the medicines you take. Where can you learn more? Go to http://samreen-tabitha.info/. Enter M683 in the search box to learn more about \"Low Back Arthritis: Exercises. \"  Current as of: November 29, 2017  Content Version: 11.7  © 4658-1810 [employer]. Care instructions adapted under license by [employer] (which disclaims liability or warranty for this information). If you have questions about a medical condition or this instruction, always ask your healthcare professional. [address] any warranty or liability for your use of this information. Learning About Benefits From Quitting Smoking  How does quitting smoking make you healthier? If you're thinking about quitting smoking, you may have a few reasons to be smoke-free. Your health may be one of them. · When you quit smoking, you lower your risks for cancer, lung disease, heart attack, stroke, blood vessel disease, and blindness from macular degeneration. · When you're smoke-free, you get sick less often, and you heal faster. You are less likely to get colds, flu, bronchitis, and pneumonia. · As a nonsmoker, you may find that your mood is better and you are less stressed. When and how will you feel healthier? Quitting has real health benefits that start from day 1 of being smoke-free. And the longer you stay smoke-free, the healthier you get and the better you feel. The first hours  · After just 20 minutes, your blood pressure and heart rate go down. That means there's less stress on your heart and blood vessels. · Within 12 hours, the level of carbon monoxide in your blood drops back to normal. That makes room for more oxygen. With more oxygen in your body, you may notice that you have more energy than when you smoked. After 2 weeks  · Your lungs start to work better.   · Your risk of heart attack starts to drop. After 1 month  · When your lungs are clear, you cough less and breathe deeper, so it's easier to be active. · Your sense of taste and smell return. That means you can enjoy food more than you have since you started smoking. Over the years  · After 1 year, your risk of heart disease is half what it would be if you kept smoking. · After 5 years, your risk of stroke starts to shrink. Within a few years after that, it's about the same as if you'd never smoked. · After 10 years, your risk of dying from lung cancer is cut by about half. And your risk for many other types of cancer is lower too. How would quitting help others in your life? When you quit smoking, you improve the health of everyone who now breathes in your smoke. · Their heart, lung, and cancer risks drop, much like yours. · They are sick less. For babies and small children, living smoke-free means they're less likely to have ear infections, pneumonia, and bronchitis. · If you're a woman who is or will be pregnant someday, quitting smoking means a healthier . · Children who are close to you are less likely to become adult smokers. Where can you learn more? Go to http://samreen-tabitha.info/. Enter [id number] [id number] [id number] in the search box to learn more about \"Learning About Benefits From Quitting Smoking. \"  Current as of: 2017  Content Version: 11.7  © 7282-7007 [employer]. Care instructions adapted under license by [employer] (which disclaims liability or warranty for this information). If you have questions about a medical condition or this instruction, always ask your healthcare professional. [place] [address] any warranty or liability for your use of this information. Lumbar Spinal Stenosis: Care Instructions  Your Care Instructions    Stenosis in the spine is a narrowing of the canal that is around the spinal cord and nerve roots in your back. It can happen as part of aging. Sometimes bone and other tissue grow into this canal and press on the nerves that branch out from the spinal cord. This can cause pain, numbness, and weakness. When it happens in the lower part of your back, it is called lumbar spinal stenosis. It can cause problems in the legs, feet, and rear end (buttocks). You may be able to get relief from the symptoms of spinal stenosis by taking pain medicine. Your doctor may suggest physical therapy and exercises to keep your spine strong and flexible. Some people try steroid shots to reduce swelling. If pain and numbness in your legs are still so bad that you cannot do your normal activities, you may need surgery. Follow-up care is a key part of your treatment and safety. Be sure to make and go to all appointments, and call your doctor if you are having problems. It's also a good idea to know your test results and keep a list of the medicines you take. How can you care for yourself at home? · Take an over-the-counter pain medicine. Nonsteroidal anti-inflammatory drugs (NSAIDs) such as ibuprofen or naproxen seem to work best. But if you can't take NSAIDs, you can try acetaminophen. Be safe with medicines. Read and follow all instructions on the label. · Do not take two or more pain medicines at the same time unless the doctor told you to. Many pain medicines have acetaminophen, which is Tylenol. Too much acetaminophen (Tylenol) can be harmful. · Stay at a healthy weight. Being overweight puts extra strain on your spine. · Change positions often when you sit or stand. This can ease pain. It may also reduce pressure on the spinal cord and its nerves. · Avoid doing things that make your symptoms worse. Walking downhill and standing for a long time may cause pain. · Stretch and strengthen your back muscles as your doctor or physical therapist recommends. If your doctor says it is okay to do them, these exercises may help.   Rufino Ports on your back with your knees bent. Gently pull one bent knee to your chest. Put that foot back on the floor, and then pull the other knee to your chest.  ¨ Do pelvic tilts. Lie on your back with your knees bent. Tighten your stomach muscles. Pull your belly button (navel) in and up toward your ribs. You should feel like your back is pressing to the floor and your hips and pelvis are slightly lifting off the floor. Hold for 6 seconds while breathing smoothly. ¨ Stand with your back flat against a wall. Slowly slide down until your knees are slightly bent. Hold for 10 seconds, then slide back up the wall. · Remove or change anything in your house that may cause you to fall. Keep walkways clear of clutter, electrical cords, and throw rugs. When should you call for help? Call 911 anytime you think you may need emergency care. For example, call if:    · You are unable to move a leg at all.   [facility] your doctor now or seek immediate medical care if:    · You have new or worse symptoms in your legs, belly, or buttocks. Symptoms may include:  ¨ Numbness or tingling. ¨ Weakness. ¨ Pain.     · You lose bladder or bowel control.    Watch closely for changes in your health, and be sure to contact your doctor if:    · You have a fever, lose weight, or don't feel well.     · You are not getting better as expected. Where can you learn more? Go to http://samreen-tabitha.info/. Bo Bobby in the search box to learn more about \"Lumbar Spinal Stenosis: Care Instructions. \"  Current as of: November 29, 2017  Content Version: 11.7  © 8486-1659 [employer]. Care instructions adapted under license by [employer] (which disclaims liability or warranty for this information). If you have questions about a medical condition or this instruction, always ask your healthcare professional. [address] any warranty or liability for your use of this information.

## 2018-09-10 NOTE — MR AVS SNAPSHOT
[address] [facility] [address] [facility] [address] 
[phone] Patient: Karlos Blandon MRN:  [id number] Visit Information Date & Time Provider Department Dept. Phone Encounter #  
 9/10/2018 10:00 AM Roro Perez, 27 [facility] [phone] [id number] Follow-up Instructions Return in about 1 month (around 10/10/2018) for Medication follow up, PT follow up. Your Appointments 11/14/2018  7:40 AM  
LAB with [facility] (JORGE LUIS Pepper) Appt Note: labs 1 week before [address] [address]  
[phone]  
  
   
 [address] [facility]  
  
    
 11/21/2018 10:30 AM  
Office Visit with Emmanuel Ojeda MD  
[address] [place] [address]) Appt Note: Return in 3 months [address] [address]  
[phone]  
  
   
 [address] [facility]  
  
    
  
 2/28/2019  9:00 AM  
Any with Sunnie Kussmaul, MD  
[facility] ([address] [facility]) Appt Note: Return in about 6 months (around 2/1/2019) for follow up with MD, DEO, PVR  
 [address]  [address] [address] [address] [facility]  
  
   
 [address] [place] [address] [address] [place] [address] 100 706 [facility] Upcoming Health Maintenance Date Due DTaP/Tdap/Td series (1 - Tdap) 7/9/1974 FOBT Q 1 YEAR, 18+ 10/15/2016 GLAUCOMA SCREENING Q2Y 7/9/2018 Pneumococcal 65+ Low/Medium Risk (1 of 2 - PCV13) 7/9/2018 Influenza Age 5 to Adult 8/1/2018 MEDICARE YEARLY EXAM 8/25/2019 COLONOSCOPY 4/1/2020 Allergies as of 9/10/2018  Review Complete On: 9/10/2018 By: Roro Perez MD  
  
 Severity Noted Reaction Type Reactions Ace Inhibitors  05/17/2010    Cough Dilaudid [Hydromorphone]  05/17/2010    Hives Lisinopril  05/17/2010    Cough Zocor [Simvastatin]  07/30/2011    Myalgia Right leg Current Immunizations  Reviewed on 9/20/2017 Name Date Influenza Vaccine 1/2/2015 Influenza Vaccine (Quad) PF 9/20/2017, 10/12/2016, 10/15/2015 Zoster Vaccine, Live 10/22/2015 Not reviewed this visit You Were Diagnosed With   
  
 Codes Comments Lumbar facet arthropathy (HCC)    -  Primary ICD-10-CM: M46.96 
ICD-9-CM: 721.3 Spinal stenosis of lumbar region without neurogenic claudication     ICD-10-CM: M48.061 
ICD-9-CM: 724.02 Neuritis     ICD-10-CM: M79.2 ICD-9-CM: 729.2 Muscle spasm     ICD-10-CM: V84.576 ICD-9-CM: 728.85 Tobacco dependence     ICD-10-CM: K52.362 ICD-9-CM: 305.1 Vitals BP Pulse Temp Resp Height(growth percentile) Weight(growth percentile) 143/56 60 99.2 °F (37.3 °C) 18 5' 9\" (1.753 m) 231 lb (104.8 kg) SpO2 BMI Smoking Status 96% 34.11 kg/m2 Current Every Day Smoker BMI and BSA Data Body Mass Index Body Surface Area  
 34.11 kg/m 2 2.26 m 2 Preferred Pharmacy Pharmacy Name Phone [facility]inner [address] [place] [address] [phone] Your Updated Medication List  
  
   
This list is accurate as of 9/10/18 10:46 AM.  Always use your most recent med list.  
  
  
  
  
 albuterol 90 mcg/actuation inhaler Commonly known as:  PROVENTIL HFA, VENTOLIN HFA, PROAIR HFA Take 2 Puffs by inhalation every four (4) hours as needed for Wheezing. chlorhexidine 0.12 % solution Commonly known as:  PERIDEX  
  
 cyclobenzaprine 10 mg tablet Commonly known as:  FLEXERIL Take 1 Tab by mouth three (3) times daily as needed for Muscle Spasm(s). fluticasone 50 mcg/actuation nasal spray Commonly known as:  Iqra Mixon 2 Sprays by Both Nostrils route daily. gabapentin 300 mg capsule Commonly known as:  NEURONTIN  
 2 po tid -- taper up as directed  Indications: NEUROPATHIC PAIN  
  
 guaiFENesin  mg ER tablet Commonly known as:  Nick & Nick Take 1 Tab by mouth two (2) times a day. HYDROcodone bitartrate 60 mg ER tablet Commonly known as:  HYSINGLA Take 1 Tab by mouth daily. Max Daily Amount: 60 mg. *DO NOT CRUSH,CHEW, OR DISSOLVE TABLET*  
  
 * HYDROcodone-acetaminophen 5-325 mg per tablet Commonly known as:  Ruelas Azeem Take 1 Tab by mouth every eight (8) hours as needed for Pain. Max Daily Amount: 3 Tabs. * HYDROcodone-acetaminophen 5-325 mg per tablet Commonly known as:  Ruelas Azeem Take 1 Tab by mouth every eight (8) hours as needed for Pain. Max Daily Amount: 3 Tabs. * HYDROcodone-acetaminophen  mg tablet Commonly known as:  Ruelas Azeem Take 1 Tab by mouth every eight (8) hours as needed for Pain. Max Daily Amount: 3 Tabs. ibuprofen 800 mg tablet Commonly known as:  MOTRIN  
TAKE ONE TABLET BY MOUTH EVERY 8 HOURS AS NEEDED FOR PAIN  
  
 lactulose 10 gram/15 mL solution Commonly known as:  Cherylynn Jean-Pierre Take 45ml PO TID  
  
 metoprolol tartrate 25 mg tablet Commonly known as:  LOPRESSOR  
TAKE ONE TABLET BY MOUTH TWICE DAILY ( GENERIC FOR LOPRESSOR )  
  
 montelukast 10 mg tablet Commonly known as:  SINGULAIR Take 1 Tab by mouth daily. nitroglycerin 0.4 mg SL tablet Commonly known as:  NITROSTAT  
1 Tab by SubLINGual route every five (5) minutes as needed. pantoprazole 40 mg tablet Commonly known as:  PROTONIX  
TAKE ONE TABLET BY MOUTH ONCE DAILY pravastatin 40 mg tablet Commonly known as:  PRAVACHOL  
TAKE ONE TABLET BY MOUTH NIGHTLY  
  
 predniSONE 10 mg tablet Commonly known as:  DELTASONE  
5 tabs daily for 5 days  
  
 tamsulosin 0.4 mg capsule Commonly known as:  FLOMAX TAKE ONE CAPSULE BY MOUTH ONCE DAILY (AFTER  DINNER) TRAVATAN Z 0.004 % ophthalmic solution Generic drug:  travoprost  
Administer 1 Drop to both eyes nightly. traZODone 50 mg tablet Commonly known as:  DESYREL  
TAKE ONE TABLET BY MOUTH ONCE DAILY AT BEDTIME AS NEEDED FOR SLEEP  
  
 umeclidinium-vilanterol 62.5-25 mcg/actuation inhaler Commonly known as:  Charisse Dills Take 1 Puff by inhalation daily. * Notice: This list has 3 medication(s) that are the same as other medications prescribed for you. Read the directions carefully, and ask your doctor or other care provider to review them with you. Prescriptions Printed Refills  
 gabapentin (NEURONTIN) 300 mg capsule 5 Si po tid -- taper up as directed  Indications: NEUROPATHIC PAIN Class: Print We Performed the Following REFERRAL TO PHYSICAL THERAPY [URD65 Custom] Comments:  
 Eval/Treat Aqua Therapy. Follow-up Instructions Return in about 1 month (around 10/10/2018) for Medication follow up, PT follow up. Referral Information Referral ID Referred By Referred To  
  
 [id number]   
   [address] [facility] [address] [place], [address] [place] [address] Phone: [phone] Fax: [phone] Visits Status Start Date End Date 1 New Request 9/10/18 9/10/19 If your referral has a status of pending review or denied, additional information will be sent to support the outcome of this decision. Patient Instructions Low Back Arthritis: Exercises Your Care Instructions Here are some examples of typical rehabilitation exercises for your condition. Start each exercise slowly. Ease off the exercise if you start to have pain. Your doctor or physical therapist will tell you when you can start these exercises and which ones will work best for you. When you are not being active, find a comfortable position for rest. Some people are comfortable on the floor or a medium-firm bed with a small pillow under their head and another under their knees.  Some people prefer to lie on their side with a pillow between their knees. Don't stay in one position for too long. Take short walks (10 to 20 minutes) every 2 to 3 hours. Avoid slopes, hills, and stairs until you feel better. Walk only distances you can manage without pain, especially leg pain. How to do the exercises Pelvic tilt 1. Lie on your back with your knees bent. 2. \"Brace\" your stomach-tighten your muscles by pulling in and imagining your belly button moving toward your spine. 3. Press your lower back into the floor. You should feel your hips and pelvis rock back. 4. Hold for 6 seconds while breathing smoothly. 5. Relax and allow your pelvis and hips to rock forward. 6. Repeat 8 to 12 times. Back stretches 1. Get down on your hands and knees on the floor. 2. Relax your head and allow it to droop. Round your back up toward the ceiling until you feel a nice stretch in your upper, middle, and lower back. Hold this stretch for as long as it feels comfortable, or about 15 to 30 seconds. 3. Return to the starting position with a flat back while you are on your hands and knees. 4. Let your back sway by pressing your stomach toward the floor. Lift your buttocks toward the ceiling. 5. Hold this position for 15 to 30 seconds. 6. Repeat 2 to 4 times. Follow-up care is a key part of your treatment and safety. Be sure to make and go to all appointments, and call your doctor if you are having problems. It's also a good idea to know your test results and keep a list of the medicines you take. Where can you learn more? Go to http://samreen-tabitha.info/. Enter B743 in the search box to learn more about \"Low Back Arthritis: Exercises. \" Current as of: November 29, 2017 Content Version: 11.7 © 9028-2347 [employer].  Care instructions adapted under license by [employer] (which disclaims liability or warranty for this information). If you have questions about a medical condition or this instruction, always ask your healthcare professional. [address] any warranty or liability for your use of this information. Learning About Benefits From Quitting Smoking How does quitting smoking make you healthier? If you're thinking about quitting smoking, you may have a few reasons to be smoke-free. Your health may be one of them. · When you quit smoking, you lower your risks for cancer, lung disease, heart attack, stroke, blood vessel disease, and blindness from macular degeneration. · When you're smoke-free, you get sick less often, and you heal faster. You are less likely to get colds, flu, bronchitis, and pneumonia. · As a nonsmoker, you may find that your mood is better and you are less stressed. When and how will you feel healthier? Quitting has real health benefits that start from day 1 of being smoke-free. And the longer you stay smoke-free, the healthier you get and the better you feel. The first hours · After just 20 minutes, your blood pressure and heart rate go down. That means there's less stress on your heart and blood vessels. · Within 12 hours, the level of carbon monoxide in your blood drops back to normal. That makes room for more oxygen. With more oxygen in your body, you may notice that you have more energy than when you smoked. After 2 weeks · Your lungs start to work better. · Your risk of heart attack starts to drop. After 1 month · When your lungs are clear, you cough less and breathe deeper, so it's easier to be active. · Your sense of taste and smell return. That means you can enjoy food more than you have since you started smoking. Over the years · After 1 year, your risk of heart disease is half what it would be if you kept smoking. · After 5 years, your risk of stroke starts to shrink.  Within a few years after that, it's about the same as if you'd never smoked. · After 10 years, your risk of dying from lung cancer is cut by about half. And your risk for many other types of cancer is lower too. How would quitting help others in your life? When you quit smoking, you improve the health of everyone who now breathes in your smoke. · Their heart, lung, and cancer risks drop, much like yours. · They are sick less. For babies and small children, living smoke-free means they're less likely to have ear infections, pneumonia, and bronchitis. · If you're a woman who is or will be pregnant someday, quitting smoking means a healthier . · Children who are close to you are less likely to become adult smokers. Where can you learn more? Go to http://samreenAlphaCare Holdingstabitha.info/. Enter [id number] in the search box to learn more about \"Learning About Benefits From Quitting Smoking. \" Current as of: 2017 Content Version: 11.7 © 0270-9262 [employer]. Care instructions adapted under license by [employer] (which disclaims liability or warranty for this information). If you have questions about a medical condition or this instruction, always ask your healthcare professional. [address] [place] [address] any warranty or liability for your use of this information. Lumbar Spinal Stenosis: Care Instructions Your Care Instructions Stenosis in the spine is a narrowing of the canal that is around the spinal cord and nerve roots in your back. It can happen as part of aging. Sometimes bone and other tissue grow into this canal and press on the nerves that branch out from the spinal cord. This can cause pain, numbness, and weakness. When it happens in the lower part of your back, it is called lumbar spinal stenosis. It can cause problems in the legs, feet, and rear end (buttocks).  
You may be able to get relief from the symptoms of spinal stenosis by taking pain medicine. Your doctor may suggest physical therapy and exercises to keep your spine strong and flexible. Some people try steroid shots to reduce swelling. If pain and numbness in your legs are still so bad that you cannot do your normal activities, you may need surgery. Follow-up care is a key part of your treatment and safety. Be sure to make and go to all appointments, and call your doctor if you are having problems. It's also a good idea to know your test results and keep a list of the medicines you take. How can you care for yourself at home? · Take an over-the-counter pain medicine. Nonsteroidal anti-inflammatory drugs (NSAIDs) such as ibuprofen or naproxen seem to work best. But if you can't take NSAIDs, you can try acetaminophen. Be safe with medicines. Read and follow all instructions on the label. · Do not take two or more pain medicines at the same time unless the doctor told you to. Many pain medicines have acetaminophen, which is Tylenol. Too much acetaminophen (Tylenol) can be harmful. · Stay at a healthy weight. Being overweight puts extra strain on your spine. · Change positions often when you sit or stand. This can ease pain. It may also reduce pressure on the spinal cord and its nerves. · Avoid doing things that make your symptoms worse. Walking downhill and standing for a long time may cause pain. · Stretch and strengthen your back muscles as your doctor or physical therapist recommends. If your doctor says it is okay to do them, these exercises may help. ¨ Lie on your back with your knees bent. Gently pull one bent knee to your chest. Put that foot back on the floor, and then pull the other knee to your chest. 
¨ Do pelvic tilts. Lie on your back with your knees bent. Tighten your stomach muscles. Pull your belly button (navel) in and up toward your ribs.  You should feel like your back is pressing to the floor and your hips and pelvis are slightly lifting off the floor. Hold for 6 seconds while breathing smoothly. ¨ Stand with your back flat against a wall. Slowly slide down until your knees are slightly bent. Hold for 10 seconds, then slide back up the wall. · Remove or change anything in your house that may cause you to fall. Keep walkways clear of clutter, electrical cords, and throw rugs. When should you call for help? Call 911 anytime you think you may need emergency care. For example, call if: 
  · You are unable to move a leg at all.  
[facility] your doctor now or seek immediate medical care if: 
  · You have new or worse symptoms in your legs, belly, or buttocks. Symptoms may include: ¨ Numbness or tingling. ¨ Weakness. ¨ Pain.  
  · You lose bladder or bowel control.  
 Watch closely for changes in your health, and be sure to contact your doctor if: 
  · You have a fever, lose weight, or don't feel well.  
  · You are not getting better as expected. Where can you learn more? Go to http://samreen-tabitha.info/. Kayley Esquivel in the search box to learn more about \"Lumbar Spinal Stenosis: Care Instructions. \" Current as of: November 29, 2017 Content Version: 11.7 © 9345-3436 [employer]. Care instructions adapted under license by [employer] (which disclaims liability or warranty for this information). If you have questions about a medical condition or this instruction, always ask your healthcare professional. [place] [address] any warranty or liability for your use of this information. Introducing [facility]! [employer] introduces [employer] patient portal. Now you can access parts of your medical record, email your doctor's office, and request medication refills online. 1. In your internet browser, go to [email or web address] 2. Click on the First Time User? Click Here link in the Sign In box.  You will see the New Member Sign Up page. 3. Enter your [employer] Access Code exactly as it appears below. You will not need to use this code after youve completed the sign-up process. If you do not sign up before the expiration date, you must request a new code. · [employer] Access Code: [email or web address] Expires: 10/18/2018 10:01 AM 
 
4. Enter the last four digits of your Social Security Number (xxxx) and Date of Birth (mm/dd/yyyy) as indicated and click Submit. You will be taken to the next sign-up page. 5. Create a Viat ID. This will be your Contestomatik login ID and cannot be changed, so think of one that is secure and easy to remember. 6. Create a Contest[employer] password. You can change your password at any time. 7. Enter your Password Reset Question and Answer. This can be used at a later time if you forget your password. 8. Enter your e-mail address. You will receive e-mail notification when new information is available in [address]. 9. Click Sign Up. You can now view and download portions of your medical record. 10. Click the Download Summary menu link to download a portable copy of your medical information. If you have questions, please visit the Frequently Asked Questions section of the [employer] website. Remember, [employer] is NOT to be used for urgent needs. For medical emergencies, dial 911. Now available from your iPhone and Android! Please provide this summary of care documentation to your next provider. Your primary care clinician is listed as WILLIAM MENARD. If you have any questions after today's visit, please call [phone].

## 2018-09-10 NOTE — PROGRESS NOTES
[facility]  [address] [place], [facility] [address]  Phone: [phone]  Fax: [phone]    NEW PATIENT  Pt's YOB: 1955    ASSESSMENT   Diagnoses and all orders for this visit:    1. Lumbar facet arthropathy (HCC)  -     REFERRAL TO PHYSICAL THERAPY    2. Spinal stenosis of lumbar region without neurogenic claudication  -     gabapentin (NEURONTIN) 300 mg capsule; 2 po tid -- taper up as directed  Indications: NEUROPATHIC PAIN  -     REFERRAL TO PHYSICAL THERAPY    3. Neuritis  -     gabapentin (NEURONTIN) 300 mg capsule; 2 po tid -- taper up as directed  Indications: NEUROPATHIC PAIN  -     REFERRAL TO PHYSICAL THERAPY    4. Muscle spasm  -     REFERRAL TO PHYSICAL THERAPY    5. Tobacco dependence         IMPRESSION AND PLAN:  Simone Longoria is a 70 y.o. male with history of low back pain. He complains of pain in the lower back and numbness/pain radiating down the both legs. Pt  notes that he started to experience pain about 3 months ago which worsened about 1 month ago. He takes ibuprofen 800 mg with minimal relief and Neurontin 300 mg 1 tab TID without sedation. 1) Pt was given information on lumbar arthritis exercises and lumbar spinal stenosis. 2) I strongly encouraged the Pt to quit smoking and gave information on smoking cessation. 3) He will increase his Neurontin 300 mg to 2 tabs TID, tapering up as directed, to better manage his symptoms. 4) Pt was referred to [facility] physical therapy. 5) Discussed trying steroid injections if needed. 6) Mr. Shannan Phoenix has a reminder for a \"due or due soon\" health maintenance. I have asked that he contact his primary care provider, Fede Islas MD, for follow-up on this health maintenance. 7)  demonstrated consistency with prescribing. 8) Pt will follow-up in 1 month or sooner if needed. HISTORY OF PRESENT ILLNESS:  Simone Longoria is a 70 y.o. male with history of low back pain.  He complains of pain in the lower back and numbness/pain radiating down the both legs. Pt presents to the office today as a new patient referred by Dr. Korin Bryant. He notes that he started to experience pain about 3 months ago which worsened about 1 month ago. Pt reports that he was lying down at a friends house on month ago when his back locked up, he was unable to change positions, and \"felt like I was paralyzed. \" He called the rescue squad and went to the ER via ambulance. Pt notes that it took about 1 month for him to walk without pain after that episode but states that he was never hospitalized. He  had a prior right hip replacement with Dr. Korin Bryant and denies any hip pain at this time. Pt notes that he has been taking ibuprofen 800 mg with minimal relief. He tried physical therapy in the past but denies ever trying aqua physical therapy. Pt is currently on a prednisone taper. He has been taking Neurontin 300 mg 1 tab TID for some time and denies any sedation with the medication. Of note, patient is not diabetic. Pt at this time desires to proceed with medication evaluation and physical therapy. Of note, he smokes 5 cigarettes daily. He is not currently working. Pt denies any history of renal issues.      Pain Scale: 6/10     PCP: Ivory Caraballo MD    Past Medical History:   Diagnosis Date    Allergic rhinitis     Avascular necrosis ([address]     CAD (coronary artery disease) 4/2009    mild, hemodynamically non-significant by angiography    Carpal tunnel syndrome     Carpal tunnel syndrome on both sides     Chest pain, unspecified     Chronic back pain 2/11/2011    Chronic obstructive pulmonary disease ([address]     Degenerative joint disease     with chronic back pain    Dysesthesia     of hand post carpal tunnel surgery v sudeck's atrophy    Dyslipidemia, goal LDL below 100     Dysphagia     GERD (gastroesophageal reflux disease) 5/17/2010    Glaucoma 9/15/2010    Glaucoma     Hearing loss     Hypercholesterolemia     Hypertension     Joint fusion     of right wrist    Morbid [facility]     Obesity 5/17/2010    Other dyspnea and respiratory abnormality     Palpitations     Right hip pain     Right hip pain     Right knee pain     S/P cardiac catheterization 4/9/2009    1. Normal systemic pressure. 2. Moderate elevation of left-sided filling pressures. 3. Preserved left ventricular systolic function in the LOWE projection. 4. Mild, nonsignificant epicardial coronary artery disese by angiography.  Sleep apnea     on CPAP    Strain of lumbar region     Tobacco abuse         Social History     Social History    Marital status: SINGLE     Spouse name: N/A    Number of children: N/A    Years of education: N/A     Occupational History    Not on file. Social History Main Topics    Smoking status: Current Every Day Smoker    Smokeless tobacco: Never Used      Comment: urothelial cancer risk discussed today [id number]    Alcohol use No    Drug use: No    Sexual activity: Not on file     Other Topics Concern    Not on file     Social History Narrative       Current Outpatient Prescriptions   Medication Sig Dispense Refill    gabapentin (NEURONTIN) 300 mg capsule 2 po tid -- taper up as directed  Indications: NEUROPATHIC PAIN 180 Cap 5    predniSONE (DELTASONE) 10 mg tablet 5 tabs daily for 5 days 25 Tab 0    pantoprazole (PROTONIX) 40 mg tablet TAKE ONE TABLET BY MOUTH ONCE DAILY 90 Tab 1    cyclobenzaprine (FLEXERIL) 10 mg tablet Take 1 Tab by mouth three (3) times daily as needed for Muscle Spasm(s). 60 Tab 0    tamsulosin (FLOMAX) 0.4 mg capsule TAKE ONE CAPSULE BY MOUTH ONCE DAILY (AFTER  DINNER) 90 Cap 0    umeclidinium-vilanterol (ANORO ELLIPTA) 62.5-25 mcg/actuation inhaler Take 1 Puff by inhalation daily.  1 Inhaler 2    traZODone (DESYREL) 50 mg tablet TAKE ONE TABLET BY MOUTH ONCE DAILY AT BEDTIME AS NEEDED FOR SLEEP 30 Tab 2    ibuprofen (MOTRIN) 800 mg tablet TAKE ONE TABLET BY MOUTH EVERY 8 HOURS AS NEEDED FOR PAIN 90 Tab 5    guaiFENesin ER (MUCINEX) 600 mg ER tablet Take 1 Tab by mouth two (2) times a day. 30 Tab 5    pravastatin (PRAVACHOL) 40 mg tablet TAKE ONE TABLET BY MOUTH NIGHTLY 90 Tab 3    metoprolol tartrate (LOPRESSOR) 25 mg tablet TAKE ONE TABLET BY MOUTH TWICE DAILY ( GENERIC FOR LOPRESSOR ) 180 Tab 3    fluticasone (FLONASE) 50 mcg/actuation nasal spray 2 Sprays by Both Nostrils route daily. 1 Bottle 5    nitroglycerin (NITROSTAT) 0.4 mg SL tablet 1 Tab by SubLINGual route every five (5) minutes as needed. 6 Tab 1    albuterol (PROVENTIL HFA, VENTOLIN HFA, PROAIR HFA) 90 mcg/actuation inhaler Take 2 Puffs by inhalation every four (4) hours as needed for Wheezing. 1 Inhaler 5    TRAVATAN Z 0.004 % ophthalmic solution Administer 1 Drop to both eyes nightly.  HYDROcodone-acetaminophen (NORCO)  mg tablet Take 1 Tab by mouth every eight (8) hours as needed for Pain. Max Daily Amount: 3 Tabs. (Patient not taking: Reported on 9/10/2018) 21 Tab 0    HYDROcodone bitartrate (HYSINGLA) 60 mg ER tablet Take 1 Tab by mouth daily. Max Daily Amount: 60 mg. *DO NOT CRUSH,CHEW, OR DISSOLVE TABLET* (Patient not taking: Reported on 9/10/2018) 30 Tab 0    HYDROcodone-acetaminophen (NORCO) 5-325 mg per tablet Take 1 Tab by mouth every eight (8) hours as needed for Pain. Max Daily Amount: 3 Tabs. (Patient not taking: Reported on 9/10/2018) 21 Tab 0    HYDROcodone-acetaminophen (NORCO) 5-325 mg per tablet Take 1 Tab by mouth every eight (8) hours as needed for Pain. Max Daily Amount: 3 Tabs. (Patient not taking: Reported on 9/10/2018) 12 Tab 0    lactulose (CONSTULOSE) 10 gram/15 mL solution Take 45ml PO TID (Patient not taking: Reported on 9/10/2018) 480 mL 2    chlorhexidine (PERIDEX) 0.12 % solution       montelukast (SINGULAIR) 10 mg tablet Take 1 Tab by mouth daily.  (Patient not taking: Reported on 9/10/2018) 30 Tab 5       Allergies   Allergen Reactions  Ace Inhibitors Cough    Dilaudid [Hydromorphone] Hives    Lisinopril Cough    Zocor [Simvastatin] Myalgia     Right leg       REVIEW OF SYSTEMS    Constitutional: Negative for fever, chills, or weight change. Respiratory: Negative for cough or shortness of breath. Cardiovascular: Negative for chest pain or palpitations. Gastrointestinal: Negative for acid reflux, change in bowel habits, or constipation. Genitourinary: Negative for dysuria and flank pain. Musculoskeletal: Positive for lumbar pain. Skin: Negative for rash. Neurological: Negative for headaches, dizziness, or numbness. Endo/Heme/Allergies: Negative for increased bruising. Psychiatric/Behavioral: Negative for difficulty with sleep. PHYSICAL EXAMINATION  Visit Vitals    /56    Pulse 60    Temp 99.2 °F (37.3 °C)    Resp 18    Ht 5' 9\" (1.753 m)    Wt 231 lb (104.8 kg)    SpO2 96%    BMI 34.11 kg/m2       Constitutional: Awake, alert, and in no acute distress. HEENT: Normocephalic. Atraumatic. Oropharynx is moist and clear. PERRL. EOMI. Sclerae are nonicteric  Heart: Regular rate and rhythm  Lungs: Clear to auscultation bilaterally  Abdomen: Soft and nontender. Bowel sounds are present  Neurological: 1+ symmetrical DTRs in the upper extremities. 1+ symmetrical DTRs in the lower extremities. Sensation to light touch is intact. Negative Sulaiman's sign bilaterally. Skin: warm, dry, and intact. Musculoskeletal: Decreased range of motion with left cervical flexion; otherwise good range of motion in the cervical spine. Tenderness to palpation in the lower lumbar region. Pain with extension, axial loading, and forward flexion. Difficulty transitioning from sit to stand. No pain with internal or external rotation of his hips. Positive straight leg raise bilaterally. Pt is unable to toe walk. Pain with heel walking. Difficulty with the single leg stand bilaterally.       Biceps  Triceps Deltoids Wrist Ext Wrist Flex Hand Intrin   Right +4/5 +4/5 +4/5 +4/5 +4/5 +4/5   Left +4/5 +4/5 +4/5 +4/5 +4/5 +4/5      Hip Flex  Quads Hamstrings Ankle DF EHL Ankle PF   Right +4/5 +4/5 +4/5 +4/5 +4/5 +4/5   Left -4/5 +4/5 +4/5 +4/5 +4/5 +4/5       IMAGING:    Lumbar spine MRI personally reviewed with the patient and demonstrated:    Results from Hospital Encounter encounter on 08/28/18   MRI LUMB SPINE WO CONT   Narrative MR Lumbar Spine Without Contrast    History/Indications: 70 years Male. Back pain, unspecified. Additional History: Low back pain for several months. Pain radiates down right  leg. COMPARISON: MRI lumbar spine 8/31/2016    Technique: Multi-sequence multiplanar MRI of the lumbar spine. FINDINGS:     There are 5 lumbar-type vertebral bodies. For the purposes of this dictation the  L5/S1 disc level will be referred to by series 5 image 9    There is spondylolisthesis. There is STIR signal hyperintensity within the left  L4 and L5 pedicles (series 4 image 3), likely representing stress reaction. There is no significant spondylolisthesis. The conus terminates at the L1 level. There is moderate bilateral sacroiliac arthrosis. There are multiple rounded T2 hyperintense structures posterior lateral and  anterolateral to the aorta, unchanged compared to CT abdomen and pelvis dated  9/9/2006 and may reflect lymph nodes or a lymphatic malformation. Axial imaging correlation:    T11-T12: Sagittal plane only. No significant disc pathology. No significant  spinal canal or neural foraminal stenosis. T12-L1: No significant disc pathology. Trace left facet joint effusion. No  significant spinal canal or neural foraminal stenosis. L1-2: No significant disc pathology. No significant spinal canal or neural  foraminal stenosis. L2-3: Similar appearing small disc bulge with small right central disc  protrusion extending to the inferior aspect of the L2 vertebral body. Mild  bilateral facet arthrosis.  Minimal spinal canal narrowing. Minimal bilateral  foraminal stenosis. Not significantly changed. L3-4: Small disc bulge with small central disc extrusion extending superiorly  slightly above the disc level. Mild bilateral facet arthrosis. Minimal spinal  canal narrowing. Mild right and minimal left foraminal stenosis. No significant  change from prior. L4-5: Small to moderate disc bulge with focal protrusions involving the central  and right foraminal zones. Moderate bilateral facet arthrosis with ligamentum  flavum buckling. Mild-to-moderate spinal canal stenosis. Moderate to severe  right foraminal stenosis with effacement of the right L4 perineural fat. Mild  left foraminal stenosis. This is progressed from prior. L5-S1: Small broad-based bulge. Minimal spinal canal narrowing. No significant  foraminal stenosis. Impression IMPRESSION:  1. Progression of a small to moderate disc bulge at L4/L5 with focal central  and right foraminal protrusions. Moderate bilateral facet arthrosis, mild to  moderate spinal canal stenosis, moderate to severe right and mild left foraminal  stenosis, with progression from prior MRI. 2.  STIR hyperintensity within the left L4-L5 pedicles, likely representing  stress reaction. 3.  Multilevel additional degenerative disc disease, mild spinal canal stenosis,  and mild foraminal stenosis as above, not significantly changed from prior. Thank you for enabling us to participate in the care of this patient. Written by Hammad Pierson, as dictated by Sudarshan Paz MD.  I, Dr. Sudarshan Paz confirm that all documentation is accurate.

## 2018-09-17 ENCOUNTER — HOSPITAL ENCOUNTER (OUTPATIENT)
Dept: PHYSICAL THERAPY | Age: 63
Discharge: HOME | End: 2018-09-17
Payer: MEDICARE

## 2018-09-17 PROCEDURE — 97530 THERAPEUTIC ACTIVITIES: CPT

## 2018-09-17 PROCEDURE — G8979 MOBILITY GOAL STATUS: HCPCS

## 2018-09-17 PROCEDURE — 97162 PT EVAL MOD COMPLEX 30 MIN: CPT

## 2018-09-17 PROCEDURE — 97110 THERAPEUTIC EXERCISES: CPT

## 2018-09-17 PROCEDURE — G8978 MOBILITY CURRENT STATUS: HCPCS

## 2018-09-17 NOTE — PROGRESS NOTES
[facility]  [address]  [place], [address]  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 Treatment Diagnosis: LBP   Prior Hospitalization: see medical history Provider#: [id number]   Medications: Verified on Patient summary List    Comorbidities: right hip replacement, B carpal tunnel release, Arthritis, HTN, Latex Allergy, Tobacco use   Prior Level of Function: The patient states that he was able to ambulate with improved ease and for longer periods prior to onset. The Plan of Care and following information is based on the information from the initial evaluation. Assessment/ key information: The patient is a 70year old female with a chief complaint of low back pain that does extend down into his left leg primarily. The patient does indicate that he was at his girl friend's house about a month ago and attempted to stand up but noted that his back felt as though it had \"locked up. \" He was taken to the ER and administered medication and he reports improvement since that time, but does indicate that he is unable to walk as long without a back brace as he could prior to the episode. The patient has impairments consisting of pain, decreased ROM, decreased flexibility, decreased core activation, and limited ADL ease. The patient will benefit from skilled PT initially consisting of aquatic therapy, in order to address the aforementioned impairments.     Evaluation Complexity History HIGH Complexity :3+ comorbidities / personal factors will impact the outcome/ POC ; Examination MEDIUM Complexity : 3 Standardized tests and measures addressing body structure, function, activity limitation and / or participation in recreation  ;Presentation MEDIUM Complexity : Evolving with changing characteristics  ; Clinical Decision Making MEDIUM Complexity : FOTO score of 26-74  Overall Complexity Rating: MEDIUM  Problem List: pain affecting function, decrease ROM, decrease strength, impaired gait/ balance, decrease ADL/ functional abilitiies, decrease activity tolerance, decrease flexibility/ joint mobility and decrease transfer abilities   Treatment Plan may include any combination of the following: Therapeutic exercise, Therapeutic activities, Neuromuscular re-education, Physical agent/modality, Manual therapy, Aquatic therapy, Patient education and Functional mobility training  Patient / Family readiness to learn indicated by: asking questions, trying to perform skills and interest  Persons(s) to be included in education: patient (P)  Barriers to Learning/Limitations: None  Patient Goal (s): [place] pain  Patient Self Reported Health Status: fair  Rehabilitation Potential: fair    Short Term Goals: To be accomplished in 2 weeks:   1. The patient will be independent and compliant with HEP to maximize therapeutic benefit. 2. The patient will improve lumbar AROM to WNL to maximize ADL ease. Long Term Goals: To be accomplished in 4 weeks:   1. The patient will improve FOTO score to 57 to maximize quality of life. 2. The patient will demonstrate ambulate of 2 blocks prior to increase in pain in order to maximize ambulation efficiency. 3. The patient will demonstrate negative shanthi tests to reduce stress of lumbar spine during ambulation. 4. The patient will improve HS flexibility to less than 30 degrees during 90/90 test to reduce stress on lumbar spine during ADLs.     Frequency / Duration: Patient to be seen 2 times per week for 4 weeks. Patient/ Caregiver education and instruction: Diagnosis, prognosis, self care, activity modification and exercises   [x]  Plan of care has been reviewed with PTA    G-Codes (GP)  Mobility   Current  CK= 40-59%   Goal  CK= 40-59%    The severity rating is based on clinical judgment and the FOTO score. Certification Period: 9/17/2018 - 11/17/2018  Hernesto Johnson, PT 9/17/2018 9:43 AM    ________________________________________________________________________    I certify that the above Therapy Services are being furnished while the patient is under my care. I agree with the treatment plan and certify that this therapy is necessary.     [de-identified] Signature:____________________  Date:____________Time: _________    Please sign and return to [facility]  [address]  [place], [address]  [phone] fax

## 2018-09-17 NOTE — PROGRESS NOTES
PT DAILY TREATMENT NOTE - [facility]     Patient Name: Cyn Singh  Date:2018  : 1955  [x]  Patient  Verified  Payor: Emily Barrientos / Plan: [place] & B / Product Type: Medicare /    In time:8:57  Out time:9:40  Total Treatment Time (min): 43  Total Timed Codes (min): 23  1:1 Treatment Time 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]  Spinal stenosis, lumbar region without neurogenic claudication [M48.061]  Neuralgia and neuritis, unspecified [M79.2]  Other muscle spasm [M62.838]    SUBJECTIVE  Pain Level (0-10 scale): 6/10  Any medication changes, allergies to medications, adverse drug reactions, diagnosis change, or new procedure performed?: [x] No    [] Yes (see summary sheet for update)  Subjective functional status/changes:   [] No changes reported  See IE    OBJECTIVE    Modality rationale:  to improve the patients ability to    Min Type Additional Details    [] Estim:  []Unatt       []IFC  []Premod                        []Other:  []w/ice   []w/heat  Position:  Location:    [] Estim: []Att    []TENS instruct  []NMES                    []Other:  []w/US   []w/ice   []w/heat  Position:  Location:    []  Traction: [] Cervical       []Lumbar                       [] Prone          []Supine                       []Intermittent   []Continuous Lbs:  [] before manual  [] after manual    []  Ultrasound: []Continuous   [] Pulsed                           []1MHz   []3MHz W/cm2:  Location:    []  Iontophoresis with dexamethasone         Location: [] Take home patch   [] In clinic    []  Ice     []  heat  []  Ice massage  []  Laser   []  Anodyne Position:  Location:    []  Laser with stim  []  Other:  Position:  Location:    []  Vasopneumatic Device Pressure:       [] lo [] med [] hi   Temperature: [] lo [] med [] hi   [] Skin assessment post-treatment:  []intact []redness- no adverse reaction    []redness - adverse reaction: 20 min [x]Eval                  []Re-Eval       15 min Therapeutic Exercise:  [x] See flow sheet :   Rationale: increase ROM and increase strength to improve the patients ability to improve ADL ease. 8 min Therapeutic Activity:  [x]  See flow sheet :   Rationale: education regarding brace use and core recruitment  to improve the patients ability to improve ambulation efficiency. With   [] TE   [] TA   [] neuro   [] other: Patient Education: [x] Review HEP    [] Progressed/Changed HEP based on:   [] positioning   [] body mechanics   [] transfers   [] heat/ice application    [] other:      Other Objective/Functional Measures: See IE     Pain Level (0-10 scale) post treatment: 6/10    ASSESSMENT/Changes in Function: See POC. Patient will continue to benefit from skilled PT services to modify and progress therapeutic interventions, address functional mobility deficits, address ROM deficits, address strength deficits, analyze and address soft tissue restrictions, analyze and cue movement patterns, analyze and modify body mechanics/ergonomics, assess and modify postural abnormalities and instruct in home and community integration to attain remaining goals. [x]  See Plan of Care  []  See progress note/recertification  []  See Discharge Summary         Progress towards goals / Updated goals:  Short Term Goals: To be accomplished in 2 weeks:   1. The patient will be independent and compliant with HEP to maximize therapeutic benefit. 2. The patient will improve lumbar AROM to WNL to maximize ADL ease. Long Term Goals: To be accomplished in 4 weeks:   1. The patient will improve FOTO score to 57 to maximize quality of life. 2. The patient will demonstrate ambulate of 2 blocks prior to increase in pain in order to maximize ambulation efficiency. 3. The patient will demonstrate negative shanthi tests to reduce stress of lumbar spine during ambulation.    4. The patient will improve HS flexibility to less than 30 degrees during 90/90 test to reduce stress on lumbar spine during ADLs.     PLAN  []  Upgrade activities as tolerated     [x]  Continue plan of care  []  Update interventions per flow sheet       []  Discharge due to:_  []  Other:_      Bridget Guardado, PT 9/17/2018  9:50 AM    Future Appointments  Date Time Provider Shae Freedi   9/20/2018 1:30 PM Delgado Victor Hugo, PT [facility]   9/25/2018 11:15 AM Delgado Victor Hugo, PT [facility]   9/27/2018 9:00 AM Delgado Victor Hugo, PT [facility]   10/2/2018 11:15 AM Delgado Victor Hugo, PT [facility]   10/4/2018 9:00 AM Delgado Victor Hugo, PT [facility]   10/9/2018 9:45 AM Delgado Victor Hugo, PT [facility]   10/11/2018 9:45 AM Delgado Victor Hugo, PT [facility]   10/29/2018 10:30 AM Radha Ibarra  [facility]   11/14/2018 7:40 AM [facility] NURSE [facility]   11/21/2018 10:30 AM Jamin Milton MD [facility]   2/28/2019 9:00 AM Linda Urbina MD [facility]

## 2018-09-17 NOTE — MR AVS SNAPSHOT
[address] [facility] [address] 
[phone] Patient: Sharron Mullen MRN: [id number] ULN:[id number] Visit Information Date & Time Provider Department Dept. Phone Encounter #  
 9/17/2018  9:00 AM KrisDante Cohen [address] [address] [id number] Your Appointments 10/29/2018 10:30 AM  
Follow Up with Gavino Jefferson MD  
[facility]) Appt Note: 1 MO FU BACK, NECK AND [address] [facility] [address]  
[phone]  
  
   
 [address] [facility],[address] [facility]  
  
    
 11/14/2018  7:40 AM  
LAB with [facility] (JORGE LUIS Pepper) Appt Note: labs 1 week before [address] [address]  
[phone]  
  
   
 [address] [facility]  
  
    
 11/21/2018 10:30 AM  
Office Visit with Joleen Quiroz MD  
[address] [facility]) Appt Note: Return in 3 months [address] [place] [address] [address]  
[phone]  
  
   
 [address] [facility]  
  
    
  
 2/28/2019  9:00 AM  
Any with Dank Cook MD  
[facility] ([facility]) Appt Note: Return in about 6 months (around 2/1/2019) for follow up with MD, DEO, PVR  
 [address] [facility] [address]  
  
   
 [address] [place] [address] 2301 [facility],[address] 706 [facility] Upcoming Health Maintenance Date Due DTaP/Tdap/Td series (1 - Tdap) 7/9/1974 FOBT Q 1 YEAR, 18+ 10/15/2016 GLAUCOMA SCREENING Q2Y 7/9/2018 Pneumococcal 65+ Low/Medium Risk (1 of 2 - PCV13) 7/9/2018 Influenza Age 5 to Adult 8/1/2018 MEDICARE YEARLY EXAM 8/25/2019 COLONOSCOPY 4/1/2020 Allergies as of 9/17/2018  Review Complete On: 9/10/2018 By: Jocelyn Alvares MD  
  
 Severity Noted Reaction Type Reactions Ace Inhibitors  05/17/2010    Cough Dilaudid [Hydromorphone]  05/17/2010    Hives Lisinopril  05/17/2010    Cough Zocor [Simvastatin]  07/30/2011    Myalgia Right leg Current Immunizations  Reviewed on 9/20/2017 Name Date Influenza Vaccine 1/2/2015 Influenza Vaccine (Quad) PF 9/20/2017, 10/12/2016, 10/15/2015 Zoster Vaccine, Live 10/22/2015 Not reviewed this visit Vitals Smoking Status Current Every Day Smoker Your Updated Medication List  
  
ASK your doctor about these medications   
 albuterol 90 mcg/actuation inhaler Commonly known as:  PROVENTIL HFA, VENTOLIN HFA, PROAIR HFA Take 2 Puffs by inhalation every four (4) hours as needed for Wheezing. chlorhexidine 0.12 % solution Commonly known as:  PERIDEX  
  
 cyclobenzaprine 10 mg tablet Commonly known as:  FLEXERIL Take 1 Tab by mouth three (3) times daily as needed for Muscle Spasm(s). fluticasone 50 mcg/actuation nasal spray Commonly known as:  Summer Guthrie 2 Sprays by Both Nostrils route daily. gabapentin 300 mg capsule Commonly known as:  NEURONTIN  
2 po tid -- taper up as directed  Indications: NEUROPATHIC PAIN  
  
 guaiFENesin  mg ER tablet Commonly known as:  [place] [address] Take 1 Tab by mouth two (2) times a day. HYDROcodone bitartrate 60 mg ER tablet Commonly known as:  HYSINGLA Take 1 Tab by mouth daily. Max Daily Amount: 60 mg. *DO NOT CRUSH,CHEW, OR DISSOLVE TABLET*  
  
 * HYDROcodone-acetaminophen 5-325 mg per tablet Commonly known as:  Starla Kind Take 1 Tab by mouth every eight (8) hours as needed for Pain. Max Daily Amount: 3 Tabs. * HYDROcodone-acetaminophen 5-325 mg per tablet Commonly known as:  Starla Kind Take 1 Tab by mouth every eight (8) hours as needed for Pain. Max Daily Amount: 3 Tabs. * HYDROcodone-acetaminophen  mg tablet Commonly known as:  Malika Foster Take 1 Tab by mouth every eight (8) hours as needed for Pain. Max Daily Amount: 3 Tabs. ibuprofen 800 mg tablet Commonly known as:  MOTRIN  
TAKE ONE TABLET BY MOUTH EVERY 8 HOURS AS NEEDED FOR PAIN  
  
 lactulose 10 gram/15 mL solution Commonly known as:  Cheri Parisian Take 45ml PO TID  
  
 metoprolol tartrate 25 mg tablet Commonly known as:  LOPRESSOR  
TAKE ONE TABLET BY MOUTH TWICE DAILY ( GENERIC FOR LOPRESSOR )  
  
 montelukast 10 mg tablet Commonly known as:  SINGULAIR Take 1 Tab by mouth daily. nitroglycerin 0.4 mg SL tablet Commonly known as:  NITROSTAT  
1 Tab by SubLINGual route every five (5) minutes as needed. pantoprazole 40 mg tablet Commonly known as:  PROTONIX  
TAKE ONE TABLET BY MOUTH ONCE DAILY pravastatin 40 mg tablet Commonly known as:  PRAVACHOL  
TAKE ONE TABLET BY MOUTH NIGHTLY  
  
 predniSONE 10 mg tablet Commonly known as:  DELTASONE  
5 tabs daily for 5 days  
  
 tamsulosin 0.4 mg capsule Commonly known as:  FLOMAX TAKE ONE CAPSULE BY MOUTH ONCE DAILY (AFTER  DINNER) TRAVATAN Z 0.004 % ophthalmic solution Generic drug:  travoprost  
Administer 1 Drop to both eyes nightly. traZODone 50 mg tablet Commonly known as:  DESYREL  
TAKE ONE TABLET BY MOUTH ONCE DAILY AT BEDTIME AS NEEDED FOR SLEEP  
  
 umeclidinium-vilanterol 62.5-25 mcg/actuation inhaler Commonly known as:  Debarah Breeding Take 1 Puff by inhalation daily. * Notice: This list has 3 medication(s) that are the same as other medications prescribed for you. Read the directions carefully, and ask your doctor or other care provider to review them with you. To-Do List   
 09/17/2018  9:00 AM  
  Appointment with Carline Heath, PT at [facility]  [facility] ([phone]) Introducing [facility]! [facility] introduces [employer] patient portal. Now you can access parts of your medical record, email your doctor's office, and request medication refills online. 1. In your internet browser, go to [email or web address] 2. Click on the First Time User? Click Here link in the Sign In box. You will see the New Member Sign Up page. 3. Enter your [employer] Access Code exactly as it appears below. You will not need to use this code after youve completed the sign-up process. If you do not sign up before the expiration date, you must request a new code. · [employer] Access Code: [email or web address] Expires: 10/18/2018 10:01 AM 
 
4. Enter the last four digits of your Social Security Number (xxxx) and Date of Birth (mm/dd/yyyy) as indicated and click Submit. You will be taken to the next sign-up page. 5. Create a [employer] ID. This will be your [employer] login ID and cannot be changed, so think of one that is secure and easy to remember. 6. Create a [employer] password. You can change your password at any time. 7. Enter your Password Reset Question and Answer. This can be used at a later time if you forget your password. 8. Enter your e-mail address. You will receive e-mail notification when new information is available in [address] 9. Click Sign Up. You can now view and download portions of your medical record. 10. Click the Download Summary menu link to download a portable copy of your medical information. If you have questions, please visit the Frequently Asked Questions section of the [employer] website. Remember, [employer] is NOT to be used for urgent needs. For medical emergencies, dial 911. Now available from your iPhone and Android! Please provide this summary of care documentation to your next provider. Your primary care clinician is listed as WILLIAM MENARD. If you have any questions after today's visit, please call [phone].

## 2018-09-20 ENCOUNTER — APPOINTMENT (OUTPATIENT)
Dept: PHYSICAL THERAPY | Age: 63
End: 2018-09-20
Payer: MEDICARE

## 2018-09-24 ENCOUNTER — CLINICAL SUPPORT (OUTPATIENT)
Dept: FAMILY MEDICINE CLINIC | Age: 63
End: 2018-09-24

## 2018-09-24 DIAGNOSIS — G89.29 CHRONIC MIDLINE LOW BACK PAIN WITHOUT SCIATICA: ICD-10-CM

## 2018-09-24 DIAGNOSIS — M54.50 CHRONIC MIDLINE LOW BACK PAIN WITHOUT SCIATICA: ICD-10-CM

## 2018-09-24 DIAGNOSIS — Z23 ENCOUNTER FOR IMMUNIZATION: ICD-10-CM

## 2018-09-24 RX ORDER — HYDROCODONE BITARTRATE 60 MG/1
60 TABLET, EXTENDED RELEASE ORAL DAILY
Qty: 30 TAB | Refills: 0 | Status: SHIPPED | OUTPATIENT
Start: 2018-09-24 | End: 2018-10-23 | Stop reason: SDUPTHER

## 2018-09-24 NOTE — PROGRESS NOTES
Verbal order read back per Dr. Kathy Mckeon flu vaccine. Patient received flu vaccine in right deltoid. Patient was observed and no signs or symptoms of allergic reaction noted at this time. Patient tolerated well and left without complaints. Patient received flu VIS.

## 2018-09-25 ENCOUNTER — HOSPITAL ENCOUNTER (OUTPATIENT)
Dept: PHYSICAL THERAPY | Age: 63
Discharge: HOME | End: 2018-09-25
Payer: MEDICARE

## 2018-09-25 PROCEDURE — 97113 AQUATIC THERAPY/EXERCISES: CPT

## 2018-09-25 NOTE — PROGRESS NOTES
PT DAILY TREATMENT NOTE - [facility]     Patient Name: Angelo Rodriguez  Date:2018  : 1955  [x]  Patient  Verified  Payor: VA MEDICARE / Plan: VA MEDICARE PART A & B / Product Type: Medicare /    In time:3:30  Out time:3:57  Total Treatment Time (min): 27  Total Timed Codes (min): 27  1:1 Treatment Time ( [address] only): 27   Visit #: 2 of 8    Treatment Area: Low back pain [M54.5]    SUBJECTIVE  Pain Level (0-10 scale): 7/10  Any medication changes, allergies to medications, adverse drug reactions, diagnosis change, or new procedure performed?: [x] No    [] Yes (see summary sheet for update)  Subjective functional status/changes:   [] No changes reported  Pt reports no new complaints of pain. Pt reports compliance with HEP. OBJECTIVE    27 min Therapeutic Exercise:  [] See flow sheet :   Rationale: increase ROM and increase strength to improve the patients ability to tolerate ADLs. With   [] TE   [] TA   [] neuro   [] other: Patient Education: [x] Review HEP    [] Progressed/Changed HEP based on:   [] positioning   [] body mechanics   [] transfers   [] heat/ice application    [] other:      Other Objective/Functional Measures: Initiated per patient report. Pain Level (0-10 scale) post treatment: 0/10    ASSESSMENT/Changes in Function: Pt had complete reduction in symptoms after performing aquatic exercises. Patient will continue to benefit from skilled PT services to modify and progress therapeutic interventions, address functional mobility deficits, address ROM deficits, address strength deficits, analyze and address soft tissue restrictions, analyze and cue movement patterns and analyze and modify body mechanics/ergonomics to attain remaining goals. []  See Plan of Care  []  See progress note/recertification  []  See Discharge Summary         Progress towards goals / Updated goals:  Short Term Goals:  To be accomplished in 2 weeks:                        1. The patient will be independent and compliant with HEP to maximize therapeutic benefit. 2. The patient will improve lumbar AROM to WNL to maximize ADL ease. Long Term Goals: To be accomplished in 4 weeks:                        1. The patient will improve FOTO score to 57 to maximize quality of life. 2. The patient will demonstrate ambulate of 2 blocks prior to increase in pain in order to maximize ambulation efficiency. 3. The patient will demonstrate negative shanthi tests to reduce stress of lumbar spine during ambulation. 4. The patient will improve HS flexibility to less than 30 degrees during 90/90 test to reduce stress on lumbar spine during ADLs.     PLAN  []  Upgrade activities as tolerated     [x]  Continue plan of care  []  Update interventions per flow sheet       []  Discharge due to:_  []  Other:_      Deng Camilo PTA 9/25/2018  3:41 PM    Future Appointments  Date Time Provider Shae Ho   9/27/2018 3:00 PM Christi Mcclelland [facility]   10/2/2018 11:15 AM [facility]   10/4/2018 9:00 AM [facility]   10/9/2018 9:45 AM [facility], [facility]   10/11/2018 9:45 AM [facility]ili [facility], PT [facility]   10/29/2018 10:30 AM Oralia Yao  [facility]   11/14/2018 7:40 AM [facility] NURSE [facility] JORGE LUIS [facility]   11/21/2018 10:30 AM Shane Ignacio MD 11 [facility]   2/28/2019 9:00 AM Sho Matute MD [facility]

## 2018-10-02 ENCOUNTER — HOSPITAL ENCOUNTER (OUTPATIENT)
Dept: PHYSICAL THERAPY | Age: 63
Discharge: HOME | End: 2018-10-02
Payer: MEDICARE

## 2018-10-02 PROCEDURE — 97113 AQUATIC THERAPY/EXERCISES: CPT

## 2018-10-02 NOTE — PROGRESS NOTES
PT DAILY TREATMENT NOTE - [facility]     Patient Name: Nemo Ferreira  Date:10/2/2018  : 1955  [x]  Patient  Verified  Payor: VA MEDICARE / Plan: VA MEDICARE PART A & B / Product Type: Medicare /    In time:11:00am  Out time:11:43am  Total Treatment Time (min): 43  Total Timed Codes (min): 43  1:1 Treatment Time ([address] only): 37   Visit #: 3 of 8    Treatment Area: Low back pain [M54.5]    SUBJECTIVE  Pain Level (0-10 scale): 5/10  Any medication changes, allergies to medications, adverse drug reactions, diagnosis change, or new procedure performed?: [x] No    [] Yes (see summary sheet for update)  Subjective functional status/changes:   [] No changes reported  Pt c/o \"throbbing\" in the \"middle of my low back\". He c/o tightness in the morning and pain being worse at night. He states that walking long distances is \"hard\" and going up stairs \"pulls my back\". He states he has a f/u appt with Dr. Юлия Rivera on 10/10/18 and states he was told that if PT didn't help, then she would do injections in his back. OBJECTIVE      43 min Therapeutic Exercise:  [x] See flow sheet : Aquatic therapy    Rationale: increase ROM and increase strength to improve the patients ability to perform ADLs and functional mobility tasks with improved ease and decreased pain. With   [] TE   [] TA   [] neuro   [] other: Patient Education: [x] Review HEP    [] Progressed/Changed HEP based on:   [] positioning   [] body mechanics   [] transfers   [] heat/ice application    [] other:      Other Objective/Functional Measures: Increased TM walking speed to 0.9mph. Pain Level (0-10 scale) post treatment: 0/10    ASSESSMENT/Changes in Function: Pt required moderate VCs for postural awareness and proper technique/proper form. Advised pt to attempt all exercises given to him for his HEP and to perform twice per day.        Patient will continue to benefit from skilled PT services to modify and progress therapeutic interventions, address functional mobility deficits, address ROM deficits, address strength deficits, analyze and cue movement patterns, analyze and modify body mechanics/ergonomics and assess and modify postural abnormalities to attain remaining goals. []  See Plan of Care  []  See progress note/recertification  []  See Discharge Summary         Progress towards goals / Updated goals:  Short Term Goals: To be accomplished in 2 weeks:                        1. The patient will be independent and compliant with HEP to maximize therapeutic benefit. Progressing: Pt reports doing \"some of the exercises, but I haven't done them all yet\". (10/2/18).                       7. The patient will improve lumbar AROM to WNL to maximize ADL ease. Long Term Goals: To be accomplished in 4 weeks:                        8. The patient will improve FOTO score to 57 to maximize quality of life.                        2. The patient will demonstrate ambulate of 2 blocks prior to increase in pain in order to maximize ambulation efficiency.                       3. The patient will demonstrate negative shanthi tests to reduce stress of lumbar spine during ambulation.                        4. The patient will improve HS flexibility to less than 30 degrees during 90/90 test to reduce stress on lumbar spine during ADLs.     PLAN  []  Upgrade activities as tolerated     []  Continue plan of care  []  Update interventions per flow sheet       []  Discharge due to:_  []  Other:_      Alpha Crews, PT 10/2/2018  1:14 PM    Future Appointments  Date Time Provider Shae Ho   10/4/2018 9:00 AM [facility]   10/9/2018 9:45 AM [facility] Crew[facility], PT [facility]   10/11/2018 9:45 AM [facility] Crew[facility], [facility]   10/29/2018 10:30 AM Leydi Flynn  [address]    11/14/2018 7:40 AM [facility] NURSE [facility] JORGE LUIS SCHED   11/21/2018 10:30 AM Glenda Bradley MD 11 [facility]   2/28/2019 9:00 AM Marilu Molina MD [address]

## 2018-10-04 ENCOUNTER — HOSPITAL ENCOUNTER (OUTPATIENT)
Dept: PHYSICAL THERAPY | Age: 63
Discharge: HOME | End: 2018-10-04
Payer: MEDICARE

## 2018-10-04 PROCEDURE — 97113 AQUATIC THERAPY/EXERCISES: CPT

## 2018-10-04 NOTE — PROGRESS NOTES
PT DAILY TREATMENT NOTE - [facility]     Patient Name: Anam Li   Date:10/4/2018  : 1955  [x]  Patient  Verified  Payor: VA MEDICARE / Plan: VA MEDICARE PART A & B / Product Type: Medicare /    In time:9:00am  Out time:9:43am  Total Treatment Time (min): 43  Total Timed Codes (min): 43  1:1 Treatment Time ( [address] only): 37   Visit #: 4 of 8    Treatment Area: Low back pain [M54.5]    SUBJECTIVE  Pain Level (0-10 scale): 10/10  Any medication changes, allergies to medications, adverse drug reactions, diagnosis change, or new procedure performed?: [x] No    [] Yes (see summary sheet for update)  Subjective functional status/changes:   [] No changes reported  Pt states that he ride his bike yesterday (around a trail that he said he made in his backyard) and his back was \"killing\" him after that and states that even his pain medication didn't relieve the pain. He c/o numbness in his left hand from carpal tunnel and he also c/o numbness in B LEs. He reports getting very little sleep last night also due to back pain and states, \"I couldn't get comfortable\". OBJECTIVE      43 min Therapeutic Exercise:  [x] See flow sheet : Aquatic therapy    Rationale: increase ROM and increase strength to improve the patients ability to perform ADLs and functional mobility tasks with improved ease and decreased pain. With   [] TE   [] TA   [] neuro   [] other: Patient Education: [x] Review HEP    [] Progressed/Changed HEP based on:   [] positioning   [] body mechanics   [] transfers   [] heat/ice application    [] other:      Other Objective/Functional Measures: Added trunk ext x 3 reps and trunk rotation x 2 min. Pain Level (0-10 scale) post treatment: 1/10    ASSESSMENT/Changes in Function: Pt reported feeling \"much better\" during and after treatment.       Patient will continue to benefit from skilled PT services to modify and progress therapeutic interventions, address functional mobility deficits, address ROM deficits, address strength deficits, analyze and cue movement patterns, analyze and modify body mechanics/ergonomics and assess and modify postural abnormalities to attain remaining goals. []  See Plan of Care  []  See progress note/recertification  []  See Discharge Summary         Progress towards goals / Updated goals:  Short Term Goals: To be accomplished in 2 weeks:                        5. The patient will be independent and compliant with HEP to maximize therapeutic benefit. Progressing: Pt reports doing \"some of the exercises, but I haven't done them all yet\". (10/2/18).                       9. The patient will improve lumbar AROM to WNL to maximize ADL ease. Long Term Goals: To be accomplished in 4 weeks:                        3. The patient will improve FOTO score to 57 to maximize quality of life.                        2. The patient will demonstrate ambulate of 2 blocks prior to increase in pain in order to maximize ambulation efficiency.                       1. The patient will demonstrate negative shanthi tests to reduce stress of lumbar spine during ambulation.                        4. The patient will improve HS flexibility to less than 30 degrees during 90/90 test to reduce stress on lumbar spine during ADLs.     PLAN  []  Upgrade activities as tolerated     [x]  Continue plan of care  []  Update interventions per flow sheet       []  Discharge due to:_  []  Other:_      Daniel Peguero PT 10/4/2018  9:12 AM    Future Appointments  Date Time Provider Shae Ho   10/9/2018 9:45 AM [facility]   10/11/2018 9:45 AM [facility]   10/29/2018 10:30 AM Wendie Qiu  [address] 23[facility]   11/14/2018 7:40 AM [facility]   11/21/2018 10:30 AM Ale Heller MD 11 [facility]   2/28/2019 9:00 AM Luis Miguel Frazier MD [address] [facility]

## 2018-10-06 RX ORDER — METOPROLOL TARTRATE 25 MG/1
TABLET, FILM COATED ORAL
Qty: 180 TAB | Refills: 3 | Status: SHIPPED | OUTPATIENT
Start: 2018-10-06 | End: 2019-09-23 | Stop reason: SDUPTHER

## 2018-10-09 ENCOUNTER — HOSPITAL ENCOUNTER (OUTPATIENT)
Dept: PHYSICAL THERAPY | Age: 63
Discharge: HOME | End: 2018-10-09
Payer: MEDICARE

## 2018-10-09 PROCEDURE — 97113 AQUATIC THERAPY/EXERCISES: CPT

## 2018-10-09 NOTE — PROGRESS NOTES
PT DAILY TREATMENT NOTE - [facility]     Patient Name: Jn Andres  Date:10/9/2018  : 1955  [x]  Patient  Verified  Payor: VA MEDICARE / Plan: VA MEDICARE PART A & B / Product Type: Medicare /    In time:9:49am  Out time:10:31am  Total Treatment Time (min): 42  Total Timed Codes (min): 42  1:1 Treatment Time ( [address] only): 42   Visit #: 5 of 8    Treatment Area: Low back pain [M54.5]    SUBJECTIVE  Pain Level (0-10 scale): 5/10  Any medication changes, allergies to medications, adverse drug reactions, diagnosis change, or new procedure performed?: [x] No    [] Yes (see summary sheet for update)  Subjective functional status/changes:   [] No changes reported  \"My back is killing me today\". He c/o pain in central LB and left LB region. He states that he tried cutting his grass because he thought pushing the lawnmower would help, but he couldn't complete this task due to back pain. OBJECTIVE      42 min Therapeutic Exercise:  [x] See flow sheet : Aquatic therapy    Rationale: increase ROM and increase strength to improve the patients ability to perform ADLs and functional mobility tasks with improved ease and decreased pain. With   [] TE   [] TA   [] neuro   [] other: Patient Education: [x] Review HEP    [] Progressed/Changed HEP based on:   [] positioning   [] body mechanics   [] transfers   [] heat/ice application    [] other:      Other Objective/Functional Measures: Increased TM walking speed to 1.1mph. Increased squats to 12 reps and HS curls to 15 reps. Pain Level (0-10 scale) post treatment: 5/10    ASSESSMENT/Changes in Function: Pt stated after session, that he plans to call his doctor's office to see if he can be seen sooner than 10/29/18.       Patient will continue to benefit from skilled PT services to modify and progress therapeutic interventions, address functional mobility deficits, address ROM deficits, address strength deficits, analyze and cue movement patterns, analyze and modify body mechanics/ergonomics and assess and modify postural abnormalities to attain remaining goals. []  See Plan of Care  []  See progress note/recertification  []  See Discharge Summary         Progress towards goals / Updated goals:  Short Term Goals: To be accomplished in 2 weeks:                        2. The patient will be independent and compliant with HEP to maximize therapeutic benefit. Progressing: Pt reports doing \"some of the exercises, but I haven't done them all yet\".  (10/2/18). Pt reports doing all exercises except has not attempted piriformis stretch as of today's date. (10/9/18).                          9. The patient will improve lumbar AROM to WNL to maximize ADL ease. Long Term Goals: To be accomplished in 4 weeks:                        8. The patient will improve FOTO score to 57 to maximize quality of life.                        2. The patient will demonstrate ambulate of 2 blocks prior to increase in pain in order to maximize ambulation efficiency.                       4. The patient will demonstrate negative shanthi tests to reduce stress of lumbar spine during ambulation.                        4. The patient will improve HS flexibility to less than 30 degrees during 90/90 test to reduce stress on lumbar spine during ADLs.     PLAN  []  Upgrade activities as tolerated     [x]  Continue plan of care  []  Update interventions per flow sheet       []  Discharge due to:_  []  Other:_      Dee Dee Ayala PT 10/9/2018  9:54 AM    Future Appointments  Date Time Provider Shae Ho   10/11/2018 9:45 AM Dee Dee Ayala PT [facility]   10/29/2018 10:30 AM Carmel Ahumada  E 23[facility]   11/14/2018 7:40 AM [facility] NURSE [facility] JORGE LUIS SCHED   11/21/2018 10:30 AM Lianna Green MD 11 [facility]   2/28/2019 9:00 AM Tavia Spain MD [facility]

## 2018-10-11 ENCOUNTER — HOSPITAL ENCOUNTER (OUTPATIENT)
Dept: PHYSICAL THERAPY | Age: 63
Discharge: HOME | End: 2018-10-11
Payer: MEDICARE

## 2018-10-11 PROCEDURE — 97110 THERAPEUTIC EXERCISES: CPT

## 2018-10-11 NOTE — PROGRESS NOTES
PT DAILY TREATMENT NOTE - [facility]     Patient Name: Moshe Martinez  Date:10/11/2018  : 1955  [x]  Patient  Verified  Payor: [place] MED[place] / Plan: [place] MEDICARE PART A & B / Product Type: Medicare /    In time:9:55am  Out time:10:35am  Total Treatment Time (min): 40  Total Timed Codes (min): 40  1:1 Treatment Time ( only): 40   Visit #: 6 of 8    Treatment Area: Low back pain [M54.5]    SUBJECTIVE  Pain Level (0-10 scale): 5/10  Any medication changes, allergies to medications, adverse drug reactions, diagnosis change, or new procedure performed?: [x] No    [] Yes (see summary sheet for update)  Subjective functional status/changes:   [] No changes reported  Pt c/o pain in the \"middle of my low back\". OBJECTIVE      40 min Therapeutic Exercise:  [x] See flow sheet :   Rationale: increase ROM and increase strength to improve the patients ability to perform ADLs and functional mobility tasks with improved ease and decreased pain. With   [] TE   [] TA   [] neuro   [] other: Patient Education: [x] Review HEP    [] Progressed/Changed HEP based on:   [] positioning   [] body mechanics   [] transfers   [] heat/ice application    [] other:      Other Objective/Functional Measures: Land-based PT exercises per flow sheet due to pool malfunction. FOTO score of 39 (decreased from score of 47 at initial evaluation). Pain Level (0-10 scale) post treatment: 5/10    ASSESSMENT/Changes in Function: Pt arrived for aquatic therapy appt and entered the pool, to discover that the water temperature was lower than normal and therapist discovered problem with pool heater. Pt changed and then agreed to land-based PT today. Pt had fair tolerance of land-based exercises reporting that they were \"more strenuous\" than the aquatic exercises. Pt's FOTO score decreased since initial evaluation.   Pt reports \"extreme difficulty\" with usual housework activities (compared to [place] little bit of difficulty\" at initial evaluation). Patient will continue to benefit from skilled PT services to modify and progress therapeutic interventions, address functional mobility deficits, address ROM deficits, address strength deficits, analyze and cue movement patterns, analyze and modify body mechanics/ergonomics and assess and modify postural abnormalities to attain remaining goals. []  See Plan of Care  []  See progress note/recertification  []  See Discharge Summary         Progress towards goals / Updated goals:  Short Term Goals: To be accomplished in 2 weeks:                        6. The patient will be independent and compliant with HEP to maximize therapeutic benefit. Progressing: Pt reports doing \"some of the exercises, but I haven't done them all yet\".  (10/2/18). Pt reports doing all exercises except has not attempted piriformis stretch as of today's date. (10/9/18).                          0. The patient will improve lumbar AROM to WNL to maximize ADL ease. Long Term Goals: To be accomplished in 4 weeks:                        4. The patient will improve FOTO score to 57 to maximize quality of life. Not met: FOTO score of 39. (10/11/18).                       3. The patient will demonstrate ambulate of 2 blocks prior to increase in pain in order to maximize ambulation efficiency.                       1. The patient will demonstrate negative shanthi tests to reduce stress of lumbar spine during ambulation.                        4. The patient will improve HS flexibility to less than 30 degrees during 90/90 test to reduce stress on lumbar spine during ADLs.     PLAN  []  Upgrade activities as tolerated     [x]  Continue plan of care  []  Update interventions per flow sheet       []  Discharge due to:_  []  Other:_      Neha Charles PT 10/11/2018  11:02 AM    Future Appointments  Date Time Provider Shae Ho   10/29/2018 10:30 AM Mario Garnica  [address]   11/14/2018 7:40 AM [facility] NURSE [facility]   11/21/2018 10:30 AM Ryan Rinne, MD [facility]   [facility]28[facility]2019 9[facility]00 [facility]

## 2018-10-13 DIAGNOSIS — F51.01 PRIMARY INSOMNIA: ICD-10-CM

## 2018-10-13 RX ORDER — TRAZODONE HYDROCHLORIDE 50 MG/1
TABLET ORAL
Qty: 30 TAB | Refills: 2 | Status: SHIPPED | OUTPATIENT
Start: 2018-10-13 | End: 2019-03-04 | Stop reason: SDUPTHER

## 2018-10-21 RX ORDER — PRAVASTATIN SODIUM 40 MG/1
TABLET ORAL
Qty: 90 TAB | Refills: 3 | Status: SHIPPED | OUTPATIENT
Start: 2018-10-21 | End: 2019-03-21 | Stop reason: SDUPTHER

## 2018-10-23 DIAGNOSIS — M54.50 CHRONIC MIDLINE LOW BACK PAIN WITHOUT SCIATICA: ICD-10-CM

## 2018-10-23 DIAGNOSIS — G89.29 CHRONIC MIDLINE LOW BACK PAIN WITHOUT SCIATICA: ICD-10-CM

## 2018-10-23 RX ORDER — HYDROCODONE BITARTRATE 60 MG/1
60 TABLET, EXTENDED RELEASE ORAL DAILY
Qty: 30 TAB | Refills: 0 | Status: SHIPPED | OUTPATIENT
Start: 2018-10-23 | End: 2018-11-21 | Stop reason: SDUPTHER

## 2018-10-23 RX ORDER — FLUTICASONE PROPIONATE 50 MCG
SPRAY, SUSPENSION (ML) NASAL
Qty: 1 BOTTLE | Refills: 5 | Status: SHIPPED | OUTPATIENT
Start: 2018-10-23 | End: 2019-10-02 | Stop reason: SDUPTHER

## 2018-10-23 NOTE — TELEPHONE ENCOUNTER
Pt requesting to  his prescription today if possible.  He is riding on a bicycle and said \"it's cold riding on the bike\"     Last OV 08/24/2018  Next OV 11/21/2018

## 2018-11-08 ENCOUNTER — TELEPHONE (OUTPATIENT)
Dept: ORTHOPEDIC SURGERY | Age: 63
End: 2018-11-08

## 2018-11-08 NOTE — TELEPHONE ENCOUNTER
Patient would like something called in for his low back pain.     [facility] [id number]     ND#[phone]    F/U 12/17/18

## 2018-11-14 ENCOUNTER — HOSPITAL ENCOUNTER (OUTPATIENT)
Dept: LAB | Age: 63
Discharge: HOME | End: 2018-11-14
Payer: MEDICARE

## 2018-11-14 DIAGNOSIS — R73.03 PREDIABETES: ICD-10-CM

## 2018-11-14 DIAGNOSIS — E78.00 HYPERCHOLESTEROLEMIA: ICD-10-CM

## 2018-11-14 DIAGNOSIS — I10 ESSENTIAL HYPERTENSION: ICD-10-CM

## 2018-11-14 LAB
ALBUMIN SERPL-MCNC: 3.9 G/DL (ref 3.4–5)
ALBUMIN/GLOB SERPL: 1.1 {RATIO} (ref 0.8–1.7)
ALP SERPL-CCNC: 75 U/L (ref 45–117)
ALT SERPL-CCNC: 18 U/L (ref 16–61)
ANION GAP SERPL CALC-SCNC: 11 MMOL/L (ref 3–18)
AST SERPL-CCNC: 14 U/L (ref 15–37)
BILIRUB SERPL-MCNC: 0.4 MG/DL (ref 0.2–1)
BUN SERPL-MCNC: 19 MG/DL (ref 7–18)
BUN/CREAT SERPL: 18 (ref 12–20)
CALCIUM SERPL-MCNC: 9 MG/DL (ref 8.5–10.1)
CHLORIDE SERPL-SCNC: 112 MMOL/L (ref 100–108)
CHOLEST SERPL-MCNC: 155 MG/DL
CO2 SERPL-SCNC: 22 MMOL/L (ref 21–32)
CREAT SERPL-MCNC: 1.08 MG/DL (ref 0.6–1.3)
GLOBULIN SER CALC-MCNC: 3.4 G/DL (ref 2–4)
GLUCOSE SERPL-MCNC: 110 MG/DL (ref 74–99)
HBA1C MFR BLD: 6 % (ref 4.2–5.6)
HDLC SERPL-MCNC: 42 MG/DL (ref 40–60)
HDLC SERPL: 3.7 {RATIO} (ref 0–5)
LDLC SERPL CALC-MCNC: 95.2 MG/DL (ref 0–100)
LIPID PROFILE,FLP: NORMAL
POTASSIUM SERPL-SCNC: 4.2 MMOL/L (ref 3.5–5.5)
PROT SERPL-MCNC: 7.3 G/DL (ref 6.4–8.2)
SODIUM SERPL-SCNC: 145 MMOL/L (ref 136–145)
TRIGL SERPL-MCNC: 89 MG/DL (ref ?–150)
VLDLC SERPL CALC-MCNC: 17.8 MG/DL

## 2018-11-14 PROCEDURE — 80061 LIPID PANEL: CPT

## 2018-11-14 PROCEDURE — 36415 COLL VENOUS BLD VENIPUNCTURE: CPT

## 2018-11-14 PROCEDURE — 80053 COMPREHEN METABOLIC PANEL: CPT

## 2018-11-14 PROCEDURE — 83036 HEMOGLOBIN GLYCOSYLATED A1C: CPT

## 2018-11-21 ENCOUNTER — OFFICE VISIT (OUTPATIENT)
Dept: FAMILY MEDICINE CLINIC | Age: 63
End: 2018-11-21

## 2018-11-21 ENCOUNTER — TELEPHONE (OUTPATIENT)
Dept: FAMILY MEDICINE CLINIC | Age: 63
End: 2018-11-21

## 2018-11-21 VITALS
SYSTOLIC BLOOD PRESSURE: 137 MMHG | HEIGHT: 69 IN | DIASTOLIC BLOOD PRESSURE: 72 MMHG | TEMPERATURE: 98.3 F | BODY MASS INDEX: 35.25 KG/M2 | RESPIRATION RATE: 20 BRPM | WEIGHT: 238 LBS | HEART RATE: 59 BPM | OXYGEN SATURATION: 96 %

## 2018-11-21 DIAGNOSIS — I10 ESSENTIAL HYPERTENSION: Primary | ICD-10-CM

## 2018-11-21 DIAGNOSIS — G47.33 OBSTRUCTIVE SLEEP APNEA SYNDROME: ICD-10-CM

## 2018-11-21 DIAGNOSIS — Z12.5 SCREENING PSA (PROSTATE SPECIFIC ANTIGEN): ICD-10-CM

## 2018-11-21 DIAGNOSIS — M54.41 CHRONIC MIDLINE LOW BACK PAIN WITH RIGHT-SIDED SCIATICA: ICD-10-CM

## 2018-11-21 DIAGNOSIS — E78.00 HYPERCHOLESTEROLEMIA: ICD-10-CM

## 2018-11-21 DIAGNOSIS — J41.0 SIMPLE CHRONIC BRONCHITIS (HCC): ICD-10-CM

## 2018-11-21 DIAGNOSIS — R73.03 PREDIABETES: ICD-10-CM

## 2018-11-21 DIAGNOSIS — G89.29 CHRONIC MIDLINE LOW BACK PAIN WITH RIGHT-SIDED SCIATICA: ICD-10-CM

## 2018-11-21 PROBLEM — E66.01 SEVERE OBESITY (HCC): Status: ACTIVE | Noted: 2018-11-21

## 2018-11-21 RX ORDER — CYCLOBENZAPRINE HCL 10 MG
10 TABLET ORAL
Qty: 90 TAB | Refills: 3 | Status: SHIPPED | OUTPATIENT
Start: 2018-11-21 | End: 2019-05-01 | Stop reason: SINTOL

## 2018-11-21 RX ORDER — ALBUTEROL SULFATE 90 UG/1
2 AEROSOL, METERED RESPIRATORY (INHALATION)
Qty: 1 INHALER | Refills: 5 | Status: SHIPPED | OUTPATIENT
Start: 2018-11-21 | End: 2019-04-16 | Stop reason: SDUPTHER

## 2018-11-21 RX ORDER — HYDROCODONE BITARTRATE 60 MG/1
60 TABLET, EXTENDED RELEASE ORAL DAILY
Qty: 30 TAB | Refills: 0 | Status: SHIPPED | OUTPATIENT
Start: 2018-11-21 | End: 2018-12-18 | Stop reason: SDUPTHER

## 2018-11-21 RX ORDER — TIZANIDINE 4 MG/1
4 TABLET ORAL
Qty: 60 TAB | Refills: 3 | Status: SHIPPED | OUTPATIENT
Start: 2018-11-21 | End: 2019-05-01 | Stop reason: SINTOL

## 2018-11-21 NOTE — PROGRESS NOTES
Subjective:       Chief Complaint  The patient presents for follow up of hypertension and high cholesterol. prediabetes  chronic back pain and right hip pain, COPD        HPI  Radha Ervin is a 70 y.o. male seen for follow up of hyperlipidemia. Healso has hypertension. Hypertension well controlled, no significant medication side effects noted, on Lopressor, hyperlipidemia fairly well controlled, no significant medication side effects noted, on Pravachol 40 mg, pt had myalgias on Zocor. Diet and Lifestyle: not attempting to follow a low fat, low cholesterol diet, exercises sporadically    Home BP Monitoring: is not measured at home. Radha Ervin RTC today to follow up on chronic pain diagnosis. We discussed his osteoarthritis that is affecting his back. Significant changes since last visit: none. He is  able to do his normal daily activities. He reports the following adverse side effects: none. Pt doing fairly well with Hysingla ER 60 mg. He is seeing the [facility] and was referred for aqua therapy but did not tolerate it due to pain. His MRI of his LS shows worsening DJD so he will f/u with the [facility] center for possible cortisone injection. Least pain over the last week has been 3/10. Worst pain over the last week has been 8/10. Opioid Risk Tool Reviewed: YES    Aberrant behaviors: None. Urine Drug Screen: due - order placed. Will do with next refill    Controlled substance agreement on file: YES.  reviewed:yes    Pill count is consistent with his prescription: yes    Concomitant use of a benzodiazepine: no        Prediabetes: pt is trying to control with diet and weight loss but it continues to be an issue due to his obesity     COPD Review:  The patient is being seen for follow up of COPD. Oxygen: He currently is not on home oxygen therapy. He us using Anoro Ellipta with good results.    Symptoms: chronic dyspnea: severity = mild: course of sx: symptoms have progressed to a point and plateaued. .   Patient uses 0 pillows at night. Patient does not smoke cigarettes. Pt has sleep apnea but has not used CPAP in awhile. He will f/u with his sleep specialist Dr Kaylah Alvarez for revaluation. Current Outpatient Medications   Medication Sig    HYDROcodone bitartrate (HYSINGLA) 60 mg ER tablet Take 1 Tab by mouth daily. Max Daily Amount: 60 mg. *DO NOT CRUSH,CHEW, OR DISSOLVE TABLET*    cyclobenzaprine (FLEXERIL) 10 mg tablet Take 1 Tab by mouth three (3) times daily (with meals).  albuterol (PROVENTIL HFA, VENTOLIN HFA, PROAIR HFA) 90 mcg/actuation inhaler Take 2 Puffs by inhalation every four (4) hours as needed for Wheezing.  fluticasone (FLONASE) 50 mcg/actuation nasal spray USE TWO SPRAY(S) IN EACH NOSTRIL ONCE DAILY    pravastatin (PRAVACHOL) 40 mg tablet TAKE ONE TABLET BY MOUTH NIGHTLY    traZODone (DESYREL) 50 mg tablet TAKE 1 TABLET BY MOUTH ONCE DAILY AT BEDTIME AS NEEDED FOR SLEEP    tamsulosin (FLOMAX) 0.4 mg capsule TAKE 1 CAPSULE BY MOUTH ONCE DAILY AFTER  DINNER    metoprolol tartrate (LOPRESSOR) 25 mg tablet TAKE ONE TABLET BY MOUTH TWICE DAILY ( GENERIC FOR LOPRESSOR )    gabapentin (NEURONTIN) 300 mg capsule 2 po tid -- taper up as directed  Indications: NEUROPATHIC PAIN    pantoprazole (PROTONIX) 40 mg tablet TAKE ONE TABLET BY MOUTH ONCE DAILY    HYDROcodone-acetaminophen (NORCO) 5-325 mg per tablet Take 1 Tab by mouth every eight (8) hours as needed for Pain. Max Daily Amount: 3 Tabs.  umeclidinium-vilanterol (ANORO ELLIPTA) 62.5-25 mcg/actuation inhaler Take 1 Puff by inhalation daily.  ibuprofen (MOTRIN) 800 mg tablet TAKE ONE TABLET BY MOUTH EVERY 8 HOURS AS NEEDED FOR PAIN    lactulose (CONSTULOSE) 10 gram/15 mL solution Take 45ml PO TID    guaiFENesin ER (MUCINEX) 600 mg ER tablet Take 1 Tab by mouth two (2) times a day.  chlorhexidine (PERIDEX) 0.12 % solution     montelukast (SINGULAIR) 10 mg tablet Take 1 Tab by mouth daily.     TRAVATAN Z 0.004 % ophthalmic solution Administer 1 Drop to both eyes nightly.  nitroglycerin (NITROSTAT) 0.4 mg SL tablet 1 Tab by SubLINGual route every five (5) minutes as needed. No current facility-administered medications for this visit. Review of Systems  Respiratory: negative for dyspnea on exertion  Cardiovascular: negative for chest pain    Objective:     Visit Vitals  /72 (BP 1 Location: Left arm, BP Patient Position: Sitting)   Pulse (!) 59   Temp 98.3 °F (36.8 °C) (Oral)   Resp 20   Ht 5' 9\" (1.753 m)   Wt 238 lb (108 kg)   SpO2 96%   BMI 35.15 kg/m²        General appearance - alert, well appearing, and in no distress  Neck - supple, no significant adenopathy, carotids upstroke normal bilaterally, no bruits  Chest - clear to auscultation, no wheezes, rales or rhonchi, symmetric air entry  Heart - normal rate, regular rhythm, normal S1, S2, no murmurs, rubs, clicks or gallops  Extremities - peripheral pulses normal, no pedal edema, no clubbing or cyanosis  Skin - normal coloration and turgor, no rashes, no suspicious skin lesions noted      Labs:   Lab Results   Component Value Date/Time    Hemoglobin A1c 6.0 (H) 11/14/2018 07:35 AM    Hemoglobin A1c 6.1 (H) 06/11/2018 08:06 AM    Hemoglobin A1c 6.3 (H) 02/15/2018 08:29 AM    Glucose 110 (H) 11/14/2018 07:35 AM    Glucose (POC) 123 (H) 01/24/2016 04:57 PM    Glucose,  (H) 04/01/2015 08:56 AM    Microalb/Creat ratio (ug/mg creat.) 4.4 04/29/2015 03:00 PM    LDL, calculated 95.2 11/14/2018 07:35 AM    Creatinine 1.08 11/14/2018 07:35 AM      Lab Results   Component Value Date/Time    Cholesterol, total 155 11/14/2018 07:35 AM    HDL Cholesterol 42 11/14/2018 07:35 AM    LDL, calculated 95.2 11/14/2018 07:35 AM    Triglyceride 89 11/14/2018 07:35 AM    CHOL/HDL Ratio 3.7 11/14/2018 07:35 AM     Lab Results   Component Value Date/Time    ALT (SGPT) 18 11/14/2018 07:35 AM    AST (SGOT) 14 (L) 11/14/2018 07:35 AM    Alk. phosphatase 75 11/14/2018 07:35 AM    Bilirubin, total 0.4 11/14/2018 07:35 AM     Lab Results   Component Value Date/Time    GFR est AA >60 11/14/2018 07:35 AM    GFR est non-AA >60 11/14/2018 07:35 AM    Creatinine 1.08 11/14/2018 07:35 AM    BUN 19 (H) 11/14/2018 07:35 AM    BUN, POC 6 (L) 04/01/2015 08:56 AM    Sodium,  04/01/2015 08:56 AM    Sodium 145 11/14/2018 07:35 AM    Potassium 4.2 11/14/2018 07:35 AM    Potassium, POC 4.0 04/01/2015 08:56 AM    Chloride,  04/01/2015 08:56 AM    Chloride 112 (H) 11/14/2018 07:35 AM    CO2 22 11/14/2018 07:35 AM      Lab Results   Component Value Date/Time    Glucose 110 (H) 11/14/2018 07:35 AM    Glucose (POC) 123 (H) 01/24/2016 04:57 PM    Glucose,  (H) 04/01/2015 08:56 AM            Assessment / Plan     Hypertension well controlled, on Lopressor   Hyperlipidemia fairly well controlled, on Pravachol 40 mg. Consider changing to Crestor to get LDL <70      ICD-10-CM ICD-9-CM    1. Essential hypertension A44 194.9 METABOLIC PANEL, COMPREHENSIVE   2. Hypercholesterolemia E78.00 272.0 LIPID PANEL   3. Prediabetes R73.03 790.29 Will try to improve with diet and weight loss. HEMOGLOBIN A1C W/O EAG   4. Simple chronic bronchitis (HCC) J41.0 491.0 Stable on Anoro. Will add albuterol MDI  to use prn.    5. Chronic midline low back pain with right-sided sciatica M54.41 724.2 Fairly well controlled on HYDROcodone bitartrate (HYSINGLA) 60 mg ER tablet    G89.29 724.3 Will add cyclobenzaprine (FLEXERIL) 10 mg tablet for spasms and pt will f/u with spine center for possible spinal injection. 338.29    6. Obstructive sleep apnea syndrome G47.33 327.23 albuterol (PROVENTIL HFA, VENTOLIN HFA, PROAIR HFA) 90 mcg/actuation inhaler      REFERRAL TO NEUROLOGY   7. Screening PSA (prostate specific antigen) Z12.5 V76.44 PSA SCREENING (SCREENING)               Low cholesterol diet, weight control and daily exercise discussed.      Follow-up Disposition:  Return in about 4 months (around 3/21/2019) for labs 1 week before. Reviewed plan of care. Patient has provided input and agrees with goals.

## 2018-11-21 NOTE — TELEPHONE ENCOUNTER
Lopez Coburn from [facility] would like the referral for the pt faxed over.     [phone] (r)

## 2018-11-21 NOTE — PATIENT INSTRUCTIONS

## 2018-11-21 NOTE — PROGRESS NOTES
Patient is in the office today for 3 month follow up. 1. Have you been to the ER, urgent care clinic since your last visit? Hospitalized since your last visit? No    2. Have you seen or consulted any other health care providers outside of the [address] [place] [address] since your last visit? Include any pap smears or colon screening.  No

## 2018-12-04 ENCOUNTER — TELEPHONE (OUTPATIENT)
Dept: FAMILY MEDICINE CLINIC | Age: 63
End: 2018-12-04

## 2018-12-04 NOTE — TELEPHONE ENCOUNTER
Roselyn with [facility] re muscle relaxer. Stated pt has two RX for muscle relaxer's written the same day and there are warnings: \"should not be taking 2 at the same time\".      Stated pt trying to get both filled     Flexeril and Zanaflex

## 2018-12-17 ENCOUNTER — OFFICE VISIT (OUTPATIENT)
Dept: ORTHOPEDIC SURGERY | Age: 63
End: 2018-12-17

## 2018-12-17 VITALS
DIASTOLIC BLOOD PRESSURE: 72 MMHG | RESPIRATION RATE: 18 BRPM | SYSTOLIC BLOOD PRESSURE: 106 MMHG | HEART RATE: 65 BPM | BODY MASS INDEX: 34.96 KG/M2 | OXYGEN SATURATION: 97 % | HEIGHT: 69 IN | WEIGHT: 236 LBS | TEMPERATURE: 98.8 F

## 2018-12-17 DIAGNOSIS — M62.838 MUSCLE SPASM: ICD-10-CM

## 2018-12-17 DIAGNOSIS — M48.061 SPINAL STENOSIS OF LUMBAR REGION WITHOUT NEUROGENIC CLAUDICATION: ICD-10-CM

## 2018-12-17 DIAGNOSIS — M43.16 SPONDYLOLISTHESIS OF LUMBAR REGION: ICD-10-CM

## 2018-12-17 DIAGNOSIS — M47.816 LUMBAR FACET ARTHROPATHY: Primary | ICD-10-CM

## 2018-12-17 DIAGNOSIS — M79.2 NEURITIS: ICD-10-CM

## 2018-12-17 DIAGNOSIS — F17.200 TOBACCO DEPENDENCE: ICD-10-CM

## 2018-12-17 RX ORDER — KETOROLAC TROMETHAMINE 15 MG/ML
30 INJECTION, SOLUTION INTRAMUSCULAR; INTRAVENOUS ONCE
Qty: 1 VIAL | Refills: 0
Start: 2018-12-17 | End: 2018-12-17

## 2018-12-17 RX ORDER — PREDNISONE 10 MG/1
TABLET ORAL
Qty: 32 TAB | Refills: 0 | Status: SHIPPED | OUTPATIENT
Start: 2018-12-17 | End: 2019-05-01 | Stop reason: ALTCHOICE

## 2018-12-17 RX ORDER — TOPIRAMATE 25 MG/1
TABLET ORAL
Qty: 90 TAB | Refills: 1 | Status: SHIPPED | OUTPATIENT
Start: 2018-12-17 | End: 2019-02-04

## 2018-12-17 NOTE — PATIENT INSTRUCTIONS
Low Back Arthritis: Exercises  Your Care Instructions  Here are some examples of typical rehabilitation exercises for your condition. Start each exercise slowly. Ease off the exercise if you start to have pain. Your doctor or physical therapist will tell you when you can start these exercises and which ones will work best for you. When you are not being active, find a comfortable position for rest. Some people are comfortable on the floor or a medium-firm bed with a small pillow under their head and another under their knees. Some people prefer to lie on their side with a pillow between their knees. Don't stay in one position for too long. Take short walks (10 to 20 minutes) every 2 to 3 hours. Avoid slopes, hills, and stairs until you feel better. Walk only distances you can manage without pain, especially leg pain. How to do the exercises  Pelvic tilt    1. Lie on your back with your knees bent. 2. \"Brace\" your stomach--tighten your muscles by pulling in and imagining your belly button moving toward your spine. 3. Press your lower back into the floor. You should feel your hips and pelvis rock back. 4. Hold for 6 seconds while breathing smoothly. 5. Relax and allow your pelvis and hips to rock forward. 6. Repeat 8 to 12 times. Back stretches    1. Get down on your hands and knees on the floor. 2. Relax your head and allow it to droop. Round your back up toward the ceiling until you feel a nice stretch in your upper, middle, and lower back. Hold this stretch for as long as it feels comfortable, or about 15 to 30 seconds. 3. Return to the starting position with a flat back while you are on your hands and knees. 4. Let your back sway by pressing your stomach toward the floor. Lift your buttocks toward the ceiling. 5. Hold this position for 15 to 30 seconds. 6. Repeat 2 to 4 times. Follow-up care is a key part of your treatment and safety.  Be sure to make and go to all appointments, and call your doctor if you are having problems. It's also a good idea to know your test results and keep a list of the medicines you take. Where can you learn more? Go to http://samreen-tabitha.info/. Enter K638 in the search box to learn more about \"Low Back Arthritis: Exercises. \"  Current as of: November 29, 2017  Content Version: 11.8  © 2006-2018 [employer]. Care instructions adapted under license by [employer] (which disclaims liability or warranty for this information). If you have questions about a medical condition or this instruction, always ask your healthcare professional. [address] any warranty or liability for your use of this information. Learning About Benefits From Quitting Smoking  How does quitting smoking make you healthier? If you're thinking about quitting smoking, you may have a few reasons to be smoke-free. Your health may be one of them. · When you quit smoking, you lower your risks for cancer, lung disease, heart attack, stroke, blood vessel disease, and blindness from macular degeneration. · When you're smoke-free, you get sick less often, and you heal faster. You are less likely to get colds, flu, bronchitis, and pneumonia. · As a nonsmoker, you may find that your mood is better and you are less stressed. When and how will you feel healthier? Quitting has real health benefits that start from day 1 of being smoke-free. And the longer you stay smoke-free, the healthier you get and the better you feel. The first hours  · After just 20 minutes, your blood pressure and heart rate go down. That means there's less stress on your heart and blood vessels. · Within 12 hours, the level of carbon monoxide in your blood drops back to normal. That makes room for more oxygen. With more oxygen in your body, you may notice that you have more energy than when you smoked.   After 2 weeks  · Your lungs start to work better. · Your risk of heart attack starts to drop. After 1 month  · When your lungs are clear, you cough less and breathe deeper, so it's easier to be active. · Your sense of taste and smell return. That means you can enjoy food more than you have since you started smoking. Over the years  · After 1 year, your risk of heart disease is half what it would be if you kept smoking. · After 5 years, your risk of stroke starts to shrink. Within a few years after that, it's about the same as if you'd never smoked. · After 10 years, your risk of dying from lung cancer is cut by about half. And your risk for many other types of cancer is lower too. How would quitting help others in your life? When you quit smoking, you improve the health of everyone who now breathes in your smoke. · Their heart, lung, and cancer risks drop, much like yours. · They are sick less. For babies and small children, living smoke-free means they're less likely to have ear infections, pneumonia, and bronchitis. · If you're a woman who is or will be pregnant someday, quitting smoking means a healthier . · Children who are close to you are less likely to become adult smokers. Where can you learn more? Go to http://samreen-tabitha.info/. Enter [id number] [id number] 72 11 in the search box to learn more about \"Learning About Benefits From Quitting Smoking. \"  Current as of: 2017  Content Version: 11.8  © 0231-8964 [employer], [facility]. Care instructions adapted under license by [employer] (which disclaims liability or warranty for this information). If you have questions about a medical condition or this instruction, always ask your healthcare professional. [place] [address] any warranty or liability for your use of this information.

## 2018-12-17 NOTE — PROGRESS NOTES
[facility]  [address] [place], [address]  Phone: [phone]  Fax: [phone]    Pt's YOB: 1955    ASSESSMENT   Diagnoses and all orders for this visit:    1. Lumbar facet arthropathy  -     KETOROLAC TROMETHAMINE INJ  -     ketorolac (TORADOL) 15 mg/mL soln injection; 2 mL by IntraMUSCular route once for 1 dose. -     WA THER/PROPH/DIAG INJECTION, SUBCUT/IM  -     predniSONE (DELTASONE) 10 mg tablet; 6 pills Day 1, 5 pills Day 2, 4 pills Day 3&4, 3 pills Day 5&6, 2 pills Day 7&8, 1 pill Day 9&10, 1/2 pill Day 11&12    2. Spinal stenosis of lumbar region without neurogenic claudication  -     topiramate (TOPAMAX) 25 mg tablet; Take 1 in the evening for 1 week, then increase to 2 the second week and continue with 3 in the evening    3. Spondylolisthesis of lumbar region    4. Neuritis  -     topiramate (TOPAMAX) 25 mg tablet; Take 1 in the evening for 1 week, then increase to 2 the second week and continue with 3 in the evening    5. Muscle spasm    6. Tobacco dependence         IMPRESSION AND PLAN:  Ricky Coyle is a 70 y.o. male with history of lumbar pain. He tried physical therapy since his last office visit and increased his Neurontin 300 mg to 2 tabs TID without improvement. Pt takes Hysingla 60 mg 1 tab daily as prescribed by his PCP, Dr. Melody Maxwell and denies any history of renal disease. 1) Pt was given information on lumbar arthritis exercises. 2) He was prescribed a large prednisone taper. 3) Pt received a Toradol injection in the office today. 4) He was also prescribed Topamax 25 mg 3 tabs QHS, tapering up as directed. 5) I will consider lumbar facet injections if needed. 6) I strongly encouraged the Pt to quit smoking and gave information on smoking cessation. 7) Mr. Libby Brand has a reminder for a \"due or due soon\" health maintenance.  I have asked that he contact his primary care provider, Shilpa Kiran, MD, for follow-up on this health maintenance. 8)  demonstrated consistency with prescribing. 9) Pt will follow-up in 1 month or sooner if needed. 10) He will continue to get his pain medication from Dr. Ruthine Dancer:  Maya Alarcon is a 70 y.o. male with history of lumbar pain and presents to the office today for follow up. He tried physical therapy since his last office visit without benefit. Pt admits to increased pain after sessions. He reports intermittent numbness in both legs. Pt reports that he woke up with stiffness this morning and \"was unable to get out of bed. \" Pt notes minimal improvement since increasing his Neurontin 300 mg to 2 tabs TID. He notes minimal relief when taking Tylenol Extra Strength and ibuprofen. Of note, he is not diabetic and has not recently tried oral steroids. Pt takes Hysingla 60 mg 1 tab daily as prescribed by his PCP, Dr. Brii Juan, and notes that he feels like he needs to take 2 to manage his pain. He denies any history of renal disease. Pt notes improvement when taking Flexeril. Of note, he is s/p right total hip replacement (with Dr. Alen Esteban). He notes that he was last followed by Dr. Alen Esteban in 2017. Pt at this time desires to proceed with medication evaluation.     Pain Scale: 8/10    PCP: Narda Marte MD     Past Medical History:   Diagnosis Date    Allergic rhinitis     Avascular necrosis ([address]     CAD (coronary artery disease) 4/2009    mild, hemodynamically non-significant by angiography    Carpal tunnel syndrome     Carpal tunnel syndrome on both sides     Chest pain, unspecified     Chronic back pain 2/11/2011    Chronic obstructive pulmonary disease ([address]     Degenerative joint disease     with chronic back pain    Dysesthesia     of hand post carpal tunnel surgery v sudeck's atrophy    Dyslipidemia, goal LDL below 100     Dysphagia     GERD (gastroesophageal reflux disease) 5/17/2010    Glaucoma 9/15/2010    Glaucoma  Hearing loss     Hypercholesterolemia     Hypertension     Joint fusion     of right wrist    [facility]bid obesity [facility]     Obesity 5/17/2010    Other dyspnea and respiratory abnormality     Palpitations     Right hip pain     Right hip pain     Right knee pain     S/P cardiac catheterization 4/9/2009    1. Normal systemic pressure. 2. Moderate elevation of left-sided filling pressures. 3. Preserved left ventricular systolic function in the LOWE projection. 4. Mild, nonsignificant epicardial coronary artery disese by angiography.     Sleep apnea     on CPAP    Strain of lumbar region     Tobacco abuse         Social History     Socioeconomic History    Marital status: SINGLE     Spouse name: Not on file    Number of children: Not on file    Years of education: Not on file    Highest education level: Not on file   Social Needs    Financial resource strain: Not on file    Food insecurity - worry: Not on file    Food insecurity - inability: Not on file    Transportation needs - medical: Not on file   [facility] needs - non-medical: Not on file   Occupational History    Not on file   Tobacco Use    Smoking status: Current Every Day Smoker    Smokeless tobacco: Never Used    Tobacco comment: urothelial cancer risk discussed today [id number]   Substance and Sexual Activity    Alcohol use: No     Alcohol/week: 0.0 oz    Drug use: No    Sexual activity: Not on file   Other Topics Concern    Not on file   Social History Narrative    Not on file       Current Outpatient Medications   Medication Sig Dispense Refill    topiramate (TOPAMAX) 25 mg tablet Take 1 in the evening for 1 week, then increase to 2 the second week and continue with 3 in the evening 90 Tab 1    predniSONE (DELTASONE) 10 mg tablet 6 pills Day 1, 5 pills Day 2, 4 pills Day 3&4, 3 pills Day 5&6, 2 pills Day 7&8, 1 pill Day 9&10, 1/2 pill Day 11&12 32 Tab 0    cyclobenzaprine (FLEXERIL) 10 mg tablet Take 1 Tab by mouth three (3) times daily (with meals). 90 Tab 3    albuterol (PROVENTIL HFA, VENTOLIN HFA, PROAIR HFA) 90 mcg/actuation inhaler Take 2 Puffs by inhalation every four (4) hours as needed for Wheezing. 1 Inhaler 5    fluticasone (FLONASE) 50 mcg/actuation nasal spray USE TWO SPRAY(S) IN EACH NOSTRIL ONCE DAILY 1 Bottle 5    pravastatin (PRAVACHOL) 40 mg tablet TAKE ONE TABLET BY MOUTH NIGHTLY 90 Tab 3    traZODone (DESYREL) 50 mg tablet TAKE 1 TABLET BY MOUTH ONCE DAILY AT BEDTIME AS NEEDED FOR SLEEP 30 Tab 2    tamsulosin (FLOMAX) 0.4 mg capsule TAKE 1 CAPSULE BY MOUTH ONCE DAILY AFTER  DINNER 90 Cap 0    metoprolol tartrate (LOPRESSOR) 25 mg tablet TAKE ONE TABLET BY MOUTH TWICE DAILY ( GENERIC FOR LOPRESSOR ) 180 Tab 3    gabapentin (NEURONTIN) 300 mg capsule 2 po tid -- taper up as directed  Indications: NEUROPATHIC PAIN 180 Cap 5    pantoprazole (PROTONIX) 40 mg tablet TAKE ONE TABLET BY MOUTH ONCE DAILY 90 Tab 1    umeclidinium-vilanterol (ANORO ELLIPTA) 62.5-25 mcg/actuation inhaler Take 1 Puff by inhalation daily. 1 Inhaler 2    ibuprofen (MOTRIN) 800 mg tablet TAKE ONE TABLET BY MOUTH EVERY 8 HOURS AS NEEDED FOR PAIN 90 Tab 5    lactulose (CONSTULOSE) 10 gram/15 mL solution Take 45ml PO  mL 2    guaiFENesin ER (MUCINEX) 600 mg ER tablet Take 1 Tab by mouth two (2) times a day. 30 Tab 5    montelukast (SINGULAIR) 10 mg tablet Take 1 Tab by mouth daily. 30 Tab 5    nitroglycerin (NITROSTAT) 0.4 mg SL tablet 1 Tab by SubLINGual route every five (5) minutes as needed. 6 Tab 1    TRAVATAN Z 0.004 % ophthalmic solution Administer 1 Drop to both eyes nightly.  HYDROcodone bitartrate (HYSINGLA) 60 mg ER tablet Take 1 Tab by mouth daily. Max Daily Amount: 60 mg. *DO NOT CRUSH,CHEW, OR DISSOLVE TABLET* 30 Tab 0    tiZANidine (ZANAFLEX) 4 mg tablet Take 1 Tab by mouth every six (6) hours as needed.  (Patient not taking: Reported on 12/17/2018) 60 Tab 3    HYDROcodone-acetaminophen (NORCO) 5-325 mg per tablet Take 1 Tab by mouth every eight (8) hours as needed for Pain. Max Daily Amount: 3 Tabs. (Patient not taking: Reported on 12/17/2018) 12 Tab 0    chlorhexidine (PERIDEX) 0.12 % solution          Allergies   Allergen Reactions    Latex Not Reported This Time    Ace Inhibitors Cough    Dilaudid [Hydromorphone] Hives    Lisinopril Cough    Zocor [Simvastatin] Myalgia     Right leg         REVIEW OF SYSTEMS    Constitutional: Negative for fever, chills, or weight change. Respiratory: Negative for cough or shortness of breath. Cardiovascular: Negative for chest pain or palpitations. Gastrointestinal: Negative for acid reflux, change in bowel habits, or constipation. Genitourinary: Negative for dysuria and flank pain. Musculoskeletal: Positive for lumbar pain. Skin: Negative for rash. Neurological: Negative for headaches, dizziness; positive for leg numbness. Endo/Heme/Allergies: Negative for increased bruising. Psychiatric/Behavioral: Negative for difficulty with sleep. PHYSICAL EXAMINATION  Visit Vitals  /72   Pulse 65   Temp 98.8 °F (37.1 °C)   Resp 18   Ht 5' 9\" (1.753 m)   Wt 236 lb (107 kg)   SpO2 97%   BMI 34.85 kg/m²       Constitutional: Awake, alert, and in no acute distress. Neurological: 1+ symmetrical DTRs in the upper extremities. 1+ symmetrical DTRs in the lower extremities. Sensation to light touch is intact. Negative Sulaiman's sign bilaterally. Skin: warm, dry, and intact. Musculoskeletal: Tenderness to palpation in the lower lumbar region. Pain with extension, axial loading, and forward flexion. Pain and limited range of motion with internal or external rotation of both hips. Negative straight leg raise bilaterally.       Biceps  Triceps Deltoids Wrist Ext Wrist Flex Hand Intrin   Right +4/5 +4/5 +4/5 +4/5 +4/5 +4/5   Left +4/5 +4/5 +4/5 +4/5 +4/5 +4/5      Hip Flex  Quads Hamstrings Ankle DF EHL Ankle PF   Right +4/5 +4/5 +4/5 +4/5 +4/5 +4/5   Left +4/5 +4/5 +4/5 +4/5 +4/5 +4/5     IMAGING:    Lumbar spine MRI personally reviewed with the patient and demonstrated:  Results from Hospital Encounter on 08/28/18   MRI LUMB SPINE WO CONT    Narrative MR Lumbar Spine Without Contrast    History/Indications: 70 years Male. Back pain, unspecified. Additional History: Low back pain for several months. Pain radiates down right  leg. COMPARISON: MRI lumbar spine 8/31/2016    Technique: Multi-sequence multiplanar MRI of the lumbar spine. FINDINGS:     There are 5 lumbar-type vertebral bodies. For the purposes of this dictation the  L5/S1 disc level will be referred to by series 5 image 9    There is spondylolisthesis. There is STIR signal hyperintensity within the left  L4 and L5 pedicles (series 4 image 3), likely representing stress reaction. There is no significant spondylolisthesis. The conus terminates at the L1 level. There is moderate bilateral sacroiliac arthrosis. There are multiple rounded T2 hyperintense structures posterior lateral and  anterolateral to the aorta, unchanged compared to CT abdomen and pelvis dated  9/9/2006 and may reflect lymph nodes or a lymphatic malformation. Axial imaging correlation:    T11-T12: Sagittal plane only. No significant disc pathology. No significant  spinal canal or neural foraminal stenosis. T12-L1: No significant disc pathology. Trace left facet joint effusion. No  significant spinal canal or neural foraminal stenosis. L1-2: No significant disc pathology. No significant spinal canal or neural  foraminal stenosis. L2-3: Similar appearing small disc bulge with small right central disc  protrusion extending to the inferior aspect of the L2 vertebral body. Mild  bilateral facet arthrosis. Minimal spinal canal narrowing. Minimal bilateral  foraminal stenosis. Not significantly changed. L3-4: Small disc bulge with small central disc extrusion extending superiorly  slightly above the disc level.  Mild bilateral facet arthrosis. Minimal spinal  canal narrowing. Mild right and minimal left foraminal stenosis. No significant  change from prior. L4-5: Small to moderate disc bulge with focal protrusions involving the central  and right foraminal zones. Moderate bilateral facet arthrosis with ligamentum  flavum buckling. Mild-to-moderate spinal canal stenosis. Moderate to severe  right foraminal stenosis with effacement of the right L4 perineural fat. Mild  left foraminal stenosis. This is progressed from prior. L5-S1: Small broad-based bulge. Minimal spinal canal narrowing. No significant  foraminal stenosis.            Impression IMPRESSION:  1. Progression of a small to moderate disc bulge at L4/L5 with focal central  and right foraminal protrusions. Moderate bilateral facet arthrosis, mild to  moderate spinal canal stenosis, moderate to severe right and mild left foraminal  stenosis, with progression from prior MRI. 2.  STIR hyperintensity within the left L4-L5 pedicles, likely representing  stress reaction. 3.  Multilevel additional degenerative disc disease, mild spinal canal stenosis,  and mild foraminal stenosis as above, not significantly changed from prior. Thank you for enabling us to participate in the care of this patient. Written by Violeta Nickerson, as dictated by Pranav Henson MD.  I, Dr. Pranav Henson confirm that all documentation is accurate.

## 2018-12-18 ENCOUNTER — TELEPHONE (OUTPATIENT)
Dept: FAMILY MEDICINE CLINIC | Age: 63
End: 2018-12-18

## 2018-12-18 DIAGNOSIS — G89.29 CHRONIC MIDLINE LOW BACK PAIN WITH RIGHT-SIDED SCIATICA: ICD-10-CM

## 2018-12-18 DIAGNOSIS — M54.41 CHRONIC MIDLINE LOW BACK PAIN WITH RIGHT-SIDED SCIATICA: ICD-10-CM

## 2018-12-18 RX ORDER — HYDROCODONE BITARTRATE 60 MG/1
60 TABLET, EXTENDED RELEASE ORAL DAILY
Qty: 30 TAB | Refills: 0 | Status: SHIPPED | OUTPATIENT
Start: 2018-12-18 | End: 2019-01-22 | Stop reason: SDUPTHER

## 2019-01-22 ENCOUNTER — TELEPHONE (OUTPATIENT)
Dept: FAMILY MEDICINE CLINIC | Age: 64
End: 2019-01-22

## 2019-01-22 DIAGNOSIS — M25.551 RIGHT HIP PAIN: ICD-10-CM

## 2019-01-22 DIAGNOSIS — G89.29 CHRONIC MIDLINE LOW BACK PAIN WITH RIGHT-SIDED SCIATICA: ICD-10-CM

## 2019-01-22 DIAGNOSIS — M54.41 CHRONIC MIDLINE LOW BACK PAIN WITH RIGHT-SIDED SCIATICA: ICD-10-CM

## 2019-01-22 RX ORDER — HYDROCODONE BITARTRATE AND ACETAMINOPHEN 5; 325 MG/1; MG/1
1 TABLET ORAL
Qty: 12 TAB | Refills: 0 | OUTPATIENT
Start: 2019-01-22

## 2019-01-22 RX ORDER — HYDROCODONE BITARTRATE 60 MG/1
60 TABLET, EXTENDED RELEASE ORAL DAILY
Qty: 30 TAB | Refills: 0 | Status: SHIPPED | OUTPATIENT
Start: 2019-01-22 | End: 2019-02-21 | Stop reason: SDUPTHER

## 2019-01-22 NOTE — TELEPHONE ENCOUNTER
Requested Prescriptions     Pending Prescriptions Disp Refills    HYDROcodone-acetaminophen (NORCO) 5-325 mg per tablet 12 Tab 0     Sig: Take 1 Tab by mouth every eight (8) hours as needed for Pain. Max Daily Amount: 3 Tabs.

## 2019-01-29 NOTE — PROGRESS NOTES
[facility]   [address]  [place], [address]  [phone] fax    Physical Therapy Discharge Summary    Patient name: Tavia Rose Start of Care: 2018   Referral source: Rad James MD : 1955               Medical Diagnosis: Unspecified inflammatory spondylopathy, lumbar region [M46.96]  Spinal stenosis, lumbar region without neurogenic claudication [M48.061]  Neuralgia and neuritis, unspecified [M79.2]  Other muscle spasm [M62.838]    Onset Date:Chronic, but most recently 1 month ago. Treatment Diagnosis: LBP   Prior Hospitalization: see medical history Provider#: [id number]   Medications: Verified on Patient summary List    Comorbidities: right hip replacement, B carpal tunnel release, Arthritis, HTN, Latex Allergy, Tobacco use   Prior Level of Function: The patient states that he was able to ambulate with improved ease and for longer periods prior to onset. Visits from Start of Care: 6    Missed Visits: 3  Reporting Period : 2018 to 10/11/2018    Summary of Care:   Short Term Goals: To be accomplished in 2 weeks:                        9. The patient will be independent and compliant with HEP to maximize therapeutic benefit. Progressing: Pt reports doing \"some of the exercises, but I haven't done them all yet\".  (10/2/18). Pt reports doing all exercises except has not attempted piriformis stretch as of today's date. (10/9/18).                            5. The patient will improve lumbar AROM to WNL to maximize ADL ease. Long Term Goals: To be accomplished in 4 weeks:                        2. The patient will improve FOTO score to 57 to maximize quality of life. Not met: FOTO score of 39. (10/11/18).                       4. The patient will demonstrate ambulate of 2 blocks prior to increase in pain in order to maximize ambulation efficiency.                       5.  The patient will demonstrate negative shanthi tests to reduce stress of lumbar spine during ambulation.                        4. The patient will improve HS flexibility to less than 30 degrees during 90/90 test to reduce stress on lumbar spine during ADLs.          ASSESSMENT/RECOMMENDATIONS: The patient self D/C'd after his last session. Unable to further assess goals. Unable to further assess progress towards goals at this time due to non-compliance/lack of attendance. DC at this time with no further instructions to the patient.   Thank you for this referral.    [x]Discontinue therapy: []Patient has reached or is progressing toward set goals      [x]Patient is non-compliant or has abdicated      []Due to lack of appreciable progress towards set [address], PT 1/29/2019 2:55 PM

## 2019-02-04 ENCOUNTER — DOCUMENTATION ONLY (OUTPATIENT)
Dept: ORTHOPEDIC SURGERY | Age: 64
End: 2019-02-04

## 2019-02-04 ENCOUNTER — OFFICE VISIT (OUTPATIENT)
Dept: ORTHOPEDIC SURGERY | Age: 64
End: 2019-02-04

## 2019-02-04 VITALS
OXYGEN SATURATION: 96 % | HEIGHT: 69 IN | WEIGHT: 253.6 LBS | DIASTOLIC BLOOD PRESSURE: 66 MMHG | TEMPERATURE: 98.3 F | SYSTOLIC BLOOD PRESSURE: 119 MMHG | RESPIRATION RATE: 19 BRPM | BODY MASS INDEX: 37.56 KG/M2 | HEART RATE: 80 BPM

## 2019-02-04 DIAGNOSIS — M48.061 SPINAL STENOSIS OF LUMBAR REGION WITHOUT NEUROGENIC CLAUDICATION: ICD-10-CM

## 2019-02-04 DIAGNOSIS — M48.061 LUMBAR FORAMINAL STENOSIS: ICD-10-CM

## 2019-02-04 DIAGNOSIS — I73.9 CLAUDICATION (HCC): ICD-10-CM

## 2019-02-04 DIAGNOSIS — F17.200 TOBACCO DEPENDENCE: ICD-10-CM

## 2019-02-04 DIAGNOSIS — M54.16 LUMBAR RADICULOPATHY: Primary | ICD-10-CM

## 2019-02-04 DIAGNOSIS — M79.2 NEURITIS: ICD-10-CM

## 2019-02-04 RX ORDER — TOPIRAMATE 25 MG/1
TABLET ORAL
Qty: 120 TAB | Refills: 1 | Status: SHIPPED | OUTPATIENT
Start: 2019-02-04 | End: 2019-05-01 | Stop reason: SDUPTHER

## 2019-02-04 NOTE — PATIENT INSTRUCTIONS
Low Back Arthritis: Exercises  Your Care Instructions  Here are some examples of typical rehabilitation exercises for your condition. Start each exercise slowly. Ease off the exercise if you start to have pain. Your doctor or physical therapist will tell you when you can start these exercises and which ones will work best for you. When you are not being active, find a comfortable position for rest. Some people are comfortable on the floor or a medium-firm bed with a small pillow under their head and another under their knees. Some people prefer to lie on their side with a pillow between their knees. Don't stay in one position for too long. Take short walks (10 to 20 minutes) every 2 to 3 hours. Avoid slopes, hills, and stairs until you feel better. Walk only distances you can manage without pain, especially leg pain. How to do the exercises  Pelvic tilt    1. Lie on your back with your knees bent. 2. \"Brace\" your stomach--tighten your muscles by pulling in and imagining your belly button moving toward your spine. 3. Press your lower back into the floor. You should feel your hips and pelvis rock back. 4. Hold for 6 seconds while breathing smoothly. 5. Relax and allow your pelvis and hips to rock forward. 6. Repeat 8 to 12 times. Back stretches    1. Get down on your hands and knees on the floor. 2. Relax your head and allow it to droop. Round your back up toward the ceiling until you feel a nice stretch in your upper, middle, and lower back. Hold this stretch for as long as it feels comfortable, or about 15 to 30 seconds. 3. Return to the starting position with a flat back while you are on your hands and knees. 4. Let your back sway by pressing your stomach toward the floor. Lift your buttocks toward the ceiling. 5. Hold this position for 15 to 30 seconds. 6. Repeat 2 to 4 times. Follow-up care is a key part of your treatment and safety.  Be sure to make and go to all appointments, and call your doctor if you are having problems. It's also a good idea to know your test results and keep a list of the medicines you take. Where can you learn more? Go to http://samreen-tabitha.info/. Enter K185 in the search box to learn more about \"Low Back Arthritis: Exercises. \"  Current as of: September 20, 2018  Content Version: 11.9  © 9567-3781 [employer]. Care instructions adapted under license by [employer] (which disclaims liability or warranty for this information). If you have questions about a medical condition or this instruction, always ask your healthcare professional. [address] any warranty or liability for your use of this information.

## 2019-02-04 NOTE — PROGRESS NOTES
[facility]  [address]ylee [address]es[address] [place], [facility] [address]  Phone: [phone]  Fax: [phone]    Pt's YOB: 1955    ASSESSMENT   Diagnoses and all orders for this visit:    1. Lumbar radiculopathy    2. Lumbar foraminal stenosis    3. Tobacco dependence  -     LOWER EXT ART PVR WITH EXERCISE; Future    4. Claudication ([address]  -     LOWER EXT ART PVR WITH EXERCISE; Future    5. Spinal stenosis of lumbar region without neurogenic claudication  -     topiramate (TOPAMAX) 25 mg tablet; Take 2 in the morning and 2 in the evening as directed    6. Neuritis  -     topiramate (TOPAMAX) 25 mg tablet; Take 2 in the morning and 2 in the evening as directed         IMPRESSION AND PLAN:  Erin Bella is a 70 y.o. male with history of lumbar pain. He notes aching pain in the lower back with numbness in both legs. He reports severe pain upon waking and notes the leg numbness increases with activity. Pt takes Topamax 25 mg 3 tabs QHS with benefit and reports temporary improvement with a Medrol Dosepak. 1) Pt was given information on lumbar arthritis exercises. 2) Discussed treatment options with the Pt, including medication and steroid injections. 3) A bilateral lower extremity vascular study was obtained. 4) He will increase his Topamax 25 mg to 2 tabs QAM and 2 tabs QHS. 5) Mr. Tara Hilario has a reminder for a \"due or due soon\" health maintenance. I have asked that he contact his primary care provider, Dereck Man MD, for follow-up on this health maintenance. 6)  demonstrated consistency with prescribing. 7) Pt will follow-up in 1 month or sooner if needed. 8) Pt will taper off gabapentin      HISTORY OF PRESENT ILLNESS:  Erin Bella is a 70 y.o. male with history of lumbar pain and presents to the office today for follow up. He notes aching pain in the lower back that is severe upon waking.  Pt complains of numbness/tingling in both legs and generally has to sit down until the numbness dissipates. He notes that his lower back pain is worse with activity and denies any pain in the legs at this time. Pt admits to a history of carpal tunnel but notes minimal relief when wearing wrist splints. He wears a lumbar support intermittently and notes increased pain when has to take it off. Pt started Topamax 25 mg and is taking 3 tabs QHS with benefit. He reports temporary improvement when taking a Medrol Dosepak. Of note, he is a smoker. He had a right hip replacement about 7 years ago. Pt at this time desires to proceed with a vascular study and medication evaluation. Pain Scale: 10 - Worst pain ever/10    PCP: Rosa Hernandes MD     Past Medical History:   Diagnosis Date    Allergic rhinitis     Avascular necrosis (HCC)     CAD (coronary artery disease) 4/2009    mild, hemodynamically non-significant by angiography    Carpal tunnel syndrome     Carpal tunnel syndrome on both sides     Chest pain, unspecified     Chronic back pain 2/11/2011    Chronic obstructive pulmonary disease ([address]     Degenerative joint disease     with chronic back pain    Dysesthesia     of hand post carpal tunnel surgery v sudeck's atrophy    Dyslipidemia, goal LDL below 100     Dysphagia     GERD (gastroesophageal reflux disease) 5/17/2010    Glaucoma 9/15/2010    Glaucoma     Hearing loss     Hypercholesterolemia     Hypertension     Joint fusion     of right wrist    Morbid obesity ([address]     Obesity 5/17/2010    Other dyspnea and respiratory abnormality     Palpitations     Right hip pain     Right hip pain     Right knee pain     S/P cardiac catheterization 4/9/2009    1. Normal systemic pressure. 2. Moderate elevation of left-sided filling pressures. 3. Preserved left ventricular systolic function in the LOWE projection. 4. Mild, nonsignificant epicardial coronary artery disese by angiography.     Sleep apnea     on CPAP    Strain of lumbar region     Tobacco abuse         Social History     Socioeconomic History    Marital status: SINGLE     Spouse name: Not on file    Number of children: Not on file    Years of education: Not on file    Highest education level: Not on file   Social Needs    Financial resource strain: Not on file    Food insecurity - worry: Not on file    Food insecurity - inability: Not on file    Transportation needs - medical: Not on file   [place] needs - non-medical: Not on file   Occupational History    Not on file   Tobacco Use    Smoking status: Current Every Day Smoker    Smokeless tobacco: Never Used    Tobacco comment: urothelial cancer risk discussed today [id number]   Substance and Sexual Activity    Alcohol use: No     Alcohol/week: 0.0 oz    Drug use: No    Sexual activity: Not on file   Other Topics Concern    Not on file   Social History Narrative    Not on file       Current Outpatient Medications   Medication Sig Dispense Refill    topiramate (TOPAMAX) 25 mg tablet Take 2 in the morning and 2 in the evening as directed 120 Tab 1    HYDROcodone bitartrate (HYSINGLA) 60 mg ER tablet Take 1 Tab by mouth daily. Max Daily Amount: 60 mg. *DO NOT CRUSH,CHEW, OR DISSOLVE TABLET* 30 Tab 0    cyclobenzaprine (FLEXERIL) 10 mg tablet Take 1 Tab by mouth three (3) times daily (with meals). 90 Tab 3    albuterol (PROVENTIL HFA, VENTOLIN HFA, PROAIR HFA) 90 mcg/actuation inhaler Take 2 Puffs by inhalation every four (4) hours as needed for Wheezing.  1 Inhaler 5    fluticasone (FLONASE) 50 mcg/actuation nasal spray USE TWO SPRAY(S) IN EACH NOSTRIL ONCE DAILY 1 Bottle 5    pravastatin (PRAVACHOL) 40 mg tablet TAKE ONE TABLET BY MOUTH NIGHTLY 90 Tab 3    traZODone (DESYREL) 50 mg tablet TAKE 1 TABLET BY MOUTH ONCE DAILY AT BEDTIME AS NEEDED FOR SLEEP 30 Tab 2    metoprolol tartrate (LOPRESSOR) 25 mg tablet TAKE ONE TABLET BY MOUTH TWICE DAILY ( GENERIC FOR LOPRESSOR ) 180 Tab 3    gabapentin (NEURONTIN) 300 mg capsule 2 po tid -- taper up as directed  Indications: NEUROPATHIC PAIN 180 Cap 5    pantoprazole (PROTONIX) 40 mg tablet TAKE ONE TABLET BY MOUTH ONCE DAILY 90 Tab 1    umeclidinium-vilanterol (ANORO ELLIPTA) 62.5-25 mcg/actuation inhaler Take 1 Puff by inhalation daily. 1 Inhaler 2    ibuprofen (MOTRIN) 800 mg tablet TAKE ONE TABLET BY MOUTH EVERY 8 HOURS AS NEEDED FOR PAIN 90 Tab 5    lactulose (CONSTULOSE) 10 gram/15 mL solution Take 45ml PO  mL 2    guaiFENesin ER (MUCINEX) 600 mg ER tablet Take 1 Tab by mouth two (2) times a day. 30 Tab 5    montelukast (SINGULAIR) 10 mg tablet Take 1 Tab by mouth daily. 30 Tab 5    nitroglycerin (NITROSTAT) 0.4 mg SL tablet 1 Tab by SubLINGual route every five (5) minutes as needed. 6 Tab 1    TRAVATAN Z 0.004 % ophthalmic solution Administer 1 Drop to both eyes nightly.  tamsulosin (FLOMAX) 0.4 mg capsule TAKE 1 CAPSULE BY MOUTH ONCE DAILY AFTER  DINNER 90 Cap 0    predniSONE (DELTASONE) 10 mg tablet 6 pills Day 1, 5 pills Day 2, 4 pills Day 3&4, 3 pills Day 5&6, 2 pills Day 7&8, 1 pill Day 9&10, 1/2 pill Day 11&12 32 Tab 0    tiZANidine (ZANAFLEX) 4 mg tablet Take 1 Tab by mouth every six (6) hours as needed. (Patient not taking: Reported on 12/17/2018) 60 Tab 3    HYDROcodone-acetaminophen (NORCO) 5-325 mg per tablet Take 1 Tab by mouth every eight (8) hours as needed for Pain. Max Daily Amount: 3 Tabs. (Patient not taking: Reported on 12/17/2018) 12 Tab 0    chlorhexidine (PERIDEX) 0.12 % solution          Allergies   Allergen Reactions    Latex Not Reported This Time    Ace Inhibitors Cough    Dilaudid [Hydromorphone] Hives    Lisinopril Cough    Zocor [Simvastatin] Myalgia     Right leg         REVIEW OF SYSTEMS    Constitutional: Negative for fever, chills, or weight change. Respiratory: Negative for cough or shortness of breath. Cardiovascular: Negative for chest pain or palpitations.   Gastrointestinal: Negative for acid reflux, change in bowel habits, or constipation. Genitourinary: Negative for dysuria and flank pain. Musculoskeletal: Positive for lumbar pain. Skin: Negative for rash. Neurological: Negative for headaches or dizziness. Positive for numbness in both legs. Endo/Heme/Allergies: Negative for increased bruising. Psychiatric/Behavioral: Negative for difficulty with sleep. PHYSICAL EXAMINATION  Visit Vitals  /66   Pulse 80   Temp 98.3 °F (36.8 °C) (Oral)   Resp 19   Ht 5' 9\" (1.753 m)   Wt 253 lb 9.6 oz (115 kg)   SpO2 96%   BMI 37.45 kg/m²       Constitutional: Awake, alert, and in no acute distress. Neurological: 1+ symmetrical DTRs in the lower extremities. Sensation to light touch is intact. Skin: warm, dry, and intact. Musculoskeletal: Tenderness to palpation in the lower lumbar region. Moderate pain with extension and axial loading. No pain with internal or external rotation of his hips. Negative straight leg raise bilaterally. Hip Flex  Quads Hamstrings Ankle DF EHL Ankle PF   Right +4/5 +4/5 +4/5 +4/5 +4/5 +4/5   Left +4/5 +4/5 +4/5 +4/5 +4/5 +4/5     IMAGING:    Lumbar spine MRI personally reviewed with the patient and demonstrated:  Results from Hospital Encounter on 08/28/18   MRI LUMB SPINE WO CONT    Narrative MR Lumbar Spine Without Contrast    History/Indications: 70 years Male. Back pain, unspecified. Additional History: Low back pain for several months. Pain radiates down right  leg. COMPARISON: MRI lumbar spine 8/31/2016    Technique: Multi-sequence multiplanar MRI of the lumbar spine. FINDINGS:     There are 5 lumbar-type vertebral bodies. For the purposes of this dictation the  L5/S1 disc level will be referred to by series 5 image 9    There is spondylolisthesis. There is STIR signal hyperintensity within the left  L4 and L5 pedicles (series 4 image 3), likely representing stress reaction. There is no significant spondylolisthesis.  The conus terminates at the L1 level.    There is moderate bilateral sacroiliac arthrosis. There are multiple rounded T2 hyperintense structures posterior lateral and  anterolateral to the aorta, unchanged compared to CT abdomen and pelvis dated  9/9/2006 and may reflect lymph nodes or a lymphatic malformation. Axial imaging correlation:    T11-T12: Sagittal plane only. No significant disc pathology. No significant  spinal canal or neural foraminal stenosis. T12-L1: No significant disc pathology. Trace left facet joint effusion. No  significant spinal canal or neural foraminal stenosis. L1-2: No significant disc pathology. No significant spinal canal or neural  foraminal stenosis. L2-3: Similar appearing small disc bulge with small right central disc  protrusion extending to the inferior aspect of the L2 vertebral body. Mild  bilateral facet arthrosis. Minimal spinal canal narrowing. Minimal bilateral  foraminal stenosis. Not significantly changed. L3-4: Small disc bulge with small central disc extrusion extending superiorly  slightly above the disc level. Mild bilateral facet arthrosis. Minimal spinal  canal narrowing. Mild right and minimal left foraminal stenosis. No significant  change from prior. L4-5: Small to moderate disc bulge with focal protrusions involving the central  and right foraminal zones. Moderate bilateral facet arthrosis with ligamentum  flavum buckling. Mild-to-moderate spinal canal stenosis. Moderate to severe  right foraminal stenosis with effacement of the right L4 perineural fat. Mild  left foraminal stenosis. This is progressed from prior. L5-S1: Small broad-based bulge. Minimal spinal canal narrowing. No significant  foraminal stenosis.            Impression IMPRESSION:  1.  Progression of a small to moderate disc bulge at L4/L5 with focal central  and right foraminal protrusions.  Moderate bilateral facet arthrosis, mild to  moderate spinal canal stenosis, moderate to severe right and mild left foraminal  stenosis, with progression from prior MRI. 2.  STIR hyperintensity within the left L4-L5 pedicles, likely representing  stress reaction. 3.  Multilevel additional degenerative disc disease, mild spinal canal stenosis,  and mild foraminal stenosis as above, not significantly changed from prior. Thank you for enabling us to participate in the care of this patient. Thoracic spine MRI from 12/29/2017 was personally reviewed with the patient and demonstrated:  FINDINGS:  The alignment of the thoracic spine is unremarkable. The facets are  appropriately aligned. No vertebral body step off appreciated. No evidence of  thoracic spine fracture. The marrow signal is unremarkable. The cord signal is  unremarkable.         A left paracentral disc protrusion is present at T7-T8 which causes mild canal  narrowing. No evidence of significant canal or neural foraminal stenosis at any  level. The visualized thoracic aorta is unremarkable. The visualized paraspinal  soft tissues are unremarkable. IMPRESSION:  1. No acute thoracic spine pathology.       Written by Jacob Carrillo MD, Chapis Werner, as dictated by Butch Dickinson MD.  I, Dr. Butch Dickinson confirm that all documentation is accurate.

## 2019-02-04 NOTE — PROGRESS NOTES
Scheduled for Arterial study for [facility] on 02-16-19 @ 10:45am @ [address], patient aware

## 2019-02-06 ENCOUNTER — HOSPITAL ENCOUNTER (OUTPATIENT)
Dept: VASCULAR SURGERY | Age: 64
Discharge: HOME | End: 2019-02-06
Attending: PHYSICAL MEDICINE & REHABILITATION
Payer: MEDICARE

## 2019-02-06 DIAGNOSIS — I73.9 CLAUDICATION (HCC): ICD-10-CM

## 2019-02-06 DIAGNOSIS — F17.200 TOBACCO DEPENDENCE: ICD-10-CM

## 2019-02-06 PROCEDURE — 93923 UPR/LXTR ART STDY 3+ LVLS: CPT

## 2019-02-06 PROCEDURE — 93924 LWR XTR VASC STDY BILAT: CPT

## 2019-02-07 LAB
LEFT ABI: 1.08
LEFT ANTERIOR TIBIAL: 131 MMHG
LEFT ARM BP: 121 MMHG
LEFT CALF PRESSURE: 135 MMHG
LEFT POSTERIOR TIBIAL: 128 MMHG
RIGHT ABI: 1.12
RIGHT ANTERIOR TIBIAL: 135 MMHG
RIGHT ARM BP: 121 MMHG
RIGHT CALF PRESSURE: 134 MMHG
RIGHT POSTERIOR TIBIAL: 131 MMHG

## 2019-02-21 DIAGNOSIS — M54.41 CHRONIC MIDLINE LOW BACK PAIN WITH RIGHT-SIDED SCIATICA: ICD-10-CM

## 2019-02-21 DIAGNOSIS — M25.551 RIGHT HIP PAIN: ICD-10-CM

## 2019-02-21 DIAGNOSIS — G89.29 CHRONIC MIDLINE LOW BACK PAIN WITH RIGHT-SIDED SCIATICA: ICD-10-CM

## 2019-02-21 RX ORDER — HYDROCODONE BITARTRATE 60 MG/1
60 TABLET, EXTENDED RELEASE ORAL DAILY
Qty: 30 TAB | Refills: 0 | Status: SHIPPED | OUTPATIENT
Start: 2019-02-21 | End: 2019-03-29 | Stop reason: SDUPTHER

## 2019-02-21 RX ORDER — HYDROCODONE BITARTRATE AND ACETAMINOPHEN 5; 325 MG/1; MG/1
1 TABLET ORAL
Qty: 12 TAB | Refills: 0 | Status: CANCELLED | OUTPATIENT
Start: 2019-02-21

## 2019-02-21 NOTE — TELEPHONE ENCOUNTER
Requested Prescriptions     Pending Prescriptions Disp Refills    HYDROcodone bitartrate (HYSINGLA) 60 mg ER tablet 30 Tab 0     Sig: Take 1 Tab by mouth daily. Max Daily Amount: 60 mg. *DO NOT CRUSH,CHEW, OR DISSOLVE TABLET*    HYDROcodone-acetaminophen (NORCO) 5-325 mg per tablet 12 Tab 0     Sig: Take 1 Tab by mouth every eight (8) hours as needed for Pain. Max Daily Amount: 3 Tabs.

## 2019-03-04 DIAGNOSIS — F51.01 PRIMARY INSOMNIA: ICD-10-CM

## 2019-03-04 RX ORDER — TRAZODONE HYDROCHLORIDE 50 MG/1
TABLET ORAL
Qty: 30 TAB | Refills: 2 | Status: SHIPPED | OUTPATIENT
Start: 2019-03-04 | End: 2019-12-03 | Stop reason: SDUPTHER

## 2019-03-14 ENCOUNTER — HOSPITAL ENCOUNTER (OUTPATIENT)
Dept: LAB | Age: 64
Discharge: HOME | End: 2019-03-14
Payer: MEDICARE

## 2019-03-14 DIAGNOSIS — E78.00 HYPERCHOLESTEROLEMIA: ICD-10-CM

## 2019-03-14 DIAGNOSIS — I10 ESSENTIAL HYPERTENSION: ICD-10-CM

## 2019-03-14 DIAGNOSIS — Z12.5 SCREENING PSA (PROSTATE SPECIFIC ANTIGEN): ICD-10-CM

## 2019-03-14 DIAGNOSIS — R73.03 PREDIABETES: ICD-10-CM

## 2019-03-14 LAB
ALBUMIN SERPL-MCNC: 3.6 G/DL (ref 3.4–5)
ALBUMIN/GLOB SERPL: 1 {RATIO} (ref 0.8–1.7)
ALP SERPL-CCNC: 100 U/L (ref 45–117)
ALT SERPL-CCNC: 17 U/L (ref 16–61)
ANION GAP SERPL CALC-SCNC: 7 MMOL/L (ref 3–18)
AST SERPL-CCNC: 10 U/L (ref 15–37)
BILIRUB SERPL-MCNC: 0.3 MG/DL (ref 0.2–1)
BUN SERPL-MCNC: 13 MG/DL (ref 7–18)
BUN/CREAT SERPL: 10 (ref 12–20)
CALCIUM SERPL-MCNC: 8.6 MG/DL (ref 8.5–10.1)
CHLORIDE SERPL-SCNC: 111 MMOL/L (ref 100–108)
CHOLEST SERPL-MCNC: 138 MG/DL
CO2 SERPL-SCNC: 22 MMOL/L (ref 21–32)
CREAT SERPL-MCNC: 1.26 MG/DL (ref 0.6–1.3)
GLOBULIN SER CALC-MCNC: 3.7 G/DL (ref 2–4)
GLUCOSE SERPL-MCNC: 101 MG/DL (ref 74–99)
HBA1C MFR BLD: 5.7 % (ref 4.2–5.6)
HDLC SERPL-MCNC: 36 MG/DL (ref 40–60)
HDLC SERPL: 3.8 {RATIO} (ref 0–5)
LDLC SERPL CALC-MCNC: 73.6 MG/DL (ref 0–100)
LIPID PROFILE,FLP: ABNORMAL
POTASSIUM SERPL-SCNC: 4.4 MMOL/L (ref 3.5–5.5)
PROT SERPL-MCNC: 7.3 G/DL (ref 6.4–8.2)
PSA SERPL-MCNC: 0.2 NG/ML (ref 0–4)
SODIUM SERPL-SCNC: 140 MMOL/L (ref 136–145)
TRIGL SERPL-MCNC: 142 MG/DL (ref ?–150)
VLDLC SERPL CALC-MCNC: 28.4 MG/DL

## 2019-03-14 PROCEDURE — 80053 COMPREHEN METABOLIC PANEL: CPT

## 2019-03-14 PROCEDURE — 80061 LIPID PANEL: CPT

## 2019-03-14 PROCEDURE — 83036 HEMOGLOBIN GLYCOSYLATED A1C: CPT

## 2019-03-14 PROCEDURE — 36415 COLL VENOUS BLD VENIPUNCTURE: CPT

## 2019-03-14 PROCEDURE — 84153 ASSAY OF PSA TOTAL: CPT

## 2019-03-20 RX ORDER — IBUPROFEN 800 MG/1
TABLET ORAL
Qty: 90 TAB | Refills: 5 | Status: SHIPPED | OUTPATIENT
Start: 2019-03-20 | End: 2020-02-13

## 2019-03-21 ENCOUNTER — OFFICE VISIT (OUTPATIENT)
Dept: FAMILY MEDICINE CLINIC | Age: 64
End: 2019-03-21

## 2019-03-21 VITALS
HEART RATE: 54 BPM | OXYGEN SATURATION: 98 % | HEIGHT: 69 IN | DIASTOLIC BLOOD PRESSURE: 60 MMHG | BODY MASS INDEX: 37.47 KG/M2 | TEMPERATURE: 98 F | WEIGHT: 253 LBS | SYSTOLIC BLOOD PRESSURE: 112 MMHG | RESPIRATION RATE: 20 BRPM

## 2019-03-21 DIAGNOSIS — E78.00 HYPERCHOLESTEROLEMIA: ICD-10-CM

## 2019-03-21 DIAGNOSIS — M54.41 CHRONIC MIDLINE LOW BACK PAIN WITH RIGHT-SIDED SCIATICA: ICD-10-CM

## 2019-03-21 DIAGNOSIS — I10 ESSENTIAL HYPERTENSION: Primary | ICD-10-CM

## 2019-03-21 DIAGNOSIS — J01.10 ACUTE NON-RECURRENT FRONTAL SINUSITIS: ICD-10-CM

## 2019-03-21 DIAGNOSIS — R73.03 PREDIABETES: ICD-10-CM

## 2019-03-21 DIAGNOSIS — G89.29 CHRONIC MIDLINE LOW BACK PAIN WITH RIGHT-SIDED SCIATICA: ICD-10-CM

## 2019-03-21 RX ORDER — PRAVASTATIN SODIUM 40 MG/1
TABLET ORAL
Qty: 90 TAB | Refills: 3 | Status: SHIPPED | OUTPATIENT
Start: 2019-03-21 | End: 2020-05-31

## 2019-03-21 RX ORDER — MONTELUKAST SODIUM 10 MG/1
10 TABLET ORAL DAILY
Qty: 30 TAB | Refills: 5 | Status: SHIPPED | OUTPATIENT
Start: 2019-03-21 | End: 2019-10-02 | Stop reason: SDUPTHER

## 2019-03-21 RX ORDER — AZITHROMYCIN 250 MG/1
TABLET, FILM COATED ORAL
Qty: 6 TAB | Refills: 0 | Status: SHIPPED | OUTPATIENT
Start: 2019-03-21 | End: 2019-03-26

## 2019-03-21 NOTE — PROGRESS NOTES
Subjective:       Chief Complaint  The patient presents for follow up of hypertension and high cholesterol. prediabetes  chronic back pain and right hip pain, COPD        HPI  Judy Gardner is a 70 y.o. male seen for follow up of hyperlipidemia. Healso has hypertension. Hypertension well controlled, no significant medication side effects noted, on Lopressor, hyperlipidemia fairly well controlled, no significant medication side effects noted, on Pravachol 40 mg, pt had myalgias on Zocor. Diet and Lifestyle: not attempting to follow a low fat, low cholesterol diet, exercises sporadically    Home BP Monitoring: is not measured at home. Judy Gardner RTC today to follow up on chronic pain diagnosis. We discussed his osteoarthritis that is affecting his back. Significant changes since last visit: none. He is  able to do his normal daily activities. He reports the following adverse side effects: none. Pt doing fairly well with Hysingla ER 60 mg. He is seeing the [facility] and was referred for aqua therapy but did not tolerate it due to pain. His MRI of his LS shows worsening DJD so he will f/u with the SPine center for possible cortisone injection. Least pain over the last week has been 3/10. Worst pain over the last week has been 8/10. Opioid Risk Tool Reviewed: YES    Aberrant behaviors: None. Urine Drug Screen: due - order placed. Will do with next refill    Controlled substance agreement on file: YES.  reviewed:yes    Pill count is consistent with his prescription: yes    Concomitant use of a benzodiazepine: no        Prediabetes: pt is trying to control with diet and weight loss but it continues to be an issue due to his obesity     COPD Review:  The patient is being seen for follow up of COPD. Oxygen: He currently is not on home oxygen therapy. He us using Anoro Ellipta with good results.    Symptoms: chronic dyspnea: severity = mild: course of sx: symptoms have progressed to a point and plateaued. .   Patient uses 0 pillows at night. Patient does not smoke cigarettes. Pt has sleep apnea but has not used CPAP in awhile. He will f/u with his sleep specialist Dr Alla Hall for revaluation. Pt c/o cough and sinus congestion for the last week with production of yellow mucus. He denies any fever or chills. Current Outpatient Medications   Medication Sig    pravastatin (PRAVACHOL) 40 mg tablet TAKE ONE TABLET BY MOUTH NIGHTLY    azithromycin (ZITHROMAX) 250 mg tablet Take 2 tablets today, then take 1 tablet daily    montelukast (SINGULAIR) 10 mg tablet Take 1 Tab by mouth daily.  ibuprofen (MOTRIN) 800 mg tablet TAKE 1 TABLET BY MOUTH EVERY 8 HOURS AS NEEDED FOR PAIN    traZODone (DESYREL) 50 mg tablet TAKE 1 TABLET BY MOUTH ONCE DAILY AT BEDTIME AS NEEDED FOR SLEEP    HYDROcodone bitartrate (HYSINGLA) 60 mg ER tablet Take 1 Tab by mouth daily. Max Daily Amount: 60 mg. *DO NOT CRUSH,CHEW, OR DISSOLVE TABLET*    tamsulosin (FLOMAX) 0.4 mg capsule TAKE 1 CAPSULE BY MOUTH ONCE DAILY AFTER  DINNER    topiramate (TOPAMAX) 25 mg tablet Take 2 in the morning and 2 in the evening as directed    predniSONE (DELTASONE) 10 mg tablet 6 pills Day 1, 5 pills Day 2, 4 pills Day 3&4, 3 pills Day 5&6, 2 pills Day 7&8, 1 pill Day 9&10, 1/2 pill Day 11&12    cyclobenzaprine (FLEXERIL) 10 mg tablet Take 1 Tab by mouth three (3) times daily (with meals).  albuterol (PROVENTIL HFA, VENTOLIN HFA, PROAIR HFA) 90 mcg/actuation inhaler Take 2 Puffs by inhalation every four (4) hours as needed for Wheezing.  tiZANidine (ZANAFLEX) 4 mg tablet Take 1 Tab by mouth every six (6) hours as needed.     fluticasone (FLONASE) 50 mcg/actuation nasal spray USE TWO SPRAY(S) IN EACH NOSTRIL ONCE DAILY    metoprolol tartrate (LOPRESSOR) 25 mg tablet TAKE ONE TABLET BY MOUTH TWICE DAILY ( GENERIC FOR LOPRESSOR )    gabapentin (NEURONTIN) 300 mg capsule 2 po tid -- taper up as directed  Indications: NEUROPATHIC PAIN    pantoprazole (PROTONIX) 40 mg tablet TAKE ONE TABLET BY MOUTH ONCE DAILY    HYDROcodone-acetaminophen (NORCO) 5-325 mg per tablet Take 1 Tab by mouth every eight (8) hours as needed for Pain. Max Daily Amount: 3 Tabs.  umeclidinium-vilanterol (ANORO ELLIPTA) 62.5-25 mcg/actuation inhaler Take 1 Puff by inhalation daily.  lactulose (CONSTULOSE) 10 gram/15 mL solution Take 45ml PO TID    chlorhexidine (PERIDEX) 0.12 % solution     TRAVATAN Z 0.004 % ophthalmic solution Administer 1 Drop to both eyes nightly.  guaiFENesin ER (MUCINEX) 600 mg ER tablet Take 1 Tab by mouth two (2) times a day.  nitroglycerin (NITROSTAT) 0.4 mg SL tablet 1 Tab by SubLINGual route every five (5) minutes as needed. No current facility-administered medications for this visit.               Review of Systems  Respiratory: negative for dyspnea on exertion  Cardiovascular: negative for chest pain    Objective:     Visit Vitals  /60 (BP 1 Location: Left arm, BP Patient Position: Sitting)   Pulse (!) 54   Temp 98 °F (36.7 °C) (Oral)   Resp 20   Ht 5' 9\" (1.753 m)   Wt 253 lb (114.8 kg)   SpO2 98%   BMI 37.36 kg/m²        General appearance - alert, well appearing, and in no distress  Neck - supple, no significant adenopathy, carotids upstroke normal bilaterally, no bruits  Chest - clear to auscultation, no wheezes, rales or rhonchi, symmetric air entry  Heart - normal rate, regular rhythm, normal S1, S2, no murmurs, rubs, clicks or gallops  Extremities - peripheral pulses normal, no pedal edema, no clubbing or cyanosis  Skin - normal coloration and turgor, no rashes, no suspicious skin lesions noted      Labs:   Lab Results   Component Value Date/Time    Hemoglobin A1c 5.7 (H) 03/14/2019 08:59 AM    Hemoglobin A1c 6.0 (H) 11/14/2018 07:35 AM    Hemoglobin A1c 6.1 (H) 06/11/2018 08:06 AM    Glucose 101 (H) 03/14/2019 08:59 AM    Glucose (POC) 123 (H) 01/24/2016 04:57 PM    Glucose,  (H) 04/01/2015 08:56 AM    Microalb/Creat ratio (ug/mg creat.) 4.4 04/29/2015 03:00 PM    LDL, calculated 73.6 03/14/2019 08:59 AM    Creatinine 1.26 03/14/2019 08:59 AM      Lab Results   Component Value Date/Time    Cholesterol, total 138 03/14/2019 08:59 AM    HDL Cholesterol 36 (L) 03/14/2019 08:59 AM    LDL, calculated 73.6 03/14/2019 08:59 AM    Triglyceride 142 03/14/2019 08:59 AM    CHOL/HDL Ratio 3.8 03/14/2019 08:59 AM     Lab Results   Component Value Date/Time    ALT (SGPT) 17 03/14/2019 08:59 AM    AST (SGOT) 10 (L) 03/14/2019 08:59 AM    Alk. phosphatase 100 03/14/2019 08:59 AM    Bilirubin, total 0.3 03/14/2019 08:59 AM     Lab Results   Component Value Date/Time    GFR est AA >60 03/14/2019 08:59 AM    GFR est non-AA 57 (L) 03/14/2019 08:59 AM    Creatinine 1.26 03/14/2019 08:59 AM    BUN 13 03/14/2019 08:59 AM    BUN, POC 6 (L) 04/01/2015 08:56 AM    Sodium,  04/01/2015 08:56 AM    Sodium 140 03/14/2019 08:59 AM    Potassium 4.4 03/14/2019 08:59 AM    Potassium, POC 4.0 04/01/2015 08:56 AM    Chloride,  04/01/2015 08:56 AM    Chloride 111 (H) 03/14/2019 08:59 AM    CO2 22 03/14/2019 08:59 AM      Lab Results   Component Value Date/Time    Glucose 101 (H) 03/14/2019 08:59 AM    Glucose (POC) 123 (H) 01/24/2016 04:57 PM    Glucose,  (H) 04/01/2015 08:56 AM            Assessment / Plan     Hypertension well controlled, on Lopressor   Hyperlipidemia fairly well controlled, on Pravachol 40 mg. ICD-10-CM ICD-9-CM    1. Essential hypertension N00 567.0 METABOLIC PANEL, COMPREHENSIVE   2. Hypercholesterolemia E78.00 272.0 pravastatin (PRAVACHOL) 40 mg tablet      LIPID PANEL   3. Chronic midline low back pain with right-sided sciatica M54.41 724.2 Stable on Hysingla 60 mg every day     G89.29 724.3      338.29    4. Acute non-recurrent frontal sinusitis J01.10 461.1 Will treat with azithromycin (ZITHROMAX) 250 mg tablet      And montelukast (SINGULAIR) 10 mg tablet   5.  Prediabetes R73.03 790.29 Will continue to try and improve with weight loss. HEMOGLOBIN A1C W/O EAG               Low cholesterol diet, weight control and daily exercise discussed. Follow-up and Dispositions    · Return in about 4 months (around 7/21/2019) for labs 1 week before. Reviewed plan of care. Patient has provided input and agrees with goals.

## 2019-03-21 NOTE — PROGRESS NOTES
Patient is in the office today for 4 month follow up. 1. Have you been to the ER, urgent care clinic since your last visit? Hospitalized since your last visit? No    2. Have you seen or consulted any other health care providers outside of the [address] [place] [address] since your last visit? Include any pap smears or colon screening.  No

## 2019-03-21 NOTE — PATIENT INSTRUCTIONS
High Blood Pressure: Care Instructions  Overview    It's normal for blood pressure to go up and down throughout the day. But if it stays up, you have high blood pressure. Another name for high blood pressure is hypertension. Despite what a lot of people think, high blood pressure usually doesn't cause headaches or make you feel dizzy or lightheaded. It usually has no symptoms. But it does increase your risk of stroke, heart attack, and other problems. You and your doctor will talk about your risks of these problems based on your blood pressure. Your doctor will give you a goal for your blood pressure. Your goal will be based on your health and your age. Lifestyle changes, such as eating healthy and being active, are always important to help lower blood pressure. You might also take medicine to reach your blood pressure goal.  Follow-up care is a key part of your treatment and safety. Be sure to make and go to all appointments, and call your doctor if you are having problems. It's also a good idea to know your test results and keep a list of the medicines you take. How can you care for yourself at home? Medical treatment  · If you stop taking your medicine, your blood pressure will go back up. You may take one or more types of medicine to lower your blood pressure. Be safe with medicines. Take your medicine exactly as prescribed. Call your doctor if you think you are having a problem with your medicine. · Talk to your doctor before you start taking aspirin every day. Aspirin can help certain people lower their risk of a heart attack or stroke. But taking aspirin isn't right for everyone, because it can cause serious bleeding. · See your doctor regularly. You may need to see the doctor more often at first or until your blood pressure comes down. · If you are taking blood pressure medicine, talk to your doctor before you take decongestants or anti-inflammatory medicine, such as ibuprofen.  Some of these medicines can raise blood pressure. · Learn how to check your blood pressure at home. Lifestyle changes  · Stay at a healthy weight. This is especially important if you put on weight around the waist. Losing even 10 pounds can help you lower your blood pressure. · If your doctor recommends it, get more exercise. Walking is a good choice. Bit by bit, increase the amount you walk every day. Try for at least 30 minutes on most days of the week. You also may want to swim, bike, or do other activities. · Avoid or limit alcohol. Talk to your doctor about whether you can drink any alcohol. · Try to limit how much sodium you eat to less than 2,300 milligrams (mg) a day. Your doctor may ask you to try to eat less than 1,500 mg a day. · Eat plenty of fruits (such as bananas and oranges), vegetables, legumes, whole grains, and low-fat dairy products. · Lower the amount of saturated fat in your diet. Saturated fat is found in animal products such as milk, cheese, and meat. Limiting these foods may help you lose weight and also lower your risk for heart disease. · Do not smoke. Smoking increases your risk for heart attack and stroke. If you need help quitting, talk to your doctor about stop-smoking programs and medicines. These can increase your chances of quitting for good. When should you call for help? Call 911 anytime you think you may need emergency care. This may mean having symptoms that suggest that your blood pressure is causing a serious heart or blood vessel problem. Your blood pressure may be over 180/120.   For example, call 911 if:    · You have symptoms of a heart attack. These may include:  ? Chest pain or pressure, or a strange feeling in the chest.  ? Sweating. ? Shortness of breath. ? Nausea or vomiting. ? Pain, pressure, or a strange feeling in the back, neck, jaw, or upper belly or in one or both shoulders or arms. ? Lightheadedness or sudden weakness.   ? A fast or irregular heartbeat.     · You have symptoms of a stroke. These may include:  ? Sudden numbness, tingling, weakness, or loss of movement in your face, arm, or leg, especially on only one side of your body. ? Sudden vision changes. ? Sudden trouble speaking. ? Sudden confusion or trouble understanding simple statements. ? Sudden problems with walking or balance. ? A sudden, severe headache that is different from past headaches.     · You have severe back or belly pain.    Do not wait until your blood pressure comes down on its own. Get help right away.   Call your doctor now or seek immediate care if:    · Your blood pressure is much higher than normal (such as 180/120 or higher), but you don't have symptoms.     · You think high blood pressure is causing symptoms, such as:  ? Severe headache.  ? Blurry vision.    Watch closely for changes in your health, and be sure to contact your doctor if:    · Your blood pressure measures higher than your doctor recommends at least 2 times. That means the top number is higher or the bottom number is higher, or both.     · You think you may be having side effects from your blood pressure medicine. Where can you learn more? Go to http://samreen-tabitha.info/. Enter M837 in the search box to learn more about \"High Blood Pressure: Care Instructions. \"  Current as of: July 22, 2018  Content Version: 11.9  © 1957-9248 [employer]. Care instructions adapted under license by [employer] (which disclaims liability or warranty for this information). If you have questions about a medical condition or this instruction, always ask your healthcare professional. [place] [address] any warranty or liability for your use of this information.

## 2019-03-29 DIAGNOSIS — G89.29 CHRONIC MIDLINE LOW BACK PAIN WITH RIGHT-SIDED SCIATICA: ICD-10-CM

## 2019-03-29 DIAGNOSIS — M54.41 CHRONIC MIDLINE LOW BACK PAIN WITH RIGHT-SIDED SCIATICA: ICD-10-CM

## 2019-03-29 RX ORDER — HYDROCODONE BITARTRATE 60 MG/1
60 TABLET, EXTENDED RELEASE ORAL DAILY
Qty: 30 TAB | Refills: 0 | Status: SHIPPED | OUTPATIENT
Start: 2019-03-29 | End: 2019-04-23 | Stop reason: SDUPTHER

## 2019-04-03 DIAGNOSIS — J44.9 CHRONIC BRONCHIOLITIS (HCC): ICD-10-CM

## 2019-04-03 RX ORDER — UMECLIDINIUM BROMIDE AND VILANTEROL TRIFENATATE 62.5; 25 UG/1; UG/1
POWDER RESPIRATORY (INHALATION)
Qty: 1 INHALER | Refills: 5 | Status: SHIPPED | OUTPATIENT
Start: 2019-04-03 | End: 2020-10-02

## 2019-04-16 DIAGNOSIS — G47.33 OBSTRUCTIVE SLEEP APNEA SYNDROME: ICD-10-CM

## 2019-04-16 RX ORDER — ALBUTEROL SULFATE 90 UG/1
2 AEROSOL, METERED RESPIRATORY (INHALATION)
Qty: 1 INHALER | Refills: 5 | Status: SHIPPED | OUTPATIENT
Start: 2019-04-16 | End: 2020-11-02

## 2019-04-19 DIAGNOSIS — M79.2 NEURITIS: ICD-10-CM

## 2019-04-19 DIAGNOSIS — M48.061 SPINAL STENOSIS OF LUMBAR REGION WITHOUT NEUROGENIC CLAUDICATION: ICD-10-CM

## 2019-04-19 RX ORDER — TOPIRAMATE 25 MG/1
TABLET ORAL
Qty: 120 TAB | Refills: 1 | OUTPATIENT
Start: 2019-04-19

## 2019-04-19 NOTE — TELEPHONE ENCOUNTER
Last Visit: 2/4/19 with MD Fran Em  Next Appointment: advised to return in 4 weeks  Previous Refill Encounter(s): 2/4/19 #120 with 1 refill    Requested Prescriptions     Pending Prescriptions Disp Refills    topiramate (TOPAMAX) 25 mg tablet 120 Tab 1     Sig: Take 2 tabs in the morning and 2 tabs in the evening as directed

## 2019-04-22 NOTE — TELEPHONE ENCOUNTER
lvm pt to [facility] med refill appt per last message. . When pt calls back please schedule

## 2019-04-23 DIAGNOSIS — G89.29 CHRONIC MIDLINE LOW BACK PAIN WITH RIGHT-SIDED SCIATICA: ICD-10-CM

## 2019-04-23 DIAGNOSIS — M25.551 RIGHT HIP PAIN: ICD-10-CM

## 2019-04-23 DIAGNOSIS — M54.41 CHRONIC MIDLINE LOW BACK PAIN WITH RIGHT-SIDED SCIATICA: ICD-10-CM

## 2019-04-23 RX ORDER — HYDROCODONE BITARTRATE 60 MG/1
60 TABLET, EXTENDED RELEASE ORAL DAILY
Qty: 30 TAB | Refills: 0 | Status: SHIPPED | OUTPATIENT
Start: 2019-04-23 | End: 2019-05-21 | Stop reason: SDUPTHER

## 2019-04-23 RX ORDER — HYDROCODONE BITARTRATE AND ACETAMINOPHEN 5; 325 MG/1; MG/1
1 TABLET ORAL
Qty: 12 TAB | Refills: 0 | Status: CANCELLED | OUTPATIENT
Start: 2019-04-23

## 2019-04-23 RX ORDER — AZELASTINE 1 MG/ML
2 SPRAY, METERED NASAL 2 TIMES DAILY
Qty: 1 BOTTLE | Refills: 5 | Status: SHIPPED | OUTPATIENT
Start: 2019-04-23 | End: 2020-02-15 | Stop reason: SDUPTHER

## 2019-04-23 NOTE — TELEPHONE ENCOUNTER
Requested Prescriptions     Pending Prescriptions Disp Refills    HYDROcodone-acetaminophen (NORCO) 5-325 mg per tablet 12 Tab 0     Sig: Take 1 Tab by mouth every eight (8) hours as needed for Pain. Max Daily Amount: 3 Tabs. Patient also state the nasal spray is not helping him and he is requesting something else called in.

## 2019-05-01 ENCOUNTER — OFFICE VISIT (OUTPATIENT)
Dept: FAMILY MEDICINE CLINIC | Age: 64
End: 2019-05-01

## 2019-05-01 ENCOUNTER — TELEPHONE (OUTPATIENT)
Dept: FAMILY MEDICINE CLINIC | Age: 64
End: 2019-05-01

## 2019-05-01 VITALS
TEMPERATURE: 98.5 F | RESPIRATION RATE: 20 BRPM | SYSTOLIC BLOOD PRESSURE: 124 MMHG | HEIGHT: 69 IN | WEIGHT: 242.2 LBS | OXYGEN SATURATION: 97 % | BODY MASS INDEX: 35.87 KG/M2 | DIASTOLIC BLOOD PRESSURE: 69 MMHG | HEART RATE: 59 BPM

## 2019-05-01 DIAGNOSIS — M79.2 NEURITIS: ICD-10-CM

## 2019-05-01 DIAGNOSIS — M48.061 SPINAL STENOSIS OF LUMBAR REGION WITHOUT NEUROGENIC CLAUDICATION: ICD-10-CM

## 2019-05-01 RX ORDER — TOPIRAMATE 100 MG/1
100 TABLET, FILM COATED ORAL
Qty: 30 TAB | Refills: 5 | Status: SHIPPED | OUTPATIENT
Start: 2019-05-01 | End: 2020-10-02

## 2019-05-01 NOTE — PROGRESS NOTES
Patient is in the office today for a new medication request.  Patient states Dr. Acacia Varela is to far out and can not get there. Patient is requesting to have a refill on Topiramate. 1. Have you been to the ER, urgent care clinic since your last visit? Hospitalized since your last visit? No    2. Have you seen or consulted any other health care providers outside of the [address] [place] [address] since your last visit? Include any pap smears or colon screening.  No

## 2019-05-01 NOTE — PATIENT INSTRUCTIONS
Back Pain: Care Instructions  Your Care Instructions    Back pain has many possible causes. It is often related to problems with muscles and ligaments of the back. It may also be related to problems with the nerves, discs, or bones of the back. Moving, lifting, standing, sitting, or sleeping in an awkward way can strain the back. Sometimes you don't notice the injury until later. Arthritis is another common cause of back pain. Although it may hurt a lot, back pain usually improves on its own within several weeks. Most people recover in 12 weeks or less. Using good home treatment and being careful not to stress your back can help you feel better sooner. Follow-up care is a key part of your treatment and safety. Be sure to make and go to all appointments, and call your doctor if you are having problems. It's also a good idea to know your test results and keep a list of the medicines you take. How can you care for yourself at home? · Sit or lie in positions that are most comfortable and reduce your pain. Try one of these positions when you lie down:  ? Lie on your back with your knees bent and supported by large pillows. ? Lie on the floor with your legs on the seat of a sofa or chair. ? Lie on your side with your knees and hips bent and a pillow between your legs. ? Lie on your stomach if it does not make pain worse. · Do not sit up in bed, and avoid soft couches and twisted positions. Bed rest can help relieve pain at first, but it delays healing. Avoid bed rest after the first day of back pain. · Change positions every 30 minutes. If you must sit for long periods of time, take breaks from sitting. Get up and walk around, or lie in a comfortable position. · Try using a heating pad on a low or medium setting for 15 to 20 minutes every 2 or 3 hours. Try a warm shower in place of one session with the heating pad. · You can also try an ice pack for 10 to 15 minutes every 2 to 3 hours.  Put a thin cloth between the ice pack and your skin. · Take pain medicines exactly as directed. ? If the doctor gave you a prescription medicine for pain, take it as prescribed. ? If you are not taking a prescription pain medicine, ask your doctor if you can take an over-the-counter medicine. · Take short walks several times a day. You can start with 5 to 10 minutes, 3 or 4 times a day, and work up to longer walks. Walk on level surfaces and avoid hills and stairs until your back is better. · Return to work and other activities as soon as you can. Continued rest without activity is usually not good for your back. · To prevent future back pain, do exercises to stretch and strengthen your back and stomach. Learn how to use good posture, safe lifting techniques, and proper body mechanics. When should you call for help? Call your doctor now or seek immediate medical care if:    · You have new or worsening numbness in your legs.     · You have new or worsening weakness in your legs. (This could make it hard to stand up.)     · You lose control of your bladder or bowels.    Watch closely for changes in your health, and be sure to contact your doctor if:    · You have a fever, lose weight, or don't feel well.     · You do not get better as expected. Where can you learn more? Go to http://samreen-tabitha.info/. Enter K498 in the search box to learn more about \"Back Pain: Care Instructions. \"  Current as of: September 20, 2018  Content Version: 11.9  © 8400-5542 [employer]. Care instructions adapted under license by [employer] (which disclaims liability or warranty for this information). If you have questions about a medical condition or this instruction, always ask your healthcare professional. [place] [address] any warranty or liability for your use of this information.

## 2019-05-01 NOTE — PROGRESS NOTES
Alejo Rudd is a 70 y.o.  male and presents with Medication Evaluation and Insect Bite (right side of neck)      SUBJECTIVE:  Pt has chronic back pain for which he has seen the spine center. He was unable to get back to Dr Feliciana Spatz due to transportation issues. He is taking Topamax 75 mg QAM which is helping his back pain and ran out of the prescription. Since he is doing well with it will increase to Topamax 100 mg. He also continues on Motrin and Hysingla 60 mg for his chronic back pain. Pt also was bitten on the right side of his neck about 2 weeks ago. He had mild swelling which has improved along with the mild pain he was having. Respiratory ROS: negative for - shortness of breath  Cardiovascular ROS: negative for - chest pain    Current Outpatient Medications   Medication Sig    topiramate (TOPAMAX) 100 mg tablet Take 1 Tab by mouth daily (with breakfast).  HYDROcodone bitartrate (HYSINGLA) 60 mg ER tablet Take 1 Tab by mouth daily for 30 days. Max Daily Amount: 60 mg. *DO NOT CRUSH,CHEW, OR DISSOLVE TABLET*    azelastine (ASTELIN) 137 mcg (0.1 %) nasal spray 2 Sprays by Both Nostrils route two (2) times a day. Use in each nostril as directed  Indications: Seasonal Runny Nose    albuterol (PROVENTIL HFA, VENTOLIN HFA, PROAIR HFA) 90 mcg/actuation inhaler Take 2 Puffs by inhalation every four (4) hours as needed for Wheezing.  ANORO ELLIPTA 62.5-25 mcg/actuation inhaler INHALE 1 PUFF BY MOUTH ONCE DAILY    pravastatin (PRAVACHOL) 40 mg tablet TAKE ONE TABLET BY MOUTH NIGHTLY    montelukast (SINGULAIR) 10 mg tablet Take 1 Tab by mouth daily.     ibuprofen (MOTRIN) 800 mg tablet TAKE 1 TABLET BY MOUTH EVERY 8 HOURS AS NEEDED FOR PAIN    traZODone (DESYREL) 50 mg tablet TAKE 1 TABLET BY MOUTH ONCE DAILY AT BEDTIME AS NEEDED FOR SLEEP    tamsulosin (FLOMAX) 0.4 mg capsule TAKE 1 CAPSULE BY MOUTH ONCE DAILY AFTER  DINNER    fluticasone (FLONASE) 50 mcg/actuation nasal spray USE TWO SPRAY(S) IN EACH NOSTRIL ONCE DAILY    metoprolol tartrate (LOPRESSOR) 25 mg tablet TAKE ONE TABLET BY MOUTH TWICE DAILY ( GENERIC FOR LOPRESSOR )    gabapentin (NEURONTIN) 300 mg capsule 2 po tid -- taper up as directed  Indications: NEUROPATHIC PAIN    pantoprazole (PROTONIX) 40 mg tablet TAKE ONE TABLET BY MOUTH ONCE DAILY    lactulose (CONSTULOSE) 10 gram/15 mL solution Take 45ml PO TID    guaiFENesin ER (MUCINEX) 600 mg ER tablet Take 1 Tab by mouth two (2) times a day.  nitroglycerin (NITROSTAT) 0.4 mg SL tablet 1 Tab by SubLINGual route every five (5) minutes as needed.  TRAVATAN Z 0.004 % ophthalmic solution Administer 1 Drop to both eyes nightly. No current facility-administered medications for this visit. OBJECTIVE:  alert, well appearing, and in no distress  Visit Vitals  /69 (BP 1 Location: Left arm, BP Patient Position: Sitting)   Pulse (!) 59   Temp 98.5 °F (36.9 °C) (Oral)   Resp 20   Ht 5' 9\" (1.753 m)   Wt 242 lb 3.2 oz (109.9 kg)   SpO2 97%   BMI 35.77 kg/m²      well developed and well nourished  Skin - neck without any signs of redness or swelling. Assessment/Plan      ICD-10-CM ICD-9-CM    1. Spinal stenosis of lumbar region without neurogenic claudication M48.061 724.02 Pain control is better. Will increase to topiramate (TOPAMAX) 100 mg tablet and continue Hysingla and Motrin   2. Neuritis M79.2 729.2 topiramate (TOPAMAX) 100 mg tablet     Follow-up and Dispositions    · Return if symptoms worsen or fail to improve. Reviewed plan of care. Patient has provided input and agrees with goals.

## 2019-05-01 NOTE — TELEPHONE ENCOUNTER
Patient called to see if the Doctor can call in an alternative medication for Topiramate 100mg because medicare will not cover the medication. Please advise.

## 2019-05-02 NOTE — TELEPHONE ENCOUNTER
Spoke with pharmacy tech she states medication needs a PA because of the strength. PA submitted via Cover my Meds. Patient is aware PA has been started. Patient verbalizes.

## 2019-05-02 NOTE — TELEPHONE ENCOUNTER
Pt came into the office today for an update of the topamax please advise. He wants to see what is going on.  Please advise [phone]

## 2019-05-21 ENCOUNTER — TELEPHONE (OUTPATIENT)
Dept: FAMILY MEDICINE CLINIC | Age: 64
End: 2019-05-21

## 2019-05-21 DIAGNOSIS — M54.41 CHRONIC MIDLINE LOW BACK PAIN WITH RIGHT-SIDED SCIATICA: ICD-10-CM

## 2019-05-21 DIAGNOSIS — G89.29 CHRONIC MIDLINE LOW BACK PAIN WITH RIGHT-SIDED SCIATICA: ICD-10-CM

## 2019-05-21 RX ORDER — HYDROCODONE BITARTRATE 60 MG/1
60 TABLET, EXTENDED RELEASE ORAL DAILY
Qty: 30 TAB | Refills: 0 | Status: SHIPPED | OUTPATIENT
Start: 2019-05-21 | End: 2019-06-19 | Stop reason: SDUPTHER

## 2019-05-21 NOTE — TELEPHONE ENCOUNTER
Requested Prescriptions     Pending Prescriptions Disp Refills    HYDROcodone bitartrate (HYSINGLA) 60 mg ER tablet 30 Tab 0     Sig: Take 1 Tab by mouth daily for 30 days. Max Daily Amount: 60 mg.  *DO NOT CRUSH,CHEW, OR DISSOLVE TABLET*

## 2019-05-22 ENCOUNTER — TELEPHONE (OUTPATIENT)
Dept: FAMILY MEDICINE CLINIC | Age: 64
End: 2019-05-22

## 2019-06-19 DIAGNOSIS — G89.29 CHRONIC MIDLINE LOW BACK PAIN WITH RIGHT-SIDED SCIATICA: ICD-10-CM

## 2019-06-19 DIAGNOSIS — M54.41 CHRONIC MIDLINE LOW BACK PAIN WITH RIGHT-SIDED SCIATICA: ICD-10-CM

## 2019-06-20 RX ORDER — HYDROCODONE BITARTRATE 60 MG/1
60 TABLET, EXTENDED RELEASE ORAL DAILY
Qty: 30 TAB | Refills: 0 | Status: SHIPPED | OUTPATIENT
Start: 2019-06-20 | End: 2019-07-23 | Stop reason: SDUPTHER

## 2019-06-20 NOTE — TELEPHONE ENCOUNTER
Pt checking refill status. He ask please call him at this # when RX is Ready for  [phone].  He is having trouble with his other phone

## 2019-07-08 ENCOUNTER — OFFICE VISIT (OUTPATIENT)
Dept: FAMILY MEDICINE CLINIC | Age: 64
End: 2019-07-08

## 2019-07-08 VITALS
DIASTOLIC BLOOD PRESSURE: 69 MMHG | WEIGHT: 243 LBS | RESPIRATION RATE: 20 BRPM | HEIGHT: 69 IN | SYSTOLIC BLOOD PRESSURE: 126 MMHG | TEMPERATURE: 98.6 F | BODY MASS INDEX: 35.99 KG/M2 | OXYGEN SATURATION: 95 % | HEART RATE: 65 BPM

## 2019-07-08 DIAGNOSIS — R13.19 ESOPHAGEAL DYSPHAGIA: ICD-10-CM

## 2019-07-08 DIAGNOSIS — J02.9 SORE THROAT: Primary | ICD-10-CM

## 2019-07-08 RX ORDER — AZITHROMYCIN 250 MG/1
TABLET, FILM COATED ORAL
Qty: 6 TAB | Refills: 0 | Status: SHIPPED | OUTPATIENT
Start: 2019-07-08 | End: 2019-07-13

## 2019-07-08 RX ORDER — AZITHROMYCIN 250 MG/1
TABLET, FILM COATED ORAL
Qty: 6 TAB | Refills: 0 | Status: SHIPPED | OUTPATIENT
Start: 2019-07-08 | End: 2019-07-08 | Stop reason: ALTCHOICE

## 2019-07-08 NOTE — PATIENT INSTRUCTIONS
Learning About Swallowing Problems  What are swallowing problems? Certain health problems that affect the nervous system can cause trouble swallowing. These conditions include stroke, ALS (also known as Irma Gehrig's disease), Parkinson's disease, and multiple sclerosis. The muscles and nerves that help move food through the throat and esophagus may not work right. Growths, such as cancer, and other problems with your esophagus can also make it hard to swallow. The esophagus is the tube that leads from your throat to your stomach. How are swallowing problems diagnosed? A doctor or speech therapist will examine you to check for swallowing problems. You may get swallowing tests to check how well your throat muscles work. For these tests, you swallow a special liquid that helps the doctor see your throat and esophagus on an X-ray or video screen. Other tests use a thin, flexible tube called a scope to check for problems with your esophagus. The doctor puts the scope in your mouth and down your throat to look at your esophagus. What are the symptoms? Symptoms of swallowing problems may include:  · Trouble getting food or liquids to go down on the first try. · Gagging, choking, or coughing when you swallow. · Having food or liquids come back up through your throat, mouth, or nose after you swallow. · Feeling like foods or liquids are stuck in some part of your throat or chest.  · Pain when you swallow. How are swallowing problems treated? How swallowing problems are treated depends on the cause. The main goals of treatment will be to help you eat and swallow safely and get good nutrition. This is important for your health and quality of life. You may learn exercises to train your throat muscles to work together so you're able to swallow better. Learning certain ways to put food in your mouth or to position your head while eating may also help.   Your doctor or a speech therapist may recommend changes to your diet to help make it easier to swallow. You may need to avoid certain foods or liquids. You also may need to change the thickness of foods or liquids in your diet. To eat and swallow safely, follow any instructions you get from your doctor or therapist. These ideas may help:  · Sit upright when eating, drinking, and taking pills. · Take small bites of food. Chew completely and swallow before taking another bite. · Take small sips of liquids. · If eating makes you tired, eat smaller but more frequent meals. · Tip your chin down when there is food in your mouth. Where can you learn more? Go to http://samreen-tabitha.info/. Enter 319 [id number] 1343 in the search box to learn more about \"Learning About Swallowing Problems. \"  Current as of: September 23, 2018  Content Version: 11.9  © 4091-6964 [employer]. Care instructions adapted under license by [employer] (which disclaims liability or warranty for this information). If you have questions about a medical condition or this instruction, always ask your healthcare professional. [place] [address] any warranty or liability for your use of this information.

## 2019-07-08 NOTE — PROGRESS NOTES
Patient is in the office today for difficulty swallowing x 2 weeks. 1. Have you been to the ER, urgent care clinic since your last visit? Hospitalized since your last visit? No    2. Have you seen or consulted any other health care providers outside of the [address] [place] [address] since your last visit? Include any pap smears or colon screening.  No

## 2019-07-10 NOTE — PROGRESS NOTES
HISTORY OF PRESENT ILLNESS  Thania Jacques is a 75 y.o. male. Sore Throat    The history is provided by the patient. This is a new problem. The current episode started more than 1 week ago. The problem has not changed since onset. There has been no fever. Associated symptoms include trouble swallowing and cough. Pertinent negatives include no diarrhea, no vomiting and no congestion. Review of Systems   HENT: Positive for sore throat and trouble swallowing. Negative for congestion. Respiratory: Positive for cough. Gastrointestinal: Negative for diarrhea and vomiting. Difficulty swallowing at times. Physical Exam   Constitutional: He appears well-developed and well-nourished. HENT:   Nose: Nose normal.   Mouth/Throat: Uvula is midline and oropharynx is clear and moist.   Cardiovascular: Normal rate, regular rhythm and normal heart sounds. Pulmonary/Chest: Effort normal and breath sounds normal.   Vitals reviewed. ASSESSMENT and PLAN    ICD-10-CM ICD-9-CM    1. Sore throat J02.9 462 Will treat with azithromycin (ZITHROMAX) 250 mg tablet for possible infection. If no improvement would refer to GI since pt has had to have his esophagus dilated in the past.     2. Esophageal dysphagia R13.10 787.20 Management as above      Reviewed plan of care. Patient has provided input and agrees with goals.

## 2019-07-15 ENCOUNTER — HOSPITAL ENCOUNTER (OUTPATIENT)
Dept: LAB | Age: 64
Discharge: HOME | End: 2019-07-15
Payer: COMMERCIAL

## 2019-07-15 DIAGNOSIS — R73.03 PREDIABETES: ICD-10-CM

## 2019-07-15 DIAGNOSIS — I10 ESSENTIAL HYPERTENSION: ICD-10-CM

## 2019-07-15 DIAGNOSIS — E78.00 HYPERCHOLESTEROLEMIA: ICD-10-CM

## 2019-07-15 LAB
ALBUMIN SERPL-MCNC: 3.5 G/DL (ref 3.4–5)
ALBUMIN/GLOB SERPL: 1 {RATIO} (ref 0.8–1.7)
ALP SERPL-CCNC: 86 U/L (ref 45–117)
ALT SERPL-CCNC: 17 U/L (ref 16–61)
ANION GAP SERPL CALC-SCNC: 6 MMOL/L (ref 3–18)
AST SERPL-CCNC: 14 U/L (ref 15–37)
BILIRUB SERPL-MCNC: 0.4 MG/DL (ref 0.2–1)
BUN SERPL-MCNC: 15 MG/DL (ref 7–18)
BUN/CREAT SERPL: 13 (ref 12–20)
CALCIUM SERPL-MCNC: 8.6 MG/DL (ref 8.5–10.1)
CHLORIDE SERPL-SCNC: 110 MMOL/L (ref 100–108)
CHOLEST SERPL-MCNC: 152 MG/DL
CO2 SERPL-SCNC: 27 MMOL/L (ref 21–32)
CREAT SERPL-MCNC: 1.14 MG/DL (ref 0.6–1.3)
GLOBULIN SER CALC-MCNC: 3.6 G/DL (ref 2–4)
GLUCOSE SERPL-MCNC: 121 MG/DL (ref 74–99)
HBA1C MFR BLD: 6 % (ref 4.2–5.6)
HDLC SERPL-MCNC: 40 MG/DL (ref 40–60)
HDLC SERPL: 3.8 {RATIO} (ref 0–5)
LDLC SERPL CALC-MCNC: 81.6 MG/DL (ref 0–100)
LIPID PROFILE,FLP: ABNORMAL
POTASSIUM SERPL-SCNC: 4.2 MMOL/L (ref 3.5–5.5)
PROT SERPL-MCNC: 7.1 G/DL (ref 6.4–8.2)
SODIUM SERPL-SCNC: 143 MMOL/L (ref 136–145)
TRIGL SERPL-MCNC: 152 MG/DL (ref ?–150)
VLDLC SERPL CALC-MCNC: 30.4 MG/DL

## 2019-07-15 PROCEDURE — 80053 COMPREHEN METABOLIC PANEL: CPT

## 2019-07-15 PROCEDURE — 80061 LIPID PANEL: CPT

## 2019-07-15 PROCEDURE — 83036 HEMOGLOBIN GLYCOSYLATED A1C: CPT

## 2019-07-15 PROCEDURE — 36415 COLL VENOUS BLD VENIPUNCTURE: CPT

## 2019-07-22 ENCOUNTER — OFFICE VISIT (OUTPATIENT)
Dept: FAMILY MEDICINE CLINIC | Age: 64
End: 2019-07-22

## 2019-07-22 VITALS
HEART RATE: 57 BPM | TEMPERATURE: 99.1 F | DIASTOLIC BLOOD PRESSURE: 55 MMHG | OXYGEN SATURATION: 96 % | BODY MASS INDEX: 35.7 KG/M2 | SYSTOLIC BLOOD PRESSURE: 117 MMHG | RESPIRATION RATE: 20 BRPM | WEIGHT: 241 LBS | HEIGHT: 69 IN

## 2019-07-22 DIAGNOSIS — E78.00 HYPERCHOLESTEROLEMIA: Primary | ICD-10-CM

## 2019-07-22 DIAGNOSIS — G47.33 OBSTRUCTIVE SLEEP APNEA SYNDROME: ICD-10-CM

## 2019-07-22 DIAGNOSIS — G89.29 CHRONIC MIDLINE LOW BACK PAIN WITH RIGHT-SIDED SCIATICA: ICD-10-CM

## 2019-07-22 DIAGNOSIS — I10 ESSENTIAL HYPERTENSION: ICD-10-CM

## 2019-07-22 DIAGNOSIS — M48.061 SPINAL STENOSIS OF LUMBAR REGION WITHOUT NEUROGENIC CLAUDICATION: ICD-10-CM

## 2019-07-22 DIAGNOSIS — M54.41 CHRONIC MIDLINE LOW BACK PAIN WITH RIGHT-SIDED SCIATICA: ICD-10-CM

## 2019-07-22 DIAGNOSIS — E66.01 SEVERE OBESITY (HCC): ICD-10-CM

## 2019-07-22 DIAGNOSIS — R73.03 PREDIABETES: ICD-10-CM

## 2019-07-22 DIAGNOSIS — Z79.891 CHRONIC PRESCRIPTION OPIATE USE: ICD-10-CM

## 2019-07-22 RX ORDER — GABAPENTIN 300 MG/1
CAPSULE ORAL
Qty: 180 CAP | Refills: 5 | Status: SHIPPED | OUTPATIENT
Start: 2019-07-22 | End: 2020-10-02

## 2019-07-22 NOTE — PROGRESS NOTES
Patient is in the office today for 4 month follow up. 1. Have you been to the ER, urgent care clinic since your last visit? Hospitalized since your last visit? No    2. Have you seen or consulted any other health care providers outside of the [address] [place] [address] since your last visit? Include any pap smears or colon screening.  No

## 2019-07-22 NOTE — PATIENT INSTRUCTIONS
High Blood Pressure: Care Instructions  Overview    It's normal for blood pressure to go up and down throughout the day. But if it stays up, you have high blood pressure. Another name for high blood pressure is hypertension. Despite what a lot of people think, high blood pressure usually doesn't cause headaches or make you feel dizzy or lightheaded. It usually has no symptoms. But it does increase your risk of stroke, heart attack, and other problems. You and your doctor will talk about your risks of these problems based on your blood pressure. Your doctor will give you a goal for your blood pressure. Your goal will be based on your health and your age. Lifestyle changes, such as eating healthy and being active, are always important to help lower blood pressure. You might also take medicine to reach your blood pressure goal.  Follow-up care is a key part of your treatment and safety. Be sure to make and go to all appointments, and call your doctor if you are having problems. It's also a good idea to know your test results and keep a list of the medicines you take. How can you care for yourself at home? Medical treatment  · If you stop taking your medicine, your blood pressure will go back up. You may take one or more types of medicine to lower your blood pressure. Be safe with medicines. Take your medicine exactly as prescribed. Call your doctor if you think you are having a problem with your medicine. · Talk to your doctor before you start taking aspirin every day. Aspirin can help certain people lower their risk of a heart attack or stroke. But taking aspirin isn't right for everyone, because it can cause serious bleeding. · See your doctor regularly. You may need to see the doctor more often at first or until your blood pressure comes down. · If you are taking blood pressure medicine, talk to your doctor before you take decongestants or anti-inflammatory medicine, such as ibuprofen.  Some of these medicines can raise blood pressure. · Learn how to check your blood pressure at home. Lifestyle changes  · Stay at a healthy weight. This is especially important if you put on weight around the waist. Losing even 10 pounds can help you lower your blood pressure. · If your doctor recommends it, get more exercise. Walking is a good choice. Bit by bit, increase the amount you walk every day. Try for at least 30 minutes on most days of the week. You also may want to swim, bike, or do other activities. · Avoid or limit alcohol. Talk to your doctor about whether you can drink any alcohol. · Try to limit how much sodium you eat to less than 2,300 milligrams (mg) a day. Your doctor may ask you to try to eat less than 1,500 mg a day. · Eat plenty of fruits (such as bananas and oranges), vegetables, legumes, whole grains, and low-fat dairy products. · Lower the amount of saturated fat in your diet. Saturated fat is found in animal products such as milk, cheese, and meat. Limiting these foods may help you lose weight and also lower your risk for heart disease. · Do not smoke. Smoking increases your risk for heart attack and stroke. If you need help quitting, talk to your doctor about stop-smoking programs and medicines. These can increase your chances of quitting for good. When should you call for help? Call 911 anytime you think you may need emergency care. This may mean having symptoms that suggest that your blood pressure is causing a serious heart or blood vessel problem. Your blood pressure may be over 180/120.   For example, call 911 if:    · You have symptoms of a heart attack. These may include:  ? Chest pain or pressure, or a strange feeling in the chest.  ? Sweating. ? Shortness of breath. ? Nausea or vomiting. ? Pain, pressure, or a strange feeling in the back, neck, jaw, or upper belly or in one or both shoulders or arms. ? Lightheadedness or sudden weakness.   ? A fast or irregular heartbeat.     · You have symptoms of a stroke. These may include:  ? Sudden numbness, tingling, weakness, or loss of movement in your face, arm, or leg, especially on only one side of your body. ? Sudden vision changes. ? Sudden trouble speaking. ? Sudden confusion or trouble understanding simple statements. ? Sudden problems with walking or balance. ? A sudden, severe headache that is different from past headaches.     · You have severe back or belly pain.    Do not wait until your blood pressure comes down on its own. Get help right away.   Call your doctor now or seek immediate care if:    · Your blood pressure is much higher than normal (such as 180/120 or higher), but you don't have symptoms.     · You think high blood pressure is causing symptoms, such as:  ? Severe headache.  ? Blurry vision.    Watch closely for changes in your health, and be sure to contact your doctor if:    · Your blood pressure measures higher than your doctor recommends at least 2 times. That means the top number is higher or the bottom number is higher, or both.     · You think you may be having side effects from your blood pressure medicine. Where can you learn more? Go to http://samreen-tabitha.info/. Enter N116 in the search box to learn more about \"High Blood Pressure: Care Instructions. \"  Current as of: July 22, 2018  Content Version: 12.1  © 7950-9596 [employer]. Care instructions adapted under license by [employer] (which disclaims liability or warranty for this information). If you have questions about a medical condition or this instruction, always ask your healthcare professional. [place] [address] any warranty or liability for your use of this information.

## 2019-07-22 NOTE — PROGRESS NOTES
Subjective:       Chief Complaint  The patient presents for follow up of hypertension and high cholesterol. prediabetes  chronic back pain and right hip pain, COPD        HPI  Andrew Patel is a 75 y.o. male seen for follow up of hyperlipidemia. Healso has hypertension. Hypertension well controlled, no significant medication side effects noted, on Lopressor, hyperlipidemia fairly well controlled, no significant medication side effects noted, on Pravachol 40 mg, pt had myalgias on Zocor. Diet and Lifestyle: not attempting to follow a low fat, low cholesterol diet, exercises sporadically    Home BP Monitoring: is not measured at home. Andrew Patel RTC today to follow up on chronic pain diagnosis. We discussed his osteoarthritis that is affecting his back. Significant changes since last visit: none. He is  able to do his normal daily activities. He reports the following adverse side effects: none. Pt doing fairly well with Hysingla ER 60 mg. [facility] is [facility] and was referred for aqua therapy but did not tolerate it due to pain. His MRI of his LS shows worsening DJD so he will f/u with the [facility] center for possible cortisone injection. Least pain over the last week has been 3/10. Worst pain over the last week has been 8/10. Opioid Risk Tool Reviewed: YES    Aberrant behaviors: None. Urine Drug Screen: due - order placed. Controlled substance agreement on file: YES.  reviewed:yes    Pill count is consistent with his prescription: yes    Concomitant use of a benzodiazepine: no        Prediabetes: pt is trying to control with diet and weight loss but it continues to be an issue due to his obesity     COPD Review:  The patient is being seen for follow up of COPD. Oxygen: He currently is not on home oxygen therapy. He us using Anoro Ellipta with good results. Symptoms: chronic dyspnea: severity = mild: course of sx: symptoms have progressed to a point and plateaued. Armida Nagy Patient uses 0 pillows at night. Patient does not smoke cigarettes. Pt has sleep apnea is trying to use CPAP on a regular basis. He will f/u with his sleep specialist Dr Chitra Aguirre. Current Outpatient Medications   Medication Sig    gabapentin (NEURONTIN) 300 mg capsule 2 po tid -- taper up as directed  Indications: Neuropathic Pain    tamsulosin (FLOMAX) 0.4 mg capsule TAKE 1 CAPSULE BY MOUTH ONCE DAILY AFTER  DINNER    topiramate (TOPAMAX) 100 mg tablet Take 1 Tab by mouth daily (with breakfast).  azelastine (ASTELIN) 137 mcg (0.1 %) nasal spray 2 Sprays by Both Nostrils route two (2) times a day. Use in each nostril as directed  Indications: Seasonal Runny Nose    albuterol (PROVENTIL HFA, VENTOLIN HFA, PROAIR HFA) 90 mcg/actuation inhaler Take 2 Puffs by inhalation every four (4) hours as needed for Wheezing.  ANORO ELLIPTA 62.5-25 mcg/actuation inhaler INHALE 1 PUFF BY MOUTH ONCE DAILY    pravastatin (PRAVACHOL) 40 mg tablet TAKE ONE TABLET BY MOUTH NIGHTLY    montelukast (SINGULAIR) 10 mg tablet Take 1 Tab by mouth daily.  ibuprofen (MOTRIN) 800 mg tablet TAKE 1 TABLET BY MOUTH EVERY 8 HOURS AS NEEDED FOR PAIN    traZODone (DESYREL) 50 mg tablet TAKE 1 TABLET BY MOUTH ONCE DAILY AT BEDTIME AS NEEDED FOR SLEEP    fluticasone (FLONASE) 50 mcg/actuation nasal spray USE TWO SPRAY(S) IN EACH NOSTRIL ONCE DAILY    metoprolol tartrate (LOPRESSOR) 25 mg tablet TAKE ONE TABLET BY MOUTH TWICE DAILY ( GENERIC FOR LOPRESSOR )    pantoprazole (PROTONIX) 40 mg tablet TAKE ONE TABLET BY MOUTH ONCE DAILY    lactulose (CONSTULOSE) 10 gram/15 mL solution Take 45ml PO TID    nitroglycerin (NITROSTAT) 0.4 mg SL tablet 1 Tab by SubLINGual route every five (5) minutes as needed.  TRAVATAN Z 0.004 % ophthalmic solution Administer 1 Drop to both eyes nightly.  guaiFENesin ER (MUCINEX) 600 mg ER tablet Take 1 Tab by mouth two (2) times a day.      No current facility-administered medications for this visit. Review of Systems  Respiratory: negative for dyspnea on exertion  Cardiovascular: negative for chest pain    Objective:     Visit Vitals  /55 (BP 1 Location: Left arm, BP Patient Position: Sitting)   Pulse (!) 57   Temp 99.1 °F (37.3 °C) (Oral)   Resp 20   Ht 5' 9\" (1.753 m)   Wt 241 lb (109.3 kg)   SpO2 96%   BMI 35.59 kg/m²        General appearance - alert, well appearing, and in no distress  Neck - supple, no significant adenopathy, carotids upstroke normal bilaterally, no bruits  Chest - clear to auscultation, no wheezes, rales or rhonchi, symmetric air entry  Heart - normal rate, regular rhythm, normal S1, S2, no murmurs, rubs, clicks or gallops  Extremities - peripheral pulses normal, no pedal edema, no clubbing or cyanosis  Skin - normal coloration and turgor, no rashes, no suspicious skin lesions noted      Labs:   Lab Results   Component Value Date/Time    Hemoglobin A1c 6.0 (H) 07/15/2019 07:39 AM    Hemoglobin A1c 5.7 (H) 03/14/2019 08:59 AM    Hemoglobin A1c 6.0 (H) 11/14/2018 07:35 AM    Glucose 121 (H) 07/15/2019 07:39 AM    Glucose (POC) 123 (H) 01/24/2016 04:57 PM    Glucose,  (H) 04/01/2015 08:56 AM    Microalb/Creat ratio (ug/mg creat.) 4.4 04/29/2015 03:00 PM    LDL, calculated 81.6 07/15/2019 07:39 AM    Creatinine 1.14 07/15/2019 07:39 AM      Lab Results   Component Value Date/Time    Cholesterol, total 152 07/15/2019 07:39 AM    HDL Cholesterol 40 07/15/2019 07:39 AM    LDL, calculated 81.6 07/15/2019 07:39 AM    Triglyceride 152 (H) 07/15/2019 07:39 AM    CHOL/HDL Ratio 3.8 07/15/2019 07:39 AM     Lab Results   Component Value Date/Time    ALT (SGPT) 17 07/15/2019 07:39 AM    AST (SGOT) 14 (L) 07/15/2019 07:39 AM    Alk.  phosphatase 86 07/15/2019 07:39 AM    Bilirubin, total 0.4 07/15/2019 07:39 AM     Lab Results   Component Value Date/Time    GFR est AA >60 07/15/2019 07:39 AM    GFR est non-AA >60 07/15/2019 07:39 AM    Creatinine 1.14 07/15/2019 07:39 AM    BUN 15 07/15/2019 07:39 AM    BUN, POC 6 (L) 04/01/2015 08:56 AM    Sodium,  04/01/2015 08:56 AM    Sodium 143 07/15/2019 07:39 AM    Potassium 4.2 07/15/2019 07:39 AM    Potassium, POC 4.0 04/01/2015 08:56 AM    Chloride,  04/01/2015 08:56 AM    Chloride 110 (H) 07/15/2019 07:39 AM    CO2 27 07/15/2019 07:39 AM      Lab Results   Component Value Date/Time    Glucose 121 (H) 07/15/2019 07:39 AM    Glucose (POC) 123 (H) 01/24/2016 04:57 PM    Glucose,  (H) 04/01/2015 08:56 AM            Assessment / Plan     Hypertension well controlled, on Lopressor   Hyperlipidemia fairly well controlled, on Pravachol 40 mg. ICD-10-CM ICD-9-CM    1. Hypercholesterolemia E78.00 272.0 LIPID PANEL   2. Essential hypertension E51 548.3 METABOLIC PANEL, COMPREHENSIVE   3. Chronic midline low back pain with right-sided sciatica M54.41 724.2 Was doing well on Hysinglar 60 mg but stopped it since co pay went up to $9     G89.29 724.3      338.29    4. Obstructive sleep apnea syndrome G47.33 327.23 Pt continues on CPAP   5. Spinal stenosis of lumbar region without neurogenic claudication M48.061 724.02 Pt will continue on gabapentin (NEURONTIN) 300 mg capsule to help with pain    6. Severe obesity ([address] E66.01 278.01 Pt will continue to work on diet and weight loss   7. Prediabetes R73.03 790.29 Controlled currently with diet. Pt to continue to work on losing weight. HEMOGLOBIN A1C W/O EAG               Low cholesterol diet, weight control and daily exercise discussed. Follow-up and Dispositions    · Return in about 4 months (around 11/22/2019) for labs 1 week before. Reviewed plan of care. Patient has provided input and agrees with goals.

## 2019-07-23 DIAGNOSIS — M54.41 CHRONIC MIDLINE LOW BACK PAIN WITH RIGHT-SIDED SCIATICA: ICD-10-CM

## 2019-07-23 DIAGNOSIS — G89.29 CHRONIC MIDLINE LOW BACK PAIN WITH RIGHT-SIDED SCIATICA: ICD-10-CM

## 2019-07-23 RX ORDER — HYDROCODONE BITARTRATE 60 MG/1
60 TABLET, EXTENDED RELEASE ORAL DAILY
Qty: 30 TAB | Refills: 0 | Status: SHIPPED | OUTPATIENT
Start: 2019-07-23 | End: 2019-08-20 | Stop reason: SDUPTHER

## 2019-07-23 NOTE — TELEPHONE ENCOUNTER
Pt here in lobby waiting. Asking for this RX.  He has transportation issues and wanted to take both the RX he received yesterday and this one at the same time to his pharmacy    HYDROcodone bitartrate (HYSINGLA) 60 mg ER tablet    It is not in his current medication list.

## 2019-08-20 DIAGNOSIS — M54.41 CHRONIC MIDLINE LOW BACK PAIN WITH RIGHT-SIDED SCIATICA: ICD-10-CM

## 2019-08-20 DIAGNOSIS — G89.29 CHRONIC MIDLINE LOW BACK PAIN WITH RIGHT-SIDED SCIATICA: ICD-10-CM

## 2019-08-21 NOTE — TELEPHONE ENCOUNTER
Patient is aware another nasal spray was sent to the pharmacy and he can use both nasal sprays. Also prescription will be ready for  at the front office this afternoon. Patient verbalizes understanding. [Lower Ext Edema] : lower extremity edema [Negative] : Heme/Lymph [Palpitations] : no palpitations [Chest Pain] : no chest pain [Claudication] : no  leg claudication [Orthopena] : no orthopnea [Paroysmal Nocturnal Dyspnea] : no paroysmal nocturnal dyspnea

## 2019-08-22 RX ORDER — HYDROCODONE BITARTRATE 60 MG/1
60 TABLET, EXTENDED RELEASE ORAL DAILY
Qty: 5 TAB | Refills: 0 | Status: SHIPPED | OUTPATIENT
Start: 2019-08-22 | End: 2019-08-26 | Stop reason: SDUPTHER

## 2019-08-26 DIAGNOSIS — M54.41 CHRONIC MIDLINE LOW BACK PAIN WITH RIGHT-SIDED SCIATICA: ICD-10-CM

## 2019-08-26 DIAGNOSIS — G89.29 CHRONIC MIDLINE LOW BACK PAIN WITH RIGHT-SIDED SCIATICA: ICD-10-CM

## 2019-08-26 RX ORDER — HYDROCODONE BITARTRATE 60 MG/1
60 TABLET, EXTENDED RELEASE ORAL DAILY
Qty: 30 TAB | Refills: 0 | Status: SHIPPED | OUTPATIENT
Start: 2019-08-26 | End: 2019-09-23 | Stop reason: SDUPTHER

## 2019-08-26 RX ORDER — HYDROCODONE BITARTRATE 60 MG/1
60 TABLET, EXTENDED RELEASE ORAL DAILY
Qty: 30 TAB | Refills: 0 | Status: SHIPPED | OUTPATIENT
Start: 2019-08-26 | End: 2019-08-26

## 2019-08-26 NOTE — TELEPHONE ENCOUNTER
Pt came into the office stating he is going to wait until Dr. Betzaida Jaime write his rx for hysingla.  Rx is at  waiting to get a full rx call him when the new rx is ready at [phone]

## 2019-08-26 NOTE — TELEPHONE ENCOUNTER
Patient states the pharmacy would not fill his prescription for Hyslinga stating the sig was incorrect. Patient would like to know if this can be corrected and reprinted for him.

## 2019-09-17 ENCOUNTER — OFFICE VISIT (OUTPATIENT)
Dept: FAMILY MEDICINE CLINIC | Age: 64
End: 2019-09-17

## 2019-09-17 VITALS
HEIGHT: 69 IN | DIASTOLIC BLOOD PRESSURE: 52 MMHG | RESPIRATION RATE: 18 BRPM | WEIGHT: 249.6 LBS | HEART RATE: 78 BPM | BODY MASS INDEX: 36.97 KG/M2 | SYSTOLIC BLOOD PRESSURE: 102 MMHG | OXYGEN SATURATION: 95 % | TEMPERATURE: 98.5 F

## 2019-09-17 DIAGNOSIS — M54.41 CHRONIC MIDLINE LOW BACK PAIN WITH RIGHT-SIDED SCIATICA: ICD-10-CM

## 2019-09-17 DIAGNOSIS — R25.2 LEG CRAMPS: Primary | ICD-10-CM

## 2019-09-17 DIAGNOSIS — G89.29 CHRONIC MIDLINE LOW BACK PAIN WITH RIGHT-SIDED SCIATICA: ICD-10-CM

## 2019-09-17 RX ORDER — CYCLOBENZAPRINE HCL 5 MG
5 TABLET ORAL
Qty: 9 TAB | Refills: 0 | Status: SHIPPED | OUTPATIENT
Start: 2019-09-17 | End: 2019-10-02 | Stop reason: ALTCHOICE

## 2019-09-17 NOTE — PROGRESS NOTES
JOHANNY Sim is a 75 y.o. male who presents today for chronic leg cramps. Pt notes the pain is daily, he states he woke up with charley horse sensation that worsens when he stands and walk. Pt states he gets intermittent numbness/tingling to legs. Pt rates the pain 10/10 and at it's worst 10/10. Pt states he is out of his pain medication, Hydrocodone. Pt has tried taking Ibuprofen 800 mg, pain patches and warm compress and notes minimal improvement. Pt was seeing a spine specialist in the past and has not been back to that provider. Current Outpatient Medications   Medication Sig Dispense Refill    HYDROcodone bitartrate (HYSINGLA) 60 mg ER tablet Take 1 Tab by mouth daily for 30 days. Max Daily Amount: 60 mg. *DO NOT CRUSH,CHEW, OR DISSOLVE TABLET* 30 Tab 0    gabapentin (NEURONTIN) 300 mg capsule 2 po tid -- taper up as directed  Indications: Neuropathic Pain 180 Cap 5    tamsulosin (FLOMAX) 0.4 mg capsule TAKE 1 CAPSULE BY MOUTH ONCE DAILY AFTER  DINNER 90 Cap 3    topiramate (TOPAMAX) 100 mg tablet Take 1 Tab by mouth daily (with breakfast). 30 Tab 5    azelastine (ASTELIN) 137 mcg (0.1 %) nasal spray 2 Sprays by Both Nostrils route two (2) times a day. Use in each nostril as directed  Indications: Seasonal Runny Nose 1 Bottle 5    albuterol (PROVENTIL HFA, VENTOLIN HFA, PROAIR HFA) 90 mcg/actuation inhaler Take 2 Puffs by inhalation every four (4) hours as needed for Wheezing. 1 Inhaler 5    ANORO ELLIPTA 62.5-25 mcg/actuation inhaler INHALE 1 PUFF BY MOUTH ONCE DAILY 1 Inhaler 5    pravastatin (PRAVACHOL) 40 mg tablet TAKE ONE TABLET BY MOUTH NIGHTLY 90 Tab 3    montelukast (SINGULAIR) 10 mg tablet Take 1 Tab by mouth daily.  30 Tab 5    ibuprofen (MOTRIN) 800 mg tablet TAKE 1 TABLET BY MOUTH EVERY 8 HOURS AS NEEDED FOR PAIN 90 Tab 5    traZODone (DESYREL) 50 mg tablet TAKE 1 TABLET BY MOUTH ONCE DAILY AT BEDTIME AS NEEDED FOR SLEEP 30 Tab 2    fluticasone (FLONASE) 50 mcg/actuation nasal spray USE TWO SPRAY(S) IN EACH NOSTRIL ONCE DAILY 1 Bottle 5    metoprolol tartrate (LOPRESSOR) 25 mg tablet TAKE ONE TABLET BY MOUTH TWICE DAILY ( GENERIC FOR LOPRESSOR ) 180 Tab 3    pantoprazole (PROTONIX) 40 mg tablet TAKE ONE TABLET BY MOUTH ONCE DAILY 90 Tab 1    lactulose (CONSTULOSE) 10 gram/15 mL solution Take 45ml PO  mL 2    guaiFENesin ER (MUCINEX) 600 mg ER tablet Take 1 Tab by mouth two (2) times a day. 30 Tab 5    nitroglycerin (NITROSTAT) 0.4 mg SL tablet 1 Tab by SubLINGual route every five (5) minutes as needed. 6 Tab 1    TRAVATAN Z 0.004 % ophthalmic solution Administer 1 Drop to both eyes nightly. Allergies   Allergen Reactions    Latex Not Reported This Time    Ace Inhibitors Cough    Dilaudid [Hydromorphone] Hives    Lisinopril Cough    Zocor [Simvastatin] Myalgia     Right leg       SUBJECTIVE:  Review of Systems   Constitutional: Negative for chills, fever and malaise/fatigue. Eyes: Negative for blurred vision. Respiratory: Negative for shortness of breath. Cardiovascular: Negative for chest pain, palpitations and leg swelling. Gastrointestinal: Negative for abdominal pain, diarrhea, nausea and vomiting. Genitourinary: Negative for dysuria, frequency and urgency. Musculoskeletal: Positive for back pain and myalgias. Negative for falls. Neurological: Positive for tingling and sensory change. Negative for dizziness and headaches. OBJECTIVE:  Visit Vitals  /52 (BP 1 Location: Left arm, BP Patient Position: Sitting)   Pulse 78   Temp 98.5 °F (36.9 °C) (Oral)   Resp 18   Ht 5' 9\" (1.753 m)   Wt 249 lb 9.6 oz (113.2 kg)   SpO2 95%   BMI 36.86 kg/m²      Physical Exam   Constitutional: He is oriented to person, place, and time and well-developed, well-nourished, and in no distress. HENT:   Head: Normocephalic. Eyes: Pupils are equal, round, and reactive to light.  Conjunctivae and EOM are normal.   Cardiovascular: Normal rate, regular rhythm and normal heart sounds. Pulmonary/Chest: Effort normal and breath sounds normal. He has no wheezes. He has no rales. He exhibits no tenderness. Musculoskeletal: He exhibits tenderness. He exhibits no edema. Neurological: He is alert and oriented to person, place, and time. Gait normal.   Skin: Skin is warm and dry. No erythema. Psychiatric: Mood and affect normal.   Nursing note and vitals reviewed. ASSESSMENT:  Diagnoses and all orders for this visit:    1. Leg cramps  -     METABOLIC PANEL, BASIC; Future    2. Chronic midline low back pain with right-sided sciatica    Other orders  -     METABOLIC PANEL, BASIC    PLAN:  Lab ordered today  Continue current medications    I have discussed the diagnosis with the patient and the intended plan as seen in the above orders. The patient has received an after-visit summary and questions were answered concerning future plans. I have discussed medication side effects and warnings with the patient as well. Patient will call for further questions. Follow-up and Dispositions    · Return if symptoms worsen or fail to improve.        Rebecca Goncalves NP

## 2019-09-17 NOTE — PROGRESS NOTES
Chief Complaint   Patient presents with    Leg Pain     1. Have you been to the ER, urgent care clinic since your last visit? Hospitalized since your last visit? No    2. Have you seen or consulted any other health care providers outside of the [address] [place] [address] since your last visit? Include any pap smears or colon screening.  No

## 2019-09-17 NOTE — PATIENT INSTRUCTIONS
Learning About How to Have a Healthy Back  What causes back pain? Back pain is often caused by overuse, strain, or injury. For example, people often hurt their backs playing sports or working in the yard, being jolted in a car accident, or lifting something too heavy. Aging plays a part too. Your bones and muscles tend to lose strength as you age, which makes injury more likely. The spongy discs between the bones of the spine (vertebrae) may suffer from wear and tear and no longer provide enough cushion between the bones. A disc that bulges or breaks open (herniated disc) can press on nerves, causing back pain. In some people, back pain is the result of arthritis, broken vertebrae caused by bone loss (osteoporosis), illness, or a spine problem. Although most people have back pain at one time or another, there are steps you can take to make it less likely. How can you have a healthy back? Reduce stress on your back through good posture  Slumping or slouching alone may not cause low back pain. But after the back has been strained or injured, bad posture can make pain worse. · Sleep in a position that maintains your back's normal curves and on a mattress that feels comfortable. Sleep on your side with a pillow between your knees, or sleep on your back with a pillow under your knees. These positions can reduce strain on your back. · Stand and sit up straight. \"Good posture\" generally means your ears, shoulders, and hips are in a straight line. · If you must stand for a long time, put one foot on a stool, ledge, or box. Switch feet every now and then. · Sit in a chair that is low enough to let you place both feet flat on the floor with both knees nearly level with your hips. If your chair or desk is too high, use a footrest to raise your knees. Place a small pillow, a rolled-up towel, or a lumbar roll in the curve of your back if you need extra support.   · Try a kneeling chair, which helps tilt your hips forward. This takes pressure off your lower back. · Try sitting on an exercise ball. It can rock from side to side, which helps keep your back loose. · When driving, keep your knees nearly level with your hips. Sit straight, and drive with both hands on the steering wheel. Your arms should be in a slightly bent position. Reduce stress on your back through careful lifting  · Squat down, bending at the hips and knees only. If you need to, put one knee to the floor and extend your other knee in front of you, bent at a right angle (half kneeling). · Press your chest straight forward. This helps keep your upper back straight while keeping a slight arch in your low back. · Hold the load as close to your body as possible, at the level of your belly button (navel). · Use your feet to change direction, taking small steps. · Lead with your hips as you change direction. Keep your shoulders in line with your hips as you move. · Set down your load carefully, squatting with your knees and hips only. Exercise and stretch your back  · Do some exercise on most days of the week, if your doctor says it is okay. You can walk, run, swim, or cycle. · Stretch your back muscles. Here are a few exercises to try:  ? Lie on your back, and gently pull one bent knee to your chest. Put that foot back on the floor, and then pull the other knee to your chest.  ? Do pelvic tilts. Lie on your back with your knees bent. Tighten your stomach muscles. Pull your belly button (navel) in and up toward your ribs. You should feel like your back is pressing to the floor and your hips and pelvis are slightly lifting off the floor. Hold for 6 seconds while breathing smoothly. ? Sit with your back flat against a wall. · Keep your core muscles strong. The muscles of your back, belly (abdomen), and buttocks support your spine. ? Pull in your belly and imagine pulling your navel toward your spine. Hold this for 6 seconds, then relax.  Remember to keep breathing normally as you tense your muscles. ? Do curl-ups. Always do them with your knees bent. Keep your low back on the floor, and curl your shoulders toward your knees using a smooth, slow motion. Keep your arms folded across your chest. If this bothers your neck, try putting your hands behind your neck (not your head), with your elbows spread apart. ? Lie on your back with your knees bent and your feet flat on the floor. Tighten your belly muscles, and then push with your feet and raise your buttocks up a few inches. Hold this position 6 seconds as you continue to breathe normally, then lower yourself slowly to the floor. Repeat 8 to 12 times. ? If you like group exercise, try Pilates or yoga. These classes have poses that strengthen the core muscles. Lead a healthy lifestyle  · Stay at a healthy weight to avoid strain on your back. · Do not smoke. Smoking increases the risk of osteoporosis, which weakens the spine. If you need help quitting, talk to your doctor about stop-smoking programs and medicines. These can increase your chances of quitting for good. Where can you learn more? Go to http://samreen[employer] Techn[employer]iestabitha.info/. Enter L315 in the search box to learn more about \"Learning About How to Have a Healthy Back. \"  Current as of: September 20, 2018  Content Version: 12.1  © 6516-5814 [employer]. Care instructions adapted under license by [employer] (which disclaims liability or warranty for this information). If you have questions about a medical condition or this instruction, always ask your healthcare professional. [place] [address] any warranty or liability for your use of this information. Muscle Cramps: Care Instructions  Your Care Instructions    A muscle cramp occurs when a muscle tightens up suddenly. A cramp often happens in the legs. A muscle cramp is also called a muscle spasm or a charley horse.   Muscle cramps usually last less than a minute. However, the pain may last for several minutes. Leg cramps that occur at night may wake you up. Heavy exercise, dehydration, and being overweight can increase your risk of getting cramps. An imbalance of certain chemicals in your blood, called electrolytes, can also lead to muscle cramps. Pregnant women sometimes get muscle cramps during sleep. Muscle cramps can be treated by stretching and massaging the muscle. If cramps keep coming back, your doctor may prescribe medicine that relaxes your muscles. Follow-up care is a key part of your treatment and safety. Be sure to make and go to all appointments, and call your doctor if you are having problems. It's also a good idea to know your test results and keep a list of the medicines you take. How can you care for yourself at home? · Drink plenty of fluids to prevent dehydration. Choose water and other caffeine-free clear liquids until you feel better. If you have kidney, heart, or liver disease and have to limit fluids, talk with your doctor before you increase the amount of fluids you drink. · Stretch your muscles every day, especially before and after exercise and at bedtime. Regular stretching can relax your muscles and may prevent cramps. · Do not suddenly increase the amount of exercise you get. Increase your exercise a little each week. · When you get a cramp, stretch and massage the muscle. You can also take a warm shower or bath to relax the muscle. A heating pad placed on the muscle can also help. · Take a daily multivitamin supplement. · Ask your doctor if you can take an over-the-counter pain medicine, such as acetaminophen (Tylenol), ibuprofen (Advil, Motrin), or naproxen (Aleve). Be safe with medicines. Read and follow all instructions on the label. When should you call for help?   Watch closely for changes in your health, and be sure to contact your doctor if:    · You get muscle cramps often that do not go away after home treatment.     · Your muscle cramps often wake you up at night.     · You do not get better as expected. Where can you learn more? Go to http://samreen-tabitha.info/. Enter E478 in the search box to learn more about \"Muscle Cramps: Care Instructions. \"  Current as of: September 20, 2018  Content Version: 12.1  © 8826-8749 [employer]. Care instructions adapted under license by [employer] (which disclaims liability or warranty for this information). If you have questions about a medical condition or this instruction, always ask your healthcare professional. [place] [address] any warranty or liability for your use of this information.

## 2019-09-18 ENCOUNTER — TELEPHONE (OUTPATIENT)
Dept: FAMILY MEDICINE CLINIC | Age: 64
End: 2019-09-18

## 2019-09-18 ENCOUNTER — CLINICAL SUPPORT (OUTPATIENT)
Dept: FAMILY MEDICINE CLINIC | Age: 64
End: 2019-09-18

## 2019-09-18 DIAGNOSIS — Z23 ENCOUNTER FOR IMMUNIZATION: Primary | ICD-10-CM

## 2019-09-18 LAB
BUN SERPL-MCNC: 23 MG/DL (ref 8–27)
BUN/CREAT SERPL: 18 (ref 10–24)
CALCIUM SERPL-MCNC: 9.4 MG/DL (ref 8.6–10.2)
CHLORIDE SERPL-SCNC: 106 MMOL/L (ref 96–106)
CO2 SERPL-SCNC: 22 MMOL/L (ref 20–29)
CREAT SERPL-MCNC: 1.31 MG/DL (ref 0.76–1.27)
GLUCOSE SERPL-MCNC: 89 MG/DL (ref 65–99)
POTASSIUM SERPL-SCNC: 4.7 MMOL/L (ref 3.5–5.2)
SODIUM SERPL-SCNC: 144 MMOL/L (ref 134–144)

## 2019-09-18 NOTE — PROGRESS NOTES
Patient received Flu highdose vaccine 0.5ml in left deltoid. Tolerated well. No signs or symptoms of distress noted. Most current VIS given and consent signed. he was observed for 10 min post injection. There were no reactions observed.

## 2019-09-18 NOTE — TELEPHONE ENCOUNTER
Patient checking on a fax that was supposed to come into the office from his pharmacy regarding his Flexeril. Patient is asking for a call back with the status of paperwork.      pls advise

## 2019-09-23 DIAGNOSIS — M54.41 CHRONIC MIDLINE LOW BACK PAIN WITH RIGHT-SIDED SCIATICA: ICD-10-CM

## 2019-09-23 DIAGNOSIS — G89.29 CHRONIC MIDLINE LOW BACK PAIN WITH RIGHT-SIDED SCIATICA: ICD-10-CM

## 2019-09-23 DIAGNOSIS — R25.2 LEG CRAMPS: ICD-10-CM

## 2019-09-23 RX ORDER — CYCLOBENZAPRINE HCL 5 MG
5 TABLET ORAL
Qty: 9 TAB | Refills: 0 | Status: CANCELLED | OUTPATIENT
Start: 2019-09-23

## 2019-09-23 RX ORDER — CYCLOBENZAPRINE HCL 10 MG
10 TABLET ORAL
Qty: 90 TAB | Refills: 3 | Status: SHIPPED | OUTPATIENT
Start: 2019-09-23 | End: 2019-10-15 | Stop reason: SINTOL

## 2019-09-23 RX ORDER — HYDROCODONE BITARTRATE 60 MG/1
60 TABLET, EXTENDED RELEASE ORAL DAILY
Qty: 30 TAB | Refills: 0 | Status: SHIPPED | OUTPATIENT
Start: 2019-09-23 | End: 2019-10-21 | Stop reason: SDUPTHER

## 2019-09-23 RX ORDER — METOPROLOL TARTRATE 25 MG/1
TABLET, FILM COATED ORAL
Qty: 180 TAB | Refills: 3 | Status: SHIPPED | OUTPATIENT
Start: 2019-09-23 | End: 2020-09-02

## 2019-09-23 NOTE — TELEPHONE ENCOUNTER
Requested Prescriptions     Pending Prescriptions Disp Refills    cyclobenzaprine (FLEXERIL) 5 mg tablet 9 Tab 0     Sig: Take 1 Tab by mouth three (3) times daily as needed for Muscle Spasm(s).  HYDROcodone bitartrate (HYSINGLA) 60 mg ER tablet 30 Tab 0     Sig: Take 1 Tab by mouth daily for 30 days. Max Daily Amount: 60 mg.  *DO NOT CRUSH,CHEW, OR DISSOLVE TABLET*

## 2019-09-23 NOTE — TELEPHONE ENCOUNTER
Spoke with patient he has picked up flexeril. Patient is aware Geovannyssameera is ready for  at the front office.

## 2019-10-02 ENCOUNTER — OFFICE VISIT (OUTPATIENT)
Dept: FAMILY MEDICINE CLINIC | Age: 64
End: 2019-10-02

## 2019-10-02 VITALS
TEMPERATURE: 98.8 F | BODY MASS INDEX: 37.47 KG/M2 | HEART RATE: 73 BPM | DIASTOLIC BLOOD PRESSURE: 71 MMHG | WEIGHT: 253 LBS | OXYGEN SATURATION: 98 % | RESPIRATION RATE: 18 BRPM | HEIGHT: 69 IN | SYSTOLIC BLOOD PRESSURE: 109 MMHG

## 2019-10-02 DIAGNOSIS — R51.9 SINUS HEADACHE: Primary | ICD-10-CM

## 2019-10-02 DIAGNOSIS — J30.1 SEASONAL ALLERGIC RHINITIS DUE TO POLLEN: ICD-10-CM

## 2019-10-02 DIAGNOSIS — J01.10 ACUTE NON-RECURRENT FRONTAL SINUSITIS: ICD-10-CM

## 2019-10-02 DIAGNOSIS — M79.89 LEG SWELLING: ICD-10-CM

## 2019-10-02 DIAGNOSIS — I87.2 VENOUS INSUFFICIENCY: ICD-10-CM

## 2019-10-02 RX ORDER — FLUTICASONE PROPIONATE 50 MCG
SPRAY, SUSPENSION (ML) NASAL
Qty: 1 BOTTLE | Refills: 5 | Status: SHIPPED | OUTPATIENT
Start: 2019-10-02 | End: 2020-11-13 | Stop reason: SDUPTHER

## 2019-10-02 RX ORDER — MONTELUKAST SODIUM 10 MG/1
10 TABLET ORAL DAILY
Qty: 30 TAB | Refills: 5 | Status: SHIPPED | OUTPATIENT
Start: 2019-10-02 | End: 2020-11-20

## 2019-10-02 NOTE — PATIENT INSTRUCTIONS
A Healthy Lifestyle: Care Instructions  Your Care Instructions    A healthy lifestyle can help you feel good, stay at a healthy weight, and have plenty of energy for both work and play. A healthy lifestyle is something you can share with your whole family. A healthy lifestyle also can lower your risk for serious health problems, such as high blood pressure, heart disease, and diabetes. You can follow a few steps listed below to improve your health and the health of your family. Follow-up care is a key part of your treatment and safety. Be sure to make and go to all appointments, and call your doctor if you are having problems. It's also a good idea to know your test results and keep a list of the medicines you take. How can you care for yourself at home? · Do not eat too much sugar, fat, or fast foods. You can still have dessert and treats now and then. The goal is moderation. · Start small to improve your eating habits. Pay attention to portion sizes, drink less juice and soda pop, and eat more fruits and vegetables. ? Eat a healthy amount of food. A 3-ounce serving of meat, for example, is about the size of a deck of cards. Fill the rest of your plate with vegetables and whole grains. ? Limit the amount of soda and sports drinks you have every day. Drink more water when you are thirsty. ? Eat at least 5 servings of fruits and vegetables every day. It may seem like a lot, but it is not hard to reach this goal. A serving or helping is 1 piece of fruit, 1 cup of vegetables, or 2 cups of leafy, raw vegetables. Have an apple or some carrot sticks as an afternoon snack instead of a candy bar. Try to have fruits and/or vegetables at every meal.  · Make exercise part of your daily routine. You may want to start with simple activities, such as walking, bicycling, or slow swimming. Try to be active 30 to 60 minutes every day. You do not need to do all 30 to 60 minutes all at once.  For example, you can exercise 3 times a day for 10 or 20 minutes. Moderate exercise is safe for most people, but it is always a good idea to talk to your doctor before starting an exercise program.  · Keep moving. Milady Nik the lawn, work in the garden, or [employer]. Take the stairs instead of the elevator at work. · If you smoke, quit. People who smoke have an increased risk for heart attack, stroke, cancer, and other lung illnesses. Quitting is hard, but there are ways to boost your chance of quitting tobacco for good. ? Use nicotine gum, patches, or lozenges. ? Ask your doctor about stop-smoking programs and medicines. ? Keep trying. In addition to reducing your risk of diseases in the future, you will notice some benefits soon after you stop using tobacco. If you have shortness of breath or asthma symptoms, they will likely get better within a few weeks after you quit. · Limit how much alcohol you drink. Moderate amounts of alcohol (up to 2 drinks a day for men, 1 drink a day for women) are okay. But drinking too much can lead to liver problems, high blood pressure, and other health problems. Family health  If you have a family, there are many things you can do together to improve your health. · Eat meals together as a family as often as possible. · Eat healthy foods. This includes fruits, vegetables, lean meats and dairy, and whole grains. · Include your family in your fitness plan. Most people think of activities such as jogging or tennis as the way to fitness, but there are many ways you and your family can be more active. Anything that makes you breathe hard and gets your heart pumping is exercise. Here are some tips:  ? Walk to do errands or to take your child to school or the bus.  ? Go for a family bike ride after dinner instead of watching TV. Where can you learn more? Go to http://samreen-tabitha.info/. Enter B246 in the search box to learn more about \"A Healthy Lifestyle: Care Instructions. \"  Current as of: May 28, 2019  Content Version: 12.2  © 1992-3672 [employer]. Care instructions adapted under license by [employer] (which disclaims liability or warranty for this information). If you have questions about a medical condition or this instruction, always ask your healthcare professional. [address] any warranty or liability for your use of this information.

## 2019-10-02 NOTE — PROGRESS NOTES
Patient is in the office today for a heaviness feeling in head. Patient states the feeling of heaviness started last week on the left side of head. Patient states he has not had a headache, denies fall or trauma to the head. 1. Have you been to the ER, urgent care clinic since your last visit? Hospitalized since your last visit? No    2. Have you seen or consulted any other health care providers outside of the [address] [place] [address] since your last visit? Include any pap smears or colon screening.  No

## 2019-10-02 NOTE — PROGRESS NOTES
HISTORY OF PRESENT ILLNESS  Eugenio Zepeda is a 75 y.o. male. Cold Symptoms   The history is provided by the patient. This is a new problem. The current episode started more than 1 week ago. There has been no fever. Associated symptoms include headaches. Pertinent negatives include no chills. Treatments tried: not using Flonase. The treatment provided no relief. He is a smoker. Review of Systems   Constitutional: Negative for chills and fever. Neurological: Positive for headaches. Physical Exam   Constitutional: He appears well-developed and well-nourished. HENT:   Nose: Mucosal edema present. Mouth/Throat: Uvula is midline, oropharynx is clear and moist and mucous membranes are normal.   Cardiovascular: Normal rate, regular rhythm and normal heart sounds. Pulmonary/Chest: Effort normal and breath sounds normal.       ASSESSMENT and PLAN    ICD-10-CM ICD-9-CM    1. Sinus headache R51 784.0 Will add montelukast (SINGULAIR) 10 mg tablet      And continue fluticasone propionate (FLONASE) 50 mcg/actuation nasal spray   2. Seasonal allergic rhinitis due to pollen J30.1 477.0 Treat as per # 1    3. Venous insufficiency I87.2 459.81 Pt to try compression stockings    4. Leg swelling M79.89 729.81 Management as per # 3    5. Acute non-recurrent frontal sinusitis J01.10 461.1 montelukast (SINGULAIR) 10 mg tablet       Reviewed plan of care. Patient has provided input and agrees with goals.

## 2019-10-15 ENCOUNTER — TELEPHONE (OUTPATIENT)
Dept: FAMILY MEDICINE CLINIC | Age: 64
End: 2019-10-15

## 2019-10-15 RX ORDER — TIZANIDINE 2 MG/1
2 TABLET ORAL
Qty: 60 TAB | Refills: 0 | Status: SHIPPED | OUTPATIENT
Start: 2019-10-15 | End: 2019-10-29 | Stop reason: SDUPTHER

## 2019-10-15 NOTE — TELEPHONE ENCOUNTER
Patient stated that his Cyclobenzapr 10mg he believes is too strong. Patient would like to know if he can have a lower dose. Patient stated that when he takes the 10mg he cant get out the bed they make him hurt.      pls advise

## 2019-10-21 DIAGNOSIS — M54.41 CHRONIC MIDLINE LOW BACK PAIN WITH RIGHT-SIDED SCIATICA: ICD-10-CM

## 2019-10-21 DIAGNOSIS — G89.29 CHRONIC MIDLINE LOW BACK PAIN WITH RIGHT-SIDED SCIATICA: ICD-10-CM

## 2019-10-21 RX ORDER — HYDROCODONE BITARTRATE 60 MG/1
60 TABLET, EXTENDED RELEASE ORAL DAILY
Qty: 30 TAB | Refills: 0 | Status: SHIPPED | OUTPATIENT
Start: 2019-10-21 | End: 2019-11-22 | Stop reason: SDUPTHER

## 2019-10-21 NOTE — TELEPHONE ENCOUNTER
Requested Prescriptions     Pending Prescriptions Disp Refills    HYDROcodone bitartrate (HYSINGLA) 60 mg ER tablet 30 Tab 0     Sig: Take 1 Tab by mouth daily for 30 days. Max Daily Amount: 60 mg.  *DO NOT CRUSH,CHEW, OR DISSOLVE TABLET*    Last OV-10/2  Next OV-11/22  Last refill-9/23

## 2019-10-22 ENCOUNTER — TELEPHONE (OUTPATIENT)
Dept: FAMILY MEDICINE CLINIC | Age: 64
End: 2019-10-22

## 2019-10-24 NOTE — TELEPHONE ENCOUNTER
Patient states he wants the order mailed to his house and he will take it to [address] or [facility].

## 2019-10-29 RX ORDER — TIZANIDINE 2 MG/1
TABLET ORAL
Qty: 60 TAB | Refills: 0 | Status: SHIPPED | OUTPATIENT
Start: 2019-10-29 | End: 2020-01-14 | Stop reason: SDUPTHER

## 2019-11-15 ENCOUNTER — HOSPITAL ENCOUNTER (OUTPATIENT)
Dept: LAB | Age: 64
Discharge: HOME | End: 2019-11-15
Payer: COMMERCIAL

## 2019-11-15 DIAGNOSIS — I10 ESSENTIAL HYPERTENSION: ICD-10-CM

## 2019-11-15 DIAGNOSIS — E78.00 HYPERCHOLESTEROLEMIA: ICD-10-CM

## 2019-11-15 DIAGNOSIS — R73.03 PREDIABETES: ICD-10-CM

## 2019-11-15 LAB
ALBUMIN SERPL-MCNC: 3.6 G/DL (ref 3.4–5)
ALBUMIN/GLOB SERPL: 1 {RATIO} (ref 0.8–1.7)
ALP SERPL-CCNC: 94 U/L (ref 45–117)
ALT SERPL-CCNC: 19 U/L (ref 16–61)
ANION GAP SERPL CALC-SCNC: 2 MMOL/L (ref 3–18)
AST SERPL-CCNC: 10 U/L (ref 10–38)
BILIRUB SERPL-MCNC: 0.4 MG/DL (ref 0.2–1)
BUN SERPL-MCNC: 16 MG/DL (ref 7–18)
BUN/CREAT SERPL: 12 (ref 12–20)
CALCIUM SERPL-MCNC: 9 MG/DL (ref 8.5–10.1)
CHLORIDE SERPL-SCNC: 112 MMOL/L (ref 100–111)
CO2 SERPL-SCNC: 27 MMOL/L (ref 21–32)
CREAT SERPL-MCNC: 1.32 MG/DL (ref 0.6–1.3)
GLOBULIN SER CALC-MCNC: 3.6 G/DL (ref 2–4)
GLUCOSE SERPL-MCNC: 101 MG/DL (ref 74–99)
HBA1C MFR BLD: 6.1 % (ref 4.2–5.6)
POTASSIUM SERPL-SCNC: 4.4 MMOL/L (ref 3.5–5.5)
PROT SERPL-MCNC: 7.2 G/DL (ref 6.4–8.2)
SODIUM SERPL-SCNC: 141 MMOL/L (ref 136–145)

## 2019-11-15 PROCEDURE — 83036 HEMOGLOBIN GLYCOSYLATED A1C: CPT

## 2019-11-15 PROCEDURE — 36415 COLL VENOUS BLD VENIPUNCTURE: CPT

## 2019-11-15 PROCEDURE — 80061 LIPID PANEL: CPT

## 2019-11-15 PROCEDURE — 80053 COMPREHEN METABOLIC PANEL: CPT

## 2019-11-16 LAB
CHOLEST SERPL-MCNC: 151 MG/DL
HDLC SERPL-MCNC: 34 MG/DL (ref 40–60)
HDLC SERPL: 4.4 {RATIO} (ref 0–5)
LDLC SERPL CALC-MCNC: 78.6 MG/DL (ref 0–100)
LIPID PROFILE,FLP: ABNORMAL
TRIGL SERPL-MCNC: 192 MG/DL (ref ?–150)
VLDLC SERPL CALC-MCNC: 38.4 MG/DL

## 2019-11-22 ENCOUNTER — OFFICE VISIT (OUTPATIENT)
Dept: FAMILY MEDICINE CLINIC | Age: 64
End: 2019-11-22

## 2019-11-22 VITALS
TEMPERATURE: 99.2 F | HEART RATE: 69 BPM | WEIGHT: 260.4 LBS | SYSTOLIC BLOOD PRESSURE: 100 MMHG | BODY MASS INDEX: 38.57 KG/M2 | DIASTOLIC BLOOD PRESSURE: 60 MMHG | OXYGEN SATURATION: 98 % | HEIGHT: 69 IN | RESPIRATION RATE: 20 BRPM

## 2019-11-22 DIAGNOSIS — M79.10 MYALGIA: ICD-10-CM

## 2019-11-22 DIAGNOSIS — G89.29 CHRONIC MIDLINE LOW BACK PAIN WITH RIGHT-SIDED SCIATICA: ICD-10-CM

## 2019-11-22 DIAGNOSIS — R73.03 PREDIABETES: ICD-10-CM

## 2019-11-22 DIAGNOSIS — G47.33 OBSTRUCTIVE SLEEP APNEA SYNDROME: ICD-10-CM

## 2019-11-22 DIAGNOSIS — E66.01 SEVERE OBESITY (HCC): ICD-10-CM

## 2019-11-22 DIAGNOSIS — M54.41 CHRONIC MIDLINE LOW BACK PAIN WITH RIGHT-SIDED SCIATICA: ICD-10-CM

## 2019-11-22 DIAGNOSIS — E78.00 HYPERCHOLESTEROLEMIA: ICD-10-CM

## 2019-11-22 DIAGNOSIS — I10 ESSENTIAL HYPERTENSION: Primary | ICD-10-CM

## 2019-11-22 RX ORDER — HYDROCODONE BITARTRATE 60 MG/1
60 TABLET, EXTENDED RELEASE ORAL DAILY
Qty: 30 TAB | Refills: 0 | Status: SHIPPED | OUTPATIENT
Start: 2019-11-22 | End: 2019-12-16 | Stop reason: SDUPTHER

## 2019-11-22 NOTE — PATIENT INSTRUCTIONS

## 2019-11-22 NOTE — PROGRESS NOTES
Patient is in the office today for a 4 month follow up. Patient states he is having some upper abdominal discomfort. 1. Have you been to the ER, urgent care clinic since your last visit? Hospitalized since your last visit? No    2. Have you seen or consulted any other health care providers outside of the [address] [place] [address] since your last visit? Include any pap smears or colon screening.  No

## 2019-11-23 LAB
DRUGS UR: NORMAL
SPECIMEN STATUS REPORT, ROLRST: NORMAL

## 2019-11-28 NOTE — PROGRESS NOTES
Subjective:       Chief Complaint  The patient presents for follow up of hypertension and high cholesterol. prediabetes  chronic back pain and right hip pain, COPD        HPI  Jose Maria Sim is a 75 y.o. male seen for follow up of hyperlipidemia. Nathalia has hypertension. Hypertension well controlled, no significant medication side effects noted, on Lopressor, hyperlipidemia fairly well controlled, pt c/o severe muscle pain in legs on Pravachol 40 mg, pt had myalgias on Zocor. Diet and Lifestyle: not attempting to follow a low fat, low cholesterol diet, exercises sporadically    Home BP Monitoring: is not measured at home. Jose Maria Sim RTC today to follow up on chronic pain diagnosis. We discussed his osteoarthritis that is affecting his back. Significant changes since last visit: none. He is  able to do his normal daily activities. He reports the following adverse side effects: none. Pt doing fairly well with Hysingla ER 60 mg. [facility] and was referred for aqua therapy but did not tolerate it due to pain. His MRI of his LS shows worsening DJD so he will f/u with the [facility]. Least pain over the last week has been 3/10. Worst pain over the last week has been 8/10. Opioid Risk Tool Reviewed: YES    Aberrant behaviors: None. Urine Drug Screen: reviewed and up to date. Controlled substance agreement on file: YES.  reviewed:yes    Pill count is consistent with his prescription: yes    Concomitant use of a benzodiazepine: no        Prediabetes: pt is trying to control with diet and weight loss but he continues to struggle to lose weight. COPD Review:  The patient is being seen for follow up of COPD. Oxygen: He currently is not on home oxygen therapy. He us using Anoro Ellipta with good results. Symptoms: chronic dyspnea: severity = mild: course of sx: symptoms have progressed to a point and plateaued. .   Patient uses 0 pillows at night.    Patient does not smoke cigarettes. Pt has sleep apnea is trying to use CPAP on a regular basis. He will f/u with his sleep specialist Dr Anai Randle. Current Outpatient Medications   Medication Sig    HYDROcodone bitartrate (HYSINGLA) 60 mg ER tablet Take 1 Tab by mouth daily for 30 days. Max Daily Amount: 60 mg.    tiZANidine (ZANAFLEX) 2 mg tablet TAKE 1 TABLET BY MOUTH THREE TIMES DAILY AS NEEDED FOR MUSCLE SPASM    montelukast (SINGULAIR) 10 mg tablet Take 1 Tab by mouth daily.  fluticasone propionate (FLONASE) 50 mcg/actuation nasal spray USE TWO SPRAY(S) IN EACH NOSTRIL ONCE DAILY    metoprolol tartrate (LOPRESSOR) 25 mg tablet TAKE 1 TABLET BY MOUTH TWICE DAILY    gabapentin (NEURONTIN) 300 mg capsule 2 po tid -- taper up as directed  Indications: Neuropathic Pain    tamsulosin (FLOMAX) 0.4 mg capsule TAKE 1 CAPSULE BY MOUTH ONCE DAILY AFTER  DINNER    topiramate (TOPAMAX) 100 mg tablet Take 1 Tab by mouth daily (with breakfast).  azelastine (ASTELIN) 137 mcg (0.1 %) nasal spray 2 Sprays by Both Nostrils route two (2) times a day. Use in each nostril as directed  Indications: Seasonal Runny Nose    albuterol (PROVENTIL HFA, VENTOLIN HFA, PROAIR HFA) 90 mcg/actuation inhaler Take 2 Puffs by inhalation every four (4) hours as needed for Wheezing.  ANORO ELLIPTA 62.5-25 mcg/actuation inhaler INHALE 1 PUFF BY MOUTH ONCE DAILY    pravastatin (PRAVACHOL) 40 mg tablet TAKE ONE TABLET BY MOUTH NIGHTLY    ibuprofen (MOTRIN) 800 mg tablet TAKE 1 TABLET BY MOUTH EVERY 8 HOURS AS NEEDED FOR PAIN    traZODone (DESYREL) 50 mg tablet TAKE 1 TABLET BY MOUTH ONCE DAILY AT BEDTIME AS NEEDED FOR SLEEP    pantoprazole (PROTONIX) 40 mg tablet TAKE ONE TABLET BY MOUTH ONCE DAILY    lactulose (CONSTULOSE) 10 gram/15 mL solution Take 45ml PO TID    guaiFENesin ER (MUCINEX) 600 mg ER tablet Take 1 Tab by mouth two (2) times a day.     nitroglycerin (NITROSTAT) 0.4 mg SL tablet 1 Tab by SubLINGual route every five (5) minutes as needed.  TRAVATAN Z 0.004 % ophthalmic solution Administer 1 Drop to both eyes nightly. No current facility-administered medications for this visit. Review of Systems  Respiratory: negative for dyspnea on exertion  Cardiovascular: negative for chest pain    Objective:     Visit Vitals  /60 (BP 1 Location: Left arm, BP Patient Position: Sitting)   Pulse 69   Temp 99.2 °F (37.3 °C) (Oral)   Resp 20   Ht 5' 9\" (1.753 m)   Wt 260 lb 6.4 oz (118.1 kg)   SpO2 98%   BMI 38.45 kg/m²        General appearance - alert, well appearing, and in no distress  Neck - supple, no significant adenopathy, carotids upstroke normal bilaterally, no bruits  Chest - clear to auscultation, no wheezes, rales or rhonchi, symmetric air entry  Heart - normal rate, regular rhythm, normal S1, S2, no murmurs, rubs, clicks or gallops  Extremities - peripheral pulses normal, no pedal edema, no clubbing or cyanosis  Skin - normal coloration and turgor, no rashes, no suspicious skin lesions noted      Labs:   Lab Results   Component Value Date/Time    Hemoglobin A1c 6.1 (H) 11/15/2019 09:05 AM    Hemoglobin A1c 6.0 (H) 07/15/2019 07:39 AM    Hemoglobin A1c 5.7 (H) 03/14/2019 08:59 AM    Glucose 101 (H) 11/15/2019 09:05 AM    Glucose (POC) 123 (H) 01/24/2016 04:57 PM    Glucose,  (H) 04/01/2015 08:56 AM    Microalb/Creat ratio (ug/mg creat.) 4.4 04/29/2015 03:00 PM    LDL, calculated 78.6 11/15/2019 09:05 AM    Creatinine 1.32 (H) 11/15/2019 09:05 AM      Lab Results   Component Value Date/Time    Cholesterol, total 151 11/15/2019 09:05 AM    HDL Cholesterol 34 (L) 11/15/2019 09:05 AM    LDL, calculated 78.6 11/15/2019 09:05 AM    Triglyceride 192 (H) 11/15/2019 09:05 AM    CHOL/HDL Ratio 4.4 11/15/2019 09:05 AM     Lab Results   Component Value Date/Time    ALT (SGPT) 19 11/15/2019 09:05 AM    AST (SGOT) 10 11/15/2019 09:05 AM    Alk.  phosphatase 94 11/15/2019 09:05 AM    Bilirubin, total 0.4 11/15/2019 09:05 AM     Lab Results   Component Value Date/Time    GFR est AA >60 11/15/2019 09:05 AM    GFR est non-AA 54 (L) 11/15/2019 09:05 AM    Creatinine 1.32 (H) 11/15/2019 09:05 AM    BUN 16 11/15/2019 09:05 AM    BUN, POC 6 (L) 04/01/2015 08:56 AM    Sodium,  04/01/2015 08:56 AM    Sodium 141 11/15/2019 09:05 AM    Potassium 4.4 11/15/2019 09:05 AM    Potassium, POC 4.0 04/01/2015 08:56 AM    Chloride,  04/01/2015 08:56 AM    Chloride 112 (H) 11/15/2019 09:05 AM    CO2 27 11/15/2019 09:05 AM      Lab Results   Component Value Date/Time    Glucose 101 (H) 11/15/2019 09:05 AM    Glucose (POC) 123 (H) 01/24/2016 04:57 PM    Glucose,  (H) 04/01/2015 08:56 AM            Assessment / Plan     Hypertension well controlled, on Lopressor   Hyperlipidemia fairly well controlled, on Pravachol 40 mg but will hold due to myalgias. ICD-10-CM ICD-9-CM    1. Essential hypertension D87 384.5 METABOLIC PANEL, COMPREHENSIVE   2. Hypercholesterolemia E78.00 272.0 LIPID PANEL   3. Severe obesity ([address] E66.01 278.01 Pt will try to cut back starches and sweets. 4. Chronic midline low back pain with right-sided sciatica M54.41 724.2 Controlled on HYDROcodone bitartrate (HYSINGLA) 60 mg ER tablet    G89.29 724.3      338.29    5. Obstructive sleep apnea syndrome G47.33 327.23 Pt continues on CPAP and will f/u with Dr Karuna Neal   6. Myalgia M79.10 729.1 Will hold Pravachol and see if any improvement. If not will refer to orthopedics. 7. Prediabetes R73.03 790.29 Will try to keep under control with diet and weight loss HEMOGLOBIN A1C W/O EAG                 Low cholesterol diet, weight control and daily exercise discussed. Follow-up and Dispositions    · Return in about 3 weeks (around 12/13/2019) for myalgias. Reviewed plan of care. Patient has provided input and agrees with goals.

## 2019-12-03 DIAGNOSIS — F51.01 PRIMARY INSOMNIA: ICD-10-CM

## 2019-12-03 RX ORDER — TRAZODONE HYDROCHLORIDE 50 MG/1
TABLET ORAL
Qty: 30 TAB | Refills: 2 | Status: SHIPPED | OUTPATIENT
Start: 2019-12-03 | End: 2021-07-16 | Stop reason: SDUPTHER

## 2019-12-16 DIAGNOSIS — G89.29 CHRONIC MIDLINE LOW BACK PAIN WITH RIGHT-SIDED SCIATICA: ICD-10-CM

## 2019-12-16 DIAGNOSIS — M54.41 CHRONIC MIDLINE LOW BACK PAIN WITH RIGHT-SIDED SCIATICA: ICD-10-CM

## 2019-12-16 RX ORDER — HYDROCODONE BITARTRATE 60 MG/1
60 TABLET, EXTENDED RELEASE ORAL DAILY
Qty: 30 TAB | Refills: 0 | Status: SHIPPED | OUTPATIENT
Start: 2019-12-16 | End: 2020-01-15

## 2019-12-16 NOTE — TELEPHONE ENCOUNTER
Requested Prescriptions     Pending Prescriptions Disp Refills    HYDROcodone bitartrate (HYSINGLA) 60 mg ER tablet 30 Tab 0     Sig: Take 1 Tab by mouth daily for 30 days. Max Daily Amount: 60 mg.

## 2020-01-14 ENCOUNTER — OFFICE VISIT (OUTPATIENT)
Dept: FAMILY MEDICINE CLINIC | Age: 65
End: 2020-01-14

## 2020-01-14 VITALS
SYSTOLIC BLOOD PRESSURE: 116 MMHG | WEIGHT: 260 LBS | HEIGHT: 69 IN | OXYGEN SATURATION: 96 % | HEART RATE: 75 BPM | BODY MASS INDEX: 38.51 KG/M2 | TEMPERATURE: 98 F | RESPIRATION RATE: 20 BRPM | DIASTOLIC BLOOD PRESSURE: 58 MMHG

## 2020-01-14 DIAGNOSIS — S29.011A MUSCLE STRAIN OF CHEST WALL, INITIAL ENCOUNTER: Primary | ICD-10-CM

## 2020-01-14 DIAGNOSIS — I10 ESSENTIAL HYPERTENSION: ICD-10-CM

## 2020-01-14 RX ORDER — TIZANIDINE 2 MG/1
TABLET ORAL
Qty: 60 TAB | Refills: 2 | Status: SHIPPED | OUTPATIENT
Start: 2020-01-14 | End: 2020-04-05

## 2020-01-14 RX ORDER — PREDNISONE 10 MG/1
TABLET ORAL
Qty: 25 TAB | Refills: 0 | Status: SHIPPED | OUTPATIENT
Start: 2020-01-14 | End: 2020-01-21

## 2020-01-14 NOTE — PROGRESS NOTES
Patient is in the office today for HTN follow up, patient is requesting to change metoprolol. 1. Have you been to the ER, urgent care clinic since your last visit? Hospitalized since your last visit? No    2. Have you seen or consulted any other health care providers outside of the [address] [place] [address] since your last visit? Include any pap smears or colon screening.  No

## 2020-01-14 NOTE — PATIENT INSTRUCTIONS
High Blood Pressure: Care Instructions  Overview    It's normal for blood pressure to go up and down throughout the day. But if it stays up, you have high blood pressure. Another name for high blood pressure is hypertension. Despite what a lot of people think, high blood pressure usually doesn't cause headaches or make you feel dizzy or lightheaded. It usually has no symptoms. But it does increase your risk of stroke, heart attack, and other problems. You and your doctor will talk about your risks of these problems based on your blood pressure. Your doctor will give you a goal for your blood pressure. Your goal will be based on your health and your age. Lifestyle changes, such as eating healthy and being active, are always important to help lower blood pressure. You might also take medicine to reach your blood pressure goal.  Follow-up care is a key part of your treatment and safety. Be sure to make and go to all appointments, and call your doctor if you are having problems. It's also a good idea to know your test results and keep a list of the medicines you take. How can you care for yourself at home? Medical treatment  · If you stop taking your medicine, your blood pressure will go back up. You may take one or more types of medicine to lower your blood pressure. Be safe with medicines. Take your medicine exactly as prescribed. Call your doctor if you think you are having a problem with your medicine. · Talk to your doctor before you start taking aspirin every day. Aspirin can help certain people lower their risk of a heart attack or stroke. But taking aspirin isn't right for everyone, because it can cause serious bleeding. · See your doctor regularly. You may need to see the doctor more often at first or until your blood pressure comes down. · If you are taking blood pressure medicine, talk to your doctor before you take decongestants or anti-inflammatory medicine, such as ibuprofen.  Some of these medicines can raise blood pressure. · Learn how to check your blood pressure at home. Lifestyle changes  · Stay at a healthy weight. This is especially important if you put on weight around the waist. Losing even 10 pounds can help you lower your blood pressure. · If your doctor recommends it, get more exercise. Walking is a good choice. Bit by bit, increase the amount you walk every day. Try for at least 30 minutes on most days of the week. You also may want to swim, bike, or do other activities. · Avoid or limit alcohol. Talk to your doctor about whether you can drink any alcohol. · Try to limit how much sodium you eat to less than 2,300 milligrams (mg) a day. Your doctor may ask you to try to eat less than 1,500 mg a day. · Eat plenty of fruits (such as bananas and oranges), vegetables, legumes, whole grains, and low-fat dairy products. · Lower the amount of saturated fat in your diet. Saturated fat is found in animal products such as milk, cheese, and meat. Limiting these foods may help you lose weight and also lower your risk for heart disease. · Do not smoke. Smoking increases your risk for heart attack and stroke. If you need help quitting, talk to your doctor about stop-smoking programs and medicines. These can increase your chances of quitting for good. When should you call for help? Call  911 anytime you think you may need emergency care. This may mean having symptoms that suggest that your blood pressure is causing a serious heart or blood vessel problem. Your blood pressure may be over 180/120.   For example, call  911 if:    · You have symptoms of a heart attack. These may include:  ? Chest pain or pressure, or a strange feeling in the chest.  ? Sweating. ? Shortness of breath. ? Nausea or vomiting. ? Pain, pressure, or a strange feeling in the back, neck, jaw, or upper belly or in one or both shoulders or arms. ? Lightheadedness or sudden weakness.   ? A fast or irregular heartbeat.     · You have symptoms of a stroke. These may include:  ? Sudden numbness, tingling, weakness, or loss of movement in your face, arm, or leg, especially on only one side of your body. ? Sudden vision changes. ? Sudden trouble speaking. ? Sudden confusion or trouble understanding simple statements. ? Sudden problems with walking or balance. ? A sudden, severe headache that is different from past headaches.     · You have severe back or belly pain.    Do not wait until your blood pressure comes down on its own. Get help right away.   Call your doctor now or seek immediate care if:    · Your blood pressure is much higher than normal (such as 180/120 or higher), but you don't have symptoms.     · You think high blood pressure is causing symptoms, such as:  ? Severe headache.  ? Blurry vision.    Watch closely for changes in your health, and be sure to contact your doctor if:    · Your blood pressure measures higher than your doctor recommends at least 2 times. That means the top number is higher or the bottom number is higher, or both.     · You think you may be having side effects from your blood pressure medicine. Where can you learn more? Go to http://samreen-tabitha.info/. Enter X855 in the search box to learn more about \"High Blood Pressure: Care Instructions. \"  Current as of: April 9, 2019  Content Version: 12.2  © 4490-1515 [employer]. Care instructions adapted under license by [employer] (which disclaims liability or warranty for this information). If you have questions about a medical condition or this instruction, always ask your healthcare professional. [place] [address] any warranty or liability for your use of this information.

## 2020-01-17 DIAGNOSIS — M54.41 CHRONIC MIDLINE LOW BACK PAIN WITH RIGHT-SIDED SCIATICA: ICD-10-CM

## 2020-01-17 DIAGNOSIS — G89.29 CHRONIC MIDLINE LOW BACK PAIN WITH RIGHT-SIDED SCIATICA: ICD-10-CM

## 2020-01-18 NOTE — PROGRESS NOTES
Tex Matute is a 75 y.o.  male and presents with Hypertension; Chest Pain (left); and Leg Pain      SUBJECTIVE:    Chest Wall Pain  Patient presents for presents evaluation of chest wall pain. Onset was 4 days ago. Pain description: character of chest pain: aching  location: costochondral region: upper, anterior: left greater than right:  severity = moderate  course since onset: symptoms have progressed to a point and plateaued. Mechanism of injury: none known. Previous visits for this problem: none. Evaluation to date: pt has been followed by Cardiology  Treatment to date: opioid analgesics: not very effective    Patient was concerned about his high blood pressure but is well controlled on Lopressor 25 mg twice daily    Respiratory ROS: negative for - shortness of breath  Cardiovascular ROS: negative for - chest pain    Current Outpatient Medications   Medication Sig    predniSONE (DELTASONE) 10 mg tablet 5 tabs daily for 5 days    tiZANidine (ZANAFLEX) 2 mg tablet TAKE 1 TABLET BY MOUTH THREE TIMES DAILY AS NEEDED FOR MUSCLE SPASM    traZODone (DESYREL) 50 mg tablet TAKE 1 TABLET BY MOUTH ONCE DAILY AT BEDTIME AS NEEDED FOR SLEEP    montelukast (SINGULAIR) 10 mg tablet Take 1 Tab by mouth daily.  fluticasone propionate (FLONASE) 50 mcg/actuation nasal spray USE TWO SPRAY(S) IN EACH NOSTRIL ONCE DAILY    metoprolol tartrate (LOPRESSOR) 25 mg tablet TAKE 1 TABLET BY MOUTH TWICE DAILY    gabapentin (NEURONTIN) 300 mg capsule 2 po tid -- taper up as directed  Indications: Neuropathic Pain    tamsulosin (FLOMAX) 0.4 mg capsule TAKE 1 CAPSULE BY MOUTH ONCE DAILY AFTER  DINNER    topiramate (TOPAMAX) 100 mg tablet Take 1 Tab by mouth daily (with breakfast).  azelastine (ASTELIN) 137 mcg (0.1 %) nasal spray 2 Sprays by Both Nostrils route two (2) times a day.  Use in each nostril as directed  Indications: Seasonal Runny Nose    albuterol (PROVENTIL HFA, VENTOLIN HFA, PROAIR HFA) 90 mcg/actuation inhaler Take 2 Puffs by inhalation every four (4) hours as needed for Wheezing.  ANORO ELLIPTA 62.5-25 mcg/actuation inhaler INHALE 1 PUFF BY MOUTH ONCE DAILY    pravastatin (PRAVACHOL) 40 mg tablet TAKE ONE TABLET BY MOUTH NIGHTLY    ibuprofen (MOTRIN) 800 mg tablet TAKE 1 TABLET BY MOUTH EVERY 8 HOURS AS NEEDED FOR PAIN    pantoprazole (PROTONIX) 40 mg tablet TAKE ONE TABLET BY MOUTH ONCE DAILY    lactulose (CONSTULOSE) 10 gram/15 mL solution Take 45ml PO TID    guaiFENesin ER (MUCINEX) 600 mg ER tablet Take 1 Tab by mouth two (2) times a day.  nitroglycerin (NITROSTAT) 0.4 mg SL tablet 1 Tab by SubLINGual route every five (5) minutes as needed.  TRAVATAN Z 0.004 % ophthalmic solution Administer 1 Drop to both eyes nightly. No current facility-administered medications for this visit. OBJECTIVE:  alert, well appearing, and in no distress  Visit Vitals  /58 (BP 1 Location: Left arm, BP Patient Position: Sitting)   Pulse 75   Temp 98 °F (36.7 °C) (Oral)   Resp 20   Ht 5' 9\" (1.753 m)   Wt 260 lb (117.9 kg)   SpO2 96%   BMI 38.40 kg/m²      well developed and well nourished  Chest - clear to auscultation, no wheezes, rales or rhonchi, symmetric air entry, chest wall tenderness noted to palpation  Heart - normal rate, regular rhythm, normal S1, S2, no murmurs, rubs, clicks or gallops        Assessment/Plan      ICD-10-CM ICD-9-CM    1. Muscle strain of chest wall, initial encounter S29.011A 848. 8 Will treat with predniSONE (DELTASONE) 10 mg tablet      And tiZANidine (ZANAFLEX) 2 mg tablet   2. Essential hypertension I10 401.9 Well controlled on Lopressor      Follow-up and Dispositions    · Return if symptoms worsen or fail to improve. Reviewed plan of care. Patient has provided input and agrees with goals.

## 2020-01-20 RX ORDER — HYDROCODONE BITARTRATE 60 MG/1
60 TABLET, EXTENDED RELEASE ORAL DAILY
Qty: 30 TAB | Refills: 0 | Status: SHIPPED | OUTPATIENT
Start: 2020-01-20 | End: 2020-02-18 | Stop reason: SDUPTHER

## 2020-01-20 NOTE — TELEPHONE ENCOUNTER
Patient called asking for a refill on pain meds.  He doesn't know the name of the medication but he said that he only gets 30 of them

## 2020-01-21 DIAGNOSIS — S29.011A MUSCLE STRAIN OF CHEST WALL, INITIAL ENCOUNTER: ICD-10-CM

## 2020-01-21 RX ORDER — PREDNISONE 10 MG/1
TABLET ORAL
Qty: 25 TAB | Refills: 0 | Status: SHIPPED | OUTPATIENT
Start: 2020-01-21 | End: 2020-02-24 | Stop reason: SDUPTHER

## 2020-02-13 DIAGNOSIS — G89.29 CHRONIC MIDLINE LOW BACK PAIN WITH RIGHT-SIDED SCIATICA: ICD-10-CM

## 2020-02-13 DIAGNOSIS — M54.41 CHRONIC MIDLINE LOW BACK PAIN WITH RIGHT-SIDED SCIATICA: ICD-10-CM

## 2020-02-13 RX ORDER — IBUPROFEN 800 MG/1
TABLET ORAL
Qty: 90 TAB | Refills: 3 | Status: SHIPPED | OUTPATIENT
Start: 2020-02-13 | End: 2021-01-07

## 2020-02-15 ENCOUNTER — OFFICE VISIT (OUTPATIENT)
Dept: FAMILY MEDICINE CLINIC | Age: 65
End: 2020-02-15

## 2020-02-15 VITALS
BODY MASS INDEX: 39.4 KG/M2 | HEART RATE: 81 BPM | DIASTOLIC BLOOD PRESSURE: 63 MMHG | HEIGHT: 69 IN | TEMPERATURE: 98.8 F | WEIGHT: 266 LBS | OXYGEN SATURATION: 98 % | RESPIRATION RATE: 20 BRPM | SYSTOLIC BLOOD PRESSURE: 123 MMHG

## 2020-02-15 DIAGNOSIS — R09.81 NASAL CONGESTION: Primary | ICD-10-CM

## 2020-02-15 DIAGNOSIS — J32.9 SINUSITIS, UNSPECIFIED CHRONICITY, UNSPECIFIED LOCATION: ICD-10-CM

## 2020-02-15 DIAGNOSIS — Z72.0 TOBACCO ABUSE DISORDER: ICD-10-CM

## 2020-02-15 LAB
FLUAV+FLUBV AG NOSE QL IA.RAPID: NEGATIVE POS/NEG
FLUAV+FLUBV AG NOSE QL IA.RAPID: NEGATIVE POS/NEG
VALID INTERNAL CONTROL?: YES

## 2020-02-15 RX ORDER — AZELASTINE 1 MG/ML
2 SPRAY, METERED NASAL 2 TIMES DAILY
Qty: 1 BOTTLE | Refills: 5 | Status: SHIPPED | OUTPATIENT
Start: 2020-02-15 | End: 2020-11-13 | Stop reason: SDUPTHER

## 2020-02-15 NOTE — PROGRESS NOTES
02/15/20    PCP: Toro Guzman MD    Chief Complaint   Patient presents with    Nasal Congestion     Onset 2 weeks ago. Symptoms: body aches, sinus pressure, headaches, cough, congestion, chills and sweats Treatments: Robitussin, Tylenol extra strength        HISTORY OF PRESENT ILLNESS  Evelia Levy  is a 75 y.o. male whom presents for Nasal Congestion (Onset 2 weeks ago. Symptoms: body aches, sinus pressure, headaches, cough, congestion, chills and sweats Treatments: Robitussin, Tylenol extra strength)         Nasal Congestion    The history is provided by the patient. This is a new problem. The current episode started 2 days ago. The problem has not changed since onset. Patient reports a subjective fever - was not measured. The fever has been present for less than 1 day. The pain is at a severity of 0/10. The patient is experiencing no pain. Associated symptoms include sweats, congestion, cough, shortness of breath, rhinorrhea and headaches. Pertinent negatives include no chills, no ear pain, no hoarse voice, no sinus pressure, no sore throat, no swollen glands, no neck pain, no neck pain and no chest pain. Associated symptoms comments: + Smoker. He has tried acetaminophen (Robitussin) for the symptoms.          Patient Active Problem List    Diagnosis Date Noted    Enlarged prostate with lower urinary tract symptoms (LUTS) 11/09/2015     Priority: 1 - One    Slowing of urinary stream 11/12/2015     Priority: 2 - Two    Urinary frequency 11/09/2015     Priority: 2 - Two    Sense of Incomplete bladder emptying 11/09/2015     Priority: 2 - Two    Nocturia 11/09/2015     Priority: 2 - Two    Severe obesity ([address] 11/21/2018    Bug bite with infection 08/01/2017    Cough 08/01/2017    Obesity (BMI 30-39.9) 08/01/2017    Obstructive sleep apnea syndrome 12/20/2016    Primary insomnia 09/01/2016    Abdominal discomfort 04/21/2016    Diarrhea 04/21/2016    Hip arthritis 01/19/2016    Right hip pain     Right knee pain     Prediabetes 12/03/2011    Chest pain, unspecified     Dyslipidemia, goal LDL below 100     Other dyspnea and respiratory abnormality     Palpitations     Degenerative joint disease     Chronic back pain 02/11/2011    HTN (hypertension) 10/11/2010    Glaucoma 09/15/2010    GERD (gastroesophageal reflux disease) 05/17/2010    Obesity 05/17/2010    Hypercholesterolemia     Allergic rhinitis     Sleep apnea      Current Outpatient Medications   Medication Sig Dispense Refill    azelastine (ASTELIN) 137 mcg (0.1 %) nasal spray 2 Sprays by Both Nostrils route two (2) times a day. Use in each nostril as directed  Indications: seasonal runny nose 1 Bottle 5    ibuprofen (MOTRIN) 800 mg tablet TAKE 1 TABLET BY MOUTH EVERY 8 HOURS AS NEEDED FOR PAIN 90 Tab 3    predniSONE (DELTASONE) 10 mg tablet TAKE 5 TABLETS BY MOUTH ONCE DAILY FOR 5 DAYS 25 Tab 0    HYDROcodone bitartrate (HYSINGLA) 60 mg ER tablet Take 1 Tab by mouth daily for 30 days. Max Daily Amount: 60 mg. *DO NOT CRUSH,CHEW, OR DISSOLVE TABLET* 30 Tab 0    tiZANidine (ZANAFLEX) 2 mg tablet TAKE 1 TABLET BY MOUTH THREE TIMES DAILY AS NEEDED FOR MUSCLE SPASM 60 Tab 2    traZODone (DESYREL) 50 mg tablet TAKE 1 TABLET BY MOUTH ONCE DAILY AT BEDTIME AS NEEDED FOR SLEEP 30 Tab 2    montelukast (SINGULAIR) 10 mg tablet Take 1 Tab by mouth daily. 30 Tab 5    fluticasone propionate (FLONASE) 50 mcg/actuation nasal spray USE TWO SPRAY(S) IN EACH NOSTRIL ONCE DAILY 1 Bottle 5    metoprolol tartrate (LOPRESSOR) 25 mg tablet TAKE 1 TABLET BY MOUTH TWICE DAILY 180 Tab 3    gabapentin (NEURONTIN) 300 mg capsule 2 po tid -- taper up as directed  Indications: Neuropathic Pain 180 Cap 5    tamsulosin (FLOMAX) 0.4 mg capsule TAKE 1 CAPSULE BY MOUTH ONCE DAILY AFTER  DINNER 90 Cap 3    topiramate (TOPAMAX) 100 mg tablet Take 1 Tab by mouth daily (with breakfast).  30 Tab 5    albuterol (PROVENTIL HFA, VENTOLIN HFA, PROAIR HFA) 90 mcg/actuation inhaler Take 2 Puffs by inhalation every four (4) hours as needed for Wheezing. 1 Inhaler 5    ANORO ELLIPTA 62.5-25 mcg/actuation inhaler INHALE 1 PUFF BY MOUTH ONCE DAILY 1 Inhaler 5    pravastatin (PRAVACHOL) 40 mg tablet TAKE ONE TABLET BY MOUTH NIGHTLY 90 Tab 3    pantoprazole (PROTONIX) 40 mg tablet TAKE ONE TABLET BY MOUTH ONCE DAILY 90 Tab 1    lactulose (CONSTULOSE) 10 gram/15 mL solution Take 45ml PO  mL 2    guaiFENesin ER (MUCINEX) 600 mg ER tablet Take 1 Tab by mouth two (2) times a day. 30 Tab 5    nitroglycerin (NITROSTAT) 0.4 mg SL tablet 1 Tab by SubLINGual route every five (5) minutes as needed. 6 Tab 1    TRAVATAN Z 0.004 % ophthalmic solution Administer 1 Drop to both eyes nightly. Allergies   Allergen Reactions    Latex Not Reported This Time    Ace Inhibitors Cough    Dilaudid [Hydromorphone] Hives    Lisinopril Cough    Zocor [Simvastatin] Myalgia     Right leg     Past Medical History:   Diagnosis Date    Allergic rhinitis     Avascular necrosis (HCC)     CAD (coronary artery disease) 4/2009    mild, hemodynamically non-significant by angiography    Carpal tunnel syndrome     Carpal tunnel syndrome on both sides     Chest pain, unspecified     Chronic back pain 2/11/2011    Chronic obstructive pulmonary disease ([address]     Degenerative joint disease     with chronic back pain    Dysesthesia     of hand post carpal tunnel surgery v sudeck's atrophy    Dyslipidemia, goal LDL below 100     Dysphagia     GERD (gastroesophageal reflux disease) 5/17/2010    Glaucoma 9/15/2010    Glaucoma     Hearing loss     Hypercholesterolemia     Hypertension     Joint fusion     of right wrist    Morbid obesity ([address]     Obesity 5/17/2010    Other dyspnea and respiratory abnormality     Palpitations     Right hip pain     Right hip pain     Right knee pain     S/P cardiac catheterization 4/9/2009    1. Normal systemic pressure.  2. Moderate elevation of left-sided filling pressures. 3. Preserved left ventricular systolic function in the LOWE projection. 4. Mild, nonsignificant epicardial coronary artery disese by angiography.  Sleep apnea     on CPAP    Strain of lumbar region     Tobacco abuse      Past Surgical History:   Procedure Laterality Date    HX CARPAL TUNNEL RELEASE      bilateral wrists    HX COLONOSCOPY      HX HEART CATHETERIZATION  4/9/2009    1. Normal systemic pressure. 2. Moderate elevation of left-sided filling pressures. 3. Preserved left ventricular systolic function in the LOWE projection. 4. Mild, nonsignificant epicardial coronary artery disese by angiography.  HX HIP REPLACEMENT      HX OTHER SURGICAL  02/2017    left shoulder surgery    HX TONSILLECTOMY      HX WRIST FRACTURE TX      IN ANESTH,SURGERY OF SHOULDER       Family History   Problem Relation Age of Onset    Diabetes Mother     Cancer Father     Hypertension Other     Arthritis-osteo Other      Social History     Tobacco Use    Smoking status: Current Every Day Smoker     Packs/day: 0.10     Years: 20.00     Pack years: 2.00    Smokeless tobacco: Never Used    Tobacco comment: urothelial cancer risk discussed today [id number]   Substance Use Topics    Alcohol use: No     Alcohol/week: 0.0 standard drinks       Review of Systems   Constitutional: Positive for fever (Subjective, sweats). Negative for chills. HENT: Positive for congestion and rhinorrhea. Negative for ear pain, hoarse voice, sinus pressure and sore throat. Respiratory: Positive for cough and shortness of breath. Negative for hemoptysis and sputum production. Cardiovascular: Negative. Negative for chest pain. Genitourinary: Negative. Musculoskeletal: Negative. Negative for neck pain. Neurological: Positive for headaches. Endo/Heme/Allergies: Positive for environmental allergies (Patient reports not taking Singulair and Flonase regularly. ).        Visit Vitals  /63 (BP 1 Location: Right arm, BP Patient Position: Sitting)   Pulse 81   Temp 98.8 °F (37.1 °C) (Oral)   Resp 20   Ht 5' 9\" (1.753 m)   Wt 266 lb (120.7 kg)   SpO2 98%   BMI 39.28 kg/m²       Pain Scale: 0 - No pain/10    Pain Location:      Physical Exam  Vitals signs reviewed. HENT:      Head: Normocephalic. Jaw: There is normal jaw occlusion. Salivary Glands: Right salivary gland is not diffusely enlarged or tender. Left salivary gland is not diffusely enlarged or tender. Right Ear: Hearing, ear canal and external ear normal.      Left Ear: Hearing, ear canal and external ear normal.      Ears:      Comments: Bilateral TMs Dull     Nose: Congestion present. No nasal deformity, septal deviation, signs of injury, laceration, nasal tenderness or mucosal edema. Right Nostril: No foreign body, epistaxis, septal hematoma or occlusion. Left Nostril: No foreign body, epistaxis, septal hematoma or occlusion. Right Turbinates: Enlarged. Not swollen or pale. Left Turbinates: Enlarged. Not swollen or pale. Right Sinus: Maxillary sinus tenderness present. No frontal sinus tenderness. Left Sinus: Maxillary sinus tenderness present. No frontal sinus tenderness. Neck:      Musculoskeletal: Normal range of motion and neck supple. Cardiovascular:      Rate and Rhythm: Normal rate and regular rhythm. Pulmonary:      Effort: Pulmonary effort is normal.      Breath sounds: Decreased breath sounds present. Neurological:      Mental Status: He is alert. ASSESSMENT and PLAN    ICD-10-CM ICD-9-CM    1. Nasal congestion R09.81 478.19 AMB POC RAPID INFLUENZA TEST   2. Sinusitis, unspecified chronicity, unspecified location J32.9 473.9    3. Tobacco abuse disorder Z72.0 305.1      Diagnoses and all orders for this visit:    1. Nasal congestion  -     AMB POC RAPID INFLUENZA TEST    2. Sinusitis, unspecified chronicity, unspecified location    3.  Tobacco abuse disorder    Other orders  -     azelastine (ASTELIN) 137 mcg (0.1 %) nasal spray; 2 Sprays by Both Nostrils route two (2) times a day. Use in each nostril as directed  Indications: seasonal runny nose     The patient appears non toxic and afebrile at appt. Flu negative. Patient with history of allergies and smoker with non compliance with medications. Discussed picking up Singulair and Flonase and Astelin. Smoking cessation strongly encouraged. Medications Discontinued During This Encounter   Medication Reason    azelastine (ASTELIN) 137 mcg (0.1 %) nasal spray Reorder       Written instructions followed our verbal discussion of all information discussed above, pending tests ordered and future goals/plans. Patient expressed understanding of current diagnosis, planned testing, follow up and if needed to contact the office for any questions or concerns prior to the next visit. Follow-up and Dispositions    · Return if symptoms worsen or fail to improve.

## 2020-02-15 NOTE — PATIENT INSTRUCTIONS
Rhinitis: Care Instructions  Your Care Instructions  Rhinitis is swelling and irritation in the nose. Allergies and infections are often the cause. Your nose may run or feel stuffy. Other symptoms are itchy and sore eyes, ears, throat, and mouth. If allergies are the cause, your doctor may do tests to find out what you are allergic to. You may be able to stop symptoms if you avoid the things that cause them. Your doctor may suggest or prescribe medicine to ease your symptoms. Follow-up care is a key part of your treatment and safety. Be sure to make and go to all appointments, and call your doctor if you are having problems. It's also a good idea to know your test results and keep a list of the medicines you take. How can you care for yourself at home? · If your rhinitis is caused by allergies, try to find out what sets off (triggers) your symptoms. Take steps to avoid these triggers. ? Avoid yard work. It can stir up both pollen and mold. ? Do not smoke or allow others to smoke around you. If you need help quitting, talk to your doctor about stop-smoking programs and medicines. These can increase your chances of quitting for good. ? Do not use aerosol sprays, cleaning products, or perfumes. ? If pollen is one of your triggers, close your house and car windows during blooming season. ? Clean your house often to control dust.  ? Keep pets outside. · If your doctor recommends over-the-counter medicines to relieve symptoms, take your medicines exactly as prescribed. Call your doctor if you think you are having a problem with your medicine. · Use saline (saltwater) nasal washes to help keep your nasal passages open and wash out mucus and bacteria. You can buy saline nose drops at a grocery store or drugstore. Or you can make your own at home by adding 1 teaspoon of salt and 1 teaspoon of baking soda to 2 cups of distilled water.  If you make your own, fill a bulb syringe with the solution, insert the tip into your nostril, and squeeze gently. Deo Lien your nose. When should you call for help? Call your doctor now or seek immediate medical care if:    · You are having trouble breathing.    Watch closely for changes in your health, and be sure to contact your doctor if:    · Mucus from your nose gets thicker (like pus) or has new blood in it.     · You have new or worse symptoms.     · You do not get better as expected. Where can you learn more? Go to http://samreen-tabitha.info/. Enter M030 in the search box to learn more about \"Rhinitis: Care Instructions. \"  Current as of: October 21, 2018  Content Version: 12.2  © 3621-2830 [employer]. Care instructions adapted under license by [employer] (which disclaims liability or warranty for this information). If you have questions about a medical condition or this instruction, always ask your healthcare professional. [address] any warranty or liability for your use of this information.

## 2020-02-18 RX ORDER — HYDROCODONE BITARTRATE 60 MG/1
60 TABLET, EXTENDED RELEASE ORAL DAILY
Qty: 30 TAB | Refills: 0 | Status: SHIPPED | OUTPATIENT
Start: 2020-02-18 | End: 2020-03-16 | Stop reason: SDUPTHER

## 2020-02-18 NOTE — TELEPHONE ENCOUNTER
[facility]20/20 for a 30 d/s.      UDS done 11/18/19  CSA signed 8/27/19    Last Visit: 1/14/20 with MD Perez  Next Appointment: 2/24/20 with MD Perez  Previous Refill Encounter(s): 1/20/20 Natalie #30

## 2020-02-24 ENCOUNTER — OFFICE VISIT (OUTPATIENT)
Dept: FAMILY MEDICINE CLINIC | Age: 65
End: 2020-02-24

## 2020-02-24 VITALS
HEIGHT: 69 IN | SYSTOLIC BLOOD PRESSURE: 101 MMHG | RESPIRATION RATE: 20 BRPM | DIASTOLIC BLOOD PRESSURE: 75 MMHG | OXYGEN SATURATION: 98 % | HEART RATE: 82 BPM | TEMPERATURE: 98.7 F | BODY MASS INDEX: 39.4 KG/M2 | WEIGHT: 266 LBS

## 2020-02-24 DIAGNOSIS — M54.50 ACUTE MIDLINE LOW BACK PAIN WITHOUT SCIATICA: Primary | ICD-10-CM

## 2020-02-24 DIAGNOSIS — S29.011A MUSCLE STRAIN OF CHEST WALL, INITIAL ENCOUNTER: ICD-10-CM

## 2020-02-24 RX ORDER — PREDNISONE 10 MG/1
TABLET ORAL
Qty: 25 TAB | Refills: 0 | Status: SHIPPED | OUTPATIENT
Start: 2020-02-24 | End: 2020-05-14 | Stop reason: SDUPTHER

## 2020-02-24 NOTE — PATIENT INSTRUCTIONS
High Blood Pressure: Care Instructions  Overview    It's normal for blood pressure to go up and down throughout the day. But if it stays up, you have high blood pressure. Another name for high blood pressure is hypertension. Despite what a lot of people think, high blood pressure usually doesn't cause headaches or make you feel dizzy or lightheaded. It usually has no symptoms. But it does increase your risk of stroke, heart attack, and other problems. You and your doctor will talk about your risks of these problems based on your blood pressure. Your doctor will give you a goal for your blood pressure. Your goal will be based on your health and your age. Lifestyle changes, such as eating healthy and being active, are always important to help lower blood pressure. You might also take medicine to reach your blood pressure goal.  Follow-up care is a key part of your treatment and safety. Be sure to make and go to all appointments, and call your doctor if you are having problems. It's also a good idea to know your test results and keep a list of the medicines you take. How can you care for yourself at home? Medical treatment  · If you stop taking your medicine, your blood pressure will go back up. You may take one or more types of medicine to lower your blood pressure. Be safe with medicines. Take your medicine exactly as prescribed. Call your doctor if you think you are having a problem with your medicine. · Talk to your doctor before you start taking aspirin every day. Aspirin can help certain people lower their risk of a heart attack or stroke. But taking aspirin isn't right for everyone, because it can cause serious bleeding. · See your doctor regularly. You may need to see the doctor more often at first or until your blood pressure comes down. · If you are taking blood pressure medicine, talk to your doctor before you take decongestants or anti-inflammatory medicine, such as ibuprofen.  Some of these medicines can raise blood pressure. · Learn how to check your blood pressure at home. Lifestyle changes  · Stay at a healthy weight. This is especially important if you put on weight around the waist. Losing even 10 pounds can help you lower your blood pressure. · If your doctor recommends it, get more exercise. Walking is a good choice. Bit by bit, increase the amount you walk every day. Try for at least 30 minutes on most days of the week. You also may want to swim, bike, or do other activities. · Avoid or limit alcohol. Talk to your doctor about whether you can drink any alcohol. · Try to limit how much sodium you eat to less than 2,300 milligrams (mg) a day. Your doctor may ask you to try to eat less than 1,500 mg a day. · Eat plenty of fruits (such as bananas and oranges), vegetables, legumes, whole grains, and low-fat dairy products. · Lower the amount of saturated fat in your diet. Saturated fat is found in animal products such as milk, cheese, and meat. Limiting these foods may help you lose weight and also lower your risk for heart disease. · Do not smoke. Smoking increases your risk for heart attack and stroke. If you need help quitting, talk to your doctor about stop-smoking programs and medicines. These can increase your chances of quitting for good. When should you call for help? Call  911 anytime you think you may need emergency care. This may mean having symptoms that suggest that your blood pressure is causing a serious heart or blood vessel problem. Your blood pressure may be over 180/120.   For example, call  911 if:    · You have symptoms of a heart attack. These may include:  ? Chest pain or pressure, or a strange feeling in the chest.  ? Sweating. ? Shortness of breath. ? Nausea or vomiting. ? Pain, pressure, or a strange feeling in the back, neck, jaw, or upper belly or in one or both shoulders or arms. ? Lightheadedness or sudden weakness.   ? A fast or irregular heartbeat.     · You have symptoms of a stroke. These may include:  ? Sudden numbness, tingling, weakness, or loss of movement in your face, arm, or leg, especially on only one side of your body. ? Sudden vision changes. ? Sudden trouble speaking. ? Sudden confusion or trouble understanding simple statements. ? Sudden problems with walking or balance. ? A sudden, severe headache that is different from past headaches.     · You have severe back or belly pain.    Do not wait until your blood pressure comes down on its own. Get help right away.   Call your doctor now or seek immediate care if:    · Your blood pressure is much higher than normal (such as 180/120 or higher), but you don't have symptoms.     · You think high blood pressure is causing symptoms, such as:  ? Severe headache.  ? Blurry vision.    Watch closely for changes in your health, and be sure to contact your doctor if:    · Your blood pressure measures higher than your doctor recommends at least 2 times. That means the top number is higher or the bottom number is higher, or both.     · You think you may be having side effects from your blood pressure medicine. Where can you learn more? Go to http://samreen-tabitha.info/. Enter X127 in the search box to learn more about \"High Blood Pressure: Care Instructions. \"  Current as of: April 9, 2019  Content Version: 12.2  © 0029-2903 [employer]. Care instructions adapted under license by [employer] (which disclaims liability or warranty for this information). If you have questions about a medical condition or this instruction, always ask your healthcare professional. [place] [address] any warranty or liability for your use of this information.

## 2020-02-24 NOTE — PROGRESS NOTES
Patient is in the office today for a follow up. 1. Have you been to the ER, urgent care clinic since your last visit? Hospitalized since your last visit? No    2. Have you seen or consulted any other health care providers outside of the [address] [place] [address] since your last visit? Include any pap smears or colon screening.  No

## 2020-02-25 ENCOUNTER — OFFICE VISIT (OUTPATIENT)
Dept: ORTHOPEDIC SURGERY | Age: 65
End: 2020-02-25

## 2020-02-25 VITALS
DIASTOLIC BLOOD PRESSURE: 69 MMHG | BODY MASS INDEX: 39.25 KG/M2 | HEIGHT: 69 IN | SYSTOLIC BLOOD PRESSURE: 146 MMHG | TEMPERATURE: 99.5 F | OXYGEN SATURATION: 98 % | HEART RATE: 70 BPM | RESPIRATION RATE: 18 BRPM | WEIGHT: 265 LBS

## 2020-02-25 DIAGNOSIS — E66.01 SEVERE OBESITY (BMI 35.0-39.9) WITH COMORBIDITY (HCC): ICD-10-CM

## 2020-02-25 DIAGNOSIS — M79.2 NEURITIS: ICD-10-CM

## 2020-02-25 DIAGNOSIS — F17.200 TOBACCO DEPENDENCE: ICD-10-CM

## 2020-02-25 DIAGNOSIS — M48.061 LUMBAR FORAMINAL STENOSIS: ICD-10-CM

## 2020-02-25 DIAGNOSIS — M48.061 SPINAL STENOSIS OF LUMBAR REGION WITHOUT NEUROGENIC CLAUDICATION: ICD-10-CM

## 2020-02-25 DIAGNOSIS — M54.16 LUMBAR RADICULOPATHY: Primary | ICD-10-CM

## 2020-02-25 RX ORDER — TOPIRAMATE 25 MG/1
75 TABLET ORAL
Qty: 90 TAB | Refills: 1 | Status: SHIPPED | OUTPATIENT
Start: 2020-02-25 | End: 2020-10-02

## 2020-02-25 NOTE — LETTER
2/27/20 Patient: Simone Longoria YOB: 1955 Date of Visit: 2/25/2020 Fede Islas MD 
[address] [id number] VIA In Basket Dear Fede Islas MD, Thank you for referring Mr. Valeriano Serrano to [facility] for evaluation. My notes for this consultation are attached. If you have questions, please do not hesitate to call me. I look forward to following your patient along with you. Sincerely, Laureano Zazueta MD

## 2020-02-25 NOTE — PROGRESS NOTES
[facility]  [address] [place], [facility] [address]  Phone: [phone]  Fax: [phone]    Pt's YOB: 1955    ASSESSMENT   Diagnoses and all orders for this visit:    1. Lumbar radiculopathy  -     topiramate (TOPAMAX) 25 mg tablet; Take 3 Tabs by mouth nightly. 2. Lumbar foraminal stenosis    3. Spinal stenosis of lumbar region without neurogenic claudication    4. Neuritis  -     topiramate (TOPAMAX) 25 mg tablet; Take 3 Tabs by mouth nightly. 5. Tobacco dependence    6. Severe obesity (BMI 35.0-39. 9) with comorbidity ([address]         IMPRESSION AND PLAN:  Amaury Arora is a 75 y.o. male with history of lumbar pain. He complains of aching pain in the lower back that radiates down the posterior aspect of both legs. Pt notes that he was doing well with Topamax 25 mg 2 tabs QAM and 2 tabs QHS but notes that his insurance stopped covering the medication. Pt has been taking Neurontin 300 mg 1 cap daily without relief. 1) Pt was given information on lumbar arthritis exercises. 2) He will restart Topamax 25 mg 3 tabs QHS. 3) Mr. Chris Medina has a reminder for a \"due or due soon\" health maintenance. I have asked that he contact his primary care provider, Jenni Flores MD, for follow-up on this health maintenance. 4)  demonstrated consistency with prescribing. 5) Smoking cessation recommended  6) Weight loss also recommended  7) Steroid injections and indications also discussed and reviewed  Follow-up and Dispositions    · Return in about 2 months (around 4/25/2020) for Medication follow up. HISTORY OF PRESENT ILLNESS:  Amaury Arora is a 75 y.o. male with history of lumbar pain and presents to the office today for follow up and was last seen on 02/04/2019. He complains of aching pain in the lower back that radiates down the posterior aspect of both legs.  Pt denies any pain in the groin or hips at this time and states that he has been followed by Dr. Steve Jackson in the past. He notes that he was doing well with Topamax 25 mg 2 tabs QAM and 2 tabs QHS but notes that his insurance stopped covering the medication. Pt has been taking Neurontin 300 mg 1 cap daily without relief. He denies any pain in the posterior aspect of both legs when taking the Topamax and wishes to restart the medication at this time. He admits to pain in the right shoulder and notes that he was recently prescribed prednisone. Pt also takes Hysingla 60 mg as prescribed by Dr. Tato Abel. Pt at this time desires to proceed with medication evaluation. Of note, he continues to smoke but notes that he has cut back since his last office visit. Pain Scale: 6/10    PCP: Berry Jones MD     Past Medical History:   Diagnosis Date    Allergic rhinitis     Avascular necrosis ([address]     CAD (coronary artery disease) 4/2009    mild, hemodynamically non-significant by angiography    Carpal tunnel syndrome     Carpal tunnel syndrome on both sides     Chest pain, unspecified     Chronic back pain 2/11/2011    Chronic obstructive pulmonary disease ([address]     Degenerative joint disease     with chronic back pain    Dysesthesia     of hand post carpal tunnel surgery v sudeck's atrophy    Dyslipidemia, goal LDL below 100     Dysphagia     GERD (gastroesophageal reflux disease) 5/17/2010    Glaucoma 9/15/2010    Glaucoma     Hearing loss     Hypercholesterolemia     Hypertension     Joint fusion     of right wrist    Morbid obesity ([address]     Obesity 5/17/2010    Other dyspnea and respiratory abnormality     Palpitations     Right hip pain     Right hip pain     Right knee pain     S/P cardiac catheterization 4/9/2009    1. Normal systemic pressure. 2. Moderate elevation of left-sided filling pressures. 3. Preserved left ventricular systolic function in the LOWE projection. 4. Mild, nonsignificant epicardial coronary artery disese by angiography.     Sleep apnea     on CPAP    Strain of lumbar region     Tobacco abuse         Social History     Socioeconomic History    Marital status: SINGLE     Spouse name: Not on file    Number of children: Not on file    Years of education: Not on file    Highest education level: Not on file   Occupational History    Not on file   Social Needs    Financial resource strain: Not on file    Food insecurity:     Worry: Not on file     Inability: Not on file    Transportation needs:     Medical: Not on file     Non-medical: Not on file   Tobacco Use    Smoking status: Current Every Day Smoker     Packs/day: 0.10     Years: 20.00     Pack years: 2.00    Smokeless tobacco: Never Used    Tobacco comment: urothelial cancer risk discussed today [id number]   Substance and Sexual Activity    Alcohol use: No     Alcohol/week: 0.0 standard drinks    Drug use: No    Sexual activity: Not on file   Lifestyle    Physical activity:     Days per week: Not on file     Minutes per session: Not on file    Stress: Not on file   Relationships    Social connections:     Talks on phone: Not on file     Gets together: Not on file     Attends Sabianist service: Not on file     Active member of club or organization: Not on file     Attends meetings of clubs or organizations: Not on file     Relationship status: Not on file    Intimate partner violence:     Fear of current or ex partner: Not on file     Emotionally abused: Not on file     Physically abused: Not on file     Forced sexual activity: Not on file   Other Topics Concern    Not on file   Social History Narrative    Not on file       Current Outpatient Medications   Medication Sig Dispense Refill    topiramate (TOPAMAX) 25 mg tablet Take 3 Tabs by mouth nightly. 90 Tab 1    predniSONE (DELTASONE) 10 mg tablet TAKE 5 TABLETS BY MOUTH ONCE DAILY FOR 5 DAYS 25 Tab 0    HYDROcodone bitartrate (HYSINGLA) 60 mg ER tablet Take 1 Tab by mouth daily for 30 days. Max Daily Amount: 60 mg.  *DO NOT CRUSH,CHEW, OR DISSOLVE TABLET* 30 Tab 0    azelastine (ASTELIN) 137 mcg (0.1 %) nasal spray 2 Sprays by Both Nostrils route two (2) times a day. Use in each nostril as directed  Indications: seasonal runny nose 1 Bottle 5    ibuprofen (MOTRIN) 800 mg tablet TAKE 1 TABLET BY MOUTH EVERY 8 HOURS AS NEEDED FOR PAIN 90 Tab 3    tiZANidine (ZANAFLEX) 2 mg tablet TAKE 1 TABLET BY MOUTH THREE TIMES DAILY AS NEEDED FOR MUSCLE SPASM 60 Tab 2    traZODone (DESYREL) 50 mg tablet TAKE 1 TABLET BY MOUTH ONCE DAILY AT BEDTIME AS NEEDED FOR SLEEP 30 Tab 2    montelukast (SINGULAIR) 10 mg tablet Take 1 Tab by mouth daily. 30 Tab 5    fluticasone propionate (FLONASE) 50 mcg/actuation nasal spray USE TWO SPRAY(S) IN EACH NOSTRIL ONCE DAILY 1 Bottle 5    metoprolol tartrate (LOPRESSOR) 25 mg tablet TAKE 1 TABLET BY MOUTH TWICE DAILY 180 Tab 3    gabapentin (NEURONTIN) 300 mg capsule 2 po tid -- taper up as directed  Indications: Neuropathic Pain 180 Cap 5    tamsulosin (FLOMAX) 0.4 mg capsule TAKE 1 CAPSULE BY MOUTH ONCE DAILY AFTER  DINNER 90 Cap 3    topiramate (TOPAMAX) 100 mg tablet Take 1 Tab by mouth daily (with breakfast). 30 Tab 5    albuterol (PROVENTIL HFA, VENTOLIN HFA, PROAIR HFA) 90 mcg/actuation inhaler Take 2 Puffs by inhalation every four (4) hours as needed for Wheezing. 1 Inhaler 5    ANORO ELLIPTA 62.5-25 mcg/actuation inhaler INHALE 1 PUFF BY MOUTH ONCE DAILY 1 Inhaler 5    pantoprazole (PROTONIX) 40 mg tablet TAKE ONE TABLET BY MOUTH ONCE DAILY 90 Tab 1    lactulose (CONSTULOSE) 10 gram/15 mL solution Take 45ml PO  mL 2    guaiFENesin ER (MUCINEX) 600 mg ER tablet Take 1 Tab by mouth two (2) times a day. 30 Tab 5    nitroglycerin (NITROSTAT) 0.4 mg SL tablet 1 Tab by SubLINGual route every five (5) minutes as needed. 6 Tab 1    TRAVATAN Z 0.004 % ophthalmic solution Administer 1 Drop to both eyes nightly.       pravastatin (PRAVACHOL) 40 mg tablet TAKE ONE TABLET BY MOUTH NIGHTLY 90 Tab 3 Allergies   Allergen Reactions    Latex Not Reported This Time    Ace Inhibitors Cough    Dilaudid [Hydromorphone] Hives    Lisinopril Cough    Zocor [Simvastatin] Myalgia     Right leg         REVIEW OF SYSTEMS    Constitutional: Negative for fever, chills, or weight change. Respiratory: Negative for cough or shortness of breath. Cardiovascular: Negative for chest pain or palpitations. Gastrointestinal: Negative for acid reflux, change in bowel habits, or constipation. Genitourinary: Negative for dysuria and flank pain. Musculoskeletal: Positive for lumbar pain. Skin: Negative for rash. Neurological: Negative for headaches, dizziness, or numbness. Endo/Heme/Allergies: Negative for increased bruising. Psychiatric/Behavioral: Negative for difficulty with sleep. PHYSICAL EXAMINATION  Visit Vitals  /69   Pulse 70   Temp 99.5 °F (37.5 °C)   Resp 18   Ht 5' 9\" (1.753 m)   Wt 265 lb (120.2 kg)   SpO2 98%   BMI 39.13 kg/m²       Constitutional: Awake, alert, and in no acute distress. Neurological: 1+ symmetrical DTRs in the upper extremities. 1+ symmetrical DTRs in the lower extremities. Sensation to light touch is intact. Negative Hamm's sign bilaterally. Skin: warm, dry, and intact. Musculoskeletal: Tenderness to palpation in the lower lumbar region. Moderate pain with extension and axial loading. Improvement with forward flexion. Moderate pain and limited range of motion with internal rotation of his hips. Negative straight leg raise bilaterally.       Biceps  Triceps Deltoids Wrist Ext Wrist Flex Hand Intrin   Right +4/5 +4/5 +4/5 +4/5 +4/5 +4/5   Left +4/5 +4/5 +4/5 +4/5 +4/5 +4/5      Hip Flex  Quads Hamstrings Ankle DF EHL Ankle PF   Right +4/5 +4/5 +4/5 +4/5 +4/5 +4/5   Left +4/5 +4/5 +4/5 +4/5 +4/5 +4/5     IMAGING:    Lower extremity Arterial PVR from 02/06/2019 was personally reviewed with the patient and demonstrated:  Interpretation Summary:  No hemodynamically significant arterial disease is identified within bilateral lower extremities at rest    Lumbar spine MRI from 08/28/2018 was personally reviewed with the patient and demonstrated:  Results from [facility] on 08/28/18   MRI LUMB SPINE WO CONT    Narrative MR Lumbar Spine Without Contrast    History/Indications: 70 years Male. Back pain, unspecified. Additional History: Low back pain for several months. Pain radiates down right  leg. COMPARISON: MRI lumbar spine 8/31/2016    Technique: Multi-sequence multiplanar MRI of the lumbar spine. FINDINGS:     There are 5 lumbar-type vertebral bodies. For the purposes of this dictation the  L5/S1 disc level will be referred to by series 5 image 9    There is spondylolisthesis. There is STIR signal hyperintensity within the left  L4 and L5 pedicles (series 4 image 3), likely representing stress reaction. There is no significant spondylolisthesis. The conus terminates at the L1 level. There is moderate bilateral sacroiliac arthrosis. There are multiple rounded T2 hyperintense structures posterior lateral and  anterolateral to the aorta, unchanged compared to CT abdomen and pelvis dated  9/9/2006 and may reflect lymph nodes or a lymphatic malformation. Axial imaging correlation:    T11-T12: Sagittal plane only. No significant disc pathology. No significant  spinal canal or neural foraminal stenosis. T12-L1: No significant disc pathology. Trace left facet joint effusion. No  significant spinal canal or neural foraminal stenosis. L1-2: No significant disc pathology. No significant spinal canal or neural  foraminal stenosis. L2-3: Similar appearing small disc bulge with small right central disc  protrusion extending to the inferior aspect of the L2 vertebral body. Mild  bilateral facet arthrosis. Minimal spinal canal narrowing. Minimal bilateral  foraminal stenosis. Not significantly changed.     L3-4: Small disc bulge with small central disc extrusion extending superiorly  slightly above the disc level. Mild bilateral facet arthrosis. Minimal spinal  canal narrowing. Mild right and minimal left foraminal stenosis. No significant  change from prior. L4-5: Small to moderate disc bulge with focal protrusions involving the central  and right foraminal zones. Moderate bilateral facet arthrosis with ligamentum  flavum buckling. Mild-to-moderate spinal canal stenosis. Moderate to severe  right foraminal stenosis with effacement of the right L4 perineural fat. Mild  left foraminal stenosis. This is progressed from prior. L5-S1: Small broad-based bulge. Minimal spinal canal narrowing. No significant  foraminal stenosis.            Impression IMPRESSION:  1.  Progression of a small to moderate disc bulge at L4/L5 with focal central  and right foraminal protrusions. Moderate bilateral facet arthrosis, mild to  moderate spinal canal stenosis, moderate to severe right and mild left foraminal  stenosis, with progression from prior MRI. 2.  STIR hyperintensity within the left L4-L5 pedicles, likely representing  stress reaction. 3.  Multilevel additional degenerative disc disease, mild spinal canal stenosis,  and mild foraminal stenosis as above, not significantly changed from prior. Thank you for enabling us to participate in the care of this patient.      Thoracic spine MRI from 12/29/2017 was personally reviewed with the patient and demonstrated:  FINDINGS:  The alignment of the thoracic spine is unremarkable.   The facets are  appropriately aligned. No vertebral body step off appreciated. No evidence of  thoracic spine fracture. The marrow signal is unremarkable. The cord signal is  unremarkable.         A left paracentral disc protrusion is present at T7-T8 which causes mild canal  narrowing. No evidence of significant canal or neural foraminal stenosis at any  level. The visualized thoracic aorta is unremarkable.  The visualized paraspinal  soft tissues are unremarkable.      IMPRESSION:  1.  No acute thoracic spine pathology.      Written by Andrew Fry, as dictated by Ahsan Dugan MD.  I, Dr. Ahsan Dugan confirm that all documentation is accurate.

## 2020-02-25 NOTE — PATIENT INSTRUCTIONS
Low Back Arthritis: Exercises  Introduction  Here are some examples of typical rehabilitation exercises for your condition. Start each exercise slowly. Ease off the exercise if you start to have pain. Your doctor or physical therapist will tell you when you can start these exercises and which ones will work best for you. When you are not being active, find a comfortable position for rest. Some people are comfortable on the floor or a medium-firm bed with a small pillow under their head and another under their knees. Some people prefer to lie on their side with a pillow between their knees. Don't stay in one position for too long. Take short walks (10 to 20 minutes) every 2 to 3 hours. Avoid slopes, hills, and stairs until you feel better. Walk only distances you can manage without pain, especially leg pain. How to do the exercises  Pelvic tilt    1. Lie on your back with your knees bent. 2. \"Brace\" your stomach--tighten your muscles by pulling in and imagining your belly button moving toward your spine. 3. Press your lower back into the floor. You should feel your hips and pelvis rock back. 4. Hold for 6 seconds while breathing smoothly. 5. Relax and allow your pelvis and hips to rock forward. 6. Repeat 8 to 12 times. Back stretches    1. Get down on your hands and knees on the floor. 2. Relax your head and allow it to droop. Round your back up toward the ceiling until you feel a nice stretch in your upper, middle, and lower back. Hold this stretch for as long as it feels comfortable, or about 15 to 30 seconds. 3. Return to the starting position with a flat back while you are on your hands and knees. 4. Let your back sway by pressing your stomach toward the floor. Lift your buttocks toward the ceiling. 5. Hold this position for 15 to 30 seconds. 6. Repeat 2 to 4 times. Follow-up care is a key part of your treatment and safety.  Be sure to make and go to all appointments, and call your doctor if you are having problems. It's also a good idea to know your test results and keep a list of the medicines you take. Where can you learn more? Go to http://samreen-tabitha.info/. Enter R779 in the search box to learn more about \"Low Back Arthritis: Exercises. \"  Current as of: June 26, 2019  Content Version: 12.2  © 2941-1031 [email or web address]. Care instructions adapted under license by [employer] (which disclaims liability or warranty for this information). If you have questions about a medical condition or this instruction, always ask your healthcare professional. [address] any warranty or liability for your use of this information.

## 2020-02-27 NOTE — PROGRESS NOTES
Jeff Pearson is a 75 y.o.  male and presents with Hypertension; Myalgia (f/u); and Back Pain      SUBJECTIVE:    Back Pain  Patient presents for presents evaluation of low back problems. Symptoms have been present for several years and include pain in in lower back (severe, sharp, cramping in character; 10/10 in severity). Initial inciting event: none. Symptoms are worst: nighttime. Alleviating factors identifiable by patient are recumbency. Exacerbating factors identifiable by patient are standing, walking. Treatments so far initiated by patient: pt on Hysingula 60 mg, Neurontin and Motrin Previous lower back problems: yes. Previous workup: [facility]. Previous treatments: as above . Respiratory ROS: negative for - shortness of breath  Cardiovascular ROS: negative for - chest pain    Current Outpatient Medications   Medication Sig    predniSONE (DELTASONE) 10 mg tablet TAKE 5 TABLETS BY MOUTH ONCE DAILY FOR 5 DAYS    HYDROcodone bitartrate (HYSINGLA) 60 mg ER tablet Take 1 Tab by mouth daily for 30 days. Max Daily Amount: 60 mg. *DO NOT CRUSH,CHEW, OR DISSOLVE TABLET*    azelastine (ASTELIN) 137 mcg (0.1 %) nasal spray 2 Sprays by Both Nostrils route two (2) times a day. Use in each nostril as directed  Indications: seasonal runny nose    ibuprofen (MOTRIN) 800 mg tablet TAKE 1 TABLET BY MOUTH EVERY 8 HOURS AS NEEDED FOR PAIN    tiZANidine (ZANAFLEX) 2 mg tablet TAKE 1 TABLET BY MOUTH THREE TIMES DAILY AS NEEDED FOR MUSCLE SPASM    traZODone (DESYREL) 50 mg tablet TAKE 1 TABLET BY MOUTH ONCE DAILY AT BEDTIME AS NEEDED FOR SLEEP    montelukast (SINGULAIR) 10 mg tablet Take 1 Tab by mouth daily.     fluticasone propionate (FLONASE) 50 mcg/actuation nasal spray USE TWO SPRAY(S) IN EACH NOSTRIL ONCE DAILY    metoprolol tartrate (LOPRESSOR) 25 mg tablet TAKE 1 TABLET BY MOUTH TWICE DAILY    gabapentin (NEURONTIN) 300 mg capsule 2 po tid -- taper up as directed  Indications: Neuropathic Pain  tamsulosin (FLOMAX) 0.4 mg capsule TAKE 1 CAPSULE BY MOUTH ONCE DAILY AFTER  DINNER    topiramate (TOPAMAX) 100 mg tablet Take 1 Tab by mouth daily (with breakfast).  albuterol (PROVENTIL HFA, VENTOLIN HFA, PROAIR HFA) 90 mcg/actuation inhaler Take 2 Puffs by inhalation every four (4) hours as needed for Wheezing.  ANORO ELLIPTA 62.5-25 mcg/actuation inhaler INHALE 1 PUFF BY MOUTH ONCE DAILY    pravastatin (PRAVACHOL) 40 mg tablet TAKE ONE TABLET BY MOUTH NIGHTLY    pantoprazole (PROTONIX) 40 mg tablet TAKE ONE TABLET BY MOUTH ONCE DAILY    lactulose (CONSTULOSE) 10 gram/15 mL solution Take 45ml PO TID    guaiFENesin ER (MUCINEX) 600 mg ER tablet Take 1 Tab by mouth two (2) times a day.  nitroglycerin (NITROSTAT) 0.4 mg SL tablet 1 Tab by SubLINGual route every five (5) minutes as needed.  TRAVATAN Z 0.004 % ophthalmic solution Administer 1 Drop to both eyes nightly.  topiramate (TOPAMAX) 25 mg tablet Take 3 Tabs by mouth nightly. No current facility-administered medications for this visit. OBJECTIVE:  alert, well appearing, and in no distress  Visit Vitals  /75 (BP 1 Location: Left arm, BP Patient Position: Sitting)   Pulse 82   Temp 98.7 °F (37.1 °C) (Oral)   Resp 20   Ht 5' 9\" (1.753 m)   Wt 266 lb (120.7 kg)   SpO2 98%   BMI 39.28 kg/m²      well developed and well nourished  General appearance - alert, well appearing, and in no distress  Back exam - antalgic gait, limited range of motion, pain with motion noted during exam, negative straight-leg raise bilaterally       Assessment/Plan      ICD-10-CM ICD-9-CM    1. Acute midline low back pain without sciatica M54.5 724.2 Will treat with predniSONE (DELTASONE) 10 mg tablet and pt [facility] to [facility]ural    2. Muscle strain of chest wall, initial encounter S29.011A 848. 8 predniSONE (DELTASONE) 10 mg tablet     Follow-up and Dispositions    · Return if symptoms worsen or fail to improve. Reviewed plan of care. Patient has provided input and agrees with goals.

## 2020-03-16 DIAGNOSIS — M54.41 CHRONIC MIDLINE LOW BACK PAIN WITH RIGHT-SIDED SCIATICA: ICD-10-CM

## 2020-03-16 DIAGNOSIS — G89.29 CHRONIC MIDLINE LOW BACK PAIN WITH RIGHT-SIDED SCIATICA: ICD-10-CM

## 2020-03-16 RX ORDER — HYDROCODONE BITARTRATE 60 MG/1
60 TABLET, EXTENDED RELEASE ORAL DAILY
Qty: 30 TAB | Refills: 0 | Status: SHIPPED | OUTPATIENT
Start: 2020-03-16 | End: 2020-04-13 | Stop reason: SDUPTHER

## 2020-03-16 NOTE — TELEPHONE ENCOUNTER
[facility]  reports the last [facility] date for Hysinga as 2/20/20 for a 30 d/s. UDS done 11/18/19  CSA signed 8/27/19    Last Visit: 2/24/20 with MD Fragoso  Next Appointment: none  Previous Refill Encounter(s): 2/18/20 #30    Requested Prescriptions     Pending Prescriptions Disp Refills    HYDROcodone bitartrate (HYSINGLA) 60 mg ER tablet 30 Tab 0     Sig: Take 1 Tab by mouth daily for 30 days. Max Daily Amount: 60 mg.  *DO NOT CRUSH,CHEW, OR DISSOLVE TABLET*

## 2020-04-04 DIAGNOSIS — G89.29 CHRONIC MIDLINE LOW BACK PAIN WITH RIGHT-SIDED SCIATICA: ICD-10-CM

## 2020-04-04 DIAGNOSIS — S29.011A MUSCLE STRAIN OF CHEST WALL, INITIAL ENCOUNTER: ICD-10-CM

## 2020-04-04 DIAGNOSIS — M54.41 CHRONIC MIDLINE LOW BACK PAIN WITH RIGHT-SIDED SCIATICA: ICD-10-CM

## 2020-04-05 RX ORDER — TIZANIDINE 2 MG/1
TABLET ORAL
Qty: 60 TAB | Refills: 2 | Status: SHIPPED | OUTPATIENT
Start: 2020-04-05 | End: 2020-10-02

## 2020-04-13 RX ORDER — HYDROCODONE BITARTRATE 60 MG/1
60 TABLET, EXTENDED RELEASE ORAL DAILY
Qty: 30 TAB | Refills: 0 | Status: SHIPPED | OUTPATIENT
Start: 2020-04-13 | End: 2020-05-14 | Stop reason: SDUPTHER

## 2020-04-13 NOTE — TELEPHONE ENCOUNTER
Please advise and pend new Rx for congestion due to allergy as per patient request.    [address] [facility] reports the last fill date for Hyslinga as 3/19/20 for a 30 d/s. UDS done 11/18/19  CSA signed 8/27/19    Last Visit: 2/24/20 with MD Menard  Next Appointment: none  Previous Refill Encounter(s): 3/16/20 #30    Requested Prescriptions     Pending Prescriptions Disp Refills    HYDROcodone bitartrate (HYSINGLA) 60 mg ER tablet 30 Tab 0     Sig: Take 1 Tab by mouth daily for 30 days. Max Daily Amount: 60 mg.  *DO NOT CRUSH,CHEW, OR DISSOLVE TABLET*     Signed Prescriptions Disp Refills    tiZANidine (ZANAFLEX) 2 mg tablet 60 Tab 2     Sig: Take 1 tablet by mouth three times daily as needed for muscle spasm     Authorizing Provider: WILLIAM MENARD

## 2020-05-04 ENCOUNTER — TELEPHONE (OUTPATIENT)
Dept: FAMILY MEDICINE CLINIC | Age: 65
End: 2020-05-04

## 2020-05-04 NOTE — TELEPHONE ENCOUNTER
Pt came into office to choose a new provider, prefers to be seen by [facility]. Pt does not have compatible phone for vv nor has a computer.  Please adv [phone]

## 2020-05-05 ENCOUNTER — TELEPHONE (OUTPATIENT)
Dept: FAMILY MEDICINE CLINIC | Age: 65
End: 2020-05-05

## 2020-05-05 DIAGNOSIS — M79.2 NEURITIS: ICD-10-CM

## 2020-05-05 DIAGNOSIS — M54.16 LUMBAR RADICULOPATHY: ICD-10-CM

## 2020-05-05 RX ORDER — TOPIRAMATE 25 MG/1
TABLET ORAL
Qty: 90 TAB | Refills: 0 | OUTPATIENT
Start: 2020-05-05

## 2020-05-05 NOTE — TELEPHONE ENCOUNTER
Patient request directions to Dr. Boom Navas [facility] office. Directions was mailed out to patient.

## 2020-05-05 NOTE — TELEPHONE ENCOUNTER
Please verify the patient is taking this and see if a virtual visit or telephone visit can be done -- thanks

## 2020-05-06 DIAGNOSIS — M79.2 NEURITIS: ICD-10-CM

## 2020-05-06 DIAGNOSIS — M54.16 LUMBAR RADICULOPATHY: ICD-10-CM

## 2020-05-06 RX ORDER — TOPIRAMATE 25 MG/1
TABLET ORAL
Qty: 90 TAB | Refills: 0 | OUTPATIENT
Start: 2020-05-06

## 2020-05-06 NOTE — TELEPHONE ENCOUNTER
Please see if pt still taking this -- I last saw him in February -- I can do a phone / virtual visit if he needs that

## 2020-05-08 NOTE — TELEPHONE ENCOUNTER
Spoke with pt, scheduled Telephone visit with Dr. Sofia Baugh for 5/13/2020. No further actions needed at this time.

## 2020-05-12 ENCOUNTER — VIRTUAL VISIT (OUTPATIENT)
Dept: ORTHOPEDIC SURGERY | Age: 65
End: 2020-05-12

## 2020-05-12 DIAGNOSIS — G62.9 NEUROPATHY: ICD-10-CM

## 2020-05-12 DIAGNOSIS — M48.061 SPINAL STENOSIS OF LUMBAR REGION WITHOUT NEUROGENIC CLAUDICATION: ICD-10-CM

## 2020-05-12 DIAGNOSIS — M48.061 LUMBAR FORAMINAL STENOSIS: ICD-10-CM

## 2020-05-12 DIAGNOSIS — M54.16 LUMBAR RADICULOPATHY: Primary | ICD-10-CM

## 2020-05-12 RX ORDER — PREGABALIN 75 MG/1
CAPSULE ORAL
Qty: 90 CAP | Refills: 1 | Status: SHIPPED | OUTPATIENT
Start: 2020-05-12 | End: 2020-10-02 | Stop reason: DRUGHIGH

## 2020-05-12 NOTE — PATIENT INSTRUCTIONS
Low Back Arthritis: Exercises Introduction Here are some examples of typical rehabilitation exercises for your condition. Start each exercise slowly. Ease off the exercise if you start to have pain. Your doctor or physical therapist will tell you when you can start these exercises and which ones will work best for you. When you are not being active, find a comfortable position for rest. Some people are comfortable on the floor or a medium-firm bed with a small pillow under their head and another under their knees. Some people prefer to lie on their side with a pillow between their knees. Don't stay in one position for too long. Take short walks (10 to 20 minutes) every 2 to 3 hours. Avoid slopes, hills, and stairs until you feel better. Walk only distances you can manage without pain, especially leg pain. How to do the exercises Pelvic tilt 1. Lie on your back with your knees bent. 2. \"Brace\" your stomachtighten your muscles by pulling in and imagining your belly button moving toward your spine. 3. Press your lower back into the floor. You should feel your hips and pelvis rock back. 4. Hold for 6 seconds while breathing smoothly. 5. Relax and allow your pelvis and hips to rock forward. 6. Repeat 8 to 12 times. Back stretches 1. Get down on your hands and knees on the floor. 2. Relax your head and allow it to droop. Round your back up toward the ceiling until you feel a nice stretch in your upper, middle, and lower back. Hold this stretch for as long as it feels comfortable, or about 15 to 30 seconds. 3. Return to the starting position with a flat back while you are on your hands and knees. 4. Let your back sway by pressing your stomach toward the floor. Lift your buttocks toward the ceiling. 5. Hold this position for 15 to 30 seconds. 6. Repeat 2 to 4 times. Follow-up care is a key part of your treatment and safety.  Be sure to make and go to all appointments, and call your doctor if you are having problems. It's also a good idea to know your test results and keep a list of the medicines you take. Where can you learn more? Go to http://samreen-tabitha.info/ Enter Z163 in the search box to learn more about \"Low Back Arthritis: Exercises. \" Current as of: June 26, 2019Content Version: 12.4 © 0660-4642 [employer]. Care instructions adapted under license by [employer] (which disclaims liability or warranty for this information). If you have questions about a medical condition or this instruction, always ask your healthcare professional. [address] any warranty or liability for your use of this information.

## 2020-05-12 NOTE — PROGRESS NOTES
[facility]  Rylee Rhoades., [address] [place] [address], [facility] [address]  Phone: [phone]  Fax: [phone]    Pt's YOB: 1955    ASSESSMENT   Diagnoses and all orders for this visit:    1. Lumbar radiculopathy  -     pregabalin (LYRICA) 75 mg capsule; Take 1 cap by mouth in the morning and 2 at night as directed. 2. Spinal stenosis of lumbar region without neurogenic claudication    3. Neuropathy  -     pregabalin (LYRICA) 75 mg capsule; Take 1 cap by mouth in the morning and 2 at night as directed. 4. Lumbar foraminal stenosis         IMPRESSION AND PLAN:  Alvarez Kennedy is a 75 y.o. male with history of lumbar pain. He complains of aching pain in the lower back that radiates down the posterior aspect of both legs with numbness in both thighs. Pt restarted Topamax 25 mg 3 tabs QHS but according to the patient, Medicare would not cover the medication. 1) Pt was given information on lumbar arthritis exercises. 2) He was prescribed Lyrica 75 mg 1 cap QAM and 2 caps QHS, tapering up as directed. 3) Mr. Ashley Hicks has a reminder for a \"due or due soon\" health maintenance. I have asked that he contact his primary care provider, Didier Joyner MD, for follow-up on this health maintenance. 4)  demonstrated consistency with prescribing. Follow-up and Dispositions    · Return in about 4 weeks (around 6/9/2020) for Medication follow up. CPT Codes 50450-93559 for Established Patients may apply to this TeleHealth Visit  Time-based coding, delete if not needed: I spent 13 minutes and 28 seconds from 1:37 PM to 1:50 PM with this established patient, and >50% of the time was spent counseling and/or coordinating care regarding his symptoms and medications. Due to this being a TeleHealth evaluation, many elements of the physical examination are unable to be assessed.      Pursuant to the emergency declaration under the [address] Emergencies Act, 1135 waiver authority and the Coronavirus Preparedness and Response Supplemental Appropriations Act, this Virtual Visit was conducted, with patient's consent, to reduce the patient's risk of exposure to COVID-19 and provide continuity of care for an established patient. Services were provided through a telephone discussion virtually to substitute for in-person clinic visit. HISTORY OF PRESENT ILLNESS:  Fatmata Bass is a 75 y.o. male with history of lumbar pain and was evaluated from his home by telephone at the [facility] location on 5/12/2020 with his verbal consent for follow up. Pt complains of aching pain in the lower back that radiates down the posterior aspect of both legs. He also reports numbness/tingling in both thighs. Pt admits that his legs occasionally give way. He reports intermittent burning pain in the toes in both feet. Of note, he had a previous right hip replacement. He notes numbness/tingling in the hands and reports a history of carpal tunnel syndrome. Pt restarted Topamax 25 mg 3 tabs QHS but according to the patient, Medicare would not cover the medication. He reports minimal relief with Neurontin and denies ever taking Lyrica. Pt continues to take Hysingla 60 mg as prescribed by his PCP, Leatha Ordoñez MD. Pt at this time desires to proceed with medication evaluation. Of note, he quit smoking 2 weeks ago.      Pain Scale: 7/10     PCP: Leatha Ordoñez MD     Past Medical History:   Diagnosis Date    Allergic rhinitis     Avascular necrosis ([facility] [address] [facility]     CAD (coronary artery disease) 4/2009    mild, hemodynamically non-significant by angiography    Carpal tunnel syndrome     Carpal tunnel syndrome on both sides     Chest pain, unspecified     Chronic back pain 2/11/2011    Chronic obstructive pulmonary disease ([address]     Degenerative joint disease     with chronic back pain    Dysesthesia     of hand post carpal tunnel surgery v sudeck's atrophy    Dyslipidemia, goal LDL below 100     Dysphagia     GERD (gastroesophageal reflux disease) 5/17/2010    Glaucoma 9/15/2010    Glaucoma     Hearing loss     Hypercholesterolemia     Hypertension     Joint fusion     of right wrist    Morbid obesity ([address]     Obesity 5/17/2010    Other dyspnea and respiratory abnormality     Palpitations     Right hip pain     Right hip pain     Right knee pain     S/P cardiac catheterization 4/9/2009    1. Normal systemic pressure. 2. Moderate elevation of left-sided filling pressures. 3. Preserved left ventricular systolic function in the LOWE projection. 4. Mild, nonsignificant epicardial coronary artery disese by angiography.     Sleep apnea     on CPAP    Strain of lumbar region     Tobacco abuse         Social History     Socioeconomic History    Marital status: SINGLE     Spouse name: Not on file    Number of children: Not on file    Years of education: Not on file    Highest education level: Not on file   Occupational History    Not on file   Social Needs    Financial resource strain: Not on file    Food insecurity     Worry: Not on file     Inability: Not on file    Transportation needs     Medical: Not on file     Non-medical: Not on file   Tobacco Use    Smoking status: Current Every Day Smoker     Packs/day: 0.10     Years: 20.00     Pack years: 2.00    Smokeless tobacco: Never Used    Tobacco comment: urothelial cancer risk discussed today [id number]   Substance and Sexual Activity    Alcohol use: No     Alcohol/week: 0.0 standard drinks    Drug use: No    Sexual activity: Not on file   Lifestyle    Physical activity     Days per week: Not on file     Minutes per session: Not on file    Stress: Not on file   Relationships    Social connections     Talks on phone: Not on file     Gets together: Not on file     Attends Judaism service: Not on file     Active member of club or organization: Not on file     Attends meetings of clubs or organizations: Not on file     Relationship status: Not on file    Intimate partner violence     Fear of current or ex partner: Not on file     Emotionally abused: Not on file     Physically abused: Not on file     Forced sexual activity: Not on file   Other Topics Concern    Not on file   Social History Narrative    Not on file       Current Outpatient Medications   Medication Sig Dispense Refill    pregabalin (LYRICA) 75 mg capsule Take 1 cap by mouth in the morning and 2 at night as directed. 90 Cap 1    HYDROcodone bitartrate (HYSINGLA) 60 mg ER tablet Take 1 Tab by mouth daily for 30 days. Max Daily Amount: 60 mg. *DO NOT CRUSH,CHEW, OR DISSOLVE TABLET* 30 Tab 0    tiZANidine (ZANAFLEX) 2 mg tablet Take 1 tablet by mouth three times daily as needed for muscle spasm 60 Tab 2    topiramate (TOPAMAX) 25 mg tablet Take 3 Tabs by mouth nightly. 90 Tab 1    predniSONE (DELTASONE) 10 mg tablet TAKE 5 TABLETS BY MOUTH ONCE DAILY FOR 5 DAYS 25 Tab 0    azelastine (ASTELIN) 137 mcg (0.1 %) nasal spray 2 Sprays by Both Nostrils route two (2) times a day. Use in each nostril as directed  Indications: seasonal runny nose 1 Bottle 5    ibuprofen (MOTRIN) 800 mg tablet TAKE 1 TABLET BY MOUTH EVERY 8 HOURS AS NEEDED FOR PAIN 90 Tab 3    traZODone (DESYREL) 50 mg tablet TAKE 1 TABLET BY MOUTH ONCE DAILY AT BEDTIME AS NEEDED FOR SLEEP 30 Tab 2    montelukast (SINGULAIR) 10 mg tablet Take 1 Tab by mouth daily.  30 Tab 5    fluticasone propionate (FLONASE) 50 mcg/actuation nasal spray USE TWO SPRAY(S) IN EACH NOSTRIL ONCE DAILY 1 Bottle 5    metoprolol tartrate (LOPRESSOR) 25 mg tablet TAKE 1 TABLET BY MOUTH TWICE DAILY 180 Tab 3    gabapentin (NEURONTIN) 300 mg capsule 2 po tid -- taper up as directed  Indications: Neuropathic Pain 180 Cap 5    tamsulosin (FLOMAX) 0.4 mg capsule TAKE 1 CAPSULE BY MOUTH ONCE DAILY AFTER  DINNER 90 Cap 3    albuterol (PROVENTIL HFA, VENTOLIN HFA, PROAIR HFA) 90 mcg/actuation inhaler Take 2 Puffs by inhalation every four (4) hours as needed for Wheezing. 1 Inhaler 5    ANORO ELLIPTA 62.5-25 mcg/actuation inhaler INHALE 1 PUFF BY MOUTH ONCE DAILY 1 Inhaler 5    pravastatin (PRAVACHOL) 40 mg tablet TAKE ONE TABLET BY MOUTH NIGHTLY 90 Tab 3    pantoprazole (PROTONIX) 40 mg tablet TAKE ONE TABLET BY MOUTH ONCE DAILY 90 Tab 1    lactulose (CONSTULOSE) 10 gram/15 mL solution Take 45ml PO  mL 2    guaiFENesin ER (MUCINEX) 600 mg ER tablet Take 1 Tab by mouth two (2) times a day. 30 Tab 5    nitroglycerin (NITROSTAT) 0.4 mg SL tablet 1 Tab by SubLINGual route every five (5) minutes as needed. 6 Tab 1    TRAVATAN Z 0.004 % ophthalmic solution Administer 1 Drop to both eyes nightly.  topiramate (TOPAMAX) 100 mg tablet Take 1 Tab by mouth daily (with breakfast). (Patient not taking: Reported on 5/12/2020) 30 Tab 5       Allergies   Allergen Reactions    Latex Not Reported This Time    Ace Inhibitors Cough    Dilaudid [Hydromorphone] Hives    Lisinopril Cough    Zocor [Simvastatin] Myalgia     Right leg         REVIEW OF SYSTEMS    Constitutional: Negative for fever, chills, or weight change. Gastrointestinal: Negative for acid reflux, change in bowel habits, or constipation. Genitourinary: Negative for dysuria and flank pain. Musculoskeletal: Positive for lumbar pain. Neurological: Negative for headaches or dizziness. Positive for numbness in the hands and legs. Psychiatric/Behavioral: Positive for difficulty with sleep.     As per HPI    IMAGING:    Lower extremity Arterial PVR from 02/06/2019 was personally reviewed with the patient and demonstrated:  Interpretation Summary:  No hemodynamically significant arterial disease is identified within bilateral lower extremities at rest     Lumbar spine MRI from 08/28/2018 was personally reviewed with the patient and demonstrated:  [facility] from [facility] on 08/28/18   MRI LUMB SPINE WO CONT    Narrative MR Lumbar Spine Without Contrast    History/Indications: 70 years Male. Back pain, unspecified. Additional History: Low back pain for several months. Pain radiates down right  leg. COMPARISON: MRI lumbar spine 8/31/2016    Technique: Multi-sequence multiplanar MRI of the lumbar spine. FINDINGS:     There are 5 lumbar-type vertebral bodies. For the purposes of this dictation the  L5/S1 disc level will be referred to by series 5 image 9    There is spondylolisthesis. There is STIR signal hyperintensity within the left  L4 and L5 pedicles (series 4 image 3), likely representing stress reaction. There is no significant spondylolisthesis. The conus terminates at the L1 level. There is moderate bilateral sacroiliac arthrosis. There are multiple rounded T2 hyperintense structures posterior lateral and  anterolateral to the aorta, unchanged compared to CT abdomen and pelvis dated  9/9/2006 and may reflect lymph nodes or a lymphatic malformation. Axial imaging correlation:    T11-T12: Sagittal plane only. No significant disc pathology. No significant  spinal canal or neural foraminal stenosis. T12-L1: No significant disc pathology. Trace left facet joint effusion. No  significant spinal canal or neural foraminal stenosis. L1-2: No significant disc pathology. No significant spinal canal or neural  foraminal stenosis. L2-3: Similar appearing small disc bulge with small right central disc  protrusion extending to the inferior aspect of the L2 vertebral body. Mild  bilateral facet arthrosis. Minimal spinal canal narrowing. Minimal bilateral  foraminal stenosis. Not significantly changed. L3-4: Small disc bulge with small central disc extrusion extending superiorly  slightly above the disc level. Mild bilateral facet arthrosis. Minimal spinal  canal narrowing. Mild right and minimal left foraminal stenosis. No significant  change from prior.     L4-5: Small to moderate disc bulge with focal protrusions involving the central  and right foraminal zones. Moderate bilateral facet arthrosis with ligamentum  flavum buckling. Mild-to-moderate spinal canal stenosis. Moderate to severe  right foraminal stenosis with effacement of the right L4 perineural fat. Mild  left foraminal stenosis. This is progressed from prior. L5-S1: Small broad-based bulge. Minimal spinal canal narrowing. No significant  foraminal stenosis.            Impression IMPRESSION:  1.  Progression of a small to moderate disc bulge at L4/L5 with focal central  and right foraminal protrusions. Moderate bilateral facet arthrosis, mild to  moderate spinal canal stenosis, moderate to severe right and mild left foraminal  stenosis, with progression from prior MRI. 2.  STIR hyperintensity within the left L4-L5 pedicles, likely representing  stress reaction. 3.  Multilevel additional degenerative disc disease, mild spinal canal stenosis,  and mild foraminal stenosis as above, not significantly changed from prior. Thank you for enabling us to participate in the care of this patient.      Thoracic spine MRI from 12/29/2017 was personally reviewed with the patient and demonstrated:  FINDINGS:  The alignment of the thoracic spine is unremarkable.   The facets are  appropriately aligned. No vertebral body step off appreciated. No evidence of  thoracic spine fracture. The marrow signal is unremarkable. The cord signal is  unremarkable.         A left paracentral disc protrusion is present at T7-T8 which causes mild canal  narrowing. No evidence of significant canal or neural foraminal stenosis at any  level. The visualized thoracic aorta is unremarkable. The visualized paraspinal  soft tissues are unremarkable.      IMPRESSION:  1.  No acute thoracic spine pathology.        Written by Abby Amaral, as dictated by Isidoro Núñez MD.  I, Dr. Isidoro Núñez confirm that all documentation is accurate.

## 2020-05-14 ENCOUNTER — VIRTUAL VISIT (OUTPATIENT)
Dept: INTERNAL MEDICINE CLINIC | Age: 65
End: 2020-05-14

## 2020-05-14 DIAGNOSIS — G89.29 CHRONIC MIDLINE LOW BACK PAIN WITH LEFT-SIDED SCIATICA: ICD-10-CM

## 2020-05-14 DIAGNOSIS — M54.42 CHRONIC MIDLINE LOW BACK PAIN WITH LEFT-SIDED SCIATICA: ICD-10-CM

## 2020-05-14 RX ORDER — PREDNISONE 10 MG/1
TABLET ORAL
Qty: 25 TAB | Refills: 0 | Status: SHIPPED | OUTPATIENT
Start: 2020-05-14 | End: 2020-06-29 | Stop reason: ALTCHOICE

## 2020-05-14 RX ORDER — HYDROCODONE BITARTRATE 60 MG/1
60 TABLET, EXTENDED RELEASE ORAL DAILY
Qty: 30 TAB | Refills: 0 | Status: SHIPPED | OUTPATIENT
Start: 2020-05-14 | End: 2020-06-10 | Stop reason: SDUPTHER

## 2020-05-14 NOTE — PROGRESS NOTES
Laura Duong is a 75 y.o. male evaluated via audio only technology on 5/14/2020. Consent: He and/or his health care decision maker is aware that he may receive a bill for this audio only encounter, depending on his insurance coverage, and has provided verbal consent to proceed: Yes    I communicated with the patient and/or health care decision maker about the nature and details of the following:  Assessment & Plan:   Diagnoses and all orders for this visit:    1. Chronic midline low back pain with left-sided sciatica  -     predniSONE (DELTASONE) 10 mg tablet; 5 tabs daily for 5 days  -     HYDROcodone bitartrate (HYSINGLA) 60 mg ER tablet; Take 1 Tab by mouth daily for 30 days. Max Daily Amount: 60 mg. *DO NOT CRUSH,CHEW, OR DISSOLVE TABLET*        12  Subjective:   Laura Duong is a 75 y.o. male who was seen for Back Pain    Back Pain  Patient presents for presents evaluation of low back problems. Symptoms have been present for several weeks and include pain in lower back (severe in character; 9/10 in severity), numbness in left leg. Initial inciting event: none. Symptoms are worst: evening. Alleviating factors identifiable by patient are recumbency. Exacerbating factors identifiable by patient are standing, walking. Treatments so far initiated by patient: Lyrica 75 mg BID and Hydrocodone ER 60 mg. Previous lower back problems: yes. Previous workup: seen by [address] [place] [address]. Previous treatments: PT he says did not help and spinal injections also had minimal improvement. His weight with BMI 39 is a factor but he has continued to have difficulty losing the weight. Prior to Admission medications    Medication Sig Start Date End Date Taking? Authorizing Provider   predniSONE (DELTASONE) 10 mg tablet 5 tabs daily for 5 days 5/14/20  Yes Alicia Fragoso MD   HYDROcodone bitartrate (HYSINGLA) 60 mg ER tablet Take 1 Tab by mouth daily for 30 days. Max Daily Amount: 60 mg.  *DO NOT CRUSH,CHEW, OR DISSOLVE TABLET* 5/14/20 6/13/20 Yes León Fragoso MD   pregabalin (LYRICA) 75 mg capsule Take 1 cap by mouth in the morning and 2 at night as directed. 5/12/20   Bryan Michel MD   tiZANidine (ZANAFLEX) 2 mg tablet Take 1 tablet by mouth three times daily as needed for muscle spasm 4/5/20   August Pandey MD   topiramate (TOPAMAX) 25 mg tablet Take 3 Tabs by mouth nightly. 2/25/20   Bryan Michel MD   azelastine (ASTELIN) 137 mcg (0.1 %) nasal spray 2 Sprays by Both Nostrils route two (2) times a day. Use in each nostril as directed  Indications: seasonal runny nose 2/15/20   Osvaldo CEDILLO, ROB   ibuprofen (MOTRIN) 800 mg tablet TAKE 1 TABLET BY MOUTH EVERY 8 HOURS AS NEEDED FOR PAIN 2/13/20   August Pandey MD   traZODone (DESYREL) 50 mg tablet TAKE 1 TABLET BY MOUTH ONCE DAILY AT BEDTIME AS NEEDED FOR SLEEP 12/3/19   León Fragoso MD   montelukast (SINGULAIR) 10 mg tablet Take 1 Tab by mouth daily. 10/2/19   León Fragoso MD   fluticasone propionate (FLONASE) 50 mcg/actuation nasal spray USE TWO SPRAY(S) IN EACH NOSTRIL ONCE DAILY 10/2/19   León Fragoso MD   metoprolol tartrate (LOPRESSOR) 25 mg tablet TAKE 1 TABLET BY MOUTH TWICE DAILY 9/23/19   León Fragoso MD   gabapentin (NEURONTIN) 300 mg capsule 2 po tid -- taper up as directed  Indications: Neuropathic Pain 7/22/19   August Pandey MD   tamsulosin (FLOMAX) 0.4 mg capsule TAKE 1 CAPSULE BY MOUTH ONCE DAILY AFTER  DINNER 6/24/19   Mario Duarte MD   topiramate (TOPAMAX) 100 mg tablet Take 1 Tab by mouth daily (with breakfast). Patient not taking: Reported on 5/12/2020 5/1/19   August Pandey MD   albuterol (PROVENTIL HFA, VENTOLIN HFA, PROAIR HFA) 90 mcg/actuation inhaler Take 2 Puffs by inhalation every four (4) hours as needed for Wheezing.  4/16/19   August Pandey MD   ANORO ELLIPTA 62.5-25 mcg/actuation inhaler INHALE 1 PUFF BY MOUTH ONCE DAILY 4/3/19   León Fragoso MD   pravastatin (PRAVACHOL) 40 mg tablet TAKE ONE TABLET BY MOUTH NIGHTLY 3/21/19   Alf Narayanan MD   pantoprazole (PROTONIX) 40 mg tablet TAKE ONE TABLET BY MOUTH ONCE DAILY 8/16/18   Alf Narayanan MD   lactulose (CONSTULOSE) 10 gram/15 mL solution Take 45ml PO TID 1/10/18   Tequila Fragoso MD   guaiFENesin ER (MUCINEX) 600 mg ER tablet Take 1 Tab by mouth two (2) times a day. 11/20/17   Alf Naaryanan MD   nitroglycerin (NITROSTAT) 0.4 mg SL tablet 1 Tab by SubLINGual route every five (5) minutes as needed. 3/16/17   Ruth Joy, KAREN   TRAVATAN Z 0.004 % ophthalmic solution Administer 1 Drop to both eyes nightly. 9/9/10   Provider, Historical     Allergies   Allergen Reactions    Latex Not Reported This Time    Ace Inhibitors Cough    Dilaudid [Hydromorphone] Hives    Lisinopril Cough    Zocor [Simvastatin] Myalgia     Right leg       Patient Active Problem List   Diagnosis Code    Hypercholesterolemia E78.00    Allergic rhinitis J30.9    Sleep apnea G47.30    GERD (gastroesophageal reflux disease) K21.9    Obesity E66.9    Glaucoma H40.9    HTN (hypertension) I10    Chronic back pain M54.9, G89.29    Chest pain, unspecified R07.9    Dyslipidemia, goal LDL below 100 E78.5    Other dyspnea and respiratory abnormality R06.09, R09.89    Palpitations R00.2    Degenerative joint disease M19.90    Prediabetes R73.03    Right hip pain M25.551    Right knee pain M25.561    Enlarged prostate with lower urinary tract symptoms (LUTS) N40.1    Urinary frequency R35.0    Sense of Incomplete bladder emptying R33.9    Nocturia R35.1    Slowing of urinary stream R39.198    Hip arthritis M16.10    Abdominal discomfort R10.9    Diarrhea R19.7    Primary insomnia F51.01    Obstructive sleep apnea syndrome G47.33    Bug bite with infection W57. XXXA    Cough R05    Obesity (BMI 30-39. 9) E66.9    Severe obesity (HCC) E66.01       Review of Systems   Musculoskeletal: Positive for back pain. Neurological: Positive for tingling (left leg).        I affirm this is a Patient-Initiated Episode with a Patient who has not had a related appointment within my department in the past 7 days or scheduled within the next 24 hours.     Total Time: minutes: 11-20 minutes    Note: not billable if this call serves to triage the patient into an appointment for the relevant concern      Amari Christian MD

## 2020-05-28 ENCOUNTER — TELEPHONE (OUTPATIENT)
Dept: INTERNAL MEDICINE CLINIC | Age: 65
End: 2020-05-28

## 2020-05-28 DIAGNOSIS — R73.03 PREDIABETES: Primary | ICD-10-CM

## 2020-05-28 DIAGNOSIS — M54.42 CHRONIC MIDLINE LOW BACK PAIN WITH LEFT-SIDED SCIATICA: ICD-10-CM

## 2020-05-28 DIAGNOSIS — G89.29 CHRONIC MIDLINE LOW BACK PAIN WITH LEFT-SIDED SCIATICA: ICD-10-CM

## 2020-05-28 DIAGNOSIS — E78.00 HYPERCHOLESTEROLEMIA: ICD-10-CM

## 2020-05-28 DIAGNOSIS — I10 ESSENTIAL HYPERTENSION: ICD-10-CM

## 2020-05-28 DIAGNOSIS — Z79.891 CHRONIC PRESCRIPTION OPIATE USE: ICD-10-CM

## 2020-06-10 DIAGNOSIS — M54.42 CHRONIC MIDLINE LOW BACK PAIN WITH LEFT-SIDED SCIATICA: ICD-10-CM

## 2020-06-10 DIAGNOSIS — G89.29 CHRONIC MIDLINE LOW BACK PAIN WITH LEFT-SIDED SCIATICA: ICD-10-CM

## 2020-06-10 RX ORDER — HYDROCODONE BITARTRATE 60 MG/1
60 TABLET, EXTENDED RELEASE ORAL DAILY
Qty: 30 TAB | Refills: 0 | Status: SHIPPED | OUTPATIENT
Start: 2020-06-10 | End: 2020-07-10 | Stop reason: SDUPTHER

## 2020-06-10 NOTE — TELEPHONE ENCOUNTER
UDS: 11/18/2019  CSA: 08/26/2019      Last visit 05/14/2020 Virtual visit MD Fragoso   Next appointment 08/04/2020 MD Fragoso   Previous refill encounter(s) 05/14/2020 [facility] #[id number]     Requested Prescriptions     Pending Prescriptions Disp Refills    HYDROcodone bitartrate (HYSINGLA) 60 mg ER tablet 30 Tab 0     Sig: Take 1 Tab by mouth daily for 30 days. Max Daily Amount: 60 mg.  *DO NOT CRUSH,CHEW, OR DISSOLVE TABLET*

## 2020-06-11 ENCOUNTER — TELEPHONE (OUTPATIENT)
Dept: INTERNAL MEDICINE CLINIC | Age: 65
End: 2020-06-11

## 2020-06-29 ENCOUNTER — OFFICE VISIT (OUTPATIENT)
Dept: ORTHOPEDIC SURGERY | Age: 65
End: 2020-06-29

## 2020-06-29 VITALS
TEMPERATURE: 98.7 F | HEART RATE: 72 BPM | RESPIRATION RATE: 22 BRPM | SYSTOLIC BLOOD PRESSURE: 98 MMHG | DIASTOLIC BLOOD PRESSURE: 56 MMHG | HEIGHT: 69 IN | WEIGHT: 260.8 LBS | BODY MASS INDEX: 38.63 KG/M2 | OXYGEN SATURATION: 97 %

## 2020-06-29 DIAGNOSIS — I95.2 HYPOTENSION DUE TO DRUGS: ICD-10-CM

## 2020-06-29 DIAGNOSIS — G62.9 NEUROPATHY: ICD-10-CM

## 2020-06-29 DIAGNOSIS — G89.29 CHRONIC MIDLINE LOW BACK PAIN WITH RIGHT-SIDED SCIATICA: ICD-10-CM

## 2020-06-29 DIAGNOSIS — M25.552 LEFT HIP PAIN: Primary | ICD-10-CM

## 2020-06-29 DIAGNOSIS — M16.12 PRIMARY OSTEOARTHRITIS OF LEFT HIP: ICD-10-CM

## 2020-06-29 DIAGNOSIS — M48.061 SPINAL STENOSIS OF LUMBAR REGION WITHOUT NEUROGENIC CLAUDICATION: ICD-10-CM

## 2020-06-29 DIAGNOSIS — M54.41 CHRONIC MIDLINE LOW BACK PAIN WITH RIGHT-SIDED SCIATICA: ICD-10-CM

## 2020-06-29 DIAGNOSIS — E66.01 SEVERE OBESITY (BMI 35.0-39.9) WITH COMORBIDITY (HCC): ICD-10-CM

## 2020-06-29 RX ORDER — PREGABALIN 100 MG/1
CAPSULE ORAL
Qty: 90 CAP | Refills: 1 | Status: SHIPPED | OUTPATIENT
Start: 2020-06-29 | End: 2020-08-11 | Stop reason: SDUPTHER

## 2020-06-29 NOTE — PROGRESS NOTES
[facility]  [address] [place], [address]  Phone: [phone]  Fax: [phone]    Pt's YOB: 1955    ASSESSMENT   Diagnoses and all orders for this visit:    1. Left hip pain  -     AMB POC X-RAY RADEX HIP UNI WITH PELVIS 2-3 VIEWS  -     REFERRAL TO ORTHOPEDICS    2. Primary osteoarthritis of left hip  -     REFERRAL TO ORTHOPEDICS    3. Spinal stenosis of lumbar region without neurogenic claudication    4. Neuropathy  -     pregabalin (LYRICA) 100 mg capsule; Take 1 cap by mouth in the morning and 2 at night as directed. 5. Hypotension due to drugs    6. Chronic midline low back pain with right-sided sciatica         IMPRESSION AND PLAN:  Mann Guthrie is a 75 y.o. male with history of lumbar pain. Pt complains of cramping pain in the left groin that extends down the left leg to the calf when transitioning from sit to stand and notes difficulty with weight bearing. He started Lyrica 75 mg since his last office visit and takes 1 cap QAM and 2 caps QHS with some improvement in his lower back pain and leg numbness. 1) Pt was given information on lumbar arthritis exercises. 2) He will increase his Lyrica 75 mg to 100 mg 1 cap QAM and 2 caps QHS. 3) Pt was referred to Dr. Ciera Ribeiro for left hip evaluation. 4) Mr. Scotty Gtz has a reminder for a \"due or due soon\" health maintenance. I have asked that he contact his primary care provider, Zeinab Nath MD, for follow-up on this health maintenance. 5)  demonstrated consistency with prescribing. 6) Pt counseled to be careful today in changing positions and to take the metoprolol in divided doses --   7) Weight loss recommended  8) Pt encouraged to use assistive device to ambulate  Follow-up and Dispositions    · Return in about 6 weeks (around 8/10/2020) for Medication follow up.              HISTORY OF PRESENT ILLNESS:  Mann Guthrie is a 75 y.o. male with history of lumbar pain and presents to the office today for medication follow up. Pt complains of cramping pain in the left groin that extends down the left leg to the calf when transitioning from sit to stand and notes difficulty with weight bearing. He admits to increased pain with activity. Pt admits to left groin pain since a recent fall (which occurred a couple weeks ago). He notes that he woke up one morning, fell into the wall onto his left side, and afterwards had difficulty climbing stairs secondary to pain. Of note, he had a previous right hip replacement 5+ years ago by Dr. Rachel Khan. He started Lyrica 75 mg since his last office visit and takes 1 cap QAM and 2 caps QHS with some improvement in his lower back pain and leg numbness. Pt denies any sedation with the Lyrica. He previously took Topamax but notes that his insurance stopped covering the medication. Pt presents to the office today with a blood pressure of 88/61. He admits that he was unsure when he would return home today so he took his morning and noon dose of metoprolol 25 mg simultaneously. Pt notes that he was prescribed metoprolol for angina. He denies any dizziness or lightheadedness at this time. Pt takes Hysingla 60 mg as his pain warrants, which is prescribed by his PCP, Odin Hanson MD . He is not currently on blood thinners and denies ever being told to avoid NSAID's. Pt at this time desires to proceed with medication evaluation.     Pain Scale: /10    PCP: Odin Hanson MD     Past Medical History:   Diagnosis Date    Allergic rhinitis     Avascular necrosis ([address]     CAD (coronary artery disease) 4/2009    mild, hemodynamically non-significant by angiography    Carpal tunnel syndrome     Carpal tunnel syndrome on both sides     Chest pain, unspecified     Chronic back pain 2/11/2011    Chronic obstructive pulmonary disease ([address]     Degenerative joint disease     with chronic back pain    Dysesthesia     of hand post carpal tunnel surgery v sudeck's atrophy    Dyslipidemia, goal LDL below 100     Dysphagia     GERD (gastroesophageal reflux disease) 5/17/2010    Glaucoma 9/15/2010    Glaucoma     Hearing loss     Hypercholesterolemia     Hypertension     Joint fusion     of right wrist    Morbid obesity ([address]     Obesity 5/17/2010    Other dyspnea and respiratory abnormality     Palpitations     Right hip pain     Right hip pain     Right knee pain     S/P cardiac catheterization 4/9/2009    1. Normal systemic pressure. 2. Moderate elevation of left-sided filling pressures. 3. Preserved left ventricular systolic function in the LOWE projection. 4. Mild, nonsignificant epicardial coronary artery disese by angiography.     Sleep apnea     on CPAP    Strain of lumbar region     Tobacco abuse         Social History     Socioeconomic History    Marital status: SINGLE     Spouse name: Not on file    Number of children: Not on file    Years of education: Not on file    Highest education level: Not on file   Occupational History    Not on file   Social Needs    Financial resource strain: Not on file    Food insecurity     Worry: Not on file     Inability: Not on file    Transportation needs     Medical: Not on file     Non-medical: Not on file   Tobacco Use    Smoking status: Current Every Day Smoker     Packs/day: 0.10     Years: 20.00     Pack years: 2.00    Smokeless tobacco: Never Used    Tobacco comment: urothelial cancer risk discussed today [id number]   Substance and Sexual Activity    Alcohol use: No     Alcohol/week: 0.0 standard drinks    Drug use: No    Sexual activity: Not on file   Lifestyle    Physical activity     Days per week: Not on file     Minutes per session: Not on file    Stress: Not on file   Relationships    Social connections     Talks on phone: Not on file     Gets together: Not on file     Attends Islam service: Not on file     Active member of club or organization: Not on file     Attends meetings of clubs or organizations: Not on file     Relationship status: Not on file    Intimate partner violence     Fear of current or ex partner: Not on file     Emotionally abused: Not on file     Physically abused: Not on file     Forced sexual activity: Not on file   Other Topics Concern    Not on file   Social History Narrative    Not on file       Current Outpatient Medications   Medication Sig Dispense Refill    pregabalin (LYRICA) 100 mg capsule Take 1 cap by mouth in the morning and 2 at night as directed. 90 Cap 1    HYDROcodone bitartrate (HYSINGLA) 60 mg ER tablet Take 1 Tab by mouth daily for 30 days. Max Daily Amount: 60 mg. *DO NOT CRUSH,CHEW, OR DISSOLVE TABLET* 30 Tab 0    pravastatin (PRAVACHOL) 40 mg tablet Take 1 tablet by mouth nightly 90 Tab 2    pregabalin (LYRICA) 75 mg capsule Take 1 cap by mouth in the morning and 2 at night as directed. 90 Cap 1    tiZANidine (ZANAFLEX) 2 mg tablet Take 1 tablet by mouth three times daily as needed for muscle spasm 60 Tab 2    topiramate (TOPAMAX) 25 mg tablet Take 3 Tabs by mouth nightly. 90 Tab 1    azelastine (ASTELIN) 137 mcg (0.1 %) nasal spray 2 Sprays by Both Nostrils route two (2) times a day. Use in each nostril as directed  Indications: seasonal runny nose 1 Bottle 5    ibuprofen (MOTRIN) 800 mg tablet TAKE 1 TABLET BY MOUTH EVERY 8 HOURS AS NEEDED FOR PAIN 90 Tab 3    traZODone (DESYREL) 50 mg tablet TAKE 1 TABLET BY MOUTH ONCE DAILY AT BEDTIME AS NEEDED FOR SLEEP 30 Tab 2    montelukast (SINGULAIR) 10 mg tablet Take 1 Tab by mouth daily.  30 Tab 5    fluticasone propionate (FLONASE) 50 mcg/actuation nasal spray USE TWO SPRAY(S) IN EACH NOSTRIL ONCE DAILY 1 Bottle 5    metoprolol tartrate (LOPRESSOR) 25 mg tablet TAKE 1 TABLET BY MOUTH TWICE DAILY 180 Tab 3    tamsulosin (FLOMAX) 0.4 mg capsule TAKE 1 CAPSULE BY MOUTH ONCE DAILY AFTER  DINNER 90 Cap 3    albuterol (PROVENTIL HFA, VENTOLIN HFA, PROAIR HFA) 90 mcg/actuation inhaler Take 2 Puffs by inhalation every four (4) hours as needed for Wheezing. 1 Inhaler 5    ANORO ELLIPTA 62.5-25 mcg/actuation inhaler INHALE 1 PUFF BY MOUTH ONCE DAILY 1 Inhaler 5    pantoprazole (PROTONIX) 40 mg tablet TAKE ONE TABLET BY MOUTH ONCE DAILY 90 Tab 1    lactulose (CONSTULOSE) 10 gram/15 mL solution Take 45ml PO  mL 2    guaiFENesin ER (MUCINEX) 600 mg ER tablet Take 1 Tab by mouth two (2) times a day. 30 Tab 5    nitroglycerin (NITROSTAT) 0.4 mg SL tablet 1 Tab by SubLINGual route every five (5) minutes as needed. 6 Tab 1    TRAVATAN Z 0.004 % ophthalmic solution Administer 1 Drop to both eyes nightly.  gabapentin (NEURONTIN) 300 mg capsule 2 po tid -- taper up as directed  Indications: Neuropathic Pain (Patient not taking: Reported on 6/29/2020) 180 Cap 5    topiramate (TOPAMAX) 100 mg tablet Take 1 Tab by mouth daily (with breakfast). (Patient not taking: Reported on 5/12/2020) 30 Tab 5       Allergies   Allergen Reactions    Latex Not Reported This Time    Ace Inhibitors Cough    Dilaudid [Hydromorphone] Hives    Lisinopril Cough    Zocor [Simvastatin] Myalgia     Right leg         REVIEW OF SYSTEMS    Constitutional: Negative for fever, chills, or weight change. Respiratory: Negative for cough or shortness of breath. Cardiovascular: Negative for chest pain or palpitations. Gastrointestinal: Negative for acid reflux, change in bowel habits, or constipation. Genitourinary: Negative for dysuria and flank pain. Musculoskeletal: Positive for lumbar and hip pain and left leg weakness. Neurological: Negative for headaches, dizziness; positive for numbness. Endo/Heme/Allergies: Negative for increased bruising. Psychiatric/Behavioral: Positive for difficulty with sleep.       PHYSICAL EXAMINATION  Visit Vitals  BP 98/56   Pulse 72   Temp 98.7 °F (37.1 °C) (Oral)   Resp 22   Ht 5' 9\" (1.753 m)   Wt 260 lb 12.8 oz (118.3 kg)   SpO2 97%   BMI 38.51 kg/m²       Constitutional: Awake, alert, and in no acute distress. Neurological: 1+ symmetrical DTRs in the upper extremities. 1+ symmetrical DTRs in the lower extremities. Sensation to light touch is intact. Negative Hamm's sign bilaterally. Skin: warm, dry, and intact. Musculoskeletal: Pain and very limited range of motion with internal rotation of his left hip. Tenderness to palpation in the lower lumbar region. Moderate pain with extension and axial loading. Difficulty transitioning from sit to stand. No pain with internal or external rotation of his hips. Negative straight leg raise bilaterally. Hip Flex  Quads Hamstrings Ankle DF EHL Ankle PF   Right +4/5 +4/5 +4/5 +4/5 +4/5 +4/5   Left -4/5  4/5 +4/5 +4/5 +4/5 +4/5     IMAGING:    Hip x-rays from 06/29/2020 were personally reviewed with the patient and demonstrated:  Right total hip replacement intact. Degenerative joint findings in the left hip.        Lumbar spine MRI from 08/28/2018 was personally reviewed with the patient and demonstrated:  Results from [facility] on 08/28/18   MRI LUMB SPINE WO CONT    Narrative MR Lumbar Spine Without Contrast    History/Indications: 70 years Male. Back pain, unspecified. Additional History: Low back pain for several months. Pain radiates down right  leg. COMPARISON: MRI lumbar spine 8/31/2016    Technique: Multi-sequence multiplanar MRI of the lumbar spine. FINDINGS:     There are 5 lumbar-type vertebral bodies. For the purposes of this dictation the  L5/S1 disc level will be referred to by series 5 image 9    There is spondylolisthesis. There is STIR signal hyperintensity within the left  L4 and L5 pedicles (series 4 image 3), likely representing stress reaction. There is no significant spondylolisthesis. The conus terminates at the L1 level. There is moderate bilateral sacroiliac arthrosis.     There are multiple rounded T2 hyperintense structures posterior lateral and  anterolateral to the aorta, unchanged compared to CT abdomen and pelvis dated  9/9/2006 and may reflect lymph nodes or a lymphatic malformation. Axial imaging correlation:    T11-T12: Sagittal plane only. No significant disc pathology. No significant  spinal canal or neural foraminal stenosis. T12-L1: No significant disc pathology. Trace left facet joint effusion. No  significant spinal canal or neural foraminal stenosis. L1-2: No significant disc pathology. No significant spinal canal or neural  foraminal stenosis. L2-3: Similar appearing small disc bulge with small right central disc  protrusion extending to the inferior aspect of the L2 vertebral body. Mild  bilateral facet arthrosis. Minimal spinal canal narrowing. Minimal bilateral  foraminal stenosis. Not significantly changed. L3-4: Small disc bulge with small central disc extrusion extending superiorly  slightly above the disc level. Mild bilateral facet arthrosis. Minimal spinal  canal narrowing. Mild right and minimal left foraminal stenosis. No significant  change from prior. L4-5: Small to moderate disc bulge with focal protrusions involving the central  and right foraminal zones. Moderate bilateral facet arthrosis with ligamentum  flavum buckling. Mild-to-moderate spinal canal stenosis. Moderate to severe  right foraminal stenosis with effacement of the right L4 perineural fat. Mild  left foraminal stenosis. This is progressed from prior. L5-S1: Small broad-based bulge. Minimal spinal canal narrowing. No significant  foraminal stenosis.            Impression IMPRESSION:  1.  Progression of a small to moderate disc bulge at L4/L5 with focal central  and right foraminal protrusions. Moderate bilateral facet arthrosis, mild to  moderate spinal canal stenosis, moderate to severe right and mild left foraminal  stenosis, with progression from prior MRI. 2.  STIR hyperintensity within the left L4-L5 pedicles, likely representing  stress reaction.   3.  Multilevel additional degenerative disc disease, mild spinal canal stenosis,  and mild foraminal stenosis as above, not significantly changed from prior. Thank you for enabling us to participate in the care of this patient.        Thoracic spine MRI from 12/29/2017 was personally reviewed with the patient and demonstrated:  FINDINGS:  The alignment of the thoracic spine is unremarkable.   The facets are  appropriately aligned. No vertebral body step off appreciated. No evidence of  thoracic spine fracture. The marrow signal is unremarkable. The cord signal is  unremarkable.         A left paracentral disc protrusion is present at T7-T8 which causes mild canal  narrowing. No evidence of significant canal or neural foraminal stenosis at any  level. The visualized thoracic aorta is unremarkable. The visualized paraspinal  soft tissues are unremarkable.      IMPRESSION:  1.  No acute thoracic spine pathology.       Lower extremity Arterial PVR from 02/06/2019 was personally reviewed with the patient and demonstrated:  Interpretation Summary:  No hemodynamically significant arterial disease is identified within bilateral lower extremities at rest    Written by Marlena Andres, as dictated by Clarissa Powell MD.  I, Dr. Clarissa Powell confirm that all documentation is accurate.

## 2020-06-29 NOTE — LETTER
6/29/20 Patient: Buddy Rayo YOB: 1955 Date of Visit: 6/29/2020 Grupo Hutchinson MD 
[facility]  [address] [place] [address] VIA In Basket Dear Grupo Hutchinson MD, Thank you for referring Mr. Brian Dill to [facility] for evaluation. My notes for this consultation are attached. If you have questions, please do not hesitate to call me. I look forward to following your patient along with you. Sincerely, King Edda MD

## 2020-06-29 NOTE — PROGRESS NOTES
Upon rooming patient, patient noted to be unsteady on his feet, per patient unsteady gait d/t left leg pain, patient declined wheelchair. Upon obtaining vital signs, patient's BP noted to be 87/60 in right arm with patient resting in chair. Recheck=88/61. Per patient, he is supposed to take his metoprolol for his BP 1 tab by mouth twice a day, however, today patient took both pills at one time with his breakfast, as he was unsure of when he would get home to take his afternoon dose. Patient reports feeling tired. Patient noted to have unsteady gait. Obtained wheelchair, informed patient he will need to sit and wheel chair as it is unsafe for him to walk to the exam room with his low BP. Patient informed not to take both doses of his Metoprolol at the same time in the future. Informed patient I will need to inform Dr. Mony King. Patient assisted to Exam Room #10, across from my desk, via wheelchair. Informed patient we will leave the door open to obtain continued visual assessment. Patient verbalized agreement/understanding.

## 2020-07-10 DIAGNOSIS — G89.29 CHRONIC MIDLINE LOW BACK PAIN WITH LEFT-SIDED SCIATICA: ICD-10-CM

## 2020-07-10 DIAGNOSIS — M54.42 CHRONIC MIDLINE LOW BACK PAIN WITH LEFT-SIDED SCIATICA: ICD-10-CM

## 2020-07-10 RX ORDER — HYDROCODONE BITARTRATE 60 MG/1
60 TABLET, EXTENDED RELEASE ORAL DAILY
Qty: 30 TAB | Refills: 0 | Status: SHIPPED | OUTPATIENT
Start: 2020-07-10 | End: 2020-08-06 | Stop reason: SDUPTHER

## 2020-07-28 ENCOUNTER — HOSPITAL ENCOUNTER (OUTPATIENT)
Dept: LAB | Age: 65
Discharge: HOME | End: 2020-07-28

## 2020-07-28 ENCOUNTER — LAB ONLY (OUTPATIENT)
Dept: INTERNAL MEDICINE CLINIC | Age: 65
End: 2020-07-28

## 2020-07-28 DIAGNOSIS — R73.03 PREDIABETES: ICD-10-CM

## 2020-07-28 DIAGNOSIS — I10 ESSENTIAL HYPERTENSION: ICD-10-CM

## 2020-07-28 DIAGNOSIS — E78.00 HYPERCHOLESTEROLEMIA: ICD-10-CM

## 2020-07-28 LAB — XX-LABCORP SPECIMEN COL,LCBCF: NORMAL

## 2020-07-28 PROCEDURE — 99001 SPECIMEN HANDLING PT-LAB: CPT

## 2020-07-29 LAB
ALBUMIN SERPL-MCNC: 3.9 G/DL (ref 3.8–4.8)
ALBUMIN/GLOB SERPL: 1.3 {RATIO} (ref 1.2–2.2)
ALP SERPL-CCNC: 92 IU/L (ref 39–117)
ALT SERPL-CCNC: 11 IU/L (ref 0–44)
AST SERPL-CCNC: 13 IU/L (ref 0–40)
BILIRUB SERPL-MCNC: 0.3 MG/DL (ref 0–1.2)
BUN SERPL-MCNC: 16 MG/DL (ref 8–27)
BUN/CREAT SERPL: 13 (ref 10–24)
CALCIUM SERPL-MCNC: 9 MG/DL (ref 8.6–10.2)
CHLORIDE SERPL-SCNC: 104 MMOL/L (ref 96–106)
CHOLEST SERPL-MCNC: 193 MG/DL (ref 100–199)
CO2 SERPL-SCNC: 20 MMOL/L (ref 20–29)
CREAT SERPL-MCNC: 1.24 MG/DL (ref 0.76–1.27)
GLOBULIN SER CALC-MCNC: 2.9 G/DL (ref 1.5–4.5)
GLUCOSE SERPL-MCNC: 87 MG/DL (ref 65–99)
HBA1C MFR BLD: 6 % (ref 4.8–5.6)
HDLC SERPL-MCNC: 35 MG/DL
INTERPRETATION, 910389: NORMAL
LDLC SERPL CALC-MCNC: 131 MG/DL (ref 0–99)
POTASSIUM SERPL-SCNC: 4.7 MMOL/L (ref 3.5–5.2)
PROT SERPL-MCNC: 6.8 G/DL (ref 6–8.5)
SODIUM SERPL-SCNC: 139 MMOL/L (ref 134–144)
TRIGL SERPL-MCNC: 135 MG/DL (ref 0–149)
VLDLC SERPL CALC-MCNC: 27 MG/DL (ref 5–40)

## 2020-08-06 ENCOUNTER — VIRTUAL VISIT (OUTPATIENT)
Dept: INTERNAL MEDICINE CLINIC | Age: 65
End: 2020-08-06

## 2020-08-06 DIAGNOSIS — G89.29 CHRONIC MIDLINE LOW BACK PAIN WITH LEFT-SIDED SCIATICA: ICD-10-CM

## 2020-08-06 DIAGNOSIS — M54.42 CHRONIC MIDLINE LOW BACK PAIN WITH LEFT-SIDED SCIATICA: ICD-10-CM

## 2020-08-06 RX ORDER — HYDROCODONE BITARTRATE 60 MG/1
60 TABLET, EXTENDED RELEASE ORAL DAILY
Qty: 30 TAB | Refills: 0 | Status: SHIPPED | OUTPATIENT
Start: 2020-08-06 | End: 2020-09-05

## 2020-08-06 NOTE — PROGRESS NOTES
Luis Fernando Norris is a 77 y.o. male, evaluated via audio-only technology on 8/6/2020 for Rash and Spasms (legs)  . Assessment & Plan:   Diagnoses and all orders for this visit:    1. Chronic midline low back pain with left-sided sciatica  -     HYDROcodone bitartrate (HYSINGLA) 60 mg ER tablet; Take 1 Tab by mouth daily for 30 days. Max Daily Amount: 60 mg. *DO NOT CRUSH,CHEW, OR DISSOLVE TABLET*        12  Subjective:   Pt continues to have chronic back pain with associated back spams. He continues on Hysingla that has managed his pain. He also continues on Lyrica and Zanaflex for muscle spasms. Prior to Admission medications    Medication Sig Start Date End Date Taking? Authorizing Provider   HYDROcodone bitartrate (HYSINGLA) 60 mg ER tablet Take 1 Tab by mouth daily for 30 days. Max Daily Amount: 60 mg. *DO NOT CRUSH,CHEW, OR DISSOLVE TABLET* 8/6/20 9/5/20 Yes Jodee Fragoso MD   pregabalin (LYRICA) 100 mg capsule Take 1 cap by mouth in the morning and 2 at night as directed. 8/11/20   Tevin Ross MD   pregabalin (LYRICA) 75 mg capsule Take 1 cap by mouth in the morning and 2 at night as directed. Patient not taking: Reported on 8/11/2020 5/12/20   Tevin Ross MD   tiZANidine (ZANAFLEX) 2 mg tablet Take 1 tablet by mouth three times daily as needed for muscle spasm 4/5/20   Karen Cid MD   topiramate (TOPAMAX) 25 mg tablet Take 3 Tabs by mouth nightly. 2/25/20   Tevin Ross MD   azelastine (ASTELIN) 137 mcg (0.1 %) nasal spray 2 Sprays by Both Nostrils route two (2) times a day.  Use in each nostril as directed  Indications: seasonal runny nose 2/15/20   Rom CEDILLO NP   ibuprofen (MOTRIN) 800 mg tablet TAKE 1 TABLET BY MOUTH EVERY 8 HOURS AS NEEDED FOR PAIN 2/13/20   Karen Cid MD   traZODone (DESYREL) 50 mg tablet TAKE 1 TABLET BY MOUTH ONCE DAILY AT BEDTIME AS NEEDED FOR SLEEP 12/3/19   Jodee Fragoso MD   montelukast (SINGULAIR) 10 mg tablet Take 1 Tab by mouth daily. 10/2/19   Bam Fragoso MD   fluticasone propionate (FLONASE) 50 mcg/actuation nasal spray USE TWO SPRAY(S) IN EACH NOSTRIL ONCE DAILY 10/2/19   Bam Fragoso MD   metoprolol tartrate (LOPRESSOR) 25 mg tablet TAKE 1 TABLET BY MOUTH TWICE DAILY 9/23/19   Bam Fragoso MD   gabapentin (NEURONTIN) 300 mg capsule 2 po tid -- taper up as directed  Indications: Neuropathic Pain  Patient not taking: Reported on 6/29/2020 7/22/19   Akilah Oden MD   tamsulosin (FLOMAX) 0.4 mg capsule TAKE 1 CAPSULE BY MOUTH ONCE DAILY AFTER  DINNER 6/24/19   Marlene Duarte MD   topiramate (TOPAMAX) 100 mg tablet Take 1 Tab by mouth daily (with breakfast). Patient not taking: Reported on 5/12/2020 5/1/19   Akilah Oden MD   albuterol (PROVENTIL HFA, VENTOLIN HFA, PROAIR HFA) 90 mcg/actuation inhaler Take 2 Puffs by inhalation every four (4) hours as needed for Wheezing. 4/16/19   Akilah Oden MD   ANORO ELLIPTA 62.5-25 mcg/actuation inhaler INHALE 1 PUFF BY MOUTH ONCE DAILY 4/3/19   Akilah Oden MD   pantoprazole (PROTONIX) 40 mg tablet TAKE ONE TABLET BY MOUTH ONCE DAILY 8/16/18   Akilah Oden MD   lactulose (CONSTULOSE) 10 gram/15 mL solution Take 45ml PO TID 1/10/18   Bam Fragoso MD   guaiFENesin ER (MUCINEX) 600 mg ER tablet Take 1 Tab by mouth two (2) times a day. 11/20/17   Akilah Oden MD   nitroglycerin (NITROSTAT) 0.4 mg SL tablet 1 Tab by SubLINGual route every five (5) minutes as needed. 3/16/17   Ruth Joy, KAREN   TRAVATAN Z 0.004 % ophthalmic solution Administer 1 Drop to both eyes nightly.  9/9/10   Provider, Historical     Patient Active Problem List   Diagnosis Code    Hypercholesterolemia E78.00    Allergic rhinitis J30.9    Sleep apnea G47.30    GERD (gastroesophageal reflux disease) K21.9    Obesity E66.9    Glaucoma H40.9    HTN (hypertension) I10    Chronic back pain M54.9, G89.29    Chest pain, unspecified R07.9    Dyslipidemia, goal LDL below 100 E78.5    Other dyspnea and respiratory abnormality R06.09, R09.89    Palpitations R00.2    Degenerative joint disease M19.90    Prediabetes R73.03    Right hip pain M25.551    Right knee pain M25.561    Enlarged prostate with lower urinary tract symptoms (LUTS) N40.1    Urinary frequency R35.0    Sense of Incomplete bladder emptying R33.9    Nocturia R35.1    Slowing of urinary stream R39.198    Hip arthritis M16.10    Abdominal discomfort R10.9    Diarrhea R19.7    Primary insomnia F51.01    Obstructive sleep apnea syndrome G47.33    Bug bite with infection W57. XXXA    Cough R05    Obesity (BMI 30-39. 9) E66.9    Severe obesity (HCC) E66.01       ROS    No flowsheet data found. Roselie Aase, who was evaluated through a patient-initiated, synchronous (real-time) audio only encounter, and/or her healthcare decision maker, is aware that it is a billable service, with coverage as determined by his insurance carrier. He provided verbal consent to proceed: Yes. He has not had a related appointment within my department in the past 7 days or scheduled within the next 24 hours. Total Time: minutes: 5-10 minutes    Follow-up and Dispositions    · Return in about 3 months (around 11/6/2020) for OV, and Medicare Wellness Visit, labs 1 week before.          Abby Montgomery MD

## 2020-08-11 ENCOUNTER — OFFICE VISIT (OUTPATIENT)
Dept: ORTHOPEDIC SURGERY | Age: 65
End: 2020-08-11

## 2020-08-11 VITALS
HEIGHT: 69 IN | OXYGEN SATURATION: 99 % | BODY MASS INDEX: 39.19 KG/M2 | SYSTOLIC BLOOD PRESSURE: 143 MMHG | TEMPERATURE: 98.9 F | DIASTOLIC BLOOD PRESSURE: 66 MMHG | RESPIRATION RATE: 20 BRPM | WEIGHT: 264.6 LBS | HEART RATE: 56 BPM

## 2020-08-11 DIAGNOSIS — E66.01 SEVERE OBESITY (BMI 35.0-35.9 WITH COMORBIDITY) (HCC): ICD-10-CM

## 2020-08-11 DIAGNOSIS — M16.12 PRIMARY OSTEOARTHRITIS OF LEFT HIP: ICD-10-CM

## 2020-08-11 DIAGNOSIS — M48.061 SPINAL STENOSIS OF LUMBAR REGION WITHOUT NEUROGENIC CLAUDICATION: Primary | ICD-10-CM

## 2020-08-11 DIAGNOSIS — M25.552 LEFT HIP PAIN: ICD-10-CM

## 2020-08-11 DIAGNOSIS — G62.9 NEUROPATHY: ICD-10-CM

## 2020-08-11 DIAGNOSIS — G89.29 OTHER CHRONIC PAIN: ICD-10-CM

## 2020-08-11 RX ORDER — PREGABALIN 100 MG/1
CAPSULE ORAL
Qty: 90 CAP | Refills: 5 | Status: SHIPPED | OUTPATIENT
Start: 2020-08-11 | End: 2020-10-12

## 2020-08-11 NOTE — PROGRESS NOTES
Gino Aguilar presents today for   Chief Complaint   Patient presents with    LOW BACK PAIN    Hip Pain     left    Follow-up       Is someone accompanying this pt? No    Is the patient using any DME equipment during OV? No    Depression Screening:  3 most recent PHQ Screens 8/11/2020   Little interest or pleasure in doing things Not at all   Feeling down, depressed, irritable, or hopeless Not at all   Total Score PHQ 2 0       Learning Assessment:  Learning Assessment 2/3/2017   PRIMARY LEARNER Patient   HIGHEST LEVEL OF EDUCATION - PRIMARY LEARNER  -   BARRIERS PRIMARY LEARNER -   CO-LEARNER CAREGIVER -   PRIMARY LANGUAGE ENGLISH   LEARNER PREFERENCE PRIMARY LISTENING   ANSWERED BY self   RELATIONSHIP SELF       Abuse Screening:  Abuse Screening Questionnaire 8/11/2020   Do you ever feel afraid of your partner? N   Are you in a relationship with someone who physically or mentally threatens you? N   Is it safe for you to go home? Y       Fall Risk  Fall Risk Assessment, last 12 mths 8/11/2020   Able to walk? Yes   Fall in past 12 months? No   Fall with injury? -   Number of falls in past 12 months -   Fall Risk Score -       OPIOID RISK TOOL  No flowsheet data found. Pt currently taking Antiplatelet therapy? No    Coordination of Care:  1. Have you been to the ER, urgent care clinic since your last visit? No  Hospitalized since your last visit? No    2. Have you seen or consulted any other health care providers outside of the [address] [place] [address] since your last visit? No Include any pap smears or colon screening.  No

## 2020-08-11 NOTE — PATIENT INSTRUCTIONS
Learning About the [facility] Diet  What is the Mediterranean diet? The Mediterranean diet is a style of eating rather than a diet plan. It features foods eaten in [place], [place], [place] and [place], and other countries along the [facility]. It emphasizes eating foods like fish, fruits, vegetables, beans, high-fiber breads and whole grains, nuts, and olive oil. This style of eating includes limited red meat, cheese, and sweets. Why choose the Mediterranean diet? A Mediterranean-style diet may improve heart health. It contains more fat than other heart-healthy diets. But the fats are mainly from nuts, unsaturated oils (such as fish oils and olive oil), and certain nut or seed oils (such as canola, soybean, or flaxseed oil). These fats may help protect the heart and blood vessels. How can you get started on the Mediterranean diet? Here are some things you can do to switch to a more Mediterranean way of eating. What to eat  · Eat a variety of fruits and vegetables each day, such as grapes, blueberries, tomatoes, broccoli, peppers, figs, olives, spinach, eggplant, beans, lentils, and chickpeas. · Eat a variety of whole-grain foods each day, such as oats, brown rice, and whole wheat bread, pasta, and couscous. · Eat fish at least 2 times a week. Try tuna, salmon, mackerel, lake trout, herring, or sardines. · Eat moderate amounts of low-fat dairy products, such as milk, cheese, or yogurt. · Eat moderate amounts of poultry and eggs. · Choose healthy (unsaturated) fats, such as nuts, olive oil, and certain nut or seed oils like canola, soybean, and flaxseed. · Limit unhealthy (saturated) fats, such as butter, palm oil, and coconut oil. And limit fats found in animal products, such as meat and dairy products made with whole milk. Try to eat red meat only a few times a month in very small amounts. · Limit sweets and desserts to only a few times a week.  This includes sugar-sweetened drinks like soda. The Mediterranean diet may also include red wine with your meal--1 glass each day for women and up to 2 glasses a day for men. Tips for eating at home  · Use herbs, spices, garlic, lemon zest, and citrus juice instead of salt to add flavor to foods. · Add avocado slices to your sandwich instead of lr. · Have fish for lunch or dinner instead of red meat. Brush the fish with olive oil, and broil or grill it. · Sprinkle your salad with seeds or nuts instead of cheese. · Cook with olive or canola oil instead of butter or oils that are high in saturated fat. · Switch from 2% milk or whole milk to 1% or fat-free milk. · Dip raw vegetables in a vinaigrette dressing or hummus instead of dips made from mayonnaise or sour cream.  · Have a piece of fruit for dessert instead of a piece of cake. Try baked apples, or have some dried fruit. Tips for eating out  · Try broiled, grilled, baked, or poached fish instead of having it fried or breaded. · Ask your  to have your meals prepared with olive oil instead of butter. · Order dishes made with marinara sauce or sauces made from olive oil. Avoid sauces made from cream or mayonnaise. · Choose whole-grain breads, whole wheat pasta and pizza crust, brown rice, beans, and lentils. · Cut back on butter or margarine on bread. Instead, you can dip your bread in a small amount of olive oil. · Ask for a side salad or grilled vegetables instead of french fries or chips. Where can you learn more? Go to http://samreen-tabitha.info/  Enter O407 in the search box to learn more about \"Learning About the Mediterranean Diet. \"  Current as of: August 22, 2019               Content Version: 12.5  © 2295-6467 Healthwise, [employer]. Care instructions adapted under license by [email or web address] (which disclaims liability or warranty for this information).  If you have questions about a medical condition or this instruction, always ask your healthcare professional. [address] any warranty or liability for your use of this information. Hip Arthritis: Exercises  Introduction  Here are some examples of exercises for you to try. The exercises may be suggested for a condition or for rehabilitation. Start each exercise slowly. Ease off the exercises if you start to have pain. You will be told when to start these exercises and which ones will work best for you. How to do the exercises  Straight-leg raises to the outside   1. Lie on your side, with your affected hip on top. 2. Tighten the front thigh muscles of your top leg to keep your knee straight. 3. Keep your hip and your leg straight in line with the rest of your body, and keep your knee pointing forward. Do not drop your hip back. 4. Lift your top leg straight up toward the ceiling, about 12 inches off the floor. Hold for about 6 seconds, then slowly lower your leg. 5. Repeat 8 to 12 times. 6. Switch legs and repeat steps 1 through 5, even if only one hip is sore. Straight-leg raises to the inside   1. Lie on your side with your affected hip on the floor. 2. You can either prop up your other leg on a chair, or you can bend that knee and put that foot in front of your other knee. Do not drop your hip back. 3. Tighten the muscles on the front thigh of your bottom leg to straighten that knee. 4. Keep your kneecap pointing forward and your leg straight, and lift your bottom leg up toward the ceiling about 6 inches. Hold for about 6 seconds, then lower slowly. 5. Repeat 8 to 12 times. 6. Switch legs and repeat steps 1 through 5, even if only one hip is sore. Hip hike   1. Stand sideways on the bottom step of a staircase, and hold on to the banister or wall. 2. Keeping both knees straight, lift your good leg off the step and let it hang down. Then hike your good hip up to the same level as your affected hip or a little higher.   3. Repeat 8 to 12 times.  4. Switch legs and repeat steps 1 through 3, even if only one hip is sore. Bridging   1. Lie on your back with both knees bent. Your knees should be bent about 90 degrees. 2. Then push your feet into the floor, squeeze your buttocks, and lift your hips off the floor until your shoulders, hips, and knees are all in a straight line. 3. Hold for about 6 seconds as you continue to breathe normally, and then slowly lower your hips back down to the floor and rest for up to 10 seconds. 4. Repeat 8 to 12 times. Hamstring stretch (lying down)   1. Lie flat on your back with your legs straight. If you feel discomfort in your back, place a small towel roll under your lower back. 2. Holding the back of your affected leg, lift your leg straight up and toward your body until you feel a stretch at the back of your thigh. 3. Hold the stretch for at least 30 seconds. 4. Repeat 2 to 4 times. 5. Switch legs and repeat steps 1 through 4, even if only one hip is sore. Standing quadriceps stretch   1. If you are not steady on your feet, hold on to a chair, counter, or wall. You can also lie on your stomach or your side to do this exercise. 2. Bend the knee of the leg you want to stretch, and reach behind you to grab the front of your foot or ankle with the hand on the same side. For example, if you are stretching your right leg, use your right hand. 3. Keeping your knees next to each other, pull your foot toward your buttock until you feel a gentle stretch across the front of your hip and down the front of your thigh. Your knee should be pointed directly to the ground, and not out to the side. 4. Hold the stretch for at least 15 to 30 seconds. 5. Repeat 2 to 4 times. 6. Switch legs and repeat steps 1 through 5, even if only one hip is sore. Hip rotator stretch   1. Lie on your back with both knees bent and your feet flat on the floor.   2. Put the ankle of your affected leg on your opposite thigh near your knee.  3. Use your hand to gently push your knee away from your body until you feel a gentle stretch around your hip. 4. Hold the stretch for 15 to 30 seconds. 5. Repeat 2 to 4 times. 6. Repeat steps 1 through 5, but this time use your hand to gently pull your knee toward your opposite shoulder. 7. Switch legs and repeat steps 1 through 6, even if only one hip is sore. Knee-to-chest   1. Lie on your back with your knees bent and your feet flat on the floor. 2. Bring your affected leg to your chest, keeping the other foot flat on the floor (or keeping the other leg straight, whichever feels better on your lower back). 3. Keep your lower back pressed to the floor. Hold for at least 15 to 30 seconds. 4. Relax, and lower the knee to the starting position. 5. Repeat 2 to 4 times. 6. Switch legs and repeat steps 1 through 5, even if only one hip is sore. 7. To get more stretch, put your other leg flat on the floor while pulling your knee to your chest.    Clamshell   1. Lie on your side, with your affected hip on top. Keep your feet and knees together and your knees bent. 2. Raise your top knee, but keep your feet together. Do not let your hips roll back. Your legs should open up like a clamshell. 3. Hold for 6 seconds. 4. Slowly lower your knee back down. Rest for 10 seconds. 5. Repeat 8 to 12 times. 6. Switch legs and repeat steps 1 through 5, even if only one hip is sore. Follow-up care is a key part of your treatment and safety. Be sure to make and go to all appointments, and call your doctor if you are having problems. It's also a good idea to know your test results and keep a list of the medicines you take. Where can you learn more? Go to http://samreen-tabitha.info/  Enter H387 in the search box to learn more about \"Hip Arthritis: Exercises. \"  Current as of: March 2, 2020               Content Version: 12.5  © 4585-5849 Healthwise, Incorporated.    Care instructions adapted under license by [employer] (which disclaims liability or warranty for this information). If you have questions about a medical condition or this instruction, always ask your healthcare professional. [address] any warranty or liability for your use of this information.

## 2020-08-11 NOTE — LETTER
8/12/20 Patient: Martita Milian YOB: 1955 Date of Visit: 8/11/2020 Tommy Mo MD 
[facility]  [address] [place] [address] VIA In Basket Dear Tommy Mo MD, Thank you for referring Mr. Tara Robertson to [facility] for evaluation. My notes for this consultation are attached. If you have questions, please do not hesitate to call me. I look forward to following your patient along with you. Sincerely, Leo Ceja MD

## 2020-08-11 NOTE — PROGRESS NOTES
[facility]  [address], [facility] [address]  Phone: [phone]  Fax: [phone]    Pt's YOB: 1955    ASSESSMENT   Diagnoses and all orders for this visit:    1. Spinal stenosis of lumbar region without neurogenic claudication    2. Neuropathy  -     pregabalin (LYRICA) 100 mg capsule; Take 1 cap by mouth in the morning and 2 at night as directed. 3. Primary osteoarthritis of left hip    4. Left hip pain    5. Other chronic pain    6. Severe obesity (BMI 35.0-35.9 with comorbidity) ([facility] [address]         IMPRESSION AND PLAN:  Thersia Cushing is a 77 y.o. male with history of lumbar pain. He reports aching pain and constant soreness in the posterior aspect of both thighs. Pt increased his Lyrica 75 mg to 100 mg 1 cap QAM and 2 caps QHS with improvement in his lower back and left hip pain. He is scheduled to follow up with Dr. Roberto Vargas regarding his left hip pain. 1) Pt was given information on lumbar and hip arthritis exercises. 2) He received a refill of Lyrica 100 mg 1 cap QAM and 2 caps QHS. 3) Pt will follow up with Dr. Roberto Vargas regarding his left hip pain. 4) I recommended that the patient lose weight and he was given information on the Mediterranean diet. 5) Mr. Donald Madrid has a reminder for a \"due or due soon\" health maintenance. I have asked that he contact his primary care provider, Claudette Vera MD, for follow-up on this health maintenance. 6)  demonstrated consistency with prescribing. Follow-up and Dispositions    · Return in about 5 months (around 1/11/2021) for Medication follow up. HISTORY OF PRESENT ILLNESS:  Thersia Cushing is a 77 y.o. male with history of lumbar pain and presents to the office today for follow up. He reports aching pain and constant soreness in the posterior aspect of both thighs. Pt denies any pain in the lower back at this time, but reports tightness across the lower back with ambulation.  He increased his Lyrica 75 mg to 100 mg 1 cap QAM and 2 caps QHS with improvement in his lower back and left hip pain. Pt denies any sedation with the Lyrica. He notes that he is scheduled to follow up with Dr. Lisa Hanson regarding his left hip pain. Pt had a previously right hip replacement with Dr. Lisa Hanson and states that at that time, he mentioned left hip surgery in the future. Pt at this time desires to continue with current care. Pain Scale: 10 - Worst pain ever/10    PCP: Ivett Colunga MD     Past Medical History:   Diagnosis Date    Allergic rhinitis     Avascular necrosis (HCC)     CAD (coronary artery disease) 4/2009    mild, hemodynamically non-significant by angiography    Carpal tunnel syndrome     Carpal tunnel syndrome on both sides     Chest pain, unspecified     Chronic back pain 2/11/2011    Chronic obstructive pulmonary disease ([address]     Degenerative joint disease     with chronic back pain    Dysesthesia     of hand post carpal tunnel surgery v sudeck's atrophy    Dyslipidemia, goal LDL below 100     Dysphagia     GERD (gastroesophageal reflux disease) 5/17/2010    Glaucoma 9/15/2010    Glaucoma     Hearing loss     Hypercholesterolemia     Hypertension     Joint fusion     of right wrist    Morbid obesity ([address]     Obesity 5/17/2010    Other dyspnea and respiratory abnormality     Palpitations     Right hip pain     Right hip pain     Right knee pain     S/P cardiac catheterization 4/9/2009    1. Normal systemic pressure. 2. Moderate elevation of left-sided filling pressures. 3. Preserved left ventricular systolic function in the LOWE projection. 4. Mild, nonsignificant epicardial coronary artery disese by angiography.     Sleep apnea     on CPAP    Strain of lumbar region     Tobacco abuse         Social History     Socioeconomic History    Marital status: SINGLE     Spouse name: Not on file    Number of children: Not on file    Years of education: Not on file    Highest education level: Not on file   Occupational History    Not on file   Social Needs    Financial resource strain: Not on file    Food insecurity     Worry: Not on file     Inability: Not on file    Transportation needs     Medical: Not on file     Non-medical: Not on file   Tobacco Use    Smoking status: Current Every Day Smoker     Packs/day: 0.10     Years: 20.00     Pack years: 2.00    Smokeless tobacco: Never Used    Tobacco comment: urothelial cancer risk discussed today [id number]   Substance and Sexual Activity    Alcohol use: No     Alcohol/week: 0.0 standard drinks    Drug use: No    Sexual activity: Not on file   Lifestyle    Physical activity     Days per week: Not on file     Minutes per session: Not on file    Stress: Not on file   Relationships    Social connections     Talks on phone: Not on file     Gets together: Not on file     Attends Druze service: Not on file     Active member of club or organization: Not on file     Attends meetings of clubs or organizations: Not on file     Relationship status: Not on file    Intimate partner violence     Fear of current or ex partner: Not on file     Emotionally abused: Not on file     Physically abused: Not on file     Forced sexual activity: Not on file   Other Topics Concern    Not on file   Social History Narrative    Not on file       Current Outpatient Medications   Medication Sig Dispense Refill    pregabalin (LYRICA) 100 mg capsule Take 1 cap by mouth in the morning and 2 at night as directed. 90 Cap 5    HYDROcodone bitartrate (HYSINGLA) 60 mg ER tablet Take 1 Tab by mouth daily for 30 days. Max Daily Amount: 60 mg. *DO NOT CRUSH,CHEW, OR DISSOLVE TABLET* 30 Tab 0    tiZANidine (ZANAFLEX) 2 mg tablet Take 1 tablet by mouth three times daily as needed for muscle spasm 60 Tab 2    topiramate (TOPAMAX) 25 mg tablet Take 3 Tabs by mouth nightly.  90 Tab 1    azelastine (ASTELIN) 137 mcg (0.1 %) nasal spray 2 Sprays by Both Nostrils route two (2) times a day. Use in each nostril as directed  Indications: seasonal runny nose 1 Bottle 5    ibuprofen (MOTRIN) 800 mg tablet TAKE 1 TABLET BY MOUTH EVERY 8 HOURS AS NEEDED FOR PAIN 90 Tab 3    traZODone (DESYREL) 50 mg tablet TAKE 1 TABLET BY MOUTH ONCE DAILY AT BEDTIME AS NEEDED FOR SLEEP 30 Tab 2    montelukast (SINGULAIR) 10 mg tablet Take 1 Tab by mouth daily. 30 Tab 5    fluticasone propionate (FLONASE) 50 mcg/actuation nasal spray USE TWO SPRAY(S) IN EACH NOSTRIL ONCE DAILY 1 Bottle 5    metoprolol tartrate (LOPRESSOR) 25 mg tablet TAKE 1 TABLET BY MOUTH TWICE DAILY 180 Tab 3    tamsulosin (FLOMAX) 0.4 mg capsule TAKE 1 CAPSULE BY MOUTH ONCE DAILY AFTER  DINNER 90 Cap 3    albuterol (PROVENTIL HFA, VENTOLIN HFA, PROAIR HFA) 90 mcg/actuation inhaler Take 2 Puffs by inhalation every four (4) hours as needed for Wheezing. 1 Inhaler 5    ANORO ELLIPTA 62.5-25 mcg/actuation inhaler INHALE 1 PUFF BY MOUTH ONCE DAILY 1 Inhaler 5    pantoprazole (PROTONIX) 40 mg tablet TAKE ONE TABLET BY MOUTH ONCE DAILY 90 Tab 1    lactulose (CONSTULOSE) 10 gram/15 mL solution Take 45ml PO  mL 2    guaiFENesin ER (MUCINEX) 600 mg ER tablet Take 1 Tab by mouth two (2) times a day. 30 Tab 5    nitroglycerin (NITROSTAT) 0.4 mg SL tablet 1 Tab by SubLINGual route every five (5) minutes as needed. 6 Tab 1    TRAVATAN Z 0.004 % ophthalmic solution Administer 1 Drop to both eyes nightly.  pregabalin (LYRICA) 75 mg capsule Take 1 cap by mouth in the morning and 2 at night as directed. (Patient not taking: Reported on 8/11/2020) 90 Cap 1    gabapentin (NEURONTIN) 300 mg capsule 2 po tid -- taper up as directed  Indications: Neuropathic Pain (Patient not taking: Reported on 6/29/2020) 180 Cap 5    topiramate (TOPAMAX) 100 mg tablet Take 1 Tab by mouth daily (with breakfast).  (Patient not taking: Reported on 5/12/2020) 30 Tab 5       Allergies   Allergen Reactions    Latex Not Reported This Time    Ace Inhibitors Cough    Dilaudid [Hydromorphone] Hives    Lisinopril Cough    Zocor [Simvastatin] Myalgia     Right leg         REVIEW OF SYSTEMS    Constitutional: Negative for fever, chills, or weight change. Respiratory: Negative for cough or shortness of breath. Cardiovascular: Negative for chest pain or palpitations. Gastrointestinal: Negative for acid reflux, change in bowel habits, or constipation. Genitourinary: Negative for dysuria and flank pain. Musculoskeletal: Positive for lumbar and left hip pain. Positive for left leg weakness. Neurological: Negative for headaches, dizziness, or numbness. Psychiatric/Behavioral: Negative for difficulty with sleep. As per HPI    PHYSICAL EXAMINATION  Visit Vitals  /66   Pulse (!) 56   Temp 98.9 °F (37.2 °C) (Oral)   Resp 20   Ht 5' 9\" (1.753 m)   Wt 264 lb 9.6 oz (120 kg)   SpO2 99%   BMI 39.07 kg/m²       Constitutional: Awake, alert, and in no acute distress. Neurological: 1+ symmetrical DTRs in the upper extremities. 1+ symmetrical DTRs in the lower extremities. Sensation to light touch is intact. Negative Hamm's sign bilaterally. Skin: warm, dry, and intact. Musculoskeletal: Limited range of motion with internal rotation of his left hip. Tenderness to palpation in the lower lumbar region. Moderate pain with extension and axial loading. Difficulty transitioning from sit to stand. No pain with internal or external rotation of his hips. Negative straight leg raise bilaterally. Hip Flex  Quads Hamstrings Ankle DF EHL Ankle PF   Right +4/5 +4/5 +4/5 +4/5 +4/5 +4/5   Left -4/5  4/5 +4/5 +4/5 +4/5 +4/5     IMAGING:    Hip x-rays from 06/29/2020 were personally reviewed with the patient and demonstrated:  Right total hip replacement intact.  Degenerative joint findings in the left hip.      Lumbar spine MRI from 08/28/2018 was personally reviewed with the patient and demonstrated:  Results from [facility] on 08/28/18 MRI LUMB SPINE WO CONT    Narrative MR Lumbar Spine Without Contrast    History/Indications: 70 years Male. Back pain, unspecified. Additional History: Low back pain for several months. Pain radiates down right  leg. COMPARISON: MRI lumbar spine 8/31/2016    Technique: Multi-sequence multiplanar MRI of the lumbar spine. FINDINGS:     There are 5 lumbar-type vertebral bodies. For the purposes of this dictation the  L5/S1 disc level will be referred to by series 5 image 9    There is spondylolisthesis. There is STIR signal hyperintensity within the left  L4 and L5 pedicles (series 4 image 3), likely representing stress reaction. There is no significant spondylolisthesis. The conus terminates at the L1 level. There is moderate bilateral sacroiliac arthrosis. There are multiple rounded T2 hyperintense structures posterior lateral and  anterolateral to the aorta, unchanged compared to CT abdomen and pelvis dated  9/9/2006 and may reflect lymph nodes or a lymphatic malformation. Axial imaging correlation:    T11-T12: Sagittal plane only. No significant disc pathology. No significant  spinal canal or neural foraminal stenosis. T12-L1: No significant disc pathology. Trace left facet joint effusion. No  significant spinal canal or neural foraminal stenosis. L1-2: No significant disc pathology. No significant spinal canal or neural  foraminal stenosis. L2-3: Similar appearing small disc bulge with small right central disc  protrusion extending to the inferior aspect of the L2 vertebral body. Mild  bilateral facet arthrosis. Minimal spinal canal narrowing. Minimal bilateral  foraminal stenosis. Not significantly changed. L3-4: Small disc bulge with small central disc extrusion extending superiorly  slightly above the disc level. Mild bilateral facet arthrosis. Minimal spinal  canal narrowing. Mild right and minimal left foraminal stenosis. No significant  change from prior.     L4-5: Small to moderate disc bulge with focal protrusions involving the central  and right foraminal zones. Moderate bilateral facet arthrosis with ligamentum  flavum buckling. Mild-to-moderate spinal canal stenosis. Moderate to severe  right foraminal stenosis with effacement of the right L4 perineural fat. Mild  left foraminal stenosis. This is progressed from prior. L5-S1: Small broad-based bulge. Minimal spinal canal narrowing. No significant  foraminal stenosis.            Impression IMPRESSION:  1.  Progression of a small to moderate disc bulge at L4/L5 with focal central  and right foraminal protrusions. Moderate bilateral facet arthrosis, mild to  moderate spinal canal stenosis, moderate to severe right and mild left foraminal  stenosis, with progression from prior MRI. 2.  STIR hyperintensity within the left L4-L5 pedicles, likely representing  stress reaction. 3.  Multilevel additional degenerative disc disease, mild spinal canal stenosis,  and mild foraminal stenosis as above, not significantly changed from prior. Thank you for enabling us to participate in the care of this patient.         Thoracic spine MRI from 12/29/2017 was personally reviewed with the patient and demonstrated:  FINDINGS:  The alignment of the thoracic spine is unremarkable.   The facets are  appropriately aligned. No vertebral body step off appreciated. No evidence of  thoracic spine fracture. The marrow signal is unremarkable. The cord signal is  unremarkable.         A left paracentral disc protrusion is present at T7-T8 which causes mild canal  narrowing. No evidence of significant canal or neural foraminal stenosis at any  level. The visualized thoracic aorta is unremarkable.  The visualized paraspinal  soft tissues are unremarkable.      IMPRESSION:  1.  No acute thoracic spine pathology.       Lower extremity arterial PVR from 02/06/2019 was personally reviewed with the patient and demonstrated:  Interpretation Summary:  No hemodynamically significant arterial disease is identified within bilateral lower extremities at rest      Written by Ronnell Prasad, as dictated by Jessika Wells MD.  I, Dr. Jessika Wells confirm that all documentation is accurate.

## 2020-08-12 DIAGNOSIS — E78.00 HYPERCHOLESTEROLEMIA: ICD-10-CM

## 2020-08-12 DIAGNOSIS — R73.03 PREDIABETES: ICD-10-CM

## 2020-08-12 DIAGNOSIS — I10 ESSENTIAL HYPERTENSION: Primary | ICD-10-CM

## 2020-08-12 DIAGNOSIS — Z12.5 PROSTATE CANCER SCREENING: ICD-10-CM

## 2020-09-09 DIAGNOSIS — M54.42 CHRONIC MIDLINE LOW BACK PAIN WITH LEFT-SIDED SCIATICA: Primary | ICD-10-CM

## 2020-09-09 DIAGNOSIS — G89.29 CHRONIC MIDLINE LOW BACK PAIN WITH LEFT-SIDED SCIATICA: Primary | ICD-10-CM

## 2020-09-09 RX ORDER — HYDROCODONE BITARTRATE 60 MG/1
60 TABLET, EXTENDED RELEASE ORAL DAILY
Qty: 30 TAB | Refills: 0 | Status: SHIPPED | OUTPATIENT
Start: 2020-09-09 | End: 2020-10-02 | Stop reason: SDUPTHER

## 2020-09-09 NOTE — TELEPHONE ENCOUNTER
[facility] the [facility] for Hysingla as 8/11/20 for a 30 d/s. UDS done 11/18/19  CSA signed 8/27/19    Last Visit: 8/6/20 with MD Fragoso  Next Appointment: 11/13/20 with MD Fragoso  Previous Refill Encounter(s): 8/6/20 #30    Requested Prescriptions     Pending Prescriptions Disp Refills    HYDROcodone bitartrate (HYSINGLA) 60 mg ER tablet 30 Tab 0     Sig: Take 1 Tab by mouth daily for 30 days. Max Daily Amount: 60 mg.  *DO NOT CRUSH,CHEW, OR DISSOLVE TABLET*

## 2020-10-02 ENCOUNTER — OFFICE VISIT (OUTPATIENT)
Dept: INTERNAL MEDICINE CLINIC | Age: 65
End: 2020-10-02
Payer: MEDICARE

## 2020-10-02 VITALS
HEART RATE: 57 BPM | DIASTOLIC BLOOD PRESSURE: 62 MMHG | BODY MASS INDEX: 39.25 KG/M2 | TEMPERATURE: 97.2 F | WEIGHT: 265 LBS | RESPIRATION RATE: 20 BRPM | SYSTOLIC BLOOD PRESSURE: 137 MMHG | OXYGEN SATURATION: 98 % | HEIGHT: 69 IN

## 2020-10-02 DIAGNOSIS — E78.00 HYPERCHOLESTEROLEMIA: ICD-10-CM

## 2020-10-02 DIAGNOSIS — H25.019 CORTICAL AGE-RELATED CATARACT, UNSPECIFIED LATERALITY: ICD-10-CM

## 2020-10-02 DIAGNOSIS — I25.83 CORONARY ARTERY DISEASE DUE TO LIPID RICH PLAQUE: ICD-10-CM

## 2020-10-02 DIAGNOSIS — I73.9 CLAUDICATION (HCC): ICD-10-CM

## 2020-10-02 DIAGNOSIS — M54.42 CHRONIC MIDLINE LOW BACK PAIN WITH LEFT-SIDED SCIATICA: ICD-10-CM

## 2020-10-02 DIAGNOSIS — I25.10 CORONARY ARTERY DISEASE DUE TO LIPID RICH PLAQUE: ICD-10-CM

## 2020-10-02 DIAGNOSIS — I10 ESSENTIAL HYPERTENSION: Primary | ICD-10-CM

## 2020-10-02 DIAGNOSIS — S29.011A MUSCLE STRAIN OF CHEST WALL, INITIAL ENCOUNTER: ICD-10-CM

## 2020-10-02 DIAGNOSIS — G89.29 CHRONIC MIDLINE LOW BACK PAIN WITH LEFT-SIDED SCIATICA: ICD-10-CM

## 2020-10-02 PROCEDURE — G8754 DIAS BP LESS 90: HCPCS | Performed by: INTERNAL MEDICINE

## 2020-10-02 PROCEDURE — G8536 NO DOC ELDER MAL SCRN: HCPCS | Performed by: INTERNAL MEDICINE

## 2020-10-02 PROCEDURE — G8417 CALC BMI ABV UP PARAM F/U: HCPCS | Performed by: INTERNAL MEDICINE

## 2020-10-02 PROCEDURE — G8427 DOCREV CUR MEDS BY ELIG CLIN: HCPCS | Performed by: INTERNAL MEDICINE

## 2020-10-02 PROCEDURE — 1101F PT FALLS ASSESS-DOCD LE1/YR: CPT | Performed by: INTERNAL MEDICINE

## 2020-10-02 PROCEDURE — G8752 SYS BP LESS 140: HCPCS | Performed by: INTERNAL MEDICINE

## 2020-10-02 PROCEDURE — G8432 DEP SCR NOT DOC, RNG: HCPCS | Performed by: INTERNAL MEDICINE

## 2020-10-02 PROCEDURE — 99214 OFFICE O/P EST MOD 30 MIN: CPT | Performed by: INTERNAL MEDICINE

## 2020-10-02 PROCEDURE — 3017F COLORECTAL CA SCREEN DOC REV: CPT | Performed by: INTERNAL MEDICINE

## 2020-10-02 RX ORDER — PANTOPRAZOLE SODIUM 40 MG/1
TABLET, DELAYED RELEASE ORAL
Qty: 90 TAB | Refills: 1 | Status: SHIPPED | OUTPATIENT
Start: 2020-10-02 | End: 2021-10-30

## 2020-10-02 RX ORDER — ASPIRIN 81 MG/1
81 TABLET ORAL DAILY
COMMUNITY

## 2020-10-02 RX ORDER — HYDROCODONE BITARTRATE 60 MG/1
60 TABLET, EXTENDED RELEASE ORAL DAILY
Qty: 30 TAB | Refills: 0 | Status: SHIPPED | OUTPATIENT
Start: 2020-10-02 | End: 2020-11-01

## 2020-10-02 RX ORDER — TIZANIDINE 4 MG/1
4 TABLET ORAL
Qty: 90 TAB | Refills: 4 | Status: SHIPPED | OUTPATIENT
Start: 2020-10-02 | End: 2021-10-13

## 2020-10-02 NOTE — PATIENT INSTRUCTIONS
DASH Diet: Care Instructions  Your Care Instructions     The DASH diet is an eating plan that can help lower your blood pressure. DASH stands for Dietary Approaches to Stop Hypertension. Hypertension is high blood pressure. The DASH diet focuses on eating foods that are high in calcium, potassium, and magnesium. These nutrients can lower blood pressure. The foods that are highest in these nutrients are fruits, vegetables, low-fat dairy products, nuts, seeds, and legumes. But taking calcium, potassium, and magnesium supplements instead of eating foods that are high in those nutrients does not have the same effect. The DASH diet also includes whole grains, fish, and poultry. The DASH diet is one of several lifestyle changes your doctor may recommend to lower your high blood pressure. Your doctor may also want you to decrease the amount of sodium in your diet. Lowering sodium while following the DASH diet can lower blood pressure even further than just the DASH diet alone. Follow-up care is a key part of your treatment and safety. Be sure to make and go to all appointments, and call your doctor if you are having problems. It's also a good idea to know your test results and keep a list of the medicines you take. How can you care for yourself at home? Following the DASH diet  · Eat 4 to 5 servings of fruit each day. A serving is 1 medium-sized piece of fruit, ½ cup chopped or canned fruit, 1/4 cup dried fruit, or 4 ounces (½ cup) of fruit juice. Choose fruit more often than fruit juice. · Eat 4 to 5 servings of vegetables each day. A serving is 1 cup of lettuce or raw leafy vegetables, ½ cup of chopped or cooked vegetables, or 4 ounces (½ cup) of vegetable juice. Choose vegetables more often than vegetable juice. · Get 2 to 3 servings of low-fat and fat-free dairy each day. A serving is 8 ounces of milk, 1 cup of yogurt, or 1 ½ ounces of cheese. · Eat 6 to 8 servings of grains each day.  A serving is 1 slice of bread, 1 ounce of dry cereal, or ½ cup of cooked rice, pasta, or cooked cereal. Try to choose whole-grain products as much as possible. · Limit lean meat, poultry, and fish to 2 servings each day. A serving is 3 ounces, about the size of a deck of cards. · Eat 4 to 5 servings of nuts, seeds, and legumes (cooked dried beans, lentils, and split peas) each week. A serving is 1/3 cup of nuts, 2 tablespoons of seeds, or ½ cup of cooked beans or peas. · Limit fats and oils to 2 to 3 servings each day. A serving is 1 teaspoon of vegetable oil or 2 tablespoons of salad dressing. · Limit sweets and added sugars to 5 servings or less a week. A serving is 1 tablespoon jelly or jam, ½ cup sorbet, or 1 cup of lemonade. · Eat less than 2,300 milligrams (mg) of sodium a day. If you limit your sodium to 1,500 mg a day, you can lower your blood pressure even more. Tips for success  · Start small. Do not try to make dramatic changes to your diet all at once. You might feel that you are missing out on your favorite foods and then be more likely to not follow the plan. Make small changes, and stick with them. Once those changes become habit, add a few more changes. · Try some of the following:  ? Make it a goal to eat a fruit or vegetable at every meal and at snacks. This will make it easy to get the recommended amount of fruits and vegetables each day. ? Try yogurt topped with fruit and nuts for a snack or healthy dessert. ? Add lettuce, tomato, cucumber, and onion to sandwiches. ? Combine a ready-made pizza crust with low-fat mozzarella cheese and lots of vegetable toppings. Try using tomatoes, squash, spinach, broccoli, carrots, cauliflower, and onions. ? Have a variety of cut-up vegetables with a low-fat dip as an appetizer instead of chips and dip. ? Sprinkle sunflower seeds or chopped almonds over salads. Or try adding chopped walnuts or almonds to cooked vegetables.   ? Try some vegetarian meals using beans and peas. Add garbanzo or kidney beans to salads. Make burritos and tacos with mashed elizabeth beans or black beans. Where can you learn more? Go to http://www.workman.com/  Enter H967 in the search box to learn more about \"DASH Diet: Care Instructions. \"  Current as of: December 16, 2019               Content Version: 12.6  © 6912-9449 [employer]. Care instructions adapted under license by [employer] (which disclaims liability or warranty for this information). If you have questions about a medical condition or this instruction, always ask your healthcare professional. [address] any warranty or liability for your use of this information.

## 2020-10-02 NOTE — PROGRESS NOTES
Patient is in the office today for a pre op clearance. 1. Have you been to the ER, urgent care clinic since your last visit? Hospitalized since your last visit? No    2. Have you seen or consulted any other health care providers outside of the [address] [place] [address] since your last visit? Include any pap smears or colon screening. yes, Dr. Garcia Che.

## 2020-10-02 NOTE — PROGRESS NOTES
Preoperative Evaluation    Date of Exam: 10/2/2020    Colette Vega is a 77 y.o. male ([phone]) who presents for preoperative evaluation. Procedure/Surgery:Cataract surgery  Date of Procedure/Surgery: TBA  Surgeon: Dr Jhonny Penaloza: [facility]     Primary Physician: Ethan Chandler MD    HPI. Pt presents for medical clearance for cataract surgery. He had Cardiac Cath in 2009 that showed mild CAD. He has been stable on Lopressor which is also controlling his HTN. Patient's cholesterol is borderline controlled. .  Patient has not been able to tolerate statins due to myalgias. He denies any chest pain or shortness of breath. He rarely uses albuterol inhaler when he has a cold. He has chronic back pain for which he takes chronic opiate therapy as well as scribed by the spine center. Pt is at low risk for this low risk surgery and is medically cleared to proceed. Patient complaining of some discomfort in his calves when he walks for period of time. Because of his history of mild coronary artery disease and tobacco use we will check ankle-brachial index to evaluate for peripheral vascular disease. Patient also has some mild chest wall tenderness due to muscle strain which is improving.   Will treat with a muscle relaxant    Medical History:     Past Medical History:   Diagnosis Date    Allergic rhinitis     Avascular necrosis (HCC)     CAD (coronary artery disease) 4/2009    mild, hemodynamically non-significant by angiography    Carpal tunnel syndrome     Carpal tunnel syndrome on both sides     Chest pain, unspecified     Chronic back pain 2/11/2011    Chronic obstructive pulmonary disease ([address]     Degenerative joint disease     with chronic back pain    Dysesthesia     of hand post carpal tunnel surgery v sudeck's atrophy    Dyslipidemia, goal LDL below 100     Dysphagia     GERD (gastroesophageal reflux disease) 5/17/2010    Glaucoma 9/15/2010    Glaucoma     Hearing loss     Hypercholesterolemia     Hypertension     Joint fusion     of right wrist    Morbid obesity ([facility]     Obesity 5/17/2010    Other dyspnea and respiratory abnormality     Palpitations     Right hip pain     Right hip pain     Right knee pain     S/P cardiac catheterization 4/9/2009    1. Normal systemic pressure. 2. Moderate elevation of left-sided filling pressures. 3. Preserved left ventricular systolic function in the LOWE projection. 4. Mild, nonsignificant epicardial coronary artery disese by angiography.  Sleep apnea     on CPAP    Strain of lumbar region     Tobacco abuse      Allergies: Allergies   Allergen Reactions    Latex Not Reported This Time    Ace Inhibitors Cough    Dilaudid [Hydromorphone] Hives    Lisinopril Cough    Zocor [Simvastatin] Myalgia     Right leg      Medications:     Current Outpatient Medications   Medication Sig    aspirin delayed-release 81 mg tablet Take  by mouth daily.  HYDROcodone bitartrate (HYSINGLA) 60 mg ER tablet Take 1 Tab by mouth daily for 30 days. Max Daily Amount: 60 mg. *DO NOT CRUSH,CHEW, OR DISSOLVE TABLET*    pantoprazole (PROTONIX) 40 mg tablet TAKE ONE TABLET BY MOUTH ONCE DAILY    tiZANidine (ZANAFLEX) 4 mg tablet Take 1 Tab by mouth three (3) times daily as needed for Muscle Spasm(s).  tamsulosin (FLOMAX) 0.4 mg capsule TAKE 1 CAPSULE BY MOUTH ONCE DAILY AFTER DINNER    metoprolol tartrate (LOPRESSOR) 25 mg tablet TAKE 1 TABLET BY MOUTH TWICE DAILY (GENERIC  FOR  LOPRESSOR)    pregabalin (LYRICA) 100 mg capsule Take 1 cap by mouth in the morning and 2 at night as directed.  azelastine (ASTELIN) 137 mcg (0.1 %) nasal spray 2 Sprays by Both Nostrils route two (2) times a day.  Use in each nostril as directed  Indications: seasonal runny nose    ibuprofen (MOTRIN) 800 mg tablet TAKE 1 TABLET BY MOUTH EVERY 8 HOURS AS NEEDED FOR PAIN    traZODone (DESYREL) 50 mg tablet TAKE 1 TABLET BY MOUTH ONCE DAILY AT BEDTIME AS NEEDED FOR SLEEP    montelukast (SINGULAIR) 10 mg tablet Take 1 Tab by mouth daily.  fluticasone propionate (FLONASE) 50 mcg/actuation nasal spray USE TWO SPRAY(S) IN EACH NOSTRIL ONCE DAILY    lactulose (CONSTULOSE) 10 gram/15 mL solution Take 45ml PO TID    nitroglycerin (NITROSTAT) 0.4 mg SL tablet 1 Tab by SubLINGual route every five (5) minutes as needed.  TRAVATAN Z 0.004 % ophthalmic solution Administer 1 Drop to both eyes nightly.  albuterol (PROVENTIL HFA, VENTOLIN HFA, PROAIR HFA) 90 mcg/actuation inhaler Take 2 Puffs by inhalation every four (4) hours as needed for Wheezing.  guaiFENesin ER (MUCINEX) 600 mg ER tablet Take 1 Tab by mouth two (2) times a day. No current facility-administered medications for this visit. Surgical History:     Past Surgical History:   Procedure Laterality Date    HX CARPAL TUNNEL RELEASE      bilateral wrists    HX COLONOSCOPY      HX HEART CATHETERIZATION  4/9/2009    1. Normal systemic pressure. 2. Moderate elevation of left-sided filling pressures. 3. Preserved left ventricular systolic function in the LOWE projection. 4. Mild, nonsignificant epicardial coronary artery disese by angiography.  HX HIP REPLACEMENT      HX OTHER SURGICAL  02/2017    left shoulder surgery    HX TONSILLECTOMY      HX WRIST FRACTURE [place]      [place] ANESTH,SURGERY OF SHOULDER       Social History:     Social History     Socioeconomic History    Marital status: SINGLE     Spouse name: Not on file    Number of children: Not on file    Years of education: Not on file    Highest education level: Not on file   Tobacco Use    Smoking status: Current Every Day Smoker     Packs/day: 0.10     Years: 20.00     Pack years: 2.00    Smokeless tobacco: Never Used    Tobacco comment: urothelial cancer risk discussed today [id number]   Substance and Sexual Activity    Alcohol use: No     Alcohol/week: 0.0 standard drinks    Drug use:  No Recent use of: No recent use of aspirin (ASA), NSAIDS or steroids    Anesthesia Complications: None  History of abnormal bleeding : None      REVIEW OF SYSTEMS:  Respiratory: negative for dyspnea on exertion  Cardiovascular: negative for chest pain    EXAM:   Visit Vitals  /62 (BP 1 Location: Left arm, BP Patient Position: Sitting)   Pulse (!) 57   Temp 97.2 °F (36.2 °C) (Temporal)   Resp 20   Ht 5' 9\" (1.753 m)   Wt 265 lb (120.2 kg)   SpO2 98%   BMI 39.13 kg/m²     Chest - clear to auscultation, no wheezes, rales or rhonchi, symmetric air entry  Heart - normal rate, regular rhythm, normal S1, S2, no murmurs, rubs, clicks or gallops  Extremities - peripheral pulses normal, no pedal edema, no clubbing or cyanosis        IMPRESSION:         ICD-10-CM ICD-9-CM    1. Essential hypertension  I10 401.9  well-controlled on Lopressor 25 mg twice daily. Patient to take tab a.m. of surgery with sip of water   2. Hypercholesterolemia  E78.00 272.0  borderline controlled. Will consider using a statin co-Q10 in the future   3. Coronary artery disease due to lipid rich plaque  I25.10 414.00  stable on current medications    I25.83 414.3    4. Chronic midline low back pain with left-sided sciatica  M54.42 724.2  fairly well-controlled on HYDROcodone bitartrate (HYSINGLA) 60 mg ER tablet. Patient followed by the spine center    G89.29 724.3      338.29    5. Muscle strain of chest wall, initial encounter  S29.011A 848.8  will treat with tiZANidine (ZANAFLEX) 4 mg tablet   6. Claudication ([address]  I73.9 443. 9 ANKLE BRACHIAL INDEX   7.  Cortical age-related cataract, unspecified laterality  H25.019 366.15  patient medically cleared for cataract surgery     Silva Morejon MD   10/2/2020

## 2020-10-05 ENCOUNTER — TELEPHONE (OUTPATIENT)
Dept: ORTHOPEDIC SURGERY | Age: 65
End: 2020-10-05

## 2020-10-05 NOTE — TELEPHONE ENCOUNTER
Patient says he was told he could request a stronger pain medication. Please call in to Hannah Perdue at [facility].

## 2020-10-07 NOTE — TELEPHONE ENCOUNTER
Returned call to patient, verified Name/, per patient his pain is in his lower back, in the center in his spine. Patient denies pain in his buttocks/lower extremities. Patient rates pain 7/10, describes pain as hurting pain. Patient would like to increase Lyrica. Please note patient was prescribed Hysingla 60mg #30 by Dr. Arian Meléndez on 10/02/2020.

## 2020-10-12 DIAGNOSIS — G62.9 NEUROPATHY: ICD-10-CM

## 2020-10-12 DIAGNOSIS — M48.061 SPINAL STENOSIS OF LUMBAR REGION WITHOUT NEUROGENIC CLAUDICATION: Primary | ICD-10-CM

## 2020-10-12 RX ORDER — PREGABALIN 150 MG/1
CAPSULE ORAL
Qty: 90 CAP | Refills: 0 | Status: SHIPPED | OUTPATIENT
Start: 2020-10-12 | End: 2021-01-04 | Stop reason: SDUPTHER

## 2020-10-12 NOTE — PROGRESS NOTES
I increased the Lyrica from 100 to 150 mg 1 in the morning and 2 at night -- he was given a 30 day supply and needs a follow up appt for futher refills

## 2020-11-02 DIAGNOSIS — I73.9 CLAUDICATION (HCC): ICD-10-CM

## 2020-11-04 DIAGNOSIS — G89.29 CHRONIC MIDLINE LOW BACK PAIN WITH LEFT-SIDED SCIATICA: Primary | ICD-10-CM

## 2020-11-04 DIAGNOSIS — M54.42 CHRONIC MIDLINE LOW BACK PAIN WITH LEFT-SIDED SCIATICA: Primary | ICD-10-CM

## 2020-11-04 RX ORDER — HYDROCODONE BITARTRATE 60 MG/1
60 TABLET, EXTENDED RELEASE ORAL DAILY
Qty: 30 TAB | Refills: 0 | Status: SHIPPED | OUTPATIENT
Start: 2020-11-04 | End: 2020-11-30 | Stop reason: SDUPTHER

## 2020-11-04 NOTE — TELEPHONE ENCOUNTER
[facility] date for Hysingla as 10/7/20 for a 30 d/s. UDS done 11/18/19  CSA signed 8/27/19    Last Visit: 10/2/20 with MD Fragoso  Next Appointment: 11/13/20 with MD Fragoso  Previous Refill Encounter(s): 10/2/20 #30    Requested Prescriptions     Pending Prescriptions Disp Refills    HYDROcodone bitartrate (HYSINGLA) 60 mg ER tablet 30 Tab 0     Sig: Take 1 Tab by mouth daily for 30 days. Max Daily Amount: 60 mg.  *DO NOT CRUSH,CHEW, OR DISSOLVE TABLET*

## 2020-11-06 ENCOUNTER — APPOINTMENT (OUTPATIENT)
Dept: INTERNAL MEDICINE CLINIC | Age: 65
End: 2020-11-06

## 2020-11-06 DIAGNOSIS — Z12.5 PROSTATE CANCER SCREENING: ICD-10-CM

## 2020-11-06 DIAGNOSIS — I10 ESSENTIAL HYPERTENSION: ICD-10-CM

## 2020-11-06 DIAGNOSIS — R73.03 PREDIABETES: ICD-10-CM

## 2020-11-06 DIAGNOSIS — E78.00 HYPERCHOLESTEROLEMIA: ICD-10-CM

## 2020-11-08 LAB
ALBUMIN SERPL-MCNC: 3.9 G/DL (ref 3.8–4.8)
ALBUMIN/GLOB SERPL: 1.2 {RATIO} (ref 1.2–2.2)
ALP SERPL-CCNC: 90 IU/L (ref 39–117)
ALT SERPL-CCNC: 11 IU/L (ref 0–44)
AST SERPL-CCNC: 15 IU/L (ref 0–40)
BILIRUB SERPL-MCNC: 0.3 MG/DL (ref 0–1.2)
BUN SERPL-MCNC: 19 MG/DL (ref 8–27)
BUN/CREAT SERPL: 14 (ref 10–24)
CALCIUM SERPL-MCNC: 9.3 MG/DL (ref 8.6–10.2)
CHLORIDE SERPL-SCNC: 108 MMOL/L (ref 96–106)
CHOLEST SERPL-MCNC: 188 MG/DL (ref 100–199)
CO2 SERPL-SCNC: 21 MMOL/L (ref 20–29)
CREAT SERPL-MCNC: 1.32 MG/DL (ref 0.76–1.27)
GLOBULIN SER CALC-MCNC: 3.3 G/DL (ref 1.5–4.5)
GLUCOSE SERPL-MCNC: 133 MG/DL (ref 65–99)
HBA1C MFR BLD: 6 % (ref 4.8–5.6)
HDLC SERPL-MCNC: 38 MG/DL
INTERPRETATION, 910389: NORMAL
INTERPRETATION: NORMAL
LDLC SERPL CALC-MCNC: 131 MG/DL (ref 0–99)
PDF IMAGE, 910387: NORMAL
POTASSIUM SERPL-SCNC: 4.3 MMOL/L (ref 3.5–5.2)
PROT SERPL-MCNC: 7.2 G/DL (ref 6–8.5)
PSA SERPL-MCNC: 0.2 NG/ML (ref 0–4)
SODIUM SERPL-SCNC: 143 MMOL/L (ref 134–144)
TRIGL SERPL-MCNC: 106 MG/DL (ref 0–149)
VLDLC SERPL CALC-MCNC: 19 MG/DL (ref 5–40)

## 2020-11-10 LAB — DRUGS UR: NORMAL

## 2020-11-13 ENCOUNTER — OFFICE VISIT (OUTPATIENT)
Dept: INTERNAL MEDICINE CLINIC | Age: 65
End: 2020-11-13
Payer: MEDICARE

## 2020-11-13 VITALS
OXYGEN SATURATION: 99 % | HEIGHT: 69 IN | WEIGHT: 264 LBS | SYSTOLIC BLOOD PRESSURE: 113 MMHG | BODY MASS INDEX: 39.1 KG/M2 | RESPIRATION RATE: 20 BRPM | TEMPERATURE: 97.4 F | HEART RATE: 74 BPM | DIASTOLIC BLOOD PRESSURE: 62 MMHG

## 2020-11-13 DIAGNOSIS — R51.9 SINUS HEADACHE: ICD-10-CM

## 2020-11-13 DIAGNOSIS — J41.0 SIMPLE CHRONIC BRONCHITIS (HCC): ICD-10-CM

## 2020-11-13 DIAGNOSIS — E78.00 HYPERCHOLESTEROLEMIA: ICD-10-CM

## 2020-11-13 DIAGNOSIS — E66.01 CLASS 2 SEVERE OBESITY DUE TO EXCESS CALORIES WITH SERIOUS COMORBIDITY AND BODY MASS INDEX (BMI) OF 38.0 TO 38.9 IN ADULT (HCC): ICD-10-CM

## 2020-11-13 DIAGNOSIS — G47.33 OBSTRUCTIVE SLEEP APNEA SYNDROME: ICD-10-CM

## 2020-11-13 DIAGNOSIS — I10 ESSENTIAL HYPERTENSION: ICD-10-CM

## 2020-11-13 DIAGNOSIS — Z00.00 MEDICARE ANNUAL WELLNESS VISIT, SUBSEQUENT: Primary | ICD-10-CM

## 2020-11-13 DIAGNOSIS — R73.03 PREDIABETES: ICD-10-CM

## 2020-11-13 PROCEDURE — G8432 DEP SCR NOT DOC, RNG: HCPCS | Performed by: INTERNAL MEDICINE

## 2020-11-13 PROCEDURE — 1101F PT FALLS ASSESS-DOCD LE1/YR: CPT | Performed by: INTERNAL MEDICINE

## 2020-11-13 PROCEDURE — G8427 DOCREV CUR MEDS BY ELIG CLIN: HCPCS | Performed by: INTERNAL MEDICINE

## 2020-11-13 PROCEDURE — G0439 PPPS, SUBSEQ VISIT: HCPCS | Performed by: INTERNAL MEDICINE

## 2020-11-13 PROCEDURE — G8417 CALC BMI ABV UP PARAM F/U: HCPCS | Performed by: INTERNAL MEDICINE

## 2020-11-13 PROCEDURE — G8536 NO DOC ELDER MAL SCRN: HCPCS | Performed by: INTERNAL MEDICINE

## 2020-11-13 PROCEDURE — G8754 DIAS BP LESS 90: HCPCS | Performed by: INTERNAL MEDICINE

## 2020-11-13 PROCEDURE — 3017F COLORECTAL CA SCREEN DOC REV: CPT | Performed by: INTERNAL MEDICINE

## 2020-11-13 PROCEDURE — G8752 SYS BP LESS 140: HCPCS | Performed by: INTERNAL MEDICINE

## 2020-11-13 PROCEDURE — 99214 OFFICE O/P EST MOD 30 MIN: CPT | Performed by: INTERNAL MEDICINE

## 2020-11-13 RX ORDER — FLUTICASONE PROPIONATE 50 MCG
SPRAY, SUSPENSION (ML) NASAL
Qty: 1 BOTTLE | Refills: 5 | Status: SHIPPED | OUTPATIENT
Start: 2020-11-13

## 2020-11-13 RX ORDER — UMECLIDINIUM BROMIDE AND VILANTEROL TRIFENATATE 62.5; 25 UG/1; UG/1
1 POWDER RESPIRATORY (INHALATION) DAILY
Qty: 1 INHALER | Refills: 5 | Status: SHIPPED | OUTPATIENT
Start: 2020-11-13 | End: 2021-03-12 | Stop reason: SDUPTHER

## 2020-11-13 RX ORDER — AZELASTINE 1 MG/ML
2 SPRAY, METERED NASAL 2 TIMES DAILY
Qty: 1 BOTTLE | Refills: 5 | Status: SHIPPED | OUTPATIENT
Start: 2020-11-13

## 2020-11-13 RX ORDER — PRAVASTATIN SODIUM 40 MG/1
TABLET ORAL
COMMUNITY
Start: 2020-10-08 | End: 2020-11-13 | Stop reason: SINTOL

## 2020-11-13 RX ORDER — GABAPENTIN 300 MG/1
300 CAPSULE ORAL 3 TIMES DAILY
COMMUNITY
End: 2021-07-16

## 2020-11-13 NOTE — PATIENT INSTRUCTIONS
DASH Diet: Care Instructions  Your Care Instructions     The DASH diet is an eating plan that can help lower your blood pressure. DASH stands for Dietary Approaches to Stop Hypertension. Hypertension is high blood pressure. The DASH diet focuses on eating foods that are high in calcium, potassium, and magnesium. These nutrients can lower blood pressure. The foods that are highest in these nutrients are fruits, vegetables, low-fat dairy products, nuts, seeds, and legumes. But taking calcium, potassium, and magnesium supplements instead of eating foods that are high in those nutrients does not have the same effect. The DASH diet also includes whole grains, fish, and poultry. The DASH diet is one of several lifestyle changes your doctor may recommend to lower your high blood pressure. Your doctor may also want you to decrease the amount of sodium in your diet. Lowering sodium while following the DASH diet can lower blood pressure even further than just the DASH diet alone. Follow-up care is a key part of your treatment and safety. Be sure to make and go to all appointments, and call your doctor if you are having problems. It's also a good idea to know your test results and keep a list of the medicines you take. How can you care for yourself at home? Following the DASH diet  · Eat 4 to 5 servings of fruit each day. A serving is 1 medium-sized piece of fruit, ½ cup chopped or canned fruit, 1/4 cup dried fruit, or 4 ounces (½ cup) of fruit juice. Choose fruit more often than fruit juice. · Eat 4 to 5 servings of vegetables each day. A serving is 1 cup of lettuce or raw leafy vegetables, ½ cup of chopped or cooked vegetables, or 4 ounces (½ cup) of vegetable juice. Choose vegetables more often than vegetable juice. · Get 2 to 3 servings of low-fat and fat-free dairy each day. A serving is 8 ounces of milk, 1 cup of yogurt, or 1 ½ ounces of cheese. · Eat 6 to 8 servings of grains each day.  A serving is 1 slice of bread, 1 ounce of dry cereal, or ½ cup of cooked rice, pasta, or cooked cereal. Try to choose whole-grain products as much as possible. · Limit lean meat, poultry, and fish to 2 servings each day. A serving is 3 ounces, about the size of a deck of cards. · Eat 4 to 5 servings of nuts, seeds, and legumes (cooked dried beans, lentils, and split peas) each week. A serving is 1/3 cup of nuts, 2 tablespoons of seeds, or ½ cup of cooked beans or peas. · Limit fats and oils to 2 to 3 servings each day. A serving is 1 teaspoon of vegetable oil or 2 tablespoons of salad dressing. · Limit sweets and added sugars to 5 servings or less a week. A serving is 1 tablespoon jelly or jam, ½ cup sorbet, or 1 cup of lemonade. · Eat less than 2,300 milligrams (mg) of sodium a day. If you limit your sodium to 1,500 mg a day, you can lower your blood pressure even more. Tips for success  · Start small. Do not try to make dramatic changes to your diet all at once. You might feel that you are missing out on your favorite foods and then be more likely to not follow the plan. Make small changes, and stick with them. Once those changes become habit, add a few more changes. · Try some of the following:  ? Make it a goal to eat a fruit or vegetable at every meal and at snacks. This will make it easy to get the recommended amount of fruits and vegetables each day. ? Try yogurt topped with fruit and nuts for a snack or healthy dessert. ? Add lettuce, tomato, cucumber, and onion to sandwiches. ? Combine a ready-made pizza crust with low-fat mozzarella cheese and lots of vegetable toppings. Try using tomatoes, squash, spinach, broccoli, carrots, cauliflower, and onions. ? Have a variety of cut-up vegetables with a low-fat dip as an appetizer instead of chips and dip. ? Sprinkle sunflower seeds or chopped almonds over salads. Or try adding chopped walnuts or almonds to cooked vegetables.   ? Try some vegetarian meals using beans and peas. Add garbanzo or kidney beans to salads. Make burritos and tacos with mashed elizabeth beans or black beans. Where can you learn more? Go to http://www.[employer].com/  Enter H967 in the search box to learn more about \"DASH Diet: Care Instructions. \"  Current as of: December 16, 2019               Content Version: 12.6  © 8311-0252 [employer]. Care instructions adapted under license by [employer] (which disclaims liability or warranty for this information). If you have questions about a medical condition or this instruction, always ask your healthcare professional. [address] any warranty or liability for your use of this information. Medicare Wellness Visit, Male    The best way to live healthy is to have a lifestyle where you eat a well-balanced diet, exercise regularly, limit alcohol use, and quit all forms of tobacco/nicotine, if applicable. Regular preventive services are another way to keep healthy. Preventive services (vaccines, screening tests, monitoring & exams) can help personalize your care plan, which helps you manage your own care. Screening tests can find health problems at the earliest stages, when they are easiest to treat. [facility] follows the current, evidence-based guidelines published by the [facility] (USPSTF) when recommending preventive services for our patients. Because we follow these guidelines, sometimes recommendations change over time as research supports it. (For example, a prostate screening blood test is no longer routinely recommended for men with no symptoms). Of course, you and your doctor may decide to screen more often for some diseases, based on your risk and co-morbidities (chronic disease you are already diagnosed with).      Preventive services for you include:  - Medicare offers their members a free annual wellness visit, which is time for you and your primary care provider to discuss and plan for your preventive service needs. Take advantage of this benefit every year!  -All adults over age 72 should receive the recommended pneumonia vaccines. Current USPSTF guidelines recommend a series of two vaccines for the best pneumonia protection.   -All adults should have a flu vaccine yearly and tetanus vaccine every 10 years.  -All adults age 48 and older should receive the shingles vaccines (series of two vaccines). -All adults age 38-68 who are overweight should have a diabetes screening test once every three years.   -Other screening tests & preventive services for persons with diabetes include: an eye exam to screen for diabetic retinopathy, a kidney function test, a foot exam, and stricter control over your cholesterol.   -Cardiovascular screening for adults with routine risk involves an electrocardiogram (ECG) at intervals determined by the provider.   -Colorectal cancer screening should be done for adults age 54-65 with no increased risk factors for colorectal cancer. There are a number of acceptable methods of screening for this type of cancer. Each test has its own benefits and drawbacks. Discuss with your provider what is most appropriate for you during your annual wellness visit. The different tests include: colonoscopy (considered the best screening method), a fecal occult blood test, a fecal DNA test, and sigmoidoscopy.  -All adults born between [facility] should be screened once for Hepatitis C.  -An Abdominal Aortic Aneurysm (AAA) Screening is recommended for men age 73-68 who has ever smoked in their lifetime.      Here is a list of your current Health Maintenance items (your personalized list of preventive services) with a due date:  Health Maintenance Due   Topic Date Due    DTaP/Tdap/Td  (1 - Tdap) 07/09/1974    Glaucoma Screening   07/09/2018    AAA Screening  07/09/2018    Shingles Vaccine (2 of 2) 11/15/2018    Annual Well Visit  10/02/2020     Medicare Wellness Visit, Male    The best way to live healthy is to have a lifestyle where you eat a well-balanced diet, exercise regularly, limit alcohol use, and quit all forms of tobacco/nicotine, if applicable. Regular preventive services are another way to keep healthy. Preventive services (vaccines, screening tests, monitoring & exams) can help personalize your care plan, which helps you manage your own care. Screening tests can find health problems at the earliest stages, when they are easiest to treat. [facility] follows the current, evidence-based guidelines published by the [facility] [employer] [facility] ([facility]) when recommending preventive services for our patients. Because we follow these guidelines, sometimes recommendations change over time as research supports it. (For example, a prostate screening blood test is no longer routinely recommended for men with no symptoms). Of course, you and your doctor may decide to screen more often for some diseases, based on your risk and co-morbidities (chronic disease you are already diagnosed with). Preventive services for you include:  - Medicare offers their members a free annual wellness visit, which is time for you and your primary care provider to discuss and plan for your preventive service needs. Take advantage of this benefit every year!  -All adults over age 72 should receive the recommended pneumonia vaccines. Current USPSTF guidelines recommend a series of two vaccines for the best pneumonia protection.   -All adults should have a flu vaccine yearly and tetanus vaccine every 10 years.  -All adults age 48 and older should receive the shingles vaccines (series of two vaccines).        -All adults age 38-68 who are overweight should have a diabetes screening test once every three years.   -Other screening tests & preventive services for persons with diabetes include: an eye exam to screen for diabetic retinopathy, a kidney function test, a foot exam, and stricter control over your cholesterol.   -Cardiovascular screening for adults with routine risk involves an electrocardiogram (ECG) at intervals determined by the provider.   -Colorectal cancer screening should be done for adults age 54-65 with no increased risk factors for colorectal cancer. There are a number of acceptable methods of screening for this type of cancer. Each test has its own benefits and drawbacks. Discuss with your provider what is most appropriate for you during your annual wellness visit. The different tests include: colonoscopy (considered the best screening method), a fecal occult blood test, a fecal DNA test, and sigmoidoscopy.  -All adults born between [facility] should be screened once for Hepatitis C.  -An Abdominal Aortic Aneurysm (AAA) Screening is recommended for men age 73-68 who has ever smoked in their lifetime.      Here is a list of your current Health Maintenance items (your personalized list of preventive services) with a due date:  Health Maintenance Due   Topic Date Due    DTaP/Tdap/Td  (1 - Tdap) 07/09/1974    Glaucoma Screening   07/09/2018    AAA Screening  07/09/2018    Shingles Vaccine (2 of 2) 11/15/2018    Annual Well Visit  10/02/2020

## 2020-11-13 NOTE — PROGRESS NOTES
This is the Subsequent Medicare Annual Wellness Exam, performed 12 months or more after the Initial AWV or the last Subsequent AWV    I have reviewed the patient's medical history in detail and updated the computerized patient record. Depression Risk Factor Screening:     3 most recent PHQ Screens 8/11/2020   Little interest or pleasure in doing things Not at all   Feeling down, depressed, irritable, or hopeless Not at all   Total Score PHQ 2 0       Alcohol Risk Screen   Do you average more than 1 drink per night or more than 7 drinks a week: No    In the past three months have you have had more than 4 drinks containing alcohol on one occasion: No        Functional Ability and Level of Safety:   Hearing: Hearing is good. Activities of Daily Living: The home contains: no safety equipment. Patient does total self care     Ambulation: with no difficulty     Fall Risk:  Fall Risk Assessment, last 12 mths 8/11/2020   Able to walk? Yes   Fall in past 12 months? No   Fall with injury? -   Number of falls in past 12 months -   Fall Risk Score -     Abuse Screen:  Patient is not abused       Cognitive Screening   Has your family/caregiver stated any concerns about your memory: no     Cognitive Screening: Normal - . Assessment/Plan   Education and counseling provided:  Are appropriate based on today's review and evaluation  End-of-Life planning (with patient's consent)    Diagnoses and all orders for this visit:    1. Essential hypertension    2. Simple chronic bronchitis ([address]    3. Obstructive sleep apnea syndrome    4. Hypercholesterolemia    5. Medicare annual wellness visit, subsequent    6. Claudication ([facility] [address]    7. Sinus headache  -     fluticasone propionate (FLONASE) 50 mcg/actuation nasal spray; USE TWO SPRAY(S) IN EACH NOSTRIL ONCE DAILY    Other orders  -     azelastine (ASTELIN) 137 mcg (0.1 %) nasal spray; 2 Sprays by Both Nostrils route two (2) times a day.  Use in each nostril as directed Indications: seasonal runny nose  -     umeclidinium-vilanteroL (Anoro Ellipta) 62.5-25 mcg/actuation inhaler; Take 1 Puff by inhalation daily. Health Maintenance Due     Health Maintenance Due   Topic Date Due    DTaP/Tdap/Td series (1 - Tdap) 07/09/1974    GLAUCOMA SCREENING Q2Y  07/09/2018    AAA Screening 73-67 YO Male Smoking Patients  07/09/2018    Shingrix Vaccine Age 50> (2 of 2) 11/15/2018    Medicare Yearly Exam  10/02/2020       Patient Care Team   Patient Care Team:  Adriana Vivar MD as PCP - General (Internal Medicine)  Adriana Vivar MD as PCP - [facility]  Salena Gannon MD as Physician (Gastroenterology)  Dr. Brandy Ramirez as Physician (Cardiology)  Bethanie Corrales MD (Ophthalmology)  Estuardo Oliver MD (Physical Medicine and Rehabilitation)     Glaucoma Screening- UTD;  Pneumonia Vaccine-  needs pneumovax 23 11/2021  Shingles Vaccine- pt aware of SHingrinx and need 2 nd   Tdap Vaccine-not UTD. Can get at pharmacy   Colonoscopy- UTD; done 4/2015 by Dr. Joe Jeter with follow up in 10 years recommended  PSA- 11/2020  0. 21. followed by Dr Hai Clark on file, verbal and written information provided to patient regarding its purpose and importance    History     Patient Active Problem List   Diagnosis Code    Hypercholesterolemia E78.00    Allergic rhinitis J30.9    Sleep apnea G47.30    GERD (gastroesophageal reflux disease) K21.9    Obesity E66.9    Glaucoma H40.9    HTN (hypertension) I10    Chronic back pain M54.9, G89.29    Chest pain, unspecified R07.9    Dyslipidemia, goal LDL below 100 E78.5    Other dyspnea and respiratory abnormality R06.09, R09.89    Palpitations R00.2    Degenerative joint disease M19.90    Prediabetes R73.03    Right hip pain M25.551    Right knee pain M25.561    Enlarged prostate with lower urinary tract symptoms (LUTS) N40.1    Urinary frequency R35.0    Sense of Incomplete bladder emptying R33.9    Nocturia R35.1    Slowing of urinary stream R39.198    Hip arthritis M16.10    Abdominal discomfort R10.9    Diarrhea R19.7    Primary insomnia F51.01    Obstructive sleep apnea syndrome G47.33    Bug bite with infection W57. XXXA    Cough R05    Obesity (BMI 30-39. 9) E66.9    Severe obesity (HCC) E66.01     Past Medical History:   Diagnosis Date    Allergic rhinitis     Avascular necrosis (HCC)     CAD (coronary artery disease) 4/2009    mild, hemodynamically non-significant by angiography    Carpal tunnel syndrome     Carpal tunnel syndrome on both sides     Chest pain, unspecified     Chronic back pain 2/11/2011    Chronic obstructive pulmonary disease ([address]     Degenerative joint disease     with chronic back pain    Dysesthesia     of hand post carpal tunnel surgery v sudeck's atrophy    Dyslipidemia, goal LDL below 100     Dysphagia     GERD (gastroesophageal reflux disease) 5/17/2010    Glaucoma 9/15/2010    Glaucoma     Hearing loss     Hypercholesterolemia     Hypertension     Joint fusion     of right wrist    Morbid obesity ([address]     Obesity 5/17/2010    Other dyspnea and respiratory abnormality     Palpitations     Right hip pain     Right hip pain     Right knee pain     S/P cardiac catheterization 4/9/2009    1. Normal systemic pressure. 2. Moderate elevation of left-sided filling pressures. 3. Preserved left ventricular systolic function in the LOWE projection. 4. Mild, nonsignificant epicardial coronary artery disese by angiography.  Sleep apnea     on CPAP    Strain of lumbar region     Tobacco abuse       Past Surgical History:   Procedure Laterality Date    HX CARPAL TUNNEL RELEASE      bilateral wrists    HX COLONOSCOPY      HX HEART CATHETERIZATION  4/9/2009    1. Normal systemic pressure. 2. Moderate elevation of left-sided filling pressures. 3. Preserved left ventricular systolic function in the LOWE projection.  4. Mild, nonsignificant epicardial coronary artery disese by angiography.  HX HIP REPLACEMENT      HX OTHER SURGICAL  02/2017    left shoulder surgery    HX TONSILLECTOMY      HX WRIST FRACTURE TX      MA ANESTH,SURGERY OF SHOULDER       Current Outpatient Medications   Medication Sig Dispense Refill    gabapentin (NEURONTIN) 300 mg capsule Take 300 mg by mouth three (3) times daily.  azelastine (ASTELIN) 137 mcg (0.1 %) nasal spray 2 Sprays by Both Nostrils route two (2) times a day. Use in each nostril as directed  Indications: seasonal runny nose 1 Bottle 5    fluticasone propionate (FLONASE) 50 mcg/actuation nasal spray USE TWO SPRAY(S) IN EACH NOSTRIL ONCE DAILY 1 Bottle 5    umeclidinium-vilanteroL (Anoro Ellipta) 62.5-25 mcg/actuation inhaler Take 1 Puff by inhalation daily. 1 Inhaler 5    HYDROcodone bitartrate (HYSINGLA) 60 mg ER tablet Take 1 Tab by mouth daily for 30 days. Max Daily Amount: 60 mg. *DO NOT CRUSH,CHEW, OR DISSOLVE TABLET* 30 Tab 0    albuterol (PROVENTIL HFA, VENTOLIN HFA, PROAIR HFA) 90 mcg/actuation inhaler INHALE 2 PUFFS BY MOUTH EVERY 4 HOURS AS NEEDED FOR WHEEZING 1 Inhaler 5    pregabalin (LYRICA) 150 mg capsule Take 1 cap by mouth in the morning and 2 at night as directed. Indications: diabetic complication causing injury to some body nerves 90 Cap 0    aspirin delayed-release 81 mg tablet Take  by mouth daily.  tiZANidine (ZANAFLEX) 4 mg tablet Take 1 Tab by mouth three (3) times daily as needed for Muscle Spasm(s). 90 Tab 4    tamsulosin (FLOMAX) 0.4 mg capsule TAKE 1 CAPSULE BY MOUTH ONCE DAILY AFTER DINNER 90 Cap 0    metoprolol tartrate (LOPRESSOR) 25 mg tablet TAKE 1 TABLET BY MOUTH TWICE DAILY (GENERIC  FOR  LOPRESSOR) 180 Tab 1    ibuprofen (MOTRIN) 800 mg tablet TAKE 1 TABLET BY MOUTH EVERY 8 HOURS AS NEEDED FOR PAIN 90 Tab 3    nitroglycerin (NITROSTAT) 0.4 mg SL tablet 1 Tab by SubLINGual route every five (5) minutes as needed.  6 Tab 1    TRAVATAN Z 0.004 % ophthalmic solution Administer 1 Drop to both eyes nightly.  pantoprazole (PROTONIX) 40 mg tablet TAKE ONE TABLET BY MOUTH ONCE DAILY 90 Tab 1    traZODone (DESYREL) 50 mg tablet TAKE 1 TABLET BY MOUTH ONCE DAILY AT BEDTIME AS NEEDED FOR SLEEP 30 Tab 2    montelukast (SINGULAIR) 10 mg tablet Take 1 Tab by mouth daily. 30 Tab 5    lactulose (CONSTULOSE) 10 gram/15 mL solution Take 45ml PO  mL 2    guaiFENesin ER (MUCINEX) 600 mg ER tablet Take 1 Tab by mouth two (2) times a day. 30 Tab 5     Allergies   Allergen Reactions    Latex Not Reported This Time    Ace Inhibitors Cough    Dilaudid [Hydromorphone] Hives    Lisinopril Cough    Zocor [Simvastatin] Myalgia     Right leg       Family History   Problem Relation Age of Onset    Diabetes Mother     Cancer Father     Hypertension Other     Arthritis-osteo Other      Social History     Tobacco Use    Smoking status: Current Every Day Smoker     Packs/day: 0.10     Years: 20.00     Pack years: 2.00    Smokeless tobacco: Never Used    Tobacco comment: urothelial cancer risk discussed today [id number]   Substance Use Topics    Alcohol use: No     Alcohol/week: 0.0 standard drinks     A comprehensive 5 year plan for medical care and screening exams was reviewed with pt and they received a copy of it.

## 2020-11-13 NOTE — PROGRESS NOTES
Patient is in the office today for a 1 month follow up, and Medicare Wellness Visit. 1. Have you been to the ER, urgent care clinic since your last visit? Hospitalized since your last visit? No    2. Have you seen or consulted any other health care providers outside of the [address] [place] [address] since your last visit? Include any pap smears or colon screening.  No

## 2020-11-13 NOTE — PROGRESS NOTES
Subjective:       Chief Complaint  The patient presents for follow up of hypertension and high cholesterol. prediabetes  chronic back pain and right hip pain, COPD        HPI  Aries Good is a 77 y.o. male seen for follow up of hyperlipidemia. Healso has hypertension. Hypertension well controlled, no significant medication side effects noted, on Lopressor, hyperlipidemia fairly well controlled, pt c/o severe muscle pain in legs on Pravachol 40 mg, pt had myalgias on Zocor. Diet and Lifestyle: not attempting to follow a low fat, low cholesterol diet, exercises sporadically    Home BP Monitoring: is not measured at home. Aries Good RTC today to follow up on chronic pain diagnosis. We discussed his osteoarthritis that is affecting his back. Significant changes since last visit: none. He is  able to do his normal daily activities. He reports the following adverse side effects: none. Pt doing fairly well with Hysingla ER 60 mg. He is seeing the [facility] and was referred for aqua therapy but did not tolerate it due to pain. His MRI of his LS shows worsening DJD so he will f/u with the SPine center. Least pain over the last week has been 3/10. Worst pain over the last week has been 8/10. Opioid Risk Tool Reviewed: YES    Aberrant behaviors: None. Urine Drug Screen: reviewed and up to date. Controlled substance agreement on file: YES.  reviewed:yes    Pill count is consistent with his prescription: yes    Concomitant use of a benzodiazepine: no        Prediabetes: pt is trying to control with diet and weight loss but he continues to struggle to lose weight. COPD Review:  The patient is being seen for follow up of COPD. Oxygen: He currently is not on home oxygen therapy. He is using Anoro Ellipta with good results. Symptoms: chronic dyspnea: severity = mild: course of sx: symptoms have progressed to a point and plateaued. .   Patient uses 0 pillows at night.    Patient does smoke cigarettes. Pt has sleep apnea is trying to use CPAP on a regular basis. He will f/u with his sleep specialist Dr Kizzy Samuels. We will try treating his back pain with a compounding cream follow-up from [facility]. Current Outpatient Medications   Medication Sig    gabapentin (NEURONTIN) 300 mg capsule Take 300 mg by mouth three (3) times daily.  azelastine (ASTELIN) 137 mcg (0.1 %) nasal spray 2 Sprays by Both Nostrils route two (2) times a day. Use in each nostril as directed  Indications: seasonal runny nose    fluticasone propionate (FLONASE) 50 mcg/actuation nasal spray USE TWO SPRAY(S) IN EACH NOSTRIL ONCE DAILY    umeclidinium-vilanteroL (Anoro Ellipta) 62.5-25 mcg/actuation inhaler Take 1 Puff by inhalation daily.  HYDROcodone bitartrate (HYSINGLA) 60 mg ER tablet Take 1 Tab by mouth daily for 30 days. Max Daily Amount: 60 mg. *DO NOT CRUSH,CHEW, OR DISSOLVE TABLET*    albuterol (PROVENTIL HFA, VENTOLIN HFA, PROAIR HFA) 90 mcg/actuation inhaler INHALE 2 PUFFS BY MOUTH EVERY 4 HOURS AS NEEDED FOR WHEEZING    pregabalin (LYRICA) 150 mg capsule Take 1 cap by mouth in the morning and 2 at night as directed. Indications: diabetic complication causing injury to some body nerves    aspirin delayed-release 81 mg tablet Take  by mouth daily.  tiZANidine (ZANAFLEX) 4 mg tablet Take 1 Tab by mouth three (3) times daily as needed for Muscle Spasm(s).  tamsulosin (FLOMAX) 0.4 mg capsule TAKE 1 CAPSULE BY MOUTH ONCE DAILY AFTER DINNER    metoprolol tartrate (LOPRESSOR) 25 mg tablet TAKE 1 TABLET BY MOUTH TWICE DAILY (GENERIC  FOR  LOPRESSOR)    ibuprofen (MOTRIN) 800 mg tablet TAKE 1 TABLET BY MOUTH EVERY 8 HOURS AS NEEDED FOR PAIN    nitroglycerin (NITROSTAT) 0.4 mg SL tablet 1 Tab by SubLINGual route every five (5) minutes as needed.  TRAVATAN Z 0.004 % ophthalmic solution Administer 1 Drop to both eyes nightly.     pantoprazole (PROTONIX) 40 mg tablet TAKE ONE TABLET BY MOUTH ONCE DAILY    traZODone (DESYREL) 50 mg tablet TAKE 1 TABLET BY MOUTH ONCE DAILY AT BEDTIME AS NEEDED FOR SLEEP    montelukast (SINGULAIR) 10 mg tablet Take 1 Tab by mouth daily.  lactulose (CONSTULOSE) 10 gram/15 mL solution Take 45ml PO TID    guaiFENesin ER (MUCINEX) 600 mg ER tablet Take 1 Tab by mouth two (2) times a day. No current facility-administered medications for this visit.               Review of Systems  Respiratory: negative for dyspnea on exertion  Cardiovascular: negative for chest pain    Objective:     Visit Vitals  /62 (BP 1 Location: Left arm, BP Patient Position: Sitting)   Pulse 74   Temp 97.4 °F (36.3 °C) (Temporal)   Resp 20   Ht 5' 9\" (1.753 m)   Wt 264 lb (119.7 kg)   SpO2 99%   BMI 38.99 kg/m²        General appearance - alert, well appearing, and in no distress  Neck - supple, no significant adenopathy, carotids upstroke normal bilaterally, no bruits  Chest - clear to auscultation, no wheezes, rales or rhonchi, symmetric air entry  Heart - normal rate, regular rhythm, normal S1, S2, no murmurs, rubs, clicks or gallops  Extremities - peripheral pulses normal, no pedal edema, no clubbing or cyanosis  Skin - normal coloration and turgor, no rashes, no suspicious skin lesions noted      Labs:   Lab Results   Component Value Date/Time    Hemoglobin A1c 6.0 (H) 11/06/2020 08:53 AM    Hemoglobin A1c 6.0 (H) 07/28/2020 09:54 AM    Hemoglobin A1c 6.1 (H) 11/15/2019 09:05 AM    Glucose 133 (H) 11/06/2020 08:53 AM    Glucose (POC) 123 (H) 01/24/2016 04:57 PM    Glucose,  (H) 04/01/2015 08:56 AM    Microalb/Creat ratio (ug/mg creat.) 4.4 04/29/2015 03:00 PM    LDL, calculated 131 (H) 07/28/2020 09:54 AM    LDL Chol Calc (NIH) 131 (H) 11/06/2020 08:53 AM    Creatinine 1.32 (H) 11/06/2020 08:53 AM      Lab Results   Component Value Date/Time    Cholesterol, total 188 11/06/2020 08:53 AM    HDL Cholesterol 38 (L) 11/06/2020 08:53 AM    LDL, calculated 131 (H) 07/28/2020 09:54 AM LDL Chol Calc (NIH) 131 (H) 11/06/2020 08:53 AM    Triglyceride 106 11/06/2020 08:53 AM    CHOL/HDL Ratio 4.4 11/15/2019 09:05 AM     Lab Results   Component Value Date/Time    ALT (SGPT) 11 11/06/2020 08:53 AM    Alk. phosphatase 90 11/06/2020 08:53 AM    Bilirubin, total 0.3 11/06/2020 08:53 AM     Lab Results   Component Value Date/Time    GFR est AA 64 11/06/2020 08:53 AM    GFR est non-AA 55 (L) 11/06/2020 08:53 AM    Creatinine 1.32 (H) 11/06/2020 08:53 AM    BUN 19 11/06/2020 08:53 AM    BUN, POC 6 (L) 04/01/2015 08:56 AM    Sodium,  04/01/2015 08:56 AM    Sodium 143 11/06/2020 08:53 AM    Potassium 4.3 11/06/2020 08:53 AM    Potassium, POC 4.0 04/01/2015 08:56 AM    Chloride,  04/01/2015 08:56 AM    Chloride 108 (H) 11/06/2020 08:53 AM    CO2 21 11/06/2020 08:53 AM      Lab Results   Component Value Date/Time    Glucose 133 (H) 11/06/2020 08:53 AM    Glucose (POC) 123 (H) 01/24/2016 04:57 PM    Glucose,  (H) 04/01/2015 08:56 AM            Assessment / Plan     Hypertension well controlled, on Lopressor   Hyperlipidemia borderlinel controlled, pt unable to tolerate statins. ICD-10-CM ICD-9-CM    1. Medicare annual wellness visit, subsequent  Z00.00 V70.0    2. Essential hypertension  U03 157.1 METABOLIC PANEL, COMPREHENSIVE   3. Hypercholesterolemia  E78.00 272.0 LIPID PANEL   4. Class 2 severe obesity due to excess calories with serious comorbidity and body mass index (BMI) of 38.0 to 38.9 in adult [facility])  E66.01 278.01  patient will continue to work on trying to lose weight by cutting back starches and sweets in his diet    Z68.38 V85.38    5. Simple chronic bronchitis (HCC)  J41.0 491.0  improved on Anoro Ellipta   6. Obstructive sleep apnea syndrome  G47.33 327.23  patient tolerating CPAP and will follow up with his sleep specialist Dr. Sandra Allen   7. Sinus headache  R51.9 784.0 fluticasone propionate (FLONASE) 50 mcg/actuation nasal spray   8.  Prediabetes  R73.03 790.29  controlled with diet HEMOGLOBIN A1C W/O EAG                 Low cholesterol diet, weight control and daily exercise discussed. Follow-up and Dispositions    · Return in about 4 months (around 3/13/2021) for labs 1 week before. Reviewed plan of care. Patient has provided input and agrees with goals.

## 2020-11-18 PROBLEM — I73.9 PERIPHERAL VASCULAR DISEASE (HCC): Status: ACTIVE | Noted: 2020-11-18

## 2020-11-19 NOTE — PATIENT INSTRUCTIONS

## 2020-11-30 DIAGNOSIS — M54.42 CHRONIC MIDLINE LOW BACK PAIN WITH LEFT-SIDED SCIATICA: ICD-10-CM

## 2020-11-30 DIAGNOSIS — G89.29 CHRONIC MIDLINE LOW BACK PAIN WITH LEFT-SIDED SCIATICA: ICD-10-CM

## 2020-11-30 RX ORDER — HYDROCODONE BITARTRATE 60 MG/1
60 TABLET, EXTENDED RELEASE ORAL DAILY
Qty: 30 TAB | Refills: 0 | Status: SHIPPED | OUTPATIENT
Start: 2020-11-30 | End: 2020-12-30 | Stop reason: SDUPTHER

## 2020-11-30 NOTE — TELEPHONE ENCOUNTER
[facility] date for Hysingla as 11/4/20 for a 30 d/s. UDS done 11/6/20  CSA signed 8/27/19    Last Visit: 11/13/20 with MD Fragoso  Next Appointment: 3/12/21 with MD Fragoso  Previous Refill Encounter(s): 11/4/20 #30    Requested Prescriptions     Pending Prescriptions Disp Refills    HYDROcodone bitartrate (HYSINGLA) 60 mg ER tablet 30 Tab 0     Sig: Take 1 Tab by mouth daily for 30 days. Max Daily Amount: 60 mg.  *DO NOT CRUSH,CHEW, OR DISSOLVE TABLET*

## 2020-12-30 DIAGNOSIS — M54.42 CHRONIC MIDLINE LOW BACK PAIN WITH LEFT-SIDED SCIATICA: ICD-10-CM

## 2020-12-30 DIAGNOSIS — G89.29 CHRONIC MIDLINE LOW BACK PAIN WITH LEFT-SIDED SCIATICA: ICD-10-CM

## 2020-12-30 RX ORDER — HYDROCODONE BITARTRATE 60 MG/1
60 TABLET, EXTENDED RELEASE ORAL DAILY
Qty: 30 TAB | Refills: 0 | Status: SHIPPED | OUTPATIENT
Start: 2020-12-30 | End: 2021-01-26 | Stop reason: SDUPTHER

## 2020-12-30 NOTE — TELEPHONE ENCOUNTER
Last Visit: 11/13/2020 with MD Fragoso  Next Appointment: 3/12/2021 with MD Fragoso  Previous Refill Encounter(s): 11/30/2020 per MD Fragoso     Requested Prescriptions     Pending Prescriptions Disp Refills    HYDROcodone bitartrate (HYSINGLA) 60 mg ER tablet 30 Tab 0     Sig: Take 1 Tab by mouth daily for 30 days. Max Daily Amount: 60 mg.  *DO NOT CRUSH,CHEW, OR DISSOLVE TABLET*

## 2021-01-04 ENCOUNTER — OFFICE VISIT (OUTPATIENT)
Dept: ORTHOPEDIC SURGERY | Age: 66
End: 2021-01-04
Payer: MEDICARE

## 2021-01-04 VITALS
TEMPERATURE: 99.2 F | DIASTOLIC BLOOD PRESSURE: 81 MMHG | HEIGHT: 69 IN | BODY MASS INDEX: 38.21 KG/M2 | RESPIRATION RATE: 25 BRPM | WEIGHT: 258 LBS | OXYGEN SATURATION: 98 % | SYSTOLIC BLOOD PRESSURE: 160 MMHG | HEART RATE: 61 BPM

## 2021-01-04 DIAGNOSIS — M48.061 SPINAL STENOSIS OF LUMBAR REGION WITHOUT NEUROGENIC CLAUDICATION: Primary | ICD-10-CM

## 2021-01-04 DIAGNOSIS — G89.29 OTHER CHRONIC PAIN: ICD-10-CM

## 2021-01-04 DIAGNOSIS — M16.12 PRIMARY OSTEOARTHRITIS OF LEFT HIP: ICD-10-CM

## 2021-01-04 DIAGNOSIS — F17.200 TOBACCO DEPENDENCE: ICD-10-CM

## 2021-01-04 DIAGNOSIS — E66.01 SEVERE OBESITY (BMI 35.0-35.9 WITH COMORBIDITY) (HCC): ICD-10-CM

## 2021-01-04 DIAGNOSIS — G62.9 NEUROPATHY: ICD-10-CM

## 2021-01-04 PROCEDURE — G8432 DEP SCR NOT DOC, RNG: HCPCS | Performed by: PHYSICAL MEDICINE & REHABILITATION

## 2021-01-04 PROCEDURE — G8754 DIAS BP LESS 90: HCPCS | Performed by: PHYSICAL MEDICINE & REHABILITATION

## 2021-01-04 PROCEDURE — G8417 CALC BMI ABV UP PARAM F/U: HCPCS | Performed by: PHYSICAL MEDICINE & REHABILITATION

## 2021-01-04 PROCEDURE — 99213 OFFICE O/P EST LOW 20 MIN: CPT | Performed by: PHYSICAL MEDICINE & REHABILITATION

## 2021-01-04 PROCEDURE — 3017F COLORECTAL CA SCREEN DOC REV: CPT | Performed by: PHYSICAL MEDICINE & REHABILITATION

## 2021-01-04 PROCEDURE — G8536 NO DOC ELDER MAL SCRN: HCPCS | Performed by: PHYSICAL MEDICINE & REHABILITATION

## 2021-01-04 PROCEDURE — G8427 DOCREV CUR MEDS BY ELIG CLIN: HCPCS | Performed by: PHYSICAL MEDICINE & REHABILITATION

## 2021-01-04 PROCEDURE — 1101F PT FALLS ASSESS-DOCD LE1/YR: CPT | Performed by: PHYSICAL MEDICINE & REHABILITATION

## 2021-01-04 PROCEDURE — G8753 SYS BP > OR = 140: HCPCS | Performed by: PHYSICAL MEDICINE & REHABILITATION

## 2021-01-04 RX ORDER — PREGABALIN 100 MG/1
CAPSULE ORAL
Qty: 90 CAP | Refills: 5 | Status: SHIPPED | OUTPATIENT
Start: 2021-01-04 | End: 2021-07-16

## 2021-01-04 NOTE — LETTER
1/5/2021 Patient: Elodia Esquivel YOB: 1955 Date of Visit: 1/4/2021 Derian Landry MD 
[place] [address] [address] [place] [address] [address] [employer] Dear Derian Landry MD, Thank you for referring Mr. Carmen Murrieta to [facility] for evaluation. My notes for this consultation are attached. If you have questions, please do not hesitate to call me. I look forward to following your patient along with you. Sincerely, Rene Singer MD

## 2021-01-04 NOTE — PATIENT INSTRUCTIONS
High Blood Pressure: Care Instructions  Overview     It's normal for blood pressure to go up and down throughout the day. But if it stays up, you have high blood pressure. Another name for high blood pressure is hypertension. Despite what a lot of people think, high blood pressure usually doesn't cause headaches or make you feel dizzy or lightheaded. It usually has no symptoms. But it does increase your risk of stroke, heart attack, and other problems. You and your doctor will talk about your risks of these problems based on your blood pressure. Your doctor will give you a goal for your blood pressure. Your goal will be based on your health and your age. Lifestyle changes, such as eating healthy and being active, are always important to help lower blood pressure. You might also take medicine to reach your blood pressure goal.  Follow-up care is a key part of your treatment and safety. Be sure to make and go to all appointments, and call your doctor if you are having problems. It's also a good idea to know your test results and keep a list of the medicines you take. How can you care for yourself at home? Medical treatment  · If you stop taking your medicine, your blood pressure will go back up. You may take one or more types of medicine to lower your blood pressure. Be safe with medicines. Take your medicine exactly as prescribed. Call your doctor if you think you are having a problem with your medicine. · Talk to your doctor before you start taking aspirin every day. Aspirin can help certain people lower their risk of a heart attack or stroke. But taking aspirin isn't right for everyone, because it can cause serious bleeding. · See your doctor regularly. You may need to see the doctor more often at first or until your blood pressure comes down. · If you are taking blood pressure medicine, talk to your doctor before you take decongestants or anti-inflammatory medicine, such as ibuprofen.  Some of these medicines can raise blood pressure. · Learn how to check your blood pressure at home. Lifestyle changes  · Stay at a healthy weight. This is especially important if you put on weight around the waist. Losing even 10 pounds can help you lower your blood pressure. · If your doctor recommends it, get more exercise. Walking is a good choice. Bit by bit, increase the amount you walk every day. Try for at least 30 minutes on most days of the week. You also may want to swim, bike, or do other activities. · Avoid or limit alcohol. Talk to your doctor about whether you can drink any alcohol. · Try to limit how much sodium you eat to less than 2,300 milligrams (mg) a day. Your doctor may ask you to try to eat less than 1,500 mg a day. · Eat plenty of fruits (such as bananas and oranges), vegetables, legumes, whole grains, and low-fat dairy products. · Lower the amount of saturated fat in your diet. Saturated fat is found in animal products such as milk, cheese, and meat. Limiting these foods may help you lose weight and also lower your risk for heart disease. · Do not smoke. Smoking increases your risk for heart attack and stroke. If you need help quitting, talk to your doctor about stop-smoking programs and medicines. These can increase your chances of quitting for good. When should you call for help? Call  911 anytime you think you may need emergency care. This may mean having symptoms that suggest that your blood pressure is causing a serious heart or blood vessel problem. Your blood pressure may be over 180/120. For example, call 911 if:    · You have symptoms of a heart attack. These may include:  ? Chest pain or pressure, or a strange feeling in the chest.  ? Sweating. ? Shortness of breath. ? Nausea or vomiting. ? Pain, pressure, or a strange feeling in the back, neck, jaw, or upper belly or in one or both shoulders or arms. ? Lightheadedness or sudden weakness.   ? A fast or irregular heartbeat.     · You have symptoms of a stroke. These may include:  ? Sudden numbness, tingling, weakness, or loss of movement in your face, arm, or leg, especially on only one side of your body. ? Sudden vision changes. ? Sudden trouble speaking. ? Sudden confusion or trouble understanding simple statements. ? Sudden problems with walking or balance. ? A sudden, severe headache that is different from past headaches.     · You have severe back or belly pain. Do not wait until your blood pressure comes down on its own. Get help right away. Call your doctor now or seek immediate care if:    · Your blood pressure is much higher than normal (such as 180/120 or higher), but you don't have symptoms.     · You think high blood pressure is causing symptoms, such as:  ? Severe headache.  ? Blurry vision. Watch closely for changes in your health, and be sure to contact your doctor if:    · Your blood pressure measures higher than your doctor recommends at least 2 times. That means the top number is higher or the bottom number is higher, or both.     · You think you may be having side effects from your blood pressure medicine. Where can you learn more? Go to http://www.gray.com/  Enter [id number] in the search box to learn more about \"High Blood Pressure: Care Instructions. \"  Current as of: December 16, 2019               Content Version: 12.6  © 2307-6369 [employer]. Care instructions adapted under license by [employer] (which disclaims liability or warranty for this information). If you have questions about a medical condition or this instruction, always ask your healthcare professional. [place] [address] any warranty or liability for your use of this information. Low Back Arthritis: Exercises  Introduction  Here are some examples of typical rehabilitation exercises for your condition. Start each exercise slowly.  Ease off the exercise if you start to have pain.  Your doctor or physical therapist will tell you when you can start these exercises and which ones will work best for you. When you are not being active, find a comfortable position for rest. Some people are comfortable on the floor or a medium-firm bed with a small pillow under their head and another under their knees. Some people prefer to lie on their side with a pillow between their knees. Don't stay in one position for too long. Take short walks (10 to 20 minutes) every 2 to 3 hours. Avoid slopes, hills, and stairs until you feel better. Walk only distances you can manage without pain, especially leg pain. How to do the exercises  Pelvic tilt   1. Lie on your back with your knees bent. 2. \"Brace\" your stomach--tighten your muscles by pulling in and imagining your belly button moving toward your spine. 3. Press your lower back into the floor. You should feel your hips and pelvis rock back. 4. Hold for 6 seconds while breathing smoothly. 5. Relax and allow your pelvis and hips to rock forward. 6. Repeat 8 to 12 times. Back stretches   1. Get down on your hands and knees on the floor. 2. Relax your head and allow it to droop. Round your back up toward the ceiling until you feel a nice stretch in your upper, middle, and lower back. Hold this stretch for as long as it feels comfortable, or about 15 to 30 seconds. 3. Return to the starting position with a flat back while you are on your hands and knees. 4. Let your back sway by pressing your stomach toward the floor. Lift your buttocks toward the ceiling. 5. Hold this position for 15 to 30 seconds. 6. Repeat 2 to 4 times. Follow-up care is a key part of your treatment and safety. Be sure to make and go to all appointments, and call your doctor if you are having problems. It's also a good idea to know your test results and keep a list of the medicines you take. Where can you learn more?   Go to http://www.gray.com/  Enter T254 in the search box to learn more about \"Low Back Arthritis: Exercises. \"  Current as of: March 2, 2020               Content Version: 12.6  © 8968-4881 [employer]. Care instructions adapted under license by [employer] (which disclaims liability or warranty for this information). If you have questions about a medical condition or this instruction, always ask your healthcare professional. [place] [address] any warranty or liability for your use of this information.

## 2021-01-04 NOTE — PROGRESS NOTES
[facility]  [address] [place], [facility] [address]  Phone: [phone]  Fax: [phone]    Pt's YOB: 1955    ASSESSMENT   Diagnoses and all orders for this visit:    1. Spinal stenosis of lumbar region without neurogenic claudication  -     pregabalin (LYRICA) 100 mg capsule; Take 1 cap by mouth in the morning and 2 at night as directed. Indications: diabetic complication causing injury to some body nerves    2. Neuropathy  -     pregabalin (LYRICA) 100 mg capsule; Take 1 cap by mouth in the morning and 2 at night as directed. Indications: diabetic complication causing injury to some body nerves    3. Primary osteoarthritis of left hip    4. Other chronic pain    5. Severe obesity (BMI 35.0-35.9 with comorbidity) ([facility] [address]    6. Tobacco dependence         IMPRESSION AND PLAN:  Angelia Nagy is a 77 y.o. male with history of lumbar pain. He denies any change in symptoms since his last office visit. Pt continues to take Lyrica to 100 mg 1 cap QAM and 2 caps QHS with significant benefit. 1) Pt was given information on lumbar arthritis exercises. 2) He received a refill of Lyrica 100 mg 1 cap QAM and 2 caps QHS for his neuropathic symptoms. 3) I strongly encouraged the patient to quit smoking. 4) I recommended that the patient lose weight to aid in management of his pain. 5) Mr. Ashely Ward has a reminder for a \"due or due soon\" health maintenance. I have asked that he contact his primary care provider, Ursula Cogan, MD, for follow-up on this health maintenance. 6)  demonstrated consistency with prescribing. 7) Pt will follow up with Dr Ana Cristina Medina in regard to his hip  Follow-up and Dispositions    · Return in about 5 months (around 6/4/2021) for Medication follow up. HISTORY OF PRESENT ILLNESS:  Angelia Nagy is a 77 y.o. male with history of lumbar pain and presents to the office today for follow up.  He denies any change in symptoms since his last office visit. Pt continues to take Lyrica to 100 mg 1 cap QAM and 2 caps QHS with significant benefit. He was scheduled to follow up with Dr. Chivo Don regarding his left hip pain but admits that he never followed up with him since his brother . Pt at this time desires to continue with current care. Of note, he is a smoker. Pain Scale: 6/10    PCP: Pina Tee MD     Past Medical History:   Diagnosis Date    Allergic rhinitis     Avascular necrosis ([address]     CAD (coronary artery disease) 2009    mild, hemodynamically non-significant by angiography    Carpal tunnel syndrome     Carpal tunnel syndrome on both sides     Chest pain, unspecified     Chronic back pain 2011    Chronic obstructive pulmonary disease ([address]     Degenerative joint disease     with chronic back pain    Dysesthesia     of hand post carpal tunnel surgery v sudeck's atrophy    Dyslipidemia, goal LDL below 100     Dysphagia     GERD (gastroesophageal reflux disease) 2010    Glaucoma 9/15/2010    Glaucoma     Hearing loss     Hypercholesterolemia     Hypertension     Joint fusion     of right wrist    Morbid obesity ([address]     Obesity 2010    Other dyspnea and respiratory abnormality     Palpitations     Right hip pain     Right hip pain     Right knee pain     S/P cardiac catheterization 2009    1. Normal systemic pressure. 2. Moderate elevation of left-sided filling pressures. 3. Preserved left ventricular systolic function in the LOWE projection. 4. Mild, nonsignificant epicardial coronary artery disese by angiography.     Sleep apnea     on CPAP    Strain of lumbar region     Tobacco abuse         Social History     Socioeconomic History    Marital status: SINGLE     Spouse name: Not on file    Number of children: Not on file    Years of education: Not on file    Highest education level: Not on file   Occupational History    Not on file   Social Needs    Financial resource strain: Not on file    Food insecurity     Worry: Not on file     Inability: Not on file    Transportation needs     Medical: Not on file     Non-medical: Not on file   Tobacco Use    Smoking status: Current Every Day Smoker     Packs/day: 0.10     Years: 20.00     Pack years: 2.00    Smokeless tobacco: Never Used    Tobacco comment: urothelial cancer risk discussed today [id number]   Substance and Sexual Activity    Alcohol use: No     Alcohol/week: 0.0 standard drinks    Drug use: No    Sexual activity: Not on file   Lifestyle    Physical activity     Days per week: Not on file     Minutes per session: Not on file    Stress: Not on file   Relationships    Social connections     Talks on phone: Not on file     Gets together: Not on file     Attends Jewish service: Not on file     Active member of club or organization: Not on file     Attends meetings of clubs or organizations: Not on file     Relationship status: Not on file    Intimate partner violence     Fear of current or ex partner: Not on file     Emotionally abused: Not on file     Physically abused: Not on file     Forced sexual activity: Not on file   Other Topics Concern    Not on file   Social History Narrative    Not on file       Current Outpatient Medications   Medication Sig Dispense Refill    pregabalin (LYRICA) 100 mg capsule Take 1 cap by mouth in the morning and 2 at night as directed. Indications: diabetic complication causing injury to some body nerves 90 Cap 5    HYDROcodone bitartrate (HYSINGLA) 60 mg ER tablet Take 1 Tab by mouth daily for 30 days. Max Daily Amount: 60 mg. *DO NOT CRUSH,CHEW, OR DISSOLVE TABLET* 30 Tab 0    montelukast (SINGULAIR) 10 mg tablet Take 1 tablet by mouth once daily 30 Tab 5    gabapentin (NEURONTIN) 300 mg capsule Take 300 mg by mouth three (3) times daily.  azelastine (ASTELIN) 137 mcg (0.1 %) nasal spray 2 Sprays by Both Nostrils route two (2) times a day.  Use in each nostril as directed  Indications: seasonal runny nose 1 Bottle 5    fluticasone propionate (FLONASE) 50 mcg/actuation nasal spray USE TWO SPRAY(S) IN EACH NOSTRIL ONCE DAILY 1 Bottle 5    umeclidinium-vilanteroL (Anoro Ellipta) 62.5-25 mcg/actuation inhaler Take 1 Puff by inhalation daily. 1 Inhaler 5    albuterol (PROVENTIL HFA, VENTOLIN HFA, PROAIR HFA) 90 mcg/actuation inhaler INHALE 2 PUFFS BY MOUTH EVERY 4 HOURS AS NEEDED FOR WHEEZING 1 Inhaler 5    aspirin delayed-release 81 mg tablet Take 81 mg by mouth daily.  pantoprazole (PROTONIX) 40 mg tablet TAKE ONE TABLET BY MOUTH ONCE DAILY 90 Tab 1    tiZANidine (ZANAFLEX) 4 mg tablet Take 1 Tab by mouth three (3) times daily as needed for Muscle Spasm(s). 90 Tab 4    tamsulosin (FLOMAX) 0.4 mg capsule TAKE 1 CAPSULE BY MOUTH ONCE DAILY AFTER DINNER 90 Cap 0    metoprolol tartrate (LOPRESSOR) 25 mg tablet TAKE 1 TABLET BY MOUTH TWICE DAILY (GENERIC  FOR  LOPRESSOR) 180 Tab 1    ibuprofen (MOTRIN) 800 mg tablet TAKE 1 TABLET BY MOUTH EVERY 8 HOURS AS NEEDED FOR PAIN 90 Tab 3    traZODone (DESYREL) 50 mg tablet TAKE 1 TABLET BY MOUTH ONCE DAILY AT BEDTIME AS NEEDED FOR SLEEP 30 Tab 2    lactulose (CONSTULOSE) 10 gram/15 mL solution Take 45ml PO  mL 2    guaiFENesin ER (MUCINEX) 600 mg ER tablet Take 1 Tab by mouth two (2) times a day. 30 Tab 5    nitroglycerin (NITROSTAT) 0.4 mg SL tablet 1 Tab by SubLINGual route every five (5) minutes as needed. 6 Tab 1    TRAVATAN Z 0.004 % ophthalmic solution Administer 1 Drop to both eyes nightly. Allergies   Allergen Reactions    Latex Not Reported This Time    Ace Inhibitors Cough    Dilaudid [Hydromorphone] Hives    Lisinopril Cough    Zocor [Simvastatin] Myalgia     Right leg         REVIEW OF SYSTEMS    Constitutional: Negative for fever, chills, or weight change. Respiratory: Negative for cough or shortness of breath.      Cardiovascular: Negative for chest pain or palpitations. Gastrointestinal: Negative for acid reflux, change in bowel habits, or constipation. Genitourinary: Negative for dysuria and flank pain. Musculoskeletal: Positive for lumbar and left hip pain. Neurological: Negative for headaches, dizziness, positive for numbness. Psychiatric/Behavioral: Negative for difficulty with sleep. As per HPI    PHYSICAL EXAMINATION  Visit Vitals  BP (!) 160/81 (BP 1 Location: Right arm, BP Patient Position: Sitting) Comment: pt asymptomatic, MD aware   Pulse 61   Temp 99.2 °F (37.3 °C) (Oral)   Resp 25   Ht 5' 9\" (1.753 m)   Wt 258 lb (117 kg)   SpO2 98% Comment: RA   BMI 38.10 kg/m²       Constitutional: Awake, alert, and in no acute distress. Neurological: 1+ symmetrical DTRs in the upper extremities. 1+ symmetrical DTRs in the lower extremities. Sensation to light touch is intact. Negative Hamm's sign bilaterally. Skin: warm, dry, and intact. Musculoskeletal: Tenderness to palpation in the lower lumbar region. Moderate pain with extension and axial loading. No pain with internal or external rotation of his hips. Negative straight leg raise bilaterally. Hip Flex  Quads Hamstrings Ankle DF EHL Ankle PF   Right +4/5 +4/5 +4/5 +4/5 +4/5 +4/5   Left +4/5 +4/5 +4/5 +4/5 +4/5 +4/5     IMAGING:    Hip x-rays from 06/29/2020 were personally reviewed with the patient and demonstrated:  Right total hip replacement intact. Degenerative joint findings in the left hip.      Lumbar spine MRI from 08/28/2018 was personally reviewed with the patient and demonstrated:  [facility] from [facility] on 08/28/18   MRI LUMB SPINE WO CONT    Narrative MR Lumbar Spine Without Contrast    History/Indications: 70 years Male. Back pain, unspecified. Additional History: Low back pain for several months. Pain radiates down right  leg. COMPARISON: MRI lumbar spine 8/31/2016    Technique: Multi-sequence multiplanar MRI of the lumbar spine.      FINDINGS:     There are 5 lumbar-type vertebral bodies. For the purposes of this dictation the  L5/S1 disc level will be referred to by series 5 image 9    There is spondylolisthesis. There is STIR signal hyperintensity within the left  L4 and L5 pedicles (series 4 image 3), likely representing stress reaction. There is no significant spondylolisthesis. The conus terminates at the L1 level. There is moderate bilateral sacroiliac arthrosis. There are multiple rounded T2 hyperintense structures posterior lateral and  anterolateral to the aorta, unchanged compared to CT abdomen and pelvis dated  9/9/2006 and may reflect lymph nodes or a lymphatic malformation. Axial imaging correlation:    T11-T12: Sagittal plane only. No significant disc pathology. No significant  spinal canal or neural foraminal stenosis. T12-L1: No significant disc pathology. Trace left facet joint effusion. No  significant spinal canal or neural foraminal stenosis. L1-2: No significant disc pathology. No significant spinal canal or neural  foraminal stenosis. L2-3: Similar appearing small disc bulge with small right central disc  protrusion extending to the inferior aspect of the L2 vertebral body. Mild  bilateral facet arthrosis. Minimal spinal canal narrowing. Minimal bilateral  foraminal stenosis. Not significantly changed. L3-4: Small disc bulge with small central disc extrusion extending superiorly  slightly above the disc level. Mild bilateral facet arthrosis. Minimal spinal  canal narrowing. Mild right and minimal left foraminal stenosis. No significant  change from prior. L4-5: Small to moderate disc bulge with focal protrusions involving the central  and right foraminal zones. Moderate bilateral facet arthrosis with ligamentum  flavum buckling. Mild-to-moderate spinal canal stenosis. Moderate to severe  right foraminal stenosis with effacement of the right L4 perineural fat. Mild  left foraminal stenosis. This is progressed from prior. L5-S1: Small broad-based bulge. Minimal spinal canal narrowing. No significant  foraminal stenosis.            Impression IMPRESSION:  1.  Progression of a small to moderate disc bulge at L4/L5 with focal central  and right foraminal protrusions. Moderate bilateral facet arthrosis, mild to  moderate spinal canal stenosis, moderate to severe right and mild left foraminal  stenosis, with progression from prior MRI. 2.  STIR hyperintensity within the left L4-L5 pedicles, likely representing  stress reaction. 3.  Multilevel additional degenerative disc disease, mild spinal canal stenosis,  and mild foraminal stenosis as above, not significantly changed from prior. Thank you for enabling us to participate in the care of this patient.         Thoracic spine MRI from 12/29/2017 was personally reviewed with the patient and demonstrated:  FINDINGS:  The alignment of the thoracic spine is unremarkable.   The facets are  appropriately aligned. No vertebral body step off appreciated. No evidence of  thoracic spine fracture. The marrow signal is unremarkable. The cord signal is  unremarkable.         A left paracentral disc protrusion is present at T7-T8 which causes mild canal  narrowing. No evidence of significant canal or neural foraminal stenosis at any  level. The visualized thoracic aorta is unremarkable. The visualized paraspinal  soft tissues are unremarkable.      IMPRESSION:  1.  No acute thoracic spine pathology.       Lower extremity arterial PVR from 02/06/2019 was personally reviewed with the patient and demonstrated:  Interpretation Summary:  No hemodynamically significant arterial disease is identified within bilateral lower extremities at rest    Written by Elisabet Badillo, as dictated by Mira Goldman MD.  I, Dr. Mira Goldman confirm that all documentation is accurate.

## 2021-01-04 NOTE — PROGRESS NOTES
Moe Ferreira presents today for   Chief Complaint   Patient presents with    Back Pain       Is someone accompanying this pt? no    Is the patient using any DME equipment during OV? no    Depression Screening:  3 most recent PHQ Screens 8/11/2020   Little interest or pleasure in doing things Not at all   Feeling down, depressed, irritable, or hopeless Not at all   Total Score PHQ 2 0       Learning Assessment:  Learning Assessment 2/3/2017   PRIMARY LEARNER Patient   HIGHEST LEVEL OF EDUCATION - PRIMARY LEARNER  -   BARRIERS PRIMARY LEARNER -   CO-LEARNER CAREGIVER -   PRIMARY LANGUAGE ENGLISH   LEARNER PREFERENCE PRIMARY LISTENING   ANSWERED BY self   RELATIONSHIP SELF       Abuse Screening:  Abuse Screening Questionnaire 8/11/2020   Do you ever feel afraid of your partner? N   Are you in a relationship with someone who physically or mentally threatens you? N   Is it safe for you to go home? Y       Fall Risk  Fall Risk Assessment, last 12 mths 8/11/2020   Able to walk? Yes   Fall in past 12 months? No   Number of falls in past 12 months -   Fall with injury? -       Coordination of Care:  1. Have you been to the ER, urgent care clinic since your last visit? no  Hospitalized since your last visit? no    2. Have you seen or consulted any other health care providers outside of the [address] [place] [address] since your last visit? Yes, pcp Include any pap smears or colon screening.  no

## 2021-01-26 DIAGNOSIS — G89.29 CHRONIC MIDLINE LOW BACK PAIN WITH LEFT-SIDED SCIATICA: ICD-10-CM

## 2021-01-26 DIAGNOSIS — M54.42 CHRONIC MIDLINE LOW BACK PAIN WITH LEFT-SIDED SCIATICA: ICD-10-CM

## 2021-01-26 RX ORDER — HYDROCODONE BITARTRATE 60 MG/1
60 TABLET, EXTENDED RELEASE ORAL DAILY
Qty: 30 TAB | Refills: 0 | Status: SHIPPED | OUTPATIENT
Start: 2021-01-26 | End: 2021-02-24 | Stop reason: SDUPTHER

## 2021-01-26 NOTE — TELEPHONE ENCOUNTER
[facility]  reports [facility] last [facility] date for Hysingla as 12/30/20 for a 30 d/s. UDS done 11/6/20  CSA signed 8/27/19    Last Visit: 11/13/20 with MD Fragoso  Next Appointment: 3/12/21 with MD Fragoso  Previous Refill Encounter(s): 12/30/20 #30    Requested Prescriptions     Pending Prescriptions Disp Refills    HYDROcodone bitartrate (HYSINGLA) 60 mg ER tablet 30 Tab 0     Sig: Take 1 Tab by mouth daily for 30 days. Max Daily Amount: 60 mg.  *DO NOT CRUSH,CHEW, OR DISSOLVE TABLET*

## 2021-02-24 DIAGNOSIS — G89.29 CHRONIC MIDLINE LOW BACK PAIN WITH LEFT-SIDED SCIATICA: ICD-10-CM

## 2021-02-24 DIAGNOSIS — M54.42 CHRONIC MIDLINE LOW BACK PAIN WITH LEFT-SIDED SCIATICA: ICD-10-CM

## 2021-02-24 RX ORDER — HYDROCODONE BITARTRATE 60 MG/1
60 TABLET, EXTENDED RELEASE ORAL DAILY
Qty: 30 TAB | Refills: 0 | Status: SHIPPED | OUTPATIENT
Start: 2021-02-24 | End: 2021-03-23 | Stop reason: SDUPTHER

## 2021-02-24 NOTE — TELEPHONE ENCOUNTER
[facility] the [facility] Hysingla as 1/27/21 for a 30 d/s. UDS done 11/6/20  CSA signed 8/27/19    Last Visit: 11/13/20 with MD Fragoso  Next Appointment: 3/12/21 with MD Fragoso  Previous Refill Encounter(s): 1/26/21 #30    Requested Prescriptions     Pending Prescriptions Disp Refills    HYDROcodone bitartrate (HYSINGLA) 60 mg ER tablet 30 Tab 0     Sig: Take 1 Tab by mouth daily for 30 days. Max Daily Amount: 60 mg.  *DO NOT CRUSH,CHEW, OR DISSOLVE TABLET*

## 2021-03-05 ENCOUNTER — APPOINTMENT (OUTPATIENT)
Dept: INTERNAL MEDICINE CLINIC | Age: 66
End: 2021-03-05

## 2021-03-05 ENCOUNTER — HOSPITAL ENCOUNTER (OUTPATIENT)
Dept: LAB | Age: 66
Discharge: HOME | End: 2021-03-05

## 2021-03-05 LAB — XX-LABCORP SPECIMEN COL,LCBCF: NORMAL

## 2021-03-05 PROCEDURE — 99001 SPECIMEN HANDLING PT-LAB: CPT

## 2021-03-06 LAB
ALBUMIN SERPL-MCNC: 4 G/DL (ref 3.8–4.8)
ALBUMIN/GLOB SERPL: 1.2 {RATIO} (ref 1.2–2.2)
ALP SERPL-CCNC: 82 IU/L (ref 39–117)
ALT SERPL-CCNC: 15 IU/L (ref 0–44)
AST SERPL-CCNC: 15 IU/L (ref 0–40)
BILIRUB SERPL-MCNC: 0.3 MG/DL (ref 0–1.2)
BUN SERPL-MCNC: 15 MG/DL (ref 8–27)
BUN/CREAT SERPL: 12 (ref 10–24)
CALCIUM SERPL-MCNC: 9.6 MG/DL (ref 8.6–10.2)
CHLORIDE SERPL-SCNC: 109 MMOL/L (ref 96–106)
CHOLEST SERPL-MCNC: 195 MG/DL (ref 100–199)
CO2 SERPL-SCNC: 26 MMOL/L (ref 20–29)
CREAT SERPL-MCNC: 1.24 MG/DL (ref 0.76–1.27)
GLOBULIN SER CALC-MCNC: 3.3 G/DL (ref 1.5–4.5)
GLUCOSE SERPL-MCNC: 108 MG/DL (ref 65–99)
HBA1C MFR BLD: 5.9 % (ref 4.8–5.6)
HDLC SERPL-MCNC: 34 MG/DL
IMP & REVIEW OF LAB RESULTS: NORMAL
LDLC SERPL CALC-MCNC: 136 MG/DL (ref 0–99)
POTASSIUM SERPL-SCNC: 4.6 MMOL/L (ref 3.5–5.2)
PROT SERPL-MCNC: 7.3 G/DL (ref 6–8.5)
SODIUM SERPL-SCNC: 145 MMOL/L (ref 134–144)
TRIGL SERPL-MCNC: 139 MG/DL (ref 0–149)
VLDLC SERPL CALC-MCNC: 25 MG/DL (ref 5–40)

## 2021-03-12 ENCOUNTER — OFFICE VISIT (OUTPATIENT)
Dept: INTERNAL MEDICINE CLINIC | Age: 66
End: 2021-03-12
Payer: MEDICARE

## 2021-03-12 VITALS
RESPIRATION RATE: 20 BRPM | DIASTOLIC BLOOD PRESSURE: 69 MMHG | WEIGHT: 263 LBS | HEART RATE: 56 BPM | TEMPERATURE: 97.6 F | OXYGEN SATURATION: 97 % | BODY MASS INDEX: 38.95 KG/M2 | SYSTOLIC BLOOD PRESSURE: 134 MMHG | HEIGHT: 69 IN

## 2021-03-12 DIAGNOSIS — G47.33 OBSTRUCTIVE SLEEP APNEA SYNDROME: ICD-10-CM

## 2021-03-12 DIAGNOSIS — E66.01 CLASS 2 SEVERE OBESITY DUE TO EXCESS CALORIES WITH SERIOUS COMORBIDITY AND BODY MASS INDEX (BMI) OF 38.0 TO 38.9 IN ADULT (HCC): ICD-10-CM

## 2021-03-12 DIAGNOSIS — E78.00 HYPERCHOLESTEROLEMIA: ICD-10-CM

## 2021-03-12 DIAGNOSIS — I10 ESSENTIAL HYPERTENSION: Primary | ICD-10-CM

## 2021-03-12 DIAGNOSIS — J41.0 SIMPLE CHRONIC BRONCHITIS (HCC): ICD-10-CM

## 2021-03-12 DIAGNOSIS — R73.03 PREDIABETES: ICD-10-CM

## 2021-03-12 DIAGNOSIS — M54.42 CHRONIC MIDLINE LOW BACK PAIN WITH LEFT-SIDED SCIATICA: ICD-10-CM

## 2021-03-12 DIAGNOSIS — G89.29 CHRONIC MIDLINE LOW BACK PAIN WITH LEFT-SIDED SCIATICA: ICD-10-CM

## 2021-03-12 DIAGNOSIS — Z79.891 CHRONIC PRESCRIPTION OPIATE USE: ICD-10-CM

## 2021-03-12 PROCEDURE — 1101F PT FALLS ASSESS-DOCD LE1/YR: CPT | Performed by: INTERNAL MEDICINE

## 2021-03-12 PROCEDURE — 99214 OFFICE O/P EST MOD 30 MIN: CPT | Performed by: INTERNAL MEDICINE

## 2021-03-12 PROCEDURE — 3017F COLORECTAL CA SCREEN DOC REV: CPT | Performed by: INTERNAL MEDICINE

## 2021-03-12 PROCEDURE — G8510 SCR DEP NEG, NO PLAN REQD: HCPCS | Performed by: INTERNAL MEDICINE

## 2021-03-12 PROCEDURE — G8536 NO DOC ELDER MAL SCRN: HCPCS | Performed by: INTERNAL MEDICINE

## 2021-03-12 PROCEDURE — G8417 CALC BMI ABV UP PARAM F/U: HCPCS | Performed by: INTERNAL MEDICINE

## 2021-03-12 PROCEDURE — G8752 SYS BP LESS 140: HCPCS | Performed by: INTERNAL MEDICINE

## 2021-03-12 PROCEDURE — G8754 DIAS BP LESS 90: HCPCS | Performed by: INTERNAL MEDICINE

## 2021-03-12 PROCEDURE — G8427 DOCREV CUR MEDS BY ELIG CLIN: HCPCS | Performed by: INTERNAL MEDICINE

## 2021-03-12 RX ORDER — METOPROLOL TARTRATE 25 MG/1
TABLET, FILM COATED ORAL
Qty: 180 TAB | Refills: 2 | Status: SHIPPED | OUTPATIENT
Start: 2021-03-12 | End: 2021-12-07

## 2021-03-12 RX ORDER — ALBUTEROL SULFATE 90 UG/1
AEROSOL, METERED RESPIRATORY (INHALATION)
Qty: 1 INHALER | Refills: 5 | Status: SHIPPED | OUTPATIENT
Start: 2021-03-12 | End: 2022-04-22

## 2021-03-12 RX ORDER — UMECLIDINIUM BROMIDE AND VILANTEROL TRIFENATATE 62.5; 25 UG/1; UG/1
1 POWDER RESPIRATORY (INHALATION) DAILY
Qty: 1 INHALER | Refills: 5 | Status: SHIPPED | OUTPATIENT
Start: 2021-03-12 | End: 2022-04-22

## 2021-03-12 NOTE — PROGRESS NOTES
Subjective:       Chief Complaint  The patient presents for follow up of hypertension and high cholesterol. prediabetes  chronic back pain and right hip pain, COPD        HPI  Dolly Hamilton is a 77 y.o. male seen for follow up of hyperlipidemia. Nathalia has hypertension. Hypertension well controlled, no significant medication side effects noted, on Lopressor, hyperlipidemia uncontrolled, pt has been unable to tolerate statins due to myalgias    Diet and Lifestyle: not attempting to follow a low fat, low cholesterol diet, exercises sporadically    Home BP Monitoring: is not measured at home. Dolly Hamilton RTC today to follow up on chronic pain diagnosis. We discussed his osteoarthritis that is affecting his back. Significant changes since last visit: none. He is  able to do his normal daily activities. He reports the following adverse side effects: none. Pt doing fairly well with Hysingla ER 60 mg. He is seeing [facility] and was referred for aqua therapy but did not tolerate it due to pain. His MRI of his LS shows worsening DJD so he will f/u with the [facility] center. Least pain over the last week has been 3/10. Worst pain over the last week has been 8/10. Opioid Risk Tool Reviewed: YES    Aberrant behaviors: None. Urine Drug Screen: reviewed and up to date. Controlled substance agreement on file: YES.  reviewed:yes    Pill count is consistent with his prescription: yes    Concomitant use of a benzodiazepine: no        Prediabetes: pt is trying to control with diet and weight loss but he continues to struggle to lose weight. His BMI of 38 puts him at high risk of multiple medical problems. COPD Review:  The patient is being seen for follow up of COPD. Oxygen: He currently is not on home oxygen therapy. He is using Anoro Ellipta with good results. Symptoms: chronic dyspnea: severity = mild: course of sx: symptoms have progressed to a point and plateaued. .   Patient uses 0 pillows at night. Patient does smoke cigarettes. Pt has sleep apnea is trying to use CPAP on a regular basis. He will f/u with his sleep specialist Dr Madhavi Schrader. Current Outpatient Medications   Medication Sig    metoprolol tartrate (LOPRESSOR) 25 mg tablet TAKE 1 TABLET BY MOUTH TWICE DAILY (GENERIC  FOR  LOPRESSOR)    umeclidinium-vilanteroL (Anoro Ellipta) 62.5-25 mcg/actuation inhaler Take 1 Puff by inhalation daily.  albuterol (PROVENTIL HFA, VENTOLIN HFA, PROAIR HFA) 90 mcg/actuation inhaler INHALE 2 PUFFS BY MOUTH EVERY 4 HOURS AS NEEDED FOR WHEEZING    HYDROcodone bitartrate (HYSINGLA) 60 mg ER tablet Take 1 Tab by mouth daily for 30 days. Max Daily Amount: 60 mg. *DO NOT CRUSH,CHEW, OR DISSOLVE TABLET*    Narcan 4 mg/actuation nasal spray ADMINISTER A SINGLE SPRAY IN ONE NOSTRIL UPON SIGNS OF OPIOID OVERDOSE. CALL 911. REPEAT AFTER 3 MINUTES IF NO RESPONSE.  tamsulosin (FLOMAX) 0.4 mg capsule TAKE 1 CAPSULE BY MOUTH ONCE DAILY AFTER SUPPER    ibuprofen (MOTRIN) 800 mg tablet TAKE 1 TABLET BY MOUTH EVERY 8 HOURS AS NEEDED FOR PAIN    pregabalin (LYRICA) 100 mg capsule Take 1 cap by mouth in the morning and 2 at night as directed. Indications: diabetic complication causing injury to some body nerves    montelukast (SINGULAIR) 10 mg tablet Take 1 tablet by mouth once daily    gabapentin (NEURONTIN) 300 mg capsule Take 300 mg by mouth three (3) times daily.  azelastine (ASTELIN) 137 mcg (0.1 %) nasal spray 2 Sprays by Both Nostrils route two (2) times a day. Use in each nostril as directed  Indications: seasonal runny nose    fluticasone propionate (FLONASE) 50 mcg/actuation nasal spray USE TWO SPRAY(S) IN EACH NOSTRIL ONCE DAILY    aspirin delayed-release 81 mg tablet Take 81 mg by mouth daily.  pantoprazole (PROTONIX) 40 mg tablet TAKE ONE TABLET BY MOUTH ONCE DAILY    tiZANidine (ZANAFLEX) 4 mg tablet Take 1 Tab by mouth three (3) times daily as needed for Muscle Spasm(s).  traZODone (DESYREL) 50 mg tablet TAKE 1 TABLET BY MOUTH ONCE DAILY AT BEDTIME AS NEEDED FOR SLEEP    lactulose (CONSTULOSE) 10 gram/15 mL solution Take 45ml PO TID    nitroglycerin (NITROSTAT) 0.4 mg SL tablet 1 Tab by SubLINGual route every five (5) minutes as needed.  TRAVATAN Z 0.004 % ophthalmic solution Administer 1 Drop to both eyes nightly.  guaiFENesin ER (MUCINEX) 600 mg ER tablet Take 1 Tab by mouth two (2) times a day. No current facility-administered medications for this visit.               Review of Systems  Respiratory: negative for dyspnea on exertion  Cardiovascular: negative for chest pain    Objective:     Visit Vitals  /69 (BP 1 Location: Left arm, BP Patient Position: Sitting, BP Cuff Size: Adult)   Pulse (!) 56   Temp 97.6 °F (36.4 °C) (Temporal)   Resp 20   Ht 5' 9\" (1.753 m)   Wt 263 lb (119.3 kg)   SpO2 97%   BMI 38.84 kg/m²        General appearance - alert, well appearing, and in no distress  Neck - supple, no significant adenopathy, carotids upstroke normal bilaterally, no bruits  Chest - clear to auscultation, no wheezes, rales or rhonchi, symmetric air entry  Heart - normal rate, regular rhythm, normal S1, S2, no murmurs, rubs, clicks or gallops  Extremities - peripheral pulses normal, no pedal edema, no clubbing or cyanosis  Skin - normal coloration and turgor, no rashes, no suspicious skin lesions noted      Labs:   Lab Results   Component Value Date/Time    Hemoglobin A1c 5.9 (H) 03/05/2021 09:08 AM    Hemoglobin A1c 6.0 (H) 11/06/2020 08:53 AM    Hemoglobin A1c 6.0 (H) 07/28/2020 09:54 AM    Glucose 108 (H) 03/05/2021 09:08 AM    Glucose (POC) 123 (H) 01/24/2016 04:57 PM    Glucose,  (H) 04/01/2015 08:56 AM    Microalb/Creat ratio (ug/mg creat.) 4.4 04/29/2015 03:00 PM    LDL, calculated 136 (H) 03/05/2021 09:08 AM    LDL, calculated 131 (H) 07/28/2020 09:54 AM    Creatinine 1.24 03/05/2021 09:08 AM      Lab Results   Component Value Date/Time Cholesterol, total 195 03/05/2021 09:08 AM    HDL Cholesterol 34 (L) 03/05/2021 09:08 AM    LDL, calculated 136 (H) 03/05/2021 09:08 AM    LDL, calculated 131 (H) 07/28/2020 09:54 AM    Triglyceride 139 03/05/2021 09:08 AM    CHOL/HDL Ratio 4.4 11/15/2019 09:05 AM     Lab Results   Component Value Date/Time    ALT (SGPT) 15 03/05/2021 09:08 AM    Alk. phosphatase 82 03/05/2021 09:08 AM    Bilirubin, total 0.3 03/05/2021 09:08 AM     Lab Results   Component Value Date/Time    GFR est AA 69 03/05/2021 09:08 AM    GFR est non-AA 60 03/05/2021 09:08 AM    Creatinine 1.24 03/05/2021 09:08 AM    BUN 15 03/05/2021 09:08 AM    BUN, POC 6 (L) 04/01/2015 08:56 AM    Sodium,  04/01/2015 08:56 AM    Sodium 145 (H) 03/05/2021 09:08 AM    Potassium 4.6 03/05/2021 09:08 AM    Potassium, POC 4.0 04/01/2015 08:56 AM    Chloride,  04/01/2015 08:56 AM    Chloride 109 (H) 03/05/2021 09:08 AM    CO2 26 03/05/2021 09:08 AM      Lab Results   Component Value Date/Time    Glucose 108 (H) 03/05/2021 09:08 AM    Glucose (POC) 123 (H) 01/24/2016 04:57 PM    Glucose,  (H) 04/01/2015 08:56 AM            Assessment / Plan     Hypertension well controlled, on Lopressor   Hyperlipidemia uncontrolled, pt unable to tolerate statins. G    ICD-10-CM ICD-9-CM    1. Essential hypertension  J95 686.6 METABOLIC PANEL, COMPREHENSIVE   2. Hypercholesterolemia  E78.00 272.0 LIPID PANEL   3. Class 2 severe obesity due to excess calories with serious comorbidity and body mass index (BMI) of 38.0 to 38.9 in [facility])  E66.01 278.01  patient strongly encouraged to cut back starches and sweets in his diet to lose weight    Z68.38 V85.38    4. Simple chronic bronchitis (HCC)  J41.0 491.0  patient stable on Anoro Ellipta   5. Prediabetes  R73.03 790.29  patient trying to control with diet currently HEMOGLOBIN A1C W/O EAG   6.  Obstructive sleep apnea syndrome  G47.33 327.23  patient to continue on CPAP and follow-up with a sleep specialist albuterol (PROVENTIL HFA, VENTOLIN HFA, PROAIR HFA) 90 mcg/actuation inhaler   7. Chronic midline low back pain with left-sided sciatica  M54.42 724.2  controlled on Hysingla. Patient follow-up with the spine center COMPLIANCE DRUG SCREEN/PRESCRIPTION MONITORING    G89.29 724.3      338.29    8. Chronic prescription opiate use  Z79.891 V58.69 COMPLIANCE DRUG SCREEN/PRESCRIPTION MONITORING                 Low cholesterol diet, weight control and daily exercise discussed. Follow-up and Dispositions    · Return in about 4 months (around 7/12/2021) for labs 1 week before. Reviewed plan of care. Patient has provided input and agrees with goals.

## 2021-03-12 NOTE — PATIENT INSTRUCTIONS
High Blood Pressure: Care Instructions  Overview     It's normal for blood pressure to go up and down throughout the day. But if it stays up, you have high blood pressure. Another name for high blood pressure is hypertension. Despite what a lot of people think, high blood pressure usually doesn't cause headaches or make you feel dizzy or lightheaded. It usually has no symptoms. But it does increase your risk of stroke, heart attack, and other problems. You and your doctor will talk about your risks of these problems based on your blood pressure. Your doctor will give you a goal for your blood pressure. Your goal will be based on your health and your age. Lifestyle changes, such as eating healthy and being active, are always important to help lower blood pressure. You might also take medicine to reach your blood pressure goal.  Follow-up care is a key part of your treatment and safety. Be sure to make and go to all appointments, and call your doctor if you are having problems. It's also a good idea to know your test results and keep a list of the medicines you take. How can you care for yourself at home? Medical treatment  · If you stop taking your medicine, your blood pressure will go back up. You may take one or more types of medicine to lower your blood pressure. Be safe with medicines. Take your medicine exactly as prescribed. Call your doctor if you think you are having a problem with your medicine. · Talk to your doctor before you start taking aspirin every day. Aspirin can help certain people lower their risk of a heart attack or stroke. But taking aspirin isn't right for everyone, because it can cause serious bleeding. · See your doctor regularly. You may need to see the doctor more often at first or until your blood pressure comes down. · If you are taking blood pressure medicine, talk to your doctor before you take decongestants or anti-inflammatory medicine, such as ibuprofen.  Some of these medicines can raise blood pressure. · Learn how to check your blood pressure at home. Lifestyle changes  · Stay at a healthy weight. This is especially important if you put on weight around the waist. Losing even 10 pounds can help you lower your blood pressure. · If your doctor recommends it, get more exercise. Walking is a good choice. Bit by bit, increase the amount you walk every day. Try for at least 30 minutes on most days of the week. You also may want to swim, bike, or do other activities. · Avoid or limit alcohol. Talk to your doctor about whether you can drink any alcohol. · Try to limit how much sodium you eat to less than 2,300 milligrams (mg) a day. Your doctor may ask you to try to eat less than 1,500 mg a day. · Eat plenty of fruits (such as bananas and oranges), vegetables, legumes, whole grains, and low-fat dairy products. · Lower the amount of saturated fat in your diet. Saturated fat is found in animal products such as milk, cheese, and meat. Limiting these foods may help you lose weight and also lower your risk for heart disease. · Do not smoke. Smoking increases your risk for heart attack and stroke. If you need help quitting, talk to your doctor about stop-smoking programs and medicines. These can increase your chances of quitting for good. When should you call for help? Call  911 anytime you think you may need emergency care. This may mean having symptoms that suggest that your blood pressure is causing a serious heart or blood vessel problem. Your blood pressure may be over 180/120. For example, call 911 if:    · You have symptoms of a heart attack. These may include:  ? Chest pain or pressure, or a strange feeling in the chest.  ? Sweating. ? Shortness of breath. ? Nausea or vomiting. ? Pain, pressure, or a strange feeling in the back, neck, jaw, or upper belly or in one or both shoulders or arms. ? Lightheadedness or sudden weakness.   ? A fast or irregular heartbeat.     · You have symptoms of a stroke. These may include:  ? Sudden numbness, tingling, weakness, or loss of movement in your face, arm, or leg, especially on only one side of your body. ? Sudden vision changes. ? Sudden trouble speaking. ? Sudden confusion or trouble understanding simple statements. ? Sudden problems with walking or balance. ? A sudden, severe headache that is different from past headaches.     · You have severe back or belly pain. Do not wait until your blood pressure comes down on its own. Get help right away. Call your doctor now or seek immediate care if:    · Your blood pressure is much higher than normal (such as 180/120 or higher), but you don't have symptoms.     · You think high blood pressure is causing symptoms, such as:  ? Severe headache.  ? Blurry vision. Watch closely for changes in your health, and be sure to contact your doctor if:    · Your blood pressure measures higher than your doctor recommends at least 2 times. That means the top number is higher or the bottom number is higher, or both.     · You think you may be having side effects from your blood pressure medicine. Where can you learn more? Go to http://www.gray.com/  Enter [id number] in the search box to learn more about \"High Blood Pressure: Care Instructions. \"  Current as of: December 16, 2019               Content Version: 12.6  © 5341-9791 [employer]. Care instructions adapted under license by [employer] (which disclaims liability or warranty for this information). If you have questions about a medical condition or this instruction, always ask your healthcare professional. [address] any warranty or liability for your use of this information.

## 2021-03-12 NOTE — PROGRESS NOTES
Patient is in the office today for a 4 month follow up. Do you have an Advance Directive :no  Do you want more information : information given    1. Have you been to the ER, urgent care clinic since your last visit? Hospitalized since your last visit? No    2. Have you seen or consulted any other health care providers outside of the [address] [place] [address] since your last visit? Include any pap smears or colon screening. Yes, Dr. Nasir Thapa.

## 2021-03-23 DIAGNOSIS — G89.29 CHRONIC MIDLINE LOW BACK PAIN WITH LEFT-SIDED SCIATICA: ICD-10-CM

## 2021-03-23 DIAGNOSIS — M54.42 CHRONIC MIDLINE LOW BACK PAIN WITH LEFT-SIDED SCIATICA: ICD-10-CM

## 2021-03-23 RX ORDER — HYDROCODONE BITARTRATE 60 MG/1
60 TABLET, EXTENDED RELEASE ORAL DAILY
Qty: 30 TAB | Refills: 0 | Status: SHIPPED | OUTPATIENT
Start: 2021-03-23 | End: 2021-04-20 | Stop reason: SDUPTHER

## 2021-03-23 NOTE — TELEPHONE ENCOUNTER
[facility] Hysingla [facility] 2[facility]24/21 for a 30 d/s. UDS done 11/6/20  CSA signed 8/27/19    Last Visit: 3/12/21 with MD Fragoso  Next Appointment: 7/16/21 with MD Fragoso  Previous Refill Encounter(s): 2/24/21 #30    Requested Prescriptions     Pending Prescriptions Disp Refills    HYDROcodone bitartrate (HYSINGLA) 60 mg ER tablet 30 Tab 0     Sig: Take 1 Tab by mouth daily for 30 days. Max Daily Amount: 60 mg.  *DO NOT CRUSH,CHEW, OR DISSOLVE TABLET*

## 2021-04-07 ENCOUNTER — HOSPITAL ENCOUNTER (OUTPATIENT)
Dept: PREADMISSION TESTING | Age: 66
Discharge: HOME | End: 2021-04-07
Payer: MEDICARE

## 2021-04-07 ENCOUNTER — TRANSCRIBE ORDER (OUTPATIENT)
Dept: REGISTRATION | Age: 66
End: 2021-04-07

## 2021-04-07 ENCOUNTER — HOSPITAL ENCOUNTER (OUTPATIENT)
Dept: LAB | Age: 66
Discharge: HOME | End: 2021-04-07

## 2021-04-07 DIAGNOSIS — Z01.818 OTHER SPECIFIED PRE-OPERATIVE EXAMINATION: ICD-10-CM

## 2021-04-07 DIAGNOSIS — Z01.818 OTHER SPECIFIED PRE-OPERATIVE EXAMINATION: Primary | ICD-10-CM

## 2021-04-07 LAB — XX-LABCORP SPECIMEN COL,LCBCF: NORMAL

## 2021-04-07 PROCEDURE — 99001 SPECIMEN HANDLING PT-LAB: CPT

## 2021-04-07 PROCEDURE — 93005 ELECTROCARDIOGRAM TRACING: CPT

## 2021-04-08 LAB
ATRIAL RATE: 63 BPM
CALCULATED P AXIS, ECG09: 35 DEGREES
CALCULATED R AXIS, ECG10: 22 DEGREES
CALCULATED T AXIS, ECG11: 25 DEGREES
DIAGNOSIS, 93000: NORMAL
P-R INTERVAL, ECG05: 176 MS
Q-T INTERVAL, ECG07: 382 MS
QRS DURATION, ECG06: 76 MS
QTC CALCULATION (BEZET), ECG08: 390 MS
VENTRICULAR RATE, ECG03: 63 BPM

## 2021-04-20 DIAGNOSIS — M54.42 CHRONIC MIDLINE LOW BACK PAIN WITH LEFT-SIDED SCIATICA: ICD-10-CM

## 2021-04-20 DIAGNOSIS — G89.29 CHRONIC MIDLINE LOW BACK PAIN WITH LEFT-SIDED SCIATICA: ICD-10-CM

## 2021-04-20 RX ORDER — HYDROCODONE BITARTRATE 60 MG/1
60 TABLET, EXTENDED RELEASE ORAL DAILY
Qty: 30 TAB | Refills: 0 | Status: SHIPPED | OUTPATIENT
Start: 2021-04-20 | End: 2021-05-19 | Stop reason: SDUPTHER

## 2021-04-20 NOTE — TELEPHONE ENCOUNTER
[facility] Hysingla as 3/24/21 for a 30 d/s. UDS done 11/6/20  CSA signed 8/27/19    Last Visit: 3/12/21 with MD Fragoso  Next Appointment: 7/16/21 with MD Fragoso  Previous Refill Encounter(s): 3/23/21 #30    Requested Prescriptions     Pending Prescriptions Disp Refills    HYDROcodone bitartrate (HYSINGLA) 60 mg ER tablet 30 Tab 0     Sig: Take 1 Tab by mouth daily for 30 days. Max Daily Amount: 60 mg.  *DO NOT CRUSH,CHEW, OR DISSOLVE TABLET*

## 2021-04-21 ENCOUNTER — HOSPITAL ENCOUNTER (EMERGENCY)
Age: 66
Discharge: HOME | End: 2021-04-22
Attending: EMERGENCY MEDICINE
Payer: MEDICARE

## 2021-04-21 VITALS
RESPIRATION RATE: 18 BRPM | SYSTOLIC BLOOD PRESSURE: 162 MMHG | HEART RATE: 96 BPM | DIASTOLIC BLOOD PRESSURE: 77 MMHG | TEMPERATURE: 99.1 F

## 2021-04-21 DIAGNOSIS — T83.021A DISPLACEMENT OF FOLEY CATHETER, INITIAL ENCOUNTER (HCC): Primary | ICD-10-CM

## 2021-04-21 PROCEDURE — 81003 URINALYSIS AUTO W/O SCOPE: CPT

## 2021-04-21 PROCEDURE — 81001 URINALYSIS AUTO W/SCOPE: CPT

## 2021-04-21 PROCEDURE — 99284 EMERGENCY DEPT VISIT MOD MDM: CPT

## 2021-04-21 PROCEDURE — 51702 INSERT TEMP BLADDER CATH: CPT

## 2021-04-21 PROCEDURE — 87086 URINE CULTURE/COLONY COUNT: CPT

## 2021-04-22 LAB
APPEARANCE UR: ABNORMAL
BILIRUB UR QL: NEGATIVE
COLOR UR: YELLOW
EPITH CASTS URNS QL MICRO: ABNORMAL /LPF (ref 0–5)
GLUCOSE UR STRIP.AUTO-MCNC: NEGATIVE MG/DL
HGB UR QL STRIP: ABNORMAL
HYALINE CASTS URNS QL MICRO: ABNORMAL /LPF (ref 0–2)
KETONES UR QL STRIP.AUTO: ABNORMAL MG/DL
LEUKOCYTE ESTERASE UR QL STRIP.AUTO: ABNORMAL
NITRITE UR QL STRIP.AUTO: NEGATIVE
PH UR STRIP: 5 [PH] (ref 5–8)
PROT UR STRIP-MCNC: 100 MG/DL
RBC #/AREA URNS HPF: >100 /HPF (ref 0–5)
SP GR UR REFRACTOMETRY: 1.03 (ref 1–1.03)
UROBILINOGEN UR QL STRIP.AUTO: 0.2 EU/DL (ref 0.2–1)
WBC URNS QL MICRO: ABNORMAL /HPF (ref 0–5)

## 2021-04-22 NOTE — DISCHARGE INSTRUCTIONS
Call the urology office to make they are aware of what happened tonight.   Return for any fevers chills nausea vomiting or difficulty with Ho

## 2021-04-22 NOTE — ED PROVIDER NOTES
EMERGENCY DEPARTMENT HISTORY AND PHYSICAL EXAM  This was created with voice recognition software and transcription errors may be present. 12:37 AM  Date: 4/21/2021  Patient Name: Tejal Alcaraz    History of Presenting Illness     Chief Complaint:    History Provided By:     HPI: Tejal Alcaraz is a 77 y.o. male presents with a past medical history of coronary disease COPD reflux who had a Ho placed today at the urologist office. For urinary retention he states that came out this evening. He presented to the emergency department with a Ho in a plastic bag. He denied fever denied chills nausea or vomiting. No aggravating or alleviating factors no other associated symptoms    PCP: Alana Brittle, MD      Past History     Past Medical History:  Past Medical History:   Diagnosis Date    Allergic rhinitis     Avascular necrosis ([address]     CAD (coronary artery disease) 4/2009    mild, hemodynamically non-significant by angiography    Carpal tunnel syndrome     Carpal tunnel syndrome on both sides     Chest pain, unspecified     Chronic back pain 2/11/2011    Chronic obstructive pulmonary disease ([address]     Degenerative joint disease     with chronic back pain    Dysesthesia     of hand post carpal tunnel surgery v sudeck's atrophy    Dyslipidemia, goal LDL below 100     Dysphagia     GERD (gastroesophageal reflux disease) 5/17/2010    Glaucoma 9/15/2010    Glaucoma     Hearing loss     Hypercholesterolemia     Hypertension     Joint fusion     of right wrist    Morbid obesity ([address]     Obesity 5/17/2010    Other dyspnea and respiratory abnormality     Palpitations     Right hip pain     Right hip pain     Right knee pain     S/P cardiac catheterization 4/9/2009    1. Normal systemic pressure. 2. Moderate elevation of left-sided filling pressures. 3. Preserved left ventricular systolic function in the LOWE projection.  4. Mild, nonsignificant epicardial coronary artery disese by angiography.  Sleep apnea     on CPAP    Strain of lumbar region     Tobacco abuse        Past Surgical History:  Past Surgical History:   Procedure Laterality Date    HX CARPAL TUNNEL RELEASE      bilateral wrists    HX COLONOSCOPY      HX HEART CATHETERIZATION  4/9/2009    1. Normal systemic pressure. 2. Moderate elevation of left-sided filling pressures. 3. Preserved left ventricular systolic function in the LOWE projection. 4. Mild, nonsignificant epicardial coronary artery disese by angiography.  HX HIP REPLACEMENT      HX OTHER SURGICAL  02/2017    left shoulder surgery    HX TONSILLECTOMY      HX WRIST FRACTURE TX      OH ANESTH,SURGERY OF SHOULDER         Family History:  Family History   Problem Relation Age of Onset    Diabetes Mother     Cancer Father     Hypertension Other     Arthritis-osteo Other        Social History:  Social History     Tobacco Use    Smoking status: Current Every Day Smoker     Packs/day: 0.10     Years: 20.00     Pack years: 2.00    Smokeless tobacco: Never Used    Tobacco comment: urothelial cancer risk discussed today [id number]   Substance Use Topics    Alcohol use: No     Alcohol/week: 0.0 standard drinks    Drug use: No       Allergies: Allergies   Allergen Reactions    Latex Not Reported This Time    Ace Inhibitors Cough    Dilaudid [Hydromorphone] Hives    Lisinopril Cough    Zocor [Simvastatin] Myalgia     Right leg       Review of Systems     Review of Systems   All other systems reviewed and are negative. 10 point review of systems otherwise negative unless noted in HPI. Physical Exam       Physical Exam  Constitutional:       Appearance: Normal appearance. HENT:      Head: Normocephalic. Nose: Nose normal.   Eyes:      Pupils: Pupils are equal, round, and reactive to light. Neck:      Musculoskeletal: Normal range of motion. Pulmonary:      Effort: Pulmonary effort is normal.   Abdominal:      General: Abdomen is flat. Musculoskeletal: Normal range of motion. Skin:     General: Skin is warm. Neurological:      General: No focal deficit present. Mental Status: He is alert. Psychiatric:         Mood and Affect: Mood normal.         Diagnostic Study Results     Vital Signs  EKG:  Labs:   Imaging:     Medical Decision Making     ED Course: Progress Notes, Reevaluation, and Consults:      Provider Notes (Medical Decision Making): Ho was replaced by Maribel Guardado without difficulty urine is basically clear sent off a urinalysis there is small leukocyte Estrace however he had a Ho in place so as he is afebrile we will not treat was going to send off labs just to ensure safety however the patient does not want them he would like to go home. I think that is reasonable will discharge for outpatient follow-up         Diagnosis     Clinical Impression: No diagnosis found. Disposition:    Patient's Medications   Start Taking    No medications on file   Continue Taking    ALBUTEROL (PROVENTIL HFA, VENTOLIN HFA, PROAIR HFA) 90 MCG/ACTUATION INHALER    INHALE 2 PUFFS BY MOUTH EVERY 4 HOURS AS NEEDED FOR WHEEZING    ASPIRIN DELAYED-RELEASE 81 MG TABLET    Take 81 mg by mouth daily. AZELASTINE (ASTELIN) 137 MCG (0.1 %) NASAL SPRAY    2 Sprays by Both Nostrils route two (2) times a day. Use in each nostril as directed  Indications: seasonal runny nose    FLUTICASONE PROPIONATE (FLONASE) 50 MCG/ACTUATION NASAL SPRAY    USE TWO SPRAY(S) IN EACH NOSTRIL ONCE DAILY    GABAPENTIN (NEURONTIN) 300 MG CAPSULE    Take 300 mg by mouth three (3) times daily. GUAIFENESIN ER (MUCINEX) 600 MG ER TABLET    Take 1 Tab by mouth two (2) times a day. HYDROCODONE BITARTRATE (HYSINGLA) 60 MG ER TABLET    Take 1 Tab by mouth daily for 30 days. Max Daily Amount: 60 mg.  *DO NOT CRUSH,CHEW, OR DISSOLVE TABLET*    IBUPROFEN (MOTRIN) 800 MG TABLET    TAKE 1 TABLET BY MOUTH EVERY 8 HOURS AS NEEDED FOR PAIN    LACTULOSE (CONSTULOSE) 10 GRAM/15 ML SOLUTION    Take 45ml PO TID    METOPROLOL TARTRATE (LOPRESSOR) 25 MG TABLET    TAKE 1 TABLET BY MOUTH TWICE DAILY (GENERIC  FOR  LOPRESSOR)    MONTELUKAST (SINGULAIR) 10 MG TABLET    Take 1 tablet by mouth once daily    NARCAN 4 MG/ACTUATION NASAL SPRAY    ADMINISTER A SINGLE SPRAY IN ONE NOSTRIL UPON SIGNS OF OPIOID OVERDOSE. CALL 911. REPEAT AFTER 3 MINUTES IF NO RESPONSE. NITROFURANTOIN, MACROCRYSTAL-MONOHYDRATE, (MACROBID) 100 MG CAPSULE    Take 1 Cap by mouth two (2) times a day. NITROGLYCERIN (NITROSTAT) 0.4 MG SL TABLET    1 Tab by SubLINGual route every five (5) minutes as needed. PANTOPRAZOLE (PROTONIX) 40 MG TABLET    TAKE ONE TABLET BY MOUTH ONCE DAILY    PREGABALIN (LYRICA) 100 MG CAPSULE    Take 1 cap by mouth in the morning and 2 at night as directed. Indications: diabetic complication causing injury to some body nerves    TAMSULOSIN (FLOMAX) 0.4 MG CAPSULE    TAKE 1 CAPSULE BY MOUTH ONCE DAILY AFTER SUPPER    TIZANIDINE (ZANAFLEX) 4 MG TABLET    Take 1 Tab by mouth three (3) times daily as needed for Muscle Spasm(s). TRAVATAN Z 0.004 % OPHTHALMIC SOLUTION    Administer 1 Drop to both eyes nightly. TRAZODONE (DESYREL) 50 MG TABLET    TAKE 1 TABLET BY MOUTH ONCE DAILY AT BEDTIME AS NEEDED FOR SLEEP    UMECLIDINIUM-VILANTEROL (ANORO ELLIPTA) 62.5-25 MCG/ACTUATION INHALER    Take 1 Puff by inhalation daily.    These Medications have changed    No medications on file   Stop Taking    No medications on file

## 2021-04-23 LAB
BACTERIA SPEC CULT: NORMAL
SERVICE CMNT-IMP: NORMAL

## 2021-05-11 NOTE — PROGRESS NOTES
Chief Complaint   Patient presents with    Shoulder Pain     Left     0/10 pain. [address] NOTE    Patient: Yolis Giordano  MRN: [id number]  : 1931    Encounter Date: 2021  Encounter Time: 3:40 PM     Date of Admission: .2021  1:22 PM    Consulting Physician:  Primary Care Physician:      [facility]     [phone]     [phone]    PROBLEM LIST:  Patient Active Problem List   Diagnosis    Atrial fibrillation ([facility]    COPD with exacerbation ([facility]    Chest pain    Asthma    CAP (community acquired pneumonia)    GI bleeding    COPD exacerbation ([facility]     Reason for Consultation: COPD Exacerbation     HPI:   Mr. Maria G Aguiar is a 79 y/o male with past medical history noted for COPD/Asthma that presented to [facility] with falls on 2021. He had some skin tears, but no major injuries and was sent home. He had another fall on this occasion. The patient states that he had his left side of his chest on an end table and is complaining of some of left-sided chest wall pain. He was thoroughly scanned in the emergency room with a series of x-rays and CAT scans that did not reveal any significant abnormalities, injuries, or fractures. He was noted to have increasing shortness of breath and was wheezing. He was felt to be in COPD exacerbation and was admitted for  that reason. Patient known to [facility] services. Patient currently on pulmicort, brovana, duoneb, IV steroids. Rapid COVID negative 2021.     PAST MEDICAL HISTORY:   Past Medical History:   Diagnosis Date    Asthma     COPD (chronic obstructive pulmonary disease) ([facility]     Unspecified cerebral artery occlusion with cerebral infarction        PAST SURGICAL HISTORY:   Past Surgical History:   Procedure Laterality Date    ABDOMEN SURGERY      multiple; intestinal abscess; hernia repair;     APPENDECTOMY      ECHO COMPL W DOP COLOR FLOW  3/16/2013         HERNIA REPAIR      JOINT REPLACEMENT  12       FAMILY HISTORY:   Family History   Problem Relation Age of Onset    Cancer Mother         stomach    Cancer Father         lung    No Known Problems Sister        SOCIAL HISTORY:   Social History     Socioeconomic History    Marital status:      Spouse name: Not on file    Number of children: Not on file    Years of education: Not on file    Highest education level: Not on file   Occupational History    Not on file   Social Needs    Financial resource strain: Not on file    Food insecurity     Worry: Not on file     Inability: Not on file    Transportation needs     Medical: Not on file     Non-medical: Not on file   Tobacco Use    Smoking status: Former Smoker     Packs/day: 1.00     Types: Cigarettes     Start date: 1945     Quit date: 1982     Years since quittin.1    Smokeless tobacco: Never Used   Substance and Sexual Activity    Alcohol use: No    Drug use: Never    Sexual activity: Never   Lifestyle    Physical activity     Days per week: Not on file     Minutes per session: Not on file    Stress: Not on file   Relationships    Social connections     Talks on phone: Not on file     Gets together: Not on file     Attends Confucianist service: Not on file     Active member of club or organization: Not on file     Attends meetings of clubs or organizations: Not on file     Relationship status: Not on file    Intimate partner violence     Fear of current or ex partner: Not on file     Emotionally abused: Not on file     Physically abused: Not on file     Forced sexual activity: Not on file   Other Topics Concern    Not on file   Social History Narrative    Not on file     Social History     Tobacco Use   Smoking Status Former Smoker    Packs/day: 1.00    Types: Cigarettes    Start date: 1945   [employer] Quit date: 1982    Years since quittin.1   Smokeless Tobacco Never Used     Social History     Substance and Sexual Activity   Alcohol Use No     Social History     Substance and Sexual Activity   Drug Use Never Topical, BID  aspirin EC tablet 81 mg, 81 mg, Oral, Daily  insulin lispro (HUMALOG) injection vial 0-6 Units, 0-6 Units, Subcutaneous, TID WC  insulin lispro (HUMALOG) injection vial 0-3 Units, 0-3 Units, Subcutaneous, Nightly  glucose (GLUTOSE) 40 % oral gel 15 g, 15 g, Oral, PRN  dextrose 50 % IV solution, 12.5 g, Intravenous, PRN  glucagon (rDNA) injection 1 mg, 1 mg, Intramuscular, PRN  dextrose 5 % solution, 100 mL/hr, Intravenous, PRN  docusate sodium (COLACE) capsule 200 mg, 200 mg, Oral, Nightly  montelukast (SINGULAIR) tablet 10 mg, 10 mg, Oral, Nightly  tamsulosin (FLOMAX) capsule 0.4 mg, 0.4 mg, Oral, Daily  ipratropium-albuterol (DUONEB) nebulizer solution 1 ampule, 1 ampule, Inhalation, Q4H WA  sodium chloride flush 0.9 % injection 5-40 mL, 5-40 mL, Intravenous, 2 times per day  sodium chloride flush 0.9 % injection 5-40 mL, 5-40 mL, Intravenous, PRN  0.9 % sodium chloride infusion, 25 mL, Intravenous, PRN  promethazine (PHENERGAN) tablet 12.5 mg, 12.5 mg, Oral, Q6H PRN **OR** ondansetron (ZOFRAN) injection 4 mg, 4 mg, Intravenous, Q6H PRN  polyethylene glycol (GLYCOLAX) packet 17 g, 17 g, Oral, Daily PRN  acetaminophen (TYLENOL) tablet 650 mg, 650 mg, Oral, Q6H PRN **OR** acetaminophen (TYLENOL) suppository 650 mg, 650 mg, Rectal, Q6H PRN  brimonidine (ALPHAGAN) 0.2 % ophthalmic solution 1 drop, 1 drop, Both Eyes, BID  Arformoterol Tartrate (BROVANA) nebulizer solution 15 mcg, 15 mcg, Nebulization, BID **AND** budesonide (PULMICORT) nebulizer suspension 500 mcg, 0.5 mg, Nebulization, BID **AND** Nebulizer tx intermittent, , , BID  polyvinyl alcohol (LIQUIFILM TEARS) 1.4 % ophthalmic solution 1 drop, 1 drop, Both Eyes, BID  miconazole (MICOTIN) 2 % powder, , Topical, BID    IV MEDICATIONS:   dextrose      sodium chloride         ALLERGIES:  No Known Allergies    REVIEW OF SYSTEMS:  General ROS:     - Positive For:     - Negative For: chills, fatigue, fever, malaise, night sweats or sleep disturbance 10/11/20 76       CURRENT PULSE OXIMETRY: SpO2: 93 %  24HR PULSE OXIMETRY RANGE: SpO2  Av %  Min: 93 %  Max: 95 %  CVP:      ________________________________________________________________________    VENTILATOR SETTINGS (if applicable):         Vent Information  SpO2: 93 %  Additional Respiratory  Assessments  Pulse: 79  Resp: 16  SpO2: 93 %  ETCO2:  Peak Inspiratory Pressure:  End-Inspiratory Plateau Pressure:    ABG:  No results for input(s): PH, PO2, PCO2, HCO3, BE, O2SAT, METHB, O2HB, COHB, O2CON, HHB, THB in the last 72 hours.         ________________________________________________________________________    IV ACCESS:    NUTRITION: DIET GENERAL;  Dietary Nutrition Supplements: Wound Healing Oral Supplement    INTAKE/OUTPUTS:  I/O last 3 completed shifts:  In: -   Out: 500 [Urine:500]    Intake/Output Summary (Last 24 hours) at 2021 1540  Last data filed at 5/10/2021 1553  Gross per 24 hour   Intake --   Output 500 ml   Net -500 ml     General Appearance: alert and oriented to person, place and time, well-developed and   well-nourished, in no acute distress   Eyes: pupils equal, round, and reactive to light, extraocular eye movements intact, conjunctivae normal and sclera anicteric   Neck: neck supple and non tender without mass, no thyromegaly, no thyroid nodules and no cervical adenopathy   Pulmonary/Chest: decreased breath sounds, no accessory muscles of inspiration, no focal wheezes  Cardiovascular: normal rate, regular rhythm, normal S1 and S2, no murmurs, rubs, clicks or gallops, distal pulses intact, no carotid bruits, no murmurs, no gallops, no carotid bruits and no JVD   Abdomen: soft, non-tender, non-distended, normal bowel sounds, no masses or organomegaly   Extremities: no cyanosis, no clubbing, no edema  Musculoskeletal: normal range of motion, no joint swelling, deformity or tenderness   Neurologic: reflexes normal and symmetric, no cranial nerve deficit noted    LABS/IMAGING:    CBC:  Lab Results   Component Value Date    WBC 9.1 05/11/2021    HGB 12.2 (L) 05/11/2021    HCT 39.2 05/11/2021    MCV 92.0 05/11/2021     05/11/2021    LYMPHOPCT 34.0 05/07/2021    RBC 4.26 05/11/2021    MCH 28.6 05/11/2021    MCHC 31.1 (L) 05/11/2021    RDW 17.2 (H) 05/11/2021    NEUTOPHILPCT 50.4 05/07/2021    MONOPCT 9.5 05/07/2021    BASOPCT 0.6 05/07/2021    NEUTROABS 4.11 05/07/2021    LYMPHSABS 2.78 05/07/2021    MONOSABS 0.78 05/07/2021    EOSABS 0.42 05/07/2021    BASOSABS 0.05 05/07/2021       Recent Labs     05/11/21  0410 05/10/21  0310 05/09/21  0538   WBC 9.1 10.1 11.4   HGB 12.2* 12.7 13.1   HCT 39.2 41.3 40.6   MCV 92.0 92.8 91.4    192 184       BMP:   Recent Labs     05/09/21  0538 05/10/21  0310 05/11/21  0410    138 139   K 4.3 4.3 4.5    105 105   CO2 28 25 29   BUN 40* 33* 34*   CREATININE 1.2 1.1 1.1       MG: No results found for: MG  Ca/Phos:   Lab Results   Component Value Date    CALCIUM 9.1 05/11/2021     Amylase: No results found for: AMYLASE  Lipase: No results found for: LIPASE  LIVER PROFILE: No results for input(s): AST, ALT, LIPASE, BILIDIR, BILITOT, ALKPHOS in the last 72 hours. Invalid input(s): AMYLASE,  ALB    PT/INR:   Recent Labs     05/09/21  0538 05/10/21  0310 05/11/21  0410   PROTIME 52.0* 57.3* 23.9*   INR 4.8 5.2* 2.2     APTT: No results for input(s): APTT in the last 72 hours.     Cardiac Enzymes:  Lab Results   Component Value Date    CKTOTAL 59 09/20/2017    CKMB 2.2 09/20/2017    TROPONINI 0.01 05/07/2021       Hgb A1C: No results found for: LABA1C  No results found for: EAG  DIMITRIS: No results found for: DIMITRIS  ESR: No results found for: SEDRATE  CRP: No results found for: CRP  D Dimer:   Lab Results   Component Value Date    DDIMER <250 10/13/2010     Folate and B12: No results found for: DBDGBEPI26, No results found for: FOLATE    Lactic Acid:   Lab Results   Component Value Date    LACTA 3.2 (H) 10/07/2020 Ammonia:   Cortisol:  Thyroid Studies:  Lab Results   Component Value Date    TSH 1.310 03/24/2019    P5BKFEQ 4.7 03/24/2019     XR CHEST (2 VW)   Final Result   Cardiomegaly. Bibasilar atelectasis and/or infiltrate. US DUP LOWER EXTREMITIES BILATERAL VENOUS   Final Result   No evidence of DVT in either lower extremity. XR ELBOW LEFT (2 VIEWS)   Final Result   1. There is no fracture or dislocation of the left elbow   2. No soft tissue foreign bodies noted. CT Head WO Contrast   Final Result   No acute intracranial abnormality. Bilateral maxillary and ethmoid sinusitis. Stable old lacunar infarct, right thalamus. CT Cervical Spine WO Contrast   Final Result   No acute abnormality of the cervical spine. Severe multilevel degenerative changes. CTA PULMONARY W CONTRAST   Final Result   No evidence of pulmonary embolism or acute pulmonary abnormality. CT ABDOMEN PELVIS W IV CONTRAST Additional Contrast? None   Final Result   No evidence of acute intra-abdominal or pelvic injury. XR CHEST PORTABLE   Final Result   Bibasilar airspace opacities which may be on the basis of chronic fibrotic   changes, similar appearance to the examination from October 2020. ASSESSMENT:  1.) Acute Exacerbation of COPD  2.) Fall without Syncope    PLAN:  1.) O2 as needed, IS  2.) IV steroids, pulmicort, brovana, duoneb   3.) supportive care  4.) hold abx  5.) labs/imagnig reviewed  6.) coumadin    - respiratory panel     Thank you No att. providers found very much for allowing me to see this patient in consultation and follow up. Care reviewed with nursing staff, medical and surgical specialty care, primary care and the patient's family as available. Restraints are ordered when the patient can do harm to him/herself by pulling out devices.     Bryn Badillo M.D.

## 2021-05-19 DIAGNOSIS — M54.42 CHRONIC MIDLINE LOW BACK PAIN WITH LEFT-SIDED SCIATICA: ICD-10-CM

## 2021-05-19 DIAGNOSIS — G89.29 CHRONIC MIDLINE LOW BACK PAIN WITH LEFT-SIDED SCIATICA: ICD-10-CM

## 2021-05-19 RX ORDER — HYDROCODONE BITARTRATE 60 MG/1
60 TABLET, EXTENDED RELEASE ORAL DAILY
Qty: 30 TABLET | Refills: 0 | Status: SHIPPED | OUTPATIENT
Start: 2021-05-19 | End: 2021-06-14 | Stop reason: SDUPTHER

## 2021-05-19 NOTE — TELEPHONE ENCOUNTER
[facility] Hysingla [facility]21/21 for a 30 d/s. UDS done 11/6/20  CSA signed 8/27/19    Last Visit: 3/12/21 with MD Fragoso  Next Appointment: 7/16/21 with MD Fragoso  Previous Refill Encounter(s): 4/20/21 #30    Requested Prescriptions     Pending Prescriptions Disp Refills    HYDROcodone bitartrate (HYSINGLA) 60 mg ER tablet 30 Tablet 0     Sig: Take 1 Tablet by mouth daily for 30 days. Max Daily Amount: 60 mg.  *DO NOT CRUSH,CHEW, OR DISSOLVE TABLET*

## 2021-06-14 DIAGNOSIS — G89.29 CHRONIC MIDLINE LOW BACK PAIN WITH LEFT-SIDED SCIATICA: ICD-10-CM

## 2021-06-14 DIAGNOSIS — M54.42 CHRONIC MIDLINE LOW BACK PAIN WITH LEFT-SIDED SCIATICA: ICD-10-CM

## 2021-06-14 RX ORDER — HYDROCODONE BITARTRATE 60 MG/1
60 TABLET, EXTENDED RELEASE ORAL DAILY
Qty: 30 TABLET | Refills: 0 | Status: SHIPPED | OUTPATIENT
Start: 2021-06-14 | End: 2021-07-09 | Stop reason: SDUPTHER

## 2021-06-14 NOTE — TELEPHONE ENCOUNTER
Requested Prescriptions     Pending Prescriptions Disp Refills    HYDROcodone bitartrate (HYSINGLA) 60 mg ER tablet 30 Tablet 0     Sig: Take 1 Tablet by mouth daily for 30 days. Max Daily Amount: 60 mg.  *DO NOT CRUSH,CHEW, OR DISSOLVE TABLET*

## 2021-06-14 NOTE — TELEPHONE ENCOUNTER
[facility] Hysingla as 5/20/21 for a 30 d/s. UDS done 11/6/20  CSA signed 8/27/19    Last Visit: 3/12/21 with MD Fragoso  Next Appointment: 7/16/21 with MD Fragoso  Previous Refill Encounter(s): 5/19/21 #30    Requested Prescriptions     Pending Prescriptions Disp Refills    HYDROcodone bitartrate (HYSINGLA) 60 mg ER tablet 30 Tablet 0     Sig: Take 1 Tablet by mouth daily for 30 days. Max Daily Amount: 60 mg.  *DO NOT CRUSH,CHEW, OR DISSOLVE TABLET*

## 2021-06-15 ENCOUNTER — TELEPHONE (OUTPATIENT)
Dept: INTERNAL MEDICINE CLINIC | Age: 66
End: 2021-06-15

## 2021-06-22 ENCOUNTER — TELEPHONE (OUTPATIENT)
Dept: INTERNAL MEDICINE CLINIC | Age: 66
End: 2021-06-22

## 2021-06-22 NOTE — TELEPHONE ENCOUNTER
Pt requested the nurse calls him because he needs a back brace. Please advise!!!  Thank you!!! Referred To Otolaryngology For Closure Text (Leave Blank If You Do Not Want): After obtaining clear surgical margins the patient was sent to otolaryngology for surgical repair.  The patient understands they will receive post-surgical care and follow-up from the referring physician's office.

## 2021-06-22 NOTE — TELEPHONE ENCOUNTER
Spoke with patient he states he asked Dr. Madi Aden for a back brace and she told him they were not good. Patient states he would like a back brace order sent to a [employer] and he will pick it up.

## 2021-06-23 NOTE — TELEPHONE ENCOUNTER
Left detailed message patient will need an appointment for back brace. Patient has upcoming appointment and will be discussed at that time.

## 2021-07-06 DIAGNOSIS — G62.9 NEUROPATHY: ICD-10-CM

## 2021-07-06 DIAGNOSIS — M48.061 SPINAL STENOSIS OF LUMBAR REGION WITHOUT NEUROGENIC CLAUDICATION: ICD-10-CM

## 2021-07-06 RX ORDER — PREGABALIN 100 MG/1
CAPSULE ORAL
Qty: 90 CAPSULE | Refills: 0 | OUTPATIENT
Start: 2021-07-06

## 2021-07-06 NOTE — TELEPHONE ENCOUNTER
I can do a small amount for him -- has not been seen in 6 months and does not currently have a follow up -- would he like to do a virtual visit?

## 2021-07-08 NOTE — TELEPHONE ENCOUNTER
Returned call to patient, Reached voicemail identified \"Gabe Guajardo\", left message, identified myself/facility/callback number, informed a fu appt was needed for further refills, requested return call to facility to make fu appt. No further action required at this time.

## 2021-07-09 ENCOUNTER — APPOINTMENT (OUTPATIENT)
Dept: INTERNAL MEDICINE CLINIC | Age: 66
End: 2021-07-09

## 2021-07-09 ENCOUNTER — HOSPITAL ENCOUNTER (OUTPATIENT)
Dept: LAB | Age: 66
Discharge: HOME | End: 2021-07-09

## 2021-07-09 DIAGNOSIS — G89.29 CHRONIC MIDLINE LOW BACK PAIN WITH LEFT-SIDED SCIATICA: ICD-10-CM

## 2021-07-09 DIAGNOSIS — M54.42 CHRONIC MIDLINE LOW BACK PAIN WITH LEFT-SIDED SCIATICA: ICD-10-CM

## 2021-07-09 LAB — XX-LABCORP SPECIMEN COL,LCBCF: NORMAL

## 2021-07-09 PROCEDURE — 99001 SPECIMEN HANDLING PT-LAB: CPT

## 2021-07-09 RX ORDER — HYDROCODONE BITARTRATE 60 MG/1
60 TABLET, EXTENDED RELEASE ORAL DAILY
Qty: 30 TABLET | Refills: 0 | Status: SHIPPED | OUTPATIENT
Start: 2021-07-09 | End: 2021-08-12 | Stop reason: SDUPTHER

## 2021-07-09 NOTE — TELEPHONE ENCOUNTER
[facility] Hysingla as 6/17/21 for a 30 d/s. UDS done 7/9/21, 11/6/20  CSA signed 8/27/19    Last Visit: 3/12/21 with MD Fragoso  Next Appointment: 7/16/21 with MD Fragoso  Previous Refill Encounter(s): 6/14/21 #30    Requested Prescriptions     Pending Prescriptions Disp Refills    HYDROcodone bitartrate (HYSINGLA) 60 mg ER tablet 30 Tablet 0     Sig: Take 1 Tablet by mouth daily for 30 days. Max Daily Amount: 60 mg.  *DO NOT CRUSH,CHEW, OR DISSOLVE TABLET*

## 2021-07-14 LAB
ALBUMIN SERPL-MCNC: 4.1 G/DL (ref 3.8–4.8)
ALBUMIN/GLOB SERPL: 1.3 {RATIO} (ref 1.2–2.2)
ALP SERPL-CCNC: 98 IU/L (ref 48–121)
ALT SERPL-CCNC: 10 IU/L (ref 0–44)
AST SERPL-CCNC: 10 IU/L (ref 0–40)
BILIRUB SERPL-MCNC: <0.2 MG/DL (ref 0–1.2)
BUN SERPL-MCNC: 27 MG/DL (ref 8–27)
BUN/CREAT SERPL: 18 (ref 10–24)
CALCIUM SERPL-MCNC: 9.2 MG/DL (ref 8.6–10.2)
CHLORIDE SERPL-SCNC: 108 MMOL/L (ref 96–106)
CHOLEST SERPL-MCNC: 204 MG/DL (ref 100–199)
CO2 SERPL-SCNC: 15 MMOL/L (ref 20–29)
CREAT SERPL-MCNC: 1.46 MG/DL (ref 0.76–1.27)
GLOBULIN SER CALC-MCNC: 3.2 G/DL (ref 1.5–4.5)
GLUCOSE SERPL-MCNC: 96 MG/DL (ref 65–99)
HBA1C MFR BLD: 5.8 % (ref 4.8–5.6)
HDLC SERPL-MCNC: 37 MG/DL
IMP & REVIEW OF LAB RESULTS: NORMAL
INTERPRETATION: NORMAL
LDLC SERPL CALC-MCNC: 146 MG/DL (ref 0–99)
POTASSIUM SERPL-SCNC: 4.9 MMOL/L (ref 3.5–5.2)
PROT SERPL-MCNC: 7.3 G/DL (ref 6–8.5)
SODIUM SERPL-SCNC: 142 MMOL/L (ref 134–144)
TRIGL SERPL-MCNC: 113 MG/DL (ref 0–149)
VLDLC SERPL CALC-MCNC: 21 MG/DL (ref 5–40)

## 2021-07-15 LAB — DRUGS UR: NORMAL

## 2021-07-16 ENCOUNTER — OFFICE VISIT (OUTPATIENT)
Dept: INTERNAL MEDICINE CLINIC | Age: 66
End: 2021-07-16
Payer: MEDICARE

## 2021-07-16 VITALS
TEMPERATURE: 96.9 F | DIASTOLIC BLOOD PRESSURE: 75 MMHG | WEIGHT: 258.4 LBS | RESPIRATION RATE: 16 BRPM | OXYGEN SATURATION: 96 % | HEART RATE: 60 BPM | SYSTOLIC BLOOD PRESSURE: 131 MMHG | BODY MASS INDEX: 38.27 KG/M2 | HEIGHT: 69 IN

## 2021-07-16 DIAGNOSIS — I10 ESSENTIAL HYPERTENSION: Primary | ICD-10-CM

## 2021-07-16 DIAGNOSIS — R79.89 AZOTEMIA: ICD-10-CM

## 2021-07-16 DIAGNOSIS — G89.29 CHRONIC MIDLINE LOW BACK PAIN WITH LEFT-SIDED SCIATICA: ICD-10-CM

## 2021-07-16 DIAGNOSIS — M54.42 CHRONIC MIDLINE LOW BACK PAIN WITH LEFT-SIDED SCIATICA: ICD-10-CM

## 2021-07-16 DIAGNOSIS — G95.19 NEUROGENIC CLAUDICATION (HCC): ICD-10-CM

## 2021-07-16 DIAGNOSIS — E78.00 HYPERCHOLESTEROLEMIA: ICD-10-CM

## 2021-07-16 DIAGNOSIS — R73.03 PREDIABETES: ICD-10-CM

## 2021-07-16 DIAGNOSIS — F51.01 PRIMARY INSOMNIA: ICD-10-CM

## 2021-07-16 PROBLEM — I73.9 PERIPHERAL VASCULAR DISEASE (HCC): Status: RESOLVED | Noted: 2020-11-18 | Resolved: 2021-07-16

## 2021-07-16 PROCEDURE — G8536 NO DOC ELDER MAL SCRN: HCPCS | Performed by: INTERNAL MEDICINE

## 2021-07-16 PROCEDURE — 1101F PT FALLS ASSESS-DOCD LE1/YR: CPT | Performed by: INTERNAL MEDICINE

## 2021-07-16 PROCEDURE — G8754 DIAS BP LESS 90: HCPCS | Performed by: INTERNAL MEDICINE

## 2021-07-16 PROCEDURE — 3017F COLORECTAL CA SCREEN DOC REV: CPT | Performed by: INTERNAL MEDICINE

## 2021-07-16 PROCEDURE — G8427 DOCREV CUR MEDS BY ELIG CLIN: HCPCS | Performed by: INTERNAL MEDICINE

## 2021-07-16 PROCEDURE — G8752 SYS BP LESS 140: HCPCS | Performed by: INTERNAL MEDICINE

## 2021-07-16 PROCEDURE — G8510 SCR DEP NEG, NO PLAN REQD: HCPCS | Performed by: INTERNAL MEDICINE

## 2021-07-16 PROCEDURE — 99214 OFFICE O/P EST MOD 30 MIN: CPT | Performed by: INTERNAL MEDICINE

## 2021-07-16 PROCEDURE — G8417 CALC BMI ABV UP PARAM F/U: HCPCS | Performed by: INTERNAL MEDICINE

## 2021-07-16 RX ORDER — TRAZODONE HYDROCHLORIDE 50 MG/1
TABLET ORAL
Qty: 30 TABLET | Refills: 2 | Status: SHIPPED | OUTPATIENT
Start: 2021-07-16 | End: 2022-10-24

## 2021-07-16 RX ORDER — LATANOPROST 50 UG/ML
SOLUTION/ DROPS OPHTHALMIC
COMMUNITY
Start: 2021-06-01

## 2021-07-16 RX ORDER — PREGABALIN 150 MG/1
150 CAPSULE ORAL 2 TIMES DAILY
Qty: 60 CAPSULE | Refills: 4 | Status: SHIPPED | OUTPATIENT
Start: 2021-07-16 | End: 2021-08-12 | Stop reason: SDUPTHER

## 2021-07-16 NOTE — PROGRESS NOTES
1. Have you been to the ER, urgent care clinic since your last visit? Hospitalized since your last visit? [facility] 4/21/21 Cath problem    2. Have you seen or consulted any other health care providers outside of the [address] [place] [address] since your last visit? Include any pap smears or colon screening.  No     Chief Complaint   Patient presents with    Hypertension    Cholesterol Problem    Blood sugar problem     prediabetes    Sleep Apnea     SONIDO    Tingling     bilat hip down to both legs - hurts when coughing

## 2021-08-06 ENCOUNTER — APPOINTMENT (OUTPATIENT)
Dept: INTERNAL MEDICINE CLINIC | Age: 66
End: 2021-08-06

## 2021-08-06 DIAGNOSIS — I10 ESSENTIAL HYPERTENSION: ICD-10-CM

## 2021-08-06 DIAGNOSIS — R79.89 AZOTEMIA: ICD-10-CM

## 2021-08-07 LAB
BUN SERPL-MCNC: 16 MG/DL (ref 8–27)
BUN/CREAT SERPL: 14 (ref 10–24)
CALCIUM SERPL-MCNC: 9.2 MG/DL (ref 8.6–10.2)
CHLORIDE SERPL-SCNC: 109 MMOL/L (ref 96–106)
CO2 SERPL-SCNC: 21 MMOL/L (ref 20–29)
CREAT SERPL-MCNC: 1.16 MG/DL (ref 0.76–1.27)
GLUCOSE SERPL-MCNC: 110 MG/DL (ref 65–99)
POTASSIUM SERPL-SCNC: 4.6 MMOL/L (ref 3.5–5.2)
SODIUM SERPL-SCNC: 143 MMOL/L (ref 134–144)

## 2021-08-12 ENCOUNTER — OFFICE VISIT (OUTPATIENT)
Dept: INTERNAL MEDICINE CLINIC | Age: 66
End: 2021-08-12
Payer: MEDICARE

## 2021-08-12 VITALS
TEMPERATURE: 97 F | SYSTOLIC BLOOD PRESSURE: 113 MMHG | RESPIRATION RATE: 17 BRPM | OXYGEN SATURATION: 95 % | HEART RATE: 56 BPM | BODY MASS INDEX: 38.34 KG/M2 | WEIGHT: 259.6 LBS | DIASTOLIC BLOOD PRESSURE: 65 MMHG

## 2021-08-12 DIAGNOSIS — G89.29 CHRONIC MIDLINE LOW BACK PAIN WITH LEFT-SIDED SCIATICA: Primary | ICD-10-CM

## 2021-08-12 DIAGNOSIS — M54.42 CHRONIC MIDLINE LOW BACK PAIN WITH LEFT-SIDED SCIATICA: Primary | ICD-10-CM

## 2021-08-12 DIAGNOSIS — I10 ESSENTIAL HYPERTENSION: ICD-10-CM

## 2021-08-12 DIAGNOSIS — E78.00 HYPERCHOLESTEROLEMIA: ICD-10-CM

## 2021-08-12 DIAGNOSIS — F11.99 OPIOID USE, UNSPECIFIED WITH UNSPECIFIED OPIOID-INDUCED DISORDER (HCC): ICD-10-CM

## 2021-08-12 DIAGNOSIS — R73.03 PREDIABETES: ICD-10-CM

## 2021-08-12 DIAGNOSIS — G95.19 NEUROGENIC CLAUDICATION (HCC): ICD-10-CM

## 2021-08-12 PROCEDURE — G8417 CALC BMI ABV UP PARAM F/U: HCPCS | Performed by: INTERNAL MEDICINE

## 2021-08-12 PROCEDURE — G8432 DEP SCR NOT DOC, RNG: HCPCS | Performed by: INTERNAL MEDICINE

## 2021-08-12 PROCEDURE — 99213 OFFICE O/P EST LOW 20 MIN: CPT | Performed by: INTERNAL MEDICINE

## 2021-08-12 PROCEDURE — G8752 SYS BP LESS 140: HCPCS | Performed by: INTERNAL MEDICINE

## 2021-08-12 PROCEDURE — G8754 DIAS BP LESS 90: HCPCS | Performed by: INTERNAL MEDICINE

## 2021-08-12 PROCEDURE — 3017F COLORECTAL CA SCREEN DOC REV: CPT | Performed by: INTERNAL MEDICINE

## 2021-08-12 PROCEDURE — 1101F PT FALLS ASSESS-DOCD LE1/YR: CPT | Performed by: INTERNAL MEDICINE

## 2021-08-12 PROCEDURE — G8427 DOCREV CUR MEDS BY ELIG CLIN: HCPCS | Performed by: INTERNAL MEDICINE

## 2021-08-12 PROCEDURE — G8536 NO DOC ELDER MAL SCRN: HCPCS | Performed by: INTERNAL MEDICINE

## 2021-08-12 RX ORDER — HYDROCODONE BITARTRATE 60 MG/1
60 TABLET, EXTENDED RELEASE ORAL DAILY
Qty: 30 TABLET | Refills: 0 | Status: SHIPPED | OUTPATIENT
Start: 2021-08-12 | End: 2021-09-07 | Stop reason: SDUPTHER

## 2021-08-12 RX ORDER — PREGABALIN 150 MG/1
150 CAPSULE ORAL 3 TIMES DAILY
Qty: 90 CAPSULE | Refills: 4 | Status: SHIPPED | OUTPATIENT
Start: 2021-08-12 | End: 2021-12-16

## 2021-08-12 NOTE — PROGRESS NOTES
Pt is here for   Chief Complaint   Patient presents with    Hypertension     follow up     1. Have you been to the ER, urgent care clinic or hospitalized since your last visit? NO.     2. Have you seen or consulted any other health care providers outside of the [address] [place] [address] since your last visit (Include any pap smears or colon screening)? NO      Do you have an Advanced Directive? NO    Would you like information on Advanced Directives?  NO

## 2021-08-18 NOTE — PROGRESS NOTES
Shelbi Bradley is a 74 y.o.  male and presents with Hypertension (follow up) and Back Pain      SUBJECTIVE:    Patient's azotemia that he had on blood work has resolved and his renal function is back to his baseline. Patient continues to have significant neurogenic claudication from underlying spinal stenosis which radiates down his right leg. He has been followed by the spine center and needs to follow-up for further management. However in the meantime he continues on Hysingla 60 mg daily and we will go ahead and increase his Lyrica 150 mg from twice daily to 3 times daily to help control the neuropathic pain. Patient continues to be strongly encouraged to try and lose weight even 10 to 20 pounds which should reduce the pressure on his back. Respiratory ROS: negative for - shortness of breath  Cardiovascular ROS: negative for - chest pain    Current Outpatient Medications   Medication Sig    HYDROcodone bitartrate (HYSINGLA) 60 mg ER tablet Take 1 Tablet by mouth daily for 30 days. Max Daily Amount: 60 mg. *DO NOT CRUSH,CHEW, OR DISSOLVE TABLET*    traZODone (DESYREL) 50 mg tablet TAKE 1-2 TABLETs BY MOUTH ONCE DAILY AT BEDTIME AS NEEDED FOR SLEEP    latanoprost (XALATAN) 0.005 % ophthalmic solution INSTILL 1 DROP INTO EACH EYE AT BEDTIME    nitrofurantoin, macrocrystal-monohydrate, (MACROBID) 100 mg capsule Take 1 Cap by mouth two (2) times a day.  metoprolol tartrate (LOPRESSOR) 25 mg tablet TAKE 1 TABLET BY MOUTH TWICE DAILY (GENERIC  FOR  LOPRESSOR)    umeclidinium-vilanteroL (Anoro Ellipta) 62.5-25 mcg/actuation inhaler Take 1 Puff by inhalation daily.  albuterol (PROVENTIL HFA, VENTOLIN HFA, PROAIR HFA) 90 mcg/actuation inhaler INHALE 2 PUFFS BY MOUTH EVERY 4 HOURS AS NEEDED FOR WHEEZING    Narcan 4 mg/actuation nasal spray ADMINISTER A SINGLE SPRAY IN ONE NOSTRIL UPON SIGNS OF OPIOID OVERDOSE. CALL 911. REPEAT AFTER 3 MINUTES IF NO RESPONSE.     tamsulosin (FLOMAX) 0.4 mg capsule TAKE 1 CAPSULE BY MOUTH ONCE DAILY AFTER SUPPER    ibuprofen (MOTRIN) 800 mg tablet TAKE 1 TABLET BY MOUTH EVERY 8 HOURS AS NEEDED FOR PAIN    montelukast (SINGULAIR) 10 mg tablet Take 1 tablet by mouth once daily    azelastine (ASTELIN) 137 mcg (0.1 %) nasal spray 2 Sprays by Both Nostrils route two (2) times a day. Use in each nostril as directed  Indications: seasonal runny nose    fluticasone propionate (FLONASE) 50 mcg/actuation nasal spray USE TWO SPRAY(S) IN EACH NOSTRIL ONCE DAILY    aspirin delayed-release 81 mg tablet Take 81 mg by mouth daily.  pantoprazole (PROTONIX) 40 mg tablet TAKE ONE TABLET BY MOUTH ONCE DAILY    tiZANidine (ZANAFLEX) 4 mg tablet Take 1 Tab by mouth three (3) times daily as needed for Muscle Spasm(s).  lactulose (CONSTULOSE) 10 gram/15 mL solution Take 45ml PO TID    nitroglycerin (NITROSTAT) 0.4 mg SL tablet 1 Tab by SubLINGual route every five (5) minutes as needed.  TRAVATAN Z 0.004 % ophthalmic solution Administer 1 Drop to both eyes nightly.  pregabalin (LYRICA) 150 mg capsule Take 1 Capsule by mouth three (3) times daily. Max Daily Amount: 450 mg. (Patient not taking: Reported on 8/12/2021)    guaiFENesin ER (MUCINEX) 600 mg ER tablet Take 1 Tab by mouth two (2) times a day. (Patient not taking: Reported on 8/12/2021)     No current facility-administered medications for this visit.          OBJECTIVE:  alert, well appearing, and in no distress  Visit Vitals  /65 (BP 1 Location: Right arm, BP Patient Position: Sitting, BP Cuff Size: Large adult)   Pulse (!) 56   Temp 97 °F (36.1 °C) (Temporal)   Resp 17   Wt 259 lb 9.6 oz (117.8 kg)   SpO2 95%   BMI 38.34 kg/m²      well developed and well nourished  Back exam - positive straight-leg raise right         Labs:   Lab Results   Component Value Date/Time    Sodium 143 08/06/2021 09:17 AM    Potassium 4.6 08/06/2021 09:17 AM    Chloride 109 (H) 08/06/2021 09:17 AM    CO2 21 08/06/2021 09:17 AM    Anion gap 2 (L) 11/15/2019 09:05 AM    Glucose 110 (H) 08/06/2021 09:17 AM    BUN 16 08/06/2021 09:17 AM    Creatinine 1.16 08/06/2021 09:17 AM    BUN/Creatinine ratio 14 08/06/2021 09:17 AM    GFR est AA 74 08/06/2021 09:17 AM    GFR est non-AA 64 08/06/2021 09:17 AM    Calcium 9.2 08/06/2021 09:17 AM        Discussed the patient's BMI with him. The BMI follow up plan is as follows: I have counseled this patient on diet and exercise regimens. Assessment/Plan      ICD-10-CM ICD-9-CM    1. Chronic midline low back pain with left-sided sciatica  M54.42 724.2  patient continues on pregabalin (LYRICA) 150 mg capsule    G89.29 724.3 HYDROcodone bitartrate (HYSINGLA) 60 mg ER tablet     338.29    2. Neurogenic claudication  M48.062 724.03  patient to follow-up with spine center for further management but in the meantime will increase Lyrica 150 mg to 3 times daily. 3. Opioid use, unspecified with unspecified opioid-induced disorder  F11.99 292.9      305.50    4. Essential hypertension  I10 401.9  controlled on Lopressor 25 mg twice daily METABOLIC PANEL, COMPREHENSIVE   5. Hypercholesterolemia  E78.00 272.0  patient unable to tolerate statins due to myalgias. We will continue to work on trying to lose weight LIPID PANEL   6. Prediabetes  R73.03 790.29  controlled currently with diet. HEMOGLOBIN A1C W/O EAG     Follow-up and Dispositions    · Return in about 4 months (around 12/12/2021) for OV, and Medicare Wellness Visit, labs 1 week before. Reviewed plan of care. Patient has provided input and agrees with goals.

## 2021-09-07 DIAGNOSIS — G89.29 CHRONIC MIDLINE LOW BACK PAIN WITH LEFT-SIDED SCIATICA: ICD-10-CM

## 2021-09-07 DIAGNOSIS — M54.42 CHRONIC MIDLINE LOW BACK PAIN WITH LEFT-SIDED SCIATICA: ICD-10-CM

## 2021-09-07 RX ORDER — HYDROCODONE BITARTRATE 60 MG/1
60 TABLET, EXTENDED RELEASE ORAL DAILY
Qty: 30 TABLET | Refills: 0 | Status: SHIPPED | OUTPATIENT
Start: 2021-09-07 | End: 2021-10-06 | Stop reason: SDUPTHER

## 2021-09-07 NOTE — TELEPHONE ENCOUNTER
[facility] Hysingla as 8/12/21 for a 30 d/s. UDS done 7/9/21  CSA signed 8/27/19    Last Visit: 8/12/21 with MD Fragoso  Next Appointment: 12/16/21 with MD Fragoso  Previous Refill Encounter(s): 8/12/21 #30    Requested Prescriptions     Pending Prescriptions Disp Refills    HYDROcodone bitartrate (HYSINGLA) 60 mg ER tablet 30 Tablet 0     Sig: Take 1 Tablet by mouth daily for 30 days. Max Daily Amount: 60 mg.  *DO NOT CRUSH,CHEW, OR DISSOLVE TABLET*

## 2021-10-06 DIAGNOSIS — M54.42 CHRONIC MIDLINE LOW BACK PAIN WITH LEFT-SIDED SCIATICA: ICD-10-CM

## 2021-10-06 DIAGNOSIS — G89.29 CHRONIC MIDLINE LOW BACK PAIN WITH LEFT-SIDED SCIATICA: ICD-10-CM

## 2021-10-06 RX ORDER — HYDROCODONE BITARTRATE 60 MG/1
60 TABLET, EXTENDED RELEASE ORAL DAILY
Qty: 30 TABLET | Refills: 0 | Status: SHIPPED | OUTPATIENT
Start: 2021-10-06 | End: 2021-11-04 | Stop reason: SDUPTHER

## 2021-10-06 NOTE — TELEPHONE ENCOUNTER
[facility] Hysingla as 9/9/21 for a 30 d/s. UDS done 7/9/21  CSA signed 8/27/19    Last Visit: 8/12/21 with MD Fragoso  Next Appointment: 12/16/21 with MD Fragoso  Previous Refill Encounter(s): 9/7/21 #30    Requested Prescriptions     Pending Prescriptions Disp Refills    HYDROcodone bitartrate (HYSINGLA) 60 mg ER tablet 30 Tablet 0     Sig: Take 1 Tablet by mouth daily for 30 days. Max Daily Amount: 60 mg.  *DO NOT CRUSH,CHEW, OR DISSOLVE TABLET*

## 2021-11-04 DIAGNOSIS — M54.42 CHRONIC MIDLINE LOW BACK PAIN WITH LEFT-SIDED SCIATICA: ICD-10-CM

## 2021-11-04 DIAGNOSIS — G89.29 CHRONIC MIDLINE LOW BACK PAIN WITH LEFT-SIDED SCIATICA: ICD-10-CM

## 2021-11-04 RX ORDER — HYDROCODONE BITARTRATE 60 MG/1
60 TABLET, EXTENDED RELEASE ORAL DAILY
Qty: 30 TABLET | Refills: 0 | Status: SHIPPED | OUTPATIENT
Start: 2021-11-04 | End: 2021-12-02 | Stop reason: SDUPTHER

## 2021-11-04 NOTE — TELEPHONE ENCOUNTER
[facility] Hysingla as 10/7/21 for a 30 d/s. UDS done 7/9/21  CSA signed 8/27/19    Last Visit: 8/12/21 with MD Fragoso  Next Appointment: 12/16/21 with MD Fragoso  Previous Refill Encounter(s): 10/6/21 #30    Requested Prescriptions     Pending Prescriptions Disp Refills    HYDROcodone bitartrate (HYSINGLA) 60 mg ER tablet 30 Tablet 0     Sig: Take 1 Tablet by mouth daily for 30 days. Max Daily Amount: 60 mg.  *DO NOT CRUSH,CHEW, OR DISSOLVE TABLET*

## 2021-12-02 DIAGNOSIS — G89.29 CHRONIC MIDLINE LOW BACK PAIN WITH LEFT-SIDED SCIATICA: ICD-10-CM

## 2021-12-02 DIAGNOSIS — M54.42 CHRONIC MIDLINE LOW BACK PAIN WITH LEFT-SIDED SCIATICA: ICD-10-CM

## 2021-12-02 RX ORDER — HYDROCODONE BITARTRATE 60 MG/1
60 TABLET, EXTENDED RELEASE ORAL DAILY
Qty: 30 TABLET | Refills: 0 | Status: SHIPPED | OUTPATIENT
Start: 2021-12-02 | End: 2021-12-21 | Stop reason: SDUPTHER

## 2021-12-02 NOTE — TELEPHONE ENCOUNTER
[facility] Hysingla as 11/4/21 for a 30 d/s. UDS done 7/9/21  CSA signed 8/27/19    Last Visit: 8/12/21 with MD Fragoso  Next Appointment: 12/16/21 with MD Fragoso  Previous Refill Encounter(s): 11/4/21 #30    Requested Prescriptions     Pending Prescriptions Disp Refills    HYDROcodone bitartrate (HYSINGLA) 60 mg ER tablet 30 Tablet 0     Sig: Take 1 Tablet by mouth daily for 30 days. Max Daily Amount: 60 mg.  *DO NOT CRUSH,CHEW, OR DISSOLVE TABLET*

## 2021-12-02 NOTE — TELEPHONE ENCOUNTER
----- Message from [employer] sent at 12/2/2021 12:56 PM EST -----  Subject: Refill Request    QUESTIONS  Name of Medication? HYDROcodone bitartrate (HYSINGLA) 60 mg ER tablet  Patient-reported dosage and instructions? 60 mg ER tablet use as needed  How many days do you have left? 3  Preferred Pharmacy? [facility] phone number (if available)? [phone]  ---------------------------------------------------------------------------  --------------  CALL BACK INFO  What is the best way for the office to contact you? OK to leave message on   voicemail  Preferred Call Back Phone Number?  [phone]

## 2021-12-07 DIAGNOSIS — G89.29 CHRONIC MIDLINE LOW BACK PAIN WITH LEFT-SIDED SCIATICA: ICD-10-CM

## 2021-12-07 DIAGNOSIS — M54.42 CHRONIC MIDLINE LOW BACK PAIN WITH LEFT-SIDED SCIATICA: ICD-10-CM

## 2021-12-07 RX ORDER — METOPROLOL TARTRATE 25 MG/1
TABLET, FILM COATED ORAL
Qty: 180 TABLET | Refills: 1 | Status: SHIPPED | OUTPATIENT
Start: 2021-12-07 | End: 2022-06-15 | Stop reason: SDUPTHER

## 2021-12-09 ENCOUNTER — APPOINTMENT (OUTPATIENT)
Dept: INTERNAL MEDICINE CLINIC | Age: 66
End: 2021-12-09

## 2021-12-10 LAB
ALBUMIN SERPL-MCNC: 4 G/DL (ref 3.8–4.8)
ALBUMIN/GLOB SERPL: 1.1 {RATIO} (ref 1.2–2.2)
ALP SERPL-CCNC: 83 IU/L (ref 44–121)
ALT SERPL-CCNC: 11 IU/L (ref 0–44)
AST SERPL-CCNC: 14 IU/L (ref 0–40)
BILIRUB SERPL-MCNC: 0.3 MG/DL (ref 0–1.2)
BUN SERPL-MCNC: 12 MG/DL (ref 8–27)
BUN/CREAT SERPL: 10 (ref 10–24)
CALCIUM SERPL-MCNC: 9 MG/DL (ref 8.6–10.2)
CHLORIDE SERPL-SCNC: 109 MMOL/L (ref 96–106)
CHOLEST SERPL-MCNC: 221 MG/DL (ref 100–199)
CO2 SERPL-SCNC: 22 MMOL/L (ref 20–29)
CREAT SERPL-MCNC: 1.22 MG/DL (ref 0.76–1.27)
GLOBULIN SER CALC-MCNC: 3.7 G/DL (ref 1.5–4.5)
GLUCOSE SERPL-MCNC: 102 MG/DL (ref 65–99)
HBA1C MFR BLD: 6.1 % (ref 4.8–5.6)
HDLC SERPL-MCNC: 35 MG/DL
IMP & REVIEW OF LAB RESULTS: NORMAL
LDLC SERPL CALC-MCNC: 165 MG/DL (ref 0–99)
POTASSIUM SERPL-SCNC: 4.5 MMOL/L (ref 3.5–5.2)
PROT SERPL-MCNC: 7.7 G/DL (ref 6–8.5)
SODIUM SERPL-SCNC: 142 MMOL/L (ref 134–144)
TRIGL SERPL-MCNC: 116 MG/DL (ref 0–149)
VLDLC SERPL CALC-MCNC: 21 MG/DL (ref 5–40)

## 2021-12-16 ENCOUNTER — OFFICE VISIT (OUTPATIENT)
Dept: INTERNAL MEDICINE CLINIC | Age: 66
End: 2021-12-16
Payer: MEDICARE

## 2021-12-16 VITALS
OXYGEN SATURATION: 95 % | SYSTOLIC BLOOD PRESSURE: 116 MMHG | HEART RATE: 62 BPM | WEIGHT: 272 LBS | BODY MASS INDEX: 40.29 KG/M2 | RESPIRATION RATE: 20 BRPM | HEIGHT: 69 IN | TEMPERATURE: 97.4 F | DIASTOLIC BLOOD PRESSURE: 75 MMHG

## 2021-12-16 DIAGNOSIS — T46.6X5A MYALGIA DUE TO STATIN: ICD-10-CM

## 2021-12-16 DIAGNOSIS — R05.9 COUGH: ICD-10-CM

## 2021-12-16 DIAGNOSIS — I10 ESSENTIAL HYPERTENSION: ICD-10-CM

## 2021-12-16 DIAGNOSIS — J40 BRONCHITIS: ICD-10-CM

## 2021-12-16 DIAGNOSIS — M48.061 SPINAL STENOSIS OF LUMBAR REGION WITHOUT NEUROGENIC CLAUDICATION: ICD-10-CM

## 2021-12-16 DIAGNOSIS — Z00.00 MEDICARE ANNUAL WELLNESS VISIT, SUBSEQUENT: Primary | ICD-10-CM

## 2021-12-16 DIAGNOSIS — E78.00 HYPERCHOLESTEROLEMIA: ICD-10-CM

## 2021-12-16 DIAGNOSIS — E66.01 MORBID OBESITY (HCC): ICD-10-CM

## 2021-12-16 DIAGNOSIS — R73.03 PREDIABETES: ICD-10-CM

## 2021-12-16 DIAGNOSIS — M79.10 MYALGIA DUE TO STATIN: ICD-10-CM

## 2021-12-16 PROCEDURE — 99214 OFFICE O/P EST MOD 30 MIN: CPT | Performed by: INTERNAL MEDICINE

## 2021-12-16 PROCEDURE — G8754 DIAS BP LESS 90: HCPCS | Performed by: INTERNAL MEDICINE

## 2021-12-16 PROCEDURE — G8417 CALC BMI ABV UP PARAM F/U: HCPCS | Performed by: INTERNAL MEDICINE

## 2021-12-16 PROCEDURE — G8752 SYS BP LESS 140: HCPCS | Performed by: INTERNAL MEDICINE

## 2021-12-16 PROCEDURE — 3017F COLORECTAL CA SCREEN DOC REV: CPT | Performed by: INTERNAL MEDICINE

## 2021-12-16 PROCEDURE — G8536 NO DOC ELDER MAL SCRN: HCPCS | Performed by: INTERNAL MEDICINE

## 2021-12-16 PROCEDURE — 1101F PT FALLS ASSESS-DOCD LE1/YR: CPT | Performed by: INTERNAL MEDICINE

## 2021-12-16 PROCEDURE — G0439 PPPS, SUBSEQ VISIT: HCPCS | Performed by: INTERNAL MEDICINE

## 2021-12-16 PROCEDURE — G8427 DOCREV CUR MEDS BY ELIG CLIN: HCPCS | Performed by: INTERNAL MEDICINE

## 2021-12-16 PROCEDURE — G8510 SCR DEP NEG, NO PLAN REQD: HCPCS | Performed by: INTERNAL MEDICINE

## 2021-12-16 RX ORDER — BRINZOLAMIDE/BRIMONIDINE TARTRATE 10; 2 MG/ML; MG/ML
SUSPENSION/ DROPS OPHTHALMIC
COMMUNITY
Start: 2021-12-09

## 2021-12-16 RX ORDER — GABAPENTIN 300 MG/1
CAPSULE ORAL
Qty: 180 CAPSULE | Refills: 5 | COMMUNITY
Start: 2021-12-16 | End: 2022-04-14 | Stop reason: ALTCHOICE

## 2021-12-16 RX ORDER — CODEINE PHOSPHATE AND GUAIFENESIN 10; 100 MG/5ML; MG/5ML
10 SOLUTION ORAL
Qty: 120 ML | Refills: 0 | Status: SHIPPED | OUTPATIENT
Start: 2021-12-16 | End: 2022-04-28

## 2021-12-16 RX ORDER — PREDNISONE 10 MG/1
TABLET ORAL
Qty: 21 TABLET | Refills: 0 | Status: SHIPPED | OUTPATIENT
Start: 2021-12-16 | End: 2022-09-12

## 2021-12-16 RX ORDER — AZITHROMYCIN 250 MG/1
TABLET, FILM COATED ORAL
Qty: 6 TABLET | Refills: 0 | Status: SHIPPED | OUTPATIENT
Start: 2021-12-16 | End: 2021-12-21

## 2021-12-16 NOTE — PATIENT INSTRUCTIONS
Back Pain: Care Instructions  Your Care Instructions     Back pain has many possible causes. It is often related to problems with muscles and ligaments of the back. It may also be related to problems with the nerves, discs, or bones of the back. Moving, lifting, standing, sitting, or sleeping in an awkward way can strain the back. Sometimes you don't notice the injury until later. Arthritis is another common cause of back pain. Although it may hurt a lot, back pain usually improves on its own within several weeks. Most people recover in 12 weeks or less. Using good home treatment and being careful not to stress your back can help you feel better sooner. Follow-up care is a key part of your treatment and safety. Be sure to make and go to all appointments, and call your doctor if you are having problems. It's also a good idea to know your test results and keep a list of the medicines you take. How can you care for yourself at home? · Sit or lie in positions that are most comfortable and reduce your pain. Try one of these positions when you lie down:  ? Lie on your back with your knees bent and supported by large pillows. ? Lie on the floor with your legs on the seat of a sofa or chair. ? Lie on your side with your knees and hips bent and a pillow between your legs. ? Lie on your stomach if it does not make pain worse. · Do not sit up in bed, and avoid soft couches and twisted positions. Bed rest can help relieve pain at first, but it delays healing. Avoid bed rest after the first day of back pain. · Change positions every 30 minutes. If you must sit for long periods of time, take breaks from sitting. Get up and walk around, or lie in a comfortable position. · Try using a heating pad on a low or medium setting for 15 to 20 minutes every 2 or 3 hours. Try a warm shower in place of one session with the heating pad. · You can also try an ice pack for 10 to 15 minutes every 2 to 3 hours.  Put a thin cloth between the ice pack and your skin. · Take pain medicines exactly as directed. ? If the doctor gave you a prescription medicine for pain, take it as prescribed. ? If you are not taking a prescription pain medicine, ask your doctor if you can take an over-the-counter medicine. · Take short walks several times a day. You can start with 5 to 10 minutes, 3 or 4 times a day, and work up to longer walks. Walk on level surfaces and avoid hills and stairs until your back is better. · Return to work and other activities as soon as you can. Continued rest without activity is usually not good for your back. · To prevent future back pain, do exercises to stretch and strengthen your back and stomach. Learn how to use good posture, safe lifting techniques, and proper body mechanics. When should you call for help? Call your doctor now or seek immediate medical care if:    · You have new or worsening numbness in your legs.     · You have new or worsening weakness in your legs. (This could make it hard to stand up.)     · You lose control of your bladder or bowels. Watch closely for changes in your health, and be sure to contact your doctor if:    · You have a fever, lose weight, or don't feel well.     · You do not get better as expected. Where can you learn more? Go to http://www.workman.com/  Enter I594 in the search box to learn more about \"Back Pain: Care Instructions. \"  Current as of: July 1, 2021               Content Version: 13.0  © 3563-2846 Healthwise, Incorporated. Care instructions adapted under license by [employer] (which disclaims liability or warranty for this information). If you have questions about a medical condition or this instruction, always ask your healthcare professional. [place] [address] any warranty or liability for your use of this information.       Medicare Wellness Visit, Male    The best way to live healthy is to have a lifestyle where you eat a well-balanced diet, exercise regularly, limit alcohol use, and quit all forms of tobacco/nicotine, if applicable. Regular preventive services are another way to keep healthy. Preventive services (vaccines, screening tests, monitoring & exams) can help personalize your care plan, which helps you manage your own care. Screening tests can find health problems at the earliest stages, when they are easiest to treat. [facility] follows the current, evidence-based guidelines published by the [facility] Manzano ([facility]) when recommending preventive services for our patients. Because we follow these guidelines, sometimes recommendations change over time as research supports it. (For example, a prostate screening blood test is no longer routinely recommended for men with no symptoms). Of course, you and your doctor may decide to screen more often for some diseases, based on your risk and co-morbidities (chronic disease you are already diagnosed with). Preventive services for you include:  - Medicare offers their members a free annual wellness visit, which is time for you and your primary care provider to discuss and plan for your preventive service needs. Take advantage of this benefit every year!  -All adults over age 72 should receive the recommended pneumonia vaccines. Current USPSTF guidelines recommend a series of two vaccines for the best pneumonia protection.   -All adults should have a flu vaccine yearly and tetanus vaccine every 10 years.  -All adults age 48 and older should receive the shingles vaccines (series of two vaccines). -All adults age 38-68 who are overweight should have a diabetes screening test once every three years.   -Other screening tests & preventive services for persons with diabetes include: an eye exam to screen for diabetic retinopathy, a kidney function test, a foot exam, and stricter control over your cholesterol. -Cardiovascular screening for adults with routine risk involves an electrocardiogram (ECG) at intervals determined by the provider.   -Colorectal cancer screening should be done for adults age 54-65 with no increased risk factors for colorectal cancer. There are a number of acceptable methods of screening for this type of cancer. Each test has its own benefits and drawbacks. Discuss with your provider what is most appropriate for you during your annual wellness visit. The different tests include: colonoscopy (considered the best screening method), a fecal occult blood test, a fecal DNA test, and sigmoidoscopy.  -All adults born between [facility] should be screened once for Hepatitis C.  -An Abdominal Aortic Aneurysm (AAA) Screening is recommended for men age 73-68 who has ever smoked in their lifetime.      Here is a list of your current Health Maintenance items (your personalized list of preventive services) with a due date:  Health Maintenance Due   Topic Date Due    DTaP/Tdap/Td  (1 - Tdap) Never done    AAA Screening  Never done    Shingles Vaccine (2 of 2) 11/15/2018    Yearly Flu Vaccine (1) 09/01/2021    Pneumococcal Vaccine (2 of 2 - PPSV23) 11/05/2021    COVID-19 Vaccine (3 - Booster for Moderna series) 12/05/2021

## 2021-12-16 NOTE — PROGRESS NOTES
This is the Subsequent Medicare Annual Wellness Exam, performed 12 months or more after the Initial AWV or the last Subsequent AWV    I have reviewed the patient's medical history in detail and updated the computerized patient record. Assessment/Plan   Education and counseling provided:  Are appropriate based on today's review and evaluation  End-of-Life planning (with patient's consent)    1. Medicare annual wellness visit, subsequent  2. Essential hypertension  -     METABOLIC PANEL, COMPREHENSIVE; Future  -     MICROALBUMIN, UR, RAND W/ MICROALB/CREAT RATIO; Future  3. Hypercholesterolemia  -     LIPID PANEL; Future  4. Prediabetes  -     HEMOGLOBIN A1C W/O EAG; Future  5. Bronchitis  -     predniSONE (STERAPRED DS) 10 mg dose pack; See administration instruction per 10mg dose pack, Normal, Disp-21 Tablet, R-0  -     azithromycin (ZITHROMAX) 250 mg tablet; Take 2 tablets today, then take 1 tablet daily, Normal, Disp-6 Tablet, R-0  6. Morbid obesity ([address]  7. Spinal stenosis of lumbar region without neurogenic claudication  8. Cough  -     guaiFENesin-codeine (Cheratussin AC) 100-10 mg/5 mL solution; Take 10 mL by mouth four (4) times daily as needed for Cough for up to 7 days. Max Daily Amount: 40 mL., Normal, Disp-120 mL, R-0  9. Myalgia due to statin       Depression Risk Factor Screening     3 most recent PHQ Screens 12/16/2021   Little interest or pleasure in doing things Not at all   Feeling down, depressed, irritable, or hopeless Not at all   Total Score PHQ 2 0       Alcohol Risk Screen    Do you average more than 1 drink per night or more than 7 drinks a week: No    In the past three months have you have had more than 4 drinks containing alcohol on one occasion: No        Functional Ability and Level of Safety    Hearing: Hearing is good. Activities of Daily Living: The home contains: no safety equipment.   Patient does total self care      Ambulation: with no difficulty     Fall Risk:  Fall Risk Assessment, last 12 mths 12/16/2021   Able to walk? Yes   Fall in past 12 months? 0   Do you feel unsteady? 0   Are you worried about falling 0   Number of falls in past 12 months -   Fall with injury? -      Abuse Screen:  Patient is not abused       Cognitive Screening    Has your family/caregiver stated any concerns about your memory: no     Cognitive Screening: Normal - . Health Maintenance Due     Health Maintenance Due   Topic Date Due    DTaP/Tdap/Td series (1 - Tdap) Never done    AAA Screening 73-69 YO Male Smoking Patients  Never done    Shingrix Vaccine Age 49> (2 of 2) 11/15/2018    Flu Vaccine (1) 09/01/2021    Pneumococcal 65+ years (2 of 2 - PPSV23) 11/05/2021    COVID-19 Vaccine (3 - Booster for Moderna series) 12/05/2021       Patient Care Team   Patient Care Team:  Emily Shaffer MD as PCP - General (Internal Medicine)  Emily Shaffer MD as PCP - REHABILITATION [facility] Empaneled Provider  Makayla Madrid MD as Physician (Gastroenterology)  Dr. Cheri Alex as Physician (Cardiology)  Naomy Aldrich MD (Ophthalmology)  Michael Montilla MD (Physical Medicine and Rehabilitation)  Glaucoma Screening- UTD;  Pneumonia Vaccine-  needs pneumovax 23 11/2021  Shingles Vaccine- pt aware of SHingrinx and need 2 nd   Tdap Vaccine-not UTD. Can get at pharmacy   Colonoscopy- UTD; done 4/2015 by Dr. Kurt Wheeler with follow up in 10 years recommended  PSA- 11/2020  0. 21. followed by Dr Baljinder Layton on file, verbal and written information provided to patient regarding its purpose and importance    History     Patient Active Problem List   Diagnosis Code    Hypercholesterolemia E78.00    Allergic rhinitis J30.9    Sleep apnea G47.30    GERD (gastroesophageal reflux disease) K21.9    Obesity E66.9    Glaucoma H40.9    HTN (hypertension) I10    Chronic back pain M54.9, G89.29    Chest pain, unspecified R07.9    Dyslipidemia, goal LDL below 100 E78.5    Other dyspnea and respiratory abnormality R06.09, R09.89    Palpitations R00.2    Degenerative joint disease M19.90    Prediabetes R73.03    Right hip pain M25.551    Right knee pain M25.561    Enlarged prostate with lower urinary tract symptoms (LUTS) N40.1    Urinary frequency R35.0    Sense of Incomplete bladder emptying R33.9    Nocturia R35.1    Slowing of urinary stream R39.198    Hip arthritis M16.10    Abdominal discomfort R10.9    Diarrhea R19.7    Primary insomnia F51.01    Obstructive sleep apnea syndrome G47.33    Bug bite with infection W57. XXXA    Cough R05.9    Obesity (BMI 30-39. 9) E66.9    Severe obesity (HCC) E66.01    Opioid use, unspecified with unspecified opioid-induced disorder F11.99     Past Medical History:   Diagnosis Date    Allergic rhinitis     Avascular necrosis (HCC)     CAD (coronary artery disease) 4/2009    mild, hemodynamically non-significant by angiography    Carpal tunnel syndrome     Carpal tunnel syndrome on both sides     Chest pain, unspecified     Chronic back pain 2/11/2011    Chronic obstructive pulmonary disease ([address]     Degenerative joint disease     with chronic back pain    Dysesthesia     of hand post carpal tunnel surgery v sudeck's atrophy    Dyslipidemia, goal LDL below 100     Dysphagia     GERD (gastroesophageal reflux disease) 5/17/2010    Glaucoma 9/15/2010    Glaucoma     Hearing loss     Hypercholesterolemia     Hypertension     Joint fusion     of right wrist    Morbid obesity ([address]     Obesity 5/17/2010    Other dyspnea and respiratory abnormality     Palpitations     Right hip pain     Right hip pain     Right knee pain     S/P cardiac catheterization 4/9/2009    1. Normal systemic pressure. 2. Moderate elevation of left-sided filling pressures. 3. Preserved left ventricular systolic function in the LOWE projection. 4. Mild, nonsignificant epicardial coronary artery disese by angiography.     Sleep apnea     on CPAP    Strain of lumbar region     Tobacco abuse       Past Surgical History:   Procedure Laterality Date    HX CARPAL TUNNEL RELEASE      bilateral wrists    HX COLONOSCOPY      HX HEART CATHETERIZATION  4/9/2009    1. Normal systemic pressure. 2. Moderate elevation of left-sided filling pressures. 3. Preserved left ventricular systolic function in the LOWE projection. 4. Mild, nonsignificant epicardial coronary artery disese by angiography.  HX HIP REPLACEMENT      HX OTHER SURGICAL  02/2017    left shoulder surgery    HX TONSILLECTOMY      HX WRIST FRACTURE TX      HI ANESTH,SURGERY OF SHOULDER       Current Outpatient Medications   Medication Sig Dispense Refill    Simbrinza 1-0.2 % drps       gabapentin (NEURONTIN) 300 mg capsule 2 po tid -- taper up as directed  Indications: neuropathic pain 180 Capsule 5    predniSONE (STERAPRED DS) 10 mg dose pack See administration instruction per 10mg dose pack 21 Tablet 0    guaiFENesin-codeine (Cheratussin AC) 100-10 mg/5 mL solution Take 10 mL by mouth four (4) times daily as needed for Cough for up to 7 days. Max Daily Amount: 40 mL. 120 mL 0    metoprolol tartrate (LOPRESSOR) 25 mg tablet Take 1 tablet by mouth twice daily 180 Tablet 1    pantoprazole (PROTONIX) 40 mg tablet Take 1 tablet by mouth once daily 90 Tablet 3    tiZANidine (ZANAFLEX) 4 mg tablet Take 1 tablet by mouth three times daily as needed for muscle spasm 90 Tablet 1    traZODone (DESYREL) 50 mg tablet TAKE 1-2 TABLETs BY MOUTH ONCE DAILY AT BEDTIME AS NEEDED FOR SLEEP 30 Tablet 2    latanoprost (XALATAN) 0.005 % ophthalmic solution INSTILL 1 DROP INTO EACH EYE AT BEDTIME      umeclidinium-vilanteroL (Anoro Ellipta) 62.5-25 mcg/actuation inhaler Take 1 Puff by inhalation daily.  1 Inhaler 5    albuterol (PROVENTIL HFA, VENTOLIN HFA, PROAIR HFA) 90 mcg/actuation inhaler INHALE 2 PUFFS BY MOUTH EVERY 4 HOURS AS NEEDED FOR WHEEZING 1 Inhaler 5    Narcan 4 mg/actuation nasal spray ADMINISTER A SINGLE SPRAY IN ONE NOSTRIL UPON SIGNS OF OPIOID OVERDOSE. CALL 911. REPEAT AFTER 3 MINUTES IF NO RESPONSE.  tamsulosin (FLOMAX) 0.4 mg capsule TAKE 1 CAPSULE BY MOUTH ONCE DAILY AFTER SUPPER 90 Cap 0    ibuprofen (MOTRIN) 800 mg tablet TAKE 1 TABLET BY MOUTH EVERY 8 HOURS AS NEEDED FOR PAIN 90 Tab 5    montelukast (SINGULAIR) 10 mg tablet Take 1 tablet by mouth once daily 30 Tab 5    azelastine (ASTELIN) 137 mcg (0.1 %) nasal spray 2 Sprays by Both Nostrils route two (2) times a day. Use in each nostril as directed  Indications: seasonal runny nose 1 Bottle 5    fluticasone propionate (FLONASE) 50 mcg/actuation nasal spray USE TWO SPRAY(S) IN EACH NOSTRIL ONCE DAILY 1 Bottle 5    aspirin delayed-release 81 mg tablet Take 81 mg by mouth daily.  lactulose (CONSTULOSE) 10 gram/15 mL solution Take 45ml PO  mL 2    nitroglycerin (NITROSTAT) 0.4 mg SL tablet 1 Tab by SubLINGual route every five (5) minutes as needed. 6 Tab 1    TRAVATAN Z 0.004 % ophthalmic solution Administer 1 Drop to both eyes nightly.  HYDROcodone bitartrate (HYSINGLA) 60 mg ER tablet Take 1 Tablet by mouth daily for 30 days. Max Daily Amount: 60 mg. *DO NOT CRUSH,CHEW, OR DISSOLVE TABLET* 30 Tablet 0    nitrofurantoin, macrocrystal-monohydrate, (MACROBID) 100 mg capsule Take 1 Cap by mouth two (2) times a day. (Patient not taking: Reported on 12/16/2021) 14 Cap 0    guaiFENesin ER (MUCINEX) 600 mg ER tablet Take 1 Tab by mouth two (2) times a day.  (Patient not taking: Reported on 8/12/2021) 30 Tab 5     Allergies   Allergen Reactions    Latex Not Reported This Time    Ace Inhibitors Cough    Dilaudid [Hydromorphone] Hives    Lisinopril Cough    Zocor [Simvastatin] Myalgia     Right leg       Family History   Problem Relation Age of Onset    Diabetes Mother     Cancer Father     Hypertension Other     OSTEOARTHRITIS Other      Social History     Tobacco Use    Smoking status: Current Every Day Smoker     Packs/day: 0.10 Years: 20.00     Pack years: 2.00    Smokeless tobacco: Never Used    Tobacco comment: urothelial cancer risk discussed today [id number]   Substance Use Topics    Alcohol use: No     Alcohol/week: 0.0 standard drinks     A comprehensive 5 year plan for medical care and screening exams was reviewed with pt and they received a copy of it.         Emre Madsen LPN

## 2021-12-16 NOTE — PROGRESS NOTES
Patient is in the office today for a 4 month follow up, and Medicare Wellness Visit. Do you have an Advance Directive no  Do you want more information : information given     1. \"Have you been to the ER, urgent care clinic since your last visit? Hospitalized since your last visit? \" No    2. \"Have you seen or consulted any other health care providers outside of the [address] [place] [address] since your last visit? \" No     3. For patients aged 39-70: Has the patient had a colonoscopy / FIT/ Cologuard? Yes, HM satisfied with blue hyperlink     If the patient is female:    4. For patients aged 41-77: Has the patient had a mammogram within the past 2 years? NA based on age or sex    11. For patients aged 21-65: Has the patient had a pap smear?  NA based on age or sex

## 2021-12-21 NOTE — TELEPHONE ENCOUNTER
----- Message from Alan Camara sent at 12/21/2021 10:16 AM EST -----  Subject: Refill Request    QUESTIONS  Name of Medication? HYDROcodone bitartrate (HYSINGLA) 60 mg ER tablet  Patient-reported dosage and instructions? 60 mg  How many days do you have left? 4  [facility]erred [facility]? [facility] phone number (if available)? [phone]  ---------------------------------------------------------------------------  --------------  CALL BACK INFO  What is the best way for the office to contact you? OK to leave message on   voicemail  Preferred Call Back Phone Number?  [phone]

## 2021-12-22 RX ORDER — HYDROCODONE BITARTRATE 60 MG/1
60 TABLET, EXTENDED RELEASE ORAL DAILY
Qty: 30 TABLET | Refills: 0 | Status: SHIPPED | OUTPATIENT
Start: 2021-12-22 | End: 2022-01-21 | Stop reason: SDUPTHER

## 2021-12-22 NOTE — TELEPHONE ENCOUNTER
Last Visit: 12/16/2021 with MD Fragoso   Next Appointment: 4/21/2022 with MD Fragoso   Previous Refill Encounter(s): 12/2/2021 per MD Fragoso #[id number]    Requested Prescriptions     Pending Prescriptions Disp Refills    HYDROcodone bitartrate (HYSINGLA) 60 mg ER tablet 30 Tablet 0     Sig: Take 1 Tablet by mouth daily for 30 days. Max Daily Amount: 60 mg.  *DO NOT CRUSH,CHEW, OR DISSOLVE TABLET*     Signed Prescriptions Disp Refills    metoprolol tartrate (LOPRESSOR) 25 mg tablet 180 Tablet 1     Sig: Take 1 tablet by mouth twice daily     Authorizing Provider: WILLIAM Weller

## 2021-12-22 NOTE — PROGRESS NOTES
Subjective:       Chief Complaint  The patient presents for follow up of hypertension and high cholesterol. prediabetes  chronic back pain and right hip pain, COPD        HPI  Jennifer Cade is a 74 y.o. male seen for follow up of hyperlipidemia. Nathalia has hypertension. Hypertension well controlled, no significant medication side effects noted, on Lopressor, hyperlipidemia uncontrolled, pt has been unable to tolerate statins due to myalgias. The 10-year ASCVD risk score (Twin Bianchi, et al., 2013) is: 27.6%      Diet and Lifestyle: not attempting to follow a low fat, low cholesterol diet, exercises sporadically    Home BP Monitoring: is not measured at home. Jennifer Cade RTC today to follow up on chronic pain diagnosis. We discussed his osteoarthritis that is affecting his back. Significant changes since last visit: pt is having neurogenic claudication and has pain with standing and walking in both legs. He is  able to do his normal daily activities. He reports the following adverse side effects: none. Pt doing fairly well with Hysingla ER 60 mg. [facility] and was referred for aqua therapy but did not tolerate it due to pain. His MRI of his LS shows worsening DJD so he will f/u with the [facility]. Lyrica has helped theneurogenic pain. Least pain over the last week has been 3/10. Worst pain over the last week has been 10/10. Opioid Risk Tool Reviewed: YES    Aberrant behaviors: None. Urine Drug Screen: reviewed and up to date. Controlled substance agreement on file: YES.  reviewed:yes    Pill count is consistent with his prescription: yes    Concomitant use of a benzodiazepine: no        Prediabetes: pt is trying to control with diet and weight loss but he continues to struggle to lose weight. His BMI of 38 puts him at high risk of multiple medical problems. COPD Review:  The patient is being seen for follow up of COPD.    Oxygen: He currently is not on home oxygen therapy. He is using Anoro Ellipta with good results. Symptoms: chronic dyspnea: severity = mild: course of sx: symptoms have progressed to a point and plateaued. .   Patient uses 0 pillows at night. Patient does smoke cigarettes. Patient having current exacerbation of chronic bronchitis and will treat with prednisone and antibiotic. Pt has sleep apnea is trying to use CPAP on a regular basis. He will f/u with his sleep specialist Dr Walter Sanders. He is having some insomnia for which we will try trazodone. Patient has had some increased azotemia with creatinine increasing 1.46. Patient will try and avoid NSAIDs and will recheck creatinine in 4 weeks. Patient will try and hydrate himself well. Current Outpatient Medications   Medication Sig    Simbrinza 1-0.2 % drps     gabapentin (NEURONTIN) 300 mg capsule 2 po tid -- taper up as directed  Indications: neuropathic pain    predniSONE (STERAPRED DS) 10 mg dose pack See administration instruction per 10mg dose pack    guaiFENesin-codeine (Cheratussin AC) 100-10 mg/5 mL solution Take 10 mL by mouth four (4) times daily as needed for Cough for up to 7 days. Max Daily Amount: 40 mL.  metoprolol tartrate (LOPRESSOR) 25 mg tablet Take 1 tablet by mouth twice daily    pantoprazole (PROTONIX) 40 mg tablet Take 1 tablet by mouth once daily    tiZANidine (ZANAFLEX) 4 mg tablet Take 1 tablet by mouth three times daily as needed for muscle spasm    traZODone (DESYREL) 50 mg tablet TAKE 1-2 TABLETs BY MOUTH ONCE DAILY AT BEDTIME AS NEEDED FOR SLEEP    latanoprost (XALATAN) 0.005 % ophthalmic solution INSTILL 1 DROP INTO EACH EYE AT BEDTIME    umeclidinium-vilanteroL (Anoro Ellipta) 62.5-25 mcg/actuation inhaler Take 1 Puff by inhalation daily.     albuterol (PROVENTIL HFA, VENTOLIN HFA, PROAIR HFA) 90 mcg/actuation inhaler INHALE 2 PUFFS BY MOUTH EVERY 4 HOURS AS NEEDED FOR WHEEZING    Narcan 4 mg/actuation nasal spray ADMINISTER A SINGLE SPRAY IN ONE NOSTRIL UPON SIGNS OF OPIOID OVERDOSE. CALL 911. REPEAT AFTER 3 MINUTES IF NO RESPONSE.  tamsulosin (FLOMAX) 0.4 mg capsule TAKE 1 CAPSULE BY MOUTH ONCE DAILY AFTER SUPPER    ibuprofen (MOTRIN) 800 mg tablet TAKE 1 TABLET BY MOUTH EVERY 8 HOURS AS NEEDED FOR PAIN    montelukast (SINGULAIR) 10 mg tablet Take 1 tablet by mouth once daily    azelastine (ASTELIN) 137 mcg (0.1 %) nasal spray 2 Sprays by Both Nostrils route two (2) times a day. Use in each nostril as directed  Indications: seasonal runny nose    fluticasone propionate (FLONASE) 50 mcg/actuation nasal spray USE TWO SPRAY(S) IN EACH NOSTRIL ONCE DAILY    aspirin delayed-release 81 mg tablet Take 81 mg by mouth daily.  lactulose (CONSTULOSE) 10 gram/15 mL solution Take 45ml PO TID    nitroglycerin (NITROSTAT) 0.4 mg SL tablet 1 Tab by SubLINGual route every five (5) minutes as needed.  TRAVATAN Z 0.004 % ophthalmic solution Administer 1 Drop to both eyes nightly.  HYDROcodone bitartrate (HYSINGLA) 60 mg ER tablet Take 1 Tablet by mouth daily for 30 days. Max Daily Amount: 60 mg. *DO NOT CRUSH,CHEW, OR DISSOLVE TABLET*    nitrofurantoin, macrocrystal-monohydrate, (MACROBID) 100 mg capsule Take 1 Cap by mouth two (2) times a day. (Patient not taking: Reported on 12/16/2021)    guaiFENesin ER (MUCINEX) 600 mg ER tablet Take 1 Tab by mouth two (2) times a day. (Patient not taking: Reported on 8/12/2021)     No current facility-administered medications for this visit.              Review of Systems  Respiratory: negative for dyspnea on exertion  Cardiovascular: negative for chest pain    Objective:     Visit Vitals  /75   Pulse 62   Temp 97.4 °F (36.3 °C) (Temporal)   Resp 20   Ht 5' 9\" (1.753 m)   Wt 272 lb (123.4 kg)   SpO2 95%   BMI 40.17 kg/m²        General appearance - alert, well appearing, and in no distress  Neck - supple, no significant adenopathy, carotids upstroke normal bilaterally, no bruits  Chest - clear to auscultation, no wheezes, rales or rhonchi, symmetric air entry  Heart - normal rate, regular rhythm, normal S1, S2, no murmurs, rubs, clicks or gallops  Extremities - peripheral pulses normal, no pedal edema, no clubbing or cyanosis  Skin - normal coloration and turgor, no rashes, no suspicious skin lesions noted      Labs:   Lab Results   Component Value Date/Time    Hemoglobin A1c 6.1 (H) 12/09/2021 10:37 AM    Hemoglobin A1c 5.8 (H) 07/09/2021 12:00 AM    Hemoglobin A1c 5.9 (H) 03/05/2021 09:08 AM    Glucose 102 (H) 12/09/2021 10:37 AM    Glucose (POC) 123 (H) 01/24/2016 04:57 PM    Glucose,  (H) 04/01/2015 08:56 AM    Microalb/Creat ratio (ug/mg creat.) 4.4 04/29/2015 03:00 PM    LDL, calculated 165 (H) 12/09/2021 10:37 AM    LDL, calculated 131 (H) 07/28/2020 09:54 AM    Creatinine 1.22 12/09/2021 10:37 AM      Lab Results   Component Value Date/Time    Cholesterol, total 221 (H) 12/09/2021 10:37 AM    HDL Cholesterol 35 (L) 12/09/2021 10:37 AM    LDL, calculated 165 (H) 12/09/2021 10:37 AM    LDL, calculated 131 (H) 07/28/2020 09:54 AM    Triglyceride 116 12/09/2021 10:37 AM    CHOL/HDL Ratio 4.4 11/15/2019 09:05 AM     Lab Results   Component Value Date/Time    ALT (SGPT) 11 12/09/2021 10:37 AM    Alk.  phosphatase 83 12/09/2021 10:37 AM    Bilirubin, total 0.3 12/09/2021 10:37 AM     Lab Results   Component Value Date/Time    GFR est AA 70 12/09/2021 10:37 AM    GFR est non-AA 61 12/09/2021 10:37 AM    Creatinine 1.22 12/09/2021 10:37 AM    BUN 12 12/09/2021 10:37 AM    BUN, POC 6 (L) 04/01/2015 08:56 AM    Sodium,  04/01/2015 08:56 AM    Sodium 142 12/09/2021 10:37 AM    Potassium 4.5 12/09/2021 10:37 AM    Potassium, POC 4.0 04/01/2015 08:56 AM    Chloride,  04/01/2015 08:56 AM    Chloride 109 (H) 12/09/2021 10:37 AM    CO2 22 12/09/2021 10:37 AM      Lab Results   Component Value Date/Time    Glucose 102 (H) 12/09/2021 10:37 AM    Glucose (POC) 123 (H) 01/24/2016 04:57 PM    Glucose,  (H) 04/01/2015 08:56 AM            Assessment / Plan     Hypertension well controlled, on Lopressor   Hyperlipidemia uncontrolled, pt unable to tolerate statins. ICD-10-CM ICD-9-CM    1. Medicare annual wellness visit, subsequent  Z00.00 V70.0    2. Essential hypertension  N74 814.7 METABOLIC PANEL, COMPREHENSIVE      MICROALBUMIN, UR, RAND W/ MICROALB/CREAT RATIO   3. Hypercholesterolemia  E78.00 272.0 LIPID PANEL   4. Prediabetes  R73.03 790.29  patient  will try to keep under control by cutting back starches and sweets in his diet HEMOGLOBIN A1C W/O EAG   5. Bronchitis  J40 490  will treat with predniSONE (STERAPRED DS) 10 mg dose pack      azithromycin (ZITHROMAX) 250 mg tablet   6. Morbid obesity ([address]  E66.01 278.01  patient encouraged to cut back starches and sweets in his diet. 7. Spinal stenosis of lumbar region without neurogenic claudication  M48.061 724.02  continues on Hysingla and Lyrica. Can change to gabapentin (NEURONTIN) 300 mg capsule if cost is an issue   8. Cough  R05.9 786.2 guaiFENesin-codeine (Cheratussin AC) 100-10 mg/5 mL solution   9. Myalgia due to statin  M79.10 729.1  patient unable to tolerate statins    T46.6X5A E942.2              Low cholesterol diet, weight control and daily exercise discussed. Follow-up and Dispositions    · Return in about 4 months (around 4/16/2022) for labs 1 week before. Reviewed plan of care. Patient has provided input and agrees with goals.

## 2022-01-11 RX ORDER — IBUPROFEN 800 MG/1
TABLET ORAL
Qty: 90 TABLET | Refills: 0 | Status: SHIPPED | OUTPATIENT
Start: 2022-01-11 | End: 2022-03-28

## 2022-01-12 ENCOUNTER — OFFICE VISIT (OUTPATIENT)
Dept: ORTHOPEDIC SURGERY | Age: 67
End: 2022-01-12
Payer: MEDICARE

## 2022-01-12 VITALS
OXYGEN SATURATION: 97 % | WEIGHT: 269.4 LBS | BODY MASS INDEX: 39.9 KG/M2 | HEART RATE: 83 BPM | HEIGHT: 69 IN | TEMPERATURE: 96 F

## 2022-01-12 DIAGNOSIS — R20.0 NUMBNESS AND TINGLING OF BOTH LEGS BELOW KNEES: ICD-10-CM

## 2022-01-12 DIAGNOSIS — M54.50 ACUTE BILATERAL LOW BACK PAIN, UNSPECIFIED WHETHER SCIATICA PRESENT: Primary | ICD-10-CM

## 2022-01-12 DIAGNOSIS — M25.551 RIGHT HIP PAIN: ICD-10-CM

## 2022-01-12 DIAGNOSIS — E66.01 SEVERE OBESITY (BMI 35.0-35.9 WITH COMORBIDITY) (HCC): ICD-10-CM

## 2022-01-12 DIAGNOSIS — M16.12 PRIMARY OSTEOARTHRITIS OF LEFT HIP: ICD-10-CM

## 2022-01-12 DIAGNOSIS — R20.2 NUMBNESS AND TINGLING OF BOTH LEGS BELOW KNEES: ICD-10-CM

## 2022-01-12 PROCEDURE — G8510 SCR DEP NEG, NO PLAN REQD: HCPCS | Performed by: ORTHOPAEDIC SURGERY

## 2022-01-12 PROCEDURE — G8536 NO DOC ELDER MAL SCRN: HCPCS | Performed by: ORTHOPAEDIC SURGERY

## 2022-01-12 PROCEDURE — G8417 CALC BMI ABV UP PARAM F/U: HCPCS | Performed by: ORTHOPAEDIC SURGERY

## 2022-01-12 PROCEDURE — 3017F COLORECTAL CA SCREEN DOC REV: CPT | Performed by: ORTHOPAEDIC SURGERY

## 2022-01-12 PROCEDURE — 99214 OFFICE O/P EST MOD 30 MIN: CPT | Performed by: ORTHOPAEDIC SURGERY

## 2022-01-12 PROCEDURE — G8427 DOCREV CUR MEDS BY ELIG CLIN: HCPCS | Performed by: ORTHOPAEDIC SURGERY

## 2022-01-12 PROCEDURE — 72100 X-RAY EXAM L-S SPINE 2/3 VWS: CPT | Performed by: ORTHOPAEDIC SURGERY

## 2022-01-12 PROCEDURE — 1101F PT FALLS ASSESS-DOCD LE1/YR: CPT | Performed by: ORTHOPAEDIC SURGERY

## 2022-01-12 PROCEDURE — G8756 NO BP MEASURE DOC: HCPCS | Performed by: ORTHOPAEDIC SURGERY

## 2022-01-12 PROCEDURE — 72170 X-RAY EXAM OF PELVIS: CPT | Performed by: ORTHOPAEDIC SURGERY

## 2022-01-12 NOTE — PROGRESS NOTES
Patient: Reyes Chung                MRN: [id number]       SSN: [id number]  YOB: 1955        AGE: 74 y.o. SEX: male  Body mass index is 39.78 kg/m². PCP: Candance Cornell, MD  01/12/22    Presents today with low back pain with radiculopathy going down both legs sometimes worse on the right than on the left no recent bowel or bladder problems    Has moderate radicular he says he is feels like he is being stabbed with needles and it can be in both legs he is status post hip replacement he is done extremely well with his hip replacement he tends to walk somewhat kyphotic and but with no antalgic or Trendelenburg component to the gait hips rotate nicely both feet warm and well-perfused he does have decreased sensation especially L4 on the right side there is no foot drop but there is a little bit of weakness involving ankle dorsiflexion on the right EHL is 5- out of 5 on the right on the left side 5 out of 5 straight leg raise is equivocal bilaterally and no cyanosis peripheral edema or clubbing he looks well body mass index is right at 40 today. Review of x-rays today on 1/12/2022 AP pelvis confirms that the hip replacements anatomically placed well aligned well fixed well osseous integrated and AP and lateral of the lumbar spine my resident points out the patient has at least a type I almost type II spondylolisthesis of L4 on 5 with foraminal stenosis and significant degenerative disc disease does have a little bit of superior endplate and chronic changes involving L3 but is noncontributory today overall impression radicular syndrome likely spinal stenosis multifactorial with spondylolisthesis.     I recommend MRI with positive neuro exam and history we will see him back in follow-up afterwards thank you    REVIEW OF SYSTEMS:      CON: negative  EYE: negative   ENT: negative  RESP: negative  GI:    negative   :  negative  MSK: Positive  A twelve point review of systems was completed, positives noted and all other systems were reviewed and are negative          Past Medical History:   Diagnosis Date    Allergic rhinitis     Avascular necrosis ([address]     CAD (coronary artery disease) 4/2009    mild, hemodynamically non-significant by angiography    Carpal tunnel syndrome     Carpal tunnel syndrome on both sides     Chest pain, unspecified     Chronic back pain 2/11/2011    Chronic obstructive pulmonary disease ([address]     Degenerative joint disease     with chronic back pain    Dysesthesia     of hand post carpal tunnel surgery v sudeck's atrophy    Dyslipidemia, goal LDL below 100     Dysphagia     GERD (gastroesophageal reflux disease) 5/17/2010    Glaucoma 9/15/2010    Glaucoma     Hearing loss     Hypercholesterolemia     Hypertension     Joint fusion     of right wrist    Morbid obesity ([address]     Obesity 5/17/2010    Other dyspnea and respiratory abnormality     Palpitations     Right hip pain     Right hip pain     Right knee pain     S/P cardiac catheterization 4/9/2009    1. Normal systemic pressure. 2. Moderate elevation of left-sided filling pressures. 3. Preserved left ventricular systolic function in the LOWE projection. 4. Mild, nonsignificant epicardial coronary artery disese by angiography.     Sleep apnea     on CPAP    Strain of lumbar region     Tobacco abuse        Family History   Problem Relation Age of Onset    Diabetes Mother     Cancer Father     Hypertension Other     OSTEOARTHRITIS Other        Current Outpatient Medications   Medication Sig Dispense Refill    gabapentin (NEURONTIN) 300 mg capsule 2 po tid -- taper up as directed  Indications: neuropathic pain 180 Capsule 5    metoprolol tartrate (LOPRESSOR) 25 mg tablet Take 1 tablet by mouth twice daily 180 Tablet 1    pantoprazole (PROTONIX) 40 mg tablet Take 1 tablet by mouth once daily 90 Tablet 3    traZODone (DESYREL) 50 mg tablet TAKE 1-2 TABLETs BY MOUTH ONCE DAILY AT BEDTIME AS NEEDED FOR SLEEP 30 Tablet 2    latanoprost (XALATAN) 0.005 % ophthalmic solution INSTILL 1 DROP INTO EACH EYE AT BEDTIME      umeclidinium-vilanteroL (Anoro Ellipta) 62.5-25 mcg/actuation inhaler Take 1 Puff by inhalation daily. 1 Inhaler 5    albuterol (PROVENTIL HFA, VENTOLIN HFA, PROAIR HFA) 90 mcg/actuation inhaler INHALE 2 PUFFS BY MOUTH EVERY 4 HOURS AS NEEDED FOR WHEEZING 1 Inhaler 5    tamsulosin (FLOMAX) 0.4 mg capsule TAKE 1 CAPSULE BY MOUTH ONCE DAILY AFTER SUPPER 90 Cap 0    montelukast (SINGULAIR) 10 mg tablet Take 1 tablet by mouth once daily 30 Tab 5    azelastine (ASTELIN) 137 mcg (0.1 %) nasal spray 2 Sprays by Both Nostrils route two (2) times a day. Use in each nostril as directed  Indications: seasonal runny nose 1 Bottle 5    fluticasone propionate (FLONASE) 50 mcg/actuation nasal spray USE TWO SPRAY(S) IN EACH NOSTRIL ONCE DAILY 1 Bottle 5    aspirin delayed-release 81 mg tablet Take 81 mg by mouth daily.  TRAVATAN Z 0.004 % ophthalmic solution Administer 1 Drop to both eyes nightly.  ibuprofen (MOTRIN) 800 mg tablet TAKE 1 TABLET BY MOUTH EVERY 8 HOURS AS NEEDED FOR PAIN 90 Tablet 0    HYDROcodone bitartrate (HYSINGLA) 60 mg ER tablet Take 1 Tablet by mouth daily for 30 days. Max Daily Amount: 60 mg. *DO NOT CRUSH,CHEW, OR DISSOLVE TABLET* (Patient not taking: Reported on 1/12/2022) 30 Tablet 0    Simbrinza 1-0.2 % drps       predniSONE (STERAPRED DS) 10 mg dose pack See administration instruction per 10mg dose pack (Patient not taking: Reported on 1/12/2022) 21 Tablet 0    tiZANidine (ZANAFLEX) 4 mg tablet Take 1 tablet by mouth three times daily as needed for muscle spasm (Patient not taking: Reported on 1/12/2022) 90 Tablet 1    nitrofurantoin, macrocrystal-monohydrate, (MACROBID) 100 mg capsule Take 1 Cap by mouth two (2) times a day.  (Patient not taking: Reported on 12/16/2021) 14 Cap 0    Narcan 4 mg/actuation nasal spray ADMINISTER A SINGLE SPRAY IN ONE NOSTRIL UPON SIGNS OF OPIOID OVERDOSE. CALL 911. REPEAT AFTER 3 MINUTES IF NO RESPONSE.  lactulose (CONSTULOSE) 10 gram/15 mL solution Take 45ml PO  mL 2    guaiFENesin ER (MUCINEX) 600 mg ER tablet Take 1 Tab by mouth two (2) times a day. (Patient not taking: Reported on 8/12/2021) 30 Tab 5    nitroglycerin (NITROSTAT) 0.4 mg SL tablet 1 Tab by SubLINGual route every five (5) minutes as needed. 6 Tab 1       Allergies   Allergen Reactions    Latex Not Reported This Time    Ace Inhibitors Cough    Dilaudid [Hydromorphone] Hives    Lisinopril Cough    Zocor [Simvastatin] Myalgia     Right leg       Past Surgical History:   Procedure Laterality Date    HX CARPAL TUNNEL RELEASE      bilateral wrists    HX COLONOSCOPY      HX HEART CATHETERIZATION  4/9/2009    1. Normal systemic pressure. 2. Moderate elevation of left-sided filling pressures. 3. Preserved left ventricular systolic function in the LOWE projection. 4. Mild, nonsignificant epicardial coronary artery disese by angiography.     HX HIP REPLACEMENT      HX OTHER SURGICAL  02/2017    left shoulder surgery    HX TONSILLECTOMY      HX WRIST FRACTURE TX      [facility] ANESTH,SURGERY OF SHOULDER         Social History     Socioeconomic History    Marital status: SINGLE     Spouse name: Not on file    Number of children: Not on file    Years of education: Not on file    Highest education level: Not on file   Occupational History    Not on file   Tobacco Use    Smoking status: Current Every Day Smoker     Packs/day: 0.10     Years: 20.00     Pack years: 2.00    Smokeless tobacco: Never Used    Tobacco comment: urothelial cancer risk discussed today [id number]   Vaping Use    Vaping Use: Never used   Substance and Sexual Activity    Alcohol use: No     Alcohol/week: 0.0 standard drinks    Drug use: No    Sexual activity: Not on file   Other Topics Concern    Not on file   Social History Narrative    Not on file     Social Determinants of Health     Financial Resource Strain:     Difficulty of Paying Living Expenses: Not on file   Food Insecurity:     Worried About Running Out of Food in the Last Year: Not on file    Mary of Food in the Last Year: Not on file   Transportation Needs:     Lack of Transportation (Medical): Not on file    Lack of Transportation (Non-Medical): Not on file   Physical Activity:     Days of Exercise per Week: Not on file    Minutes of Exercise per Session: Not on file   Stress:     Feeling of Stress : Not on file   Social Connections:     Frequency of Communication with Friends and Family: Not on file    Frequency of Social Gatherings with Friends and Family: Not on file    Attends Orthodox Services: Not on file    Active Member of [address] [place] [address] [employer] or Organizations: Not on file    Attends Club or Organization Meetings: Not on file    Marital Status: Not on file   Intimate Partner Violence:     Fear of Current or Ex-Partner: Not on file    Emotionally Abused: Not on file    Physically Abused: Not on file    Sexually Abused: Not on file   Housing Stability:     Unable to Pay for Housing in the Last Year: Not on file    Number of [place] in the Last Year: Not on file    Unstable Housing in the Last Year: Not on file       Visit Vitals  Pulse 83   Temp (!) 96 °F (35.6 °C) (Temporal)   Ht 5' 9\" (1.753 m)   Wt 269 lb 6.4 oz (122.2 kg)   SpO2 97%   BMI 39.78 kg/m²         PHYSICAL EXAMINATION:  GENERAL: Alert and oriented x3, in no acute distress, well-developed, well-nourished, afebrile. HEART: No JVD. EYES: No scleral icterus   NECK: No significant lymphadenopathy   LUNGS: No respiratory compromise or indrawing  ABDOMEN: Soft, non-tender, non-distended. Note: This note was completed using voice recognition software. Any typographical/name errors or mistakes are unintentional.    Electronically signed by:  Zahraa Gary MD

## 2022-01-19 DIAGNOSIS — R20.0 NUMBNESS AND TINGLING OF BOTH LEGS BELOW KNEES: ICD-10-CM

## 2022-01-19 DIAGNOSIS — R20.2 NUMBNESS AND TINGLING OF BOTH LEGS BELOW KNEES: ICD-10-CM

## 2022-01-19 DIAGNOSIS — M54.50 ACUTE BILATERAL LOW BACK PAIN, UNSPECIFIED WHETHER SCIATICA PRESENT: ICD-10-CM

## 2022-01-21 DIAGNOSIS — M54.42 CHRONIC MIDLINE LOW BACK PAIN WITH LEFT-SIDED SCIATICA: ICD-10-CM

## 2022-01-21 DIAGNOSIS — G89.29 CHRONIC MIDLINE LOW BACK PAIN WITH LEFT-SIDED SCIATICA: ICD-10-CM

## 2022-01-21 RX ORDER — HYDROCODONE BITARTRATE 60 MG/1
60 TABLET, EXTENDED RELEASE ORAL DAILY
Qty: 30 TABLET | Refills: 0 | Status: SHIPPED | OUTPATIENT
Start: 2022-01-21 | End: 2022-02-23 | Stop reason: SDUPTHER

## 2022-01-21 NOTE — TELEPHONE ENCOUNTER
[facility] Hysingla as 12/31/21 for a 30 d/s. UDS done 7/9/21  CSA signed 12/16/21    Last Visit: 12/16/21 with MD Fragoso  Next Appointment: 4/21/22 with MD Fragoso  Previous Refill Encounter(s): 12/22/21 #30    Requested Prescriptions     Pending Prescriptions Disp Refills    HYDROcodone bitartrate (HYSINGLA) 60 mg ER tablet 30 Tablet 0     Sig: Take 1 Tablet by mouth daily for 30 days. Max Daily Amount: 60 mg.  *DO NOT CRUSH,CHEW, OR DISSOLVE TABLET*

## 2022-02-23 ENCOUNTER — TELEPHONE (OUTPATIENT)
Dept: INTERNAL MEDICINE CLINIC | Age: 67
End: 2022-02-23

## 2022-02-23 DIAGNOSIS — M54.42 CHRONIC MIDLINE LOW BACK PAIN WITH LEFT-SIDED SCIATICA: ICD-10-CM

## 2022-02-23 DIAGNOSIS — G89.29 CHRONIC MIDLINE LOW BACK PAIN WITH LEFT-SIDED SCIATICA: ICD-10-CM

## 2022-02-23 RX ORDER — HYDROCODONE BITARTRATE 60 MG/1
60 TABLET, EXTENDED RELEASE ORAL DAILY
Qty: 30 TABLET | Refills: 0 | Status: SHIPPED | OUTPATIENT
Start: 2022-02-23 | End: 2022-03-22 | Stop reason: SDUPTHER

## 2022-02-23 NOTE — TELEPHONE ENCOUNTER
----- Message from Kashmir Silverman sent at 2/23/2022  3:12 PM EST -----  Subject: Refill Request    QUESTIONS  Name of Medication? HYDROcodone bitartrate (HYSINGLA) 60 mg ER tablet  Patient-reported dosage and instructions? as needed  How many days do you have left? 4  Preferred Pharmacy? [id number] phone number (if available)? [phone]  ---------------------------------------------------------------------------  --------------  CALL BACK INFO  What is the best way for the office to contact you? OK to leave message on   voicemail  Preferred Call Back Phone Number?  [phone]
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
## 2022-03-19 PROBLEM — F11.99 OPIOID USE, UNSPECIFIED WITH UNSPECIFIED OPIOID-INDUCED DISORDER (HCC): Status: ACTIVE | Noted: 2021-08-12

## 2022-03-19 PROBLEM — R05.9 COUGH: Status: ACTIVE | Noted: 2017-08-01

## 2022-03-19 PROBLEM — E66.9 OBESITY (BMI 30-39.9): Status: ACTIVE | Noted: 2017-08-01

## 2022-03-19 PROBLEM — E66.01 SEVERE OBESITY (HCC): Status: ACTIVE | Noted: 2018-11-21

## 2022-03-22 DIAGNOSIS — G89.29 CHRONIC MIDLINE LOW BACK PAIN WITH LEFT-SIDED SCIATICA: ICD-10-CM

## 2022-03-22 DIAGNOSIS — M54.42 CHRONIC MIDLINE LOW BACK PAIN WITH LEFT-SIDED SCIATICA: ICD-10-CM

## 2022-03-22 RX ORDER — HYDROCODONE BITARTRATE 60 MG/1
60 TABLET, EXTENDED RELEASE ORAL DAILY
Qty: 30 TABLET | Refills: 0 | Status: SHIPPED | OUTPATIENT
Start: 2022-03-22 | End: 2022-04-21

## 2022-03-22 NOTE — TELEPHONE ENCOUNTER
----- Message from Janet Aguilar sent at 3/22/2022  9:50 AM EDT -----  Subject: Refill Request    QUESTIONS  Name of Medication? HYDROcodone bitartrate (HYSINGLA) 60 mg ER tablet  Patient-reported dosage and instructions? Take 1 Tablet by mouth daily for   30 days. Max Daily Amount? 60 mg. #DO NOT CRUSH,CHEW, OR DISSOLVE TABLET#  How many days do you have left? 2  Preferred Pharmacy? [address] phone number (if available)? [phone]  ---------------------------------------------------------------------------  --------------,  Name of Medication? metoprolol tartrate (LOPRESSOR) 25 mg tablet  Patient-reported dosage and instructions? Take 1 tablet by mouth twice   daily  How many days do you have left? 2  Preferred Pharmacy? [address] phone number (if available)? [phone]  ---------------------------------------------------------------------------  --------------  CALL BACK INFO  What is the best way for the office to contact you? Do not leave any   message, patient will call back for answer  Preferred Call Back Phone Number?  [phone]

## 2022-03-22 NOTE — TELEPHONE ENCOUNTER
Lopressor was sent on 12/7/21 #180 with 1 refill    [facility] for Hysingla as 2/25/22 for a 30 d/s. UDS done 7/9/21  CSA signed 12/16/21    Last Visit: 12/16/21 with MD Fragoso  Next Appointment: 4/21/22 with MD Fragoso  Previous Refill Encounter(s): 2/23/22 #30    Requested Prescriptions     Pending Prescriptions Disp Refills    HYDROcodone bitartrate (HYSINGLA) 60 mg ER tablet 30 Tablet 0     Sig: Take 1 Tablet by mouth daily for 30 days. Max Daily Amount: 60 mg.  *DO NOT CRUSH,CHEW, OR DISSOLVE TABLET*

## 2022-04-14 ENCOUNTER — APPOINTMENT (OUTPATIENT)
Dept: INTERNAL MEDICINE CLINIC | Age: 67
End: 2022-04-14

## 2022-04-14 DIAGNOSIS — M54.42 CHRONIC MIDLINE LOW BACK PAIN WITH LEFT-SIDED SCIATICA: ICD-10-CM

## 2022-04-14 DIAGNOSIS — G89.29 CHRONIC MIDLINE LOW BACK PAIN WITH LEFT-SIDED SCIATICA: ICD-10-CM

## 2022-04-14 RX ORDER — PREGABALIN 150 MG/1
150 CAPSULE ORAL 3 TIMES DAILY
Qty: 90 CAPSULE | Refills: 4 | Status: SHIPPED | OUTPATIENT
Start: 2022-04-14 | End: 2022-10-24

## 2022-04-14 NOTE — TELEPHONE ENCOUNTER
Pt asking for the following medication. Stated he needs it. He does not know why it was discontinud       pregabalin (LYRICA) 150 mg capsule [[id number]]  DISCONTINUED    Order Details  Dose: 150 mg Route: Oral Frequency: 3 TIMES DAILY   Dispense Quantity: 90 Capsule Refills: 4          Sig: Take 1 Capsule by mouth three (3) times daily. Max Daily Amount: 450 mg.

## 2022-04-15 LAB
ALBUMIN SERPL-MCNC: 4.1 G/DL (ref 3.8–4.8)
ALBUMIN/CREAT UR: 15 MG/G CREAT (ref 0–29)
ALBUMIN/GLOB SERPL: 1.3 {RATIO} (ref 1.2–2.2)
ALP SERPL-CCNC: 94 IU/L (ref 44–121)
ALT SERPL-CCNC: 10 IU/L (ref 0–44)
AST SERPL-CCNC: 11 IU/L (ref 0–40)
BILIRUB SERPL-MCNC: 0.4 MG/DL (ref 0–1.2)
BUN SERPL-MCNC: 16 MG/DL (ref 8–27)
BUN/CREAT SERPL: 12 (ref 10–24)
CALCIUM SERPL-MCNC: 9.1 MG/DL (ref 8.6–10.2)
CHLORIDE SERPL-SCNC: 107 MMOL/L (ref 96–106)
CHOLEST SERPL-MCNC: 200 MG/DL (ref 100–199)
CO2 SERPL-SCNC: 20 MMOL/L (ref 20–29)
CREAT SERPL-MCNC: 1.34 MG/DL (ref 0.76–1.27)
CREAT UR-MCNC: 223.1 MG/DL
EGFR: 58 ML/MIN/1.73
GLOBULIN SER CALC-MCNC: 3.1 G/DL (ref 1.5–4.5)
GLUCOSE SERPL-MCNC: 129 MG/DL (ref 65–99)
HBA1C MFR BLD: 6.2 % (ref 4.8–5.6)
HDLC SERPL-MCNC: 37 MG/DL
IMP & REVIEW OF LAB RESULTS: NORMAL
INTERPRETATION: NORMAL
LDLC SERPL CALC-MCNC: 145 MG/DL (ref 0–99)
MICROALBUMIN UR-MCNC: 33.4 UG/ML
POTASSIUM SERPL-SCNC: 4.7 MMOL/L (ref 3.5–5.2)
PROT SERPL-MCNC: 7.2 G/DL (ref 6–8.5)
SODIUM SERPL-SCNC: 141 MMOL/L (ref 134–144)
TRIGL SERPL-MCNC: 97 MG/DL (ref 0–149)
VLDLC SERPL CALC-MCNC: 18 MG/DL (ref 5–40)

## 2022-04-21 DIAGNOSIS — E78.00 HYPERCHOLESTEROLEMIA: ICD-10-CM

## 2022-04-21 DIAGNOSIS — R73.03 PREDIABETES: ICD-10-CM

## 2022-04-21 DIAGNOSIS — I10 ESSENTIAL HYPERTENSION: ICD-10-CM

## 2022-04-25 DIAGNOSIS — M54.42 CHRONIC MIDLINE LOW BACK PAIN WITH LEFT-SIDED SCIATICA: Primary | ICD-10-CM

## 2022-04-25 DIAGNOSIS — G89.29 CHRONIC MIDLINE LOW BACK PAIN WITH LEFT-SIDED SCIATICA: Primary | ICD-10-CM

## 2022-04-25 RX ORDER — HYDROCODONE BITARTRATE 60 MG/1
60 TABLET, EXTENDED RELEASE ORAL DAILY
Qty: 30 TABLET | Refills: 0 | Status: SHIPPED | OUTPATIENT
Start: 2022-04-25 | End: 2022-05-24 | Stop reason: SDUPTHER

## 2022-04-25 NOTE — TELEPHONE ENCOUNTER
[facility] for Hysingla as 3/25/22 for a 30 d/s. Last Visit: 12/16/21 with MD Fragoso  Next Appointment: 5/2/22 with MD Fragoso  Previous Refill Encounter(s): 3/22/22 #30    Requested Prescriptions     Pending Prescriptions Disp Refills    HYDROcodone bitartrate (HYSINGLA) 60 mg ER tablet 30 Tablet 0     Sig: Take 1 Tablet by mouth daily for 30 days. Max Daily Amount: 60 mg.  *DO NOT CRUSH,CHEW, OR DISSOLVE TABLET*

## 2022-04-28 DIAGNOSIS — R05.9 COUGH: ICD-10-CM

## 2022-04-28 RX ORDER — CODEINE PHOSPHATE AND GUAIFENESIN 10; 100 MG/5ML; MG/5ML
SOLUTION ORAL
Qty: 120 ML | Refills: 0 | Status: SHIPPED | OUTPATIENT
Start: 2022-04-28 | End: 2022-05-05

## 2022-05-02 ENCOUNTER — OFFICE VISIT (OUTPATIENT)
Dept: INTERNAL MEDICINE CLINIC | Age: 67
End: 2022-05-02
Payer: MEDICARE

## 2022-05-02 VITALS
HEIGHT: 69 IN | OXYGEN SATURATION: 98 % | TEMPERATURE: 97.8 F | BODY MASS INDEX: 40.43 KG/M2 | RESPIRATION RATE: 20 BRPM | SYSTOLIC BLOOD PRESSURE: 116 MMHG | WEIGHT: 273 LBS | HEART RATE: 60 BPM | DIASTOLIC BLOOD PRESSURE: 60 MMHG

## 2022-05-02 DIAGNOSIS — E78.00 HYPERCHOLESTEROLEMIA: ICD-10-CM

## 2022-05-02 DIAGNOSIS — F11.99 OPIOID USE, UNSPECIFIED WITH UNSPECIFIED OPIOID-INDUCED DISORDER (HCC): ICD-10-CM

## 2022-05-02 DIAGNOSIS — I10 ESSENTIAL HYPERTENSION: Primary | ICD-10-CM

## 2022-05-02 DIAGNOSIS — G95.19 NEUROGENIC CLAUDICATION (HCC): ICD-10-CM

## 2022-05-02 DIAGNOSIS — J41.0 SIMPLE CHRONIC BRONCHITIS (HCC): ICD-10-CM

## 2022-05-02 DIAGNOSIS — E66.01 MORBID OBESITY (HCC): ICD-10-CM

## 2022-05-02 DIAGNOSIS — R73.03 PREDIABETES: ICD-10-CM

## 2022-05-02 PROBLEM — R29.818 NEUROGENIC CLAUDICATION: Status: ACTIVE | Noted: 2022-05-02

## 2022-05-02 PROCEDURE — 1101F PT FALLS ASSESS-DOCD LE1/YR: CPT | Performed by: INTERNAL MEDICINE

## 2022-05-02 PROCEDURE — G8510 SCR DEP NEG, NO PLAN REQD: HCPCS | Performed by: INTERNAL MEDICINE

## 2022-05-02 PROCEDURE — G8536 NO DOC ELDER MAL SCRN: HCPCS | Performed by: INTERNAL MEDICINE

## 2022-05-02 PROCEDURE — G8752 SYS BP LESS 140: HCPCS | Performed by: INTERNAL MEDICINE

## 2022-05-02 PROCEDURE — G8427 DOCREV CUR MEDS BY ELIG CLIN: HCPCS | Performed by: INTERNAL MEDICINE

## 2022-05-02 PROCEDURE — 3017F COLORECTAL CA SCREEN DOC REV: CPT | Performed by: INTERNAL MEDICINE

## 2022-05-02 PROCEDURE — 99214 OFFICE O/P EST MOD 30 MIN: CPT | Performed by: INTERNAL MEDICINE

## 2022-05-02 PROCEDURE — G8754 DIAS BP LESS 90: HCPCS | Performed by: INTERNAL MEDICINE

## 2022-05-02 PROCEDURE — G8417 CALC BMI ABV UP PARAM F/U: HCPCS | Performed by: INTERNAL MEDICINE

## 2022-05-02 NOTE — PATIENT INSTRUCTIONS
High Blood Pressure: Care Instructions  Overview     It's normal for blood pressure to go up and down throughout the day. But if it stays up, you have high blood pressure. Another name for high blood pressure is hypertension. Despite what a lot of people think, high blood pressure usually doesn't cause headaches or make you feel dizzy or lightheaded. It usually has no symptoms. But it does increase your risk of stroke, heart attack, and other problems. You and your doctor will talk about your risks of these problems based on your blood pressure. Your doctor will give you a goal for your blood pressure. Your goal will be based on your health and your age. Lifestyle changes, such as eating healthy and being active, are always important to help lower blood pressure. You might also take medicine to reach your blood pressure goal.  Follow-up care is a key part of your treatment and safety. Be sure to make and go to all appointments, and call your doctor if you are having problems. It's also a good idea to know your test results and keep a list of the medicines you take. How can you care for yourself at home? Medical treatment  · If you stop taking your medicine, your blood pressure will go back up. You may take one or more types of medicine to lower your blood pressure. Be safe with medicines. Take your medicine exactly as prescribed. Call your doctor if you think you are having a problem with your medicine. · Talk to your doctor before you start taking aspirin every day. Aspirin can help certain people lower their risk of a heart attack or stroke. But taking aspirin isn't right for everyone, because it can cause serious bleeding. · See your doctor regularly. You may need to see the doctor more often at first or until your blood pressure comes down. · If you are taking blood pressure medicine, talk to your doctor before you take decongestants or anti-inflammatory medicine, such as ibuprofen.  Some of these medicines can raise blood pressure. · Learn how to check your blood pressure at home. Lifestyle changes  · Stay at a healthy weight. This is especially important if you put on weight around the waist. Losing even 10 pounds can help you lower your blood pressure. · If your doctor recommends it, get more exercise. Walking is a good choice. Bit by bit, increase the amount you walk every day. Try for at least 30 minutes on most days of the week. You also may want to swim, bike, or do other activities. · Avoid or limit alcohol. Talk to your doctor about whether you can drink any alcohol. · Try to limit how much sodium you eat to less than 2,300 milligrams (mg) a day. Your doctor may ask you to try to eat less than 1,500 mg a day. · Eat plenty of fruits (such as bananas and oranges), vegetables, legumes, whole grains, and low-fat dairy products. · Lower the amount of saturated fat in your diet. Saturated fat is found in animal products such as milk, cheese, and meat. Limiting these foods may help you lose weight and also lower your risk for heart disease. · Do not smoke. Smoking increases your risk for heart attack and stroke. If you need help quitting, talk to your doctor about stop-smoking programs and medicines. These can increase your chances of quitting for good. When should you call for help? Call 911  anytime you think you may need emergency care. This may mean having symptoms that suggest that your blood pressure is causing a serious heart or blood vessel problem. Your blood pressure may be over 180/120. For example, call 911 if:    · You have symptoms of a heart attack. These may include:  ? Chest pain or pressure, or a strange feeling in the chest.  ? Sweating. ? Shortness of breath. ? Nausea or vomiting. ? Pain, pressure, or a strange feeling in the back, neck, jaw, or upper belly or in one or both shoulders or arms. ? Lightheadedness or sudden weakness.   ? A fast or irregular heartbeat.     · You have symptoms of a stroke. These may include:  ? Sudden numbness, tingling, weakness, or loss of movement in your face, arm, or leg, especially on only one side of your body. ? Sudden vision changes. ? Sudden trouble speaking. ? Sudden confusion or trouble understanding simple statements. ? Sudden problems with walking or balance. ? A sudden, severe headache that is different from past headaches.     · You have severe back or belly pain. Do not wait until your blood pressure comes down on its own. Get help right away. Call your doctor now or seek immediate care if:    · Your blood pressure is much higher than normal (such as 180/120 or higher), but you don't have symptoms.     · You think high blood pressure is causing symptoms, such as:  ? Severe headache.  ? Blurry vision. Watch closely for changes in your health, and be sure to contact your doctor if:    · Your blood pressure measures higher than your doctor recommends at least 2 times. That means the top number is higher or the bottom number is higher, or both.     · You think you may be having side effects from your blood pressure medicine. Where can you learn more? Go to http://samreen-tabitha.info/  Enter [id number] in the search box to learn more about \"High Blood Pressure: Care Instructions. \"  Current as of: January 10, 2022               Content Version: 13.2  © 2715-3713 [employer]. Care instructions adapted under license by [employer] (which disclaims liability or warranty for this information). If you have questions about a medical condition or this instruction, always ask your healthcare professional. [place] [address] any warranty or liability for your use of this information.

## 2022-05-02 NOTE — PROGRESS NOTES
Patient is in the office today for a 3 month follow up. Patient states his legs hurt and he has some wheezing in his chest.    Do you have an Advance Directive no  Do you want more information : information given     1. \"Have you been to the ER, urgent care clinic since your last visit? Hospitalized since your last visit? \" No    2. \"Have you seen or consulted any other health care providers outside of the [address] [place] [address] since your last visit? \" No     3. For patients aged 39-70: Has the patient had a colonoscopy / FIT/ Cologuard? No      If the patient is female:    4. For patients aged 41-77: Has the patient had a mammogram within the past 2 years? NA - based on age or sex      11. For patients aged 21-65: Has the patient had a pap smear?  NA - based on age or sex

## 2022-05-06 NOTE — PROGRESS NOTES
Subjective:       Chief Complaint  The patient presents for follow up of hypertension and high cholesterol. prediabetes  chronic back pain and right hip pain, COPD        HPI  Adalberto Payne is a 74 y.o. male seen for follow up of hyperlipidemia. Healjohn has hypertension. Hypertension well controlled, no significant medication side effects noted, on Lopressor 25 mg BID, hyperlipidemia uncontrolled, pt has been unable to tolerate statins due to myalgias. The 10-year ASCVD risk score (Tiffany Graves, et al., 2013) is: 26.5%      Diet and Lifestyle: not attempting to follow a low fat, low cholesterol diet, exercises sporadically    Home BP Monitoring: is not measured at home. Adalberto Payne RTC today to follow up on chronic pain diagnosis. We discussed his osteoarthritis that is affecting his back. Significant changes since last visit: pt is having neurogenic claudication and has pain with standing and walking in both legs. He is  able to do his normal daily activities. He reports the following adverse side effects: none. Pt doing fairly well with Hysingla ER 60 mg. He is seeing [facility] and was referred for aqua therapy but did not tolerate it due to pain. His MRI of his LS shows worsening DJD so he will f/u with the [facility] center. Lyrica has helped theneurogenic pain. Due to patient's severe back pain and neurogenic claudication he would benefit from a Rollator to help with his balance and to prevent him from falling. Patient given prescription    Least pain over the last week has been 3/10. Worst pain over the last week has been 10/10. Opioid Risk Tool Reviewed: YES    Aberrant behaviors: None. Urine Drug Screen: reviewed and up to date. Controlled substance agreement on file: YES.        reviewed:yes    Pill count is consistent with his prescription: yes    Concomitant use of a benzodiazepine: no        Prediabetes: pt is trying to control with diet and weight loss but he continues to struggle to lose weight. His BMI of 40 puts him at high risk of multiple medical problems. COPD Review:  The patient is being seen for follow up of COPD. Oxygen: He currently is not on home oxygen therapy. He is using Anoro Ellipta with good results. Symptoms: chronic dyspnea: severity = mild: course of sx: symptoms have progressed to a point and plateaued. .   Patient uses 0 pillows at night. Patient does smoke cigarettes. Pt has sleep apnea is trying to use CPAP on a regular basis. He will f/u with his sleep specialist Dr Brien Cardenas. Patient appears to have chronic kidney disease stage II. We will continue to monitor. Patient also has prediabetes which he is encouraged to try and improved by cutting back starches and sugar in his diet. Current Outpatient Medications   Medication Sig    HYDROcodone bitartrate (HYSINGLA) 60 mg ER tablet Take 1 Tablet by mouth daily for 30 days. Max Daily Amount: 60 mg. *DO NOT CRUSH,CHEW, OR DISSOLVE TABLET*    Anoro Ellipta 62.5-25 mcg/actuation inhaler Inhale 1 puff by mouth once daily    albuterol (PROVENTIL HFA, VENTOLIN HFA, PROAIR HFA) 90 mcg/actuation inhaler INHALE 2 PUFFS BY MOUTH EVERY 4 HOURS AS NEEDED FOR WHEEZING    pregabalin (LYRICA) 150 mg capsule Take 1 Capsule by mouth three (3) times daily.  Max Daily Amount: 450 mg.    tamsulosin (FLOMAX) 0.4 mg capsule TAKE 1 CAPSULE BY MOUTH ONCE DAILY AFTER SUPPER    ibuprofen (MOTRIN) 800 mg tablet TAKE 1 TABLET BY MOUTH EVERY 8 HOURS AS NEEDED FOR PAIN    montelukast (SINGULAIR) 10 mg tablet Take 1 tablet by mouth once daily    Simbrinza 1-0.2 % drps     predniSONE (STERAPRED DS) 10 mg dose pack See administration instruction per 10mg dose pack (Patient not taking: Reported on 1/12/2022)    metoprolol tartrate (LOPRESSOR) 25 mg tablet Take 1 tablet by mouth twice daily    pantoprazole (PROTONIX) 40 mg tablet Take 1 tablet by mouth once daily    tiZANidine (ZANAFLEX) 4 mg tablet Take 1 tablet by mouth three times daily as needed for muscle spasm (Patient not taking: Reported on 1/12/2022)    traZODone (DESYREL) 50 mg tablet TAKE 1-2 TABLETs BY MOUTH ONCE DAILY AT BEDTIME AS NEEDED FOR SLEEP    latanoprost (XALATAN) 0.005 % ophthalmic solution INSTILL 1 DROP INTO EACH EYE AT BEDTIME    nitrofurantoin, macrocrystal-monohydrate, (MACROBID) 100 mg capsule Take 1 Cap by mouth two (2) times a day. (Patient not taking: Reported on 12/16/2021)    Narcan 4 mg/actuation nasal spray ADMINISTER A SINGLE SPRAY IN ONE NOSTRIL UPON SIGNS OF OPIOID OVERDOSE. CALL 911. REPEAT AFTER 3 MINUTES IF NO RESPONSE.    azelastine (ASTELIN) 137 mcg (0.1 %) nasal spray 2 Sprays by Both Nostrils route two (2) times a day. Use in each nostril as directed  Indications: seasonal runny nose    fluticasone propionate (FLONASE) 50 mcg/actuation nasal spray USE TWO SPRAY(S) IN EACH NOSTRIL ONCE DAILY    aspirin delayed-release 81 mg tablet Take 81 mg by mouth daily.  lactulose (CONSTULOSE) 10 gram/15 mL solution Take 45ml PO TID    guaiFENesin ER (MUCINEX) 600 mg ER tablet Take 1 Tab by mouth two (2) times a day. (Patient not taking: Reported on 8/12/2021)    nitroglycerin (NITROSTAT) 0.4 mg SL tablet 1 Tab by SubLINGual route every five (5) minutes as needed.  TRAVATAN Z 0.004 % ophthalmic solution Administer 1 Drop to both eyes nightly. No current facility-administered medications for this visit.              Review of Systems  Respiratory: negative for dyspnea on exertion  Cardiovascular: negative for chest pain    Objective:     Visit Vitals  /60 (BP 1 Location: Left arm, BP Patient Position: Sitting, BP Cuff Size: Adult)   Pulse 60   Temp 97.8 °F (36.6 °C) (Temporal)   Resp 20   Ht 5' 9\" (1.753 m)   Wt 273 lb (123.8 kg)   SpO2 98%   BMI 40.32 kg/m²        General appearance - alert, well appearing, and in no distress  Neck - supple, no significant adenopathy, carotids upstroke normal bilaterally, no bruits  Chest - clear to auscultation, no wheezes, rales or rhonchi, symmetric air entry  Heart - normal rate, regular rhythm, normal S1, S2, no murmurs, rubs, clicks or gallops  Extremities - peripheral pulses normal, no pedal edema, no clubbing or cyanosis  Skin - normal coloration and turgor, no rashes, no suspicious skin lesions noted      Labs:   Lab Results   Component Value Date/Time    Hemoglobin A1c 6.2 (H) 04/14/2022 08:20 AM    Hemoglobin A1c 6.1 (H) 12/09/2021 10:37 AM    Hemoglobin A1c 5.8 (H) 07/09/2021 12:00 AM    Glucose 129 (H) 04/14/2022 08:20 AM    Glucose (POC) 123 (H) 01/24/2016 04:57 PM    Glucose,  (H) 04/01/2015 08:56 AM    Microalb/Creat ratio (ug/mg creat.) 15 04/14/2022 08:20 AM    LDL, calculated 145 (H) 04/14/2022 08:20 AM    LDL, calculated 131 (H) 07/28/2020 09:54 AM    Creatinine 1.34 (H) 04/14/2022 08:20 AM      Lab Results   Component Value Date/Time    Cholesterol, total 200 (H) 04/14/2022 08:20 AM    HDL Cholesterol 37 (L) 04/14/2022 08:20 AM    LDL, calculated 145 (H) 04/14/2022 08:20 AM    LDL, calculated 131 (H) 07/28/2020 09:54 AM    Triglyceride 97 04/14/2022 08:20 AM    CHOL/HDL Ratio 4.4 11/15/2019 09:05 AM     Lab Results   Component Value Date/Time    ALT (SGPT) 10 04/14/2022 08:20 AM    Alk.  phosphatase 94 04/14/2022 08:20 AM    Bilirubin, total 0.4 04/14/2022 08:20 AM     Lab Results   Component Value Date/Time    GFR est AA 70 12/09/2021 10:37 AM    GFR est non-AA 61 12/09/2021 10:37 AM    Creatinine 1.34 (H) 04/14/2022 08:20 AM    BUN 16 04/14/2022 08:20 AM    BUN, POC 6 (L) 04/01/2015 08:56 AM    Sodium,  04/01/2015 08:56 AM    Sodium 141 04/14/2022 08:20 AM    Potassium 4.7 04/14/2022 08:20 AM    Potassium, POC 4.0 04/01/2015 08:56 AM    Chloride,  04/01/2015 08:56 AM    Chloride 107 (H) 04/14/2022 08:20 AM    CO2 20 04/14/2022 08:20 AM      Lab Results   Component Value Date/Time    Glucose 129 (H) 04/14/2022 08:20 AM    Glucose (POC) 123 (H) 01/24/2016 04:57 PM    Glucose,  (H) 04/01/2015 08:56 AM            Assessment / Plan     Hypertension well controlled, on Lopressor   Hyperlipidemia uncontrolled, pt unable to tolerate statins. ICD-10-CM ICD-9-CM    1. Essential hypertension  L04 404.5 METABOLIC PANEL, COMPREHENSIVE   2. Hypercholesterolemia  E78.00 272.0 LIPID PANEL   3. Prediabetes  R73.03 790.29 HEMOGLOBIN A1C W/O EAG   4. Morbid obesity ([facility]  E66.01 278.01  patient courage to try and lose weight by cutting back drinks that have sugar and try to cut back snacking. 5. Simple chronic bronchitis (HCC)  J41.0 491.0  stable on Anoro Ellipta   6. Opioid use, unspecified with unspecified opioid-induced disorder ([facility].)  F11.99 292.9  patient continues on Hysingla ER for chronic low back pain as well as neurogenic claudication     305.50    7. Neurogenic claudication (HCC)  G95.19 435.1  patient continues on Lyrica and he needs to follow-up to have a new MRI done of his spine which was ordered by orthopedics. Patient given number for central scheduling so he can call and then follow-up with spine center             Low cholesterol diet, weight control and daily exercise discussed. Follow-up and Dispositions    · Return in about 4 months (around 9/2/2022) for labs 1 week before. Reviewed plan of care. Patient has provided input and agrees with goals.

## 2022-05-10 ENCOUNTER — TELEPHONE (OUTPATIENT)
Dept: INTERNAL MEDICINE CLINIC | Age: 67
End: 2022-05-10

## 2022-05-10 RX ORDER — HYDROXYZINE PAMOATE 25 MG/1
25 CAPSULE ORAL
Qty: 60 CAPSULE | Refills: 1 | Status: SHIPPED | OUTPATIENT
Start: 2022-05-10 | End: 2022-08-11

## 2022-05-10 NOTE — TELEPHONE ENCOUNTER
Pt calling asking for a medication for itching. Says he is itching all over his stomach and back. Says there is no rash. Says no change in medication that would cause this. He doesn't know why he is itching. Refused ov or VV saying he doesn't have a ride and doesn't know how to use his phone to do a VV.

## 2022-05-13 ENCOUNTER — HOSPITAL ENCOUNTER (OUTPATIENT)
Age: 67
Discharge: HOME | End: 2022-05-13
Attending: ORTHOPAEDIC SURGERY
Payer: MEDICARE

## 2022-05-13 PROCEDURE — 72148 MRI LUMBAR SPINE W/O DYE: CPT

## 2022-05-24 DIAGNOSIS — G89.29 CHRONIC MIDLINE LOW BACK PAIN WITH LEFT-SIDED SCIATICA: ICD-10-CM

## 2022-05-24 DIAGNOSIS — M54.42 CHRONIC MIDLINE LOW BACK PAIN WITH LEFT-SIDED SCIATICA: ICD-10-CM

## 2022-05-24 RX ORDER — HYDROCODONE BITARTRATE 60 MG/1
60 TABLET, EXTENDED RELEASE ORAL DAILY
Qty: 30 TABLET | Refills: 0 | Status: SHIPPED | OUTPATIENT
Start: 2022-05-24 | End: 2022-06-20 | Stop reason: SDUPTHER

## 2022-05-24 NOTE — TELEPHONE ENCOUNTER
[facility] for Hysingla as 4/25/22 for a 30 d/s. Last Visit: 5/2/22 with MD Fragoso  Next Appointment: 9/12/22 with MD Fragoso  Previous Refill Encounter(s): 4/25/22 #30    Requested Prescriptions     Pending Prescriptions Disp Refills    HYDROcodone bitartrate (HYSINGLA) 60 mg ER tablet 30 Tablet 0     Sig: Take 1 Tablet by mouth daily for 30 days. Max Daily Amount: 60 mg.  *DO NOT CRUSH,CHEW, OR DISSOLVE TABLET*

## 2022-05-24 NOTE — TELEPHONE ENCOUNTER
----- Message from Lauren Hopkins sent at 5/24/2022  9:13 AM EDT -----  Subject: Refill Request    QUESTIONS  Name of Medication? HYDROcodone bitartrate (HYSINGLA) 60 mg ER tablet  Patient-reported dosage and instructions? 60 Mg 1 PO PRN  How many days do you have left? 1  Preferred Pharmacy? [facility] phone number (if available)? [phone]  Additional Information for Provider? Please refill   ---------------------------------------------------------------------------  --------------  CALL BACK INFO  What is the best way for the office to contact you? OK to leave message on   voicemail  Preferred Call Back Phone Number? [phone]  ---------------------------------------------------------------------------  --------------  SCRIPT ANSWERS  Relationship to Patient?  Self

## 2022-06-15 ENCOUNTER — OFFICE VISIT (OUTPATIENT)
Dept: INTERNAL MEDICINE CLINIC | Age: 67
End: 2022-06-15
Payer: MEDICARE

## 2022-06-15 VITALS
RESPIRATION RATE: 20 BRPM | WEIGHT: 268 LBS | SYSTOLIC BLOOD PRESSURE: 112 MMHG | HEIGHT: 69 IN | TEMPERATURE: 97.1 F | DIASTOLIC BLOOD PRESSURE: 62 MMHG | BODY MASS INDEX: 39.69 KG/M2 | HEART RATE: 64 BPM | OXYGEN SATURATION: 96 %

## 2022-06-15 DIAGNOSIS — G89.29 CHRONIC MIDLINE LOW BACK PAIN WITH LEFT-SIDED SCIATICA: Primary | ICD-10-CM

## 2022-06-15 DIAGNOSIS — M54.42 CHRONIC MIDLINE LOW BACK PAIN WITH LEFT-SIDED SCIATICA: Primary | ICD-10-CM

## 2022-06-15 PROCEDURE — G8417 CALC BMI ABV UP PARAM F/U: HCPCS | Performed by: INTERNAL MEDICINE

## 2022-06-15 PROCEDURE — G8427 DOCREV CUR MEDS BY ELIG CLIN: HCPCS | Performed by: INTERNAL MEDICINE

## 2022-06-15 PROCEDURE — 99213 OFFICE O/P EST LOW 20 MIN: CPT | Performed by: INTERNAL MEDICINE

## 2022-06-15 PROCEDURE — 3017F COLORECTAL CA SCREEN DOC REV: CPT | Performed by: INTERNAL MEDICINE

## 2022-06-15 PROCEDURE — 1123F ACP DISCUSS/DSCN MKR DOCD: CPT | Performed by: INTERNAL MEDICINE

## 2022-06-15 PROCEDURE — G8432 DEP SCR NOT DOC, RNG: HCPCS | Performed by: INTERNAL MEDICINE

## 2022-06-15 PROCEDURE — G8754 DIAS BP LESS 90: HCPCS | Performed by: INTERNAL MEDICINE

## 2022-06-15 PROCEDURE — G8536 NO DOC ELDER MAL SCRN: HCPCS | Performed by: INTERNAL MEDICINE

## 2022-06-15 RX ORDER — METOPROLOL TARTRATE 25 MG/1
TABLET, FILM COATED ORAL
Qty: 180 TABLET | Refills: 1 | Status: SHIPPED | OUTPATIENT
Start: 2022-06-15 | End: 2022-09-06

## 2022-06-15 NOTE — PATIENT INSTRUCTIONS
High Blood Pressure: Care Instructions  Overview     It's normal for blood pressure to go up and down throughout the day. But if it stays up, you have high blood pressure. Another name for high blood pressure is hypertension. Despite what a lot of people think, high blood pressure usually doesn't cause headaches or make you feel dizzy or lightheaded. It usually has no symptoms. But it does increase your risk of stroke, heart attack, and other problems. You and your doctor will talk about your risks of these problems based on your blood pressure. Your doctor will give you a goal for your blood pressure. Your goal will be based on your health and your age. Lifestyle changes, such as eating healthy and being active, are always important to help lower blood pressure. You might also take medicine to reach your blood pressure goal.  Follow-up care is a key part of your treatment and safety. Be sure to make and go to all appointments, and call your doctor if you are having problems. It's also a good idea to know your test results and keep a list of the medicines you take. How can you care for yourself at home? Medical treatment  · If you stop taking your medicine, your blood pressure will go back up. You may take one or more types of medicine to lower your blood pressure. Be safe with medicines. Take your medicine exactly as prescribed. Call your doctor if you think you are having a problem with your medicine. · Talk to your doctor before you start taking aspirin every day. Aspirin can help certain people lower their risk of a heart attack or stroke. But taking aspirin isn't right for everyone, because it can cause serious bleeding. · See your doctor regularly. You may need to see the doctor more often at first or until your blood pressure comes down. · If you are taking blood pressure medicine, talk to your doctor before you take decongestants or anti-inflammatory medicine, such as ibuprofen.  Some of these medicines can raise blood pressure. · Learn how to check your blood pressure at home. Lifestyle changes  · Stay at a healthy weight. This is especially important if you put on weight around the waist. Losing even 10 pounds can help you lower your blood pressure. · If your doctor recommends it, get more exercise. Walking is a good choice. Bit by bit, increase the amount you walk every day. Try for at least 30 minutes on most days of the week. You also may want to swim, bike, or do other activities. · Avoid or limit alcohol. Talk to your doctor about whether you can drink any alcohol. · Try to limit how much sodium you eat to less than 2,300 milligrams (mg) a day. Your doctor may ask you to try to eat less than 1,500 mg a day. · Eat plenty of fruits (such as bananas and oranges), vegetables, legumes, whole grains, and low-fat dairy products. · Lower the amount of saturated fat in your diet. Saturated fat is found in animal products such as milk, cheese, and meat. Limiting these foods may help you lose weight and also lower your risk for heart disease. · Do not smoke. Smoking increases your risk for heart attack and stroke. If you need help quitting, talk to your doctor about stop-smoking programs and medicines. These can increase your chances of quitting for good. When should you call for help? Call 911  anytime you think you may need emergency care. This may mean having symptoms that suggest that your blood pressure is causing a serious heart or blood vessel problem. Your blood pressure may be over 180/120. For example, call 911 if:    · You have symptoms of a heart attack. These may include:  ? Chest pain or pressure, or a strange feeling in the chest.  ? Sweating. ? Shortness of breath. ? Nausea or vomiting. ? Pain, pressure, or a strange feeling in the back, neck, jaw, or upper belly or in one or both shoulders or arms. ? Lightheadedness or sudden weakness.   ? A fast or irregular heartbeat.     · You have symptoms of a stroke. These may include:  ? Sudden numbness, tingling, weakness, or loss of movement in your face, arm, or leg, especially on only one side of your body. ? Sudden vision changes. ? Sudden trouble speaking. ? Sudden confusion or trouble understanding simple statements. ? Sudden problems with walking or balance. ? A sudden, severe headache that is different from past headaches.     · You have severe back or belly pain. Do not wait until your blood pressure comes down on its own. Get help right away. Call your doctor now or seek immediate care if:    · Your blood pressure is much higher than normal (such as 180/120 or higher), but you don't have symptoms.     · You think high blood pressure is causing symptoms, such as:  ? Severe headache.  ? Blurry vision. Watch closely for changes in your health, and be sure to contact your doctor if:    · Your blood pressure measures higher than your doctor recommends at least 2 times. That means the top number is higher or the bottom number is higher, or both.     · You think you may be having side effects from your blood pressure medicine. Where can you learn more? Go to http://www.gray.com/  Enter [id number] in the search box to learn more about \"High Blood Pressure: Care Instructions. \"  Current as of: January 10, 2022               Content Version: 13.2  © 2006-2022 [employer]. Care instructions adapted under license by [employer] (which disclaims liability or warranty for this information). If you have questions about a medical condition or this instruction, always ask your healthcare professional. [place] [address] any warranty or liability for your use of this information.

## 2022-06-15 NOTE — PROGRESS NOTES
Patient is in the office today for MRI results and wheezing. Do you have an Advance Directive no  Do you want more information : information given     1. \"Have you been to the ER, urgent care clinic since your last visit? Hospitalized since your last visit? \" No    2. \"Have you seen or consulted any other health care providers outside of the [address] [place] [address] since your last visit? \" No     3. For patients aged 39-70: Has the patient had a colonoscopy / FIT/ Cologuard? Yes - no Care Gap present      If the patient is female:    4. For patients aged 41-77: Has the patient had a mammogram within the past 2 years? NA - based on age or sex      11. For patients aged 21-65: Has the patient had a pap smear?  NA - based on age or sex

## 2022-06-20 DIAGNOSIS — M54.42 CHRONIC MIDLINE LOW BACK PAIN WITH LEFT-SIDED SCIATICA: ICD-10-CM

## 2022-06-20 DIAGNOSIS — G89.29 CHRONIC MIDLINE LOW BACK PAIN WITH LEFT-SIDED SCIATICA: ICD-10-CM

## 2022-06-20 RX ORDER — HYDROCODONE BITARTRATE 60 MG/1
60 TABLET, EXTENDED RELEASE ORAL DAILY
Qty: 30 TABLET | Refills: 0 | Status: SHIPPED | OUTPATIENT
Start: 2022-06-20 | End: 2022-07-20

## 2022-06-20 NOTE — TELEPHONE ENCOUNTER
----- Message from Triston Izaguirre sent at 6/20/2022  1:22 PM EDT -----  Subject: Refill Request    QUESTIONS  Name of Medication? HYDROcodone bitartrate (HYSINGLA) 60 mg ER tablet  Patient-reported dosage and instructions? 60mg as needed  How many days do you have left? 2  Preferred Pharmacy? [facility] phone number (if available)? [phone]  ---------------------------------------------------------------------------  --------------  CALL BACK INFO  What is the best way for the office to contact you? OK to leave message on   voicemail  Preferred Call Back Phone Number? [phone]  ---------------------------------------------------------------------------  --------------  SCRIPT ANSWERS  Relationship to Patient?  Self

## 2022-06-20 NOTE — TELEPHONE ENCOUNTER
[facility] Hysingla as 5/24/22 for a 30 d/s. Last Visit: 6/15/22 with MD Fragoso  Next Appointment: 9/12/22 with MD Fragoso  Previous Refill Encounter(s): 5/24/22 #30    Requested Prescriptions     Pending Prescriptions Disp Refills    HYDROcodone bitartrate (HYSINGLA) 60 mg ER tablet 30 Tablet 0     Sig: Take 1 Tablet by mouth daily for 30 days. Max Daily Amount: 60 mg.  *DO NOT CRUSH,CHEW, OR DISSOLVE TABLET*         For Pharmacy Admin Tracking Only     CPA in place:    Recommendation Provided To:    Intervention Detail: New Rx: 1, reason: Patient Preference   Gap Closed?:    Intervention Accepted By:   Vinita Gaytan Time Spent (min): 5

## 2022-07-22 ENCOUNTER — TELEPHONE (OUTPATIENT)
Dept: INTERNAL MEDICINE CLINIC | Age: 67
End: 2022-07-22

## 2022-07-22 DIAGNOSIS — G89.29 CHRONIC MIDLINE LOW BACK PAIN WITH LEFT-SIDED SCIATICA: Primary | ICD-10-CM

## 2022-07-22 DIAGNOSIS — M54.42 CHRONIC MIDLINE LOW BACK PAIN WITH LEFT-SIDED SCIATICA: Primary | ICD-10-CM

## 2022-07-22 RX ORDER — HYDROCODONE BITARTRATE 60 MG/1
60 TABLET, EXTENDED RELEASE ORAL DAILY
Qty: 30 TABLET | Refills: 0 | Status: SHIPPED | OUTPATIENT
Start: 2022-07-22 | End: 2022-08-19 | Stop reason: SDUPTHER

## 2022-07-22 NOTE — TELEPHONE ENCOUNTER
Patient called again to follow up the pain medicine that he is requesting a few minutes ago. He doesn't know the name of the medicine.   All he knows is \"HIGH SENIOR\"      CBN: [id number]

## 2022-07-22 NOTE — TELEPHONE ENCOUNTER
Pt needs a medication, he doesn't know name of it, didn't have bottle, doesn't know how to spell it. He called it \"high senior.:  I repeated that back to him and he said yes, that is it. Told him I don't have a clue what that is and he would need to get the exact name of it and he wasn't happy with that answer.     He said have Brie Denis call him at [phone]

## 2022-07-22 NOTE — TELEPHONE ENCOUNTER
[facility] Hysingla as 6/22/22 for a 30 d/s. Last Visit: 6/15/22 with MD Fragoso  Next Appointment: 9/12/22 with MD Fragoso  Previous Refill Encounter(s): 6/20/22 #30    Requested Prescriptions     Pending Prescriptions Disp Refills    HYDROcodone bitartrate (HYSINGLA) 60 mg ER tablet 30 Tablet 0     Sig: Take 1 Tablet by mouth in the morning. Max Daily Amount: 60 mg. *DO NOT CRUSH,CHEW, OR DISSOLVE TABLET*         For Pharmacy Admin Tracking Only    CPA in place:   Recommendation Provided To:    Intervention Detail: New Rx: 1, reason: Patient Preference  Gap Closed?:   Intervention Accepted By:   Time Spent (min): 5

## 2022-08-11 RX ORDER — HYDROXYZINE PAMOATE 25 MG/1
CAPSULE ORAL
Qty: 60 CAPSULE | Refills: 0 | Status: SHIPPED | OUTPATIENT
Start: 2022-08-11 | End: 2022-09-25

## 2022-08-19 DIAGNOSIS — M54.42 CHRONIC MIDLINE LOW BACK PAIN WITH LEFT-SIDED SCIATICA: ICD-10-CM

## 2022-08-19 DIAGNOSIS — G89.29 CHRONIC MIDLINE LOW BACK PAIN WITH LEFT-SIDED SCIATICA: ICD-10-CM

## 2022-08-19 RX ORDER — HYDROCODONE BITARTRATE 60 MG/1
60 TABLET, EXTENDED RELEASE ORAL DAILY
Qty: 30 TABLET | Refills: 0 | Status: SHIPPED | OUTPATIENT
Start: 2022-08-19 | End: 2022-09-12 | Stop reason: SDUPTHER

## 2022-08-19 NOTE — TELEPHONE ENCOUNTER
[facility] date for Hysingla as 7/26/22 for a 30 d/s. Last Visit: 6/15/22 with MD Fragoso  Next Appointment: 9/12/22 with MD Fragoso  Previous Refill Encounter(s): 7/22/22 #30    Requested Prescriptions     Pending Prescriptions Disp Refills    HYDROcodone bitartrate (HYSINGLA) 60 mg ER tablet 30 Tablet 0     Sig: Take 1 Tablet by mouth daily for 30 days. Max Daily Amount: 60 mg. *DO NOT CRUSH,CHEW, OR DISSOLVE TABLET*         For Pharmacy Admin Tracking Only    CPA in place:   Recommendation Provided To:    Intervention Detail: New Rx: 1, reason: Patient Preference  Gap Closed?:   Intervention Accepted By:   Time Spent (min): 5

## 2022-08-23 RX ORDER — IBUPROFEN 800 MG/1
TABLET ORAL
Qty: 90 TABLET | Refills: 0 | Status: SHIPPED | OUTPATIENT
Start: 2022-08-23 | End: 2022-10-09

## 2022-08-29 ENCOUNTER — APPOINTMENT (OUTPATIENT)
Dept: INTERNAL MEDICINE CLINIC | Age: 67
End: 2022-08-29

## 2022-08-30 LAB
ALBUMIN SERPL-MCNC: 4.1 G/DL (ref 3.8–4.8)
ALBUMIN/GLOB SERPL: 1.1 {RATIO} (ref 1.2–2.2)
ALP SERPL-CCNC: 96 IU/L (ref 44–121)
ALT SERPL-CCNC: 14 IU/L (ref 0–44)
AST SERPL-CCNC: 16 IU/L (ref 0–40)
BILIRUB SERPL-MCNC: 0.3 MG/DL (ref 0–1.2)
BUN SERPL-MCNC: 21 MG/DL (ref 8–27)
BUN/CREAT SERPL: 18 (ref 10–24)
CALCIUM SERPL-MCNC: 9.3 MG/DL (ref 8.6–10.2)
CHLORIDE SERPL-SCNC: 106 MMOL/L (ref 96–106)
CHOLEST SERPL-MCNC: 217 MG/DL (ref 100–199)
CO2 SERPL-SCNC: 23 MMOL/L (ref 20–29)
CREAT SERPL-MCNC: 1.16 MG/DL (ref 0.76–1.27)
EGFR: 68 ML/MIN/1.73
GLOBULIN SER CALC-MCNC: 3.6 G/DL (ref 1.5–4.5)
GLUCOSE SERPL-MCNC: 103 MG/DL (ref 65–99)
HBA1C MFR BLD: 6 % (ref 4.8–5.6)
HDLC SERPL-MCNC: 37 MG/DL
IMP & REVIEW OF LAB RESULTS: NORMAL
LDLC SERPL CALC-MCNC: 156 MG/DL (ref 0–99)
POTASSIUM SERPL-SCNC: 4.5 MMOL/L (ref 3.5–5.2)
PROT SERPL-MCNC: 7.7 G/DL (ref 6–8.5)
SODIUM SERPL-SCNC: 141 MMOL/L (ref 134–144)
TRIGL SERPL-MCNC: 134 MG/DL (ref 0–149)
VLDLC SERPL CALC-MCNC: 24 MG/DL (ref 5–40)

## 2022-09-03 DIAGNOSIS — I10 ESSENTIAL HYPERTENSION: ICD-10-CM

## 2022-09-03 DIAGNOSIS — R73.03 PREDIABETES: ICD-10-CM

## 2022-09-03 DIAGNOSIS — E78.00 HYPERCHOLESTEROLEMIA: ICD-10-CM

## 2022-09-12 ENCOUNTER — OFFICE VISIT (OUTPATIENT)
Dept: INTERNAL MEDICINE CLINIC | Age: 67
End: 2022-09-12
Payer: MEDICARE

## 2022-09-12 VITALS
BODY MASS INDEX: 38.21 KG/M2 | HEIGHT: 69 IN | HEART RATE: 62 BPM | DIASTOLIC BLOOD PRESSURE: 63 MMHG | TEMPERATURE: 98.2 F | OXYGEN SATURATION: 98 % | WEIGHT: 258 LBS | SYSTOLIC BLOOD PRESSURE: 130 MMHG | RESPIRATION RATE: 20 BRPM

## 2022-09-12 DIAGNOSIS — Z23 ENCOUNTER FOR IMMUNIZATION: ICD-10-CM

## 2022-09-12 DIAGNOSIS — M54.42 CHRONIC MIDLINE LOW BACK PAIN WITH LEFT-SIDED SCIATICA: ICD-10-CM

## 2022-09-12 DIAGNOSIS — E66.01 CLASS 2 SEVERE OBESITY DUE TO EXCESS CALORIES WITH SERIOUS COMORBIDITY AND BODY MASS INDEX (BMI) OF 38.0 TO 38.9 IN ADULT (HCC): ICD-10-CM

## 2022-09-12 DIAGNOSIS — I10 ESSENTIAL HYPERTENSION: Primary | ICD-10-CM

## 2022-09-12 DIAGNOSIS — R73.03 PREDIABETES: ICD-10-CM

## 2022-09-12 DIAGNOSIS — G89.29 CHRONIC MIDLINE LOW BACK PAIN WITH LEFT-SIDED SCIATICA: ICD-10-CM

## 2022-09-12 DIAGNOSIS — E78.00 HYPERCHOLESTEROLEMIA: ICD-10-CM

## 2022-09-12 PROCEDURE — G0008 ADMIN INFLUENZA VIRUS VAC: HCPCS | Performed by: INTERNAL MEDICINE

## 2022-09-12 PROCEDURE — G8510 SCR DEP NEG, NO PLAN REQD: HCPCS | Performed by: INTERNAL MEDICINE

## 2022-09-12 PROCEDURE — 1123F ACP DISCUSS/DSCN MKR DOCD: CPT | Performed by: INTERNAL MEDICINE

## 2022-09-12 PROCEDURE — G8536 NO DOC ELDER MAL SCRN: HCPCS | Performed by: INTERNAL MEDICINE

## 2022-09-12 PROCEDURE — 90694 VACC AIIV4 NO PRSRV 0.5ML IM: CPT | Performed by: INTERNAL MEDICINE

## 2022-09-12 PROCEDURE — G8752 SYS BP LESS 140: HCPCS | Performed by: INTERNAL MEDICINE

## 2022-09-12 PROCEDURE — 99214 OFFICE O/P EST MOD 30 MIN: CPT | Performed by: INTERNAL MEDICINE

## 2022-09-12 PROCEDURE — 1101F PT FALLS ASSESS-DOCD LE1/YR: CPT | Performed by: INTERNAL MEDICINE

## 2022-09-12 PROCEDURE — G8427 DOCREV CUR MEDS BY ELIG CLIN: HCPCS | Performed by: INTERNAL MEDICINE

## 2022-09-12 PROCEDURE — 3017F COLORECTAL CA SCREEN DOC REV: CPT | Performed by: INTERNAL MEDICINE

## 2022-09-12 PROCEDURE — G8417 CALC BMI ABV UP PARAM F/U: HCPCS | Performed by: INTERNAL MEDICINE

## 2022-09-12 PROCEDURE — G8754 DIAS BP LESS 90: HCPCS | Performed by: INTERNAL MEDICINE

## 2022-09-12 RX ORDER — HYDROCODONE BITARTRATE 60 MG/1
60 TABLET, EXTENDED RELEASE ORAL DAILY
Qty: 30 TABLET | Refills: 0 | Status: SHIPPED | OUTPATIENT
Start: 2022-09-12 | End: 2022-10-12

## 2022-09-12 RX ORDER — UMECLIDINIUM BROMIDE AND VILANTEROL TRIFENATATE 62.5; 25 UG/1; UG/1
POWDER RESPIRATORY (INHALATION)
Qty: 60 EACH | Refills: 5 | Status: SHIPPED | OUTPATIENT
Start: 2022-09-12

## 2022-09-12 NOTE — PATIENT INSTRUCTIONS
Vaccine Information Statement    Influenza (Flu) Vaccine (Inactivated or Recombinant): What You Need to Know    Many vaccine information statements are available in Polish and other languages. See www.immunize.org/vis. Hojas de información sobre vacunas están disponibles en español y en muchos otros idiomas. Visite www.immunize.org/vis. 1. Why get vaccinated? Influenza vaccine can prevent influenza (flu). Flu is a contagious disease that spreads around the [facility] every year, usually between October and May. Anyone can get the flu, but it is more dangerous for some people. Infants and young children, people 72 years and older, pregnant people, and people with certain health conditions or a weakened immune system are at greatest risk of flu complications. Pneumonia, bronchitis, sinus infections, and ear infections are examples of flu-related complications. If you have a medical condition, such as heart disease, cancer, or diabetes, flu can make it worse. Flu can cause fever and chills, sore throat, muscle aches, fatigue, cough, headache, and runny or stuffy nose. Some people may have vomiting and diarrhea, though this is more common in children than adults. In an average year, thousands of people in the [facility] die from flu, and many more are hospitalized. Flu vaccine prevents millions of illnesses and flu-related visits to the doctor each year. 2. Influenza vaccines     CDC recommends everyone 6 months and older get vaccinated every flu season. Children 6 months through 6years of age may need 2 doses during a single flu season. Everyone else needs only 1 dose each flu season. It takes about 2 weeks for protection to develop after vaccination. There are many flu viruses, and they are always changing. Each year a new flu vaccine is made to protect against the influenza viruses believed to be likely to cause disease in the upcoming flu season.  Even when the vaccine doesnt exactly match these viruses, it may still provide some protection. Influenza vaccine does not cause flu. Influenza vaccine may be given at the same time as other vaccines. 3. Talk with your health care provider    Tell your vaccination provider if the person getting the vaccine:  Has had an allergic reaction after a previous dose of influenza vaccine, or has any severe, life-threatening allergies   Has ever had Guillain-Barré Syndrome (also called GBS)    In some cases, your health care provider may decide to postpone influenza vaccination until a future visit. Influenza vaccine can be administered at any time during pregnancy. People who are or will be pregnant during influenza season should receive inactivated influenza vaccine. People with minor illnesses, such as a cold, may be vaccinated. People who are moderately or severely ill should usually wait until they recover before getting influenza vaccine. Your health care provider can give you more information. 4. Risks of a vaccine reaction    Soreness, redness, and swelling where the shot is given, fever, muscle aches, and headache can happen after influenza vaccination. There may be a very small increased risk of Guillain-Barré Syndrome (GBS) after inactivated influenza vaccine (the flu shot). Hildy Paddy children who get the flu shot along with pneumococcal vaccine (PCV13) and/or DTaP vaccine at the same time might be slightly more likely to have a seizure caused by fever. Tell your health care provider if a child who is getting flu vaccine has ever had a seizure. People sometimes faint after medical procedures, including vaccination. Tell your provider if you feel dizzy or have vision changes or ringing in the ears. As with any medicine, there is a very remote chance of a vaccine causing a severe allergic reaction, other serious injury, or death. 5. What if there is a serious problem?     An allergic reaction could occur after the vaccinated person leaves the clinic. If you see signs of a severe allergic reaction (hives, swelling of the face and throat, difficulty breathing, a fast heartbeat, dizziness, or weakness), call 9-1-1 and get the person to the nearest hospital.    For other signs that concern you, call your health care provider. Adverse reactions should be reported to the Vaccine Adverse Event Reporting System (VAERS). Your health care provider will usually file this report, or you can do it yourself. Visit the VAERS website at www.vaers. [facility].gov or call 2-370.661.8806. VAERS is only for reporting reactions, and VAERS staff members do not give medical advice. 6. The National Vaccine Injury Compensation Program    The [facility] Vaccine Injury Compensation Program (VICP) is a federal program that was created to compensate people who may have been injured by certain vaccines. Claims regarding alleged injury or death due to vaccination have a time limit for filing, which may be as short as two years. Visit the VICP website at www.[facility].gov/vaccinecompensation or call 4-397.811.9343 to learn about the program and about filing a claim. 7. How can I learn more? Ask your health care provider. Call your local or state health department. Visit the website of the Food and Drug Administration (FDA) for vaccine package inserts and additional information at www.fda.gov/vaccines-blood-biologics/vaccines. Contact the Centers for Disease Control and Prevention (CDC): Call 2-885.644.5662 (6-488-DSF-INFO) or  Visit CDCs influenza website at www.cdc.gov/flu. Vaccine Information Statement   Inactivated Influenza Vaccine   8/6/2021  42 LUIS FELIPE Grewal [id number]-05   Department of Health and Human Services  Centers for Disease Control and Prevention    Office Use Only

## 2022-09-12 NOTE — PROGRESS NOTES
Patient is in the office today for a 4 month follow up. Patient is requesting to have Hyslinga sent to the pharmacy. Do you have an Advance Directive no  Do you want more information : information given     1. \"Have you been to the ER, urgent care clinic since your last visit? Hospitalized since your last visit? \" No    2. \"Have you seen or consulted any other health care providers outside of the [address] [place] [address] since your last visit? \" No     3. For patients aged 39-70: Has the patient had a colonoscopy / FIT/ Cologuard? Yes - no Care Gap present      If the patient is female:    4. For patients aged 41-77: Has the patient had a mammogram within the past 2 years? NA - based on age or sex      11. For patients aged 21-65: Has the patient had a pap smear? NA - based on age or sex    Verbal order read back per Dr. Geovany Robb flu vaccine. Patient received flu vaccine in left deltoid. Patient was observed and no signs or symptoms for an allergic reaction noted at this time. Patient tolerated well and left without complaints. Patient received flu  VIS.

## 2022-09-12 NOTE — PROGRESS NOTES
Subjective:       Chief Complaint  The patient presents for follow up of hypertension and high cholesterol. prediabetes  chronic back pain and right hip pain, COPD        HPI  Rhett Christine is a 69 y.o. male seen for follow up of hyperlipidemia. Nathalia has hypertension. Hypertension well controlled, no significant medication side effects noted, on Lopressor 25 mg BID, hyperlipidemia uncontrolled, pt has been unable to tolerate statins due to myalgias. The 10-year ASCVD risk score (Bert Lomeli et al., 2013) is: 33.5%      Diet and Lifestyle: not attempting to follow a low fat, low cholesterol diet, exercises sporadically    Home BP Monitoring: is not measured at home. Rhett Christine RTC today to follow up on chronic pain diagnosis. We discussed his osteoarthritis that is affecting his back. Significant changes since last visit: pt is having neurogenic claudication and has pain with standing and walking in both legs. He is  able to do his normal daily activities. He reports the following adverse side effects: none. Pt doing fairly well with Hysingla ER 60 mg. [facility] and was referred for aqua therapy but did not tolerate it due to pain. His MRI of his LS shows worsening DJD so he will f/u with the [facility]. Lyrica has helped the neurogenic pain. Least pain over the last week has been 3/10. Worst pain over the last week has been 10/10. Opioid Risk Tool Reviewed: YES    Aberrant behaviors: None. Urine Drug Screen: reviewed and up to date. Controlled substance agreement on file: YES.  reviewed:yes    Pill count is consistent with his prescription: yes    Concomitant use of a benzodiazepine: no        Prediabetes: pt is trying to control with diet and weight loss but he continues to struggle to lose weight. His BMI of 38 puts him at high risk of multiple medical problems. COPD Review:  The patient is being seen for follow up of COPD.    Oxygen: He currently is not on home oxygen therapy. He is using Anoro Ellipta with good results. Symptoms: chronic dyspnea: severity = mild: course of sx: symptoms have progressed to a point and plateaued. .   Patient uses 0 pillows at night. Patient does smoke cigarettes. Pt has sleep apnea is trying to use CPAP on a regular basis. He will f/u with his sleep specialist Dr Jesus Ruby. Patient renal function is in the normal range with this blood test.  Patient also has prediabetes which he is encouraged to try and improved by cutting back starches and sugar in his diet. Current Outpatient Medications   Medication Sig    HYDROcodone bitartrate (HYSINGLA) 60 mg ER tablet Take 1 Tablet by mouth daily for 30 days. Max Daily Amount: 60 mg. *DO NOT CRUSH,CHEW, OR DISSOLVE TABLET*    umeclidinium-vilanteroL (Anoro Ellipta) 62.5-25 mcg/actuation inhaler Inhale 1 puff by mouth once daily    metoprolol tartrate (LOPRESSOR) 25 mg tablet Take 1 tablet by mouth twice daily    ibuprofen (MOTRIN) 800 mg tablet TAKE 1 TABLET BY MOUTH EVERY 8 HOURS AS NEEDED FOR PAIN    hydrOXYzine pamoate (VISTARIL) 25 mg capsule TAKE 1 CAPSULE BY MOUTH THREE TIMES DAILY AS NEEDED FOR ITCHING    albuterol (PROVENTIL HFA, VENTOLIN HFA, PROAIR HFA) 90 mcg/actuation inhaler INHALE 2 PUFFS BY MOUTH EVERY 4 HOURS AS NEEDED FOR WHEEZING    pregabalin (LYRICA) 150 mg capsule Take 1 Capsule by mouth three (3) times daily.  Max Daily Amount: 450 mg.    tamsulosin (FLOMAX) 0.4 mg capsule TAKE 1 CAPSULE BY MOUTH ONCE DAILY AFTER SUPPER    montelukast (SINGULAIR) 10 mg tablet Take 1 tablet by mouth once daily    Simbrinza 1-0.2 % drps     pantoprazole (PROTONIX) 40 mg tablet Take 1 tablet by mouth once daily    traZODone (DESYREL) 50 mg tablet TAKE 1-2 TABLETs BY MOUTH ONCE DAILY AT BEDTIME AS NEEDED FOR SLEEP    latanoprost (XALATAN) 0.005 % ophthalmic solution INSTILL 1 DROP INTO EACH EYE AT BEDTIME    Narcan 4 mg/actuation nasal spray ADMINISTER A SINGLE SPRAY IN ONE NOSTRIL UPON SIGNS OF OPIOID OVERDOSE. CALL 911. REPEAT AFTER 3 MINUTES IF NO RESPONSE.    azelastine (ASTELIN) 137 mcg (0.1 %) nasal spray 2 Sprays by Both Nostrils route two (2) times a day. Use in each nostril as directed  Indications: seasonal runny nose    fluticasone propionate (FLONASE) 50 mcg/actuation nasal spray USE TWO SPRAY(S) IN EACH NOSTRIL ONCE DAILY    aspirin delayed-release 81 mg tablet Take 81 mg by mouth daily. lactulose (CONSTULOSE) 10 gram/15 mL solution Take 45ml PO TID    nitroglycerin (NITROSTAT) 0.4 mg SL tablet 1 Tab by SubLINGual route every five (5) minutes as needed. TRAVATAN Z 0.004 % ophthalmic solution Administer 1 Drop to both eyes nightly. No current facility-administered medications for this visit.              Review of Systems  Respiratory: negative for dyspnea on exertion  Cardiovascular: negative for chest pain    Objective:     Visit Vitals  /63 (BP 1 Location: Left arm, BP Patient Position: Sitting, BP Cuff Size: Adult)   Pulse 62   Temp 98.2 °F (36.8 °C) (Temporal)   Resp 20   Ht 5' 9\" (1.753 m)   Wt 258 lb (117 kg)   SpO2 98%   BMI 38.10 kg/m²        General appearance - alert, well appearing, and in no distress  Neck - supple, no significant adenopathy, carotids upstroke normal bilaterally, no bruits  Chest - clear to auscultation, no wheezes, rales or rhonchi, symmetric air entry  Heart - normal rate, regular rhythm, normal S1, S2, no murmurs, rubs, clicks or gallops  Extremities - peripheral pulses normal, no pedal edema, no clubbing or cyanosis  Skin - normal coloration and turgor, no rashes, no suspicious skin lesions noted      Labs:   Lab Results   Component Value Date/Time    Hemoglobin A1c 6.0 (H) 08/29/2022 08:07 AM    Hemoglobin A1c 6.2 (H) 04/14/2022 08:20 AM    Hemoglobin A1c 6.1 (H) 12/09/2021 10:37 AM    Glucose 103 (H) 08/29/2022 08:07 AM    Glucose (POC) 123 (H) 01/24/2016 04:57 PM    Glucose,  (H) 04/01/2015 08:56 AM Microalb/Creat ratio (ug/mg creat.) 15 04/14/2022 08:20 AM    LDL, calculated 156 (H) 08/29/2022 08:07 AM    LDL, calculated 131 (H) 07/28/2020 09:54 AM    Creatinine 1.16 08/29/2022 08:07 AM      Lab Results   Component Value Date/Time    Cholesterol, total 217 (H) 08/29/2022 08:07 AM    HDL Cholesterol 37 (L) 08/29/2022 08:07 AM    LDL, calculated 156 (H) 08/29/2022 08:07 AM    LDL, calculated 131 (H) 07/28/2020 09:54 AM    Triglyceride 134 08/29/2022 08:07 AM    CHOL/HDL Ratio 4.4 11/15/2019 09:05 AM     Lab Results   Component Value Date/Time    ALT (SGPT) 14 08/29/2022 08:07 AM    Alk. phosphatase 96 08/29/2022 08:07 AM    Bilirubin, total 0.3 08/29/2022 08:07 AM     Lab Results   Component Value Date/Time    GFR est AA 70 12/09/2021 10:37 AM    GFR est non-AA 61 12/09/2021 10:37 AM    Creatinine 1.16 08/29/2022 08:07 AM    BUN 21 08/29/2022 08:07 AM    BUN, POC 6 (L) 04/01/2015 08:56 AM    Sodium,  04/01/2015 08:56 AM    Sodium 141 08/29/2022 08:07 AM    Potassium 4.5 08/29/2022 08:07 AM    Potassium, POC 4.0 04/01/2015 08:56 AM    Chloride,  04/01/2015 08:56 AM    Chloride 106 08/29/2022 08:07 AM    CO2 23 08/29/2022 08:07 AM      Lab Results   Component Value Date/Time    Glucose 103 (H) 08/29/2022 08:07 AM    Glucose (POC) 123 (H) 01/24/2016 04:57 PM    Glucose,  (H) 04/01/2015 08:56 AM            Assessment / Plan     Hypertension well controlled, on Lopressor   Hyperlipidemia uncontrolled, pt unable to tolerate statins. ICD-10-CM ICD-9-CM    1. Essential hypertension  Q75 750.6 METABOLIC PANEL, COMPREHENSIVE      2. Hypercholesterolemia  E78.00 272.0 LIPID PANEL      3. Prediabetes  R73.03 790.29 Patient trying to keep under control with diet and weight loss HEMOGLOBIN A1C W/O EAG      4.  Chronic midline low back pain with left-sided sciatica  M54.42 724.2 Controlled on HYDROcodone bitartrate (HYSINGLA) 60 mg ER tablet    G89.29 724.3 COMPLIANCE DRUG SCREEN/PRESCRIPTION MONITORING 338.29       5. Encounter for immunization  Z23 V03.89 ADMIN INFLUENZA VIRUS VAC      INFLUENZA, FLUAD, (AGE 72 Y+), IM, PF, 0.5 ML      6. Class 2 severe obesity due to excess calories with serious comorbidity and body mass index (BMI) of 38.0 to 38.9 in adult [facility])  E66.01 278.01 Patient will continue to work on trying to lose more weight by cutting back starches and sugar in his diet    Z68.38 V85.38                     Low cholesterol diet, weight control and daily exercise discussed. Follow-up and Dispositions    Return in about 6 months (around 3/12/2023) for labs 1 week before. Reviewed plan of care. Patient has provided input and agrees with goals.

## 2022-09-25 RX ORDER — HYDROXYZINE PAMOATE 25 MG/1
CAPSULE ORAL
Qty: 60 CAPSULE | Refills: 0 | Status: SHIPPED | OUTPATIENT
Start: 2022-09-25

## 2022-10-09 RX ORDER — IBUPROFEN 800 MG/1
TABLET ORAL
Qty: 90 TABLET | Refills: 0 | Status: SHIPPED | OUTPATIENT
Start: 2022-10-09

## 2022-10-19 DIAGNOSIS — M54.42 CHRONIC MIDLINE LOW BACK PAIN WITH LEFT-SIDED SCIATICA: Primary | ICD-10-CM

## 2022-10-19 DIAGNOSIS — G89.29 CHRONIC MIDLINE LOW BACK PAIN WITH LEFT-SIDED SCIATICA: Primary | ICD-10-CM

## 2022-10-19 RX ORDER — HYDROCODONE BITARTRATE 60 MG/1
60 TABLET, EXTENDED RELEASE ORAL DAILY
Qty: 30 TABLET | Refills: 0 | Status: SHIPPED | OUTPATIENT
Start: 2022-10-19 | End: 2022-11-18

## 2022-10-19 NOTE — TELEPHONE ENCOUNTER
[facility] Hysingla [facility] 9[facility]20/22 for a 30 d/s. Last Visit: 9/12/22 with MD Fragoso  Next Appointment: 3/20/23 with MD Fragoso  Previous Refill Encounter(s): 9/12/22 #30    Requested Prescriptions     Pending Prescriptions Disp Refills    HYDROcodone bitartrate (HYSINGLA) 60 mg ER tablet 30 Tablet 0     Sig: Take 1 Tablet by mouth daily for 30 days. Max Daily Amount: 60 mg. *DO NOT CRUSH,CHEW, OR DISSOLVE TABLET*         For Pharmacy Admin Tracking Only    CPA in place:   Recommendation Provided To:    Intervention Detail: New Rx: 1, reason: Patient Preference  Gap Closed?:   Intervention Accepted By:   Time Spent (min): 5

## 2022-11-18 DIAGNOSIS — M54.42 CHRONIC MIDLINE LOW BACK PAIN WITH LEFT-SIDED SCIATICA: ICD-10-CM

## 2022-11-18 DIAGNOSIS — G89.29 CHRONIC MIDLINE LOW BACK PAIN WITH LEFT-SIDED SCIATICA: ICD-10-CM

## 2022-11-18 RX ORDER — HYDROCODONE BITARTRATE 60 MG/1
60 TABLET, EXTENDED RELEASE ORAL DAILY
Qty: 30 TABLET | Refills: 0 | Status: SHIPPED | OUTPATIENT
Start: 2022-11-18 | End: 2022-12-18

## 2022-11-18 NOTE — TELEPHONE ENCOUNTER
Requested Prescriptions     Pending Prescriptions Disp Refills    HYDROcodone bitartrate (HYSINGLA) 60 mg ER tablet 30 Tablet 0     Sig: Take 1 Tablet by mouth daily for 30 days. Max Daily Amount: 60 mg.  *DO NOT CRUSH,CHEW, OR DISSOLVE TABLET*

## 2022-11-26 NOTE — TELEPHONE ENCOUNTER
Pt requesting a call back. He would like to know why he was cut back to 30 pain pills a month. Regarding: [place]-  Dizzy /99   ----- Message from Shahla Jara sent at 11/26/2022  1:07 PM CST -----  Patient Name: Angelica Wright    Specialist or PCP Name:Margoth Noel     Symptoms: Dizzy  /99    Pregnant (females aged 13-60. If Yes, how long?) : No    Call Back # : [phone]    Which State are you currently located in?: [place]    Name of Clinic Site / Acct# : [facility]     Use following scripting for patients waiting for a callback:   “Nurse callback times vary based on call volumes; please be aware the return phone call may come from an unidentified phone number. If your symptoms worsen or become life threatening while waiting, you should seek immediate assistance by calling 911 or going to the ER for evaluation.”

## 2022-12-02 NOTE — PROGRESS NOTES
[address]  [address]  [place], [address] [place] [address] Street  Phone: [phone]  Fax: [phone]           George Hull  : 1955  PCP: Karina Lindquist MD  2022    PROGRESS NOTE    HISTORY OF PRESENT ILLNESS  Zehra Freitas is a 69 y.o. male c/o constant lower back pain with paraesthesia down anterior legs upon standing. Notes he can only stand for about 5 minutes due to sxs. Also notes difficulty standing up after sitting down. He has tenderness upon palpation of lumbar region. He was previously seen by Dr. Andrea Coleman on 2021 with hx of lumbar back pain; during that time, he was taking Lyrica 100 mg at morning and 200 mg at night with benefit. Pt notes that his insurance wouldn't cover the medication anymore, but Lyrica had provided great benefit. Pt previously saw Dr. Wai Hugo on 22 for lower back pain radiating down both legs. Notes of numbness and tingling of both legs as well. Lumbar MRI images dated 22 were reviewed. Per report, slightly progressed degenerative disc and joint disease in L4-L5, with moderate central stenosis, foraminal stenosis and suspected compression of exiting L4 nerve roots. Notes he has not had previous injections in the past.     Pain Score: 8/10      PmHx: CAD, HTN      ASSESSMENT  Zehra Freitas is a 69 y.o. male c/o lower back pain with paraesthesia down anterior legs, with onset of sxs exacerbated upon standing. His symptoms are likely due to lumbar spinal stenosis with neurogenic claudication(worse at L4-5), myofascial pain, and lumbar facet arthropathy (worse at L4-5) based on presentation and radiologic evidence. His pain is reproduced upon back flexion = extension.     We discussed treatment options of: surgical consult, HEP, injection treatment, medication regimens    PLAN  Lyrica 150 mg BID - pt was previously on the medication last year with great benefit  Advised pt to follow up with Dr. Andrea Coleman for discussion of injection treatment, as he may benefit from an JOSE. He would prefer to avoid surgical intervention at this time. Pt will f/u with Dr. Antonio Sarah. Diagnoses and all orders for this visit:    1. Lumbar stenosis with neurogenic claudication    2. Acute bilateral low back pain, unspecified whether sciatica present    3. Numbness and tingling of both legs below knees    4. Chronic midline low back pain with left-sided sciatica           CHIEF COMPLAINT  Charla Parikh is seen today in consultation at the request of Jocelin Singh MD for complaints of lower back pain with paraesthesia of BLE. PAST MEDICAL HISTORY   Past Medical History:   Diagnosis Date    Allergic rhinitis     Avascular necrosis (HCC)     CAD (coronary artery disease) 4/2009    mild, hemodynamically non-significant by angiography    Carpal tunnel syndrome     Carpal tunnel syndrome on both sides     Chest pain, unspecified     Chronic back pain 2/11/2011    Chronic obstructive pulmonary disease ([address]     Degenerative joint disease     with chronic back pain    Dysesthesia     of hand post carpal tunnel surgery v sudeck's atrophy    Dyslipidemia, goal LDL below 100     Dysphagia     GERD (gastroesophageal reflux disease) 5/17/2010    Glaucoma 9/15/2010    Glaucoma     Hearing loss     Hypercholesterolemia     Hypertension     Joint fusion     of right wrist    Morbid obesity ([address]     Obesity 5/17/2010    Other dyspnea and respiratory abnormality     Palpitations     Right hip pain     Right hip pain     Right knee pain     S/P cardiac catheterization 4/9/2009    1. Normal systemic pressure. 2. Moderate elevation of left-sided filling pressures. 3. Preserved left ventricular systolic function in the LOWE projection. 4. Mild, nonsignificant epicardial coronary artery disese by angiography.     Sleep apnea     on CPAP    Strain of lumbar region     Tobacco abuse        Past Surgical History:   Procedure Laterality Date    HX CARPAL TUNNEL RELEASE      bilateral wrists    HX COLONOSCOPY      HX HEART CATHETERIZATION  4/9/2009    1. Normal systemic pressure. 2. Moderate elevation of left-sided filling pressures. 3. Preserved left ventricular systolic function in the LOWE projection. 4. Mild, nonsignificant epicardial coronary artery disese by angiography. HX HIP REPLACEMENT      HX OTHER SURGICAL  02/2017    left shoulder surgery    HX TONSILLECTOMY      HX WRIST FRACTURE TX      AR ANESTH,SURGERY OF SHOULDER         MEDICATIONS      Current Outpatient Medications   Medication Sig Dispense Refill    HYDROcodone bitartrate (HYSINGLA) 60 mg ER tablet Take 1 Tablet by mouth daily for 30 days. Max Daily Amount: 60 mg. *DO NOT CRUSH,CHEW, OR DISSOLVE TABLET* 30 Tablet 0    hydrOXYzine pamoate (VISTARIL) 25 mg capsule TAKE 1 CAPSULE BY MOUTH THREE TIMES DAILY AS NEEDED FOR ITCHING 60 Capsule 3    montelukast (SINGULAIR) 10 mg tablet Take 1 tablet by mouth once daily 30 Tablet 5    pregabalin (LYRICA) 150 mg capsule TAKE 1 CAPSULE BY MOUTH THREE TIMES DAILY .  DO NOT EXCEED 3 PER 24 HOURS 90 Capsule 3    traZODone (DESYREL) 50 mg tablet TAKE 1 TO 2 TABLETS BY MOUTH ONCE DAILY AT BEDTIME AS NEEDED FOR SLEEP 30 Tablet 3    ibuprofen (MOTRIN) 800 mg tablet TAKE 1 TABLET BY MOUTH EVERY 8 HOURS AS NEEDED FOR PAIN 90 Tablet 0    umeclidinium-vilanteroL (Anoro Ellipta) 62.5-25 mcg/actuation inhaler Inhale 1 puff by mouth once daily 60 Each 5    metoprolol tartrate (LOPRESSOR) 25 mg tablet Take 1 tablet by mouth twice daily 180 Tablet 1    albuterol (PROVENTIL HFA, VENTOLIN HFA, PROAIR HFA) 90 mcg/actuation inhaler INHALE 2 PUFFS BY MOUTH EVERY 4 HOURS AS NEEDED FOR WHEEZING 18 g 5    tamsulosin (FLOMAX) 0.4 mg capsule TAKE 1 CAPSULE BY MOUTH ONCE DAILY AFTER SUPPER 90 Capsule 3    Simbrinza 1-0.2 % drps       latanoprost (XALATAN) 0.005 % ophthalmic solution INSTILL 1 DROP INTO EACH EYE AT BEDTIME      Narcan 4 mg/actuation nasal spray ADMINISTER A SINGLE SPRAY IN ONE NOSTRIL UPON SIGNS OF OPIOID OVERDOSE. CALL 911. REPEAT AFTER 3 MINUTES IF NO RESPONSE.      azelastine (ASTELIN) 137 mcg (0.1 %) nasal spray 2 Sprays by Both Nostrils route two (2) times a day. Use in each nostril as directed  Indications: seasonal runny nose 1 Bottle 5    fluticasone propionate (FLONASE) 50 mcg/actuation nasal spray USE TWO SPRAY(S) IN EACH NOSTRIL ONCE DAILY 1 Bottle 5    aspirin delayed-release 81 mg tablet Take 81 mg by mouth daily. nitroglycerin (NITROSTAT) 0.4 mg SL tablet 1 Tab by SubLINGual route every five (5) minutes as needed. 6 Tab 1    TRAVATAN Z 0.004 % ophthalmic solution Administer 1 Drop to both eyes nightly. pantoprazole (PROTONIX) 40 mg tablet Take 1 tablet by mouth once daily 90 Tablet 3    lactulose (CONSTULOSE) 10 gram/15 mL solution Take 45ml PO  mL 2       ALLERGIES    Allergies   Allergen Reactions    Latex Not Reported This Time    Ace Inhibitors Cough    Dilaudid [Hydromorphone] Hives    Lisinopril Cough    Zocor [Simvastatin] Myalgia     Right leg          SOCIAL HISTORY    Social History     Socioeconomic History    Marital status: SINGLE   Tobacco Use    Smoking status: Every Day     Packs/day: 0.10     Years: 20.00     Pack years: 2.00     Types: Cigarettes    Smokeless tobacco: Never    Tobacco comments:     urothelial cancer risk discussed today [id number]   Vaping Use    Vaping Use: Never used   Substance and Sexual Activity    Alcohol use: No     Alcohol/week: 0.0 standard drinks    Drug use: No       FAMILY HISTORY    Family History   Problem Relation Age of Onset    Diabetes Mother     Cancer Father     Hypertension Other     OSTEOARTHRITIS Other        REVIEW OF SYSTEMS  Review of Systems   Constitutional:  Negative for chills, fever and weight loss. Respiratory:  Negative for shortness of breath. Cardiovascular:  Negative for chest pain. Gastrointestinal:  Negative for constipation.         Negative for fecal incontinence Genitourinary:  Negative for dysuria. Negative for urinary incontinence   Musculoskeletal:  Positive for back pain (bilateral lower) and myalgias. Skin:  Negative for rash. Neurological:  Positive for tingling (BLE). Negative for dizziness, tremors, focal weakness and headaches. Endo/Heme/Allergies:  Does not bruise/bleed easily. Psychiatric/Behavioral:  The patient does not have insomnia. PHYSICAL EXAMINATION  Visit Vitals  Pulse (!) 59   Temp 97.3 °F (36.3 °C) (Temporal)   Resp 16   Ht 5' 9\" (1.753 m)   Wt 259 lb (117.5 kg)   SpO2 99%   BMI 38.25 kg/m²       Pain Assessment  12/6/2022   Location of Pain Back;Leg   Location Modifiers -   Severity of Pain 8   Quality of Pain Burning; Other (Comment)   Quality of Pain Comment cramping, stiff , stabbing, numbness, tingling   Duration of Pain Persistent   Frequency of Pain Constant   Date Pain First Started -   Aggravating Factors Walking;Standing;Exercise; Other (Comment)   Aggravating Factors Comment sitting   Limiting Behavior Yes   Relieving Factors Other (Comment)   Relieving Factors Comment lying down   Result of Injury No   Work-Related Injury -   Type of Injury -       Constitutional:  Well developed, well nourished, in no acute distress. Psychiatric: Affect and mood are appropriate. HEENT: Normocephalic, atraumatic. Extraocular movements intact. Integumentary: No rashes or abrasions noted on exposed areas. Cardiovascular: Regular rate and rhythm. Pulmonary: Clear to auscultation bilaterally. SPINE/MUSCULOSKELETAL EXAM    Per Dr. Malika Baker, 1/4/2021  Tenderness to palpation in the lower lumbar region. Moderate pain with extension and axial loading. No pain with internal or external rotation of his hips. Negative straight leg raise bilaterally. Lumbar spine:  No rash, ecchymosis, or gross obliquity. No fasciculations. No focal atrophy is noted. No pain with hip ROM. Full range of motion.   Tenderness to palpation of lumbar region  No tenderness to palpation at the sciatic notch. SI joints non-tender. Trochanters non tender. Sensation in the bilateral legs grossly intact to light touch. His pain is reproduced upon back flexion = extension. MOTOR:      Elbow Flex  Elbow Ext Arm Abd Wrist Ext Wrist Flex Hand Intrin   Right 5/5 5/5 5/5 5/5 5/5 5/5   Left 5/5 5/5 5/5 5/5 5/5 5/5             Hip flex  Knee Ext EHL Ankle DF Ankle PF      Right 5/5 5/5 5/5 5/5 5/5    Left 5/5 5/5 5/5 5/5 5/5      DTRs are 1+ biceps, triceps, brachioradialis, patella, and Achilles. Negative Straight Leg raise. Squat not tested. No difficulty with tandem gait. Ambulation without assistive device. FWB. RADIOGRAPHS  Lumbar MRI images WO Contrast taken on 5/13/22 personally reviewed with patient:  Trace anterior spondylolisthesis of L4, stable. Edema in upper L5 and inferior  L4, likely discogenic. Less severe edema in the L4 and L5 pedicles compared to  prior study. No compression deformity. Conus medullaris ends at L1-2 with normal  morphology and signal intensity. Paraspinal soft tissues unremarkable. L1-L2: No disc herniation, central or foraminal stenosis. L2-L3: Posterior disc bulge. Facet and ligamentous hypertrophy. Mild posterior  epidural fat. Overall, AP canal measures 12 mm. No central stenosis. Mild  bilateral foraminal stenosis. L3-L4: More severe posterior disc bulge, facet and ligamentous hypertrophy. AP  canal measures 9.3 mm, borderline mild central stenosis. No significant  foraminal stenosis. L4-L5: More severe posterior disc bulge with central disc protrusion. More  severe facet and ligamentous hypertrophy. Left facet joint effusion. Mild facet  inflammation. Disc bulge lifts the posterior longitudinal ligament. Overall, AP  canal measures approximately 7 mm moderate central stenosis. Moderately severe  right and moderate left foraminal stenosis.  Compression of exiting L4 nerve  roots suspected, slightly more conspicuous than before. Slightly  worse/progressed. L5-S1: Mild posterior disc bulge. Mild central stenosis. Mild bilateral  foraminal stenosis. IMPRESSION  Slightly progressed degenerative disc and joint disease in L4-L5,  with moderate central stenosis, foraminal stenosis and suspected compression of exiting L4 nerve roots. 28 minutes of face-to-face contact were spent with the patient during today's visit extensively discussing symptoms and treatment plan. All questions were answered. More than half of this visit today was spent on counseling. Written by Walker Carroll, as dictated by Dr. Rico Perdue.

## 2022-12-06 ENCOUNTER — OFFICE VISIT (OUTPATIENT)
Dept: ORTHOPEDIC SURGERY | Age: 67
End: 2022-12-06
Payer: MEDICARE

## 2022-12-06 VITALS
HEIGHT: 69 IN | BODY MASS INDEX: 38.36 KG/M2 | OXYGEN SATURATION: 99 % | WEIGHT: 259 LBS | HEART RATE: 59 BPM | TEMPERATURE: 97.3 F | RESPIRATION RATE: 16 BRPM

## 2022-12-06 DIAGNOSIS — R20.2 NUMBNESS AND TINGLING OF BOTH LEGS BELOW KNEES: ICD-10-CM

## 2022-12-06 DIAGNOSIS — M48.062 LUMBAR STENOSIS WITH NEUROGENIC CLAUDICATION: Primary | ICD-10-CM

## 2022-12-06 DIAGNOSIS — G89.29 CHRONIC MIDLINE LOW BACK PAIN WITH LEFT-SIDED SCIATICA: ICD-10-CM

## 2022-12-06 DIAGNOSIS — R20.0 NUMBNESS AND TINGLING OF BOTH LEGS BELOW KNEES: ICD-10-CM

## 2022-12-06 DIAGNOSIS — M54.50 ACUTE BILATERAL LOW BACK PAIN, UNSPECIFIED WHETHER SCIATICA PRESENT: ICD-10-CM

## 2022-12-06 DIAGNOSIS — M54.42 CHRONIC MIDLINE LOW BACK PAIN WITH LEFT-SIDED SCIATICA: ICD-10-CM

## 2022-12-06 RX ORDER — PREGABALIN 150 MG/1
CAPSULE ORAL
Qty: 60 CAPSULE | Refills: 1 | Status: SHIPPED | OUTPATIENT
Start: 2022-12-06

## 2022-12-14 DIAGNOSIS — M54.42 CHRONIC MIDLINE LOW BACK PAIN WITH LEFT-SIDED SCIATICA: ICD-10-CM

## 2022-12-14 DIAGNOSIS — G89.29 CHRONIC MIDLINE LOW BACK PAIN WITH LEFT-SIDED SCIATICA: ICD-10-CM

## 2022-12-14 RX ORDER — HYDROCODONE BITARTRATE 60 MG/1
60 TABLET, EXTENDED RELEASE ORAL DAILY
Qty: 30 TABLET | Refills: 0 | Status: SHIPPED | OUTPATIENT
Start: 2022-12-14 | End: 2023-01-13

## 2022-12-30 ENCOUNTER — TELEPHONE (OUTPATIENT)
Dept: INTERNAL MEDICINE CLINIC | Age: 67
End: 2022-12-30

## 2022-12-30 NOTE — TELEPHONE ENCOUNTER
----- Message from Janna Martin sent at 12/30/2022  9:53 AM EST -----  Subject: Message to Provider    QUESTIONS  Information for Provider? Pt called in and rescheduled his 3/20/23 appt to   3/31/23, Pt is requesting a mailed reminder for this appt, Pt does not   have texting on his phone nor does he have an email address. ---------------------------------------------------------------------------  --------------  Lupe TEE  [phone]; OK to leave message on voicemail  ---------------------------------------------------------------------------  --------------  SCRIPT ANSWERS  Relationship to Patient?  Self

## 2023-01-11 ENCOUNTER — TELEPHONE (OUTPATIENT)
Dept: ORTHOPEDIC SURGERY | Age: 68
End: 2023-01-11

## 2023-01-11 NOTE — TELEPHONE ENCOUNTER
Omar John from [facility] on behalf of pt  [facility] . He wants to leave Dr. Mary Russell a message:    Pt has an appt on 02/07/23 with Dr. Mary Russell. At this time he is experiencing trouble with the back of his legs and is having difficulty getting out of the bed in the morning. He is requesting a call back  Pt can be reached at [phone].

## 2023-01-12 NOTE — TELEPHONE ENCOUNTER
Called patient to schedule patient for next week with Dr Shelley Akbar, No answer left message that I had called.

## 2023-01-12 NOTE — TELEPHONE ENCOUNTER
Called and spoke with patient. Offered patient an appt with Dr Stanislaw Lopez today at [facility]. Patient denied this due to lack of transportation. Dr Dannie Melendez saw patient last month and order lyrica, patient stats this is not working for him and wants to know if there is anything else he can take.

## 2023-01-16 ENCOUNTER — OFFICE VISIT (OUTPATIENT)
Dept: ORTHOPEDIC SURGERY | Age: 68
End: 2023-01-16
Payer: MEDICARE

## 2023-01-16 VITALS
BODY MASS INDEX: 39.1 KG/M2 | OXYGEN SATURATION: 99 % | HEIGHT: 69 IN | WEIGHT: 264 LBS | HEART RATE: 60 BPM | TEMPERATURE: 97.8 F

## 2023-01-16 DIAGNOSIS — M48.062 LUMBAR STENOSIS WITH NEUROGENIC CLAUDICATION: Primary | ICD-10-CM

## 2023-01-16 DIAGNOSIS — G89.4 CHRONIC PAIN SYNDROME: ICD-10-CM

## 2023-01-16 DIAGNOSIS — E66.01 SEVERE OBESITY (BMI 35.0-39.9) WITH COMORBIDITY (HCC): ICD-10-CM

## 2023-01-16 DIAGNOSIS — M62.838 MUSCLE SPASM: ICD-10-CM

## 2023-01-16 DIAGNOSIS — M47.816 LUMBAR FACET ARTHROPATHY: ICD-10-CM

## 2023-01-16 DIAGNOSIS — Z79.891 USE OF OPIATES FOR THERAPEUTIC PURPOSES: ICD-10-CM

## 2023-01-16 PROCEDURE — 1123F ACP DISCUSS/DSCN MKR DOCD: CPT | Performed by: PHYSICAL MEDICINE & REHABILITATION

## 2023-01-16 PROCEDURE — G8427 DOCREV CUR MEDS BY ELIG CLIN: HCPCS | Performed by: PHYSICAL MEDICINE & REHABILITATION

## 2023-01-16 PROCEDURE — G8510 SCR DEP NEG, NO PLAN REQD: HCPCS | Performed by: PHYSICAL MEDICINE & REHABILITATION

## 2023-01-16 PROCEDURE — 1101F PT FALLS ASSESS-DOCD LE1/YR: CPT | Performed by: PHYSICAL MEDICINE & REHABILITATION

## 2023-01-16 PROCEDURE — 99214 OFFICE O/P EST MOD 30 MIN: CPT | Performed by: PHYSICAL MEDICINE & REHABILITATION

## 2023-01-16 PROCEDURE — G8417 CALC BMI ABV UP PARAM F/U: HCPCS | Performed by: PHYSICAL MEDICINE & REHABILITATION

## 2023-01-16 PROCEDURE — G8536 NO DOC ELDER MAL SCRN: HCPCS | Performed by: PHYSICAL MEDICINE & REHABILITATION

## 2023-01-16 PROCEDURE — 3017F COLORECTAL CA SCREEN DOC REV: CPT | Performed by: PHYSICAL MEDICINE & REHABILITATION

## 2023-01-16 RX ORDER — TIZANIDINE 4 MG/1
4 TABLET ORAL
COMMUNITY

## 2023-01-16 RX ORDER — HYDROCODONE BITARTRATE AND ACETAMINOPHEN 5; 325 MG/1; MG/1
1 TABLET ORAL
COMMUNITY
Start: 2022-11-02

## 2023-01-16 RX ORDER — CHLORHEXIDINE GLUCONATE 1.2 MG/ML
RINSE ORAL
COMMUNITY
Start: 2022-10-29

## 2023-01-16 RX ORDER — AMOXICILLIN 500 MG/1
CAPSULE ORAL
COMMUNITY
Start: 2022-10-29

## 2023-01-16 NOTE — PROGRESS NOTES
[facility]  [address]ylee [address]es[address] [place], [address]  Phone: [phone]  Fax: [phone]    Pt's YOB: 1955    ASSESSMENT   Diagnoses and all orders for this visit:    1. Lumbar stenosis with neurogenic claudication  -     SCHEDULE SURGERY    2. Lumbar facet arthropathy  -     SCHEDULE SURGERY    3. Muscle spasm  -     SCHEDULE SURGERY    4. Severe obesity (BMI 35.0-39. 9) with comorbidity ([address]    5. Chronic pain syndrome    6. Use of opiates for therapeutic purposes       IMPRESSION AND PLAN:  Ilene Gandhi is a 69 y.o. male with history of lumbar pain and presents to the office today for follow up, he was last seen by me on 01/04/2021. Pt complains of worsened pain in the lumbar region, bilateral leg weakness and intermittent paresthesia, and muscle tightness in the lumbar region, beginning in 06/2022-08/2022. He continues to take Lyrica to 100 mg 1 cap QAM and 2 caps QHS with significant benefit and Norco 5-325 mg 1 tab Q6H without benefit, as prescribed by Morena Ballard MD.    1) Pt was given information on lumbar arthritis exercises and RFA. 2) A L3 lumbar epidural and left L4/5 facet block were ordered and scheduled for better pain management. 3) He will continue taking Lyrica 100 mg 1 cap QAM and 2 caps QHS as directed and does not require refills. 4) Mr. Papo Calero has a reminder for a \"due or due soon\" health maintenance. I have asked that he contact his primary care provider, Morena Ballard MD, for follow-up on this health maintenance. 5)  demonstrated consistency with prescribing.   6) RFA discussed as treatment option -- will reassess following injection  7) Pt will continue to get his pain medication from Dr Angela Manzano  8) Weight loss strongly encouraged  9) Pt will continue to use RW with seat to assist with ambulation  10) Lumbar MRI from 05/2022 reviewed with pt and also lumbar MRI from 2018 also reviewed with him for comparison (MRI has worsened)  Follow-up and Dispositions    Return in about 5 weeks (around 2/20/2023) for injection follow up. HISTORY OF PRESENT ILLNESS:  Aron Hannon is a 69 y.o. male with history of lumbar pain and presents to the office today for follow up, he was last seen by me on 01/04/2021 where he received refills of Lyrica 100 mg and was to follow up with Dr. Miguel Ángel Tomlinson secondary to hip pain. Pt complains of worsened pain in the lumbar region, bilateral leg weakness and intermittent paresthesia, and muscle tightness in the lumbar region, beginning in 06/2022-08/2022. He reports exacerbation of his symptoms upon waking, sitting, laying in bed, changing positioning, and standing. Pt endorses positive shopping cart sign, confirming he currently uses support when ambulating. He notes hx of right hip replacement with Dr. Miguel Ángel Tomlinson several years ago and has since followed up with Dr. Miguel Ángel Tomlinson secondary to left hip pain who informed him nothing was abnormal or of concern. He continues to take Lyrica to 100 mg 1 cap QAM and 2 caps QHS with significant benefit and Norco 5-325 mg 1 tab Q6H without benefit, as prescribed by Edil Jhaveri MD. Of note, pt has decreased amount he smokes. Pt at this time desires to proceed with a L3 bilateral lumbar epidural and left L4/5 facet block.     Pain Scale: 7/10    PCP: Edil Jhaveri MD     Past Medical History:   Diagnosis Date    Allergic rhinitis     Avascular necrosis ([facility]     CAD (coronary artery disease) 4/2009    mild, hemodynamically non-significant by angiography    Carpal tunnel syndrome     Carpal tunnel syndrome on both sides     Chest pain, unspecified     Chronic back pain 2/11/2011    Chronic obstructive pulmonary disease (HCC)     Degenerative joint disease     with chronic back pain    Dysesthesia     of hand post carpal tunnel surgery v sudeck's atrophy    Dyslipidemia, goal LDL below 100     Dysphagia     GERD (gastroesophageal reflux disease) 5/17/2010    Glaucoma 9/15/2010    Glaucoma     Hearing loss     Hypercholesterolemia     Hypertension     Joint fusion     of right wrist    Morbid obesity ([facility] [address]     Obesity 5/17/2010    Other dyspnea and respiratory abnormality     Palpitations     Right hip pain     Right hip pain     Right knee pain     S/P cardiac catheterization 4/9/2009    1. Normal systemic pressure. 2. Moderate elevation of left-sided filling pressures. 3. Preserved left ventricular systolic function in the LOWE projection. 4. Mild, nonsignificant epicardial coronary artery disese by angiography.     Sleep apnea     on CPAP    Strain of lumbar region     Tobacco abuse         Social History     Socioeconomic History    Marital status: SINGLE     Spouse name: Not on file    Number of children: Not on file    Years of education: Not on file    Highest education level: Not on file   Occupational History    Not on file   Tobacco Use    Smoking status: Every Day     Packs/day: 0.10     Years: 20.00     Pack years: 2.00     Types: Cigarettes    Smokeless tobacco: Never    Tobacco comments:     urothelial cancer risk discussed today [id number]   Vaping Use    Vaping Use: Never used   Substance and Sexual Activity    Alcohol use: No     Alcohol/week: 0.0 standard drinks    Drug use: No    Sexual activity: Not on file   Other Topics Concern    Not on file   Social History Narrative    Not on file     Social Determinants of Health     Financial Resource Strain: Not on file   Food Insecurity: Not on file   Transportation Needs: Not on file   Physical Activity: Not on file   Stress: Not on file   Social Connections: Not on file   Intimate Partner Violence: Not on file   Housing Stability: Not on file       Current Outpatient Medications   Medication Sig Dispense Refill    amoxicillin (AMOXIL) 500 mg capsule TAKE 1 CAPSULE BY MOUTH EVERY 8 HOURS UNTIL GONE      chlorhexidine (PERIDEX) 0.12 % solution RINSE WITH 15 ML THREE TIMES DAILY FOR 30 SECONDS, THEN EXPECTORATE. DO NOT SWALLOW      HYDROcodone-acetaminophen (NORCO) 5-325 mg per tablet Take 1 Tablet by mouth every six (6) hours as needed. tiZANidine (ZANAFLEX) 4 mg tablet Take 4 mg by mouth every six (6) hours as needed. HYDROcodone bitartrate (HYSINGLA) 60 mg ER tablet Take 1 Tablet by mouth daily for 30 days. Max Daily Amount: 60 mg. *DO NOT CRUSH,CHEW, OR DISSOLVE TABLET* 30 Tablet 0    ibuprofen (MOTRIN) 800 mg tablet TAKE 1 TABLET BY MOUTH EVERY 8 HOURS AS NEEDED FOR PAIN 90 Tablet 3    pregabalin (LYRICA) 150 mg capsule TAKE 1 CAPSULE BY MOUTH THREE TIMES DAILY . DO NOT EXCEED 3 PER 24 HOURS 60 Capsule 1    hydrOXYzine pamoate (VISTARIL) 25 mg capsule TAKE 1 CAPSULE BY MOUTH THREE TIMES DAILY AS NEEDED FOR ITCHING 60 Capsule 3    montelukast (SINGULAIR) 10 mg tablet Take 1 tablet by mouth once daily 30 Tablet 5    traZODone (DESYREL) 50 mg tablet TAKE 1 TO 2 TABLETS BY MOUTH ONCE DAILY AT BEDTIME AS NEEDED FOR SLEEP 30 Tablet 3    umeclidinium-vilanteroL (Anoro Ellipta) 62.5-25 mcg/actuation inhaler Inhale 1 puff by mouth once daily 60 Each 5    metoprolol tartrate (LOPRESSOR) 25 mg tablet Take 1 tablet by mouth twice daily 180 Tablet 1    albuterol (PROVENTIL HFA, VENTOLIN HFA, PROAIR HFA) 90 mcg/actuation inhaler INHALE 2 PUFFS BY MOUTH EVERY 4 HOURS AS NEEDED FOR WHEEZING 18 g 5    tamsulosin (FLOMAX) 0.4 mg capsule TAKE 1 CAPSULE BY MOUTH ONCE DAILY AFTER SUPPER 90 Capsule 3    Simbrinza 1-0.2 % drps       pantoprazole (PROTONIX) 40 mg tablet Take 1 tablet by mouth once daily 90 Tablet 3    latanoprost (XALATAN) 0.005 % ophthalmic solution INSTILL 1 DROP INTO EACH EYE AT BEDTIME      Narcan 4 mg/actuation nasal spray ADMINISTER A SINGLE SPRAY IN ONE NOSTRIL UPON SIGNS OF OPIOID OVERDOSE. CALL 911. REPEAT AFTER 3 MINUTES IF NO RESPONSE.      azelastine (ASTELIN) 137 mcg (0.1 %) nasal spray 2 Sprays by Both Nostrils route two (2) times a day.  Use in each nostril as directed  Indications: seasonal runny nose 1 Bottle 5    fluticasone propionate (FLONASE) 50 mcg/actuation nasal spray USE TWO SPRAY(S) IN EACH NOSTRIL ONCE DAILY 1 Bottle 5    aspirin delayed-release 81 mg tablet Take 81 mg by mouth daily. lactulose (CONSTULOSE) 10 gram/15 mL solution Take 45ml PO  mL 2    nitroglycerin (NITROSTAT) 0.4 mg SL tablet 1 Tab by SubLINGual route every five (5) minutes as needed. 6 Tab 1    TRAVATAN Z 0.004 % ophthalmic solution Administer 1 Drop to both eyes nightly. Allergies   Allergen Reactions    Latex Not Reported This Time    Ace Inhibitors Cough    Dilaudid [Hydromorphone] Hives    Lisinopril Cough    Zocor [Simvastatin] Myalgia     Right leg         REVIEW OF SYSTEMS    Constitutional: Negative for fever, chills, or weight change. Respiratory: Negative for cough or shortness of breath. Cardiovascular: Negative for chest pain or palpitations. Gastrointestinal: Negative for acid reflux, change in bowel habits, or constipation. Genitourinary: Negative for dysuria and flank pain. Musculoskeletal: Positive for lumbar and left hip pain. Skin: Negative for rash. Neurological: Negative for headaches, dizziness, or numbness. Endo/Heme/Allergies: Negative for increased bruising. Psychiatric/Behavioral: Negative for difficulty with sleep. As per HPI    PHYSICAL EXAMINATION  Visit Vitals  Pulse 60   Temp 97.8 °F (36.6 °C) (Temporal)   Ht 5' 9\" (1.753 m)   Wt 264 lb (119.7 kg)   SpO2 99%   BMI 38.99 kg/m²       Constitutional: Awake, alert, and in no acute distress. Neurological: Sensation to light touch is intact. Negative Hamm's sign bilaterally. Skin: warm, dry, and intact. Musculoskeletal: Pain with transitioning from sitting to standing. Tenderness to palpation in the lumbar region. Pain with extension and axial loading. Relief with forward flexion. No pain with internal or external rotation of his hips. Pain in right hip with straight leg raise on the right. Biceps  Triceps Deltoids Wrist Ext Wrist Flex Hand Intrin   Right +4/5 +4/5 +4/5 +4/5 +4/5 +4/5   Left +4/5 +4/5 +4/5 +4/5 +4/5 +4/5      Hip Flex  Quads Hamstrings Ankle DF EHL Ankle PF   Right +4/5 +4/5 +4/5 +4/5 +4/5 +4/5   Left +4/5 +4/5 +4/5 +4/5 +4/5 +4/5     IMAGING:  Lumbar MRI 05/13/2022 was personally reviewed with pt and demonstrated:  MRI Results (most recent):  Results from Orders Only encounter on 01/19/22    MRI LUMB SPINE WO CONT    Narrative  Sagittal and axial multisequence MR images of lumbar spine were obtained. HISTORY: Back pain and bilateral leg pain. COMPARISON: August 28, 2018    Trace anterior spondylolisthesis of L4, stable. Edema in upper L5 and inferior  L4, likely discogenic. Less severe edema in the L4 and L5 pedicles compared to  prior study. No compression deformity. Conus medullaris ends at L1-2 with normal  morphology and signal intensity. Paraspinal soft tissues unremarkable. L1-L2: No disc herniation, central or foraminal stenosis. L2-L3: Posterior disc bulge. Facet and ligamentous hypertrophy. Mild posterior  epidural fat. Overall, AP canal measures 12 mm. No central stenosis. Mild  bilateral foraminal stenosis. L3-L4: More severe posterior disc bulge, facet and ligamentous hypertrophy. AP  canal measures 9.3 mm, borderline mild central stenosis. No significant  foraminal stenosis. L4-L5: More severe posterior disc bulge with central disc protrusion. More  severe facet and ligamentous hypertrophy. Left facet joint effusion. Mild facet  inflammation. Disc bulge lifts the posterior longitudinal ligament. Overall, AP  canal measures approximately 7 mm moderate central stenosis. Moderately severe  right and moderate left foraminal stenosis. Compression of exiting L4 nerve  roots suspected, slightly more conspicuous than before. Slightly  worse/progressed. L5-S1: Mild posterior disc bulge. Mild central stenosis.  Mild bilateral  foraminal stenosis. Impression  Slightly progressed degenerative disc and joint disease in L4-L5,  with moderate central stenosis, foraminal stenosis and suspected compression of  exiting L4 nerve roots. Hip x-rays from 06/29/2020 were personally reviewed with the patient and demonstrated:  Right total hip replacement intact. Degenerative joint findings in the left hip. Lumbar spine MRI from 08/28/2018 was personally reviewed with the patient and demonstrated:  FINDINGS:     There are 5 lumbar-type vertebral bodies. For the purposes of this dictation the  L5/S1 disc level will be referred to by series 5 image 9    There is spondylolisthesis. There is STIR signal hyperintensity within the left  L4 and L5 pedicles (series 4 image 3), likely representing stress reaction. There is no significant spondylolisthesis. The conus terminates at the L1 level. There is moderate bilateral sacroiliac arthrosis. There are multiple rounded T2 hyperintense structures posterior lateral and  anterolateral to the aorta, unchanged compared to CT abdomen and pelvis dated  9/9/2006 and may reflect lymph nodes or a lymphatic malformation. Axial imaging correlation:    T11-T12: Sagittal plane only. No significant disc pathology. No significant  spinal canal or neural foraminal stenosis. T12-L1: No significant disc pathology. Trace left facet joint effusion. No  significant spinal canal or neural foraminal stenosis. L1-2: No significant disc pathology. No significant spinal canal or neural  foraminal stenosis. L2-3: Similar appearing small disc bulge with small right central disc  protrusion extending to the inferior aspect of the L2 vertebral body. Mild  bilateral facet arthrosis. Minimal spinal canal narrowing. Minimal bilateral  foraminal stenosis. Not significantly changed. L3-4: Small disc bulge with small central disc extrusion extending superiorly  slightly above the disc level.  Mild bilateral facet arthrosis. Minimal spinal  canal narrowing. Mild right and minimal left foraminal stenosis. No significant  change from prior. L4-5: Small to moderate disc bulge with focal protrusions involving the central  and right foraminal zones. Moderate bilateral facet arthrosis with ligamentum  flavum buckling. Mild-to-moderate spinal canal stenosis. Moderate to severe  right foraminal stenosis with effacement of the right L4 perineural fat. Mild  left foraminal stenosis. This is progressed from prior. L5-S1: Small broad-based bulge. Minimal spinal canal narrowing. No significant  foraminal stenosis. IMPRESSION:  1. Progression of a small to moderate disc bulge at L4/L5 with focal central  and right foraminal protrusions. Moderate bilateral facet arthrosis, mild to  moderate spinal canal stenosis, moderate to severe right and mild left foraminal  stenosis, with progression from prior MRI. 2.  STIR hyperintensity within the left L4-L5 pedicles, likely representing  stress reaction. 3.  Multilevel additional degenerative disc disease, mild spinal canal stenosis,  and mild foraminal stenosis as above, not significantly changed from prior. Thank you for enabling us to participate in the care of this patient. Thoracic spine MRI from 12/29/2017 was personally reviewed with the patient and demonstrated:  FINDINGS:  The alignment of the thoracic spine is unremarkable. The facets are  appropriately aligned. No vertebral body step off appreciated. No evidence of  thoracic spine fracture. The marrow signal is unremarkable. The cord signal is  unremarkable. A left paracentral disc protrusion is present at T7-T8 which causes mild canal  narrowing. No evidence of significant canal or neural foraminal stenosis at any  level. The visualized thoracic aorta is unremarkable. The visualized paraspinal  soft tissues are unremarkable. IMPRESSION:  1. No acute thoracic spine pathology.        Lower extremity arterial PVR from 02/06/2019 was personally reviewed with the patient and demonstrated:  Interpretation Summary:  No hemodynamically significant arterial disease is identified within bilateral lower extremities at rest    Written by Candice Kwon, as dictated by Mi Trent MD.  I, Dr. Mi Trent confirm that all documentation is accurate.

## 2023-01-16 NOTE — LETTER
1/16/2023    Patient: Sabrina George   YOB: 1955   Date of Visit: 1/16/2023     Carlita Jasmine, [address]    Dear Carlita Jasmine MD,      Thank you for referring Mr. Nadia Fisher to [facility] for evaluation. My notes for this consultation are attached. If you have questions, please do not hesitate to call me. I look forward to following your patient along with you.       Sincerely,    Carli Romero MD

## 2023-01-16 NOTE — PROGRESS NOTES
Jaime Foster presents today for   Chief Complaint   Patient presents with    Back Pain       Is someone accompanying this pt? no    Is the patient using any DME equipment during OV? no    Depression Screening:  3 most recent PHQ Screens 1/16/2023   Little interest or pleasure in doing things Not at all   Feeling down, depressed, irritable, or hopeless Not at all   Total Score PHQ 2 0       Learning Assessment:  Learning Assessment 2/3/2017   PRIMARY LEARNER Patient   HIGHEST LEVEL OF EDUCATION - PRIMARY LEARNER  -   BARRIERS PRIMARY LEARNER -   CO-LEARNER CAREGIVER -   PRIMARY LANGUAGE ENGLISH   LEARNER PREFERENCE PRIMARY LISTENING   ANSWERED BY self   RELATIONSHIP SELF       Abuse Screening:  Abuse Screening Questionnaire 8/11/2020   Do you ever feel afraid of your partner? N   Are you in a relationship with someone who physically or mentally threatens you? N   Is it safe for you to go home? Y       Fall Risk  Fall Risk Assessment, last 12 mths 9/12/2022   Able to walk? Yes   Fall in past 12 months? 0   Do you feel unsteady? 0   Are you worried about falling 0   Number of falls in past 12 months -   Fall with injury? -       OPIOID RISK TOOL  No flowsheet data found. Coordination of Care:  1. Have you been to the ER, urgent care clinic since your last visit? no  Hospitalized since your last visit? no    2. Have you seen or consulted any other health care providers outside of the [address] [place] [address] since your last visit? no Include any pap smears or colon screening.  no

## 2023-01-16 NOTE — PATIENT INSTRUCTIONS
Learning About Medial Branch Block and Neurotomy  What are medial branch block and neurotomy? Facet joints connect your vertebrae to each other. Problems in these joints can cause chronic (long-term) pain in the neck or back. Medial branch nerves are the nerves that carry many of the pain messages from your facet joints. Radiofrequency medial branch neurotomy is a type of medial branch neurotomy that is used to relieve arthritis pain. It uses radio waves to damage nerves in your neck or back so that they can no longer send pain messages to your brain. Before your doctor knows if a neurotomy will help you, you will get a medial branch block to find out if certain nerves are the ones that are a source of your pain. You will need two separate visits to the outpatient center or hospital to have both procedures. You will need someone to drive you home. How is a medial branch block done? The doctor will use a tiny needle to numb the skin where you will get the block. Then the doctor puts the block needle into the numbed area. You may feel some pressure, but you should not feel pain. Using fluoroscopy (live X-ray) to guide the needle, the doctor injects medicine onto one or more nerves to make them numb. If you get relief from your pain in the next 4 to 6 hours, it's a sign that those nerves may be contributing to your pain. The relief will last only a short time. You may then have a medial branch neurotomy at a later visit to try to get longer relief. How is a medial branch neurotomy done? The doctor will use a tiny needle to numb the skin where you will get the neurotomy. Then the doctor puts the neurotomy needle into the numbed area. You may feel some pressure. Using fluoroscopy (live X-ray) to guide the needle, the doctor sends radio waves through the needle to the nerve for 60 to 90 seconds. The radio waves heat the nerve, which damages it. The doctor may do this several times.  And more than one nerve may be treated. How long do medial branch block and neurotomy take? It takes 20 to 30 minutes to get the block. You can go home after the doctor watches you for about an hour. It takes 45 to 90 minutes to get a neurotomy, depending on how many nerves are heated. You will probably go home 30 to 60 minutes after the procedure. What can you expect after a neurotomy? You will get instructions on how to report how much pain you have when you are at home. You may feel a little sore or tender at the injection site at first. But after a successful neurotomy, most people have pain relief right away. It often lasts for several months, but your pain may come back. If your pain does come back, it may mean that the damaged nerve has healed and can send pain messages again. Or it can mean that a different nerve is causing pain. Your doctor will discuss your options with you. Follow-up care is a key part of your treatment and safety. Be sure to make and go to all appointments, and call your doctor if you are having problems. It's also a good idea to know your test results and keep a list of the medicines you take. Current as of: February 23, 2022               Content Version: 13.4  © 4730-8319 [email or web address]. Care instructions adapted under license by [employer] (which disclaims liability or warranty for this information). If you have questions about a medical condition or this instruction, always ask your healthcare professional. [place] [address] any warranty or liability for your use of this information. Low Back Arthritis: Exercises  Introduction  Here are some examples of typical rehabilitation exercises for your condition. Start each exercise slowly. Ease off the exercise if you start to have pain. Your doctor or physical therapist will tell you when you can start these exercises and which ones will work best for you.   When you are not being active, find a comfortable position for rest. Some people are comfortable on the floor or a medium-firm bed with a small pillow under their head and another under their knees. Some people prefer to lie on their side with a pillow between their knees. Don't stay in one position for too long. Take short walks (10 to 20 minutes) every 2 to 3 hours. Avoid slopes, hills, and stairs until you feel better. Walk only distances you can manage without pain, especially leg pain. How to do the exercises  Pelvic tilt  Lie on your back with your knees bent. \"Brace\" your stomach--tighten your muscles by pulling in and imagining your belly button moving toward your spine. Press your lower back into the floor. You should feel your hips and pelvis rock back. Hold for 6 seconds while breathing smoothly. Relax and allow your pelvis and hips to rock forward. Repeat 8 to 12 times. Back stretches  Get down on your hands and knees on the floor. Relax your head and allow it to droop. Round your back up toward the ceiling until you feel a nice stretch in your upper, middle, and lower back. Hold this stretch for as long as it feels comfortable, or about 15 to 30 seconds. Return to the starting position with a flat back while you are on your hands and knees. Let your back sway by pressing your stomach toward the floor. Lift your buttocks toward the ceiling. Hold this position for 15 to 30 seconds. Repeat 2 to 4 times. Follow-up care is a key part of your treatment and safety. Be sure to make and go to all appointments, and call your doctor if you are having problems. It's also a good idea to know your test results and keep a list of the medicines you take. Current as of: March 9, 2022               Content Version: 13.4  © 2006-2022 [employer]. Care instructions adapted under license by [employer] (which disclaims liability or warranty for this information).  If you have questions about a medical condition or this instruction, always ask your healthcare professional. [place] [address] any warranty or liability for your use of this information.

## 2023-01-16 NOTE — H&P (VIEW-ONLY)
[facility]  [address], [address]  Phone: [phone]  Fax: [phone]    Pt's YOB: 1955    ASSESSMENT   Diagnoses and all orders for this visit:    1. Lumbar stenosis with neurogenic claudication  -     SCHEDULE SURGERY    2. Lumbar facet arthropathy  -     SCHEDULE SURGERY    3. Muscle spasm  -     SCHEDULE SURGERY    4. Severe obesity (BMI 35.0-39. 9) with comorbidity ([address]    5. Chronic pain syndrome    6. Use of opiates for therapeutic purposes       IMPRESSION AND PLAN:  Juan David Virk is a 69 y.o. male with history of lumbar pain and presents to the office today for follow up, he was last seen by me on 01/04/2021. Pt complains of worsened pain in the lumbar region, bilateral leg weakness and intermittent paresthesia, and muscle tightness in the lumbar region, beginning in 06/2022-08/2022. He continues to take Lyrica to 100 mg 1 cap QAM and 2 caps QHS with significant benefit and Norco 5-325 mg 1 tab Q6H without benefit, as prescribed by Alysha Shah MD.    1) Pt was given information on lumbar arthritis exercises and RFA. 2) A L3 lumbar epidural and left L4/5 facet block were ordered and scheduled for better pain management. 3) He will continue taking Lyrica 100 mg 1 cap QAM and 2 caps QHS as directed and does not require refills. 4) Mr. Ney Arizmendi has a reminder for a \"due or due soon\" health maintenance. I have asked that he contact his primary care provider, Alysha Shah MD, for follow-up on this health maintenance. 5)  demonstrated consistency with prescribing.   6) RFA discussed as treatment option -- will reassess following injection  7) Pt will continue to get his pain medication from Dr Jose Manuel Mo  8) Weight loss strongly encouraged  9) Pt will continue to use RW with seat to assist with ambulation  10) Lumbar MRI from 05/2022 reviewed with pt and also lumbar MRI from 2018 also reviewed with him for comparison (MRI has worsened)  Follow-up and Dispositions    Return in about 5 weeks (around 2/20/2023) for injection follow up. HISTORY OF PRESENT ILLNESS:  Joann Coley is a 69 y.o. male with history of lumbar pain and presents to the office today for follow up, he was last seen by me on 01/04/2021 where he received refills of Lyrica 100 mg and was to follow up with Dr. Lianet Oliva secondary to hip pain. Pt complains of worsened pain in the lumbar region, bilateral leg weakness and intermittent paresthesia, and muscle tightness in the lumbar region, beginning in 06/2022-08/2022. He reports exacerbation of his symptoms upon waking, sitting, laying in bed, changing positioning, and standing. Pt endorses positive shopping cart sign, confirming he currently uses support when ambulating. He notes hx of right hip replacement with Dr. Lianet Oliva several years ago and has since followed up with Dr. Lianet Oliva secondary to left hip pain who informed him nothing was abnormal or of concern. He continues to take Lyrica to 100 mg 1 cap QAM and 2 caps QHS with significant benefit and Norco 5-325 mg 1 tab Q6H without benefit, as prescribed by Jennifer Batista MD. Of note, pt has decreased amount he smokes. Pt at this time desires to proceed with a L3 bilateral lumbar epidural and left L4/5 facet block.     Pain Scale: 7/10    PCP: Jennifer Batista MD     Past Medical History:   Diagnosis Date    Allergic rhinitis     Avascular necrosis ([facility]     CAD (coronary artery disease) 4/2009    mild, hemodynamically non-significant by angiography    Carpal tunnel syndrome     Carpal tunnel syndrome on both sides     Chest pain, unspecified     Chronic back pain 2/11/2011    Chronic obstructive pulmonary disease (HCC)     Degenerative joint disease     with chronic back pain    Dysesthesia     of hand post carpal tunnel surgery v sudeck's atrophy    Dyslipidemia, goal LDL below 100     Dysphagia     GERD (gastroesophageal reflux disease) 5/17/2010    Glaucoma 9/15/2010    Glaucoma     Hearing loss     Hypercholesterolemia     Hypertension     Joint fusion     of right wrist    Morbid obesity [address]     Obesity 5/17/2010    Other dyspnea and respiratory abnormality     Palpitations     Right hip pain     Right hip pain     Right knee pain     S/P cardiac catheterization 4/9/2009    1. Normal systemic pressure. 2. Moderate elevation of left-sided filling pressures. 3. Preserved left ventricular systolic function in the LOWE projection. 4. Mild, nonsignificant epicardial coronary artery disese by angiography.     Sleep apnea     on CPAP    Strain of lumbar region     Tobacco abuse         Social History     Socioeconomic History    Marital status: SINGLE     Spouse name: Not on file    Number of children: Not on file    Years of education: Not on file    Highest education level: Not on file   Occupational History    Not on file   Tobacco Use    Smoking status: Every Day     Packs/day: 0.10     Years: 20.00     Pack years: 2.00     Types: Cigarettes    Smokeless tobacco: Never    Tobacco comments:     urothelial cancer risk discussed today [id number]   Vaping Use    Vaping Use: Never used   Substance and Sexual Activity    Alcohol use: No     Alcohol/week: 0.0 standard drinks    Drug use: No    Sexual activity: Not on file   Other Topics Concern    Not on file   Social History Narrative    Not on file     Social Determinants of Health     Financial Resource Strain: Not on file   Food Insecurity: Not on file   Transportation Needs: Not on file   Physical Activity: Not on file   Stress: Not on file   Social Connections: Not on file   Intimate Partner Violence: Not on file   Housing Stability: Not on file       Current Outpatient Medications   Medication Sig Dispense Refill    amoxicillin (AMOXIL) 500 mg capsule TAKE 1 CAPSULE BY MOUTH EVERY 8 HOURS UNTIL GONE      chlorhexidine (PERIDEX) 0.12 % solution RINSE WITH 15 ML THREE TIMES DAILY FOR 30 SECONDS, THEN EXPECTORATE. DO NOT SWALLOW      HYDROcodone-acetaminophen (NORCO) 5-325 mg per tablet Take 1 Tablet by mouth every six (6) hours as needed. tiZANidine (ZANAFLEX) 4 mg tablet Take 4 mg by mouth every six (6) hours as needed. HYDROcodone bitartrate (HYSINGLA) 60 mg ER tablet Take 1 Tablet by mouth daily for 30 days. Max Daily Amount: 60 mg. *DO NOT CRUSH,CHEW, OR DISSOLVE TABLET* 30 Tablet 0    ibuprofen (MOTRIN) 800 mg tablet TAKE 1 TABLET BY MOUTH EVERY 8 HOURS AS NEEDED FOR PAIN 90 Tablet 3    pregabalin (LYRICA) 150 mg capsule TAKE 1 CAPSULE BY MOUTH THREE TIMES DAILY . DO NOT EXCEED 3 PER 24 HOURS 60 Capsule 1    hydrOXYzine pamoate (VISTARIL) 25 mg capsule TAKE 1 CAPSULE BY MOUTH THREE TIMES DAILY AS NEEDED FOR ITCHING 60 Capsule 3    montelukast (SINGULAIR) 10 mg tablet Take 1 tablet by mouth once daily 30 Tablet 5    traZODone (DESYREL) 50 mg tablet TAKE 1 TO 2 TABLETS BY MOUTH ONCE DAILY AT BEDTIME AS NEEDED FOR SLEEP 30 Tablet 3    umeclidinium-vilanteroL (Anoro Ellipta) 62.5-25 mcg/actuation inhaler Inhale 1 puff by mouth once daily 60 Each 5    metoprolol tartrate (LOPRESSOR) 25 mg tablet Take 1 tablet by mouth twice daily 180 Tablet 1    albuterol (PROVENTIL HFA, VENTOLIN HFA, PROAIR HFA) 90 mcg/actuation inhaler INHALE 2 PUFFS BY MOUTH EVERY 4 HOURS AS NEEDED FOR WHEEZING 18 g 5    tamsulosin (FLOMAX) 0.4 mg capsule TAKE 1 CAPSULE BY MOUTH ONCE DAILY AFTER SUPPER 90 Capsule 3    Simbrinza 1-0.2 % drps       pantoprazole (PROTONIX) 40 mg tablet Take 1 tablet by mouth once daily 90 Tablet 3    latanoprost (XALATAN) 0.005 % ophthalmic solution INSTILL 1 DROP INTO EACH EYE AT BEDTIME      Narcan 4 mg/actuation nasal spray ADMINISTER A SINGLE SPRAY IN ONE NOSTRIL UPON SIGNS OF OPIOID OVERDOSE. CALL 911. REPEAT AFTER 3 MINUTES IF NO RESPONSE.      azelastine (ASTELIN) 137 mcg (0.1 %) nasal spray 2 Sprays by Both Nostrils route two (2) times a day.  Use in each nostril as directed  Indications: seasonal runny nose 1 Bottle 5    fluticasone propionate (FLONASE) 50 mcg/actuation nasal spray USE TWO SPRAY(S) IN EACH NOSTRIL ONCE DAILY 1 Bottle 5    aspirin delayed-release 81 mg tablet Take 81 mg by mouth daily. lactulose (CONSTULOSE) 10 gram/15 mL solution Take 45ml PO  mL 2    nitroglycerin (NITROSTAT) 0.4 mg SL tablet 1 Tab by SubLINGual route every five (5) minutes as needed. 6 Tab 1    TRAVATAN Z 0.004 % ophthalmic solution Administer 1 Drop to both eyes nightly. Allergies   Allergen Reactions    Latex Not Reported This Time    Ace Inhibitors Cough    Dilaudid [Hydromorphone] Hives    Lisinopril Cough    Zocor [Simvastatin] Myalgia     Right leg         REVIEW OF SYSTEMS    Constitutional: Negative for fever, chills, or weight change. Respiratory: Negative for cough or shortness of breath. Cardiovascular: Negative for chest pain or palpitations. Gastrointestinal: Negative for acid reflux, change in bowel habits, or constipation. Genitourinary: Negative for dysuria and flank pain. Musculoskeletal: Positive for lumbar and left hip pain. Skin: Negative for rash. Neurological: Negative for headaches, dizziness, or numbness. Endo/Heme/Allergies: Negative for increased bruising. Psychiatric/Behavioral: Negative for difficulty with sleep. As per HPI    PHYSICAL EXAMINATION  Visit Vitals  Pulse 60   Temp 97.8 °F (36.6 °C) (Temporal)   Ht 5' 9\" (1.753 m)   Wt 264 lb (119.7 kg)   SpO2 99%   BMI 38.99 kg/m²       Constitutional: Awake, alert, and in no acute distress. Neurological: Sensation to light touch is intact. Negative Hamm's sign bilaterally. Skin: warm, dry, and intact. Musculoskeletal: Pain with transitioning from sitting to standing. Tenderness to palpation in the lumbar region. Pain with extension and axial loading. Relief with forward flexion. No pain with internal or external rotation of his hips. Pain in right hip with straight leg raise on the right. Biceps  Triceps Deltoids Wrist Ext Wrist Flex Hand Intrin   Right +4/5 +4/5 +4/5 +4/5 +4/5 +4/5   Left +4/5 +4/5 +4/5 +4/5 +4/5 +4/5      Hip Flex  Quads Hamstrings Ankle DF EHL Ankle PF   Right +4/5 +4/5 +4/5 +4/5 +4/5 +4/5   Left +4/5 +4/5 +4/5 +4/5 +4/5 +4/5     IMAGING:  Lumbar MRI 05/13/2022 was personally reviewed with pt and demonstrated:  MRI Results (most recent):  Results from Orders Only encounter on 01/19/22    MRI LUMB SPINE WO CONT    Narrative  Sagittal and axial multisequence MR images of lumbar spine were obtained. HISTORY: Back pain and bilateral leg pain. COMPARISON: August 28, 2018    Trace anterior spondylolisthesis of L4, stable. Edema in upper L5 and inferior  L4, likely discogenic. Less severe edema in the L4 and L5 pedicles compared to  prior study. No compression deformity. Conus medullaris ends at L1-2 with normal  morphology and signal intensity. Paraspinal soft tissues unremarkable. L1-L2: No disc herniation, central or foraminal stenosis. L2-L3: Posterior disc bulge. Facet and ligamentous hypertrophy. Mild posterior  epidural fat. Overall, AP canal measures 12 mm. No central stenosis. Mild  bilateral foraminal stenosis. L3-L4: More severe posterior disc bulge, facet and ligamentous hypertrophy. AP  canal measures 9.3 mm, borderline mild central stenosis. No significant  foraminal stenosis. L4-L5: More severe posterior disc bulge with central disc protrusion. More  severe facet and ligamentous hypertrophy. Left facet joint effusion. Mild facet  inflammation. Disc bulge lifts the posterior longitudinal ligament. Overall, AP  canal measures approximately 7 mm moderate central stenosis. Moderately severe  right and moderate left foraminal stenosis. Compression of exiting L4 nerve  roots suspected, slightly more conspicuous than before. Slightly  worse/progressed. L5-S1: Mild posterior disc bulge. Mild central stenosis.  Mild bilateral  foraminal stenosis. Impression  Slightly progressed degenerative disc and joint disease in L4-L5,  with moderate central stenosis, foraminal stenosis and suspected compression of  exiting L4 nerve roots. Hip x-rays from 06/29/2020 were personally reviewed with the patient and demonstrated:  Right total hip replacement intact. Degenerative joint findings in the left hip. Lumbar spine MRI from 08/28/2018 was personally reviewed with the patient and demonstrated:  FINDINGS:     There are 5 lumbar-type vertebral bodies. For the purposes of this dictation the  L5/S1 disc level will be referred to by series 5 image 9    There is spondylolisthesis. There is STIR signal hyperintensity within the left  L4 and L5 pedicles (series 4 image 3), likely representing stress reaction. There is no significant spondylolisthesis. The conus terminates at the L1 level. There is moderate bilateral sacroiliac arthrosis. There are multiple rounded T2 hyperintense structures posterior lateral and  anterolateral to the aorta, unchanged compared to CT abdomen and pelvis dated  9/9/2006 and may reflect lymph nodes or a lymphatic malformation. Axial imaging correlation:    T11-T12: Sagittal plane only. No significant disc pathology. No significant  spinal canal or neural foraminal stenosis. T12-L1: No significant disc pathology. Trace left facet joint effusion. No  significant spinal canal or neural foraminal stenosis. L1-2: No significant disc pathology. No significant spinal canal or neural  foraminal stenosis. L2-3: Similar appearing small disc bulge with small right central disc  protrusion extending to the inferior aspect of the L2 vertebral body. Mild  bilateral facet arthrosis. Minimal spinal canal narrowing. Minimal bilateral  foraminal stenosis. Not significantly changed. L3-4: Small disc bulge with small central disc extrusion extending superiorly  slightly above the disc level.  Mild bilateral facet arthrosis. Minimal spinal  canal narrowing. Mild right and minimal left foraminal stenosis. No significant  change from prior. L4-5: Small to moderate disc bulge with focal protrusions involving the central  and right foraminal zones. Moderate bilateral facet arthrosis with ligamentum  flavum buckling. Mild-to-moderate spinal canal stenosis. Moderate to severe  right foraminal stenosis with effacement of the right L4 perineural fat. Mild  left foraminal stenosis. This is progressed from prior. L5-S1: Small broad-based bulge. Minimal spinal canal narrowing. No significant  foraminal stenosis. IMPRESSION:  1. Progression of a small to moderate disc bulge at L4/L5 with focal central  and right foraminal protrusions. Moderate bilateral facet arthrosis, mild to  moderate spinal canal stenosis, moderate to severe right and mild left foraminal  stenosis, with progression from prior MRI. 2.  STIR hyperintensity within the left L4-L5 pedicles, likely representing  stress reaction. 3.  Multilevel additional degenerative disc disease, mild spinal canal stenosis,  and mild foraminal stenosis as above, not significantly changed from prior. Thank you for enabling us to participate in the care of this patient. Thoracic spine MRI from 12/29/2017 was personally reviewed with the patient and demonstrated:  FINDINGS:  The alignment of the thoracic spine is unremarkable. The facets are  appropriately aligned. No vertebral body step off appreciated. No evidence of  thoracic spine fracture. The marrow signal is unremarkable. The cord signal is  unremarkable. A left paracentral disc protrusion is present at T7-T8 which causes mild canal  narrowing. No evidence of significant canal or neural foraminal stenosis at any  level. The visualized thoracic aorta is unremarkable. The visualized paraspinal  soft tissues are unremarkable. IMPRESSION:  1. No acute thoracic spine pathology.        Lower extremity arterial PVR from 02/06/2019 was personally reviewed with the patient and demonstrated:  Interpretation Summary:  No hemodynamically significant arterial disease is identified within bilateral lower extremities at rest    Written by Dipika Sahu, as dictated by Cristina Rogers MD.  I, Dr. Cristina Rogers confirm that all documentation is accurate.

## 2023-01-31 ENCOUNTER — APPOINTMENT (OUTPATIENT)
Dept: GENERAL RADIOLOGY | Age: 68
End: 2023-01-31
Attending: PHYSICAL MEDICINE & REHABILITATION
Payer: MEDICARE

## 2023-01-31 ENCOUNTER — HOSPITAL ENCOUNTER (OUTPATIENT)
Age: 68
Setting detail: OUTPATIENT SURGERY
Discharge: HOME | End: 2023-01-31
Attending: PHYSICAL MEDICINE & REHABILITATION | Admitting: PHYSICAL MEDICINE & REHABILITATION
Payer: MEDICARE

## 2023-01-31 VITALS
HEART RATE: 58 BPM | SYSTOLIC BLOOD PRESSURE: 129 MMHG | TEMPERATURE: 98.9 F | DIASTOLIC BLOOD PRESSURE: 72 MMHG | OXYGEN SATURATION: 95 % | RESPIRATION RATE: 16 BRPM

## 2023-01-31 PROCEDURE — 74011250637 HC RX REV CODE- 250/637: Performed by: PHYSICAL MEDICINE & REHABILITATION

## 2023-01-31 PROCEDURE — 77030014124 HC TY EPDRL BBMI -A: Performed by: PHYSICAL MEDICINE & REHABILITATION

## 2023-01-31 PROCEDURE — 2709999900 HC NON-CHARGEABLE SUPPLY: Performed by: PHYSICAL MEDICINE & REHABILITATION

## 2023-01-31 PROCEDURE — 74011250636 HC RX REV CODE- 250/636: Performed by: PHYSICAL MEDICINE & REHABILITATION

## 2023-01-31 PROCEDURE — 74011000250 HC RX REV CODE- 250: Performed by: PHYSICAL MEDICINE & REHABILITATION

## 2023-01-31 PROCEDURE — 74011000636 HC RX REV CODE- 636: Performed by: PHYSICAL MEDICINE & REHABILITATION

## 2023-01-31 PROCEDURE — 76010000009 HC PAIN MGT 0 TO 30 MIN PROC: Performed by: PHYSICAL MEDICINE & REHABILITATION

## 2023-01-31 RX ORDER — LIDOCAINE HYDROCHLORIDE 10 MG/ML
INJECTION, SOLUTION EPIDURAL; INFILTRATION; INTRACAUDAL; PERINEURAL AS NEEDED
Status: DISCONTINUED | OUTPATIENT
Start: 2023-01-31 | End: 2023-01-31 | Stop reason: HOSPADM

## 2023-01-31 RX ORDER — DIAZEPAM 5 MG/1
5-20 TABLET ORAL ONCE
Status: COMPLETED | OUTPATIENT
Start: 2023-01-31 | End: 2023-01-31

## 2023-01-31 RX ORDER — DEXAMETHASONE SODIUM PHOSPHATE 100 MG/10ML
INJECTION INTRAMUSCULAR; INTRAVENOUS AS NEEDED
Status: DISCONTINUED | OUTPATIENT
Start: 2023-01-31 | End: 2023-01-31 | Stop reason: HOSPADM

## 2023-01-31 RX ADMIN — DIAZEPAM 5 MG: 5 TABLET ORAL at 08:51

## 2023-01-31 NOTE — INTERVAL H&P NOTE
Update History & Physical    The Patient's History and Physical of January 16, 2023 was reviewed. There was no change. The surgical site was confirmed by the patient and me. Plan:  The risk, benefits, expected outcome, and alternative to the recommended procedure have been discussed with the patient. Patient understands and wants to proceed with the procedure.     Electronically signed by Skyler Hebert MD on 1/31/2023 at 9:54 AM

## 2023-01-31 NOTE — PROCEDURES
Intralaminar Epidural Steroid Procedure Note        Patient Name   Reyes Chung  Date of Procedure: January 31, 2023  Preoperative Diagnosis: Lumbar spinal stenosis with claudication  Postoperative Diagnosis: Same  Location [facility], [address]      Procedure:  Epidural Steroid Injection    Consent:  Informed consent was obtained prior to the procedure. In addition to the potential risks associated with the procedure itself, the patient was informed both verbally and in writing of the potential side effects of the use of glucocorticoid. The patient appeared to comprehend the informed consent and desired to have the procedure performed. Procedure in Detail:  The patient was taken to the procedure suite and placed in the prone position on the operating table on appropriate padding. The posterior lumbar region was prepped and draped in the usual sterile fashion. Intraoperative fluoroscopy was used to localize the L3-L4 interspace. The skin was infiltrated with 1% lidocaine. An 18-gauge standard #6spinal Tuohy needle was advanced into the epidural space at L3-L4 under fluoroscopic guidance using the loss of resistance technique. No cerebrospinal fluid was seen throughout the procedure. Yes  A small amount of Isovue was injected into the epidural space, confirming appropriated needle placement on fluoroscopy. No vascular uptake was identified. Next, 2ml of 1% Lidocaine and 10mg of preservative free Dexamethasone were injected via the Tuohy needle. The needle was removed from the patient. The patient tolerated the procedure well and was discharged home with designated  and care instructions. Denies headache. No bleeding from injection site. Hip flexor strength intact. Straight leg raise negative. Patient reported bernie-procedural pain on Visual Analog Scale:  pre-10; post-0.       Signed By: Rosa M Mosley MD                      January 31, 2023

## 2023-01-31 NOTE — PERIOP NOTES
Patient verbalized understanding of discharge instructions and verbally consented to [facility] Patient Consent. Signature pad not working.

## 2023-02-03 DIAGNOSIS — E78.00 HYPERCHOLESTEROLEMIA: Primary | ICD-10-CM

## 2023-02-05 DIAGNOSIS — I10 ESSENTIAL HYPERTENSION: Primary | ICD-10-CM

## 2023-02-06 DIAGNOSIS — R73.03 PREDIABETES: Primary | ICD-10-CM

## 2023-02-07 ENCOUNTER — OFFICE VISIT (OUTPATIENT)
Dept: ORTHOPEDIC SURGERY | Age: 68
End: 2023-02-07
Payer: MEDICARE

## 2023-02-07 VITALS
SYSTOLIC BLOOD PRESSURE: 126 MMHG | DIASTOLIC BLOOD PRESSURE: 85 MMHG | RESPIRATION RATE: 18 BRPM | BODY MASS INDEX: 42.72 KG/M2 | HEIGHT: 65 IN | WEIGHT: 256.4 LBS | HEART RATE: 63 BPM | TEMPERATURE: 97.6 F | OXYGEN SATURATION: 100 %

## 2023-02-07 DIAGNOSIS — E66.01 MORBID OBESITY WITH BMI OF 40.0-44.9, ADULT (HCC): ICD-10-CM

## 2023-02-07 DIAGNOSIS — Z79.891 USE OF OPIATES FOR THERAPEUTIC PURPOSES: ICD-10-CM

## 2023-02-07 DIAGNOSIS — M47.816 LUMBAR FACET ARTHROPATHY: ICD-10-CM

## 2023-02-07 DIAGNOSIS — M54.42 CHRONIC MIDLINE LOW BACK PAIN WITH LEFT-SIDED SCIATICA: ICD-10-CM

## 2023-02-07 DIAGNOSIS — M48.062 LUMBAR STENOSIS WITH NEUROGENIC CLAUDICATION: Primary | ICD-10-CM

## 2023-02-07 DIAGNOSIS — G89.29 CHRONIC MIDLINE LOW BACK PAIN WITH LEFT-SIDED SCIATICA: ICD-10-CM

## 2023-02-07 DIAGNOSIS — G89.4 CHRONIC PAIN SYNDROME: ICD-10-CM

## 2023-02-07 RX ORDER — PREGABALIN 150 MG/1
CAPSULE ORAL
Qty: 270 CAPSULE | Refills: 1 | Status: SHIPPED | OUTPATIENT
Start: 2023-02-07

## 2023-02-07 NOTE — PATIENT INSTRUCTIONS
Low Back Arthritis: Exercises  Introduction  Here are some examples of typical rehabilitation exercises for your condition. Start each exercise slowly. Ease off the exercise if you start to have pain. Your doctor or physical therapist will tell you when you can start these exercises and which ones will work best for you. When you are not being active, find a comfortable position for rest. Some people are comfortable on the floor or a medium-firm bed with a small pillow under their head and another under their knees. Some people prefer to lie on their side with a pillow between their knees. Don't stay in one position for too long. Take short walks (10 to 20 minutes) every 2 to 3 hours. Avoid slopes, hills, and stairs until you feel better. Walk only distances you can manage without pain, especially leg pain. How to do the exercises  Pelvic tilt  Lie on your back with your knees bent. \"Brace\" your stomach--tighten your muscles by pulling in and imagining your belly button moving toward your spine. Press your lower back into the floor. You should feel your hips and pelvis rock back. Hold for 6 seconds while breathing smoothly. Relax and allow your pelvis and hips to rock forward. Repeat 8 to 12 times. Back stretches  Get down on your hands and knees on the floor. Relax your head and allow it to droop. Round your back up toward the ceiling until you feel a nice stretch in your upper, middle, and lower back. Hold this stretch for as long as it feels comfortable, or about 15 to 30 seconds. Return to the starting position with a flat back while you are on your hands and knees. Let your back sway by pressing your stomach toward the floor. Lift your buttocks toward the ceiling. Hold this position for 15 to 30 seconds. Repeat 2 to 4 times. Follow-up care is a key part of your treatment and safety. Be sure to make and go to all appointments, and call your doctor if you are having problems.  It's also a good idea to know your test results and keep a list of the medicines you take. Current as of: March 9, 2022               Content Version: 13.4  © 2006-2022 [employer]. Care instructions adapted under license by [employer] (which disclaims liability or warranty for this information). If you have questions about a medical condition or this instruction, always ask your healthcare professional. [place] [address] any warranty or liability for your use of this information.

## 2023-02-07 NOTE — LETTER
2/8/2023    Patient: Jaime Foster   YOB: 1955   Date of Visit: 2/7/2023     Jesús Stevens, [address]    Dear Jesús Stevens MD,      Thank you for referring Mr. Shanna Rao to [facility] for evaluation. My notes for this consultation are attached. If you have questions, please do not hesitate to call me. I look forward to following your patient along with you.       Sincerely,    Ehsan Velasco MD

## 2023-02-07 NOTE — PROGRESS NOTES
Marce Sheridan presents today for   Chief Complaint   Patient presents with    Back Pain       Is someone accompanying this pt? no    Is the patient using any DME equipment during OV? no    Depression Screening:  3 most recent PHQ Screens 1/16/2023   Little interest or pleasure in doing things Not at all   Feeling down, depressed, irritable, or hopeless Not at all   Total Score PHQ 2 0       Learning Assessment:  Learning Assessment 2/3/2017   PRIMARY LEARNER Patient   HIGHEST LEVEL OF EDUCATION - PRIMARY LEARNER  -   BARRIERS PRIMARY LEARNER -   CO-LEARNER CAREGIVER -   PRIMARY LANGUAGE ENGLISH   LEARNER PREFERENCE PRIMARY LISTENING   ANSWERED BY self   RELATIONSHIP SELF       Abuse Screening:  Abuse Screening Questionnaire 8/11/2020   Do you ever feel afraid of your partner? N   Are you in a relationship with someone who physically or mentally threatens you? N   Is it safe for you to go home? Y       Fall Risk  Fall Risk Assessment, last 12 mths 2/7/2023   Able to walk? Yes   Fall in past 12 months? 0   Do you feel unsteady? 0   Are you worried about falling 0   Number of falls in past 12 months -   Fall with injury? -       OPIOID RISK TOOL  No flowsheet data found. Coordination of Care:  1. Have you been to the ER, urgent care clinic since your last visit? no  Hospitalized since your last visit? no    2. Have you seen or consulted any other health care providers outside of the [address] [place] [address] since your last visit? no Include any pap smears or colon screening.  no

## 2023-02-07 NOTE — PROGRESS NOTES
[facility]  [address] [place], [facility] [address]  Phone: [phone]  Fax: [phone]    Pt's YOB: 1955    ASSESSMENT   Diagnoses and all orders for this visit:    1. Lumbar stenosis with neurogenic claudication  -     pregabalin (LYRICA) 150 mg capsule; TAKE 1 CAPSULE BY MOUTH THREE TIMES DAILY . DO NOT EXCEED 3 PER 24 HOURS    2. Lumbar facet arthropathy    3. Chronic midline low back pain with left-sided sciatica  -     pregabalin (LYRICA) 150 mg capsule; TAKE 1 CAPSULE BY MOUTH THREE TIMES DAILY . DO NOT EXCEED 3 PER 24 HOURS    4. Chronic pain syndrome    5. Use of opiates for therapeutic purposes    6. Morbid obesity with BMI of 40.0-44.9, adult [facility])       IMPRESSION AND PLAN:  Ta Singleton is a 69 y.o. male with history of lumbar pain and presents to the office today for injection follow up. Pt complains of continued lumbar pain, bilateral leg weakness, intermittent paresthesia, and muscle tightness in the lumbar region. He continues to take Lyrica to 100 mg 1 cap QAM and 2 caps QHS with significant benefit and Norco 5-325 mg 1 tab Q6H without benefit, as prescribed by Gema Valadez MD.    1) Pt was given information on lumbar arthritis exercises. 2) Lumbar spine MRI from 05/13/2022 was reviewed with the pt at today's office visit. 3) He received refills of Lyrica 150 mg 1 cap TID as directed at this time. 4) Pt will continue to use RW with seat to assist with ambulation. 5) Mr. Maria French has a reminder for a \"due or due soon\" health maintenance. I have asked that he contact his primary care provider, Gema Valadez MD, for follow-up on this health maintenance. 6)  demonstrated consistency with prescribing. 7) Pt will continue to get his pain medication from Dr. Bruno Orantes. 8) Weight loss recommended    Follow-up and Dispositions    Return in about 3 months (around 5/7/2023) for Medication follow up.              HISTORY OF PRESENT ILLNESS:  Aron Hannon is a 69 y.o. male with history of lumbar pain and presents to the office today for injection follow up. Pt complains of continued lumbar pain, bilateral leg weakness, intermittent paresthesia, and muscle tightness in the lumbar region. He reports improvement of his symptoms since undergoing a L3 lumbar epidural on 01/31/2023 with Dr. Isa Perez. Pt notes the only persistent symptoms that continues to bother him is the soreness in the lumbar region. He notes hx of right hip replacement with Dr. Miguel Ángel Tomlinson several years ago and has since followed up with Dr. Miguel Ángel Tomlinson secondary to left hip pain. He continues to take Lyrica to 150 mg 1 cap QAM and 2 caps QHS with significant benefit and Norco 5-325 mg 1 tab Q6H without benefit, as prescribed by Edil Jhaveri MD. Pt at this time desires to continue with current care. Pain Scale: 1/10    PCP: Edil Jhaveri MD     Past Medical History:   Diagnosis Date    Allergic rhinitis     Avascular necrosis ([address]     CAD (coronary artery disease) 4/2009    mild, hemodynamically non-significant by angiography    Carpal tunnel syndrome     Carpal tunnel syndrome on both sides     Chest pain, unspecified     Chronic back pain 2/11/2011    Chronic obstructive pulmonary disease (HCC)     Degenerative joint disease     with chronic back pain    Dysesthesia     of hand post carpal tunnel surgery v sudeck's atrophy    Dyslipidemia, goal LDL below 100     Dysphagia     GERD (gastroesophageal reflux disease) 5/17/2010    Glaucoma 9/15/2010    Glaucoma     Hearing loss     Hypercholesterolemia     Hypertension     Joint fusion     of right wrist    Morbid obesity ([address]     Obesity 5/17/2010    Other dyspnea and respiratory abnormality     Palpitations     Right hip pain     Right hip pain     Right knee pain     S/P cardiac catheterization 4/9/2009    1. Normal systemic pressure. 2. Moderate elevation of left-sided filling pressures.  3. Preserved left ventricular systolic function in the LOWE projection. 4. Mild, nonsignificant epicardial coronary artery disese by angiography. Sleep apnea     on CPAP    Strain of lumbar region     Tobacco abuse         Social History     Socioeconomic History    Marital status: SINGLE     Spouse name: Not on file    Number of children: Not on file    Years of education: Not on file    Highest education level: Not on file   Occupational History    Not on file   Tobacco Use    Smoking status: Every Day     Packs/day: 0.10     Years: 20.00     Pack years: 2.00     Types: Cigarettes    Smokeless tobacco: Never    Tobacco comments:     urothelial cancer risk discussed today [id number]   Vaping Use    Vaping Use: Never used   Substance and Sexual Activity    Alcohol use: No     Alcohol/week: 0.0 standard drinks    Drug use: No    Sexual activity: Not on file   Other Topics Concern    Not on file   Social History Narrative    Not on file     Social Determinants of Health     Financial Resource Strain: Not on file   Food Insecurity: Not on file   Transportation Needs: Not on file   Physical Activity: Not on file   Stress: Not on file   Social Connections: Not on file   Intimate Partner Violence: Not on file   Housing Stability: Not on file       Current Outpatient Medications   Medication Sig Dispense Refill    pregabalin (LYRICA) 150 mg capsule TAKE 1 CAPSULE BY MOUTH THREE TIMES DAILY . DO NOT EXCEED 3 PER 24 HOURS 270 Capsule 1    chlorhexidine (PERIDEX) 0.12 % solution RINSE WITH 15 ML THREE TIMES DAILY FOR 30 SECONDS, THEN EXPECTORATE. DO NOT SWALLOW      HYDROcodone-acetaminophen (NORCO) 5-325 mg per tablet Take 1 Tablet by mouth every six (6) hours as needed. tiZANidine (ZANAFLEX) 4 mg tablet Take 4 mg by mouth every six (6) hours as needed. HYDROcodone bitartrate (HYSINGLA) 60 mg ER tablet Take 1 Tablet by mouth daily for 30 days. Max Daily Amount: 60 mg.  *DO NOT CRUSH,CHEW, OR DISSOLVE TABLET* 30 Tablet 0    ibuprofen (MOTRIN) 800 mg tablet TAKE 1 TABLET BY MOUTH EVERY 8 HOURS AS NEEDED FOR PAIN 90 Tablet 3    hydrOXYzine pamoate (VISTARIL) 25 mg capsule TAKE 1 CAPSULE BY MOUTH THREE TIMES DAILY AS NEEDED FOR ITCHING 60 Capsule 3    montelukast (SINGULAIR) 10 mg tablet Take 1 tablet by mouth once daily 30 Tablet 5    traZODone (DESYREL) 50 mg tablet TAKE 1 TO 2 TABLETS BY MOUTH ONCE DAILY AT BEDTIME AS NEEDED FOR SLEEP 30 Tablet 3    umeclidinium-vilanteroL (Anoro Ellipta) 62.5-25 mcg/actuation inhaler Inhale 1 puff by mouth once daily 60 Each 5    metoprolol tartrate (LOPRESSOR) 25 mg tablet Take 1 tablet by mouth twice daily 180 Tablet 1    albuterol (PROVENTIL HFA, VENTOLIN HFA, PROAIR HFA) 90 mcg/actuation inhaler INHALE 2 PUFFS BY MOUTH EVERY 4 HOURS AS NEEDED FOR WHEEZING 18 g 5    tamsulosin (FLOMAX) 0.4 mg capsule TAKE 1 CAPSULE BY MOUTH ONCE DAILY AFTER SUPPER 90 Capsule 3    Simbrinza 1-0.2 % drps       pantoprazole (PROTONIX) 40 mg tablet Take 1 tablet by mouth once daily 90 Tablet 3    latanoprost (XALATAN) 0.005 % ophthalmic solution INSTILL 1 DROP INTO EACH EYE AT BEDTIME      Narcan 4 mg/actuation nasal spray ADMINISTER A SINGLE SPRAY IN ONE NOSTRIL UPON SIGNS OF OPIOID OVERDOSE. CALL 911. REPEAT AFTER 3 MINUTES IF NO RESPONSE.      azelastine (ASTELIN) 137 mcg (0.1 %) nasal spray 2 Sprays by Both Nostrils route two (2) times a day. Use in each nostril as directed  Indications: seasonal runny nose 1 Bottle 5    fluticasone propionate (FLONASE) 50 mcg/actuation nasal spray USE TWO SPRAY(S) IN EACH NOSTRIL ONCE DAILY 1 Bottle 5    aspirin delayed-release 81 mg tablet Take 81 mg by mouth daily. lactulose (CONSTULOSE) 10 gram/15 mL solution Take 45ml PO  mL 2    nitroglycerin (NITROSTAT) 0.4 mg SL tablet 1 Tab by SubLINGual route every five (5) minutes as needed. 6 Tab 1    TRAVATAN Z 0.004 % ophthalmic solution Administer 1 Drop to both eyes nightly.          Allergies   Allergen Reactions    Latex Not Reported This Time    Ace Inhibitors Cough    Dilaudid [Hydromorphone] Hives    Lisinopril Cough    Zocor [Simvastatin] Myalgia     Right leg         REVIEW OF SYSTEMS    Constitutional: Negative for fever, chills, or weight change. Respiratory: Negative for cough or shortness of breath. Cardiovascular: Negative for chest pain or palpitations. Gastrointestinal: Negative for acid reflux, change in bowel habits, or constipation. Genitourinary: Negative for dysuria and flank pain. Musculoskeletal: Positive for lumbar and left hip pain. Skin: Negative for rash. Neurological: Negative for headaches, dizziness, or numbness. Endo/Heme/Allergies: Negative for increased bruising. Psychiatric/Behavioral: Negative for difficulty with sleep. As per HPI    PHYSICAL EXAMINATION  Visit Vitals  /85 (BP 1 Location: Left upper arm, BP Patient Position: Sitting, BP Cuff Size: Adult)   Pulse 63   Temp 97.6 °F (36.4 °C) (Temporal)   Resp 18   Ht 5' 5\" (1.651 m)   Wt 256 lb 6.4 oz (116.3 kg)   SpO2 100% Comment: RA   BMI 42.67 kg/m²       Constitutional: Awake, alert, and in no acute distress. Neurological: 1+ symmetrical DTRs in the upper extremities. 1+ symmetrical DTRs in the lower extremities. Sensation to light touch is intact. Negative Hamm's sign bilaterally. Skin: warm, dry, and intact. Musculoskeletal: Pain in right knee with straight leg raise on the right. Pain with extension, axial loading and forward flexion. No pain with internal or external rotation of his hips. Negative straight leg raise bilaterally. Pain with transitioning from sitting to standing. Tenderness to palpation in the lumbar region.        Biceps  Triceps Deltoids Wrist Ext Wrist Flex Hand Intrin   Right +4/5 +4/5 +4/5 +4/5 +4/5 +4/5   Left +4/5 +4/5 +4/5 +4/5 +4/5 +4/5      Hip Flex  Quads Hamstrings Ankle DF EHL Ankle PF   Right +4/5 +4/5 +4/5 +4/5 +4/5 +4/5   Left +4/5 +4/5 +4/5 +4/5 +4/5 +4/5     IMAGING:    Lumbar MRI 05/13/2022 was personally reviewed with pt and demonstrated:   Narrative  Sagittal and axial multisequence MR images of lumbar spine were obtained. HISTORY: Back pain and bilateral leg pain. COMPARISON: August 28, 2018     Trace anterior spondylolisthesis of L4, stable. Edema in upper L5 and inferior  L4, likely discogenic. Less severe edema in the L4 and L5 pedicles compared to  prior study. No compression deformity. Conus medullaris ends at L1-2 with normal  morphology and signal intensity. Paraspinal soft tissues unremarkable. L1-L2: No disc herniation, central or foraminal stenosis. L2-L3: Posterior disc bulge. Facet and ligamentous hypertrophy. Mild posterior  epidural fat. Overall, AP canal measures 12 mm. No central stenosis. Mild  bilateral foraminal stenosis. L3-L4: More severe posterior disc bulge, facet and ligamentous hypertrophy. AP  canal measures 9.3 mm, borderline mild central stenosis. No significant  foraminal stenosis. L4-L5: More severe posterior disc bulge with central disc protrusion. More  severe facet and ligamentous hypertrophy. Left facet joint effusion. Mild facet  inflammation. Disc bulge lifts the posterior longitudinal ligament. Overall, AP  canal measures approximately 7 mm moderate central stenosis. Moderately severe  right and moderate left foraminal stenosis. Compression of exiting L4 nerve  roots suspected, slightly more conspicuous than before. Slightly  worse/progressed. L5-S1: Mild posterior disc bulge. Mild central stenosis. Mild bilateral  foraminal stenosis. Impression  Slightly progressed degenerative disc and joint disease in L4-L5,  with moderate central stenosis, foraminal stenosis and suspected compression of  exiting L4 nerve roots. Hip x-rays from 06/29/2020 were personally reviewed with the patient and demonstrated:  Right total hip replacement intact. Degenerative joint findings in the left hip.       Lumbar spine MRI from 08/28/2018 was personally reviewed with the patient and demonstrated:  FINDINGS:     There are 5 lumbar-type vertebral bodies. For the purposes of this dictation the  L5/S1 disc level will be referred to by series 5 image 9    There is spondylolisthesis. There is STIR signal hyperintensity within the left  L4 and L5 pedicles (series 4 image 3), likely representing stress reaction. There is no significant spondylolisthesis. The conus terminates at the L1 level. There is moderate bilateral sacroiliac arthrosis. There are multiple rounded T2 hyperintense structures posterior lateral and  anterolateral to the aorta, unchanged compared to CT abdomen and pelvis dated  9/9/2006 and may reflect lymph nodes or a lymphatic malformation. Axial imaging correlation:    T11-T12: Sagittal plane only. No significant disc pathology. No significant  spinal canal or neural foraminal stenosis. T12-L1: No significant disc pathology. Trace left facet joint effusion. No  significant spinal canal or neural foraminal stenosis. L1-2: No significant disc pathology. No significant spinal canal or neural  foraminal stenosis. L2-3: Similar appearing small disc bulge with small right central disc  protrusion extending to the inferior aspect of the L2 vertebral body. Mild  bilateral facet arthrosis. Minimal spinal canal narrowing. Minimal bilateral  foraminal stenosis. Not significantly changed. L3-4: Small disc bulge with small central disc extrusion extending superiorly  slightly above the disc level. Mild bilateral facet arthrosis. Minimal spinal  canal narrowing. Mild right and minimal left foraminal stenosis. No significant  change from prior. L4-5: Small to moderate disc bulge with focal protrusions involving the central  and right foraminal zones. Moderate bilateral facet arthrosis with ligamentum  flavum buckling. Mild-to-moderate spinal canal stenosis.  Moderate to severe  right foraminal stenosis with effacement of the right L4 perineural fat. Mild  left foraminal stenosis. This is progressed from prior. L5-S1: Small broad-based bulge. Minimal spinal canal narrowing. No significant  foraminal stenosis. IMPRESSION:  1. Progression of a small to moderate disc bulge at L4/L5 with focal central  and right foraminal protrusions. Moderate bilateral facet arthrosis, mild to  moderate spinal canal stenosis, moderate to severe right and mild left foraminal  stenosis, with progression from prior MRI. 2.  STIR hyperintensity within the left L4-L5 pedicles, likely representing  stress reaction. 3.  Multilevel additional degenerative disc disease, mild spinal canal stenosis,  and mild foraminal stenosis as above, not significantly changed from prior. Thank you for enabling us to participate in the care of this patient. Thoracic spine MRI from 12/29/2017 was personally reviewed with the patient and demonstrated:  FINDINGS:  The alignment of the thoracic spine is unremarkable. The facets are  appropriately aligned. No vertebral body step off appreciated. No evidence of  thoracic spine fracture. The marrow signal is unremarkable. The cord signal is  unremarkable. A left paracentral disc protrusion is present at T7-T8 which causes mild canal  narrowing. No evidence of significant canal or neural foraminal stenosis at any  level. The visualized thoracic aorta is unremarkable. The visualized paraspinal  soft tissues are unremarkable. IMPRESSION:  1. No acute thoracic spine pathology. Lower extremity arterial PVR from 02/06/2019 was personally reviewed with the patient and demonstrated:  Interpretation Summary:  No hemodynamically significant arterial disease is identified within bilateral lower extremities at rest    Written by Sunita Loyd, as dictated by Kanchan Velásquez MD.  I, Dr. Kanchan Velásquez confirm that all documentation is accurate.

## 2023-02-08 DIAGNOSIS — G89.29 CHRONIC MIDLINE LOW BACK PAIN WITH LEFT-SIDED SCIATICA: ICD-10-CM

## 2023-02-08 DIAGNOSIS — M54.42 CHRONIC MIDLINE LOW BACK PAIN WITH LEFT-SIDED SCIATICA: ICD-10-CM

## 2023-02-09 RX ORDER — HYDROCODONE BITARTRATE 60 MG/1
60 TABLET, EXTENDED RELEASE ORAL DAILY
Qty: 30 TABLET | Refills: 0 | Status: SHIPPED | OUTPATIENT
Start: 2023-02-09 | End: 2023-03-11

## 2023-03-10 ENCOUNTER — TELEPHONE (OUTPATIENT)
Age: 68
End: 2023-03-10

## 2023-03-10 RX ORDER — PANTOPRAZOLE SODIUM 40 MG/1
TABLET, DELAYED RELEASE ORAL
Qty: 30 TABLET | Refills: 5 | Status: SHIPPED | OUTPATIENT
Start: 2023-03-10

## 2023-03-10 NOTE — TELEPHONE ENCOUNTER
----- Message from Tiara Vines sent at 3/10/2023 11:03 AM EST -----  Subject: Refill Request    QUESTIONS  Name of Medication? pantoprazole (PROTONIX) 40 MG tablet  Patient-reported dosage and instructions? as needed  How many days do you have left? 3  Preferred [facility]? [facility] phone number (if available)? [phone]  Additional Information for Provider? Patient said he needs refill on pain   meds. He was difficult to hear but I believe this is the right one.   ---------------------------------------------------------------------------  --------------  CALL BACK INFO  What is the best way for the office to contact you? OK to leave message on   voicemail  Preferred Call Back Phone Number? [phone]  ---------------------------------------------------------------------------  --------------  SCRIPT ANSWERS  Relationship to Patient?  Self

## 2023-03-12 DIAGNOSIS — I10 ESSENTIAL HYPERTENSION: ICD-10-CM

## 2023-03-12 DIAGNOSIS — R73.03 PREDIABETES: ICD-10-CM

## 2023-03-12 DIAGNOSIS — G89.29 CHRONIC MIDLINE LOW BACK PAIN WITH LEFT-SIDED SCIATICA: ICD-10-CM

## 2023-03-12 DIAGNOSIS — M54.42 CHRONIC MIDLINE LOW BACK PAIN WITH LEFT-SIDED SCIATICA: ICD-10-CM

## 2023-03-12 DIAGNOSIS — E78.00 HYPERCHOLESTEROLEMIA: ICD-10-CM

## 2023-03-13 ENCOUNTER — TELEPHONE (OUTPATIENT)
Age: 68
End: 2023-03-13

## 2023-03-13 ENCOUNTER — HOSPITAL ENCOUNTER (OUTPATIENT)
Facility: HOSPITAL | Age: 68
Setting detail: SPECIMEN
Discharge: HOME | End: 2023-03-16
Payer: COMMERCIAL

## 2023-03-13 DIAGNOSIS — R73.03 PREDIABETES: ICD-10-CM

## 2023-03-13 DIAGNOSIS — G89.29 CHRONIC MIDLINE LOW BACK PAIN WITHOUT SCIATICA: Primary | ICD-10-CM

## 2023-03-13 DIAGNOSIS — M54.50 CHRONIC MIDLINE LOW BACK PAIN WITHOUT SCIATICA: Primary | ICD-10-CM

## 2023-03-13 DIAGNOSIS — E78.00 HYPERCHOLESTEROLEMIA: ICD-10-CM

## 2023-03-13 DIAGNOSIS — I10 ESSENTIAL HYPERTENSION: ICD-10-CM

## 2023-03-13 LAB
ALBUMIN SERPL-MCNC: 4 G/DL (ref 3.4–5)
ALBUMIN/GLOB SERPL: 0.9 (ref 0.8–1.7)
ALP SERPL-CCNC: 90 U/L (ref 45–117)
ALT SERPL-CCNC: 18 U/L (ref 16–61)
ANION GAP SERPL CALC-SCNC: 6 MMOL/L (ref 3–18)
AST SERPL-CCNC: 11 U/L (ref 10–38)
BILIRUB SERPL-MCNC: 0.5 MG/DL (ref 0.2–1)
BUN SERPL-MCNC: 21 MG/DL (ref 7–18)
BUN/CREAT SERPL: 17 (ref 12–20)
CALCIUM SERPL-MCNC: 9.6 MG/DL (ref 8.5–10.1)
CHLORIDE SERPL-SCNC: 109 MMOL/L (ref 100–111)
CHOLEST SERPL-MCNC: 260 MG/DL
CO2 SERPL-SCNC: 24 MMOL/L (ref 21–32)
CREAT SERPL-MCNC: 1.24 MG/DL (ref 0.6–1.3)
EST. AVERAGE GLUCOSE BLD GHB EST-MCNC: 120 MG/DL
GLOBULIN SER CALC-MCNC: 4.3 G/DL (ref 2–4)
GLUCOSE SERPL-MCNC: 103 MG/DL (ref 74–99)
HBA1C MFR BLD: 5.8 % (ref 4.2–5.6)
HDLC SERPL-MCNC: 43 MG/DL (ref 40–60)
HDLC SERPL: 6 (ref 0–5)
LDLC SERPL CALC-MCNC: 189.4 MG/DL (ref 0–100)
LIPID PANEL: ABNORMAL
POTASSIUM SERPL-SCNC: 4.3 MMOL/L (ref 3.5–5.5)
PROT SERPL-MCNC: 8.3 G/DL (ref 6.4–8.2)
SODIUM SERPL-SCNC: 139 MMOL/L (ref 136–145)
TRIGL SERPL-MCNC: 138 MG/DL
VLDLC SERPL CALC-MCNC: 27.6 MG/DL

## 2023-03-13 PROCEDURE — 80061 LIPID PANEL: CPT

## 2023-03-13 PROCEDURE — 83036 HEMOGLOBIN GLYCOSYLATED A1C: CPT

## 2023-03-13 PROCEDURE — 36415 COLL VENOUS BLD VENIPUNCTURE: CPT

## 2023-03-13 PROCEDURE — 80053 COMPREHEN METABOLIC PANEL: CPT

## 2023-03-13 RX ORDER — HYDROCODONE BITARTRATE 60 MG/1
60 TABLET, EXTENDED RELEASE ORAL EVERY 24 HOURS
Qty: 30 TABLET | Refills: 0 | Status: SHIPPED | OUTPATIENT
Start: 2023-03-13 | End: 2023-04-12

## 2023-03-13 NOTE — TELEPHONE ENCOUNTER
reviewed 3/16/2023.     Unable to  view last refill for hydrocodone    Last OV : 9/12/2022  Next OV: 3/31/2023

## 2023-03-29 RX ORDER — CYCLOBENZAPRINE HCL 10 MG
TABLET ORAL
Qty: 90 TABLET | Refills: 0 | Status: SHIPPED | OUTPATIENT
Start: 2023-03-29

## 2023-03-31 ENCOUNTER — TELEPHONE (OUTPATIENT)
Age: 68
End: 2023-03-31

## 2023-03-31 ENCOUNTER — OFFICE VISIT (OUTPATIENT)
Age: 68
End: 2023-03-31
Payer: COMMERCIAL

## 2023-03-31 VITALS
HEIGHT: 65 IN | BODY MASS INDEX: 43.49 KG/M2 | DIASTOLIC BLOOD PRESSURE: 61 MMHG | TEMPERATURE: 97.9 F | HEART RATE: 60 BPM | WEIGHT: 261 LBS | SYSTOLIC BLOOD PRESSURE: 130 MMHG | OXYGEN SATURATION: 98 % | RESPIRATION RATE: 20 BRPM

## 2023-03-31 DIAGNOSIS — G89.29 CHRONIC MIDLINE LOW BACK PAIN WITHOUT SCIATICA: ICD-10-CM

## 2023-03-31 DIAGNOSIS — E66.01 MORBID (SEVERE) OBESITY DUE TO EXCESS CALORIES (HCC): ICD-10-CM

## 2023-03-31 DIAGNOSIS — Z00.00 MEDICARE ANNUAL WELLNESS VISIT, SUBSEQUENT: Primary | ICD-10-CM

## 2023-03-31 DIAGNOSIS — R05.1 ACUTE COUGH: ICD-10-CM

## 2023-03-31 DIAGNOSIS — E78.00 PURE HYPERCHOLESTEROLEMIA, UNSPECIFIED: ICD-10-CM

## 2023-03-31 DIAGNOSIS — M54.50 CHRONIC MIDLINE LOW BACK PAIN WITHOUT SCIATICA: ICD-10-CM

## 2023-03-31 DIAGNOSIS — I10 ESSENTIAL (PRIMARY) HYPERTENSION: ICD-10-CM

## 2023-03-31 DIAGNOSIS — R73.03 PREDIABETES: ICD-10-CM

## 2023-03-31 PROCEDURE — G0439 PPPS, SUBSEQ VISIT: HCPCS | Performed by: INTERNAL MEDICINE

## 2023-03-31 PROCEDURE — 1123F ACP DISCUSS/DSCN MKR DOCD: CPT | Performed by: INTERNAL MEDICINE

## 2023-03-31 PROCEDURE — 3074F SYST BP LT 130 MM HG: CPT | Performed by: INTERNAL MEDICINE

## 2023-03-31 PROCEDURE — 3078F DIAST BP <80 MM HG: CPT | Performed by: INTERNAL MEDICINE

## 2023-03-31 PROCEDURE — 99211 OFF/OP EST MAY X REQ PHY/QHP: CPT | Performed by: INTERNAL MEDICINE

## 2023-03-31 RX ORDER — LACTULOSE 10 G/15ML
30 SOLUTION ORAL EVERY EVENING
Qty: 473 ML | Refills: 1 | Status: SHIPPED | OUTPATIENT
Start: 2023-03-31

## 2023-03-31 RX ORDER — GUAIFENESIN AND CODEINE PHOSPHATE 100; 10 MG/5ML; MG/5ML
5 SOLUTION ORAL EVERY 4 HOURS PRN
Qty: 112 ML | Refills: 0 | Status: SHIPPED | OUTPATIENT
Start: 2023-03-31 | End: 2023-04-07

## 2023-03-31 SDOH — ECONOMIC STABILITY: FOOD INSECURITY: WITHIN THE PAST 12 MONTHS, YOU WORRIED THAT YOUR FOOD WOULD RUN OUT BEFORE YOU GOT MONEY TO BUY MORE.: PATIENT DECLINED

## 2023-03-31 SDOH — ECONOMIC STABILITY: HOUSING INSECURITY
IN THE LAST 12 MONTHS, WAS THERE A TIME WHEN YOU DID NOT HAVE A STEADY PLACE TO SLEEP OR SLEPT IN A SHELTER (INCLUDING NOW)?: PATIENT REFUSED

## 2023-03-31 SDOH — ECONOMIC STABILITY: FOOD INSECURITY: WITHIN THE PAST 12 MONTHS, THE FOOD YOU BOUGHT JUST DIDN'T LAST AND YOU DIDN'T HAVE MONEY TO GET MORE.: PATIENT DECLINED

## 2023-03-31 SDOH — ECONOMIC STABILITY: INCOME INSECURITY: HOW HARD IS IT FOR YOU TO PAY FOR THE VERY BASICS LIKE FOOD, HOUSING, MEDICAL CARE, AND HEATING?: PATIENT DECLINED

## 2023-03-31 ASSESSMENT — PATIENT HEALTH QUESTIONNAIRE - PHQ9
SUM OF ALL RESPONSES TO PHQ QUESTIONS 1-9: 0
SUM OF ALL RESPONSES TO PHQ9 QUESTIONS 1 & 2: 0
SUM OF ALL RESPONSES TO PHQ QUESTIONS 1-9: 0
2. FEELING DOWN, DEPRESSED OR HOPELESS: 0
1. LITTLE INTEREST OR PLEASURE IN DOING THINGS: 0

## 2023-03-31 ASSESSMENT — LIFESTYLE VARIABLES
HOW MANY STANDARD DRINKS CONTAINING ALCOHOL DO YOU HAVE ON A TYPICAL DAY: PATIENT DOES NOT DRINK
HOW OFTEN DO YOU HAVE A DRINK CONTAINING ALCOHOL: NEVER

## 2023-03-31 NOTE — PROGRESS NOTES
Shawnhannah Thomas presents today for   Chief Complaint   Patient presents with    Hypertension    Cholesterol Problem    Blood Sugar Problem     6 month follow up     Medicare AWV     Patient is requesting cough medication to be sent to the pharmacy. 1. \"Have you been to the ER, urgent care clinic since your last visit? Hospitalized since your last visit? \" no    2. \"Have you seen or consulted any other health care providers outside of the [address] [place] [address] since your last visit? \" no     3. For patients aged 39-70: Has the patient had a colonoscopy / FIT/ Cologuard? Yes - no Care Gap present      If the patient is female:    4. For patients aged 41-77: Has the patient had a mammogram within the past 2 years? NA - based on age or sex      11. For patients aged 21-65: Has the patient had a pap smear?  NA - based on age or sex
Subjective:       Chief Complaint  The patient presents for follow up of hypertension and high cholesterol. prediabetes  chronic back pain and right hip pain, COPD        HPI  Stu Soto is a 69 y.o. male seen for follow up of hyperlipidemia. Healso has hypertension. Hypertension well controlled, no significant medication side effects noted, on Lopressor 25 mg BID, hyperlipidemia uncontrolled, pt has been unable to tolerate statins due to myalgias. The 10-year ASCVD risk score (Germán Flowers, et al., 2013) is: 33.5%      Diet and Lifestyle: not attempting to follow a low fat, low cholesterol diet, exercises sporadically    Home BP Monitoring: is not measured at home. Stu Soto RTC today to follow up on chronic pain diagnosis. We discussed his osteoarthritis that is affecting his back. Significant changes since last visit: pt is having neurogenic claudication and has pain with standing and walking in both legs. He is  able to do his normal daily activities. He reports the following adverse side effects: none. Pt doing fairly well with Hysingla ER 60 mg. He is seeing the [facility] and was referred for aqua therapy but did not tolerate it due to pain. His MRI of his LS shows worsening DJD so he will f/u with the [facility] center. Lyrica has helped the neurogenic pain. Least pain over the last week has been 3/10. Worst pain over the last week has been 10/10. Opioid Risk Tool Reviewed: YES    Aberrant behaviors: None. Urine Drug Screen: reviewed and up to date. Controlled substance agreement on file: YES.  reviewed:yes    Pill count is consistent with his prescription: yes    Concomitant use of a benzodiazepine: no        Prediabetes: pt is trying to control with diet and weight loss but he continues to struggle to lose weight. His BMI of 38 puts him at high risk of multiple medical problems. COPD Review:  The patient is being seen for follow up of COPD.    Oxygen: He
chronic pain diagnosis. We discussed his osteoarthritis that is affecting his back. Significant changes since last visit: pt is having neurogenic claudication and has pain with standing and walking in both legs. He is  able to do his normal daily activities. He reports the following adverse side effects: none. Pt doing fairly well with Hysingla ER 60 mg. He is seeing the [facility] and was referred for aqua therapy but did not tolerate it due to pain. His MRI of his LS shows worsening DJD so he will f/u with the [facility] center. Lyrica has helped the neurogenic pain. Least pain over the last week has been 3/10. Worst pain over the last week has been 10/10. Opioid Risk Tool Reviewed: YES    Aberrant behaviors: None. Urine Drug Screen: reviewed and up to date. Controlled substance agreement on file: YES.  reviewed:yes    Pill count is consistent with his prescription: yes    Concomitant use of a benzodiazepine: no        Prediabetes: pt is trying to control with diet and weight loss but he continues to struggle to lose weight. His BMI of 43 puts him at high risk of multiple medical problems. COPD Review:  The patient is being seen for follow up of COPD. Oxygen: He currently is not on home oxygen therapy. He is using Anoro Ellipta with good results. Symptoms: chronic dyspnea: severity = mild: course of sx: symptoms have progressed to a point and plateaued. .   Patient uses 0 pillows at night. Patient does smoke cigarettes. Pt has sleep apnea is trying to use CPAP on a regular basis. He will f/u with his sleep specialist Dr Jolynn Frazier. Patient renal function is in the normal range with this blood test.  Patient also has prediabetes which he is encouraged to try and improved by cutting back starches and sugar in his diet. Patient's complete Health Risk Assessment and screening values have been reviewed and are found in Flowsheets.  The following problems were reviewed

## 2023-03-31 NOTE — PATIENT INSTRUCTIONS
stop and talk to your doctor. Where can you learn more? Go to http://www.emanuel.com/ and enter P600 to learn more about \"Learning About Being Active as an Older Adult. \"  Current as of: October 10, 2022               Content Version: 13.6  © 1724-6910 [employer]. Care instructions adapted under license by [facility] ([facility]). If you have questions about a medical condition or this instruction, always ask your healthcare professional. [place] [address] any warranty or liability for your use of this information. Advance Directives: Care Instructions  Overview  An advance directive is a legal way to state your wishes at the end of your life. It tells your family and your doctor what to do if you can't say what you want. There are two main types of advance directives. You can change them any time your wishes change. Living will. This form tells your family and your doctor your wishes about life support and other treatment. The form is also called a declaration. Medical power of . This form lets you name a person to make treatment decisions for you when you can't speak for yourself. This person is called a health care agent (health care proxy, health care surrogate). The form is also called a durable power of  for health care. If you do not have an advance directive, decisions about your medical care may be made by a family member, or by a doctor or a  who doesn't know you. It may help to think of an advance directive as a gift to the people who care for you. If you have one, they won't have to make tough decisions by themselves. For more information, including forms for your state, see the [address] website (www.caringinfo.org/planning/advance-directives/). Follow-up care is a key part of your treatment and safety. Be sure to make and go to all appointments, and call your doctor if you are having problems.  It's also a good idea to know

## 2023-04-18 ENCOUNTER — TELEPHONE (OUTPATIENT)
Age: 68
End: 2023-04-18

## 2023-04-18 NOTE — TELEPHONE ENCOUNTER
Spoke with Ragini Vallecillo pharmacist at [facility] she wants to know if ok to dispense cough med since it was written on 3/31/2023 and patient is just now coming to get it.

## 2023-05-08 ENCOUNTER — OFFICE VISIT (OUTPATIENT)
Age: 68
End: 2023-05-08
Payer: COMMERCIAL

## 2023-05-08 VITALS
WEIGHT: 255 LBS | HEART RATE: 66 BPM | TEMPERATURE: 97.8 F | OXYGEN SATURATION: 96 % | BODY MASS INDEX: 42.49 KG/M2 | HEIGHT: 65 IN

## 2023-05-08 DIAGNOSIS — M54.16 LUMBAR RADICULITIS: Primary | ICD-10-CM

## 2023-05-08 DIAGNOSIS — Z79.891 LONG TERM (CURRENT) USE OF OPIATE ANALGESIC: ICD-10-CM

## 2023-05-08 DIAGNOSIS — M47.816 SPONDYLOSIS WITHOUT MYELOPATHY OR RADICULOPATHY, LUMBAR REGION: ICD-10-CM

## 2023-05-08 DIAGNOSIS — M16.12 PRIMARY OSTEOARTHRITIS OF LEFT HIP: ICD-10-CM

## 2023-05-08 DIAGNOSIS — G89.4 CHRONIC PAIN SYNDROME: ICD-10-CM

## 2023-05-08 PROCEDURE — 1123F ACP DISCUSS/DSCN MKR DOCD: CPT | Performed by: PHYSICAL MEDICINE & REHABILITATION

## 2023-05-08 PROCEDURE — 99214 OFFICE O/P EST MOD 30 MIN: CPT | Performed by: PHYSICAL MEDICINE & REHABILITATION

## 2023-05-08 RX ORDER — METHYLPREDNISOLONE 4 MG/1
TABLET ORAL
Qty: 1 KIT | Refills: 0 | Status: SHIPPED | OUTPATIENT
Start: 2023-05-08 | End: 2023-05-14

## 2023-05-08 ASSESSMENT — PATIENT HEALTH QUESTIONNAIRE - PHQ9
SUM OF ALL RESPONSES TO PHQ QUESTIONS 1-9: 0
2. FEELING DOWN, DEPRESSED OR HOPELESS: 0
SUM OF ALL RESPONSES TO PHQ QUESTIONS 1-9: 0
SUM OF ALL RESPONSES TO PHQ QUESTIONS 1-9: 0
1. LITTLE INTEREST OR PLEASURE IN DOING THINGS: 0
SUM OF ALL RESPONSES TO PHQ9 QUESTIONS 1 & 2: 0
SUM OF ALL RESPONSES TO PHQ QUESTIONS 1-9: 0

## 2023-05-08 NOTE — PROGRESS NOTES
Amirah White presents today for   Chief Complaint   Patient presents with    Back Pain       Is someone accompanying this pt? no    Is the patient using any DME equipment during OV? no    Depression Screening:  No flowsheet data found. Learning Assessment:  No flowsheet data found. Abuse Screening:  No flowsheet data found. Fall Risk  No flowsheet data found. OPIOID RISK TOOL  No flowsheet data found. Coordination of Care:  1. Have you been to the ER, urgent care clinic since your last visit? no  Hospitalized since your last visit? no    2. Have you seen or consulted any other health care providers outside of the [address] [place] [address] since your last visit? no Include any pap smears or colon screening.  no

## 2023-05-08 NOTE — PROGRESS NOTES
[facility]  [address] [place], [address]  Phone: [phone]  Fax: [phone]    Pt's YOB: 1955    ASSESSMENT   Luis Perez was seen today for back pain. Diagnoses and all orders for this visit:    Lumbar radiculitis  -     SCHEDULE SURGERY    Spondylosis without myelopathy or radiculopathy, lumbar region  -     methylPREDNISolone (MEDROL DOSEPACK) 4 MG tablet; Take by mouth.  -     SCHEDULE SURGERY    Chronic pain syndrome  -     SCHEDULE SURGERY    Primary osteoarthritis of left hip  -     SCHEDULE SURGERY    Long term (current) use of opiate analgesic  -     SCHEDULE SURGERY         IMPRESSION AND PLAN:  .     1) Pt was put in a prescription for Medrol Dosepak to use for acute inflammatory pain  2) Patient scheduled for a right L4 selective nerve block for his neuropathic symptoms   3) Patient will continue with the Lyrica 150 mg 3 times daily--he may try decreasing the dose to determine the needed dosage for effective treatment of his neuropathic symptoms  4) Mr. Christine Childress has a reminder for a \"due or due soon\" health maintenance. I have asked that he contact his primary care provider, Mimi Bar MD, for follow-up on this health maintenance. 5)  demonstrated consistency with prescribing. 6) Patient will continue with chronic pain medication which he gets through   7) Patient also encouraged to use a cane to assist with ambulation  Return in about 6 weeks (around 6/19/2023) for Medication follow up. HISTORY OF PRESENT ILLNESS:  Susan Lentz is a 69 y.o.  male with history of lumbar pain and presents to the office today for injection follow up. Pt complains of continued lumbar pain, bilateral leg weakness, intermittent paresthesia,  and muscle tightness in the lumbar region. Patient states that he is having some left hip pain he reports difficulty with bending forward and a sensation of pulling or tightness.

## 2023-05-09 DIAGNOSIS — G89.29 CHRONIC MIDLINE LOW BACK PAIN WITHOUT SCIATICA: ICD-10-CM

## 2023-05-09 DIAGNOSIS — M54.50 CHRONIC MIDLINE LOW BACK PAIN WITHOUT SCIATICA: ICD-10-CM

## 2023-05-09 RX ORDER — HYDROCODONE BITARTRATE 60 MG/1
60 TABLET, EXTENDED RELEASE ORAL EVERY 24 HOURS
Qty: 30 TABLET | Refills: 0 | Status: SHIPPED | OUTPATIENT
Start: 2023-05-09 | End: 2023-06-08

## 2023-05-09 RX ORDER — MONTELUKAST SODIUM 10 MG/1
TABLET ORAL
Qty: 90 TABLET | Refills: 2 | Status: SHIPPED | OUTPATIENT
Start: 2023-05-09

## 2023-05-09 NOTE — TELEPHONE ENCOUNTER
[facility] 5/9/2023    The last fill date was 4/11/2023 for a 30 d/s qty 30      Last UDS: not on file     CSA Last signed: 3/31/2023        PCP: Caren Pathak MD    Last appt: 3/31/2023  Future Appointments   Date Time Provider Mya Garcia   6/6/2023 10:20 AM [facility]   6/28/2023  3:20 PM Jamilah Flores MD [facility] BS AMB   7/7/2023 10:40 AM Jamilah Folres MD [facility] BS AMB   10/6/2023 10:55 AM [facility] LAB VISIT [facility] BS AMB   10/13/2023 11:00 AM Chito Coulter MD [facility] BS AMB       Requested Prescriptions     Pending Prescriptions Disp Refills    HYDROcodone (HYSINGLA ER) 60 MG extended release tablet 30 tablet 0     Sig: Take 60 mg by mouth every 24 hours for 30 days.  Max Daily Amount: 60 mg

## 2023-05-16 DIAGNOSIS — M47.816 SPONDYLOSIS WITHOUT MYELOPATHY OR RADICULOPATHY, LUMBAR REGION: ICD-10-CM

## 2023-05-16 RX ORDER — METHYLPREDNISOLONE 4 MG/1
TABLET ORAL
Qty: 21 TABLET | Refills: 0 | OUTPATIENT
Start: 2023-05-16

## 2023-05-18 RX ORDER — LIDOCAINE HYDROCHLORIDE 20 MG/ML
5 INJECTION, SOLUTION EPIDURAL; INFILTRATION; INTRACAUDAL; PERINEURAL ONCE
OUTPATIENT
Start: 2023-05-23

## 2023-05-19 DIAGNOSIS — M47.816 SPONDYLOSIS WITHOUT MYELOPATHY OR RADICULOPATHY, LUMBAR REGION: ICD-10-CM

## 2023-05-21 RX ORDER — METHYLPREDNISOLONE 4 MG/1
TABLET ORAL
Qty: 21 TABLET | Refills: 0 | OUTPATIENT
Start: 2023-05-21

## 2023-05-23 ENCOUNTER — HOSPITAL ENCOUNTER (OUTPATIENT)
Facility: HOSPITAL | Age: 68
Discharge: HOME | End: 2023-05-26
Payer: MEDICARE

## 2023-05-23 VITALS
OXYGEN SATURATION: 97 % | TEMPERATURE: 98.9 F | SYSTOLIC BLOOD PRESSURE: 147 MMHG | HEART RATE: 57 BPM | RESPIRATION RATE: 16 BRPM | DIASTOLIC BLOOD PRESSURE: 80 MMHG

## 2023-05-23 PROCEDURE — 6360000002 HC RX W HCPCS: Performed by: PHYSICAL MEDICINE & REHABILITATION

## 2023-05-23 PROCEDURE — 64483 NJX AA&/STRD TFRM EPI L/S 1: CPT

## 2023-05-23 PROCEDURE — 2500000003 HC RX 250 WO HCPCS: Performed by: PHYSICAL MEDICINE & REHABILITATION

## 2023-05-23 PROCEDURE — 6370000000 HC RX 637 (ALT 250 FOR IP): Performed by: PHYSICAL MEDICINE & REHABILITATION

## 2023-05-23 PROCEDURE — 6360000004 HC RX CONTRAST MEDICATION: Performed by: PHYSICAL MEDICINE & REHABILITATION

## 2023-05-23 RX ORDER — LIDOCAINE HYDROCHLORIDE 10 MG/ML
30 INJECTION, SOLUTION EPIDURAL; INFILTRATION; INTRACAUDAL; PERINEURAL ONCE
Status: COMPLETED | OUTPATIENT
Start: 2023-05-23 | End: 2023-05-23

## 2023-05-23 RX ORDER — DIAZEPAM 5 MG/1
10 TABLET ORAL ONCE
Status: COMPLETED | OUTPATIENT
Start: 2023-05-23 | End: 2023-05-23

## 2023-05-23 RX ORDER — DEXAMETHASONE SODIUM PHOSPHATE 10 MG/ML
10 INJECTION, SOLUTION INTRAMUSCULAR; INTRAVENOUS ONCE
Status: COMPLETED | OUTPATIENT
Start: 2023-05-23 | End: 2023-05-23

## 2023-05-23 RX ORDER — DIAZEPAM 5 MG/1
5 TABLET ORAL ONCE
Status: COMPLETED | OUTPATIENT
Start: 2023-05-23 | End: 2023-05-23

## 2023-05-23 RX ORDER — DIAZEPAM 5 MG/1
2.5 TABLET ORAL ONCE
Status: COMPLETED | OUTPATIENT
Start: 2023-05-23 | End: 2023-05-23

## 2023-05-23 RX ADMIN — LIDOCAINE HYDROCHLORIDE 15 ML: 10 INJECTION, SOLUTION EPIDURAL; INFILTRATION; INTRACAUDAL; PERINEURAL at 09:00

## 2023-05-23 RX ADMIN — IOPAMIDOL 1 ML: 408 INJECTION, SOLUTION INTRATHECAL at 09:02

## 2023-05-23 RX ADMIN — DEXAMETHASONE SODIUM PHOSPHATE 10 MG: 10 INJECTION, SOLUTION INTRAMUSCULAR; INTRAVENOUS at 09:03

## 2023-05-23 RX ADMIN — DIAZEPAM 5 MG: 5 TABLET ORAL at 08:39

## 2023-05-23 ASSESSMENT — PAIN SCALES - GENERAL: PAINLEVEL_OUTOF10: 4

## 2023-05-23 ASSESSMENT — PAIN - FUNCTIONAL ASSESSMENT: PAIN_FUNCTIONAL_ASSESSMENT: 0-10

## 2023-05-23 NOTE — INTERVAL H&P NOTE
Update History & Physical    The patient's History and Physical of May 8, 2023 was reviewed. There was no change. The surgical site was confirmed by the patient and me. Plan: The risks, benefits, expected outcome, and alternative to the recommended procedure have been discussed with the patient. Patient understands and wants to proceed with the procedure.      Electronically signed by Mekhi Kern MD on 5/23/2023 at 8:53 AM

## 2023-05-23 NOTE — PROCEDURES
SELECTIVE NERVE ROOT BLOCK PROCEDURE NOTE      Patient Name: Sallie Horvath  Date of Procedure: May 23, 2023  Preoperative Diagnosis:  Lumbar Radicular Pain  Post Operative Diagnosis:  Lumbar Radicular Pain  Location:  [address] [place] [address]    Procedure :    right L4 Selective Nerve Root Block      Consent:  Informed consent was obtained prior to the procedure. The patient was given the opportunity to ask questions regarding the procedure and its associated risks. In addition to the potential risks associated with the procedure itself, the patient was informed both verbally and in writing of the potential side effects of the use of glucocorticoid. The patient appeared to comprehend the informed consent and desired to have the procedure performed. Procedure: The patient was placed in the prone position on the fluoroscopy table and the back was prepped and draped in the usual sterile manner. The exact spinal level was  identified using fluoroscopy, and Lidocaine 1 % was injected locally, a 22 #5 gauge spinal needle was passed to the transverse process. The depth was noted and the needle redirected to pass inferior and approximately one cm anterior to the transverse process. Yes  about 1 cc of Isovue M-200 was used to verify positioning in the epidural and paravertebral space and outlined the course of the spinal nerve into the epidural space. The same procedure was repeated at each spinal level indicated above. No vascular uptake was identified. A total of 10 mg of preservative free Dexamethasone and 1 cc of Lidocaine/site was slowly injected. The patient tolerated the procedure well. The injection area was cleaned and bandaids applied. Not excessive bleeding was noted. Patient dressed and discharged to home with instructions. Discussion: The patient tolerated the procedure well.  Patient reported buffy-procedural pain on Visual Analog Scale:  pre-7;

## 2023-06-08 DIAGNOSIS — G89.29 CHRONIC MIDLINE LOW BACK PAIN WITHOUT SCIATICA: ICD-10-CM

## 2023-06-08 DIAGNOSIS — M54.50 CHRONIC MIDLINE LOW BACK PAIN WITHOUT SCIATICA: ICD-10-CM

## 2023-06-08 RX ORDER — HYDROCODONE BITARTRATE 60 MG/1
60 TABLET, EXTENDED RELEASE ORAL EVERY 24 HOURS
Qty: 30 TABLET | Refills: 0 | Status: SHIPPED | OUTPATIENT
Start: 2023-06-08 | End: 2023-07-08

## 2023-06-08 NOTE — TELEPHONE ENCOUNTER
[facility]  report reviewed 6/8/2023    The last fill date was 5/9/2023 for a 30 d/s qty 30      Last UDS: not on file     CSA Last signed: 3/31/2023        PCP: Vania Little MD      Future Appointments   Date Time Provider Mya Radha   6/28/2023  3:20 PM Eliseo Novak MD [facility] BS AMB   7/7/2023 10:40 AM Eliseo Novak MD [facility]   10/6/2023 10:55 AM [facility] LAB VISIT [facility]   10/13/2023 11:00 AM Ramon Hein MD [facility] AMB       Requested Prescriptions     Pending Prescriptions Disp Refills    HYDROcodone (HYSINGLA ER) 60 MG extended release tablet 30 tablet 0     Sig: Take 60 mg by mouth every 24 hours for 30 days.  Max Daily Amount: 60 mg

## 2023-07-01 RX ORDER — ALBUTEROL SULFATE 90 UG/1
AEROSOL, METERED RESPIRATORY (INHALATION)
Qty: 18 G | Refills: 5 | Status: SHIPPED | OUTPATIENT
Start: 2023-07-01

## 2023-07-05 ENCOUNTER — TELEPHONE (OUTPATIENT)
Age: 68
End: 2023-07-05

## 2023-07-05 DIAGNOSIS — G89.29 CHRONIC MIDLINE LOW BACK PAIN WITHOUT SCIATICA: ICD-10-CM

## 2023-07-05 DIAGNOSIS — M54.50 CHRONIC MIDLINE LOW BACK PAIN WITHOUT SCIATICA: ICD-10-CM

## 2023-07-05 RX ORDER — HYDROCODONE BITARTRATE 60 MG/1
60 TABLET, EXTENDED RELEASE ORAL EVERY 24 HOURS
Qty: 30 TABLET | Refills: 0 | Status: SHIPPED | OUTPATIENT
Start: 2023-07-05 | End: 2023-08-04

## 2023-07-05 NOTE — TELEPHONE ENCOUNTER
Requested refill:    - HYDROcodone (HYSINGLA ER) 60 MG extended release tablet    Pharmacy: [facility] on [address]

## 2023-07-06 NOTE — TELEPHONE ENCOUNTER
Patient is calling stating the pharmacy told him his insurance wont pay for his medication. He did not ask any questions so he doesn't know why. I'm sure not sure if the medication is just requiring a prior auth or if there is another issue.

## 2023-07-06 NOTE — PROGRESS NOTES
[facility]  [address] [place] [address], [address] [facility], [address] [place] [address]   Phone: [phone]  Fax: [phone]    Pt's YOB: 1955    ASSESSMENT   Ba Darling was seen today for lower back pain. Diagnoses and all orders for this visit:    Spinal stenosis, lumbar region with neurogenic claudication  -     pregabalin (LYRICA) 150 MG capsule; TAKE 1 CAPSULE BY MOUTH THREE TIMES DAILY . DO NOT EXCEED 3 PER 24 HOURS    Spondylosis without myelopathy or radiculopathy, lumbar region    Lumbar radiculitis  -     pregabalin (LYRICA) 150 MG capsule; TAKE 1 CAPSULE BY MOUTH THREE TIMES DAILY . DO NOT EXCEED 3 PER 24 HOURS    Long term (current) use of opiate analgesic    Other chronic pain    Other muscle spasm    Body mass index (BMI) 40.0-44.9, adult (HCC)         IMPRESSION AND PLAN:  Connie Flor is a 69 y.o. male with history of lumbar pain and presents to the office today for medication follow up. Pt complains of continued lumbar pain with radicular symptoms into his bilateral lower extremities, bilateral leg weakness, bilateral foot numbness, intermittent paresthesia, and muscle tightness in the lumbar region, slightly improved since his last office visit. Pt denies hx of lumbar spine surgery. He reports relief with the right L4 SNRB administered on 05/23/2023. Pt has underwent an L3 epidural on January 31 with relief. Pt was offered to be referred to orthopedics for another injection but desires to wait at this time. Pt has previously had a right total hip replacement In the past. Pt has taken Neurontin with no improvement. He is taking Lyrica 150 mg BID-TID for neuropathic symptoms. 1) He is taking Lyrica 150 mg BID-TID for neuropathic symptoms and received refills at this time. 2) Pt may be referred to orthopedics for another injection if his pain worsens  3) Mr. Ida Ayala has a reminder for a \"due or due soon\" health maintenance.  I have asked that he contact his

## 2023-07-07 ENCOUNTER — OFFICE VISIT (OUTPATIENT)
Age: 68
End: 2023-07-07
Payer: COMMERCIAL

## 2023-07-07 VITALS
TEMPERATURE: 97 F | HEIGHT: 65 IN | WEIGHT: 258 LBS | OXYGEN SATURATION: 100 % | SYSTOLIC BLOOD PRESSURE: 123 MMHG | DIASTOLIC BLOOD PRESSURE: 70 MMHG | RESPIRATION RATE: 18 BRPM | BODY MASS INDEX: 42.99 KG/M2 | HEART RATE: 60 BPM

## 2023-07-07 DIAGNOSIS — M48.062 SPINAL STENOSIS, LUMBAR REGION WITH NEUROGENIC CLAUDICATION: Primary | ICD-10-CM

## 2023-07-07 DIAGNOSIS — M62.838 OTHER MUSCLE SPASM: ICD-10-CM

## 2023-07-07 DIAGNOSIS — G89.29 OTHER CHRONIC PAIN: ICD-10-CM

## 2023-07-07 DIAGNOSIS — Z79.891 LONG TERM (CURRENT) USE OF OPIATE ANALGESIC: ICD-10-CM

## 2023-07-07 DIAGNOSIS — M54.16 LUMBAR RADICULITIS: ICD-10-CM

## 2023-07-07 DIAGNOSIS — M47.816 SPONDYLOSIS WITHOUT MYELOPATHY OR RADICULOPATHY, LUMBAR REGION: ICD-10-CM

## 2023-07-07 PROCEDURE — 3074F SYST BP LT 130 MM HG: CPT | Performed by: PHYSICAL MEDICINE & REHABILITATION

## 2023-07-07 PROCEDURE — 1123F ACP DISCUSS/DSCN MKR DOCD: CPT | Performed by: PHYSICAL MEDICINE & REHABILITATION

## 2023-07-07 PROCEDURE — 3078F DIAST BP <80 MM HG: CPT | Performed by: PHYSICAL MEDICINE & REHABILITATION

## 2023-07-07 PROCEDURE — 99213 OFFICE O/P EST LOW 20 MIN: CPT | Performed by: PHYSICAL MEDICINE & REHABILITATION

## 2023-07-07 RX ORDER — PREGABALIN 150 MG/1
CAPSULE ORAL
Qty: 90 CAPSULE | Refills: 5 | Status: SHIPPED | OUTPATIENT
Start: 2023-07-07 | End: 2024-01-16

## 2023-07-10 ENCOUNTER — TELEPHONE (OUTPATIENT)
Age: 68
End: 2023-07-10

## 2023-07-10 NOTE — TELEPHONE ENCOUNTER
Spoke with the pharmacist she is states she does not have Hydrocodone ER in stock, and will have to order the medication. Pharmacist is unable to tell this nurse if a PA is needed due to 3rd party payer system is down. Patient is aware of situation and will check with pharmacy tomorrow afternoon.

## 2023-07-10 NOTE — TELEPHONE ENCOUNTER
Pt came into office stating his insurance will not   Cover  Rx   HYDROcodone (HYSINGLA ER) 60 MG extended release tablet [[id number]]   He is asking if Dr could presctibr an alternate medication

## 2023-08-22 ENCOUNTER — TELEPHONE (OUTPATIENT)
Age: 68
End: 2023-08-22

## 2023-08-22 DIAGNOSIS — G89.29 CHRONIC MIDLINE LOW BACK PAIN WITHOUT SCIATICA: ICD-10-CM

## 2023-08-22 DIAGNOSIS — M54.50 CHRONIC MIDLINE LOW BACK PAIN WITHOUT SCIATICA: ICD-10-CM

## 2023-08-22 RX ORDER — HYDROCODONE BITARTRATE AND ACETAMINOPHEN 10; 325 MG/1; MG/1
1 TABLET ORAL EVERY 8 HOURS PRN
Qty: 90 TABLET | Refills: 0 | Status: SHIPPED | OUTPATIENT
Start: 2023-08-22 | End: 2023-09-21

## 2023-08-22 NOTE — TELEPHONE ENCOUNTER
Refill req.  HYDROcodone-acetaminophen (NORCO)  MG per tablet    Pharmacy [address] [place] [address], [address] [place] [address] [phone] - f [phone]    Please advise

## 2023-08-25 ENCOUNTER — TELEPHONE (OUTPATIENT)
Age: 68
End: 2023-08-25

## 2023-08-25 NOTE — TELEPHONE ENCOUNTER
Spoke with Chay Ambrosio. Gave clarification per Dr. Christine Kenyon that pt is to have the [address] filled and discontinue taking the Hysinglar due to pt being unable to get it.

## 2023-08-25 NOTE — TELEPHONE ENCOUNTER
[address] Far[address]ana Aguilar is calling to get clarification. Stating patient has history of taking Hysinglar but they got an rx for Norco. Is patient switching to Norco and discontinuing  Hysinglar?

## 2023-10-03 ENCOUNTER — TELEPHONE (OUTPATIENT)
Age: 68
End: 2023-10-03

## 2023-10-03 DIAGNOSIS — G89.29 CHRONIC MIDLINE LOW BACK PAIN WITHOUT SCIATICA: ICD-10-CM

## 2023-10-03 DIAGNOSIS — M54.50 CHRONIC MIDLINE LOW BACK PAIN WITHOUT SCIATICA: ICD-10-CM

## 2023-10-03 RX ORDER — HYDROCODONE BITARTRATE AND ACETAMINOPHEN 10; 325 MG/1; MG/1
1 TABLET ORAL EVERY 8 HOURS PRN
Qty: 90 TABLET | Refills: 0 | Status: SHIPPED | OUTPATIENT
Start: 2023-10-03 | End: 2023-11-02

## 2023-10-03 NOTE — TELEPHONE ENCOUNTER
----- Message from Pop Rowan sent at 10/3/2023 11:17 AM EDT -----  Subject: Refill Request    QUESTIONS  Name of Medication? HYDROcodone-acetaminophen (NORCO)  MG per tablet  Patient-reported dosage and instructions?  mcg 1x a day  How many days do you have left? 10  Preferred Pharmacy? [address] phone number (if available)? [phone]  ---------------------------------------------------------------------------  --------------  CALL BACK INFO  What is the best way for the office to contact you? OK to leave message on   voicemail  Preferred Call Back Phone Number? [phone]  ---------------------------------------------------------------------------  --------------  SCRIPT ANSWERS  Relationship to Patient?  Self

## 2023-10-03 NOTE — TELEPHONE ENCOUNTER
----- Message from Tere Wendie sent at 10/3/2023 11:17 AM EDT -----  Subject: Refill Request    QUESTIONS  Name of Medication? HYDROcodone-acetaminophen (NORCO)  MG per tablet  Patient-reported dosage and instructions?  mcg 1x a day  How many days do you have left? 10  Preferred Pharmacy? [address] phone number (if available)? [phone]  ---------------------------------------------------------------------------  --------------  CALL BACK INFO  What is the best way for the office to contact you? OK to leave message on   voicemail  Preferred Call Back Phone Number? [phone]  ---------------------------------------------------------------------------  --------------  SCRIPT ANSWERS  Relationship to Patient?  Self

## 2023-10-03 NOTE — TELEPHONE ENCOUNTER
[facility]3/2023    The last fill date for Hydrocodone-Acetamin  Mg was 8/25/2023  for a 30 d/s qty 90      Last UDS: 7/5/2023    CSA Last signed: 3/31/2023        PCP: Thanh Simmons MD    Future Appointments   Date Time Provider [address]   10/6/2023 11:00 AM [facility] LAB VISIT [facility] BS AMB   10/13/2023 11:00 AM Thanh Simmons MD [facility] BS AMB   11/10/2023 11:15 AM [facility]   12/6/2023  8:40 AM Shreyas Polanco MD [facility] BS AMB

## 2023-10-05 RX ORDER — HYDROXYZINE PAMOATE 25 MG/1
CAPSULE ORAL
Qty: 60 CAPSULE | Refills: 0 | Status: SHIPPED | OUTPATIENT
Start: 2023-10-05

## 2023-10-06 ENCOUNTER — NURSE ONLY (OUTPATIENT)
Age: 68
End: 2023-10-06

## 2023-10-06 DIAGNOSIS — R73.03 PREDIABETES: ICD-10-CM

## 2023-10-06 DIAGNOSIS — R31.0 GROSS HEMATURIA: ICD-10-CM

## 2023-10-06 DIAGNOSIS — E78.00 PURE HYPERCHOLESTEROLEMIA, UNSPECIFIED: ICD-10-CM

## 2023-10-06 DIAGNOSIS — M54.50 CHRONIC MIDLINE LOW BACK PAIN WITHOUT SCIATICA: ICD-10-CM

## 2023-10-06 DIAGNOSIS — I10 ESSENTIAL (PRIMARY) HYPERTENSION: ICD-10-CM

## 2023-10-06 DIAGNOSIS — G89.29 CHRONIC MIDLINE LOW BACK PAIN WITHOUT SCIATICA: ICD-10-CM

## 2023-10-07 LAB
ALBUMIN SERPL-MCNC: 4 G/DL (ref 3.9–4.9)
ALBUMIN/GLOB SERPL: 1.3 {RATIO} (ref 1.2–2.2)
ALP SERPL-CCNC: 100 IU/L (ref 44–121)
ALT SERPL-CCNC: 14 IU/L (ref 0–44)
AST SERPL-CCNC: 15 IU/L (ref 0–40)
BILIRUB SERPL-MCNC: 0.4 MG/DL (ref 0–1.2)
BUN SERPL-MCNC: 12 MG/DL (ref 8–27)
BUN/CREAT SERPL: 11 (ref 10–24)
CALCIUM SERPL-MCNC: 9.1 MG/DL (ref 8.6–10.2)
CHLORIDE SERPL-SCNC: 107 MMOL/L (ref 96–106)
CHOLEST SERPL-MCNC: 174 MG/DL (ref 100–199)
CO2 SERPL-SCNC: 22 MMOL/L (ref 20–29)
CREAT SERPL-MCNC: 1.13 MG/DL (ref 0.76–1.27)
EGFRCR SERPLBLD CKD-EPI 2021: 70 ML/MIN/1.73
GLOBULIN SER CALC-MCNC: 3.1 G/DL (ref 1.5–4.5)
GLUCOSE SERPL-MCNC: 106 MG/DL (ref 70–99)
HBA1C MFR BLD: 6 % (ref 4.8–5.6)
HDLC SERPL-MCNC: 33 MG/DL
LDLC SERPL CALC-MCNC: 119 MG/DL (ref 0–99)
POTASSIUM SERPL-SCNC: 4.5 MMOL/L (ref 3.5–5.2)
PROT SERPL-MCNC: 7.1 G/DL (ref 6–8.5)
SODIUM SERPL-SCNC: 141 MMOL/L (ref 134–144)
SPECIMEN STATUS REPORT: NORMAL
TRIGL SERPL-MCNC: 123 MG/DL (ref 0–149)
VLDLC SERPL CALC-MCNC: 22 MG/DL (ref 5–40)

## 2023-10-10 LAB — DRUGS UR: NORMAL

## 2023-10-13 ENCOUNTER — OFFICE VISIT (OUTPATIENT)
Age: 68
End: 2023-10-13
Payer: MEDICARE

## 2023-10-13 VITALS
DIASTOLIC BLOOD PRESSURE: 65 MMHG | HEIGHT: 65 IN | HEART RATE: 59 BPM | RESPIRATION RATE: 20 BRPM | OXYGEN SATURATION: 97 % | SYSTOLIC BLOOD PRESSURE: 112 MMHG | TEMPERATURE: 98.1 F | BODY MASS INDEX: 42.49 KG/M2 | WEIGHT: 255 LBS

## 2023-10-13 DIAGNOSIS — Z79.891 CHRONIC PRESCRIPTION OPIATE USE: ICD-10-CM

## 2023-10-13 DIAGNOSIS — I10 ESSENTIAL (PRIMARY) HYPERTENSION: Primary | ICD-10-CM

## 2023-10-13 DIAGNOSIS — E66.01 MORBID (SEVERE) OBESITY DUE TO EXCESS CALORIES (HCC): ICD-10-CM

## 2023-10-13 DIAGNOSIS — E78.00 PURE HYPERCHOLESTEROLEMIA, UNSPECIFIED: ICD-10-CM

## 2023-10-13 DIAGNOSIS — R73.03 PREDIABETES: ICD-10-CM

## 2023-10-13 DIAGNOSIS — G89.29 CHRONIC MIDLINE LOW BACK PAIN WITHOUT SCIATICA: ICD-10-CM

## 2023-10-13 DIAGNOSIS — M54.50 CHRONIC MIDLINE LOW BACK PAIN WITHOUT SCIATICA: ICD-10-CM

## 2023-10-13 PROCEDURE — 1123F ACP DISCUSS/DSCN MKR DOCD: CPT | Performed by: INTERNAL MEDICINE

## 2023-10-13 PROCEDURE — 3078F DIAST BP <80 MM HG: CPT | Performed by: INTERNAL MEDICINE

## 2023-10-13 PROCEDURE — 99214 OFFICE O/P EST MOD 30 MIN: CPT | Performed by: INTERNAL MEDICINE

## 2023-10-13 PROCEDURE — 3074F SYST BP LT 130 MM HG: CPT | Performed by: INTERNAL MEDICINE

## 2023-10-13 NOTE — PROGRESS NOTES
Jesse Macias presents today for   Chief Complaint   Patient presents with    Hypertension    Cholesterol Problem     Follow up              1. \"Have you been to the ER, urgent care clinic since your last visit? Hospitalized since your last visit? \" no    2. \"Have you seen or consulted any other health care providers outside of the [address] [place] [address] since your last visit? \" no     3. For patients aged 43-73: Has the patient had a colonoscopy / FIT/ Cologuard? Yes - no Care Gap present      If the patient is female:    4. For patients aged 43-66: Has the patient had a mammogram within the past 2 years? NA - based on age or sex      11. For patients aged 21-65: Has the patient had a pap smear?  NA - based on age or sex
Component Value Date/Time     10/06/2023 12:00 AM    K 4.5 10/06/2023 12:00 AM     10/06/2023 12:00 AM    CO2 22 10/06/2023 12:00 AM    BUN 12 10/06/2023 12:00 AM    CREATININE 1.13 10/06/2023 12:00 AM    GFRAA 70 12/09/2021 10:37 AM    AGRATIO 1.3 10/06/2023 12:00 AM    LABGLOM 70 10/06/2023 12:00 AM    GLUCOSE 106 10/06/2023 12:00 AM    PROT 7.1 10/06/2023 12:00 AM    LABALBU 4.0 10/06/2023 12:00 AM    CALCIUM 9.1 10/06/2023 12:00 AM    BILITOT 0.4 10/06/2023 12:00 AM    ALKPHOS 100 10/06/2023 12:00 AM    AST 15 10/06/2023 12:00 AM    ALT 14 10/06/2023 12:00 AM     Lipids:    Lab Results   Component Value Date/Time    TRIG 123 10/06/2023 12:00 AM    HDL 33 10/06/2023 12:00 AM    LDLCALC 119 10/06/2023 12:00 AM    LABVLDL 22 10/06/2023 12:00 AM     Microalbumin/Creatinine ratio:    Lab Results   Component Value Date/Time    MALBCR 15 04/14/2022 08:20 AM     PSA:   Lab Results   Component Value Date/Time    PSA 0.4 08/21/2023 12:00 AM           Assessment / Plan     Hypertension well controlled, on Lopressor   Hyperlipidemia uncontrolled, pt unable to tolerate statins. ICD-10-CM    1. Essential (primary) hypertension  I10 Comprehensive Metabolic Panel     Microalbumin / Creatinine Urine Ratio      2. Pure hypercholesterolemia, unspecified  E78.00 Lipid Panel      3. Prediabetes  R73.03 Borderline control with diet patient encouraged to continue to work on trying to lose weight by cutting back starches and sugar in his diet Hemoglobin A1C      4. Chronic midline low back pain without sciatica  M54.50 Controlled on Norco 10/325 3 times a day. We will do urine drug screen with next blood work  reviewed    G89.29       5. Chronic prescription opiate use  Z79.891       6.  Morbid (severe) obesity due to excess calories (HCC)  E66.01 Patient encouraged to cut back starches and sugar in his diet to lose weight and decrease his BMI which will also help his chronic low back pain                     Low

## 2023-10-25 NOTE — TELEPHONE ENCOUNTER
For Your Information   If you are in need of a medication refill, please use one of the following options. You can expect your medication to be filled within 2-3 business days.   1. Call your pharmacy for all medication refills and renewals.   2. [facility]- [email or web address]  3. Call your providers office    If your provider ordered any imaging for you today. Our pre-scheduling services will be reaching out to you within 2 business days to schedule this. Prescheduling Services can be reached by calling [phone]     If your provider ordered testing today, you will be notified of your lab results within 3-5 business days and imaging results within 7-14 business days, unless specified otherwise. If you have not received your results within the allotted business days please call your provider's office. Have you signed up for the [employer] Helene, if not please consider doing so to receive results on the helene as soon as they have been resulted by the system. [email or web address]     Medication Prior Authorizations may take up to 30 days for approval/denial.     You may be receiving a survey.  Please take the time to complete this, as your feedback is very important to us!  We strive to make your experience exceptional, and your comments help us with that goal.  We look forward to hearing from you!    For all future appointments please arrive 15 minutes prior to your scheduled visit.     Patient Contact Center Business Office: assistance with medical billing & financial inquires [phone]               Per the note, 3) He will increase his Neurontin 300 mg to 2 tabs TID, tapering up as directed, to better manage his symptoms. Please ensure he has done this. I can recommend OTC Tylenol and Motrin PRN and ice and heat. Can be offered a sooner FU.

## 2023-11-08 ENCOUNTER — TELEPHONE (OUTPATIENT)
Age: 68
End: 2023-11-08

## 2023-11-08 DIAGNOSIS — M54.50 CHRONIC MIDLINE LOW BACK PAIN WITHOUT SCIATICA: ICD-10-CM

## 2023-11-08 DIAGNOSIS — G89.29 CHRONIC MIDLINE LOW BACK PAIN WITHOUT SCIATICA: ICD-10-CM

## 2023-11-08 RX ORDER — HYDROCODONE BITARTRATE AND ACETAMINOPHEN 10; 325 MG/1; MG/1
1 TABLET ORAL EVERY 8 HOURS PRN
Qty: 90 TABLET | Refills: 0 | Status: SHIPPED | OUTPATIENT
Start: 2023-11-08 | End: 2023-12-08

## 2023-11-08 NOTE — TELEPHONE ENCOUNTER
Please send refill to [facility]    HYDROcodone-acetaminophen (NORCO)  MG per tablet 90 tablet 0 10/3/2023 11/2/2023    Sig - Route: Take 1 tablet by mouth every 8 hours as needed for Pain for up to 30 days.  Intended supply: 30 days Max Daily Amount: 3 tablets - Oral

## 2023-11-30 ENCOUNTER — HOSPITAL ENCOUNTER (OUTPATIENT)
Facility: HOSPITAL | Age: 68
Discharge: HOME | End: 2023-11-30
Payer: MEDICARE

## 2023-11-30 DIAGNOSIS — R31.0 GROSS HEMATURIA: ICD-10-CM

## 2023-11-30 LAB — CREAT UR-MCNC: 1.4 MG/DL (ref 0.6–1.3)

## 2023-11-30 PROCEDURE — 82565 ASSAY OF CREATININE: CPT

## 2023-11-30 PROCEDURE — 6360000004 HC RX CONTRAST MEDICATION: Performed by: PHYSICIAN ASSISTANT

## 2023-11-30 PROCEDURE — 74178 CT ABD&PLV WO CNTR FLWD CNTR: CPT | Performed by: PHYSICIAN ASSISTANT

## 2023-11-30 RX ADMIN — IOPAMIDOL 100 ML: 755 INJECTION, SOLUTION INTRAVENOUS at 11:05

## 2023-12-01 NOTE — RESULT ENCOUNTER NOTE
CT doesn't show any cause for blood in the urine. Follow up as scheduled. CT does show Avascular necrosis in the left femoral head of left leg. Advise to follow up with PCP as soon as possible.

## 2023-12-06 ENCOUNTER — OFFICE VISIT (OUTPATIENT)
Age: 68
End: 2023-12-06
Payer: MEDICARE

## 2023-12-06 VITALS
SYSTOLIC BLOOD PRESSURE: 131 MMHG | HEART RATE: 77 BPM | DIASTOLIC BLOOD PRESSURE: 61 MMHG | HEIGHT: 65 IN | OXYGEN SATURATION: 96 % | RESPIRATION RATE: 18 BRPM | TEMPERATURE: 98.2 F | WEIGHT: 259.8 LBS | BODY MASS INDEX: 43.28 KG/M2

## 2023-12-06 DIAGNOSIS — G89.29 OTHER CHRONIC PAIN: ICD-10-CM

## 2023-12-06 DIAGNOSIS — M25.552 BILATERAL HIP PAIN: ICD-10-CM

## 2023-12-06 DIAGNOSIS — M87.00 AVASCULAR NECROSIS OF BONE (HCC): ICD-10-CM

## 2023-12-06 DIAGNOSIS — M48.062 SPINAL STENOSIS, LUMBAR REGION WITH NEUROGENIC CLAUDICATION: Primary | ICD-10-CM

## 2023-12-06 DIAGNOSIS — M54.16 LUMBAR RADICULITIS: ICD-10-CM

## 2023-12-06 DIAGNOSIS — M25.551 BILATERAL HIP PAIN: ICD-10-CM

## 2023-12-06 PROCEDURE — 3075F SYST BP GE 130 - 139MM HG: CPT | Performed by: PHYSICAL MEDICINE & REHABILITATION

## 2023-12-06 PROCEDURE — 1123F ACP DISCUSS/DSCN MKR DOCD: CPT | Performed by: PHYSICAL MEDICINE & REHABILITATION

## 2023-12-06 PROCEDURE — 73502 X-RAY EXAM HIP UNI 2-3 VIEWS: CPT | Performed by: PHYSICAL MEDICINE & REHABILITATION

## 2023-12-06 PROCEDURE — 3078F DIAST BP <80 MM HG: CPT | Performed by: PHYSICAL MEDICINE & REHABILITATION

## 2023-12-06 PROCEDURE — 99214 OFFICE O/P EST MOD 30 MIN: CPT | Performed by: PHYSICAL MEDICINE & REHABILITATION

## 2023-12-06 PROCEDURE — 72110 X-RAY EXAM L-2 SPINE 4/>VWS: CPT | Performed by: PHYSICAL MEDICINE & REHABILITATION

## 2023-12-06 RX ORDER — UMECLIDINIUM BROMIDE AND VILANTEROL TRIFENATATE 62.5; 25 UG/1; UG/1
POWDER RESPIRATORY (INHALATION)
Qty: 60 EACH | Refills: 5 | Status: SHIPPED | OUTPATIENT
Start: 2023-12-06

## 2023-12-06 RX ORDER — PREGABALIN 150 MG/1
CAPSULE ORAL
Qty: 90 CAPSULE | Refills: 5 | Status: SHIPPED | OUTPATIENT
Start: 2023-12-06 | End: 2024-06-16

## 2023-12-06 NOTE — PROGRESS NOTES
Edgardo Jack presents today for   Chief Complaint   Patient presents with    Back Problem    Pain    Back Pain       Is someone accompanying this pt? nop    Is the patient using any DME equipment during OV? no    Depression Screening:       No data to display                Learning Assessment:  No flowsheet data found. Abuse Screening:       No data to display                Fall Risk  No flowsheet data found. OPIOID RISK TOOL  No flowsheet data found. Coordination of Care:  1. Have you been to the ER, urgent care clinic since your last visit? no  Hospitalized since your last visit? no    2. Have you seen or consulted any other health care providers outside of the [address] [place] [address] since your last visit? no Include any pap smears or colon screening.  no
Moderate  fecal retention throughout the colon. Normal appendix. Mild diverticular disease  with no focal diverticulitis. Aorta is normal in caliber. No adenopathy. Prostate is not enlarged. Images through pelvis degraded by right hip prosthesis  artifacts. Avascular necrosis in the left femoral head. Stress reaction, healing stress  fracture along the iliac crest and perhaps sacrum at the SI joint. Nonspecific  sclerosis at the left anterior pubic bones. Degenerative disease in the lumbar  spine. IMPRESSION:  1. No renal or ureteral calculi. Unremarkable collecting systems and ureters. No  renal mass or hydronephrosis. 2. Avascular necrosis in the left femoral head. Suspect infection, healing  stress fracture in the sacrum and iliac crests. 3. No acute findings in the abdomen or pelvis otherwise. Lumbar MRI 05/13/2022 was personally reviewed with pt and demonstrated:    Narrative   Sagittal and axial multisequence MR images of lumbar spine were obtained. HISTORY: Back pain and bilateral leg pain. COMPARISON: August 28, 2018       Trace anterior spondylolisthesis of L4, stable. Edema in upper L5 and inferior   L4, likely discogenic. Less severe edema in the L4 and L5 pedicles compared to   prior study. No compression deformity. Conus medullaris ends at L1-2 with normal   morphology and signal intensity. Paraspinal soft tissues unremarkable. L1-L2: No disc herniation, central or foraminal stenosis. L2-L3: Posterior disc bulge. Facet and ligamentous hypertrophy. Mild posterior   epidural fat. Overall, AP canal measures 12 mm. No central stenosis. Mild   bilateral foraminal stenosis. L3-L4: More severe posterior disc bulge, facet and ligamentous hypertrophy. AP   canal measures 9.3 mm, borderline mild central stenosis. No significant   foraminal stenosis. L4-L5: More severe posterior disc bulge with central disc protrusion.  More   severe facet and ligamentous

## 2023-12-22 ENCOUNTER — HOSPITAL ENCOUNTER (OUTPATIENT)
Facility: HOSPITAL | Age: 68
Discharge: HOME | End: 2023-12-25
Attending: PHYSICAL MEDICINE & REHABILITATION
Payer: MEDICARE

## 2023-12-22 DIAGNOSIS — M25.552 BILATERAL HIP PAIN: ICD-10-CM

## 2023-12-22 DIAGNOSIS — M48.062 SPINAL STENOSIS, LUMBAR REGION WITH NEUROGENIC CLAUDICATION: ICD-10-CM

## 2023-12-22 DIAGNOSIS — M54.16 LUMBAR RADICULITIS: ICD-10-CM

## 2023-12-22 DIAGNOSIS — M25.551 BILATERAL HIP PAIN: ICD-10-CM

## 2023-12-22 DIAGNOSIS — G89.29 OTHER CHRONIC PAIN: ICD-10-CM

## 2023-12-22 PROCEDURE — 72148 MRI LUMBAR SPINE W/O DYE: CPT

## 2024-01-09 ENCOUNTER — TELEPHONE (OUTPATIENT)
Age: 69
End: 2024-01-09

## 2024-01-09 DIAGNOSIS — G89.29 CHRONIC MIDLINE LOW BACK PAIN WITHOUT SCIATICA: ICD-10-CM

## 2024-01-09 DIAGNOSIS — M54.50 CHRONIC MIDLINE LOW BACK PAIN WITHOUT SCIATICA: ICD-10-CM

## 2024-01-09 RX ORDER — HYDROCODONE BITARTRATE AND ACETAMINOPHEN 10; 325 MG/1; MG/1
1 TABLET ORAL EVERY 8 HOURS PRN
Qty: 90 TABLET | Refills: 0 | Status: SHIPPED | OUTPATIENT
Start: 2024-01-09 | End: 2024-02-08

## 2024-01-09 NOTE — TELEPHONE ENCOUNTER
Tried to confirm if patient wants a refill on Hyslinga or Hydrocodone-Acetaminophen.     Voice mail is full.  Will try again later.

## 2024-01-09 NOTE — TELEPHONE ENCOUNTER
Please refill and send to [facility] [address] [place] [address] [address] [facility] - P [phone] - F [phone] [[id number]]     HYDROcodone (HYSINGLA ER) 60 MG extended release tablet

## 2024-01-12 ENCOUNTER — OFFICE VISIT (OUTPATIENT)
Age: 69
End: 2024-01-12
Payer: MEDICARE

## 2024-01-12 VITALS — HEIGHT: 65 IN | BODY MASS INDEX: 43.23 KG/M2

## 2024-01-12 DIAGNOSIS — M25.552 CHRONIC LEFT HIP PAIN: ICD-10-CM

## 2024-01-12 DIAGNOSIS — M25.551 BILATERAL HIP PAIN: Primary | ICD-10-CM

## 2024-01-12 DIAGNOSIS — M54.16 LUMBAR RADICULOPATHY: ICD-10-CM

## 2024-01-12 DIAGNOSIS — R20.0 BILATERAL LEG NUMBNESS: ICD-10-CM

## 2024-01-12 DIAGNOSIS — M48.062 SPINAL STENOSIS, LUMBAR REGION WITH NEUROGENIC CLAUDICATION: ICD-10-CM

## 2024-01-12 DIAGNOSIS — M25.552 BILATERAL HIP PAIN: Primary | ICD-10-CM

## 2024-01-12 DIAGNOSIS — G89.29 CHRONIC LEFT HIP PAIN: ICD-10-CM

## 2024-01-12 DIAGNOSIS — Z96.641 PRESENCE OF ARTIFICIAL HIP, RIGHT: ICD-10-CM

## 2024-01-12 DIAGNOSIS — M87.00 AVASCULAR NECROSIS OF BONE (HCC): ICD-10-CM

## 2024-01-12 DIAGNOSIS — M16.12 PRIMARY OSTEOARTHRITIS OF LEFT HIP: ICD-10-CM

## 2024-01-12 PROCEDURE — 1123F ACP DISCUSS/DSCN MKR DOCD: CPT | Performed by: ORTHOPAEDIC SURGERY

## 2024-01-12 PROCEDURE — 72170 X-RAY EXAM OF PELVIS: CPT | Performed by: ORTHOPAEDIC SURGERY

## 2024-01-12 PROCEDURE — 99214 OFFICE O/P EST MOD 30 MIN: CPT | Performed by: ORTHOPAEDIC SURGERY

## 2024-01-12 NOTE — PROGRESS NOTES
Unstable Housing in the Last Year: Patient refused       Ht 1.651 m (5' 5\")   BMI 43.23 kg/m²       PHYSICAL EXAMINATION:  GENERAL: Alert and oriented x3, in no acute distress, well-developed, well-nourished, afebrile.  HEART: No JVD.  EYES: No scleral icterus   NECK: No significant lymphadenopathy   LUNGS: No respiratory compromise or indrawing  ABDOMEN: Soft, non-tender, non-distended.         Note: This note was completed using voice recognition software. Any typographical/name errors or mistakes are unintentional.    Electronically signed by: Melly Augustin MA

## 2024-01-23 ENCOUNTER — OFFICE VISIT (OUTPATIENT)
Age: 69
End: 2024-01-23
Payer: MEDICARE

## 2024-01-23 VITALS
HEIGHT: 65 IN | DIASTOLIC BLOOD PRESSURE: 58 MMHG | WEIGHT: 259 LBS | BODY MASS INDEX: 43.15 KG/M2 | HEART RATE: 59 BPM | OXYGEN SATURATION: 95 % | SYSTOLIC BLOOD PRESSURE: 139 MMHG

## 2024-01-23 DIAGNOSIS — R20.0 BILATERAL LEG NUMBNESS: ICD-10-CM

## 2024-01-23 DIAGNOSIS — M48.062 SPINAL STENOSIS, LUMBAR REGION WITH NEUROGENIC CLAUDICATION: ICD-10-CM

## 2024-01-23 DIAGNOSIS — G89.29 OTHER CHRONIC PAIN: ICD-10-CM

## 2024-01-23 DIAGNOSIS — R93.7 ABNORMAL MRI, LUMBAR SPINE: Primary | ICD-10-CM

## 2024-01-23 DIAGNOSIS — M54.16 LUMBAR RADICULITIS: ICD-10-CM

## 2024-01-23 PROCEDURE — 3078F DIAST BP <80 MM HG: CPT | Performed by: PHYSICAL MEDICINE & REHABILITATION

## 2024-01-23 PROCEDURE — 99214 OFFICE O/P EST MOD 30 MIN: CPT | Performed by: PHYSICAL MEDICINE & REHABILITATION

## 2024-01-23 PROCEDURE — 3075F SYST BP GE 130 - 139MM HG: CPT | Performed by: PHYSICAL MEDICINE & REHABILITATION

## 2024-01-23 PROCEDURE — 1123F ACP DISCUSS/DSCN MKR DOCD: CPT | Performed by: PHYSICAL MEDICINE & REHABILITATION

## 2024-01-23 RX ORDER — PREGABALIN 150 MG/1
CAPSULE ORAL
Qty: 90 CAPSULE | Refills: 5 | Status: SHIPPED | OUTPATIENT
Start: 2024-01-23 | End: 2024-08-03

## 2024-01-23 NOTE — PROGRESS NOTES
[facility]  [address] [place] [address]., [address]  [place] [address]  Phone: [phone]  Fax: [phone]    Pt's YOB: 1955    ASSESSMENT   Lino was seen today for lower back pain.    Diagnoses and all orders for this visit:    Abnormal MRI, lumbar spine  -     MRI PELVIS WO CONTRAST; Future  -     Amb External Referral To Spine Surgery    Spinal stenosis, lumbar region with neurogenic claudication  -     pregabalin (LYRICA) 150 MG capsule; TAKE 1 CAPSULE BY MOUTH THREE TIMES DAILY . DO NOT EXCEED 3 PER 24 HOURS  -     Ambulatory Referral to Ortho Injection  -     Amb External Referral To Spine Surgery    Lumbar radiculitis  -     pregabalin (LYRICA) 150 MG capsule; TAKE 1 CAPSULE BY MOUTH THREE TIMES DAILY . DO NOT EXCEED 3 PER 24 HOURS  -     Amb External Referral To Spine Surgery    Bilateral leg numbness    Other chronic pain         IMPRESSION AND PLAN:  Lino Vega is a 68 y.o. male with history of lumbar pain and presents to the office today for medication follow up. Pt continues to complain of lumbar pain with radicular symptoms into both legs, bilateral leg weakness, bilateral foot numbness, intermittent paresthesia, and muscle tightness in the lumbar region; worse since his last office visit. He reports previously undergoing PT without improvement. Per his last note, he underwent a right L4 SNRB administered on 05/23/2023 by Dr. Mckenna Martinez MD with minimal relief and has undergone a L3 epidural on January 31 with relief. Pt desires to undergo a lumbar spine MRI to assess worsening for spinal stenosis, impingement and inflammation and to follow up with Dr. Gordon Nelson for his hips. He is taking Lyrica 150 mg TID for neuropathic symptoms.      1) Scheduled pt for an L3-4 MILAN for lumbar radicular symptoms and neurogenic claudication  2) RF Lyrica 150 mg TID for neuropathic symptoms  3) Ordered MRI Pelvis to s/o insufficiency fracture.   4) Referral

## 2024-01-23 NOTE — H&P (VIEW-ONLY)
calyces and ureters,  within normal configuration. No intraluminal mass lesions or obstruction.  Bladder is grossly unremarkable.     Lung bases are clear.     Liver, gallbladder, spleen, pancreas and adrenal glands are unremarkable. No  ascites or free air. Small hiatal hernia. No small bowel distention. Moderate  fecal retention throughout the colon. Normal appendix. Mild diverticular disease  with no focal diverticulitis.     Aorta is normal in caliber. No adenopathy.     Prostate is not enlarged. Images through pelvis degraded by right hip prosthesis  artifacts.     Avascular necrosis in the left femoral head. Stress reaction, healing stress  fracture along the iliac crest and perhaps sacrum at the SI joint. Nonspecific  sclerosis at the left anterior pubic bones. Degenerative disease in the lumbar  spine.     IMPRESSION:  1. No renal or ureteral calculi. Unremarkable collecting systems and ureters. No  renal mass or hydronephrosis.  2. Avascular necrosis in the left femoral head. Suspect infection, healing  stress fracture in the sacrum and iliac crests.  3. No acute findings in the abdomen or pelvis otherwise.    Written by Maida Mackey, as dictated by Mary Hines MD.  I, Dr. Mary Hines confirm that all documentation is accurate.

## 2024-01-30 RX ORDER — FLUTICASONE PROPIONATE 50 MCG
SPRAY, SUSPENSION (ML) NASAL
Qty: 16 G | Refills: 3 | Status: SHIPPED | OUTPATIENT
Start: 2024-01-30

## 2024-01-31 ENCOUNTER — TELEPHONE (OUTPATIENT)
Age: 69
End: 2024-01-31

## 2024-01-31 NOTE — TELEPHONE ENCOUNTER
----- Message from Niyah Barajas sent at 1/30/2024  9:45 AM EST -----  Subject: Message to Provider    QUESTIONS  Information for Provider? PT WOULD LIKE TO RESCHEDULE LAB APPOINTMENT  ---------------------------------------------------------------------------  --------------  CALL BACK INFO  [phone]; OK to leave message on voicemail  ---------------------------------------------------------------------------  --------------  SCRIPT ANSWERS  Relationship to Patient? Self

## 2024-02-03 ENCOUNTER — HOSPITAL ENCOUNTER (OUTPATIENT)
Facility: HOSPITAL | Age: 69
End: 2024-02-03
Attending: PHYSICAL MEDICINE & REHABILITATION
Payer: MEDICARE

## 2024-02-03 DIAGNOSIS — R93.7 ABNORMAL MRI, LUMBAR SPINE: ICD-10-CM

## 2024-02-03 PROCEDURE — 72195 MRI PELVIS W/O DYE: CPT

## 2024-02-06 ENCOUNTER — TELEPHONE (OUTPATIENT)
Age: 69
End: 2024-02-06

## 2024-02-06 ENCOUNTER — HOSPITAL ENCOUNTER (OUTPATIENT)
Facility: HOSPITAL | Age: 69
Discharge: HOME | End: 2024-02-09
Payer: MEDICARE

## 2024-02-06 VITALS
SYSTOLIC BLOOD PRESSURE: 145 MMHG | TEMPERATURE: 98.6 F | OXYGEN SATURATION: 95 % | DIASTOLIC BLOOD PRESSURE: 88 MMHG | RESPIRATION RATE: 16 BRPM | HEART RATE: 60 BPM

## 2024-02-06 PROCEDURE — 62323 NJX INTERLAMINAR LMBR/SAC: CPT | Performed by: PHYSICAL MEDICINE & REHABILITATION

## 2024-02-06 PROCEDURE — 6360000002 HC RX W HCPCS: Performed by: PHYSICAL MEDICINE & REHABILITATION

## 2024-02-06 PROCEDURE — 2500000003 HC RX 250 WO HCPCS: Performed by: PHYSICAL MEDICINE & REHABILITATION

## 2024-02-06 PROCEDURE — 6370000000 HC RX 637 (ALT 250 FOR IP): Performed by: PHYSICAL MEDICINE & REHABILITATION

## 2024-02-06 PROCEDURE — 6360000004 HC RX CONTRAST MEDICATION: Performed by: PHYSICAL MEDICINE & REHABILITATION

## 2024-02-06 PROCEDURE — 62323 NJX INTERLAMINAR LMBR/SAC: CPT

## 2024-02-06 RX ORDER — IOPAMIDOL 408 MG/ML
4 INJECTION, SOLUTION INTRATHECAL
Status: COMPLETED | OUTPATIENT
Start: 2024-02-06 | End: 2024-02-06

## 2024-02-06 RX ORDER — DIAZEPAM 5 MG/1
2.5 TABLET ORAL ONCE
Status: COMPLETED | OUTPATIENT
Start: 2024-02-06 | End: 2024-02-06

## 2024-02-06 RX ORDER — LIDOCAINE HYDROCHLORIDE 10 MG/ML
30 INJECTION, SOLUTION EPIDURAL; INFILTRATION; INTRACAUDAL; PERINEURAL ONCE
Status: COMPLETED | OUTPATIENT
Start: 2024-02-06 | End: 2024-02-06

## 2024-02-06 RX ORDER — DIAZEPAM 5 MG/1
10 TABLET ORAL ONCE
Status: COMPLETED | OUTPATIENT
Start: 2024-02-06 | End: 2024-02-06

## 2024-02-06 RX ORDER — DEXAMETHASONE SODIUM PHOSPHATE 10 MG/ML
10 INJECTION, SOLUTION INTRAMUSCULAR; INTRAVENOUS ONCE
Status: COMPLETED | OUTPATIENT
Start: 2024-02-06 | End: 2024-02-06

## 2024-02-06 RX ORDER — DIAZEPAM 5 MG/1
5 TABLET ORAL ONCE
Status: COMPLETED | OUTPATIENT
Start: 2024-02-06 | End: 2024-02-06

## 2024-02-06 RX ADMIN — DEXAMETHASONE SODIUM PHOSPHATE 10 MG: 10 INJECTION, SOLUTION INTRAMUSCULAR; INTRAVENOUS at 09:06

## 2024-02-06 RX ADMIN — IOPAMIDOL 1 ML: 408 INJECTION, SOLUTION INTRATHECAL at 09:05

## 2024-02-06 RX ADMIN — LIDOCAINE HYDROCHLORIDE 14 ML: 10 INJECTION, SOLUTION EPIDURAL; INFILTRATION; INTRACAUDAL; PERINEURAL at 08:59

## 2024-02-06 RX ADMIN — DIAZEPAM 5 MG: 5 TABLET ORAL at 08:42

## 2024-02-06 ASSESSMENT — PAIN DESCRIPTION - DESCRIPTORS: DESCRIPTORS: CRAMPING;TIGHTNESS

## 2024-02-06 ASSESSMENT — PAIN SCALES - GENERAL: PAINLEVEL_OUTOF10: 0

## 2024-02-06 ASSESSMENT — PAIN - FUNCTIONAL ASSESSMENT: PAIN_FUNCTIONAL_ASSESSMENT: 0-10

## 2024-02-06 NOTE — TELEPHONE ENCOUNTER
Patient is aware there is not an opening for labs.  Patient states he will go to  to have labs done.

## 2024-02-06 NOTE — PROCEDURES
Intralaminar Epidural Steroid Procedure Note        Patient Name   Lino Vega  Date of Procedure: February 6, 2024  Preoperative Diagnosis: Lumbar spinal stenosis with claudication  Postoperative Diagnosis: Same  Location [facility], [place], [place]      Procedure:  Epidural Steroid Injection    Consent:  Informed consent was obtained prior to the procedure.  In addition to the potential risks associated with the procedure itself, the patient was informed both verbally and in writing of the potential side effects of the use of glucocorticoid.  The patient appeared to comprehend the informed consent and desired to have the procedure performed.      Procedure in Detail:  The patient was taken to the procedure suite and placed in the prone position on the operating table on appropriate padding. The posterior lumbar region was prepped and draped in the usual sterile fashion. Intraoperative fluoroscopy was used to localize the L3-L4 interspace. The skin was infiltrated with 1% lidocaine. An 18-gauge standard #6 spinal Tuohy needle was advanced into the epidural space at L3-L4 under fluoroscopic guidance using the loss of resistance technique. No cerebrospinal fluid was seen throughout the procedure.     Yes  A small amount of Isovue was injected into the epidural space, confirming appropriated needle placement on fluoroscopy. No vascular uptake was identified.    Next, 2ml of 1% Lidocaine and 10mg of preservative free Dexamethasone were injected via the Tuohy needle. The needle was removed from the patient.     The patient tolerated the procedure well and was discharged home with designated  and care instructions. Denies headache. No bleeding from injection site. Hip flexor strength intact. Straight leg raise negative. Patient reported buffy-procedural pain on Visual Analog Scale:  pre-8; post-0.      Signed By: GENEVIEVE BROWN MD                      February 6,

## 2024-02-06 NOTE — TELEPHONE ENCOUNTER
----- Message from Kamilah Mabry sent at 2/6/2024 12:35 PM EST -----  Subject: Message to Provider    QUESTIONS  Information for Provider? patient needs to reschedule lab visit that was   on 02/09/24, please contact patient to advise.  ---------------------------------------------------------------------------  --------------  CALL BACK INFO  [phone]; OK to leave message on voicemail  ---------------------------------------------------------------------------  --------------  SCRIPT ANSWERS  Relationship to Patient? Self  (Is the patient requesting to see the provider for a procedure?)? Yes

## 2024-02-06 NOTE — INTERVAL H&P NOTE
Update History & Physical    The patient's History and Physical of January 23, 2023 was reviewed. There was no change. The surgical site was confirmed by the patient and me.     Plan: The risks, benefits, expected outcome, and alternative to the recommended procedure have been discussed with the patient. Patient understands and wants to proceed with the procedure.     Electronically signed by GENEVIEVE BROWN MD on 2/6/2024 at 8:53 AM

## 2024-02-06 NOTE — TELEPHONE ENCOUNTER
----- Message from Iris Diaz sent at 2/6/2024  3:49 PM EST -----  Subject: Message to Provider    QUESTIONS  Information for Provider? Pt returning a call to Ignacia Soria to   reschedule lab from 2/9. unable to reach office for transfer. please   follow up again. pt would like to schedule for before his appt on 2/13 if   possible  ---------------------------------------------------------------------------  --------------  CALL BACK INFO  [phone]; OK to leave message on voicemail  ---------------------------------------------------------------------------  --------------  SCRIPT ANSWERS  undefined

## 2024-02-07 ENCOUNTER — HOSPITAL ENCOUNTER (OUTPATIENT)
Facility: HOSPITAL | Age: 69
Discharge: HOME | End: 2024-02-10

## 2024-02-07 LAB — LABCORP SPECIMEN COLLECTION: NORMAL

## 2024-02-09 LAB
ALBUMIN/CREAT UR: 17 MG/G CREAT (ref 0–29)
CREAT UR-MCNC: 171.3 MG/DL
DRUGS UR: NORMAL
HBA1C MFR BLD: 5.9 % (ref 4.8–5.6)
MICROALBUMIN UR-MCNC: 29.6 UG/ML

## 2024-02-10 LAB
ALBUMIN SERPL-MCNC: 4 G/DL (ref 3.9–4.9)
ALBUMIN/GLOB SERPL: 1.3 {RATIO} (ref 1.2–2.2)
ALP SERPL-CCNC: 99 IU/L (ref 44–121)
ALT SERPL-CCNC: 16 IU/L (ref 0–44)
AST SERPL-CCNC: 17 IU/L (ref 0–40)
BILIRUB SERPL-MCNC: 0.2 MG/DL (ref 0–1.2)
BUN SERPL-MCNC: 14 MG/DL (ref 8–27)
BUN/CREAT SERPL: 11 (ref 10–24)
CALCIUM SERPL-MCNC: 9.1 MG/DL (ref 8.6–10.2)
CHLORIDE SERPL-SCNC: 107 MMOL/L (ref 96–106)
CHOLEST SERPL-MCNC: 183 MG/DL (ref 100–199)
CO2 SERPL-SCNC: 17 MMOL/L (ref 20–29)
CREAT SERPL-MCNC: 1.28 MG/DL (ref 0.76–1.27)
EGFRCR SERPLBLD CKD-EPI 2021: 60 ML/MIN/1.73
GLOBULIN SER CALC-MCNC: 3.2 G/DL (ref 1.5–4.5)
GLUCOSE SERPL-MCNC: 95 MG/DL (ref 70–99)
HDLC SERPL-MCNC: 38 MG/DL
LDLC SERPL CALC-MCNC: 134 MG/DL (ref 0–99)
POTASSIUM SERPL-SCNC: 4.5 MMOL/L (ref 3.5–5.2)
PROT SERPL-MCNC: 7.2 G/DL (ref 6–8.5)
SODIUM SERPL-SCNC: 142 MMOL/L (ref 134–144)
TRIGL SERPL-MCNC: 56 MG/DL (ref 0–149)
VLDLC SERPL CALC-MCNC: 11 MG/DL (ref 5–40)

## 2024-02-13 DIAGNOSIS — I10 ESSENTIAL (PRIMARY) HYPERTENSION: ICD-10-CM

## 2024-02-13 DIAGNOSIS — G89.29 CHRONIC MIDLINE LOW BACK PAIN WITHOUT SCIATICA: ICD-10-CM

## 2024-02-13 DIAGNOSIS — Z79.891 CHRONIC PRESCRIPTION OPIATE USE: ICD-10-CM

## 2024-02-13 DIAGNOSIS — M54.50 CHRONIC MIDLINE LOW BACK PAIN WITHOUT SCIATICA: ICD-10-CM

## 2024-02-13 DIAGNOSIS — E78.00 PURE HYPERCHOLESTEROLEMIA, UNSPECIFIED: ICD-10-CM

## 2024-02-13 DIAGNOSIS — R73.03 PREDIABETES: ICD-10-CM

## 2024-02-14 ENCOUNTER — TELEPHONE (OUTPATIENT)
Age: 69
End: 2024-02-14

## 2024-02-14 DIAGNOSIS — G89.29 CHRONIC MIDLINE LOW BACK PAIN WITHOUT SCIATICA: ICD-10-CM

## 2024-02-14 DIAGNOSIS — M54.50 CHRONIC MIDLINE LOW BACK PAIN WITHOUT SCIATICA: ICD-10-CM

## 2024-02-14 RX ORDER — HYDROCODONE BITARTRATE AND ACETAMINOPHEN 10; 325 MG/1; MG/1
1 TABLET ORAL EVERY 8 HOURS PRN
Qty: 90 TABLET | Refills: 0 | Status: SHIPPED | OUTPATIENT
Start: 2024-02-14 | End: 2024-03-15

## 2024-02-14 NOTE — TELEPHONE ENCOUNTER
Please refill and send to [facility] [address] [place] [address] [address] [facility] - P [phone] - F [phone] [[id number]]     HYDROcodone-acetaminophen (NORCO)  MG per tablet

## 2024-03-18 ENCOUNTER — TELEPHONE (OUTPATIENT)
Age: 69
End: 2024-03-18

## 2024-03-18 DIAGNOSIS — G89.29 CHRONIC MIDLINE LOW BACK PAIN WITHOUT SCIATICA: Primary | ICD-10-CM

## 2024-03-18 DIAGNOSIS — M54.50 CHRONIC MIDLINE LOW BACK PAIN WITHOUT SCIATICA: Primary | ICD-10-CM

## 2024-03-18 DIAGNOSIS — M54.42 LOW BACK PAIN WITH LEFT-SIDED SCIATICA, UNSPECIFIED BACK PAIN LATERALITY, UNSPECIFIED CHRONICITY: ICD-10-CM

## 2024-03-18 RX ORDER — HYDROCODONE BITARTRATE AND ACETAMINOPHEN 5; 325 MG/1; MG/1
1 TABLET ORAL EVERY 8 HOURS PRN
Qty: 90 TABLET | Refills: 0 | Status: SHIPPED | OUTPATIENT
Start: 2024-03-18 | End: 2024-04-17

## 2024-03-18 NOTE — TELEPHONE ENCOUNTER
----- Message from Mary Carmen Guillen sent at 3/18/2024  3:03 PM EDT -----  Subject: Refill Request    QUESTIONS  Name of Medication? HYDROcodone-acetaminophen (NORCO)  MG per tablet  Patient-reported dosage and instructions? Take 1 tablet by mouth every 8   hours as needed for Pain for up to 30 days. Intended supply? 30 days Max   Daily Amount? 3 tablets  How many days do you have left? 14  Preferred Pharmacy? [facility]  Pharmacy phone number (if available)? [phone]  ---------------------------------------------------------------------------  --------------  CALL BACK INFO  What is the best way for the office to contact you? OK to leave message on   voicemail  Preferred Call Back Phone Number? [phone]  ---------------------------------------------------------------------------  --------------  SCRIPT ANSWERS  Relationship to Patient? Self

## 2024-03-18 NOTE — TELEPHONE ENCOUNTER
[facility]14/2024    The last fill date for Hydrocodone-Acetamin  Mg  was 2/15/2024 for a 30 d/s qty 90      Last UDS: 2/7/2024    CSA Last signed: 3/31/2023        PCP: Doni Ross MD    Last appt: 10/13/2023  Future Appointments   Date Time Provider Department Center   4/1/2024  8:40 AM Mary Hines MD [facility] BS AMB   4/2/2024  8:40 AM Doni Ross MD [facility] BS AMB   4/3/2024  8:20 AM Doni Ross MD [facility] BS AMB   7/12/2024  8:15 AM Gordon Nelson MD [facility] BS AMB

## 2024-04-01 ENCOUNTER — OFFICE VISIT (OUTPATIENT)
Age: 69
End: 2024-04-01
Payer: MEDICARE

## 2024-04-01 VITALS
SYSTOLIC BLOOD PRESSURE: 119 MMHG | WEIGHT: 264.2 LBS | OXYGEN SATURATION: 100 % | DIASTOLIC BLOOD PRESSURE: 60 MMHG | BODY MASS INDEX: 44.02 KG/M2 | RESPIRATION RATE: 18 BRPM | HEIGHT: 65 IN | TEMPERATURE: 98.3 F | HEART RATE: 62 BPM

## 2024-04-01 DIAGNOSIS — M48.062 SPINAL STENOSIS, LUMBAR REGION WITH NEUROGENIC CLAUDICATION: Primary | ICD-10-CM

## 2024-04-01 DIAGNOSIS — M54.16 LUMBAR RADICULITIS: ICD-10-CM

## 2024-04-01 DIAGNOSIS — G89.29 OTHER CHRONIC PAIN: ICD-10-CM

## 2024-04-01 DIAGNOSIS — Z79.891 LONG TERM (CURRENT) USE OF OPIATE ANALGESIC: ICD-10-CM

## 2024-04-01 DIAGNOSIS — M87.052 AVASCULAR NECROSIS OF BONE OF HIP, LEFT (HCC): ICD-10-CM

## 2024-04-01 DIAGNOSIS — Z96.641 PRESENCE OF ARTIFICIAL HIP, RIGHT: ICD-10-CM

## 2024-04-01 DIAGNOSIS — M47.818 ARTHRITIS OF SACROILIAC JOINT OF BOTH SIDES: ICD-10-CM

## 2024-04-01 PROCEDURE — 99214 OFFICE O/P EST MOD 30 MIN: CPT | Performed by: PHYSICAL MEDICINE & REHABILITATION

## 2024-04-01 PROCEDURE — 1123F ACP DISCUSS/DSCN MKR DOCD: CPT | Performed by: PHYSICAL MEDICINE & REHABILITATION

## 2024-04-01 PROCEDURE — 3078F DIAST BP <80 MM HG: CPT | Performed by: PHYSICAL MEDICINE & REHABILITATION

## 2024-04-01 PROCEDURE — 3074F SYST BP LT 130 MM HG: CPT | Performed by: PHYSICAL MEDICINE & REHABILITATION

## 2024-04-01 NOTE — PROGRESS NOTES
Lino Vega presents today for   Chief Complaint   Patient presents with    Back Problem    Pain    Back Pain       Is someone accompanying this pt? no    Is the patient using any DME equipment during OV? no    Depression Screening:       No data to display                Learning Assessment:  Failed to redirect to the Timeline version of the [employer] SmartLink.    Abuse Screening:       No data to display                Fall Risk  Failed to redirect to the Timeline version of the [employer] Smart[employer].    OPIOID RISK TOOL  Failed to redirect to the Timeline version of the [employer] Smart[employer].    Coordination of Care:  1. Have you been to the ER, urgent care clinic since your last visit? no  Hospitalized since your last visit? no    2. Have you seen or consulted any other health care providers outside of the [facility] since your last visit? no Include any pap smears or colon screening. no      
leg  Other reaction(s): muscle/joint pain         REVIEW OF SYSTEMS    Constitutional: Negative for fever, chills, or weight change.   Respiratory: Negative for cough or shortness of breath.     Cardiovascular: Negative for chest pain or palpitations.  Gastrointestinal: Negative for acid reflux, change in bowel habits, or constipation.  Genitourinary: Negative for dysuria and flank pain.   Musculoskeletal: Positive for lumbar pain and BLE pain.  Skin: Negative for rash.   Neurological: Negative for headaches or dizziness. Positive for leg weakness and numbness in both feet.  Endo/Heme/Allergies: Negative for increased bruising.   Psychiatric/Behavioral: Negative for difficulty with sleep.    As per HPI    PHYSICAL EXAMINATION  /60 (Site: Left Upper Arm, Position: Sitting, Cuff Size: Medium Adult)   Pulse 62   Temp 98.3 °F (36.8 °C) (Oral)   Resp 18   Ht 1.651 m (5' 5\")   Wt 119.8 kg (264 lb 3.2 oz)   SpO2 100% Comment: RA  BMI 43.97 kg/m²     Constitutional: Awake, alert, and in no acute distress.  Neurological: 1+ symmetrical DTRs in the upper extremities. 1+ symmetrical DTRs in the lower extremities. Sensation to light touch is intact. Negative Rogers's sign bilaterally.  Skin: warm, dry, and intact.   Musculoskeletal: Moderate pain with extension, axial loading, less with forward flexion. Mild pain with internal or external rotation of his hips. Negative straight leg raise bilaterally.       Biceps  Triceps Deltoids Wrist Ext Wrist Flex Hand Intrin   Right +4/5 +4/5 +4/5 +4/5 +4/5 +4/5   Left +4/5 +4/5 +4/5 +4/5 +4/5 +4/5      Hip Flex  Quads Hamstrings Ankle DF EHL Ankle PF   Right +4/5 +4/5 +4/5 +4/5 +4/5 +4/5   Left +4/5 +4/5 +4/5 +4/5 +4/5 +4/5     IMAGING:  MRI Pelvis from 02/05/24 was personally reviewed with the patient and demonstrated:  Narrative & Impression  EXAM: MRI PELVIS WO CONTRAST     HISTORY: Abnormal findings on diagnostic imaging of other parts of  musculoskeletal system

## 2024-04-19 DIAGNOSIS — G89.29 CHRONIC MIDLINE LOW BACK PAIN WITHOUT SCIATICA: Primary | ICD-10-CM

## 2024-04-19 DIAGNOSIS — M54.50 CHRONIC MIDLINE LOW BACK PAIN WITHOUT SCIATICA: Primary | ICD-10-CM

## 2024-04-21 RX ORDER — HYDROCODONE BITARTRATE AND ACETAMINOPHEN 5; 325 MG/1; MG/1
1 TABLET ORAL EVERY 4 HOURS PRN
Qty: 90 TABLET | Refills: 0 | Status: SHIPPED | OUTPATIENT
Start: 2024-04-21 | End: 2024-05-21

## 2024-05-01 ENCOUNTER — OFFICE VISIT (OUTPATIENT)
Age: 69
End: 2024-05-01
Payer: MEDICARE

## 2024-05-01 VITALS
DIASTOLIC BLOOD PRESSURE: 71 MMHG | OXYGEN SATURATION: 97 % | TEMPERATURE: 98.7 F | SYSTOLIC BLOOD PRESSURE: 134 MMHG | HEIGHT: 65 IN | BODY MASS INDEX: 42.65 KG/M2 | RESPIRATION RATE: 20 BRPM | HEART RATE: 57 BPM | WEIGHT: 256 LBS

## 2024-05-01 DIAGNOSIS — E66.01 MORBID (SEVERE) OBESITY DUE TO EXCESS CALORIES (HCC): ICD-10-CM

## 2024-05-01 DIAGNOSIS — H40.1133 PRIMARY OPEN ANGLE GLAUCOMA (POAG) OF BOTH EYES, SEVERE STAGE: ICD-10-CM

## 2024-05-01 DIAGNOSIS — E78.00 PURE HYPERCHOLESTEROLEMIA, UNSPECIFIED: ICD-10-CM

## 2024-05-01 DIAGNOSIS — I10 ESSENTIAL (PRIMARY) HYPERTENSION: Primary | ICD-10-CM

## 2024-05-01 DIAGNOSIS — R73.03 PREDIABETES: ICD-10-CM

## 2024-05-01 DIAGNOSIS — J43.2 CENTRILOBULAR EMPHYSEMA (HCC): ICD-10-CM

## 2024-05-01 PROCEDURE — 3075F SYST BP GE 130 - 139MM HG: CPT | Performed by: INTERNAL MEDICINE

## 2024-05-01 PROCEDURE — 3078F DIAST BP <80 MM HG: CPT | Performed by: INTERNAL MEDICINE

## 2024-05-01 PROCEDURE — 1123F ACP DISCUSS/DSCN MKR DOCD: CPT | Performed by: INTERNAL MEDICINE

## 2024-05-01 PROCEDURE — 99214 OFFICE O/P EST MOD 30 MIN: CPT | Performed by: INTERNAL MEDICINE

## 2024-05-01 NOTE — PROGRESS NOTES
Lino Vega presents today for   Chief Complaint   Patient presents with    Pre-op Exam     Xen Stent   Dr. Merritt   May 8, 2024 and May 15, 2024           \"Have you been to the ER, urgent care clinic since your last visit?  Hospitalized since your last visit?\"    NO    “Have you seen or consulted any other health care providers outside of [facility] since your last visit?”    Yes, Dr. Merritt opt.             
Ellipta and albuterol       5. Morbid (severe) obesity due to excess calories (HCC)  E66.01 Patient encouraged to continue to work on trying to lose weight by cutting back sugars and starches in his diet      6. Primary open angle glaucoma (POAG) of both eyes, severe stage  H40.1133 Patient medically cleared for eye surgery above         JENNIFER FENG MD   5/1/2024

## 2024-05-29 DIAGNOSIS — M54.50 CHRONIC MIDLINE LOW BACK PAIN WITHOUT SCIATICA: ICD-10-CM

## 2024-05-29 DIAGNOSIS — G89.29 CHRONIC MIDLINE LOW BACK PAIN WITHOUT SCIATICA: ICD-10-CM

## 2024-05-29 RX ORDER — HYDROCODONE BITARTRATE AND ACETAMINOPHEN 5; 325 MG/1; MG/1
1 TABLET ORAL EVERY 4 HOURS PRN
Qty: 90 TABLET | Refills: 0 | Status: SHIPPED | OUTPATIENT
Start: 2024-05-29 | End: 2024-06-28

## 2024-05-29 RX ORDER — PANTOPRAZOLE SODIUM 40 MG/1
TABLET, DELAYED RELEASE ORAL
Qty: 30 TABLET | Refills: 5 | Status: SHIPPED | OUTPATIENT
Start: 2024-05-29

## 2024-05-29 RX ORDER — HYDROCODONE BITARTRATE AND ACETAMINOPHEN 5; 325 MG/1; MG/1
1 TABLET ORAL EVERY 4 HOURS PRN
Refills: 0 | OUTPATIENT
Start: 2024-05-29

## 2024-05-29 RX ORDER — HYDROXYZINE PAMOATE 25 MG/1
CAPSULE ORAL
Qty: 60 CAPSULE | Refills: 0 | Status: SHIPPED | OUTPATIENT
Start: 2024-05-29

## 2024-05-29 NOTE — TELEPHONE ENCOUNTER
[facility] 5/29/2024    The last fill date for Hydrocodone-Acetamin 5-325 Mg  was 4/22/2024 for a 15 d/s qty 90      Last UDS:completed     CSA Last signed: 4/3/2024        PCP: Doni Ross MD    Last appt:  5/1/2024  Future Appointments   Date Time Provider Department Center   7/1/2024  9:20 AM Mary Hines MD [facility] BS AMB   7/12/2024  8:15 AM Gordon Nelson MD [facility]   7/26/2024  9:15 AM [facility] LAB VISIT [facility] BS AMB   8/2/2024  8:20 AM Doni Ross MD [facility] BS AMB       Requested Prescriptions     Pending Prescriptions Disp Refills    pantoprazole (PROTONIX) 40 MG tablet 30 tablet 5     Sig: Take 1 tablet by mouth once daily    hydrOXYzine pamoate (VISTARIL) 25 MG capsule 60 capsule 0     Sig: TAKE 1 CAPSULE BY MOUTH THREE TIMES DAILY AS NEEDED FOR ITCHING    HYDROcodone-acetaminophen (NORCO) 5-325 MG per tablet  0     Sig: Take 1 tablet by mouth every 4 hours as needed for Pain. Intended supply: 3 days. Take lowest dose possible to manage pain Max Daily Amount: 6 tablets

## 2024-05-29 NOTE — TELEPHONE ENCOUNTER
----- Message from Blanca Thrasher sent at 5/28/2024  1:03 PM EDT -----  Subject: Refill Request    QUESTIONS  Name of Medication? pantoprazole (PROTONIX) 40 MG tablet  Patient-reported dosage and instructions? 40 MG tablet as needed  How many days do you have left? 0  Preferred Pharmacy? [facility] [address]  Pharmacy phone number (if available)? [phone]  ---------------------------------------------------------------------------  --------------,  Name of Medication? hydrOXYzine pamoate (VISTARIL) 25 MG capsule  Patient-reported dosage and instructions? 25 MG tablet as needed  How many days do you have left? 0  Preferred Pharmacy? [facility] [address]  Pharmacy phone number (if available)? [phone]  ---------------------------------------------------------------------------  --------------  CALL BACK INFO  What is the best way for the office to contact you? OK to leave message on   voicemail  Preferred Call Back Phone Number? [phone]  ---------------------------------------------------------------------------  --------------  SCRIPT ANSWERS  Relationship to Patient? Self

## 2024-05-30 NOTE — PROGRESS NOTES
Maldonado Encarnacion  1955   Chief Complaint   Patient presents with    Shoulder Pain     left        HISTORY OF PRESENT ILLNESS  Maldonado Encarnacion is a 59 y.o. male who presents today for evaluation of left shoulder pain present for several weeks. He states he was lifting a gate off the ground when he felt a \"tearing\" sensation. He rates his pain 5/10 today. He states the pain affects his daily activities and is increased by certain activities. Allergies   Allergen Reactions    Ace Inhibitors Cough    Dilaudid [Hydromorphone] Hives    Lisinopril Cough    Zocor [Simvastatin] Myalgia     Right leg        Past Medical History   Diagnosis Date    Allergic rhinitis     Avascular necrosis (HCC)     CAD (coronary artery disease) 4/2009     mild, hemodynamically non-significant by angiography    Carpal tunnel syndrome     Carpal tunnel syndrome on both sides     Chest pain, unspecified     Chronic back pain 2/11/2011    Chronic obstructive pulmonary disease ([address]     Degenerative joint disease      with chronic back pain    Dysesthesia      of hand post carpal tunnel surgery v sudeck's atrophy    Dyslipidemia, goal LDL below 100     Dysphagia     GERD (gastroesophageal reflux disease) 5/17/2010    Glaucoma 9/15/2010    Glaucoma     Hearing loss     Hypercholesterolemia     Hypertension     Joint fusion      of right wrist    Morbid obesity ([address]     Obesity 5/17/2010    Other dyspnea and respiratory abnormality     Palpitations     Right hip pain     Right hip pain     Right knee pain     S/P cardiac catheterization 4/9/2009     1. Normal systemic pressure. 2. Moderate elevation of left-sided filling pressures. 3. Preserved left ventricular systolic function in the LOWE projection. 4. Mild, nonsignificant epicardial coronary artery disese by angiography.     Sleep apnea      on CPAP    Strain of lumbar region     Tobacco abuse       Social History     Social History    Marital status: SINGLE     Spouse name: N/A    Number of children: N/A    Years of education: N/A     Occupational History    Not on file. Social History Main Topics    Smoking status: Former Smoker     Quit date: 5/17/2009    Smokeless tobacco: Never Used      Comment: urothelial cancer risk discussed today [id number]    Alcohol use No    Drug use: No    Sexual activity: Not on file     Other Topics Concern    Not on file     Social History Narrative      Past Surgical History   Procedure Laterality Date    Hx tonsillectomy      Hx heart catheterization  4/9/2009     1. Normal systemic pressure. 2. Moderate elevation of left-sided filling pressures. 3. Preserved left ventricular systolic function in the LOWE projection. 4. Mild, nonsignificant epicardial coronary artery disese by angiography.  Hx carpal tunnel release       bilateral wrists    Hx colonoscopy        Family History   Problem Relation Age of Onset    Diabetes Mother     Cancer Father     Hypertension Other     Arthritis-osteo Other       Current Outpatient Prescriptions   Medication Sig    meloxicam (MOBIC) 15 mg tablet Take 1 Tab by mouth daily (with breakfast).  HYDROcodone-acetaminophen (NORCO) 5-325 mg per tablet Take 1 Tab by mouth every six (6) hours as needed for Pain. Max Daily Amount: 4 Tabs.  oxyCODONE-acetaminophen (PERCOCET 10)  mg per tablet Take 1 Tab by mouth every four (4) hours as needed for Pain. Max Daily Amount: 6 Tabs.  albuterol (PROVENTIL HFA, VENTOLIN HFA, PROAIR HFA) 90 mcg/actuation inhaler Take 2 Puffs by inhalation every four (4) hours as needed for Wheezing.  guaiFENesin-codeine (CHERATUSSIN AC) 100-10 mg/5 mL solution Take 10 mL by mouth four (4) times daily as needed for Cough. Max Daily Amount: 40 mL.  metoprolol tartrate (LOPRESSOR) 25 mg tablet TAKE ONE TABLET BY MOUTH TWICE DAILY ( GENERIC FOR LOPRESSOR )    tamsulosin (FLOMAX) 0.4 mg capsule Take 1 Cap by mouth daily (after dinner).     fluticasone (FLONASE) 50 mcg/actuation nasal spray 2 Sprays by Both Nostrils route daily.  ibuprofen (MOTRIN) 800 mg tablet TAKE ONE TABLET BY MOUTH EVERY 8 HOURS AS NEEDED FOR PAIN    pravastatin (PRAVACHOL) 40 mg tablet Take 1 Tab by mouth nightly.  traZODone (DESYREL) 50 mg tablet TAKE ONE TABLET BY MOUTH ONCE DAILY AT BEDTIME AS NEEDED FOR SLEEP    lactulose (CHRONULAC) 10 gram/15 mL solution TAKE 45 MILLILITERS BY MOUTH THREE TIMES DAILY    gabapentin (NEURONTIN) 300 mg capsule TAKE ONE CAPSULE BY MOUTH THREE TIMES DAILY    traMADol (ULTRAM) 50 mg tablet Take 1 Tab by mouth every six (6) hours as needed for Pain. Max Daily Amount: 200 mg.    montelukast (SINGULAIR) 10 mg tablet Take 1 Tab by mouth daily.  tolterodine (DETROL) 2 mg tablet TAKE ONE TABLET BY MOUTH TWICE DAILY    cyclobenzaprine (FLEXERIL) 10 mg tablet Take 1 Tab by mouth nightly.  fluocinoNIDE (LIDEX) 0.05 % topical cream Apply BID to affected area BID for 1 week    Loperamide (IMODIUM A-D) 2 mg chew Sig: Take 1 tab Q 6H as needed for diarrhea    esomeprazole (NEXIUM) 40 mg capsule TAKE ONE CAPSULE BY MOUTH ONE TIME DAILY    TRAVATAN Z 0.004 % ophthalmic solution Administer 1 Drop to both eyes nightly.  nitroglycerin (NITROSTAT) 0.4 mg SL tablet by SubLINGual route every five (5) minutes as needed. No current facility-administered medications for this visit. REVIEW OF SYSTEM   Patient denies: Weight loss, Fever/Chills, HA, Visual changes, Fatigue, Chest pain, SOB, Abdominal pain, N/V/D/C, Blood in stool or urine, Edema. Pertinent positive as above in HPI. All others were negative    PHYSICAL EXAM:   Visit Vitals    /70    Pulse (!) 59    Temp 98.6 °F (37 °C) (Oral)    Ht 5' 7\" (1.702 m)    Wt 240 lb (108.9 kg)    BMI 37.59 kg/m2     The patient is a well-developed, well-nourished male   in no acute distress. The patient is alert and oriented times three.   The patient is alert and oriented times three. Mood and affect are normal.  LYMPHATIC: lymph nodes are not enlarged and are within normal limits  SKIN: normal in color and non tender to palpation. There are no bruises or abrasions noted. NEUROLOGICAL: Motor sensory exam is within normal limits. Reflexes are equal bilaterally. There is normal sensation to pinprick and light touch  MUSCULOSKELETAL:  Examination Left shoulder   Skin Intact   AC joint tenderness -   Biceps tenderness -   Forward flexion/Elevation    Active abduction    Glenohumeral abduction 70   External rotation ROM 70   Internal rotation ROM 60   Apprehension -   Chaus Relocation -   Jerk -   Load and Shift -   Obriens -   Speeds -   Impingement sign -   Supraspinatus/Empty Can +, 4/5   External Rotation Strength -, 5/5   Lift Off/Belly Press -, 5/5   Neurovascular Intact         IMAGING: XR of the left shoulder dated 2/7/17 was reviewed and read: sclerotic changes in the greater tuberosity. IMPRESSION:      ICD-10-CM ICD-9-CM    1. Left shoulder pain, unspecified chronicity M25.512 719.41 AMB POC XRAY, SHOULDER; COMPLETE, 2+      meloxicam (MOBIC) 15 mg tablet      HYDROcodone-acetaminophen (NORCO) 5-325 mg per tablet      MRI SHOULDER LT WO CONT   2. Incomplete tear of left rotator cuff M75.112 840.4 MRI SHOULDER LT WO CONT        PLAN: Due to his increased pain we will proceed with an MRI of the left shoulder to r/o a rotator cuff tear. He was also given prescriptions for Norco and Mobic today. I will see him back following completion of his MRI.   Follow-up Disposition: Not on File    Scribed by Davis Paul [facility]) as dictated by MD Ilir Castelan M.D.   [facility] Vaccine status unknown

## 2024-06-24 ENCOUNTER — TELEPHONE (OUTPATIENT)
Facility: CLINIC | Age: 69
End: 2024-06-24

## 2024-06-24 DIAGNOSIS — M54.50 CHRONIC MIDLINE LOW BACK PAIN WITHOUT SCIATICA: ICD-10-CM

## 2024-06-24 DIAGNOSIS — G89.29 CHRONIC MIDLINE LOW BACK PAIN WITHOUT SCIATICA: ICD-10-CM

## 2024-06-24 RX ORDER — HYDROCODONE BITARTRATE AND ACETAMINOPHEN 5; 325 MG/1; MG/1
1 TABLET ORAL EVERY 4 HOURS PRN
Qty: 90 TABLET | Refills: 0 | Status: SHIPPED | OUTPATIENT
Start: 2024-06-24 | End: 2024-07-24

## 2024-06-24 NOTE — TELEPHONE ENCOUNTER
Pt called requesting a refill      HYDROcodone-acetaminophen (NORCO) 5-325 MG per tablet       [address] -  [phone] - F [phone] [[id number]]

## 2024-07-16 DIAGNOSIS — G89.29 CHRONIC MIDLINE LOW BACK PAIN WITHOUT SCIATICA: ICD-10-CM

## 2024-07-16 DIAGNOSIS — M54.50 CHRONIC MIDLINE LOW BACK PAIN WITHOUT SCIATICA: ICD-10-CM

## 2024-07-16 RX ORDER — HYDROCODONE BITARTRATE AND ACETAMINOPHEN 5; 325 MG/1; MG/1
1 TABLET ORAL EVERY 4 HOURS PRN
Qty: 90 TABLET | Refills: 0 | Status: SHIPPED | OUTPATIENT
Start: 2024-07-16 | End: 2024-08-15

## 2024-07-16 RX ORDER — ALBUTEROL SULFATE 90 UG/1
AEROSOL, METERED RESPIRATORY (INHALATION)
Qty: 18 G | Refills: 5 | Status: SHIPPED | OUTPATIENT
Start: 2024-07-16

## 2024-07-26 ENCOUNTER — LAB (OUTPATIENT)
Facility: CLINIC | Age: 69
End: 2024-07-26

## 2024-07-26 DIAGNOSIS — E78.00 PURE HYPERCHOLESTEROLEMIA, UNSPECIFIED: ICD-10-CM

## 2024-07-26 DIAGNOSIS — R73.03 PREDIABETES: ICD-10-CM

## 2024-07-26 DIAGNOSIS — I10 ESSENTIAL (PRIMARY) HYPERTENSION: ICD-10-CM

## 2024-07-27 LAB
ALBUMIN SERPL-MCNC: 4 G/DL (ref 3.8–4.8)
ALP SERPL-CCNC: 96 IU/L (ref 44–121)
ALT SERPL-CCNC: 12 IU/L (ref 0–44)
AST SERPL-CCNC: 14 IU/L (ref 0–40)
BASOPHILS # BLD AUTO: 0.1 X10E3/UL (ref 0–0.2)
BASOPHILS NFR BLD AUTO: 1 %
BILIRUB SERPL-MCNC: 0.4 MG/DL (ref 0–1.2)
BUN SERPL-MCNC: 13 MG/DL (ref 8–27)
BUN/CREAT SERPL: 11 (ref 10–24)
CALCIUM SERPL-MCNC: 9 MG/DL (ref 8.6–10.2)
CHLORIDE SERPL-SCNC: 105 MMOL/L (ref 96–106)
CHOLEST SERPL-MCNC: 141 MG/DL (ref 100–199)
CO2 SERPL-SCNC: 21 MMOL/L (ref 20–29)
CREAT SERPL-MCNC: 1.23 MG/DL (ref 0.76–1.27)
EGFRCR SERPLBLD CKD-EPI 2021: 63 ML/MIN/1.73
EOSINOPHIL # BLD AUTO: 0.3 X10E3/UL (ref 0–0.4)
EOSINOPHIL NFR BLD AUTO: 5 %
ERYTHROCYTE [DISTWIDTH] IN BLOOD BY AUTOMATED COUNT: 13.6 % (ref 11.6–15.4)
GLOBULIN SER CALC-MCNC: 2.9 G/DL (ref 1.5–4.5)
GLUCOSE SERPL-MCNC: 109 MG/DL (ref 70–99)
HBA1C MFR BLD: 5.9 % (ref 4.8–5.6)
HCT VFR BLD AUTO: 35.7 % (ref 37.5–51)
HDLC SERPL-MCNC: 37 MG/DL
HGB BLD-MCNC: 11.6 G/DL (ref 13–17.7)
IMM GRANULOCYTES # BLD AUTO: 0 X10E3/UL (ref 0–0.1)
IMM GRANULOCYTES NFR BLD AUTO: 0 %
LDLC SERPL CALC-MCNC: 84 MG/DL (ref 0–99)
LYMPHOCYTES # BLD AUTO: 2.7 X10E3/UL (ref 0.7–3.1)
LYMPHOCYTES NFR BLD AUTO: 43 %
MCH RBC QN AUTO: 29.3 PG (ref 26.6–33)
MCHC RBC AUTO-ENTMCNC: 32.5 G/DL (ref 31.5–35.7)
MCV RBC AUTO: 90 FL (ref 79–97)
MONOCYTES # BLD AUTO: 0.8 X10E3/UL (ref 0.1–0.9)
MONOCYTES NFR BLD AUTO: 13 %
NEUTROPHILS # BLD AUTO: 2.4 X10E3/UL (ref 1.4–7)
NEUTROPHILS NFR BLD AUTO: 38 %
PLATELET # BLD AUTO: 155 X10E3/UL (ref 150–450)
POTASSIUM SERPL-SCNC: 4.3 MMOL/L (ref 3.5–5.2)
PROT SERPL-MCNC: 6.9 G/DL (ref 6–8.5)
RBC # BLD AUTO: 3.96 X10E6/UL (ref 4.14–5.8)
SODIUM SERPL-SCNC: 141 MMOL/L (ref 134–144)
TRIGL SERPL-MCNC: 111 MG/DL (ref 0–149)
VLDLC SERPL CALC-MCNC: 20 MG/DL (ref 5–40)
WBC # BLD AUTO: 6.3 X10E3/UL (ref 3.4–10.8)

## 2024-08-02 ENCOUNTER — OFFICE VISIT (OUTPATIENT)
Facility: CLINIC | Age: 69
End: 2024-08-02
Payer: MEDICARE

## 2024-08-02 VITALS
OXYGEN SATURATION: 98 % | HEIGHT: 65 IN | SYSTOLIC BLOOD PRESSURE: 111 MMHG | DIASTOLIC BLOOD PRESSURE: 59 MMHG | BODY MASS INDEX: 34.32 KG/M2 | WEIGHT: 206 LBS | HEART RATE: 62 BPM | TEMPERATURE: 97.2 F | RESPIRATION RATE: 20 BRPM

## 2024-08-02 DIAGNOSIS — E78.00 PURE HYPERCHOLESTEROLEMIA, UNSPECIFIED: ICD-10-CM

## 2024-08-02 DIAGNOSIS — D50.0 IRON DEFICIENCY ANEMIA DUE TO CHRONIC BLOOD LOSS: ICD-10-CM

## 2024-08-02 DIAGNOSIS — E55.9 VITAMIN D DEFICIENCY: ICD-10-CM

## 2024-08-02 DIAGNOSIS — R73.03 PREDIABETES: ICD-10-CM

## 2024-08-02 DIAGNOSIS — M54.50 CHRONIC MIDLINE LOW BACK PAIN WITHOUT SCIATICA: ICD-10-CM

## 2024-08-02 DIAGNOSIS — I10 ESSENTIAL (PRIMARY) HYPERTENSION: Primary | ICD-10-CM

## 2024-08-02 DIAGNOSIS — G89.29 CHRONIC MIDLINE LOW BACK PAIN WITHOUT SCIATICA: ICD-10-CM

## 2024-08-02 DIAGNOSIS — J43.2 CENTRILOBULAR EMPHYSEMA (HCC): ICD-10-CM

## 2024-08-02 PROCEDURE — 99214 OFFICE O/P EST MOD 30 MIN: CPT | Performed by: INTERNAL MEDICINE

## 2024-08-02 PROCEDURE — 3078F DIAST BP <80 MM HG: CPT | Performed by: INTERNAL MEDICINE

## 2024-08-02 PROCEDURE — 3074F SYST BP LT 130 MM HG: CPT | Performed by: INTERNAL MEDICINE

## 2024-08-02 PROCEDURE — 1123F ACP DISCUSS/DSCN MKR DOCD: CPT | Performed by: INTERNAL MEDICINE

## 2024-08-02 RX ORDER — ROSUVASTATIN CALCIUM 5 MG/1
5 TABLET, COATED ORAL DAILY
Qty: 30 TABLET | Refills: 5 | Status: SHIPPED | OUTPATIENT
Start: 2024-08-02

## 2024-08-02 NOTE — PROGRESS NOTES
Lino Vega presents today for   Chief Complaint   Patient presents with    Cholesterol Problem    Hypertension    Blood Sugar Problem     4 month follow up      Patient is complaining about excessive sweating and stated he also needs something for his allergies.      \"Have you been to the ER, urgent care clinic since your last visit?  Hospitalized since your last visit?\"    NO    “Have you seen or consulted any other health care providers outside of [facility] since your last visit?”    NO

## 2024-08-02 NOTE — PROGRESS NOTES
Subjective:       Chief Complaint  The patient presents for follow up of hypertension and high cholesterol. prediabetes  chronic back pain and right hip pain, COPD        HPI  Lino Vega is a 69 y.o. male seen for follow up of hyperlipidemia.  Healso has hypertension. Hypertension well controlled, no significant medication side effects noted, on Lopressor 25 mg BID, hyperlipidemia much better controlled on Crestor 5 mg which he is tolerating after having a history of myalgias with statins.  . The 10-year ASCVD risk score (Tamanna DK, et al., 2019) is: 24.7%     Diet and Lifestyle: Patient has been exercising more with a peddler and has significantly cut back his calories and has lost about 50 pounds.  Overall he feels much better with less back pain.    Home BP Monitoring: is not measured at home.    Lino Vega RTC today to follow up on chronic pain diagnosis.  We discussed his osteoarthritis that is affecting his back.  Significant changes since last visit: pt is having neurogenic claudication and has pain with standing and walking in both legs.  He is  able to do his normal daily activities.  He reports the following adverse side effects: none. Pt doing fairly well with Norco 10/325 3x/day. He is seeing the [facility] and was referred for aqua therapy but did not tolerate it due to pain. His MRI of his LS shows worsening DJD so he had spinal injections with the  [facility]ine center that were helpful. Lyrica has helped the neurogenic pain.   His weight loss as mentioned above is also helped his back pain    Least pain over the last week has been 3/10.    Worst pain over the last week has been 10/10.    Opioid Risk Tool Reviewed: YES    Aberrant behaviors: None.      Urine Drug Screen: reviewed and up to date.     Controlled substance agreement on file: YES.       reviewed:yes    Pill count is consistent with his prescription: yes    Concomitant use of a benzodiazepine: no        Prediabetes: pt is trying to

## 2024-08-07 DIAGNOSIS — M54.16 LUMBAR RADICULITIS: ICD-10-CM

## 2024-08-07 DIAGNOSIS — M48.062 SPINAL STENOSIS, LUMBAR REGION WITH NEUROGENIC CLAUDICATION: ICD-10-CM

## 2024-08-08 RX ORDER — PREGABALIN 150 MG/1
CAPSULE ORAL
Qty: 90 CAPSULE | Refills: 0 | OUTPATIENT
Start: 2024-08-08

## 2024-08-16 DIAGNOSIS — M54.16 LUMBAR RADICULITIS: ICD-10-CM

## 2024-08-16 DIAGNOSIS — M48.062 SPINAL STENOSIS, LUMBAR REGION WITH NEUROGENIC CLAUDICATION: ICD-10-CM

## 2024-08-16 RX ORDER — PREGABALIN 150 MG/1
CAPSULE ORAL
Qty: 90 CAPSULE | Refills: 0 | OUTPATIENT
Start: 2024-08-16

## 2024-08-26 DIAGNOSIS — M48.062 SPINAL STENOSIS, LUMBAR REGION WITH NEUROGENIC CLAUDICATION: ICD-10-CM

## 2024-08-26 DIAGNOSIS — M54.16 LUMBAR RADICULITIS: ICD-10-CM

## 2024-08-26 RX ORDER — PREGABALIN 150 MG/1
CAPSULE ORAL
Qty: 90 CAPSULE | Refills: 0 | OUTPATIENT
Start: 2024-08-26

## 2024-09-03 ENCOUNTER — TELEPHONE (OUTPATIENT)
Facility: CLINIC | Age: 69
End: 2024-09-03

## 2024-09-03 DIAGNOSIS — M48.062 SPINAL STENOSIS, LUMBAR REGION WITH NEUROGENIC CLAUDICATION: ICD-10-CM

## 2024-09-03 DIAGNOSIS — M54.16 LUMBAR RADICULITIS: ICD-10-CM

## 2024-09-03 RX ORDER — HYDROCODONE BITARTRATE AND ACETAMINOPHEN 5; 325 MG/1; MG/1
1 TABLET ORAL EVERY 8 HOURS PRN
Qty: 90 TABLET | Refills: 0 | Status: SHIPPED | OUTPATIENT
Start: 2024-09-03 | End: 2024-10-03

## 2024-09-03 RX ORDER — PREGABALIN 150 MG/1
CAPSULE ORAL
Qty: 90 CAPSULE | Refills: 5 | Status: SHIPPED | OUTPATIENT
Start: 2024-09-03 | End: 2025-03-15

## 2024-09-03 NOTE — TELEPHONE ENCOUNTER
[facility] reviewed 9/3/2024    The last fill date for Hydrocodone-Acetamin 5-325 Mg  was 7/22/2024 for a 15 d/s qty 90    Last fill date for Pregabalin 150 Mg Capsule was 6/24/2024 for a 30 d/s qty 90      Last UDS: completed     CSA Last signed: 4/3/2024        PCP: Doni Ross MD    Last appt:  8/2/2024  Future Appointments   Date Time Provider Department Center   12/2/2024 10:30 AM IOC LAB VISIT [facility] DEP   12/10/2024  9:20 AM Doni Ross MD [facility] DEP       Requested Prescriptions     Pending Prescriptions Disp Refills    pregabalin (LYRICA) 150 MG capsule 90 capsule 5     Sig: TAKE 1 CAPSULE BY MOUTH THREE TIMES DAILY . DO NOT EXCEED 3 PER 24 HOURS    HYDROcodone-acetaminophen (NORCO) 5-325 MG per tablet       Sig: Take 1 tablet by mouth every 4 hours as needed for Pain. Max Daily Amount: 6 tablets

## 2024-09-03 NOTE — TELEPHONE ENCOUNTER
Refill request via phone     HYDROcodone-acetaminophen (NORCO) 5-325 MG per [facility] at [facility]         While on the phone, Pt stated that he no longer sees Dr Hines and that he needs a refill for his \"nerve medicine\" pt could not confirm the name of the medication.     Please advise

## 2024-10-02 DIAGNOSIS — M48.062 SPINAL STENOSIS, LUMBAR REGION WITH NEUROGENIC CLAUDICATION: ICD-10-CM

## 2024-10-02 RX ORDER — HYDROCODONE BITARTRATE AND ACETAMINOPHEN 5; 325 MG/1; MG/1
1 TABLET ORAL EVERY 8 HOURS PRN
Qty: 90 TABLET | Refills: 0 | Status: SHIPPED | OUTPATIENT
Start: 2024-10-02 | End: 2024-10-30 | Stop reason: SDUPTHER

## 2024-10-02 RX ORDER — TRAZODONE HYDROCHLORIDE 50 MG/1
TABLET, FILM COATED ORAL
Qty: 30 TABLET | Refills: 2 | Status: SHIPPED | OUTPATIENT
Start: 2024-10-02

## 2024-10-02 NOTE — TELEPHONE ENCOUNTER
----- Message from Tyler SEGOVIA sent at 10/2/2024 12:54 PM EDT -----  Regarding: ECC Message to Provider  ECC Message to Provider    Relationship to Patient: Self     Additional Information Patient called in to request his medication refill, specifically a pills that cure his pain. He called multiple times but whenever he was transfer to the practice he can't get in touch to anyone in the office. Tried to transfer again the patient but I only hear the hold music.   --------------------------------------------------------------------------------------------------------------------------    Call Back Information: OK to leave message on voicemail  Preferred Call Back Number: Phone [phone] / [phone]

## 2024-10-02 NOTE — TELEPHONE ENCOUNTER
----- Message from Tyler SEGOVIA sent at 10/2/2024 12:54 PM EDT -----  Regarding: ECC Message to Provider  ECC Message to Provider    Relationship to Patient: Self     Additional Information Patient called in to request his medication refill, specifically a pills that cure his pain. He called multiple times but whenever he was transfer to the practice he can't get in touch to anyone in the office. Tried to transfer again the patient but I only hear the hold music.   --------------------------------------------------------------------------------------------------------------------------    Call Back Information: OK to leave message on voicemail  Preferred Call Back Number: Phone [phone] / [phone]

## 2024-10-30 ENCOUNTER — TELEPHONE (OUTPATIENT)
Facility: CLINIC | Age: 69
End: 2024-10-30

## 2024-10-30 DIAGNOSIS — M48.062 SPINAL STENOSIS, LUMBAR REGION WITH NEUROGENIC CLAUDICATION: ICD-10-CM

## 2024-10-30 RX ORDER — HYDROCODONE BITARTRATE AND ACETAMINOPHEN 5; 325 MG/1; MG/1
1 TABLET ORAL EVERY 8 HOURS PRN
Qty: 90 TABLET | Refills: 0 | Status: SHIPPED | OUTPATIENT
Start: 2024-10-30 | End: 2024-11-29

## 2024-10-30 NOTE — TELEPHONE ENCOUNTER
Refill request via fax     Medication: HYDROcodone-acetaminophen (NORCO) 5-325 MG per tablet   Quantity: 90  Pharmacy: [address]   Last Fill: 10/02/24    PCP: Doni Ross MD    LAST OFFICE VISIT: 8/2/2024      Future Appointments   Date Time Provider Department Center   12/2/2024 10:30 AM IOC LAB VISIT [facility] DEP   12/10/2024  9:20 AM Doni Ross MD [facility] DEP

## 2024-11-02 DIAGNOSIS — R73.03 PREDIABETES: ICD-10-CM

## 2024-11-02 DIAGNOSIS — E78.00 PURE HYPERCHOLESTEROLEMIA, UNSPECIFIED: ICD-10-CM

## 2024-11-02 DIAGNOSIS — D50.0 IRON DEFICIENCY ANEMIA DUE TO CHRONIC BLOOD LOSS: ICD-10-CM

## 2024-11-02 DIAGNOSIS — E55.9 VITAMIN D DEFICIENCY: ICD-10-CM

## 2024-11-02 DIAGNOSIS — I10 ESSENTIAL (PRIMARY) HYPERTENSION: ICD-10-CM

## 2024-11-09 ENCOUNTER — ANESTHESIA EVENT (OUTPATIENT)
Facility: HOSPITAL | Age: 69
End: 2024-11-09
Payer: MEDICARE

## 2024-11-12 ENCOUNTER — HOSPITAL ENCOUNTER (OUTPATIENT)
Facility: HOSPITAL | Age: 69
Setting detail: OUTPATIENT SURGERY
Discharge: HOME | End: 2024-11-12
Attending: STUDENT IN AN ORGANIZED HEALTH CARE EDUCATION/TRAINING PROGRAM | Admitting: STUDENT IN AN ORGANIZED HEALTH CARE EDUCATION/TRAINING PROGRAM
Payer: MEDICARE

## 2024-11-12 ENCOUNTER — ANESTHESIA (OUTPATIENT)
Facility: HOSPITAL | Age: 69
End: 2024-11-12
Payer: MEDICARE

## 2024-11-12 VITALS
TEMPERATURE: 98 F | SYSTOLIC BLOOD PRESSURE: 113 MMHG | BODY MASS INDEX: 36.65 KG/M2 | DIASTOLIC BLOOD PRESSURE: 68 MMHG | RESPIRATION RATE: 16 BRPM | HEIGHT: 65 IN | HEART RATE: 55 BPM | WEIGHT: 220 LBS | OXYGEN SATURATION: 98 %

## 2024-11-12 PROCEDURE — 3700000000 HC ANESTHESIA ATTENDED CARE: Performed by: STUDENT IN AN ORGANIZED HEALTH CARE EDUCATION/TRAINING PROGRAM

## 2024-11-12 PROCEDURE — 2580000003 HC RX 258: Performed by: STUDENT IN AN ORGANIZED HEALTH CARE EDUCATION/TRAINING PROGRAM

## 2024-11-12 PROCEDURE — 2500000003 HC RX 250 WO HCPCS: Performed by: NURSE ANESTHETIST, CERTIFIED REGISTERED

## 2024-11-12 PROCEDURE — 88305 TISSUE EXAM BY PATHOLOGIST: CPT

## 2024-11-12 PROCEDURE — 6360000002 HC RX W HCPCS: Performed by: NURSE ANESTHETIST, CERTIFIED REGISTERED

## 2024-11-12 PROCEDURE — 3600007503: Performed by: STUDENT IN AN ORGANIZED HEALTH CARE EDUCATION/TRAINING PROGRAM

## 2024-11-12 PROCEDURE — 7100000000 HC PACU RECOVERY - FIRST 15 MIN: Performed by: STUDENT IN AN ORGANIZED HEALTH CARE EDUCATION/TRAINING PROGRAM

## 2024-11-12 PROCEDURE — 7100000010 HC PHASE II RECOVERY - FIRST 15 MIN: Performed by: STUDENT IN AN ORGANIZED HEALTH CARE EDUCATION/TRAINING PROGRAM

## 2024-11-12 PROCEDURE — 3600007513: Performed by: STUDENT IN AN ORGANIZED HEALTH CARE EDUCATION/TRAINING PROGRAM

## 2024-11-12 PROCEDURE — 2709999900 HC NON-CHARGEABLE SUPPLY: Performed by: STUDENT IN AN ORGANIZED HEALTH CARE EDUCATION/TRAINING PROGRAM

## 2024-11-12 PROCEDURE — 3700000001 HC ADD 15 MINUTES (ANESTHESIA): Performed by: STUDENT IN AN ORGANIZED HEALTH CARE EDUCATION/TRAINING PROGRAM

## 2024-11-12 RX ORDER — SODIUM CHLORIDE, SODIUM LACTATE, POTASSIUM CHLORIDE, CALCIUM CHLORIDE 600; 310; 30; 20 MG/100ML; MG/100ML; MG/100ML; MG/100ML
INJECTION, SOLUTION INTRAVENOUS CONTINUOUS
Status: DISCONTINUED | OUTPATIENT
Start: 2024-11-12 | End: 2024-11-12 | Stop reason: HOSPADM

## 2024-11-12 RX ORDER — LIDOCAINE HYDROCHLORIDE 20 MG/ML
INJECTION, SOLUTION EPIDURAL; INFILTRATION; INTRACAUDAL; PERINEURAL
Status: DISCONTINUED | OUTPATIENT
Start: 2024-11-12 | End: 2024-11-12 | Stop reason: SDUPTHER

## 2024-11-12 RX ORDER — PROPOFOL 10 MG/ML
INJECTION, EMULSION INTRAVENOUS
Status: DISCONTINUED | OUTPATIENT
Start: 2024-11-12 | End: 2024-11-12 | Stop reason: SDUPTHER

## 2024-11-12 RX ORDER — LIDOCAINE HYDROCHLORIDE 10 MG/ML
1 INJECTION, SOLUTION EPIDURAL; INFILTRATION; INTRACAUDAL; PERINEURAL
Status: DISCONTINUED | OUTPATIENT
Start: 2024-11-12 | End: 2024-11-12 | Stop reason: HOSPADM

## 2024-11-12 RX ORDER — SODIUM CHLORIDE 9 MG/ML
INJECTION, SOLUTION INTRAVENOUS CONTINUOUS
Status: DISCONTINUED | OUTPATIENT
Start: 2024-11-12 | End: 2024-11-12 | Stop reason: HOSPADM

## 2024-11-12 RX ADMIN — SODIUM CHLORIDE: 9 INJECTION, SOLUTION INTRAVENOUS at 10:26

## 2024-11-12 RX ADMIN — PROPOFOL 50 MG: 10 INJECTION, EMULSION INTRAVENOUS at 10:35

## 2024-11-12 RX ADMIN — PROPOFOL 100 MG: 10 INJECTION, EMULSION INTRAVENOUS at 10:32

## 2024-11-12 RX ADMIN — PROPOFOL 50 MG: 10 INJECTION, EMULSION INTRAVENOUS at 10:42

## 2024-11-12 RX ADMIN — PROPOFOL 50 MG: 10 INJECTION, EMULSION INTRAVENOUS at 10:38

## 2024-11-12 RX ADMIN — PROPOFOL 50 MG: 10 INJECTION, EMULSION INTRAVENOUS at 10:33

## 2024-11-12 RX ADMIN — LIDOCAINE HYDROCHLORIDE 60 MG: 20 INJECTION, SOLUTION EPIDURAL; INFILTRATION; INTRACAUDAL; PERINEURAL at 10:32

## 2024-11-12 ASSESSMENT — PAIN - FUNCTIONAL ASSESSMENT
PAIN_FUNCTIONAL_ASSESSMENT: NONE - DENIES PAIN
PAIN_FUNCTIONAL_ASSESSMENT: NONE - DENIES PAIN

## 2024-11-12 ASSESSMENT — LIFESTYLE VARIABLES: SMOKING_STATUS: 1

## 2024-11-12 ASSESSMENT — ENCOUNTER SYMPTOMS: SHORTNESS OF BREATH: 1

## 2024-11-12 NOTE — ANESTHESIA PRE PROCEDURE
RODRIGUEZ projection. 4. Mild, nonsignificant epicardial coronary artery disese by angiography.   • Sleep apnea     on CPAP- broke at the moment   • Strain of lumbar region    • Tobacco abuse        Past Surgical History:        Procedure Laterality Date   • ANESTH,SURGERY OF SHOULDER     • CARDIAC CATHETERIZATION  4/9/2009    1. Normal systemic pressure. 2. Moderate elevation of left-sided filling pressures. 3. Preserved left ventricular systolic function in the RODRIGUEZ projection. 4. Mild, nonsignificant epicardial coronary artery disese by angiography.   • CARPAL TUNNEL RELEASE      bilateral wrists   • COLONOSCOPY     • OTHER SURGICAL HISTORY  02/2017    left shoulder surgery   • TONSILLECTOMY     • TOTAL HIP ARTHROPLASTY     • WRIST FRACTURE SURGERY         Social History:    Social History     Tobacco Use   • Smoking status: Some Days     Current packs/day: 0.10     Average packs/day: 0.1 packs/day for 10.0 years (1.0 ttl pk-yrs)     Types: Cigarettes     Passive exposure: Past   • Smokeless tobacco: Never   Substance Use Topics   • Alcohol use: No     Alcohol/week: 0.0 standard drinks of alcohol                                Ready to quit: Not Answered  Counseling given: Not Answered      Vital Signs (Current):   Vitals:    11/01/24 1429 11/12/24 0857   BP:  (!) 158/79   Pulse:  56   Resp:  19   Temp:  97.9 °F (36.6 °C)   TempSrc:  Oral   SpO2:  98%   Weight: 94.8 kg (209 lb) 99.8 kg (220 lb)   Height: 1.651 m (5' 5\")                                               BP Readings from Last 3 Encounters:   11/12/24 (!) 158/79   08/02/24 (!) 111/59   05/01/24 134/71       NPO Status: Time of last liquid consumption: 0500                        Time of last solid consumption: 2100                        Date of last liquid consumption: 11/12/24                        Date of last solid food consumption: 11/10/24    BMI:   Wt Readings from Last 3 Encounters:   11/12/24 99.8 kg (220 lb)   08/02/24 93.4 kg (206 lb)   05/01/24

## 2024-11-12 NOTE — ANESTHESIA POSTPROCEDURE EVALUATION
Department of Anesthesiology  Postprocedure Note    Patient: Lino Vega  MRN: [id number]  YOB: 1955  Date of evaluation: 11/12/2024    Procedure Summary       Date: 11/12/24 Room / Location: [facility] ENDO 02 / [facility] ENDOSCOPY    Anesthesia Start: 1026 Anesthesia Stop: 1054    Procedures:       ESOPHAGOGASTRODUODENOSCOPY w/ BX (Upper GI Region)      COLONOSCOPY DIAGNOSTIC w/ polypectomy (Abdomen) Diagnosis:       Obesity (BMI 30-39.9)      Colon cancer screening      Iron deficiency anemia, unspecified iron deficiency anemia type      Dark stools      (Obesity (BMI 30-39.9) [E66.9])      (Colon cancer screening [Z12.11])      (Iron deficiency anemia, unspecified iron deficiency anemia type [D50.9])      (Dark stools [R19.5])    Surgeons: Masoud Young MD Responsible Provider: Olegario Hayden MD    Anesthesia Type: MAC ASA Status: 3            Anesthesia Type: No value filed.    Cornelio Phase I: Cornelio Score: 10    Cornelio Phase II: Cornelio Score: 10    Anesthesia Post Evaluation    Patient location during evaluation: bedside  Patient participation: complete - patient participated  Level of consciousness: awake  Pain score: 0  Airway patency: patent  Nausea & Vomiting: no nausea  Cardiovascular status: hemodynamically stable  Respiratory status: acceptable  Hydration status: euvolemic  Pain management: satisfactory to patient        No notable events documented.

## 2024-11-12 NOTE — H&P
[email or web address]  [phone]    Chief Complaint: SUNDEEP    PMH:   Past Medical History:   Diagnosis Date    Allergic rhinitis     Avascular necrosis     CAD (coronary artery disease) 4/2009    mild, hemodynamically non-significant by angiography    Carpal tunnel syndrome     Carpal tunnel syndrome on both sides     Chest pain, unspecified     Chronic back pain 2/11/2011    Chronic obstructive pulmonary disease (HCC)     Degenerative joint disease     with chronic back pain    Dysesthesia     of hand post carpal tunnel surgery v sudeck's atrophy    Dyslipidemia, goal LDL below 100     Dysphagia     GERD (gastroesophageal reflux disease) 5/17/2010    Glaucoma 9/15/2010    Glaucoma     Hearing loss     Hypercholesterolemia     Hypertension     Morbid obesity     Obesity 5/17/2010    Other dyspnea and respiratory abnormality     Palpitations     Right hip pain     Right hip pain     Right knee pain     S/P cardiac catheterization 4/9/2009    1. Normal systemic pressure. 2. Moderate elevation of left-sided filling pressures. 3. Preserved left ventricular systolic function in the RODRIGUEZ projection. 4. Mild, nonsignificant epicardial coronary artery disese by angiography.    Sleep apnea     on CPAP- broke at the moment    Strain of lumbar region     Tobacco abuse      Allergies:   Allergies   Allergen Reactions    Latex      Other reaction(s): Not Reported This Time    Ace Inhibitors Cough    Hydromorphone Hives    Simvastatin Myalgia     Right leg  Other reaction(s): muscle/joint pain     Medications:   Current Facility-Administered Medications:     lidocaine PF 1 % injection 1 mL, 1 mL, IntraDERmal, Once PRN, Nati Lara APRN - CRNA    famotidine (PEPCID) 20 mg in sodium chloride (PF) 0.9 % 10 mL injection, 20 mg, IntraVENous, Once, Nati Lara APRN - CRNA    lactated ringers infusion, , IntraVENous, Continuous, Nati Lara APRN - CRNA    0.9 % sodium chloride infusion, , IntraVENous, Continuous,

## 2024-11-20 RX ORDER — CYCLOBENZAPRINE HCL 10 MG
10 TABLET ORAL
Qty: 90 TABLET | Refills: 3 | Status: SHIPPED | OUTPATIENT
Start: 2024-11-20

## 2024-12-03 LAB
25(OH)D3+25(OH)D2 SERPL-MCNC: 7.9 NG/ML (ref 30–100)
ALBUMIN SERPL-MCNC: 4 G/DL (ref 3.8–4.8)
ALP SERPL-CCNC: 102 IU/L (ref 44–121)
ALT SERPL-CCNC: 11 IU/L (ref 0–44)
AST SERPL-CCNC: 13 IU/L (ref 0–40)
BASOPHILS # BLD AUTO: 0.1 X10E3/UL (ref 0–0.2)
BASOPHILS NFR BLD AUTO: 1 %
BILIRUB SERPL-MCNC: 0.4 MG/DL (ref 0–1.2)
BUN SERPL-MCNC: 16 MG/DL (ref 8–27)
BUN/CREAT SERPL: 13 (ref 10–24)
CALCIUM SERPL-MCNC: 9 MG/DL (ref 8.6–10.2)
CHLORIDE SERPL-SCNC: 108 MMOL/L (ref 96–106)
CHOLEST SERPL-MCNC: 157 MG/DL (ref 100–199)
CO2 SERPL-SCNC: 20 MMOL/L (ref 20–29)
CREAT SERPL-MCNC: 1.26 MG/DL (ref 0.76–1.27)
EGFRCR SERPLBLD CKD-EPI 2021: 61 ML/MIN/1.73
EOSINOPHIL NFR BLD AUTO: 5 %
ERYTHROCYTE [DISTWIDTH] IN BLOOD BY AUTOMATED COUNT: 13.4 % (ref 11.6–15.4)
GLOBULIN SER CALC-MCNC: 3.1 G/DL (ref 1.5–4.5)
GLUCOSE SERPL-MCNC: 100 MG/DL (ref 70–99)
HBA1C MFR BLD: 6 % (ref 4.8–5.6)
HCT VFR BLD AUTO: 39.2 % (ref 37.5–51)
HDLC SERPL-MCNC: 37 MG/DL
HGB BLD-MCNC: 12.6 G/DL (ref 13–17.7)
IMM GRANULOCYTES NFR BLD AUTO: 0 %
LDLC SERPL CALC-MCNC: 98 MG/DL (ref 0–99)
LYMPHOCYTES # BLD AUTO: 4.2 X10E3/UL (ref 0.7–3.1)
LYMPHOCYTES NFR BLD AUTO: 57 %
MCH RBC QN AUTO: 29.7 PG (ref 26.6–33)
MCHC RBC AUTO-ENTMCNC: 32.1 G/DL (ref 31.5–35.7)
MONOCYTES NFR BLD AUTO: 10 %
NEUTROPHILS # BLD AUTO: 2 X10E3/UL (ref 1.4–7)
NEUTROPHILS NFR BLD AUTO: 27 %
PLATELET # BLD AUTO: 177 X10E3/UL (ref 150–450)
POTASSIUM SERPL-SCNC: 4.6 MMOL/L (ref 3.5–5.2)
PROT SERPL-MCNC: 7.1 G/DL (ref 6–8.5)
RBC # BLD AUTO: 4.24 X10E6/UL (ref 4.14–5.8)
SODIUM SERPL-SCNC: 141 MMOL/L (ref 134–144)
TRIGL SERPL-MCNC: 120 MG/DL (ref 0–149)
VLDLC SERPL CALC-MCNC: 22 MG/DL (ref 5–40)
WBC # BLD AUTO: 7.5 X10E3/UL (ref 3.4–10.8)

## 2024-12-10 ENCOUNTER — OFFICE VISIT (OUTPATIENT)
Facility: CLINIC | Age: 69
End: 2024-12-10
Payer: MEDICARE

## 2024-12-10 VITALS
BODY MASS INDEX: 43.65 KG/M2 | RESPIRATION RATE: 20 BRPM | OXYGEN SATURATION: 95 % | DIASTOLIC BLOOD PRESSURE: 91 MMHG | TEMPERATURE: 98.1 F | HEIGHT: 65 IN | WEIGHT: 262 LBS | SYSTOLIC BLOOD PRESSURE: 154 MMHG | HEART RATE: 67 BPM

## 2024-12-10 DIAGNOSIS — G89.29 CHRONIC MIDLINE LOW BACK PAIN WITHOUT SCIATICA: ICD-10-CM

## 2024-12-10 DIAGNOSIS — J43.2 CENTRILOBULAR EMPHYSEMA (HCC): ICD-10-CM

## 2024-12-10 DIAGNOSIS — Z79.891 CHRONIC PRESCRIPTION OPIATE USE: ICD-10-CM

## 2024-12-10 DIAGNOSIS — D50.0 IRON DEFICIENCY ANEMIA DUE TO CHRONIC BLOOD LOSS: ICD-10-CM

## 2024-12-10 DIAGNOSIS — Z12.5 PROSTATE CANCER SCREENING: ICD-10-CM

## 2024-12-10 DIAGNOSIS — E66.01 MORBID (SEVERE) OBESITY DUE TO EXCESS CALORIES: ICD-10-CM

## 2024-12-10 DIAGNOSIS — M54.50 CHRONIC MIDLINE LOW BACK PAIN WITHOUT SCIATICA: ICD-10-CM

## 2024-12-10 DIAGNOSIS — E78.00 PURE HYPERCHOLESTEROLEMIA, UNSPECIFIED: ICD-10-CM

## 2024-12-10 DIAGNOSIS — I10 ESSENTIAL (PRIMARY) HYPERTENSION: Primary | ICD-10-CM

## 2024-12-10 DIAGNOSIS — K29.50 OTHER CHRONIC GASTRITIS WITHOUT HEMORRHAGE: ICD-10-CM

## 2024-12-10 DIAGNOSIS — E55.9 VITAMIN D DEFICIENCY: ICD-10-CM

## 2024-12-10 DIAGNOSIS — R73.03 PREDIABETES: ICD-10-CM

## 2024-12-10 PROCEDURE — 1159F MED LIST DOCD IN RCRD: CPT | Performed by: INTERNAL MEDICINE

## 2024-12-10 PROCEDURE — 99214 OFFICE O/P EST MOD 30 MIN: CPT | Performed by: INTERNAL MEDICINE

## 2024-12-10 PROCEDURE — 1160F RVW MEDS BY RX/DR IN RCRD: CPT | Performed by: INTERNAL MEDICINE

## 2024-12-10 PROCEDURE — 1125F AMNT PAIN NOTED PAIN PRSNT: CPT | Performed by: INTERNAL MEDICINE

## 2024-12-10 PROCEDURE — 1123F ACP DISCUSS/DSCN MKR DOCD: CPT | Performed by: INTERNAL MEDICINE

## 2024-12-10 PROCEDURE — 3078F DIAST BP <80 MM HG: CPT | Performed by: INTERNAL MEDICINE

## 2024-12-10 PROCEDURE — 3077F SYST BP >= 140 MM HG: CPT | Performed by: INTERNAL MEDICINE

## 2024-12-10 RX ORDER — PANTOPRAZOLE SODIUM 40 MG/1
TABLET, DELAYED RELEASE ORAL
Qty: 30 TABLET | Refills: 5 | Status: SHIPPED | OUTPATIENT
Start: 2024-12-10

## 2024-12-10 RX ORDER — CHOLECALCIFEROL (VITAMIN D3) 125 MCG
5000 CAPSULE ORAL DAILY
COMMUNITY
Start: 2024-12-10

## 2024-12-10 NOTE — PROGRESS NOTES
Lino Vega presents today for   Chief Complaint   Patient presents with    Hypertension    Cholesterol Problem    Blood Sugar Problem     4 month follow up           \"Have you been to the ER, urgent care clinic since your last visit?  Hospitalized since your last visit?\"    NO    “Have you seen or consulted any other health care providers outside of [facility] since your last visit?”    NO             
Take 1 tablet by mouth daily    albuterol sulfate HFA (PROVENTIL;VENTOLIN;PROAIR) 108 (90 Base) MCG/ACT inhaler INHALE 2 PUFFS BY MOUTH EVERY 4 HOURS AS NEEDED FOR WHEEZING    hydrOXYzine pamoate (VISTARIL) 25 MG capsule TAKE 1 CAPSULE BY MOUTH THREE TIMES DAILY AS NEEDED FOR ITCHING    fluticasone-umeclidin-vilant (TRELEGY ELLIPTA) 100-62.5-25 MCG/ACT AEPB inhaler Inhale 1 puff into the lungs daily    fluticasone (FLONASE) 50 MCG/ACT nasal spray Use 2 spray(s) in each nostril once daily    metoprolol tartrate (LOPRESSOR) 25 MG tablet Take 1 tablet by mouth twice daily    tamsulosin (FLOMAX) 0.4 MG capsule TAKE 1 CAPSULE BY MOUTH ONCE DAILY AFTER SUPPER    lactulose (CHRONULAC) 10 GM/15ML solution Take 45 mLs by mouth every evening    aspirin 81 MG EC tablet Take 1 tablet by mouth daily    azelastine (ASTELIN) 0.1 % nasal spray 2 sprays by Nasal route 2 times daily    latanoprost (XALATAN) 0.005 % ophthalmic solution INSTILL 1 DROP INTO EACH EYE AT BEDTIME    naloxone 4 MG/0.1ML LIQD nasal spray ADMINISTER A SINGLE SPRAY IN ONE NOSTRIL UPON SIGNS OF OPIOID OVERDOSE. CALL 911. REPEAT AFTER 3 MINUTES IF NO RESPONSE.    nitroGLYCERIN (NITROSTAT) 0.4 MG SL tablet Place 1 tablet under the tongue     No current facility-administered medications for this visit.             Review of Systems  Respiratory: negative for dyspnea on exertion  Cardiovascular: negative for chest pain    Objective:     Visit Vitals  BP (!) 154/91 (Site: Left Upper Arm, Position: Sitting, Cuff Size: Large Adult)   Pulse 67   Temp 98.1 °F (36.7 °C) (Temporal)   Resp 20   Ht 1.651 m (5' 5\")   Wt 118.8 kg (262 lb)   SpO2 95%   BMI 43.60 kg/m²       General appearance - alert, well appearing, and in no distress  Neck - supple, no significant adenopathy, carotids upstroke normal bilaterally, no bruits  Chest - clear to auscultation, no wheezes, rales or rhonchi, symmetric air entry  Heart - normal rate, regular rhythm, normal S1, S2, no murmurs, rubs,

## 2024-12-14 RX ORDER — METOPROLOL TARTRATE 25 MG/1
TABLET, FILM COATED ORAL
Qty: 180 TABLET | Refills: 3 | Status: SHIPPED | OUTPATIENT
Start: 2024-12-14

## 2025-01-20 ENCOUNTER — TELEPHONE (OUTPATIENT)
Facility: CLINIC | Age: 70
End: 2025-01-20

## 2025-01-20 DIAGNOSIS — M48.062 SPINAL STENOSIS, LUMBAR REGION WITH NEUROGENIC CLAUDICATION: ICD-10-CM

## 2025-01-20 RX ORDER — HYDROCODONE BITARTRATE AND ACETAMINOPHEN 5; 325 MG/1; MG/1
1 TABLET ORAL EVERY 8 HOURS PRN
Qty: 90 TABLET | Refills: 0 | Status: SHIPPED | OUTPATIENT
Start: 2025-01-20 | End: 2025-02-19

## 2025-01-20 NOTE — TELEPHONE ENCOUNTER
Patient requesting a refill on HYDROcodone-acetaminophen     [address] - [address] [place] [address] -  [phone] - F [phone] [[id number]]

## 2025-01-21 ENCOUNTER — TELEPHONE (OUTPATIENT)
Facility: CLINIC | Age: 70
End: 2025-01-21

## 2025-01-21 DIAGNOSIS — N40.1 BPH WITH OBSTRUCTION/LOWER URINARY TRACT SYMPTOMS: ICD-10-CM

## 2025-01-21 DIAGNOSIS — N13.8 BPH WITH OBSTRUCTION/LOWER URINARY TRACT SYMPTOMS: ICD-10-CM

## 2025-01-21 RX ORDER — TAMSULOSIN HYDROCHLORIDE 0.4 MG/1
CAPSULE ORAL
Qty: 90 CAPSULE | Refills: 3 | Status: SHIPPED | OUTPATIENT
Start: 2025-01-21

## 2025-01-21 NOTE — TELEPHONE ENCOUNTER
Patient contacted the office asking for a refill of a bladder medication but did not know the name of the prescription and is not home so he can not check what the name of the rx is

## 2025-03-07 RX ORDER — ROSUVASTATIN CALCIUM 5 MG/1
5 TABLET, COATED ORAL DAILY
Qty: 30 TABLET | Refills: 5 | Status: SHIPPED | OUTPATIENT
Start: 2025-03-07

## 2025-03-07 RX ORDER — FLUTICASONE PROPIONATE 50 MCG
SPRAY, SUSPENSION (ML) NASAL
Qty: 16 G | Refills: 5 | Status: SHIPPED | OUTPATIENT
Start: 2025-03-07

## 2025-03-10 DIAGNOSIS — E55.9 VITAMIN D DEFICIENCY: ICD-10-CM

## 2025-03-10 DIAGNOSIS — E78.00 PURE HYPERCHOLESTEROLEMIA, UNSPECIFIED: ICD-10-CM

## 2025-03-10 DIAGNOSIS — G89.29 CHRONIC MIDLINE LOW BACK PAIN WITHOUT SCIATICA: ICD-10-CM

## 2025-03-10 DIAGNOSIS — D50.0 IRON DEFICIENCY ANEMIA DUE TO CHRONIC BLOOD LOSS: ICD-10-CM

## 2025-03-10 DIAGNOSIS — Z79.891 CHRONIC PRESCRIPTION OPIATE USE: ICD-10-CM

## 2025-03-10 DIAGNOSIS — M54.50 CHRONIC MIDLINE LOW BACK PAIN WITHOUT SCIATICA: ICD-10-CM

## 2025-03-10 DIAGNOSIS — Z12.5 PROSTATE CANCER SCREENING: ICD-10-CM

## 2025-03-10 DIAGNOSIS — R73.03 PREDIABETES: ICD-10-CM

## 2025-03-10 DIAGNOSIS — I10 ESSENTIAL (PRIMARY) HYPERTENSION: ICD-10-CM

## 2025-03-14 LAB
25(OH)D3+25(OH)D2 SERPL-MCNC: 34.9 NG/ML (ref 30–100)
ALBUMIN SERPL-MCNC: 4 G/DL (ref 3.8–4.8)
ALP SERPL-CCNC: 91 IU/L (ref 44–121)
ALT SERPL-CCNC: 9 IU/L (ref 0–44)
AST SERPL-CCNC: 16 IU/L (ref 0–40)
BASOPHILS # BLD AUTO: 0.1 X10E3/UL (ref 0–0.2)
BASOPHILS NFR BLD AUTO: 1 %
BILIRUB SERPL-MCNC: <0.2 MG/DL (ref 0–1.2)
BUN SERPL-MCNC: 21 MG/DL (ref 8–27)
BUN/CREAT SERPL: 14 (ref 10–24)
CALCIUM SERPL-MCNC: 8.9 MG/DL (ref 8.6–10.2)
CHLORIDE SERPL-SCNC: 108 MMOL/L (ref 96–106)
CHOLEST SERPL-MCNC: 140 MG/DL (ref 100–199)
CO2 SERPL-SCNC: 20 MMOL/L (ref 20–29)
CREAT SERPL-MCNC: 1.52 MG/DL (ref 0.76–1.27)
EGFRCR SERPLBLD CKD-EPI 2021: 49 ML/MIN/1.73
EOSINOPHIL # BLD AUTO: 0.4 X10E3/UL (ref 0–0.4)
EOSINOPHIL NFR BLD AUTO: 6 %
ERYTHROCYTE [DISTWIDTH] IN BLOOD BY AUTOMATED COUNT: 13.3 % (ref 11.6–15.4)
GLOBULIN SER CALC-MCNC: 3 G/DL (ref 1.5–4.5)
GLUCOSE SERPL-MCNC: 117 MG/DL (ref 70–99)
HBA1C MFR BLD: 6.4 % (ref 4.8–5.6)
HCT VFR BLD AUTO: 37.6 % (ref 37.5–51)
HDLC SERPL-MCNC: 36 MG/DL
HGB BLD-MCNC: 12.3 G/DL (ref 13–17.7)
IMM GRANULOCYTES # BLD AUTO: 0 X10E3/UL (ref 0–0.1)
IMM GRANULOCYTES NFR BLD AUTO: 0 %
LDLC SERPL CALC-MCNC: 75 MG/DL (ref 0–99)
LYMPHOCYTES # BLD AUTO: 4 X10E3/UL (ref 0.7–3.1)
LYMPHOCYTES NFR BLD AUTO: 51 %
MCH RBC QN AUTO: 30.1 PG (ref 26.6–33)
MCHC RBC AUTO-ENTMCNC: 32.7 G/DL (ref 31.5–35.7)
MCV RBC AUTO: 92 FL (ref 79–97)
MONOCYTES # BLD AUTO: 0.9 X10E3/UL (ref 0.1–0.9)
MONOCYTES NFR BLD AUTO: 11 %
NEUTROPHILS # BLD AUTO: 2.4 X10E3/UL (ref 1.4–7)
NEUTROPHILS NFR BLD AUTO: 31 %
PLATELET # BLD AUTO: 183 X10E3/UL (ref 150–450)
POTASSIUM SERPL-SCNC: 4.5 MMOL/L (ref 3.5–5.2)
PROT SERPL-MCNC: 7 G/DL (ref 6–8.5)
PSA SERPL-MCNC: 0.3 NG/ML (ref 0–4)
RBC # BLD AUTO: 4.09 X10E6/UL (ref 4.14–5.8)
SODIUM SERPL-SCNC: 146 MMOL/L (ref 134–144)
TRIGL SERPL-MCNC: 168 MG/DL (ref 0–149)
VLDLC SERPL CALC-MCNC: 29 MG/DL (ref 5–40)
WBC # BLD AUTO: 7.7 X10E3/UL (ref 3.4–10.8)

## 2025-03-18 LAB — DRUGS UR: NORMAL

## 2025-03-24 ENCOUNTER — OFFICE VISIT (OUTPATIENT)
Facility: CLINIC | Age: 70
End: 2025-03-24

## 2025-03-24 VITALS
WEIGHT: 271 LBS | RESPIRATION RATE: 20 BRPM | HEIGHT: 65 IN | OXYGEN SATURATION: 95 % | BODY MASS INDEX: 45.15 KG/M2 | DIASTOLIC BLOOD PRESSURE: 80 MMHG | TEMPERATURE: 97.9 F | HEART RATE: 63 BPM | SYSTOLIC BLOOD PRESSURE: 144 MMHG

## 2025-03-24 DIAGNOSIS — G47.33 OBSTRUCTIVE SLEEP APNEA SYNDROME: ICD-10-CM

## 2025-03-24 DIAGNOSIS — M48.062 SPINAL STENOSIS, LUMBAR REGION WITH NEUROGENIC CLAUDICATION: ICD-10-CM

## 2025-03-24 DIAGNOSIS — R73.03 PREDIABETES: ICD-10-CM

## 2025-03-24 DIAGNOSIS — F11.99 OPIOID USE, UNSPECIFIED WITH UNSPECIFIED OPIOID-INDUCED DISORDER: ICD-10-CM

## 2025-03-24 DIAGNOSIS — E86.0 DEHYDRATION: ICD-10-CM

## 2025-03-24 DIAGNOSIS — Z00.00 MEDICARE ANNUAL WELLNESS VISIT, SUBSEQUENT: Primary | ICD-10-CM

## 2025-03-24 DIAGNOSIS — E78.00 PURE HYPERCHOLESTEROLEMIA, UNSPECIFIED: ICD-10-CM

## 2025-03-24 DIAGNOSIS — E55.9 VITAMIN D DEFICIENCY: ICD-10-CM

## 2025-03-24 DIAGNOSIS — I10 PRIMARY HYPERTENSION: ICD-10-CM

## 2025-03-24 DIAGNOSIS — J43.2 CENTRILOBULAR EMPHYSEMA (HCC): ICD-10-CM

## 2025-03-24 RX ORDER — TRAZODONE HYDROCHLORIDE 50 MG/1
50 TABLET ORAL NIGHTLY PRN
Qty: 30 TABLET | Refills: 2 | Status: SHIPPED | OUTPATIENT
Start: 2025-03-24

## 2025-03-24 SDOH — ECONOMIC STABILITY: FOOD INSECURITY: WITHIN THE PAST 12 MONTHS, YOU WORRIED THAT YOUR FOOD WOULD RUN OUT BEFORE YOU GOT MONEY TO BUY MORE.: PATIENT DECLINED

## 2025-03-24 SDOH — ECONOMIC STABILITY: FOOD INSECURITY: WITHIN THE PAST 12 MONTHS, THE FOOD YOU BOUGHT JUST DIDN'T LAST AND YOU DIDN'T HAVE MONEY TO GET MORE.: PATIENT DECLINED

## 2025-03-24 ASSESSMENT — PATIENT HEALTH QUESTIONNAIRE - PHQ9
1. LITTLE INTEREST OR PLEASURE IN DOING THINGS: NOT AT ALL
SUM OF ALL RESPONSES TO PHQ QUESTIONS 1-9: 0
2. FEELING DOWN, DEPRESSED OR HOPELESS: NOT AT ALL
SUM OF ALL RESPONSES TO PHQ QUESTIONS 1-9: 0

## 2025-03-24 NOTE — PROGRESS NOTES
Medicare Annual Wellness Visit    Lino Vega is here for Hypertension, Cholesterol Problem, Blood Sugar Problem (3 month follow up ), and Medicare AWV    Assessment & Plan   Medicare annual wellness visit, subsequent  Primary hypertension  -     Comprehensive Metabolic Panel; Future  -     Albumin/Creatinine Ratio, Urine; Future  Pure hypercholesterolemia, unspecified  -     Lipid Panel; Future  Prediabetes  -     Hemoglobin A1C; Future  Centrilobular emphysema (HCC)  Spinal stenosis, lumbar region with neurogenic claudication  Opioid use, unspecified with unspecified opioid-induced disorder (HCC)  Obstructive sleep apnea syndrome  -     Amb External Referral To Sleep Medicine  Body mass index [BMI] 45.0-49.9, adult (Z68.42)  Dehydration  -     CBC with Auto Differential; Future  Vitamin D deficiency  -     Vitamin D 25 Hydroxy; Future     Return in about 3 months (around 6/24/2025) for labs 1 week before.     Subjective       Patient's complete Health Risk Assessment and screening values have been reviewed and are found in Flowsheets. The following problems were reviewed today and where indicated follow up appointments were made and/or referrals ordered.    Positive Risk Factor Screenings with Interventions:                Abnormal BMI (obese):  Body mass index is 45.1 kg/m². (!) Abnormal  Interventions:  low carbohydrate diet             Advanced Directives:  Do you have a Living Will?: (!) No    Intervention:  has NO advanced directive - not interested in additional information        Tobacco Use:    Tobacco Use      Smoking status: Some Days        Packs/day: 0.10        Years: 0.1 packs/day for 10.0 years (1.0 ttl pk-yrs)        Types: Cigarettes        Passive exposure: Past      Smokeless tobacco: Never     Interventions:  Patient declined any further intervention or treatment                      Objective   Vitals:    03/24/25 1037 03/24/25 1057   BP: (!) 145/63 (!) 144/80   BP Site: Right Upper Arm Left

## 2025-03-24 NOTE — PROGRESS NOTES
Subjective:       Chief Complaint  The patient presents for follow up of hypertension and high cholesterol. prediabetes  chronic back pain and right hip pain, COPD        HPI  Lino Vega is a 69 y.o. male seen for follow up of hyperlipidemia.  Healso has hypertension. Hypertension uncontrolled, no significant medication side effects noted, on Lopressor 25 mg BID,  hyperlipidemia much better controlled on Crestor 5 mg which he is tolerating after having a history of myalgias with statins.  . The 10-year ASCVD risk score (Tamanna DK, et al., 2019) is: 37.5%     Diet and Lifestyle: Patient continues to struggle to lose weight.  He is advised to try and cut back on snacks and sugary foods.    Home BP Monitoring: is not measured at home.    Lino Vega RTC today to follow up on chronic pain diagnosis.  We discussed his osteoarthritis that is affecting his back.  Significant changes since last visit: pt is having neurogenic claudication and has pain with standing and walking in both legs.  He is  able to do his normal daily activities.  He reports the following adverse side effects: none. Pt doing fairly well with Norco 10/325 3x/day. He is seeing the [facility] and was referred for aqua therapy but did not tolerate it due to pain. His MRI of his LS shows worsening DJD so he had spinal injections with the  [facility] that were helpful. Lyrica has helped the neurogenic pain.   Patient will try and lose weight again to try and improve some of his back pain    Least pain over the last week has been 3/10.    Worst pain over the last week has been 10/10.    Opioid Risk Tool Reviewed: YES    Aberrant behaviors: None.      Urine Drug Screen: reviewed and up to date.     Controlled substance agreement on file: YES.       reviewed:yes    Pill count is consistent with his prescription: yes    Concomitant use of a benzodiazepine: no        Prediabetes: progressing due to weight gain. If not improving may need to consider

## 2025-03-24 NOTE — PROGRESS NOTES
Lino Vega presents today for   Chief Complaint   Patient presents with    Hypertension    Cholesterol Problem    Blood Sugar Problem     3 month follow up     Medicare AWV     Patient states his eyes are bothering him because of allergies.       \"Have you been to the ER, urgent care clinic since your last visit?  Hospitalized since your last visit?\"    NO    “Have you seen or consulted any other health care providers outside of [facility] since your last visit?”    NO

## 2025-03-25 DIAGNOSIS — M54.16 LUMBAR RADICULITIS: ICD-10-CM

## 2025-03-25 DIAGNOSIS — M48.062 SPINAL STENOSIS, LUMBAR REGION WITH NEUROGENIC CLAUDICATION: ICD-10-CM

## 2025-03-25 RX ORDER — PREGABALIN 150 MG/1
CAPSULE ORAL
Qty: 90 CAPSULE | Refills: 5 | Status: SHIPPED | OUTPATIENT
Start: 2025-03-25 | End: 2025-08-25

## 2025-03-25 NOTE — TELEPHONE ENCOUNTER
[facility] reviewed 3/25/2026     The last fill date for Pregabalin 150 Mg Capsule  was 2/5/2025 for a 30 d/s qty 90      Last UDS: completed     CSA Last signed: 4/3/2024         PCP: Doni Ross MD    Last appt:  3/25/2025  Future Appointments   Date Time Provider Department Center   6/18/2025  9:45 AM IOC LAB VISIT [facility] DEP   6/25/2025  9:20 AM Doni Ross MD [facility] DEP       Requested Prescriptions     Pending Prescriptions Disp Refills    pregabalin (LYRICA) 150 MG capsule [Pharmacy Med Name: Pregabalin 150 MG Oral Capsule] 90 capsule 0     Sig: TAKE 1 CAPSULE BY MOUTH THREE TIMES DAILY FOR NEUROPATHIC SYMPTOMS. DO NOT EXCEED 3 PER 24 HOURS.

## 2025-05-08 RX ORDER — FLUTICASONE FUROATE, UMECLIDINIUM BROMIDE AND VILANTEROL TRIFENATATE 100; 62.5; 25 UG/1; UG/1; UG/1
POWDER RESPIRATORY (INHALATION)
Qty: 60 EACH | Refills: 5 | Status: SHIPPED | OUTPATIENT
Start: 2025-05-08

## 2025-06-02 DIAGNOSIS — K29.50 OTHER CHRONIC GASTRITIS WITHOUT HEMORRHAGE: ICD-10-CM

## 2025-06-02 RX ORDER — PANTOPRAZOLE SODIUM 40 MG/1
40 TABLET, DELAYED RELEASE ORAL DAILY
Qty: 30 TABLET | Refills: 5 | Status: SHIPPED | OUTPATIENT
Start: 2025-06-02

## 2025-06-12 ENCOUNTER — TELEPHONE (OUTPATIENT)
Facility: CLINIC | Age: 70
End: 2025-06-12

## 2025-06-12 DIAGNOSIS — G89.29 CHRONIC MIDLINE LOW BACK PAIN WITHOUT SCIATICA: ICD-10-CM

## 2025-06-12 DIAGNOSIS — M54.50 CHRONIC MIDLINE LOW BACK PAIN WITHOUT SCIATICA: ICD-10-CM

## 2025-06-12 RX ORDER — HYDROCODONE BITARTRATE AND ACETAMINOPHEN 10; 325 MG/1; MG/1
1 TABLET ORAL EVERY 8 HOURS PRN
Qty: 90 TABLET | Refills: 0 | Status: SHIPPED | OUTPATIENT
Start: 2025-06-12 | End: 2025-07-12

## 2025-06-12 NOTE — TELEPHONE ENCOUNTER
PCP: Doni Ross MD    Refill Request     LAST OFFICE VISIT: 12/10/24      Medication:   HYDROcodone-acetaminophen (NORCO) 5-325 MG per tablet     Dispense: 30      Pharmacy [address] - P [phone] - F [phone] [address] [facility] [id number]  Phone: [phone]  Fax: [phone]       Future Appointments   Date Time Provider Department Center   6/18/2025  9:45 AM IOC LAB VISIT [facility] DEP   6/25/2025  9:20 AM Doni Ross MD [facility] DEP

## 2025-06-20 ENCOUNTER — LAB (OUTPATIENT)
Facility: CLINIC | Age: 70
End: 2025-06-20

## 2025-06-20 DIAGNOSIS — E86.0 DEHYDRATION: ICD-10-CM

## 2025-06-20 DIAGNOSIS — E78.00 PURE HYPERCHOLESTEROLEMIA, UNSPECIFIED: ICD-10-CM

## 2025-06-20 DIAGNOSIS — E55.9 VITAMIN D DEFICIENCY: ICD-10-CM

## 2025-06-20 DIAGNOSIS — I10 PRIMARY HYPERTENSION: ICD-10-CM

## 2025-06-20 DIAGNOSIS — R73.03 PREDIABETES: ICD-10-CM

## 2025-06-21 LAB
25(OH)D3+25(OH)D2 SERPL-MCNC: 40 NG/ML (ref 30–100)
ALBUMIN SERPL-MCNC: 3.9 G/DL (ref 3.8–4.8)
ALBUMIN/CREAT UR: 7 MG/G CREAT (ref 0–29)
ALP SERPL-CCNC: 96 IU/L (ref 44–121)
ALT SERPL-CCNC: 13 IU/L (ref 0–44)
AST SERPL-CCNC: 14 IU/L (ref 0–40)
BASOPHILS # BLD AUTO: 0.1 X10E3/UL (ref 0–0.2)
BASOPHILS NFR BLD AUTO: 1 %
BILIRUB SERPL-MCNC: 0.3 MG/DL (ref 0–1.2)
BUN SERPL-MCNC: 15 MG/DL (ref 8–27)
BUN/CREAT SERPL: 13 (ref 10–24)
CALCIUM SERPL-MCNC: 9.1 MG/DL (ref 8.6–10.2)
CHLORIDE SERPL-SCNC: 105 MMOL/L (ref 96–106)
CHOLEST SERPL-MCNC: 127 MG/DL (ref 100–199)
CO2 SERPL-SCNC: 21 MMOL/L (ref 20–29)
CREAT SERPL-MCNC: 1.2 MG/DL (ref 0.76–1.27)
CREAT UR-MCNC: 129.3 MG/DL
EGFRCR SERPLBLD CKD-EPI 2021: 65 ML/MIN/1.73
EOSINOPHIL # BLD AUTO: 0.4 X10E3/UL (ref 0–0.4)
EOSINOPHIL NFR BLD AUTO: 5 %
ERYTHROCYTE [DISTWIDTH] IN BLOOD BY AUTOMATED COUNT: 13.4 % (ref 11.6–15.4)
GLOBULIN SER CALC-MCNC: 3 G/DL (ref 1.5–4.5)
GLUCOSE SERPL-MCNC: 107 MG/DL (ref 70–99)
HCT VFR BLD AUTO: 37 % (ref 37.5–51)
HDLC SERPL-MCNC: 35 MG/DL
HGB BLD-MCNC: 11.9 G/DL (ref 13–17.7)
IMM GRANULOCYTES # BLD AUTO: 0 X10E3/UL (ref 0–0.1)
IMM GRANULOCYTES NFR BLD AUTO: 0 %
LDLC SERPL CALC-MCNC: 73 MG/DL (ref 0–99)
LYMPHOCYTES # BLD AUTO: 3.8 X10E3/UL (ref 0.7–3.1)
LYMPHOCYTES NFR BLD AUTO: 45 %
MCH RBC QN AUTO: 29.9 PG (ref 26.6–33)
MCHC RBC AUTO-ENTMCNC: 32.2 G/DL (ref 31.5–35.7)
MCV RBC AUTO: 93 FL (ref 79–97)
MICROALBUMIN UR-MCNC: 8.9 UG/ML
MONOCYTES # BLD AUTO: 1 X10E3/UL (ref 0.1–0.9)
MONOCYTES NFR BLD AUTO: 12 %
NEUTROPHILS # BLD AUTO: 3.1 X10E3/UL (ref 1.4–7)
NEUTROPHILS NFR BLD AUTO: 37 %
PLATELET # BLD AUTO: 196 X10E3/UL (ref 150–450)
POTASSIUM SERPL-SCNC: 4.7 MMOL/L (ref 3.5–5.2)
PROT SERPL-MCNC: 6.9 G/DL (ref 6–8.5)
RBC # BLD AUTO: 3.98 X10E6/UL (ref 4.14–5.8)
SODIUM SERPL-SCNC: 140 MMOL/L (ref 134–144)
TRIGL SERPL-MCNC: 101 MG/DL (ref 0–149)
VLDLC SERPL CALC-MCNC: 19 MG/DL (ref 5–40)
WBC # BLD AUTO: 8.4 X10E3/UL (ref 3.4–10.8)

## 2025-06-23 LAB
EST. AVERAGE GLUCOSE BLD GHB EST-MCNC: 131 MG/DL
HBA1C MFR BLD: 6.2 %HB

## 2025-06-25 ENCOUNTER — OFFICE VISIT (OUTPATIENT)
Facility: CLINIC | Age: 70
End: 2025-06-25
Payer: MEDICARE

## 2025-06-25 VITALS
TEMPERATURE: 98.7 F | HEART RATE: 66 BPM | HEIGHT: 65 IN | BODY MASS INDEX: 44.32 KG/M2 | WEIGHT: 266 LBS | SYSTOLIC BLOOD PRESSURE: 138 MMHG | RESPIRATION RATE: 20 BRPM | OXYGEN SATURATION: 97 % | DIASTOLIC BLOOD PRESSURE: 65 MMHG

## 2025-06-25 DIAGNOSIS — G47.33 OBSTRUCTIVE SLEEP APNEA SYNDROME: ICD-10-CM

## 2025-06-25 DIAGNOSIS — D50.0 IRON DEFICIENCY ANEMIA DUE TO CHRONIC BLOOD LOSS: ICD-10-CM

## 2025-06-25 DIAGNOSIS — J43.2 CENTRILOBULAR EMPHYSEMA (HCC): ICD-10-CM

## 2025-06-25 DIAGNOSIS — E55.9 VITAMIN D DEFICIENCY: ICD-10-CM

## 2025-06-25 DIAGNOSIS — M79.89 BILATERAL HAND SWELLING: ICD-10-CM

## 2025-06-25 DIAGNOSIS — R73.03 PREDIABETES: ICD-10-CM

## 2025-06-25 DIAGNOSIS — I10 PRIMARY HYPERTENSION: Primary | ICD-10-CM

## 2025-06-25 DIAGNOSIS — F11.99 OPIOID USE, UNSPECIFIED WITH UNSPECIFIED OPIOID-INDUCED DISORDER (HCC): ICD-10-CM

## 2025-06-25 DIAGNOSIS — E66.813 CLASS 3 SEVERE OBESITY DUE TO EXCESS CALORIES WITH SERIOUS COMORBIDITY AND BODY MASS INDEX (BMI) OF 40.0 TO 44.9 IN ADULT (HCC): ICD-10-CM

## 2025-06-25 DIAGNOSIS — E78.00 PURE HYPERCHOLESTEROLEMIA, UNSPECIFIED: ICD-10-CM

## 2025-06-25 DIAGNOSIS — G89.29 CHRONIC MIDLINE LOW BACK PAIN WITHOUT SCIATICA: ICD-10-CM

## 2025-06-25 DIAGNOSIS — M54.50 CHRONIC MIDLINE LOW BACK PAIN WITHOUT SCIATICA: ICD-10-CM

## 2025-06-25 PROCEDURE — 1160F RVW MEDS BY RX/DR IN RCRD: CPT | Performed by: INTERNAL MEDICINE

## 2025-06-25 PROCEDURE — 3078F DIAST BP <80 MM HG: CPT | Performed by: INTERNAL MEDICINE

## 2025-06-25 PROCEDURE — 3075F SYST BP GE 130 - 139MM HG: CPT | Performed by: INTERNAL MEDICINE

## 2025-06-25 PROCEDURE — 1125F AMNT PAIN NOTED PAIN PRSNT: CPT | Performed by: INTERNAL MEDICINE

## 2025-06-25 PROCEDURE — 1123F ACP DISCUSS/DSCN MKR DOCD: CPT | Performed by: INTERNAL MEDICINE

## 2025-06-25 PROCEDURE — 99214 OFFICE O/P EST MOD 30 MIN: CPT | Performed by: INTERNAL MEDICINE

## 2025-06-25 PROCEDURE — 1159F MED LIST DOCD IN RCRD: CPT | Performed by: INTERNAL MEDICINE

## 2025-06-25 RX ORDER — PREDNISONE 10 MG/1
TABLET ORAL
Qty: 1 EACH | Refills: 0 | Status: SHIPPED | OUTPATIENT
Start: 2025-06-25

## 2025-06-25 NOTE — PROGRESS NOTES
Lino Vega presents today for   Chief Complaint   Patient presents with    Hypertension    Cholesterol Problem    Blood Sugar Problem     3 month follow up     Joint Swelling     Left wrist swelling           \"Have you been to the ER, urgent care clinic since your last visit?  Hospitalized since your last visit?\"    NO    “Have you seen or consulted any other health care providers outside of [facility] since your last visit?”    NO             
low back pain without sciatica  M54.50 Fairly stable on Norco.  consistent and UDS UTD     G89.29       5. Opioid use, unspecified with unspecified opioid-induced disorder (HCC)  F11.99 Pt stable on [place] and does not show any signs of mis use.      6. Iron deficiency anemia due to chronic blood loss  D50.0 Fairly stable. Will continue Protonix for gastritis and continue to monitor CBC with Auto Differential      7. Obstructive sleep apnea syndrome  G47.33 Uncontrolled off CPAP. Was seen previously by Dr Hernandez Amb External Referral To Sleep Medicine      8. Class 3 severe obesity due to excess calories with serious comorbidity and body mass index (BMI) of 40.0 to 44.9 in adult ([facility])  E66.813 Patient continues to struggle to lose weight.  He will try to cut back starches and sugars in his diet    Z68.41       9. Bilateral hand swelling  M79.89 Will treat with predniSONE 10 MG (21) TBPK     [facility] - Lewis Navarro DO, Orthopedic Surgery(Hand, Elbow & Wrist), [place] [facility] for further evaluation.  Concerned about gout and will check uric acid with next set of labs      10. Centrilobular emphysema (HCC)  J43.2 Stable on Trelegy Ellipta      11. Vitamin D deficiency  E55.9 Well-controlled vitamin D 5000's per day Vitamin D 25 Hydroxy                   Low cholesterol diet, weight control and daily exercise discussed.     Follow-up and Dispositions    Return in about 4 months (around 10/25/2025) for labs 1 week before.             Reviewed plan of care. Patient has provided input and agrees with goals.

## 2025-07-03 ENCOUNTER — TELEPHONE (OUTPATIENT)
Facility: CLINIC | Age: 70
End: 2025-07-03

## 2025-07-03 NOTE — TELEPHONE ENCOUNTER
Pt called in and requested last AVS from 6/25/25 and sleep referral to be mailed to address on file. Mailed to Pt.

## 2025-07-28 RX ORDER — CYCLOBENZAPRINE HCL 10 MG
10 TABLET ORAL
Qty: 90 TABLET | Refills: 2 | Status: SHIPPED | OUTPATIENT
Start: 2025-07-28

## 2025-07-30 RX ORDER — HYDROXYZINE PAMOATE 25 MG/1
CAPSULE ORAL
Qty: 60 CAPSULE | Refills: 2 | Status: SHIPPED | OUTPATIENT
Start: 2025-07-30

## 2025-08-26 ENCOUNTER — TELEPHONE (OUTPATIENT)
Facility: CLINIC | Age: 70
End: 2025-08-26

## 2025-08-26 DIAGNOSIS — M48.062 SPINAL STENOSIS, LUMBAR REGION WITH NEUROGENIC CLAUDICATION: ICD-10-CM

## 2025-08-26 RX ORDER — HYDROCODONE BITARTRATE AND ACETAMINOPHEN 5; 325 MG/1; MG/1
1 TABLET ORAL EVERY 8 HOURS PRN
Qty: 90 TABLET | Refills: 0 | Status: SHIPPED | OUTPATIENT
Start: 2025-08-26 | End: 2025-09-25

## 2025-08-26 RX ORDER — HYDROCODONE BITARTRATE AND ACETAMINOPHEN 10; 325 MG/1; MG/1
1 TABLET ORAL
Refills: 0 | Status: CANCELLED | OUTPATIENT
Start: 2025-08-26

## 2025-09-02 RX ORDER — ROSUVASTATIN CALCIUM 5 MG/1
5 TABLET, COATED ORAL DAILY
Qty: 90 TABLET | Refills: 1 | Status: SHIPPED | OUTPATIENT
Start: 2025-09-02

## (undated) DEVICE — SYRINGE MED 10ML LUERLOCK TIP W/O SFTY DISP

## (undated) DEVICE — BITE BLOCK ENDOSCP UNIV AD 6 TO 9.4 MM

## (undated) DEVICE — GOWN PLASTIC FILM THMBHKS UNIV BLUE: Brand: CARDINAL HEALTH

## (undated) DEVICE — UNDERPAD INCONT W23XL36IN STD BLU POLYPR BK FLUF SFT

## (undated) DEVICE — TRAP EVAC NO 5500 DISPOS-A-TRAP

## (undated) DEVICE — SYRINGE MED 50ML LUERSLIP TIP

## (undated) DEVICE — FORCEPS BX L240CM JAW DIA2.4MM ORNG L CAP W/ NDL DISP RAD

## (undated) DEVICE — CANNULA ORIG TL CLR W FOAM CUSHIONS AND 14FT SUPL TB 3 CHN

## (undated) DEVICE — SYRINGE MED 25GA 3ML L5/8IN SUBQ PLAS W/ DETACH NDL SFTY

## (undated) DEVICE — SYR 10ML LUER LOK 1/5ML GRAD --

## (undated) DEVICE — SYRINGE 20ML LL S/C 50

## (undated) DEVICE — GAUZE,SPONGE,4"X4",16PLY,STRL,LF,10/TRAY: Brand: MEDLINE

## (undated) DEVICE — CATHETER SUCT TR FL TIP 14FR W/ O CTRL

## (undated) DEVICE — STERILE POLYISOPRENE POWDER-FREE SURGICAL GLOVES: Brand: PROTEXIS

## (undated) DEVICE — MEDIA CONTRAST 10ML 200MG/ML 41%

## (undated) DEVICE — BASIN EMSIS 16OZ GRAPHITE PLAS KID SHP MOLD GRAD FOR ORAL

## (undated) DEVICE — YANKAUER,SMOOTH HANDLE,HIGH CAPACITY: Brand: MEDLINE INDUSTRIES, INC.

## (undated) DEVICE — ENDOSCOPY PUMP TUBING/ CAP SET: Brand: ERBE

## (undated) DEVICE — LINER SUCT CANSTR 3000CC PLAS SFT PRE ASSEMB W/OUT TBNG W/

## (undated) DEVICE — SNARE VASC L240CM LOOP W10MM SHTH DIA2.4MM RND STIFF CLD

## (undated) DEVICE — MEDI-VAC NON-CONDUCTIVE SUCTION TUBING: Brand: CARDINAL HEALTH

## (undated) DEVICE — BANDAGE ADH W0.75XL3IN UNIV WVN FAB NAT GEN USE STRP N ADH

## (undated) DEVICE — Device: Brand: MEDEX

## (undated) DEVICE — CANNULA NSL AD TBNG L14FT STD PVC O2 CRV CONN NONFLARED NSL

## (undated) DEVICE — SET EPI 18GA L3.5IN TUOHY NDL W/ 20GA CLS TIP NYL CATH

## (undated) DEVICE — SOLUTION IRRIG 1000ML H2O STRL BLT

## (undated) DEVICE — NEEDLE EPI 18GA TUOHY WNG W/ CLR HUB PERIFIX